# Patient Record
Sex: FEMALE | Employment: UNEMPLOYED | URBAN - METROPOLITAN AREA
[De-identification: names, ages, dates, MRNs, and addresses within clinical notes are randomized per-mention and may not be internally consistent; named-entity substitution may affect disease eponyms.]

---

## 2017-01-01 ENCOUNTER — APPOINTMENT (OUTPATIENT)
Dept: GENERAL RADIOLOGY | Age: 57
DRG: 710 | End: 2017-01-01
Attending: HOSPITALIST
Payer: MEDICAID

## 2017-01-01 ENCOUNTER — ANESTHESIA (OUTPATIENT)
Dept: SURGERY | Age: 57
DRG: 710 | End: 2017-01-01
Payer: MEDICAID

## 2017-01-01 ENCOUNTER — HOME CARE VISIT (OUTPATIENT)
Dept: SCHEDULING | Facility: HOME HEALTH | Age: 57
End: 2017-01-01
Payer: MEDICAID

## 2017-01-01 ENCOUNTER — HOSPICE ADMISSION (OUTPATIENT)
Dept: HOSPICE | Facility: HOSPICE | Age: 57
End: 2017-01-01
Payer: MEDICAID

## 2017-01-01 ENCOUNTER — APPOINTMENT (OUTPATIENT)
Dept: GENERAL RADIOLOGY | Age: 57
DRG: 710 | End: 2017-01-01
Attending: INTERNAL MEDICINE
Payer: MEDICAID

## 2017-01-01 ENCOUNTER — APPOINTMENT (OUTPATIENT)
Dept: GENERAL RADIOLOGY | Age: 57
DRG: 710 | End: 2017-01-01
Attending: UROLOGY
Payer: MEDICAID

## 2017-01-01 ENCOUNTER — ANESTHESIA EVENT (OUTPATIENT)
Dept: ICU | Age: 57
DRG: 710 | End: 2017-01-01
Payer: MEDICAID

## 2017-01-01 ENCOUNTER — APPOINTMENT (OUTPATIENT)
Dept: CT IMAGING | Age: 57
DRG: 710 | End: 2017-01-01
Attending: FAMILY MEDICINE
Payer: MEDICAID

## 2017-01-01 ENCOUNTER — APPOINTMENT (OUTPATIENT)
Dept: GENERAL RADIOLOGY | Age: 57
DRG: 710 | End: 2017-01-01
Attending: NURSE PRACTITIONER
Payer: MEDICAID

## 2017-01-01 ENCOUNTER — APPOINTMENT (OUTPATIENT)
Dept: CT IMAGING | Age: 57
DRG: 710 | End: 2017-01-01
Attending: HOSPITALIST
Payer: MEDICAID

## 2017-01-01 ENCOUNTER — APPOINTMENT (OUTPATIENT)
Dept: GENERAL RADIOLOGY | Age: 57
End: 2017-01-01
Attending: EMERGENCY MEDICINE
Payer: MEDICAID

## 2017-01-01 ENCOUNTER — HOSPITAL ENCOUNTER (INPATIENT)
Age: 57
LOS: 28 days | Discharge: HOME HEALTH | DRG: 710 | End: 2017-03-31
Attending: EMERGENCY MEDICINE | Admitting: FAMILY MEDICINE
Payer: MEDICAID

## 2017-01-01 ENCOUNTER — ANESTHESIA (OUTPATIENT)
Dept: ICU | Age: 57
DRG: 710 | End: 2017-01-01
Payer: MEDICAID

## 2017-01-01 ENCOUNTER — ANESTHESIA EVENT (OUTPATIENT)
Dept: SURGERY | Age: 57
DRG: 710 | End: 2017-01-01
Payer: MEDICAID

## 2017-01-01 ENCOUNTER — HOSPITAL ENCOUNTER (EMERGENCY)
Age: 57
Discharge: HOME | End: 2017-05-06
Attending: EMERGENCY MEDICINE
Payer: MEDICAID

## 2017-01-01 ENCOUNTER — HOME CARE VISIT (OUTPATIENT)
Dept: HOSPICE | Facility: HOSPICE | Age: 57
End: 2017-01-01
Payer: MEDICAID

## 2017-01-01 ENCOUNTER — HOSPITAL ENCOUNTER (INPATIENT)
Age: 57
LOS: 10 days | Discharge: NURSING, REHAB OR LONG-TERM CARE | DRG: 720 | End: 2017-06-30
Attending: EMERGENCY MEDICINE | Admitting: FAMILY MEDICINE
Payer: MEDICAID

## 2017-01-01 ENCOUNTER — APPOINTMENT (OUTPATIENT)
Dept: GENERAL RADIOLOGY | Age: 57
DRG: 710 | End: 2017-01-01
Attending: EMERGENCY MEDICINE
Payer: MEDICAID

## 2017-01-01 ENCOUNTER — APPOINTMENT (OUTPATIENT)
Dept: CT IMAGING | Age: 57
DRG: 720 | End: 2017-01-01
Attending: EMERGENCY MEDICINE
Payer: MEDICAID

## 2017-01-01 ENCOUNTER — APPOINTMENT (OUTPATIENT)
Dept: GENERAL RADIOLOGY | Age: 57
DRG: 720 | End: 2017-01-01
Attending: EMERGENCY MEDICINE
Payer: MEDICAID

## 2017-01-01 ENCOUNTER — HOSPITAL ENCOUNTER (EMERGENCY)
Age: 57
LOS: 1 days | End: 2017-07-07
Attending: EMERGENCY MEDICINE | Admitting: INTERNAL MEDICINE
Payer: MEDICAID

## 2017-01-01 ENCOUNTER — APPOINTMENT (OUTPATIENT)
Dept: CT IMAGING | Age: 57
DRG: 710 | End: 2017-01-01
Attending: EMERGENCY MEDICINE
Payer: MEDICAID

## 2017-01-01 VITALS
WEIGHT: 246 LBS | RESPIRATION RATE: 7 BRPM | BODY MASS INDEX: 38.53 KG/M2 | TEMPERATURE: 97.4 F | HEART RATE: 109 BPM | SYSTOLIC BLOOD PRESSURE: 121 MMHG | DIASTOLIC BLOOD PRESSURE: 17 MMHG | OXYGEN SATURATION: 67 %

## 2017-01-01 VITALS
SYSTOLIC BLOOD PRESSURE: 133 MMHG | WEIGHT: 246.91 LBS | OXYGEN SATURATION: 96 % | TEMPERATURE: 97.7 F | DIASTOLIC BLOOD PRESSURE: 81 MMHG | BODY MASS INDEX: 42.15 KG/M2 | HEART RATE: 87 BPM | HEIGHT: 64 IN | RESPIRATION RATE: 14 BRPM

## 2017-01-01 VITALS
DIASTOLIC BLOOD PRESSURE: 71 MMHG | HEIGHT: 63 IN | SYSTOLIC BLOOD PRESSURE: 106 MMHG | RESPIRATION RATE: 17 BRPM | WEIGHT: 231 LBS | OXYGEN SATURATION: 98 % | BODY MASS INDEX: 40.93 KG/M2 | HEART RATE: 86 BPM | TEMPERATURE: 98.1 F

## 2017-01-01 VITALS — HEART RATE: 80 BPM | OXYGEN SATURATION: 92 % | RESPIRATION RATE: 18 BRPM | TEMPERATURE: 98.2 F

## 2017-01-01 VITALS
HEIGHT: 64 IN | BODY MASS INDEX: 39.52 KG/M2 | TEMPERATURE: 98.2 F | OXYGEN SATURATION: 97 % | DIASTOLIC BLOOD PRESSURE: 77 MMHG | SYSTOLIC BLOOD PRESSURE: 116 MMHG | WEIGHT: 231.48 LBS | HEART RATE: 92 BPM | RESPIRATION RATE: 21 BRPM

## 2017-01-01 VITALS
OXYGEN SATURATION: 94 % | HEART RATE: 79 BPM | RESPIRATION RATE: 18 BRPM | TEMPERATURE: 97.3 F | SYSTOLIC BLOOD PRESSURE: 130 MMHG | HEIGHT: 67 IN | DIASTOLIC BLOOD PRESSURE: 70 MMHG

## 2017-01-01 DIAGNOSIS — N17.9 ACUTE RENAL FAILURE, UNSPECIFIED ACUTE RENAL FAILURE TYPE (HCC): ICD-10-CM

## 2017-01-01 DIAGNOSIS — N39.0 RECURRENT UTI: Primary | ICD-10-CM

## 2017-01-01 DIAGNOSIS — I95.9 HYPOTENSION, UNSPECIFIED HYPOTENSION TYPE: ICD-10-CM

## 2017-01-01 DIAGNOSIS — J96.20 ACUTE ON CHRONIC RESPIRATORY FAILURE, UNSPECIFIED WHETHER WITH HYPOXIA OR HYPERCAPNIA (HCC): ICD-10-CM

## 2017-01-01 DIAGNOSIS — R65.21 SEPTIC SHOCK (HCC): Primary | ICD-10-CM

## 2017-01-01 DIAGNOSIS — N39.0 ACUTE UTI (URINARY TRACT INFECTION): Primary | ICD-10-CM

## 2017-01-01 DIAGNOSIS — N30.01 ACUTE CYSTITIS WITH HEMATURIA: ICD-10-CM

## 2017-01-01 DIAGNOSIS — F41.9 ANXIETY: Chronic | ICD-10-CM

## 2017-01-01 DIAGNOSIS — A41.9 SEPTIC SHOCK (HCC): Primary | ICD-10-CM

## 2017-01-01 DIAGNOSIS — L98.429 STAGE 4 SKIN ULCER OF SACRAL REGION (HCC): ICD-10-CM

## 2017-01-01 DIAGNOSIS — C54.1 ENDOMETRIAL CANCER (HCC): ICD-10-CM

## 2017-01-01 DIAGNOSIS — E87.29 HIGH ANION GAP METABOLIC ACIDOSIS: ICD-10-CM

## 2017-01-01 DIAGNOSIS — S31.000D WOUND OF SACRAL REGION, SUBSEQUENT ENCOUNTER: ICD-10-CM

## 2017-01-01 DIAGNOSIS — R65.21 SEVERE SEPSIS WITH SEPTIC SHOCK (CODE) (HCC): ICD-10-CM

## 2017-01-01 LAB
ABO + RH BLD: NORMAL
ALBUMIN SERPL BCP-MCNC: 1.1 G/DL (ref 3.4–5)
ALBUMIN SERPL BCP-MCNC: 1.4 G/DL (ref 3.4–5)
ALBUMIN SERPL BCP-MCNC: 1.4 G/DL (ref 3.4–5)
ALBUMIN SERPL BCP-MCNC: 1.5 G/DL (ref 3.4–5)
ALBUMIN SERPL BCP-MCNC: 1.6 G/DL (ref 3.4–5)
ALBUMIN SERPL BCP-MCNC: 1.9 G/DL (ref 3.4–5)
ALBUMIN SERPL BCP-MCNC: 2 G/DL (ref 3.4–5)
ALBUMIN SERPL BCP-MCNC: 2.7 G/DL (ref 3.4–5)
ALBUMIN SERPL BCP-MCNC: 2.9 G/DL (ref 3.4–5)
ALBUMIN SERPL BCP-MCNC: 3 G/DL (ref 3.4–5)
ALBUMIN SERPL BCP-MCNC: 3 G/DL (ref 3.4–5)
ALBUMIN SERPL BCP-MCNC: 3.4 G/DL (ref 3.4–5)
ALBUMIN SERPL BCP-MCNC: 3.9 G/DL (ref 3.4–5)
ALBUMIN/GLOB SERPL: 0.2 {RATIO} (ref 0.8–1.7)
ALBUMIN/GLOB SERPL: 0.3 {RATIO} (ref 0.8–1.7)
ALBUMIN/GLOB SERPL: 0.4 {RATIO} (ref 0.8–1.7)
ALBUMIN/GLOB SERPL: 0.4 {RATIO} (ref 0.8–1.7)
ALBUMIN/GLOB SERPL: 0.5 {RATIO} (ref 0.8–1.7)
ALBUMIN/GLOB SERPL: 0.7 {RATIO} (ref 0.8–1.7)
ALBUMIN/GLOB SERPL: 0.8 {RATIO} (ref 0.8–1.7)
ALBUMIN/GLOB SERPL: 0.9 {RATIO} (ref 0.8–1.7)
ALP SERPL-CCNC: 141 U/L (ref 45–117)
ALP SERPL-CCNC: 144 U/L (ref 45–117)
ALP SERPL-CCNC: 146 U/L (ref 45–117)
ALP SERPL-CCNC: 163 U/L (ref 45–117)
ALP SERPL-CCNC: 191 U/L (ref 45–117)
ALP SERPL-CCNC: 198 U/L (ref 45–117)
ALP SERPL-CCNC: 227 U/L (ref 45–117)
ALP SERPL-CCNC: 231 U/L (ref 45–117)
ALP SERPL-CCNC: 232 U/L (ref 45–117)
ALP SERPL-CCNC: 294 U/L (ref 45–117)
ALP SERPL-CCNC: 370 U/L (ref 45–117)
ALP SERPL-CCNC: 450 U/L (ref 45–117)
ALT SERPL-CCNC: 10 U/L (ref 13–56)
ALT SERPL-CCNC: 10 U/L (ref 13–56)
ALT SERPL-CCNC: 12 U/L (ref 13–56)
ALT SERPL-CCNC: 20 U/L (ref 13–56)
ALT SERPL-CCNC: 21 U/L (ref 13–56)
ALT SERPL-CCNC: 21 U/L (ref 13–56)
ALT SERPL-CCNC: 22 U/L (ref 13–56)
ALT SERPL-CCNC: 22 U/L (ref 13–56)
ALT SERPL-CCNC: 26 U/L (ref 13–56)
ALT SERPL-CCNC: 28 U/L (ref 13–56)
ALT SERPL-CCNC: 42 U/L (ref 13–56)
ALT SERPL-CCNC: 46 U/L (ref 13–56)
ANION GAP BLD CALC-SCNC: 10 MMOL/L (ref 3–18)
ANION GAP BLD CALC-SCNC: 11 MMOL/L (ref 3–18)
ANION GAP BLD CALC-SCNC: 12 MMOL/L (ref 3–18)
ANION GAP BLD CALC-SCNC: 12 MMOL/L (ref 3–18)
ANION GAP BLD CALC-SCNC: 13 MMOL/L (ref 3–18)
ANION GAP BLD CALC-SCNC: 14 MMOL/L (ref 3–18)
ANION GAP BLD CALC-SCNC: 15 MMOL/L (ref 3–18)
ANION GAP BLD CALC-SCNC: 16 MMOL/L (ref 3–18)
ANION GAP BLD CALC-SCNC: 17 MMOL/L (ref 10–20)
ANION GAP BLD CALC-SCNC: 17 MMOL/L (ref 3–18)
ANION GAP BLD CALC-SCNC: 17 MMOL/L (ref 3–18)
ANION GAP BLD CALC-SCNC: 24 MMOL/L (ref 3–18)
ANION GAP BLD CALC-SCNC: 26 MMOL/L (ref 3–18)
ANION GAP BLD CALC-SCNC: 5 MMOL/L (ref 3–18)
ANION GAP BLD CALC-SCNC: 6 MMOL/L (ref 3–18)
ANION GAP BLD CALC-SCNC: 7 MMOL/L (ref 3–18)
ANION GAP BLD CALC-SCNC: 7 MMOL/L (ref 3–18)
ANION GAP BLD CALC-SCNC: 8 MMOL/L (ref 3–18)
ANION GAP BLD CALC-SCNC: 9 MMOL/L (ref 3–18)
ANION GAP BLD CALC-SCNC: 9 MMOL/L (ref 3–18)
APPEARANCE UR: ABNORMAL
APTT PPP: 33.8 SEC (ref 23–36.4)
APTT PPP: 48.6 SEC (ref 23–36.4)
ARTERIAL PATENCY WRIST A: ABNORMAL
ARTERIAL PATENCY WRIST A: YES
AST SERPL W P-5'-P-CCNC: 11 U/L (ref 15–37)
AST SERPL W P-5'-P-CCNC: 16 U/L (ref 15–37)
AST SERPL W P-5'-P-CCNC: 20 U/L (ref 15–37)
AST SERPL W P-5'-P-CCNC: 22 U/L (ref 15–37)
AST SERPL W P-5'-P-CCNC: 23 U/L (ref 15–37)
AST SERPL W P-5'-P-CCNC: 24 U/L (ref 15–37)
AST SERPL W P-5'-P-CCNC: 24 U/L (ref 15–37)
AST SERPL W P-5'-P-CCNC: 26 U/L (ref 15–37)
AST SERPL W P-5'-P-CCNC: 29 U/L (ref 15–37)
AST SERPL W P-5'-P-CCNC: 33 U/L (ref 15–37)
AST SERPL W P-5'-P-CCNC: 68 U/L (ref 15–37)
AST SERPL W P-5'-P-CCNC: 82 U/L (ref 15–37)
ATRIAL RATE: 103 BPM
ATRIAL RATE: 104 BPM
ATRIAL RATE: 127 BPM
BACTERIA SPEC CULT: ABNORMAL
BACTERIA SPEC CULT: NORMAL
BACTERIA URNS QL MICRO: ABNORMAL /HPF
BASE DEFICIT BLD-SCNC: 10 MMOL/L
BASE DEFICIT BLD-SCNC: 13 MMOL/L
BASE DEFICIT BLD-SCNC: 14 MMOL/L
BASE DEFICIT BLD-SCNC: 14 MMOL/L
BASE DEFICIT BLD-SCNC: 15 MMOL/L
BASE DEFICIT BLD-SCNC: 15 MMOL/L
BASE DEFICIT BLD-SCNC: 18 MMOL/L
BASE DEFICIT BLD-SCNC: 6 MMOL/L
BASOPHILS # BLD AUTO: 0 K/UL (ref 0–0.06)
BASOPHILS # BLD AUTO: 0 K/UL (ref 0–0.1)
BASOPHILS # BLD AUTO: 0.1 K/UL (ref 0–0.1)
BASOPHILS # BLD: 0 % (ref 0–2)
BASOPHILS # BLD: 0 % (ref 0–3)
BASOPHILS # BLD: 1 % (ref 0–3)
BDY SITE: ABNORMAL
BILIRUB DIRECT SERPL-MCNC: <0.1 MG/DL (ref 0–0.2)
BILIRUB SERPL-MCNC: 0.2 MG/DL (ref 0.2–1)
BILIRUB SERPL-MCNC: 0.3 MG/DL (ref 0.2–1)
BILIRUB SERPL-MCNC: 0.3 MG/DL (ref 0.2–1)
BILIRUB SERPL-MCNC: 0.4 MG/DL (ref 0.2–1)
BILIRUB SERPL-MCNC: 0.5 MG/DL (ref 0.2–1)
BILIRUB SERPL-MCNC: 0.5 MG/DL (ref 0.2–1)
BILIRUB SERPL-MCNC: 0.6 MG/DL (ref 0.2–1)
BILIRUB SERPL-MCNC: 0.9 MG/DL (ref 0.2–1)
BILIRUB SERPL-MCNC: 1.8 MG/DL (ref 0.2–1)
BILIRUB SERPL-MCNC: 2.2 MG/DL (ref 0.2–1)
BILIRUB UR QL: ABNORMAL
BILIRUB UR QL: NEGATIVE
BLASTS NFR BLD: 0 %
BLD PROD TYP BPU: NORMAL
BLOOD GROUP ANTIBODIES SERPL: NORMAL
BODY TEMPERATURE: 37.5
BODY TEMPERATURE: 37.8
BODY TEMPERATURE: 38.1
BODY TEMPERATURE: 97.7
BODY TEMPERATURE: 98.6
BODY TEMPERATURE: 99.2
BPU ID: NORMAL
BUN BLD-MCNC: 38 MG/DL (ref 7–18)
BUN SERPL-MCNC: 113 MG/DL (ref 7–18)
BUN SERPL-MCNC: 13 MG/DL (ref 7–18)
BUN SERPL-MCNC: 131 MG/DL (ref 7–18)
BUN SERPL-MCNC: 14 MG/DL (ref 7–18)
BUN SERPL-MCNC: 148 MG/DL (ref 7–18)
BUN SERPL-MCNC: 15 MG/DL (ref 7–18)
BUN SERPL-MCNC: 152 MG/DL (ref 7–18)
BUN SERPL-MCNC: 155 MG/DL (ref 7–18)
BUN SERPL-MCNC: 167 MG/DL (ref 7–18)
BUN SERPL-MCNC: 18 MG/DL (ref 7–18)
BUN SERPL-MCNC: 19 MG/DL (ref 7–18)
BUN SERPL-MCNC: 20 MG/DL (ref 7–18)
BUN SERPL-MCNC: 20 MG/DL (ref 7–18)
BUN SERPL-MCNC: 21 MG/DL (ref 7–18)
BUN SERPL-MCNC: 22 MG/DL (ref 7–18)
BUN SERPL-MCNC: 22 MG/DL (ref 7–18)
BUN SERPL-MCNC: 23 MG/DL (ref 7–18)
BUN SERPL-MCNC: 24 MG/DL (ref 7–18)
BUN SERPL-MCNC: 24 MG/DL (ref 7–18)
BUN SERPL-MCNC: 25 MG/DL (ref 7–18)
BUN SERPL-MCNC: 26 MG/DL (ref 7–18)
BUN SERPL-MCNC: 27 MG/DL (ref 7–18)
BUN SERPL-MCNC: 28 MG/DL (ref 7–18)
BUN SERPL-MCNC: 29 MG/DL (ref 7–18)
BUN SERPL-MCNC: 31 MG/DL (ref 7–18)
BUN SERPL-MCNC: 34 MG/DL (ref 7–18)
BUN SERPL-MCNC: 37 MG/DL (ref 7–18)
BUN SERPL-MCNC: 38 MG/DL (ref 7–18)
BUN SERPL-MCNC: 42 MG/DL (ref 7–18)
BUN SERPL-MCNC: 43 MG/DL (ref 7–18)
BUN SERPL-MCNC: 44 MG/DL (ref 7–18)
BUN SERPL-MCNC: 45 MG/DL (ref 7–18)
BUN SERPL-MCNC: 47 MG/DL (ref 7–18)
BUN SERPL-MCNC: 50 MG/DL (ref 7–18)
BUN SERPL-MCNC: 51 MG/DL (ref 7–18)
BUN SERPL-MCNC: 51 MG/DL (ref 7–18)
BUN SERPL-MCNC: 66 MG/DL (ref 7–18)
BUN SERPL-MCNC: 71 MG/DL (ref 7–18)
BUN SERPL-MCNC: 90 MG/DL (ref 7–18)
BUN/CREAT SERPL: 13 (ref 12–20)
BUN/CREAT SERPL: 18 (ref 12–20)
BUN/CREAT SERPL: 19 (ref 12–20)
BUN/CREAT SERPL: 19 (ref 12–20)
BUN/CREAT SERPL: 20 (ref 12–20)
BUN/CREAT SERPL: 21 (ref 12–20)
BUN/CREAT SERPL: 22 (ref 12–20)
BUN/CREAT SERPL: 24 (ref 12–20)
BUN/CREAT SERPL: 25 (ref 12–20)
BUN/CREAT SERPL: 26 (ref 12–20)
BUN/CREAT SERPL: 27 (ref 12–20)
BUN/CREAT SERPL: 28 (ref 12–20)
BUN/CREAT SERPL: 28 (ref 12–20)
BUN/CREAT SERPL: 29 (ref 12–20)
BUN/CREAT SERPL: 31 (ref 12–20)
BUN/CREAT SERPL: 32 (ref 12–20)
BUN/CREAT SERPL: 33 (ref 12–20)
BUN/CREAT SERPL: 35 (ref 12–20)
BUN/CREAT SERPL: 37 (ref 12–20)
BUN/CREAT SERPL: 40 (ref 12–20)
CA-I BLD-MCNC: 1.32 MMOL/L (ref 1.12–1.32)
CA-I SERPL-SCNC: 0.97 MMOL/L (ref 1.12–1.32)
CA-I SERPL-SCNC: 0.98 MMOL/L (ref 1.12–1.32)
CA-I SERPL-SCNC: 0.98 MMOL/L (ref 1.12–1.32)
CA-I SERPL-SCNC: 0.99 MMOL/L (ref 1.12–1.32)
CA-I SERPL-SCNC: 0.99 MMOL/L (ref 1.12–1.32)
CA-I SERPL-SCNC: 1.01 MMOL/L (ref 1.12–1.32)
CA-I SERPL-SCNC: 1.02 MMOL/L (ref 1.12–1.32)
CA-I SERPL-SCNC: 1.03 MMOL/L (ref 1.12–1.32)
CA-I SERPL-SCNC: 1.04 MMOL/L (ref 1.12–1.32)
CA-I SERPL-SCNC: 1.05 MMOL/L (ref 1.12–1.32)
CA-I SERPL-SCNC: 1.06 MMOL/L (ref 1.12–1.32)
CA-I SERPL-SCNC: 1.07 MMOL/L (ref 1.12–1.32)
CA-I SERPL-SCNC: 1.08 MMOL/L (ref 1.12–1.32)
CA-I SERPL-SCNC: 1.08 MMOL/L (ref 1.12–1.32)
CA-I SERPL-SCNC: 1.09 MMOL/L (ref 1.12–1.32)
CA-I SERPL-SCNC: 1.11 MMOL/L (ref 1.12–1.32)
CA-I SERPL-SCNC: 1.11 MMOL/L (ref 1.12–1.32)
CA-I SERPL-SCNC: 1.13 MMOL/L (ref 1.12–1.32)
CA-I SERPL-SCNC: 1.14 MMOL/L (ref 1.12–1.32)
CA-I SERPL-SCNC: 1.14 MMOL/L (ref 1.12–1.32)
CA-I SERPL-SCNC: 1.16 MMOL/L (ref 1.12–1.32)
CA-I SERPL-SCNC: 1.16 MMOL/L (ref 1.12–1.32)
CA-I SERPL-SCNC: 1.17 MMOL/L (ref 1.12–1.32)
CA-I SERPL-SCNC: 1.17 MMOL/L (ref 1.12–1.32)
CALCIUM SERPL-MCNC: 7 MG/DL (ref 8.5–10.1)
CALCIUM SERPL-MCNC: 7 MG/DL (ref 8.5–10.1)
CALCIUM SERPL-MCNC: 7.1 MG/DL (ref 8.5–10.1)
CALCIUM SERPL-MCNC: 7.1 MG/DL (ref 8.5–10.1)
CALCIUM SERPL-MCNC: 7.2 MG/DL (ref 8.5–10.1)
CALCIUM SERPL-MCNC: 7.3 MG/DL (ref 8.5–10.1)
CALCIUM SERPL-MCNC: 7.4 MG/DL (ref 8.5–10.1)
CALCIUM SERPL-MCNC: 7.5 MG/DL (ref 8.5–10.1)
CALCIUM SERPL-MCNC: 7.6 MG/DL (ref 8.5–10.1)
CALCIUM SERPL-MCNC: 7.7 MG/DL (ref 8.5–10.1)
CALCIUM SERPL-MCNC: 7.7 MG/DL (ref 8.5–10.1)
CALCIUM SERPL-MCNC: 7.8 MG/DL (ref 8.5–10.1)
CALCIUM SERPL-MCNC: 7.9 MG/DL (ref 8.5–10.1)
CALCIUM SERPL-MCNC: 8.1 MG/DL (ref 8.5–10.1)
CALCIUM SERPL-MCNC: 8.3 MG/DL (ref 8.5–10.1)
CALCIUM SERPL-MCNC: 8.4 MG/DL (ref 8.5–10.1)
CALCULATED P AXIS, ECG09: 25 DEGREES
CALCULATED P AXIS, ECG09: 51 DEGREES
CALCULATED P AXIS, ECG09: 55 DEGREES
CALCULATED R AXIS, ECG10: 34 DEGREES
CALCULATED R AXIS, ECG10: 39 DEGREES
CALCULATED R AXIS, ECG10: 6 DEGREES
CALCULATED T AXIS, ECG11: 18 DEGREES
CALCULATED T AXIS, ECG11: 63 DEGREES
CALCULATED T AXIS, ECG11: 65 DEGREES
CALLED TO:,BCALL1: NORMAL
CALLED TO:,BCALL2: NORMAL
CALLED TO:,BCALL3: NORMAL
CHLORIDE BLD-SCNC: 115 MMOL/L (ref 100–108)
CHLORIDE SERPL-SCNC: 100 MMOL/L (ref 100–108)
CHLORIDE SERPL-SCNC: 101 MMOL/L (ref 100–108)
CHLORIDE SERPL-SCNC: 101 MMOL/L (ref 100–108)
CHLORIDE SERPL-SCNC: 102 MMOL/L (ref 100–108)
CHLORIDE SERPL-SCNC: 103 MMOL/L (ref 100–108)
CHLORIDE SERPL-SCNC: 103 MMOL/L (ref 100–108)
CHLORIDE SERPL-SCNC: 104 MMOL/L (ref 100–108)
CHLORIDE SERPL-SCNC: 105 MMOL/L (ref 100–108)
CHLORIDE SERPL-SCNC: 106 MMOL/L (ref 100–108)
CHLORIDE SERPL-SCNC: 108 MMOL/L (ref 100–108)
CHLORIDE SERPL-SCNC: 108 MMOL/L (ref 100–108)
CHLORIDE SERPL-SCNC: 109 MMOL/L (ref 100–108)
CHLORIDE SERPL-SCNC: 110 MMOL/L (ref 100–108)
CHLORIDE SERPL-SCNC: 111 MMOL/L (ref 100–108)
CHLORIDE SERPL-SCNC: 112 MMOL/L (ref 100–108)
CHLORIDE SERPL-SCNC: 113 MMOL/L (ref 100–108)
CHLORIDE SERPL-SCNC: 114 MMOL/L (ref 100–108)
CHLORIDE SERPL-SCNC: 116 MMOL/L (ref 100–108)
CHLORIDE SERPL-SCNC: 92 MMOL/L (ref 100–108)
CHLORIDE SERPL-SCNC: 92 MMOL/L (ref 100–108)
CHLORIDE SERPL-SCNC: 93 MMOL/L (ref 100–108)
CHLORIDE SERPL-SCNC: 94 MMOL/L (ref 100–108)
CHLORIDE SERPL-SCNC: 94 MMOL/L (ref 100–108)
CHLORIDE SERPL-SCNC: 95 MMOL/L (ref 100–108)
CHLORIDE SERPL-SCNC: 96 MMOL/L (ref 100–108)
CHLORIDE SERPL-SCNC: 96 MMOL/L (ref 100–108)
CHLORIDE SERPL-SCNC: 99 MMOL/L (ref 100–108)
CHLORIDE SERPL-SCNC: 99 MMOL/L (ref 100–108)
CK MB CFR SERPL CALC: 2.9 % (ref 0–4)
CK MB CFR SERPL CALC: 3 % (ref 0–4)
CK MB CFR SERPL CALC: 3 % (ref 0–4)
CK MB CFR SERPL CALC: 3.2 % (ref 0–4)
CK MB CFR SERPL CALC: 3.4 % (ref 0–4)
CK MB CFR SERPL CALC: 3.5 % (ref 0–4)
CK MB CFR SERPL CALC: 3.6 % (ref 0–4)
CK MB CFR SERPL CALC: 3.6 % (ref 0–4)
CK MB CFR SERPL CALC: 4.2 % (ref 0–4)
CK MB CFR SERPL CALC: 4.5 % (ref 0–4)
CK MB CFR SERPL CALC: 7.4 % (ref 0–4)
CK MB CFR SERPL CALC: 7.9 % (ref 0–4)
CK MB CFR SERPL CALC: 8.6 % (ref 0–4)
CK MB SERPL-MCNC: 10.4 NG/ML (ref 5–25)
CK MB SERPL-MCNC: 11.7 NG/ML (ref 5–25)
CK MB SERPL-MCNC: 15.9 NG/ML (ref 5–25)
CK MB SERPL-MCNC: 22.5 NG/ML (ref 5–25)
CK MB SERPL-MCNC: 37.2 NG/ML (ref 5–25)
CK MB SERPL-MCNC: 5.7 NG/ML (ref 5–25)
CK MB SERPL-MCNC: 6 NG/ML (ref 5–25)
CK MB SERPL-MCNC: 6 NG/ML (ref 5–25)
CK MB SERPL-MCNC: 7 NG/ML (ref 5–25)
CK MB SERPL-MCNC: 7.2 NG/ML (ref 5–25)
CK MB SERPL-MCNC: 8.3 NG/ML (ref 5–25)
CK MB SERPL-MCNC: 8.8 NG/ML (ref 5–25)
CK MB SERPL-MCNC: 9.5 NG/ML (ref 5–25)
CK SERPL-CCNC: 128 U/L (ref 26–192)
CK SERPL-CCNC: 178 U/L (ref 26–192)
CK SERPL-CCNC: 198 U/L (ref 26–192)
CK SERPL-CCNC: 200 U/L (ref 26–192)
CK SERPL-CCNC: 206 U/L (ref 26–192)
CK SERPL-CCNC: 209 U/L (ref 26–192)
CK SERPL-CCNC: 210 U/L (ref 26–192)
CK SERPL-CCNC: 215 U/L (ref 26–192)
CK SERPL-CCNC: 229 U/L (ref 26–192)
CK SERPL-CCNC: 229 U/L (ref 26–192)
CK SERPL-CCNC: 262 U/L (ref 26–192)
CK SERPL-CCNC: 263 U/L (ref 26–192)
CK SERPL-CCNC: 31 U/L (ref 26–192)
CK SERPL-CCNC: 37 U/L (ref 26–192)
CK SERPL-CCNC: 39 U/L (ref 26–192)
CK SERPL-CCNC: 396 U/L (ref 26–192)
CK SERPL-CCNC: 42 U/L (ref 26–192)
CK SERPL-CCNC: 468 U/L (ref 26–192)
CK SERPL-CCNC: 57 U/L (ref 26–192)
CO2 BLD-SCNC: 16 MMOL/L (ref 19–24)
CO2 SERPL-SCNC: 12 MMOL/L (ref 21–32)
CO2 SERPL-SCNC: 12 MMOL/L (ref 21–32)
CO2 SERPL-SCNC: 13 MMOL/L (ref 21–32)
CO2 SERPL-SCNC: 14 MMOL/L (ref 21–32)
CO2 SERPL-SCNC: 15 MMOL/L (ref 21–32)
CO2 SERPL-SCNC: 16 MMOL/L (ref 21–32)
CO2 SERPL-SCNC: 17 MMOL/L (ref 21–32)
CO2 SERPL-SCNC: 17 MMOL/L (ref 21–32)
CO2 SERPL-SCNC: 18 MMOL/L (ref 21–32)
CO2 SERPL-SCNC: 18 MMOL/L (ref 21–32)
CO2 SERPL-SCNC: 19 MMOL/L (ref 21–32)
CO2 SERPL-SCNC: 20 MMOL/L (ref 21–32)
CO2 SERPL-SCNC: 22 MMOL/L (ref 21–32)
CO2 SERPL-SCNC: 22 MMOL/L (ref 21–32)
CO2 SERPL-SCNC: 23 MMOL/L (ref 21–32)
CO2 SERPL-SCNC: 25 MMOL/L (ref 21–32)
CO2 SERPL-SCNC: 26 MMOL/L (ref 21–32)
CO2 SERPL-SCNC: 29 MMOL/L (ref 21–32)
CO2 SERPL-SCNC: 31 MMOL/L (ref 21–32)
CO2 SERPL-SCNC: 32 MMOL/L (ref 21–32)
CO2 SERPL-SCNC: 33 MMOL/L (ref 21–32)
CO2 SERPL-SCNC: 33 MMOL/L (ref 21–32)
CO2 SERPL-SCNC: 34 MMOL/L (ref 21–32)
CO2 SERPL-SCNC: 36 MMOL/L (ref 21–32)
CO2 SERPL-SCNC: 36 MMOL/L (ref 21–32)
CO2 SERPL-SCNC: 39 MMOL/L (ref 21–32)
CO2 SERPL-SCNC: 9 MMOL/L (ref 21–32)
CO2 SERPL-SCNC: 9 MMOL/L (ref 21–32)
COLOR UR: ABNORMAL
COLOR UR: YELLOW
CORTIS SERPL-MCNC: 68 UG/DL (ref 3.09–22.4)
CREAT SERPL-MCNC: 0.61 MG/DL (ref 0.6–1.3)
CREAT SERPL-MCNC: 0.65 MG/DL (ref 0.6–1.3)
CREAT SERPL-MCNC: 0.66 MG/DL (ref 0.6–1.3)
CREAT SERPL-MCNC: 0.69 MG/DL (ref 0.6–1.3)
CREAT SERPL-MCNC: 0.71 MG/DL (ref 0.6–1.3)
CREAT SERPL-MCNC: 0.74 MG/DL (ref 0.6–1.3)
CREAT SERPL-MCNC: 0.76 MG/DL (ref 0.6–1.3)
CREAT SERPL-MCNC: 0.78 MG/DL (ref 0.6–1.3)
CREAT SERPL-MCNC: 0.8 MG/DL (ref 0.6–1.3)
CREAT SERPL-MCNC: 0.8 MG/DL (ref 0.6–1.3)
CREAT SERPL-MCNC: 0.83 MG/DL (ref 0.6–1.3)
CREAT SERPL-MCNC: 0.84 MG/DL (ref 0.6–1.3)
CREAT SERPL-MCNC: 0.85 MG/DL (ref 0.6–1.3)
CREAT SERPL-MCNC: 0.93 MG/DL (ref 0.6–1.3)
CREAT SERPL-MCNC: 0.96 MG/DL (ref 0.6–1.3)
CREAT SERPL-MCNC: 1.02 MG/DL (ref 0.6–1.3)
CREAT SERPL-MCNC: 1.06 MG/DL (ref 0.6–1.3)
CREAT SERPL-MCNC: 1.16 MG/DL (ref 0.6–1.3)
CREAT SERPL-MCNC: 1.18 MG/DL (ref 0.6–1.3)
CREAT SERPL-MCNC: 1.25 MG/DL (ref 0.6–1.3)
CREAT SERPL-MCNC: 1.3 MG/DL (ref 0.6–1.3)
CREAT SERPL-MCNC: 1.32 MG/DL (ref 0.6–1.3)
CREAT SERPL-MCNC: 1.32 MG/DL (ref 0.6–1.3)
CREAT SERPL-MCNC: 1.36 MG/DL (ref 0.6–1.3)
CREAT SERPL-MCNC: 1.59 MG/DL (ref 0.6–1.3)
CREAT SERPL-MCNC: 1.65 MG/DL (ref 0.6–1.3)
CREAT SERPL-MCNC: 1.66 MG/DL (ref 0.6–1.3)
CREAT SERPL-MCNC: 1.77 MG/DL (ref 0.6–1.3)
CREAT SERPL-MCNC: 1.78 MG/DL (ref 0.6–1.3)
CREAT SERPL-MCNC: 1.82 MG/DL (ref 0.6–1.3)
CREAT SERPL-MCNC: 1.91 MG/DL (ref 0.6–1.3)
CREAT SERPL-MCNC: 1.97 MG/DL (ref 0.6–1.3)
CREAT SERPL-MCNC: 2.03 MG/DL (ref 0.6–1.3)
CREAT SERPL-MCNC: 2.04 MG/DL (ref 0.6–1.3)
CREAT SERPL-MCNC: 2.22 MG/DL (ref 0.6–1.3)
CREAT SERPL-MCNC: 2.86 MG/DL (ref 0.6–1.3)
CREAT SERPL-MCNC: 3.27 MG/DL (ref 0.6–1.3)
CREAT SERPL-MCNC: 3.38 MG/DL (ref 0.6–1.3)
CREAT SERPL-MCNC: 4.14 MG/DL (ref 0.6–1.3)
CREAT SERPL-MCNC: 4.55 MG/DL (ref 0.6–1.3)
CREAT SERPL-MCNC: 5.31 MG/DL (ref 0.6–1.3)
CREAT SERPL-MCNC: 6.1 MG/DL (ref 0.6–1.3)
CREAT SERPL-MCNC: 6.55 MG/DL (ref 0.6–1.3)
CREAT UR-MCNC: 3.2 MG/DL (ref 0.6–1.3)
CROSSMATCH RESULT,%XM: NORMAL
CRP SERPL-MCNC: 22 MG/DL (ref 0–0.3)
DIAGNOSIS, 93000: NORMAL
DIFFERENTIAL METHOD BLD: ABNORMAL
EOSINOPHIL # BLD: 0 K/UL (ref 0–0.4)
EOSINOPHIL # BLD: 0.1 K/UL (ref 0–0.4)
EOSINOPHIL # BLD: 0.3 K/UL (ref 0–0.4)
EOSINOPHIL NFR BLD: 0 % (ref 0–5)
EOSINOPHIL NFR BLD: 1 % (ref 0–5)
EOSINOPHIL NFR BLD: 2 % (ref 0–5)
EOSINOPHIL NFR BLD: 2 % (ref 0–5)
EPITH CASTS URNS QL MICRO: 0 /LPF (ref 0–5)
EPITH CASTS URNS QL MICRO: ABNORMAL /LPF (ref 0–5)
EPITH CASTS URNS QL MICRO: NEGATIVE /LPF (ref 0–5)
EPITH CASTS URNS QL MICRO: NEGATIVE /LPF (ref 0–5)
ERYTHROCYTE [DISTWIDTH] IN BLOOD BY AUTOMATED COUNT: 15.4 % (ref 11.6–14.5)
ERYTHROCYTE [DISTWIDTH] IN BLOOD BY AUTOMATED COUNT: 15.6 % (ref 11.6–14.5)
ERYTHROCYTE [DISTWIDTH] IN BLOOD BY AUTOMATED COUNT: 15.7 % (ref 11.6–14.5)
ERYTHROCYTE [DISTWIDTH] IN BLOOD BY AUTOMATED COUNT: 15.8 % (ref 11.6–14.5)
ERYTHROCYTE [DISTWIDTH] IN BLOOD BY AUTOMATED COUNT: 15.9 % (ref 11.6–14.5)
ERYTHROCYTE [DISTWIDTH] IN BLOOD BY AUTOMATED COUNT: 15.9 % (ref 11.6–14.5)
ERYTHROCYTE [DISTWIDTH] IN BLOOD BY AUTOMATED COUNT: 16 % (ref 11.6–14.5)
ERYTHROCYTE [DISTWIDTH] IN BLOOD BY AUTOMATED COUNT: 16.1 % (ref 11.6–14.5)
ERYTHROCYTE [DISTWIDTH] IN BLOOD BY AUTOMATED COUNT: 16.2 % (ref 11.6–14.5)
ERYTHROCYTE [DISTWIDTH] IN BLOOD BY AUTOMATED COUNT: 16.2 % (ref 11.6–14.5)
ERYTHROCYTE [DISTWIDTH] IN BLOOD BY AUTOMATED COUNT: 16.3 % (ref 11.6–14.5)
ERYTHROCYTE [DISTWIDTH] IN BLOOD BY AUTOMATED COUNT: 16.4 % (ref 11.6–14.5)
ERYTHROCYTE [DISTWIDTH] IN BLOOD BY AUTOMATED COUNT: 16.5 % (ref 11.6–14.5)
ERYTHROCYTE [DISTWIDTH] IN BLOOD BY AUTOMATED COUNT: 16.6 % (ref 11.6–14.5)
ERYTHROCYTE [DISTWIDTH] IN BLOOD BY AUTOMATED COUNT: 16.7 % (ref 11.6–14.5)
ERYTHROCYTE [DISTWIDTH] IN BLOOD BY AUTOMATED COUNT: 16.8 % (ref 11.6–14.5)
ERYTHROCYTE [DISTWIDTH] IN BLOOD BY AUTOMATED COUNT: 16.9 % (ref 11.6–14.5)
ERYTHROCYTE [DISTWIDTH] IN BLOOD BY AUTOMATED COUNT: 16.9 % (ref 11.6–14.5)
ERYTHROCYTE [DISTWIDTH] IN BLOOD BY AUTOMATED COUNT: 17 % (ref 11.6–14.5)
ERYTHROCYTE [DISTWIDTH] IN BLOOD BY AUTOMATED COUNT: 17.2 % (ref 11.6–14.5)
ERYTHROCYTE [DISTWIDTH] IN BLOOD BY AUTOMATED COUNT: 17.4 % (ref 11.6–14.5)
ERYTHROCYTE [DISTWIDTH] IN BLOOD BY AUTOMATED COUNT: 17.4 % (ref 11.6–14.5)
ERYTHROCYTE [DISTWIDTH] IN BLOOD BY AUTOMATED COUNT: 17.5 % (ref 11.6–14.5)
ERYTHROCYTE [DISTWIDTH] IN BLOOD BY AUTOMATED COUNT: 17.8 % (ref 11.6–14.5)
ERYTHROCYTE [DISTWIDTH] IN BLOOD BY AUTOMATED COUNT: 17.8 % (ref 11.6–14.5)
ERYTHROCYTE [DISTWIDTH] IN BLOOD BY AUTOMATED COUNT: 18 % (ref 11.6–14.5)
ERYTHROCYTE [DISTWIDTH] IN BLOOD BY AUTOMATED COUNT: 18.1 % (ref 11.6–14.5)
ERYTHROCYTE [DISTWIDTH] IN BLOOD BY AUTOMATED COUNT: 18.2 % (ref 11.6–14.5)
ERYTHROCYTE [SEDIMENTATION RATE] IN BLOOD: >140 MM/HR (ref 0–30)
EST. AVERAGE GLUCOSE BLD GHB EST-MCNC: 108 MG/DL
EST. AVERAGE GLUCOSE BLD GHB EST-MCNC: ABNORMAL MG/DL
FLUAV AG NPH QL IA: NEGATIVE
FLUBV AG NOSE QL IA: NEGATIVE
GAS FLOW.O2 O2 DELIVERY SYS: ABNORMAL L/MIN
GAS FLOW.O2 SETTING OXYMISER: 12 BPM
GAS FLOW.O2 SETTING OXYMISER: 14 BPM
GLOBULIN SER CALC-MCNC: 3.7 G/DL (ref 2–4)
GLOBULIN SER CALC-MCNC: 3.8 G/DL (ref 2–4)
GLOBULIN SER CALC-MCNC: 3.8 G/DL (ref 2–4)
GLOBULIN SER CALC-MCNC: 3.9 G/DL (ref 2–4)
GLOBULIN SER CALC-MCNC: 3.9 G/DL (ref 2–4)
GLOBULIN SER CALC-MCNC: 4 G/DL (ref 2–4)
GLOBULIN SER CALC-MCNC: 4.2 G/DL (ref 2–4)
GLOBULIN SER CALC-MCNC: 4.5 G/DL (ref 2–4)
GLOBULIN SER CALC-MCNC: 4.6 G/DL (ref 2–4)
GLOBULIN SER CALC-MCNC: 5.1 G/DL (ref 2–4)
GLUCOSE BLD STRIP.AUTO-MCNC: 100 MG/DL (ref 70–110)
GLUCOSE BLD STRIP.AUTO-MCNC: 101 MG/DL (ref 70–110)
GLUCOSE BLD STRIP.AUTO-MCNC: 106 MG/DL (ref 70–110)
GLUCOSE BLD STRIP.AUTO-MCNC: 107 MG/DL (ref 70–110)
GLUCOSE BLD STRIP.AUTO-MCNC: 108 MG/DL (ref 70–110)
GLUCOSE BLD STRIP.AUTO-MCNC: 109 MG/DL (ref 70–110)
GLUCOSE BLD STRIP.AUTO-MCNC: 110 MG/DL (ref 70–110)
GLUCOSE BLD STRIP.AUTO-MCNC: 110 MG/DL (ref 70–110)
GLUCOSE BLD STRIP.AUTO-MCNC: 111 MG/DL (ref 70–110)
GLUCOSE BLD STRIP.AUTO-MCNC: 112 MG/DL (ref 70–110)
GLUCOSE BLD STRIP.AUTO-MCNC: 114 MG/DL (ref 70–110)
GLUCOSE BLD STRIP.AUTO-MCNC: 116 MG/DL (ref 70–110)
GLUCOSE BLD STRIP.AUTO-MCNC: 117 MG/DL (ref 70–110)
GLUCOSE BLD STRIP.AUTO-MCNC: 117 MG/DL (ref 70–110)
GLUCOSE BLD STRIP.AUTO-MCNC: 119 MG/DL (ref 70–110)
GLUCOSE BLD STRIP.AUTO-MCNC: 122 MG/DL (ref 70–110)
GLUCOSE BLD STRIP.AUTO-MCNC: 122 MG/DL (ref 70–110)
GLUCOSE BLD STRIP.AUTO-MCNC: 123 MG/DL (ref 70–110)
GLUCOSE BLD STRIP.AUTO-MCNC: 124 MG/DL (ref 70–110)
GLUCOSE BLD STRIP.AUTO-MCNC: 124 MG/DL (ref 70–110)
GLUCOSE BLD STRIP.AUTO-MCNC: 130 MG/DL (ref 70–110)
GLUCOSE BLD STRIP.AUTO-MCNC: 131 MG/DL (ref 70–110)
GLUCOSE BLD STRIP.AUTO-MCNC: 131 MG/DL (ref 70–110)
GLUCOSE BLD STRIP.AUTO-MCNC: 135 MG/DL (ref 70–110)
GLUCOSE BLD STRIP.AUTO-MCNC: 135 MG/DL (ref 70–110)
GLUCOSE BLD STRIP.AUTO-MCNC: 136 MG/DL (ref 70–110)
GLUCOSE BLD STRIP.AUTO-MCNC: 137 MG/DL (ref 70–110)
GLUCOSE BLD STRIP.AUTO-MCNC: 138 MG/DL (ref 70–110)
GLUCOSE BLD STRIP.AUTO-MCNC: 141 MG/DL (ref 70–110)
GLUCOSE BLD STRIP.AUTO-MCNC: 143 MG/DL (ref 70–110)
GLUCOSE BLD STRIP.AUTO-MCNC: 145 MG/DL (ref 74–106)
GLUCOSE BLD STRIP.AUTO-MCNC: 147 MG/DL (ref 70–110)
GLUCOSE BLD STRIP.AUTO-MCNC: 152 MG/DL (ref 70–110)
GLUCOSE BLD STRIP.AUTO-MCNC: 152 MG/DL (ref 70–110)
GLUCOSE BLD STRIP.AUTO-MCNC: 155 MG/DL (ref 70–110)
GLUCOSE BLD STRIP.AUTO-MCNC: 157 MG/DL (ref 70–110)
GLUCOSE BLD STRIP.AUTO-MCNC: 162 MG/DL (ref 70–110)
GLUCOSE BLD STRIP.AUTO-MCNC: 173 MG/DL (ref 70–110)
GLUCOSE BLD STRIP.AUTO-MCNC: 179 MG/DL (ref 70–110)
GLUCOSE BLD STRIP.AUTO-MCNC: 191 MG/DL (ref 70–110)
GLUCOSE BLD STRIP.AUTO-MCNC: 429 MG/DL (ref 70–110)
GLUCOSE BLD STRIP.AUTO-MCNC: 438 MG/DL (ref 70–110)
GLUCOSE BLD STRIP.AUTO-MCNC: 49 MG/DL (ref 70–110)
GLUCOSE BLD STRIP.AUTO-MCNC: 53 MG/DL (ref 70–110)
GLUCOSE BLD STRIP.AUTO-MCNC: 53 MG/DL (ref 70–110)
GLUCOSE BLD STRIP.AUTO-MCNC: 55 MG/DL (ref 70–110)
GLUCOSE BLD STRIP.AUTO-MCNC: 64 MG/DL (ref 70–110)
GLUCOSE BLD STRIP.AUTO-MCNC: 67 MG/DL (ref 70–110)
GLUCOSE BLD STRIP.AUTO-MCNC: 68 MG/DL (ref 70–110)
GLUCOSE BLD STRIP.AUTO-MCNC: 72 MG/DL (ref 70–110)
GLUCOSE BLD STRIP.AUTO-MCNC: 76 MG/DL (ref 70–110)
GLUCOSE BLD STRIP.AUTO-MCNC: 77 MG/DL (ref 70–110)
GLUCOSE BLD STRIP.AUTO-MCNC: 78 MG/DL (ref 70–110)
GLUCOSE BLD STRIP.AUTO-MCNC: 81 MG/DL (ref 70–110)
GLUCOSE BLD STRIP.AUTO-MCNC: 82 MG/DL (ref 70–110)
GLUCOSE BLD STRIP.AUTO-MCNC: 83 MG/DL (ref 70–110)
GLUCOSE BLD STRIP.AUTO-MCNC: 84 MG/DL (ref 70–110)
GLUCOSE BLD STRIP.AUTO-MCNC: 85 MG/DL (ref 70–110)
GLUCOSE BLD STRIP.AUTO-MCNC: 87 MG/DL (ref 70–110)
GLUCOSE BLD STRIP.AUTO-MCNC: 89 MG/DL (ref 70–110)
GLUCOSE BLD STRIP.AUTO-MCNC: 89 MG/DL (ref 70–110)
GLUCOSE BLD STRIP.AUTO-MCNC: 90 MG/DL (ref 70–110)
GLUCOSE BLD STRIP.AUTO-MCNC: 90 MG/DL (ref 70–110)
GLUCOSE BLD STRIP.AUTO-MCNC: 91 MG/DL (ref 70–110)
GLUCOSE BLD STRIP.AUTO-MCNC: 92 MG/DL (ref 70–110)
GLUCOSE BLD STRIP.AUTO-MCNC: 95 MG/DL (ref 70–110)
GLUCOSE BLD STRIP.AUTO-MCNC: 96 MG/DL (ref 70–110)
GLUCOSE BLD STRIP.AUTO-MCNC: 96 MG/DL (ref 70–110)
GLUCOSE BLD STRIP.AUTO-MCNC: <30 MG/DL (ref 70–110)
GLUCOSE BLD STRIP.AUTO-MCNC: >600 MG/DL (ref 70–110)
GLUCOSE SERPL-MCNC: 100 MG/DL (ref 74–99)
GLUCOSE SERPL-MCNC: 103 MG/DL (ref 74–99)
GLUCOSE SERPL-MCNC: 104 MG/DL (ref 74–99)
GLUCOSE SERPL-MCNC: 105 MG/DL (ref 74–99)
GLUCOSE SERPL-MCNC: 109 MG/DL (ref 74–99)
GLUCOSE SERPL-MCNC: 111 MG/DL (ref 74–99)
GLUCOSE SERPL-MCNC: 112 MG/DL (ref 74–99)
GLUCOSE SERPL-MCNC: 113 MG/DL (ref 74–99)
GLUCOSE SERPL-MCNC: 115 MG/DL (ref 74–99)
GLUCOSE SERPL-MCNC: 116 MG/DL (ref 74–99)
GLUCOSE SERPL-MCNC: 124 MG/DL (ref 74–99)
GLUCOSE SERPL-MCNC: 132 MG/DL (ref 74–99)
GLUCOSE SERPL-MCNC: 134 MG/DL (ref 74–99)
GLUCOSE SERPL-MCNC: 140 MG/DL (ref 74–99)
GLUCOSE SERPL-MCNC: 145 MG/DL (ref 74–99)
GLUCOSE SERPL-MCNC: 148 MG/DL (ref 74–99)
GLUCOSE SERPL-MCNC: 148 MG/DL (ref 74–99)
GLUCOSE SERPL-MCNC: 158 MG/DL (ref 74–99)
GLUCOSE SERPL-MCNC: 440 MG/DL (ref 74–99)
GLUCOSE SERPL-MCNC: 75 MG/DL (ref 74–99)
GLUCOSE SERPL-MCNC: 76 MG/DL (ref 74–99)
GLUCOSE SERPL-MCNC: 79 MG/DL (ref 74–99)
GLUCOSE SERPL-MCNC: 80 MG/DL (ref 74–99)
GLUCOSE SERPL-MCNC: 80 MG/DL (ref 74–99)
GLUCOSE SERPL-MCNC: 84 MG/DL (ref 74–99)
GLUCOSE SERPL-MCNC: 86 MG/DL (ref 74–99)
GLUCOSE SERPL-MCNC: 88 MG/DL (ref 74–99)
GLUCOSE SERPL-MCNC: 89 MG/DL (ref 74–99)
GLUCOSE SERPL-MCNC: 90 MG/DL (ref 74–99)
GLUCOSE SERPL-MCNC: 90 MG/DL (ref 74–99)
GLUCOSE SERPL-MCNC: 91 MG/DL (ref 74–99)
GLUCOSE SERPL-MCNC: 93 MG/DL (ref 74–99)
GLUCOSE SERPL-MCNC: 93 MG/DL (ref 74–99)
GLUCOSE SERPL-MCNC: 94 MG/DL (ref 74–99)
GLUCOSE SERPL-MCNC: 95 MG/DL (ref 74–99)
GLUCOSE SERPL-MCNC: 96 MG/DL (ref 74–99)
GLUCOSE SERPL-MCNC: 97 MG/DL (ref 74–99)
GLUCOSE SERPL-MCNC: 98 MG/DL (ref 74–99)
GLUCOSE SERPL-MCNC: 98 MG/DL (ref 74–99)
GLUCOSE UR STRIP.AUTO-MCNC: NEGATIVE MG/DL
GRAM STN SPEC: ABNORMAL
HAPTOGLOB SERPL-MCNC: 246 MG/DL (ref 34–200)
HBA1C MFR BLD: 5.4 % (ref 4.2–5.6)
HBA1C MFR BLD: <3.5 % (ref 4.2–5.6)
HCG UR QL: NEGATIVE
HCO3 BLD-SCNC: 11.4 MMOL/L (ref 22–26)
HCO3 BLD-SCNC: 11.7 MMOL/L (ref 22–26)
HCO3 BLD-SCNC: 12 MMOL/L (ref 22–26)
HCO3 BLD-SCNC: 12.4 MMOL/L (ref 22–26)
HCO3 BLD-SCNC: 13.8 MMOL/L (ref 22–26)
HCO3 BLD-SCNC: 14.8 MMOL/L (ref 22–26)
HCO3 BLD-SCNC: 15.8 MMOL/L (ref 22–26)
HCO3 BLD-SCNC: 18.5 MMOL/L (ref 22–26)
HCT VFR BLD AUTO: 20 % (ref 35–45)
HCT VFR BLD AUTO: 20.3 % (ref 35–45)
HCT VFR BLD AUTO: 20.3 % (ref 35–45)
HCT VFR BLD AUTO: 20.5 % (ref 35–45)
HCT VFR BLD AUTO: 21.2 % (ref 35–45)
HCT VFR BLD AUTO: 22 % (ref 35–45)
HCT VFR BLD AUTO: 22 % (ref 35–45)
HCT VFR BLD AUTO: 22.3 % (ref 35–45)
HCT VFR BLD AUTO: 22.4 % (ref 35–45)
HCT VFR BLD AUTO: 23.2 % (ref 35–45)
HCT VFR BLD AUTO: 23.4 % (ref 35–45)
HCT VFR BLD AUTO: 23.5 % (ref 35–45)
HCT VFR BLD AUTO: 23.7 % (ref 35–45)
HCT VFR BLD AUTO: 23.7 % (ref 35–45)
HCT VFR BLD AUTO: 23.8 % (ref 35–45)
HCT VFR BLD AUTO: 24.2 % (ref 35–45)
HCT VFR BLD AUTO: 24.3 % (ref 35–45)
HCT VFR BLD AUTO: 24.7 % (ref 35–45)
HCT VFR BLD AUTO: 25 % (ref 35–45)
HCT VFR BLD AUTO: 25.3 % (ref 35–45)
HCT VFR BLD AUTO: 25.9 % (ref 35–45)
HCT VFR BLD AUTO: 26.2 % (ref 35–45)
HCT VFR BLD AUTO: 26.5 % (ref 35–45)
HCT VFR BLD AUTO: 26.6 % (ref 35–45)
HCT VFR BLD AUTO: 26.6 % (ref 35–45)
HCT VFR BLD AUTO: 26.7 % (ref 35–45)
HCT VFR BLD AUTO: 26.8 % (ref 35–45)
HCT VFR BLD AUTO: 26.9 % (ref 35–45)
HCT VFR BLD AUTO: 27 % (ref 35–45)
HCT VFR BLD AUTO: 27.2 % (ref 35–45)
HCT VFR BLD AUTO: 27.3 % (ref 35–45)
HCT VFR BLD AUTO: 27.4 % (ref 35–45)
HCT VFR BLD AUTO: 27.4 % (ref 35–45)
HCT VFR BLD AUTO: 27.7 % (ref 35–45)
HCT VFR BLD AUTO: 27.7 % (ref 35–45)
HCT VFR BLD AUTO: 27.8 % (ref 35–45)
HCT VFR BLD AUTO: 28.1 % (ref 35–45)
HCT VFR BLD AUTO: 28.1 % (ref 35–45)
HCT VFR BLD AUTO: 28.2 % (ref 35–45)
HCT VFR BLD AUTO: 28.2 % (ref 35–45)
HCT VFR BLD AUTO: 28.4 % (ref 35–45)
HCT VFR BLD AUTO: 28.9 % (ref 35–45)
HCT VFR BLD AUTO: 31.8 % (ref 35–45)
HCT VFR BLD AUTO: 32.9 % (ref 35–45)
HCT VFR BLD CALC: 27 % (ref 36–49)
HGB BLD-MCNC: 10.1 G/DL (ref 12–16)
HGB BLD-MCNC: 10.3 G/DL (ref 12–16)
HGB BLD-MCNC: 6.4 G/DL (ref 12–16)
HGB BLD-MCNC: 6.4 G/DL (ref 12–16)
HGB BLD-MCNC: 6.6 G/DL (ref 12–16)
HGB BLD-MCNC: 6.8 G/DL (ref 12–16)
HGB BLD-MCNC: 6.9 G/DL (ref 12–16)
HGB BLD-MCNC: 7.2 G/DL (ref 12–16)
HGB BLD-MCNC: 7.2 G/DL (ref 12–16)
HGB BLD-MCNC: 7.4 G/DL (ref 12–16)
HGB BLD-MCNC: 7.4 G/DL (ref 12–16)
HGB BLD-MCNC: 7.5 G/DL (ref 12–16)
HGB BLD-MCNC: 7.5 G/DL (ref 12–16)
HGB BLD-MCNC: 7.6 G/DL (ref 12–16)
HGB BLD-MCNC: 7.7 G/DL (ref 12–16)
HGB BLD-MCNC: 7.7 G/DL (ref 12–16)
HGB BLD-MCNC: 7.8 G/DL (ref 12–16)
HGB BLD-MCNC: 7.8 G/DL (ref 12–16)
HGB BLD-MCNC: 8 G/DL (ref 12–16)
HGB BLD-MCNC: 8.1 G/DL (ref 12–16)
HGB BLD-MCNC: 8.2 G/DL (ref 12–16)
HGB BLD-MCNC: 8.3 G/DL (ref 12–16)
HGB BLD-MCNC: 8.4 G/DL (ref 12–16)
HGB BLD-MCNC: 8.5 G/DL (ref 12–16)
HGB BLD-MCNC: 8.6 G/DL (ref 12–16)
HGB BLD-MCNC: 8.7 G/DL (ref 12–16)
HGB BLD-MCNC: 8.8 G/DL (ref 12–16)
HGB BLD-MCNC: 8.8 G/DL (ref 12–16)
HGB BLD-MCNC: 8.9 G/DL (ref 12–16)
HGB BLD-MCNC: 9 G/DL (ref 12–16)
HGB BLD-MCNC: 9 G/DL (ref 12–16)
HGB BLD-MCNC: 9.2 G/DL (ref 12–16)
HGB UR QL STRIP: ABNORMAL
INR PPP: 1.2 (ref 0.8–1.2)
INR PPP: 1.2 (ref 0.8–1.2)
INR PPP: 1.3 (ref 0.8–1.2)
INR PPP: 1.4 (ref 0.8–1.2)
INSPIRATION.DURATION SETTING TIME VENT: 0.8 SEC
INSPIRATION.DURATION SETTING TIME VENT: 1 SEC
KETONES UR QL STRIP.AUTO: ABNORMAL MG/DL
KETONES UR QL STRIP.AUTO: NEGATIVE MG/DL
LACTATE BLD-SCNC: 0.8 MMOL/L (ref 0.4–2)
LACTATE BLD-SCNC: 1.3 MMOL/L (ref 0.4–2)
LACTATE BLD-SCNC: 1.7 MMOL/L (ref 0.4–2)
LACTATE BLD-SCNC: 2.1 MMOL/L (ref 0.4–2)
LACTATE BLD-SCNC: 3.6 MMOL/L (ref 0.4–2)
LACTATE BLD-SCNC: 7.9 MMOL/L (ref 0.4–2)
LACTATE BLD-SCNC: <0.3 MMOL/L (ref 0.4–2)
LACTATE SERPL-SCNC: 1.4 MMOL/L (ref 0.4–2)
LDH SERPL L TO P-CCNC: 294 U/L (ref 81–234)
LEUKOCYTE ESTERASE UR QL STRIP.AUTO: ABNORMAL
LIPASE SERPL-CCNC: 207 U/L (ref 73–393)
LIPASE SERPL-CCNC: 93 U/L (ref 73–393)
LYMPHOCYTES # BLD AUTO: 1 % (ref 20–51)
LYMPHOCYTES # BLD AUTO: 10 % (ref 21–52)
LYMPHOCYTES # BLD AUTO: 11 % (ref 21–52)
LYMPHOCYTES # BLD AUTO: 12 % (ref 21–52)
LYMPHOCYTES # BLD AUTO: 13 % (ref 21–52)
LYMPHOCYTES # BLD AUTO: 13 % (ref 21–52)
LYMPHOCYTES # BLD AUTO: 14 % (ref 21–52)
LYMPHOCYTES # BLD AUTO: 14 % (ref 21–52)
LYMPHOCYTES # BLD AUTO: 15 % (ref 20–51)
LYMPHOCYTES # BLD AUTO: 16 % (ref 21–52)
LYMPHOCYTES # BLD AUTO: 17 % (ref 21–52)
LYMPHOCYTES # BLD AUTO: 18 % (ref 21–52)
LYMPHOCYTES # BLD AUTO: 2 % (ref 20–51)
LYMPHOCYTES # BLD AUTO: 4 % (ref 20–51)
LYMPHOCYTES # BLD AUTO: 4 % (ref 21–52)
LYMPHOCYTES # BLD AUTO: 5 % (ref 20–51)
LYMPHOCYTES # BLD AUTO: 5 % (ref 21–52)
LYMPHOCYTES # BLD AUTO: 6 % (ref 20–51)
LYMPHOCYTES # BLD AUTO: 6 % (ref 20–51)
LYMPHOCYTES # BLD AUTO: 6 % (ref 21–52)
LYMPHOCYTES # BLD AUTO: 7 % (ref 21–52)
LYMPHOCYTES # BLD AUTO: 8 % (ref 20–51)
LYMPHOCYTES # BLD AUTO: 8 % (ref 20–51)
LYMPHOCYTES # BLD AUTO: 9 % (ref 20–51)
LYMPHOCYTES # BLD AUTO: 9 % (ref 21–52)
LYMPHOCYTES # BLD AUTO: 9 % (ref 21–52)
LYMPHOCYTES # BLD: 0.3 K/UL (ref 0.8–3.5)
LYMPHOCYTES # BLD: 0.6 K/UL (ref 0.8–3.5)
LYMPHOCYTES # BLD: 0.7 K/UL (ref 0.8–3.5)
LYMPHOCYTES # BLD: 0.7 K/UL (ref 0.8–3.5)
LYMPHOCYTES # BLD: 0.8 K/UL (ref 0.8–3.5)
LYMPHOCYTES # BLD: 0.8 K/UL (ref 0.8–3.5)
LYMPHOCYTES # BLD: 0.8 K/UL (ref 0.9–3.6)
LYMPHOCYTES # BLD: 0.9 K/UL (ref 0.8–3.5)
LYMPHOCYTES # BLD: 0.9 K/UL (ref 0.9–3.6)
LYMPHOCYTES # BLD: 1 K/UL (ref 0.9–3.6)
LYMPHOCYTES # BLD: 1.1 K/UL (ref 0.8–3.5)
LYMPHOCYTES # BLD: 1.1 K/UL (ref 0.9–3.6)
LYMPHOCYTES # BLD: 1.2 K/UL (ref 0.9–3.6)
LYMPHOCYTES # BLD: 1.3 K/UL (ref 0.9–3.6)
LYMPHOCYTES # BLD: 1.4 K/UL (ref 0.9–3.6)
LYMPHOCYTES # BLD: 1.4 K/UL (ref 0.9–3.6)
LYMPHOCYTES # BLD: 1.5 K/UL (ref 0.8–3.5)
LYMPHOCYTES # BLD: 1.7 K/UL (ref 0.9–3.6)
LYMPHOCYTES # BLD: 1.9 K/UL (ref 0.8–3.5)
LYMPHOCYTES # BLD: 2 K/UL (ref 0.8–3.5)
LYMPHOCYTES # BLD: 2 K/UL (ref 0.9–3.6)
LYMPHOCYTES # BLD: 2.1 K/UL (ref 0.9–3.6)
LYMPHOCYTES # BLD: 3.5 K/UL (ref 0.8–3.5)
MAGNESIUM SERPL-MCNC: 1.2 MG/DL (ref 1.8–2.4)
MAGNESIUM SERPL-MCNC: 1.3 MG/DL (ref 1.8–2.4)
MAGNESIUM SERPL-MCNC: 1.4 MG/DL (ref 1.8–2.4)
MAGNESIUM SERPL-MCNC: 1.5 MG/DL (ref 1.8–2.4)
MAGNESIUM SERPL-MCNC: 1.6 MG/DL (ref 1.6–2.6)
MAGNESIUM SERPL-MCNC: 1.6 MG/DL (ref 1.6–2.6)
MAGNESIUM SERPL-MCNC: 1.6 MG/DL (ref 1.8–2.4)
MAGNESIUM SERPL-MCNC: 1.6 MG/DL (ref 1.8–2.4)
MAGNESIUM SERPL-MCNC: 1.7 MG/DL (ref 1.8–2.4)
MAGNESIUM SERPL-MCNC: 1.8 MG/DL (ref 1.6–2.6)
MAGNESIUM SERPL-MCNC: 1.8 MG/DL (ref 1.8–2.4)
MAGNESIUM SERPL-MCNC: 1.9 MG/DL (ref 1.6–2.6)
MAGNESIUM SERPL-MCNC: 1.9 MG/DL (ref 1.8–2.4)
MAGNESIUM SERPL-MCNC: 2 MG/DL (ref 1.8–2.4)
MAGNESIUM SERPL-MCNC: 2.1 MG/DL (ref 1.8–2.4)
MAGNESIUM SERPL-MCNC: 2.2 MG/DL (ref 1.8–2.4)
MAGNESIUM SERPL-MCNC: 2.2 MG/DL (ref 1.8–2.4)
MAGNESIUM SERPL-MCNC: 2.3 MG/DL (ref 1.6–2.6)
MANUAL DIFFERENTIAL PERFORMED BLD QL: ABNORMAL
MCH RBC QN AUTO: 26.2 PG (ref 24–34)
MCH RBC QN AUTO: 26.3 PG (ref 24–34)
MCH RBC QN AUTO: 26.3 PG (ref 24–34)
MCH RBC QN AUTO: 26.5 PG (ref 24–34)
MCH RBC QN AUTO: 26.7 PG (ref 24–34)
MCH RBC QN AUTO: 26.7 PG (ref 24–34)
MCH RBC QN AUTO: 26.8 PG (ref 24–34)
MCH RBC QN AUTO: 26.8 PG (ref 24–34)
MCH RBC QN AUTO: 27 PG (ref 24–34)
MCH RBC QN AUTO: 27 PG (ref 24–34)
MCH RBC QN AUTO: 27.1 PG (ref 24–34)
MCH RBC QN AUTO: 27.2 PG (ref 24–34)
MCH RBC QN AUTO: 27.3 PG (ref 24–34)
MCH RBC QN AUTO: 27.4 PG (ref 24–34)
MCH RBC QN AUTO: 27.5 PG (ref 24–34)
MCH RBC QN AUTO: 27.7 PG (ref 24–34)
MCH RBC QN AUTO: 27.7 PG (ref 24–34)
MCH RBC QN AUTO: 27.8 PG (ref 24–34)
MCH RBC QN AUTO: 28 PG (ref 24–34)
MCH RBC QN AUTO: 28 PG (ref 24–34)
MCH RBC QN AUTO: 28.1 PG (ref 24–34)
MCH RBC QN AUTO: 28.1 PG (ref 24–34)
MCH RBC QN AUTO: 28.2 PG (ref 24–34)
MCH RBC QN AUTO: 28.3 PG (ref 24–34)
MCH RBC QN AUTO: 28.3 PG (ref 24–34)
MCH RBC QN AUTO: 28.4 PG (ref 24–34)
MCH RBC QN AUTO: 28.5 PG (ref 24–34)
MCH RBC QN AUTO: 28.5 PG (ref 24–34)
MCH RBC QN AUTO: 28.6 PG (ref 24–34)
MCH RBC QN AUTO: 28.7 PG (ref 24–34)
MCH RBC QN AUTO: 28.7 PG (ref 24–34)
MCHC RBC AUTO-ENTMCNC: 30 G/DL (ref 31–37)
MCHC RBC AUTO-ENTMCNC: 30.2 G/DL (ref 31–37)
MCHC RBC AUTO-ENTMCNC: 30.2 G/DL (ref 31–37)
MCHC RBC AUTO-ENTMCNC: 30.3 G/DL (ref 31–37)
MCHC RBC AUTO-ENTMCNC: 30.4 G/DL (ref 31–37)
MCHC RBC AUTO-ENTMCNC: 30.5 G/DL (ref 31–37)
MCHC RBC AUTO-ENTMCNC: 30.6 G/DL (ref 31–37)
MCHC RBC AUTO-ENTMCNC: 30.7 G/DL (ref 31–37)
MCHC RBC AUTO-ENTMCNC: 30.7 G/DL (ref 31–37)
MCHC RBC AUTO-ENTMCNC: 30.8 G/DL (ref 31–37)
MCHC RBC AUTO-ENTMCNC: 30.8 G/DL (ref 31–37)
MCHC RBC AUTO-ENTMCNC: 30.9 G/DL (ref 31–37)
MCHC RBC AUTO-ENTMCNC: 30.9 G/DL (ref 31–37)
MCHC RBC AUTO-ENTMCNC: 31.2 G/DL (ref 31–37)
MCHC RBC AUTO-ENTMCNC: 31.3 G/DL (ref 31–37)
MCHC RBC AUTO-ENTMCNC: 31.3 G/DL (ref 31–37)
MCHC RBC AUTO-ENTMCNC: 31.5 G/DL (ref 31–37)
MCHC RBC AUTO-ENTMCNC: 31.7 G/DL (ref 31–37)
MCHC RBC AUTO-ENTMCNC: 31.7 G/DL (ref 31–37)
MCHC RBC AUTO-ENTMCNC: 31.8 G/DL (ref 31–37)
MCHC RBC AUTO-ENTMCNC: 31.9 G/DL (ref 31–37)
MCHC RBC AUTO-ENTMCNC: 31.9 G/DL (ref 31–37)
MCHC RBC AUTO-ENTMCNC: 32 G/DL (ref 31–37)
MCHC RBC AUTO-ENTMCNC: 32 G/DL (ref 31–37)
MCHC RBC AUTO-ENTMCNC: 32.1 G/DL (ref 31–37)
MCHC RBC AUTO-ENTMCNC: 32.5 G/DL (ref 31–37)
MCHC RBC AUTO-ENTMCNC: 32.7 G/DL (ref 31–37)
MCHC RBC AUTO-ENTMCNC: 32.8 G/DL (ref 31–37)
MCHC RBC AUTO-ENTMCNC: 33 G/DL (ref 31–37)
MCV RBC AUTO: 82.5 FL (ref 74–97)
MCV RBC AUTO: 82.6 FL (ref 74–97)
MCV RBC AUTO: 82.7 FL (ref 74–97)
MCV RBC AUTO: 83.5 FL (ref 74–97)
MCV RBC AUTO: 83.5 FL (ref 74–97)
MCV RBC AUTO: 85.2 FL (ref 74–97)
MCV RBC AUTO: 85.5 FL (ref 74–97)
MCV RBC AUTO: 85.6 FL (ref 74–97)
MCV RBC AUTO: 85.7 FL (ref 74–97)
MCV RBC AUTO: 85.9 FL (ref 74–97)
MCV RBC AUTO: 86.2 FL (ref 74–97)
MCV RBC AUTO: 86.6 FL (ref 74–97)
MCV RBC AUTO: 86.6 FL (ref 74–97)
MCV RBC AUTO: 86.8 FL (ref 74–97)
MCV RBC AUTO: 87.2 FL (ref 74–97)
MCV RBC AUTO: 87.6 FL (ref 74–97)
MCV RBC AUTO: 87.8 FL (ref 74–97)
MCV RBC AUTO: 87.9 FL (ref 74–97)
MCV RBC AUTO: 88 FL (ref 74–97)
MCV RBC AUTO: 88 FL (ref 74–97)
MCV RBC AUTO: 88.2 FL (ref 74–97)
MCV RBC AUTO: 88.3 FL (ref 74–97)
MCV RBC AUTO: 88.8 FL (ref 74–97)
MCV RBC AUTO: 88.8 FL (ref 74–97)
MCV RBC AUTO: 89 FL (ref 74–97)
MCV RBC AUTO: 89.1 FL (ref 74–97)
MCV RBC AUTO: 89.1 FL (ref 74–97)
MCV RBC AUTO: 89.4 FL (ref 74–97)
MCV RBC AUTO: 89.7 FL (ref 74–97)
MCV RBC AUTO: 90.5 FL (ref 74–97)
MCV RBC AUTO: 91.8 FL (ref 74–97)
MCV RBC AUTO: 92 FL (ref 74–97)
MCV RBC AUTO: 92.5 FL (ref 74–97)
MCV RBC AUTO: 92.6 FL (ref 74–97)
MCV RBC AUTO: 93.2 FL (ref 74–97)
MCV RBC AUTO: 93.8 FL (ref 74–97)
MCV RBC AUTO: 94 FL (ref 74–97)
MCV RBC AUTO: 94.1 FL (ref 74–97)
MCV RBC AUTO: 94.2 FL (ref 74–97)
MCV RBC AUTO: 94.3 FL (ref 74–97)
MCV RBC AUTO: 94.4 FL (ref 74–97)
MCV RBC AUTO: 94.8 FL (ref 74–97)
METAMYELOCYTES NFR BLD MANUAL: 0 %
METAMYELOCYTES NFR BLD MANUAL: 1 %
METAMYELOCYTES NFR BLD MANUAL: 1 %
METAMYELOCYTES NFR BLD MANUAL: 3 %
MONOCYTES # BLD: 0.1 K/UL (ref 0–1)
MONOCYTES # BLD: 0.3 K/UL (ref 0.05–1.2)
MONOCYTES # BLD: 0.3 K/UL (ref 0–1)
MONOCYTES # BLD: 0.4 K/UL (ref 0.05–1.2)
MONOCYTES # BLD: 0.5 K/UL (ref 0.05–1.2)
MONOCYTES # BLD: 0.5 K/UL (ref 0–1)
MONOCYTES # BLD: 0.6 K/UL (ref 0.05–1.2)
MONOCYTES # BLD: 0.6 K/UL (ref 0–1)
MONOCYTES # BLD: 0.7 K/UL (ref 0.05–1.2)
MONOCYTES # BLD: 0.7 K/UL (ref 0.05–1.2)
MONOCYTES # BLD: 0.7 K/UL (ref 0–1)
MONOCYTES # BLD: 1.3 K/UL (ref 0.05–1.2)
MONOCYTES # BLD: 1.3 K/UL (ref 0–1)
MONOCYTES # BLD: 1.6 K/UL (ref 0–1)
MONOCYTES # BLD: 1.7 K/UL (ref 0.05–1.2)
MONOCYTES # BLD: 1.9 K/UL (ref 0–1)
MONOCYTES NFR BLD AUTO: 1 % (ref 2–9)
MONOCYTES NFR BLD AUTO: 2 % (ref 2–9)
MONOCYTES NFR BLD AUTO: 2 % (ref 3–10)
MONOCYTES NFR BLD AUTO: 2 % (ref 3–10)
MONOCYTES NFR BLD AUTO: 3 % (ref 2–9)
MONOCYTES NFR BLD AUTO: 3 % (ref 3–10)
MONOCYTES NFR BLD AUTO: 3 % (ref 3–10)
MONOCYTES NFR BLD AUTO: 4 % (ref 2–9)
MONOCYTES NFR BLD AUTO: 4 % (ref 2–9)
MONOCYTES NFR BLD AUTO: 4 % (ref 3–10)
MONOCYTES NFR BLD AUTO: 5 % (ref 3–10)
MONOCYTES NFR BLD AUTO: 6 % (ref 2–9)
MONOCYTES NFR BLD AUTO: 6 % (ref 2–9)
MONOCYTES NFR BLD AUTO: 6 % (ref 3–10)
MONOCYTES NFR BLD AUTO: 7 % (ref 2–9)
MONOCYTES NFR BLD AUTO: 7 % (ref 3–10)
MYELOCYTES NFR BLD MANUAL: 0 %
MYELOCYTES NFR BLD MANUAL: 1 %
MYELOCYTES NFR BLD MANUAL: 1 %
NEUTS BAND NFR BLD MANUAL: 0 % (ref 0–5)
NEUTS BAND NFR BLD MANUAL: 1 % (ref 0–5)
NEUTS BAND NFR BLD MANUAL: 17 % (ref 0–5)
NEUTS BAND NFR BLD MANUAL: 2 % (ref 0–5)
NEUTS BAND NFR BLD MANUAL: 3 % (ref 0–5)
NEUTS SEG # BLD: 10.1 K/UL (ref 1.8–8)
NEUTS SEG # BLD: 10.5 K/UL (ref 1.8–8)
NEUTS SEG # BLD: 11.8 K/UL (ref 1.8–8)
NEUTS SEG # BLD: 14.1 K/UL (ref 1.8–8)
NEUTS SEG # BLD: 15.1 K/UL (ref 1.8–8)
NEUTS SEG # BLD: 16.6 K/UL (ref 1.8–8)
NEUTS SEG # BLD: 16.9 K/UL (ref 1.8–8)
NEUTS SEG # BLD: 16.9 K/UL (ref 1.8–8)
NEUTS SEG # BLD: 18 K/UL (ref 1.8–8)
NEUTS SEG # BLD: 22.7 K/UL (ref 1.8–8)
NEUTS SEG # BLD: 23.4 K/UL (ref 1.8–8)
NEUTS SEG # BLD: 26.5 K/UL (ref 1.8–8)
NEUTS SEG # BLD: 27.1 K/UL (ref 1.8–8)
NEUTS SEG # BLD: 27.5 K/UL (ref 1.8–8)
NEUTS SEG # BLD: 27.9 K/UL (ref 1.8–8)
NEUTS SEG # BLD: 30.3 K/UL (ref 1.8–8)
NEUTS SEG # BLD: 37.8 K/UL (ref 1.8–8)
NEUTS SEG # BLD: 5.6 K/UL (ref 1.8–8)
NEUTS SEG # BLD: 5.6 K/UL (ref 1.8–8)
NEUTS SEG # BLD: 5.7 K/UL (ref 1.8–8)
NEUTS SEG # BLD: 5.8 K/UL (ref 1.8–8)
NEUTS SEG # BLD: 5.9 K/UL (ref 1.8–8)
NEUTS SEG # BLD: 6 K/UL (ref 1.8–8)
NEUTS SEG # BLD: 6 K/UL (ref 1.8–8)
NEUTS SEG # BLD: 6.1 K/UL (ref 1.8–8)
NEUTS SEG # BLD: 6.2 K/UL (ref 1.8–8)
NEUTS SEG # BLD: 6.3 K/UL (ref 1.8–8)
NEUTS SEG # BLD: 6.3 K/UL (ref 1.8–8)
NEUTS SEG # BLD: 6.9 K/UL (ref 1.8–8)
NEUTS SEG # BLD: 7.2 K/UL (ref 1.8–8)
NEUTS SEG # BLD: 7.3 K/UL (ref 1.8–8)
NEUTS SEG # BLD: 7.4 K/UL (ref 1.8–8)
NEUTS SEG # BLD: 7.7 K/UL (ref 1.8–8)
NEUTS SEG # BLD: 7.8 K/UL (ref 1.8–8)
NEUTS SEG # BLD: 7.8 K/UL (ref 1.8–8)
NEUTS SEG # BLD: 7.9 K/UL (ref 1.8–8)
NEUTS SEG # BLD: 7.9 K/UL (ref 1.8–8)
NEUTS SEG # BLD: 8 K/UL (ref 1.8–8)
NEUTS SEG # BLD: 8.2 K/UL (ref 1.8–8)
NEUTS SEG # BLD: 8.4 K/UL (ref 1.8–8)
NEUTS SEG # BLD: 8.5 K/UL (ref 1.8–8)
NEUTS SEG # BLD: 9.2 K/UL (ref 1.8–8)
NEUTS SEG NFR BLD AUTO: 76 % (ref 40–73)
NEUTS SEG NFR BLD AUTO: 77 % (ref 40–73)
NEUTS SEG NFR BLD AUTO: 78 % (ref 42–75)
NEUTS SEG NFR BLD AUTO: 79 % (ref 40–73)
NEUTS SEG NFR BLD AUTO: 79 % (ref 40–73)
NEUTS SEG NFR BLD AUTO: 80 % (ref 40–73)
NEUTS SEG NFR BLD AUTO: 80 % (ref 42–75)
NEUTS SEG NFR BLD AUTO: 81 % (ref 40–73)
NEUTS SEG NFR BLD AUTO: 82 % (ref 40–73)
NEUTS SEG NFR BLD AUTO: 83 % (ref 40–73)
NEUTS SEG NFR BLD AUTO: 83 % (ref 40–73)
NEUTS SEG NFR BLD AUTO: 83 % (ref 42–75)
NEUTS SEG NFR BLD AUTO: 84 % (ref 40–73)
NEUTS SEG NFR BLD AUTO: 85 % (ref 40–73)
NEUTS SEG NFR BLD AUTO: 85 % (ref 40–73)
NEUTS SEG NFR BLD AUTO: 85 % (ref 42–75)
NEUTS SEG NFR BLD AUTO: 86 % (ref 40–73)
NEUTS SEG NFR BLD AUTO: 87 % (ref 42–75)
NEUTS SEG NFR BLD AUTO: 88 % (ref 40–73)
NEUTS SEG NFR BLD AUTO: 88 % (ref 42–75)
NEUTS SEG NFR BLD AUTO: 89 % (ref 40–73)
NEUTS SEG NFR BLD AUTO: 90 % (ref 42–75)
NEUTS SEG NFR BLD AUTO: 91 % (ref 40–73)
NEUTS SEG NFR BLD AUTO: 91 % (ref 42–75)
NEUTS SEG NFR BLD AUTO: 92 % (ref 40–73)
NEUTS SEG NFR BLD AUTO: 94 % (ref 40–73)
NEUTS SEG NFR BLD AUTO: 94 % (ref 42–75)
NEUTS SEG NFR BLD AUTO: 94 % (ref 42–75)
NITRITE UR QL STRIP.AUTO: NEGATIVE
NITRITE UR QL STRIP.AUTO: POSITIVE
NRBC BLD-RTO: 1 PER 100 WBC
O2/TOTAL GAS SETTING VFR VENT: 0.1 %
O2/TOTAL GAS SETTING VFR VENT: 0.35 %
O2/TOTAL GAS SETTING VFR VENT: 0.4 %
O2/TOTAL GAS SETTING VFR VENT: 0.5 %
O2/TOTAL GAS SETTING VFR VENT: 1 %
O2/TOTAL GAS SETTING VFR VENT: 100 %
O2/TOTAL GAS SETTING VFR VENT: 60 %
O2/TOTAL GAS SETTING VFR VENT: 60 %
P-R INTERVAL, ECG05: 118 MS
P-R INTERVAL, ECG05: 130 MS
P-R INTERVAL, ECG05: 130 MS
PCO2 BLD: 24.2 MMHG (ref 35–45)
PCO2 BLD: 24.5 MMHG (ref 35–45)
PCO2 BLD: 24.8 MMHG (ref 35–45)
PCO2 BLD: 28.7 MMHG (ref 35–45)
PCO2 BLD: 29.9 MMHG (ref 35–45)
PCO2 BLD: 30.9 MMHG (ref 35–45)
PCO2 BLD: 40.6 MMHG (ref 35–45)
PCO2 BLD: 42.8 MMHG (ref 35–45)
PEEP RESPIRATORY: 5 CMH2O
PEEP RESPIRATORY: 7 CMH2O
PEEP RESPIRATORY: 7 CMH2O
PH BLD: 7.05 [PH] (ref 7.35–7.45)
PH BLD: 7.17 [PH] (ref 7.35–7.45)
PH BLD: 7.27 [PH] (ref 7.35–7.45)
PH BLD: 7.29 [PH] (ref 7.35–7.45)
PH BLD: 7.29 [PH] (ref 7.35–7.45)
PH BLD: 7.31 [PH] (ref 7.35–7.45)
PH BLD: 7.32 [PH] (ref 7.35–7.45)
PH BLD: 7.4 [PH] (ref 7.35–7.45)
PH UR STRIP: 5 [PH] (ref 5–8)
PH UR STRIP: 5.5 [PH] (ref 5–8)
PH UR STRIP: 6 [PH] (ref 5–8)
PH UR STRIP: 6.5 [PH] (ref 5–8)
PH UR STRIP: 8 [PH] (ref 5–8)
PHOSPHATE SERPL-MCNC: 1.6 MG/DL (ref 2.5–4.9)
PHOSPHATE SERPL-MCNC: 1.7 MG/DL (ref 2.5–4.9)
PHOSPHATE SERPL-MCNC: 1.9 MG/DL (ref 2.5–4.9)
PHOSPHATE SERPL-MCNC: 2 MG/DL (ref 2.5–4.9)
PHOSPHATE SERPL-MCNC: 2.1 MG/DL (ref 2.5–4.9)
PHOSPHATE SERPL-MCNC: 2.2 MG/DL (ref 2.5–4.9)
PHOSPHATE SERPL-MCNC: 2.2 MG/DL (ref 2.5–4.9)
PHOSPHATE SERPL-MCNC: 2.3 MG/DL (ref 2.5–4.9)
PHOSPHATE SERPL-MCNC: 2.4 MG/DL (ref 2.5–4.9)
PHOSPHATE SERPL-MCNC: 2.5 MG/DL (ref 2.5–4.9)
PHOSPHATE SERPL-MCNC: 2.5 MG/DL (ref 2.5–4.9)
PHOSPHATE SERPL-MCNC: 2.7 MG/DL (ref 2.5–4.9)
PHOSPHATE SERPL-MCNC: 2.8 MG/DL (ref 2.5–4.9)
PHOSPHATE SERPL-MCNC: 2.9 MG/DL (ref 2.5–4.9)
PHOSPHATE SERPL-MCNC: 2.9 MG/DL (ref 2.5–4.9)
PHOSPHATE SERPL-MCNC: 3.2 MG/DL (ref 2.5–4.9)
PHOSPHATE SERPL-MCNC: 3.4 MG/DL (ref 2.5–4.9)
PHOSPHATE SERPL-MCNC: 3.5 MG/DL (ref 2.5–4.9)
PHOSPHATE SERPL-MCNC: 3.8 MG/DL (ref 2.5–4.9)
PHOSPHATE SERPL-MCNC: 3.9 MG/DL (ref 2.5–4.9)
PHOSPHATE SERPL-MCNC: 4.2 MG/DL (ref 2.5–4.9)
PHOSPHATE SERPL-MCNC: 4.3 MG/DL (ref 2.5–4.9)
PHOSPHATE SERPL-MCNC: 4.9 MG/DL (ref 2.5–4.9)
PHOSPHATE SERPL-MCNC: 5 MG/DL (ref 2.5–4.9)
PHOSPHATE SERPL-MCNC: 5.1 MG/DL (ref 2.5–4.9)
PIP ISTAT,IPIP: 16
PIP ISTAT,IPIP: 20
PIP ISTAT,IPIP: 21
PIP ISTAT,IPIP: 22
PIP ISTAT,IPIP: 25
PIP ISTAT,IPIP: 26
PIP ISTAT,IPIP: 29
PLATELET # BLD AUTO: 10 K/UL (ref 135–420)
PLATELET # BLD AUTO: 103 K/UL (ref 135–420)
PLATELET # BLD AUTO: 105 K/UL (ref 135–420)
PLATELET # BLD AUTO: 111 K/UL (ref 135–420)
PLATELET # BLD AUTO: 112 K/UL (ref 135–420)
PLATELET # BLD AUTO: 113 K/UL (ref 135–420)
PLATELET # BLD AUTO: 120 K/UL (ref 135–420)
PLATELET # BLD AUTO: 123 K/UL (ref 135–420)
PLATELET # BLD AUTO: 128 K/UL (ref 135–420)
PLATELET # BLD AUTO: 133 K/UL (ref 135–420)
PLATELET # BLD AUTO: 143 K/UL (ref 135–420)
PLATELET # BLD AUTO: 146 K/UL (ref 135–420)
PLATELET # BLD AUTO: 151 K/UL (ref 135–420)
PLATELET # BLD AUTO: 16 K/UL (ref 135–420)
PLATELET # BLD AUTO: 17 K/UL (ref 135–420)
PLATELET # BLD AUTO: 170 K/UL (ref 135–420)
PLATELET # BLD AUTO: 175 K/UL (ref 135–420)
PLATELET # BLD AUTO: 177 K/UL (ref 135–420)
PLATELET # BLD AUTO: 182 K/UL (ref 135–420)
PLATELET # BLD AUTO: 199 K/UL (ref 135–420)
PLATELET # BLD AUTO: 20 K/UL (ref 135–420)
PLATELET # BLD AUTO: 205 K/UL (ref 135–420)
PLATELET # BLD AUTO: 207 K/UL (ref 135–420)
PLATELET # BLD AUTO: 207 K/UL (ref 135–420)
PLATELET # BLD AUTO: 210 K/UL (ref 135–420)
PLATELET # BLD AUTO: 226 K/UL (ref 135–420)
PLATELET # BLD AUTO: 238 K/UL (ref 135–420)
PLATELET # BLD AUTO: 240 K/UL (ref 135–420)
PLATELET # BLD AUTO: 240 K/UL (ref 135–420)
PLATELET # BLD AUTO: 249 K/UL (ref 135–420)
PLATELET # BLD AUTO: 258 K/UL (ref 135–420)
PLATELET # BLD AUTO: 277 K/UL (ref 135–420)
PLATELET # BLD AUTO: 279 K/UL (ref 135–420)
PLATELET # BLD AUTO: 28 K/UL (ref 135–420)
PLATELET # BLD AUTO: 315 K/UL (ref 135–420)
PLATELET # BLD AUTO: 321 K/UL (ref 135–420)
PLATELET # BLD AUTO: 49 K/UL (ref 135–420)
PLATELET # BLD AUTO: 72 K/UL (ref 135–420)
PLATELET # BLD AUTO: 72 K/UL (ref 135–420)
PLATELET # BLD AUTO: 83 K/UL (ref 135–420)
PLATELET # BLD AUTO: 9 K/UL (ref 135–420)
PLATELET # BLD AUTO: 94 K/UL (ref 135–420)
PLATELET COMMENTS,PCOM: ABNORMAL
PMV BLD AUTO: 10 FL (ref 9.2–11.8)
PMV BLD AUTO: 10 FL (ref 9.2–11.8)
PMV BLD AUTO: 10.1 FL (ref 9.2–11.8)
PMV BLD AUTO: 10.1 FL (ref 9.2–11.8)
PMV BLD AUTO: 10.2 FL (ref 9.2–11.8)
PMV BLD AUTO: 10.2 FL (ref 9.2–11.8)
PMV BLD AUTO: 10.3 FL (ref 9.2–11.8)
PMV BLD AUTO: 10.3 FL (ref 9.2–11.8)
PMV BLD AUTO: 10.5 FL (ref 9.2–11.8)
PMV BLD AUTO: 10.7 FL (ref 9.2–11.8)
PMV BLD AUTO: 10.8 FL (ref 9.2–11.8)
PMV BLD AUTO: 10.8 FL (ref 9.2–11.8)
PMV BLD AUTO: 10.9 FL (ref 9.2–11.8)
PMV BLD AUTO: 11 FL (ref 9.2–11.8)
PMV BLD AUTO: 11 FL (ref 9.2–11.8)
PMV BLD AUTO: 11.3 FL (ref 9.2–11.8)
PMV BLD AUTO: 11.3 FL (ref 9.2–11.8)
PMV BLD AUTO: 11.4 FL (ref 9.2–11.8)
PMV BLD AUTO: 11.4 FL (ref 9.2–11.8)
PMV BLD AUTO: 8.9 FL (ref 9.2–11.8)
PMV BLD AUTO: 9 FL (ref 9.2–11.8)
PMV BLD AUTO: 9.3 FL (ref 9.2–11.8)
PMV BLD AUTO: 9.4 FL (ref 9.2–11.8)
PMV BLD AUTO: 9.5 FL (ref 9.2–11.8)
PMV BLD AUTO: 9.6 FL (ref 9.2–11.8)
PMV BLD AUTO: 9.7 FL (ref 9.2–11.8)
PMV BLD AUTO: 9.8 FL (ref 9.2–11.8)
PMV BLD AUTO: 9.9 FL (ref 9.2–11.8)
PO2 BLD: 158 MMHG (ref 80–100)
PO2 BLD: 162 MMHG (ref 80–100)
PO2 BLD: 174 MMHG (ref 80–100)
PO2 BLD: 209 MMHG (ref 80–100)
PO2 BLD: 258 MMHG (ref 80–100)
PO2 BLD: 304 MMHG (ref 80–100)
PO2 BLD: 542 MMHG (ref 80–100)
PO2 BLD: 68 MMHG (ref 80–100)
POTASSIUM BLD-SCNC: 4.4 MMOL/L (ref 3.5–5.5)
POTASSIUM SERPL-SCNC: 2.5 MMOL/L (ref 3.5–5.5)
POTASSIUM SERPL-SCNC: 2.8 MMOL/L (ref 3.5–5.5)
POTASSIUM SERPL-SCNC: 2.8 MMOL/L (ref 3.5–5.5)
POTASSIUM SERPL-SCNC: 2.9 MMOL/L (ref 3.5–5.5)
POTASSIUM SERPL-SCNC: 3 MMOL/L (ref 3.5–5.5)
POTASSIUM SERPL-SCNC: 3.1 MMOL/L (ref 3.5–5.5)
POTASSIUM SERPL-SCNC: 3.2 MMOL/L (ref 3.5–5.5)
POTASSIUM SERPL-SCNC: 3.3 MMOL/L (ref 3.5–5.5)
POTASSIUM SERPL-SCNC: 3.4 MMOL/L (ref 3.5–5.5)
POTASSIUM SERPL-SCNC: 3.5 MMOL/L (ref 3.5–5.5)
POTASSIUM SERPL-SCNC: 3.5 MMOL/L (ref 3.5–5.5)
POTASSIUM SERPL-SCNC: 3.6 MMOL/L (ref 3.5–5.5)
POTASSIUM SERPL-SCNC: 3.7 MMOL/L (ref 3.5–5.5)
POTASSIUM SERPL-SCNC: 3.8 MMOL/L (ref 3.5–5.5)
POTASSIUM SERPL-SCNC: 3.9 MMOL/L (ref 3.5–5.5)
POTASSIUM SERPL-SCNC: 4 MMOL/L (ref 3.5–5.5)
POTASSIUM SERPL-SCNC: 4.1 MMOL/L (ref 3.5–5.5)
POTASSIUM SERPL-SCNC: 4.2 MMOL/L (ref 3.5–5.5)
POTASSIUM SERPL-SCNC: 4.3 MMOL/L (ref 3.5–5.5)
POTASSIUM SERPL-SCNC: 4.3 MMOL/L (ref 3.5–5.5)
POTASSIUM SERPL-SCNC: 4.4 MMOL/L (ref 3.5–5.5)
POTASSIUM SERPL-SCNC: 4.4 MMOL/L (ref 3.5–5.5)
POTASSIUM SERPL-SCNC: 4.5 MMOL/L (ref 3.5–5.5)
POTASSIUM SERPL-SCNC: 4.6 MMOL/L (ref 3.5–5.5)
POTASSIUM SERPL-SCNC: 4.7 MMOL/L (ref 3.5–5.5)
POTASSIUM SERPL-SCNC: 5.3 MMOL/L (ref 3.5–5.5)
PRESSURE CONTROL, IPC: YES
PRESSURE CONTROL, IPC: YES
PRESSURE SUPPORT SETTING VENT: 7 CMH2O
PRESSURE SUPPORT SETTING VENT: 7 CMH2O
PROMYELOCYTES NFR BLD MANUAL: 0 %
PROT SERPL-MCNC: 5.6 G/DL (ref 6.4–8.2)
PROT SERPL-MCNC: 5.6 G/DL (ref 6.4–8.2)
PROT SERPL-MCNC: 5.9 G/DL (ref 6.4–8.2)
PROT SERPL-MCNC: 6 G/DL (ref 6.4–8.2)
PROT SERPL-MCNC: 6.1 G/DL (ref 6.4–8.2)
PROT SERPL-MCNC: 6.1 G/DL (ref 6.4–8.2)
PROT SERPL-MCNC: 6.6 G/DL (ref 6.4–8.2)
PROT SERPL-MCNC: 6.7 G/DL (ref 6.4–8.2)
PROT SERPL-MCNC: 6.7 G/DL (ref 6.4–8.2)
PROT SERPL-MCNC: 6.8 G/DL (ref 6.4–8.2)
PROT SERPL-MCNC: 7.1 G/DL (ref 6.4–8.2)
PROT SERPL-MCNC: 7.3 G/DL (ref 6.4–8.2)
PROT UR STRIP-MCNC: 100 MG/DL
PROT UR STRIP-MCNC: 30 MG/DL
PROT UR STRIP-MCNC: 300 MG/DL
PROT UR STRIP-MCNC: 300 MG/DL
PROTHROMBIN TIME: 14.5 SEC (ref 11.5–15.2)
PROTHROMBIN TIME: 14.9 SEC (ref 11.5–15.2)
PROTHROMBIN TIME: 15.5 SEC (ref 11.5–15.2)
PROTHROMBIN TIME: 16.3 SEC (ref 11.5–15.2)
Q-T INTERVAL, ECG07: 278 MS
Q-T INTERVAL, ECG07: 316 MS
Q-T INTERVAL, ECG07: 328 MS
QRS DURATION, ECG06: 74 MS
QRS DURATION, ECG06: 82 MS
QRS DURATION, ECG06: 86 MS
QTC CALCULATION (BEZET), ECG08: 404 MS
QTC CALCULATION (BEZET), ECG08: 413 MS
QTC CALCULATION (BEZET), ECG08: 431 MS
RBC # BLD AUTO: 2.34 M/UL (ref 4.2–5.3)
RBC # BLD AUTO: 2.34 M/UL (ref 4.2–5.3)
RBC # BLD AUTO: 2.43 M/UL (ref 4.2–5.3)
RBC # BLD AUTO: 2.47 M/UL (ref 4.2–5.3)
RBC # BLD AUTO: 2.54 M/UL (ref 4.2–5.3)
RBC # BLD AUTO: 2.62 M/UL (ref 4.2–5.3)
RBC # BLD AUTO: 2.66 M/UL (ref 4.2–5.3)
RBC # BLD AUTO: 2.66 M/UL (ref 4.2–5.3)
RBC # BLD AUTO: 2.68 M/UL (ref 4.2–5.3)
RBC # BLD AUTO: 2.7 M/UL (ref 4.2–5.3)
RBC # BLD AUTO: 2.77 M/UL (ref 4.2–5.3)
RBC # BLD AUTO: 2.78 M/UL (ref 4.2–5.3)
RBC # BLD AUTO: 2.82 M/UL (ref 4.2–5.3)
RBC # BLD AUTO: 2.83 M/UL (ref 4.2–5.3)
RBC # BLD AUTO: 2.84 M/UL (ref 4.2–5.3)
RBC # BLD AUTO: 2.84 M/UL (ref 4.2–5.3)
RBC # BLD AUTO: 2.87 M/UL (ref 4.2–5.3)
RBC # BLD AUTO: 2.88 M/UL (ref 4.2–5.3)
RBC # BLD AUTO: 2.89 M/UL (ref 4.2–5.3)
RBC # BLD AUTO: 2.91 M/UL (ref 4.2–5.3)
RBC # BLD AUTO: 2.93 M/UL (ref 4.2–5.3)
RBC # BLD AUTO: 2.93 M/UL (ref 4.2–5.3)
RBC # BLD AUTO: 2.94 M/UL (ref 4.2–5.3)
RBC # BLD AUTO: 2.99 M/UL (ref 4.2–5.3)
RBC # BLD AUTO: 3 M/UL (ref 4.2–5.3)
RBC # BLD AUTO: 3 M/UL (ref 4.2–5.3)
RBC # BLD AUTO: 3.06 M/UL (ref 4.2–5.3)
RBC # BLD AUTO: 3.1 M/UL (ref 4.2–5.3)
RBC # BLD AUTO: 3.13 M/UL (ref 4.2–5.3)
RBC # BLD AUTO: 3.13 M/UL (ref 4.2–5.3)
RBC # BLD AUTO: 3.14 M/UL (ref 4.2–5.3)
RBC # BLD AUTO: 3.18 M/UL (ref 4.2–5.3)
RBC # BLD AUTO: 3.2 M/UL (ref 4.2–5.3)
RBC # BLD AUTO: 3.25 M/UL (ref 4.2–5.3)
RBC # BLD AUTO: 3.29 M/UL (ref 4.2–5.3)
RBC # BLD AUTO: 3.3 M/UL (ref 4.2–5.3)
RBC # BLD AUTO: 3.3 M/UL (ref 4.2–5.3)
RBC # BLD AUTO: 3.73 M/UL (ref 4.2–5.3)
RBC #/AREA URNS HPF: ABNORMAL /HPF (ref 0–5)
RBC MORPH BLD: ABNORMAL
RETICS/RBC NFR AUTO: 3.4 % (ref 0.5–2.3)
SAO2 % BLD: 100 % (ref 92–97)
SAO2 % BLD: 84 % (ref 92–97)
SAO2 % BLD: 99 % (ref 92–97)
SERVICE CMNT-IMP: ABNORMAL
SERVICE CMNT-IMP: NORMAL
SODIUM BLD-SCNC: 142 MMOL/L (ref 136–145)
SODIUM SERPL-SCNC: 127 MMOL/L (ref 136–145)
SODIUM SERPL-SCNC: 132 MMOL/L (ref 136–145)
SODIUM SERPL-SCNC: 134 MMOL/L (ref 136–145)
SODIUM SERPL-SCNC: 135 MMOL/L (ref 136–145)
SODIUM SERPL-SCNC: 135 MMOL/L (ref 136–145)
SODIUM SERPL-SCNC: 136 MMOL/L (ref 136–145)
SODIUM SERPL-SCNC: 136 MMOL/L (ref 136–145)
SODIUM SERPL-SCNC: 137 MMOL/L (ref 136–145)
SODIUM SERPL-SCNC: 138 MMOL/L (ref 136–145)
SODIUM SERPL-SCNC: 138 MMOL/L (ref 136–145)
SODIUM SERPL-SCNC: 139 MMOL/L (ref 136–145)
SODIUM SERPL-SCNC: 139 MMOL/L (ref 136–145)
SODIUM SERPL-SCNC: 140 MMOL/L (ref 136–145)
SODIUM SERPL-SCNC: 141 MMOL/L (ref 136–145)
SODIUM SERPL-SCNC: 142 MMOL/L (ref 136–145)
SODIUM SERPL-SCNC: 143 MMOL/L (ref 136–145)
SODIUM SERPL-SCNC: 144 MMOL/L (ref 136–145)
SODIUM SERPL-SCNC: 145 MMOL/L (ref 136–145)
SP GR UR REFRACTOMETRY: 1.01 (ref 1–1.03)
SP GR UR REFRACTOMETRY: 1.02 (ref 1–1.03)
SPECIMEN EXP DATE BLD: NORMAL
SPECIMEN TYPE: ABNORMAL
STATUS OF UNIT,%ST: NORMAL
T4 FREE SERPL-MCNC: 0.4 NG/DL (ref 0.7–1.5)
TOTAL RESP. RATE, ITRR: 14
TOTAL RESP. RATE, ITRR: 22
TOTAL RESP. RATE, ITRR: 23
TOTAL RESP. RATE, ITRR: 24
TOTAL RESP. RATE, ITRR: 28
TOTAL RESP. RATE, ITRR: 28
TOTAL RESP. RATE, ITRR: 33
TOTAL RESP. RATE, ITRR: 35
TROPONIN I SERPL-MCNC: 0.02 NG/ML (ref 0–0.04)
TROPONIN I SERPL-MCNC: 0.03 NG/ML (ref 0–0.04)
TROPONIN I SERPL-MCNC: 0.05 NG/ML (ref 0–0.04)
TROPONIN I SERPL-MCNC: 0.05 NG/ML (ref 0–0.04)
TROPONIN I SERPL-MCNC: 0.06 NG/ML (ref 0–0.04)
TROPONIN I SERPL-MCNC: 0.07 NG/ML (ref 0–0.04)
TROPONIN I SERPL-MCNC: 0.1 NG/ML (ref 0–0.04)
TROPONIN I SERPL-MCNC: 0.14 NG/ML (ref 0–0.04)
TSH SERPL DL<=0.05 MIU/L-ACNC: 2.82 UIU/ML (ref 0.36–3.74)
UNIT DIVISION, %UDIV: 0
UROBILINOGEN UR QL STRIP.AUTO: 0.2 EU/DL (ref 0.2–1)
UROBILINOGEN UR QL STRIP.AUTO: 1 EU/DL (ref 0.2–1)
VENTILATION MODE VENT: ABNORMAL
VENTRICULAR RATE, ECG03: 103 BPM
VENTRICULAR RATE, ECG03: 104 BPM
VENTRICULAR RATE, ECG03: 127 BPM
VOLUME CONTROL PLUS IVLCP: YES
VOLUME CONTROL PLUS IVLCP: YES
VT SETTING VENT: 450 ML
VT SETTING VENT: 484 ML
VT SETTING VENT: 675 ML
WBC # BLD AUTO: 10 K/UL (ref 4.6–13.2)
WBC # BLD AUTO: 11.3 K/UL (ref 4.6–13.2)
WBC # BLD AUTO: 12 K/UL (ref 4.6–13.2)
WBC # BLD AUTO: 12.2 K/UL (ref 4.6–13.2)
WBC # BLD AUTO: 13.9 K/UL (ref 4.6–13.2)
WBC # BLD AUTO: 14.7 K/UL (ref 4.6–13.2)
WBC # BLD AUTO: 16 K/UL (ref 4.6–13.2)
WBC # BLD AUTO: 16.8 K/UL (ref 4.6–13.2)
WBC # BLD AUTO: 18.4 K/UL (ref 4.6–13.2)
WBC # BLD AUTO: 18.5 K/UL (ref 4.6–13.2)
WBC # BLD AUTO: 19.2 K/UL (ref 4.6–13.2)
WBC # BLD AUTO: 23.1 K/UL (ref 4.6–13.2)
WBC # BLD AUTO: 24.9 K/UL (ref 4.6–13.2)
WBC # BLD AUTO: 28.2 K/UL (ref 4.6–13.2)
WBC # BLD AUTO: 28.4 K/UL (ref 4.6–13.2)
WBC # BLD AUTO: 29.3 K/UL (ref 4.6–13.2)
WBC # BLD AUTO: 30.4 K/UL (ref 4.6–13.2)
WBC # BLD AUTO: 32 K/UL (ref 4.6–13.2)
WBC # BLD AUTO: 33.6 K/UL (ref 4.6–13.2)
WBC # BLD AUTO: 41.6 K/UL (ref 4.6–13.2)
WBC # BLD AUTO: 47.1 K/UL (ref 4.6–13.2)
WBC # BLD AUTO: 6.9 K/UL (ref 4.6–13.2)
WBC # BLD AUTO: 7.2 K/UL (ref 4.6–13.2)
WBC # BLD AUTO: 7.3 K/UL (ref 4.6–13.2)
WBC # BLD AUTO: 7.4 K/UL (ref 4.6–13.2)
WBC # BLD AUTO: 7.5 K/UL (ref 4.6–13.2)
WBC # BLD AUTO: 7.5 K/UL (ref 4.6–13.2)
WBC # BLD AUTO: 7.7 K/UL (ref 4.6–13.2)
WBC # BLD AUTO: 7.7 K/UL (ref 4.6–13.2)
WBC # BLD AUTO: 8.2 K/UL (ref 4.6–13.2)
WBC # BLD AUTO: 8.3 K/UL (ref 4.6–13.2)
WBC # BLD AUTO: 8.6 K/UL (ref 4.6–13.2)
WBC # BLD AUTO: 9 K/UL (ref 4.6–13.2)
WBC # BLD AUTO: 9 K/UL (ref 4.6–13.2)
WBC # BLD AUTO: 9.2 K/UL (ref 4.6–13.2)
WBC # BLD AUTO: 9.3 K/UL (ref 4.6–13.2)
WBC # BLD AUTO: 9.4 K/UL (ref 4.6–13.2)
WBC # BLD AUTO: 9.4 K/UL (ref 4.6–13.2)
WBC # BLD AUTO: 9.5 K/UL (ref 4.6–13.2)
WBC # BLD AUTO: 9.6 K/UL (ref 4.6–13.2)
WBC # BLD AUTO: 9.7 K/UL (ref 4.6–13.2)
WBC # BLD AUTO: 9.8 K/UL (ref 4.6–13.2)
WBC MORPH BLD: ABNORMAL
WBC URNS QL MICRO: ABNORMAL /HPF (ref 0–4)

## 2017-01-01 PROCEDURE — 77030008680 HC TU ET BRONCH COOK -C: Performed by: INTERNAL MEDICINE

## 2017-01-01 PROCEDURE — 83615 LACTATE (LD) (LDH) ENZYME: CPT | Performed by: INTERNAL MEDICINE

## 2017-01-01 PROCEDURE — 74011250636 HC RX REV CODE- 250/636: Performed by: HOSPITALIST

## 2017-01-01 PROCEDURE — 82330 ASSAY OF CALCIUM: CPT | Performed by: HOSPITALIST

## 2017-01-01 PROCEDURE — 83690 ASSAY OF LIPASE: CPT | Performed by: EMERGENCY MEDICINE

## 2017-01-01 PROCEDURE — C9113 INJ PANTOPRAZOLE SODIUM, VIA: HCPCS | Performed by: HOSPITALIST

## 2017-01-01 PROCEDURE — 74011000258 HC RX REV CODE- 258: Performed by: PHYSICIAN ASSISTANT

## 2017-01-01 PROCEDURE — 93970 EXTREMITY STUDY: CPT

## 2017-01-01 PROCEDURE — 77030020256 HC SOL INJ NACL 0.9%  500ML

## 2017-01-01 PROCEDURE — 74011000250 HC RX REV CODE- 250: Performed by: UROLOGY

## 2017-01-01 PROCEDURE — 77030020254 HC SOL INJ D5LR LACTATED RINGER

## 2017-01-01 PROCEDURE — 83735 ASSAY OF MAGNESIUM: CPT | Performed by: FAMILY MEDICINE

## 2017-01-01 PROCEDURE — 84100 ASSAY OF PHOSPHORUS: CPT | Performed by: FAMILY MEDICINE

## 2017-01-01 PROCEDURE — 74011250637 HC RX REV CODE- 250/637: Performed by: INTERNAL MEDICINE

## 2017-01-01 PROCEDURE — 77030034848

## 2017-01-01 PROCEDURE — 77030011256 HC DRSG MEPILEX <16IN NO BORD MOLN -A

## 2017-01-01 PROCEDURE — 74011000258 HC RX REV CODE- 258: Performed by: EMERGENCY MEDICINE

## 2017-01-01 PROCEDURE — 93005 ELECTROCARDIOGRAM TRACING: CPT

## 2017-01-01 PROCEDURE — 74011000258 HC RX REV CODE- 258: Performed by: HOSPITALIST

## 2017-01-01 PROCEDURE — 84100 ASSAY OF PHOSPHORUS: CPT | Performed by: INTERNAL MEDICINE

## 2017-01-01 PROCEDURE — 96376 TX/PRO/DX INJ SAME DRUG ADON: CPT

## 2017-01-01 PROCEDURE — 87077 CULTURE AEROBIC IDENTIFY: CPT | Performed by: FAMILY MEDICINE

## 2017-01-01 PROCEDURE — 82550 ASSAY OF CK (CPK): CPT | Performed by: FAMILY MEDICINE

## 2017-01-01 PROCEDURE — 74011250636 HC RX REV CODE- 250/636: Performed by: EMERGENCY MEDICINE

## 2017-01-01 PROCEDURE — 80053 COMPREHEN METABOLIC PANEL: CPT | Performed by: EMERGENCY MEDICINE

## 2017-01-01 PROCEDURE — 77010033678 HC OXYGEN DAILY

## 2017-01-01 PROCEDURE — 74011250636 HC RX REV CODE- 250/636: Performed by: INTERNAL MEDICINE

## 2017-01-01 PROCEDURE — 74011250637 HC RX REV CODE- 250/637: Performed by: NURSE PRACTITIONER

## 2017-01-01 PROCEDURE — 74011250636 HC RX REV CODE- 250/636: Performed by: PHYSICIAN ASSISTANT

## 2017-01-01 PROCEDURE — 74011000250 HC RX REV CODE- 250: Performed by: INTERNAL MEDICINE

## 2017-01-01 PROCEDURE — 84132 ASSAY OF SERUM POTASSIUM: CPT | Performed by: FAMILY MEDICINE

## 2017-01-01 PROCEDURE — 36593 DECLOT VASCULAR DEVICE: CPT

## 2017-01-01 PROCEDURE — 74011000250 HC RX REV CODE- 250: Performed by: NURSE PRACTITIONER

## 2017-01-01 PROCEDURE — 74011250637 HC RX REV CODE- 250/637: Performed by: HOSPITALIST

## 2017-01-01 PROCEDURE — 87040 BLOOD CULTURE FOR BACTERIA: CPT | Performed by: INTERNAL MEDICINE

## 2017-01-01 PROCEDURE — 74011000250 HC RX REV CODE- 250: Performed by: PHYSICIAN ASSISTANT

## 2017-01-01 PROCEDURE — 77030033263 HC DRSG MEPILEX 16-48IN BORD MOLN -B

## 2017-01-01 PROCEDURE — 74011000250 HC RX REV CODE- 250: Performed by: EMERGENCY MEDICINE

## 2017-01-01 PROCEDURE — 84100 ASSAY OF PHOSPHORUS: CPT | Performed by: HOSPITALIST

## 2017-01-01 PROCEDURE — 3330220001 HC HSPC R&B RUG RATE

## 2017-01-01 PROCEDURE — 74011250636 HC RX REV CODE- 250/636: Performed by: FAMILY MEDICINE

## 2017-01-01 PROCEDURE — 77030003560 HC NDL HUBR BARD -A

## 2017-01-01 PROCEDURE — 0651 HSPC ROUTINE HOME CARE

## 2017-01-01 PROCEDURE — 3336590001 HSPC ROOM AND BOARD

## 2017-01-01 PROCEDURE — 94640 AIRWAY INHALATION TREATMENT: CPT

## 2017-01-01 PROCEDURE — 92611 MOTION FLUOROSCOPY/SWALLOW: CPT

## 2017-01-01 PROCEDURE — 80048 BASIC METABOLIC PNL TOTAL CA: CPT | Performed by: FAMILY MEDICINE

## 2017-01-01 PROCEDURE — 83735 ASSAY OF MAGNESIUM: CPT | Performed by: HOSPITALIST

## 2017-01-01 PROCEDURE — 74011000250 HC RX REV CODE- 250: Performed by: HOSPITALIST

## 2017-01-01 PROCEDURE — 77030018842 HC SOL IRR SOD CL 9% BAXT -A: Performed by: UROLOGY

## 2017-01-01 PROCEDURE — 97530 THERAPEUTIC ACTIVITIES: CPT

## 2017-01-01 PROCEDURE — 94660 CPAP INITIATION&MGMT: CPT

## 2017-01-01 PROCEDURE — 82962 GLUCOSE BLOOD TEST: CPT

## 2017-01-01 PROCEDURE — 74011250636 HC RX REV CODE- 250/636: Performed by: PSYCHIATRY & NEUROLOGY

## 2017-01-01 PROCEDURE — 94761 N-INVAS EAR/PLS OXIMETRY MLT: CPT

## 2017-01-01 PROCEDURE — 77030019938 HC TBNG IV PCA ICUM -A

## 2017-01-01 PROCEDURE — A9270 NON-COVERED ITEM OR SERVICE: HCPCS

## 2017-01-01 PROCEDURE — 36591 DRAW BLOOD OFF VENOUS DEVICE: CPT

## 2017-01-01 PROCEDURE — 85025 COMPLETE CBC W/AUTO DIFF WBC: CPT | Performed by: HOSPITALIST

## 2017-01-01 PROCEDURE — 76450000000

## 2017-01-01 PROCEDURE — 74011250637 HC RX REV CODE- 250/637: Performed by: PHYSICIAN ASSISTANT

## 2017-01-01 PROCEDURE — 65610000006 HC RM INTENSIVE CARE

## 2017-01-01 PROCEDURE — HHS10554 SHAMPOO/BODY WASH 8 OZ ALOE VESTA

## 2017-01-01 PROCEDURE — 77030012510 HC MSK AIRWY LMA TELE -B: Performed by: NURSE ANESTHETIST, CERTIFIED REGISTERED

## 2017-01-01 PROCEDURE — 74011250636 HC RX REV CODE- 250/636: Performed by: NURSE ANESTHETIST, CERTIFIED REGISTERED

## 2017-01-01 PROCEDURE — 36415 COLL VENOUS BLD VENIPUNCTURE: CPT | Performed by: INTERNAL MEDICINE

## 2017-01-01 PROCEDURE — P9016 RBC LEUKOCYTES REDUCED: HCPCS | Performed by: FAMILY MEDICINE

## 2017-01-01 PROCEDURE — P9047 ALBUMIN (HUMAN), 25%, 50ML: HCPCS | Performed by: INTERNAL MEDICINE

## 2017-01-01 PROCEDURE — 87186 SC STD MICRODIL/AGAR DIL: CPT | Performed by: EMERGENCY MEDICINE

## 2017-01-01 PROCEDURE — 82040 ASSAY OF SERUM ALBUMIN: CPT | Performed by: PHYSICIAN ASSISTANT

## 2017-01-01 PROCEDURE — 82550 ASSAY OF CK (CPK): CPT | Performed by: HOSPITALIST

## 2017-01-01 PROCEDURE — 82803 BLOOD GASES ANY COMBINATION: CPT

## 2017-01-01 PROCEDURE — 36430 TRANSFUSION BLD/BLD COMPNT: CPT

## 2017-01-01 PROCEDURE — 81001 URINALYSIS AUTO W/SCOPE: CPT | Performed by: EMERGENCY MEDICINE

## 2017-01-01 PROCEDURE — 74011000258 HC RX REV CODE- 258: Performed by: INTERNAL MEDICINE

## 2017-01-01 PROCEDURE — 77030013140 HC MSK NEB VYRM -A: Performed by: INTERNAL MEDICINE

## 2017-01-01 PROCEDURE — 71010 XR CHEST PORT: CPT

## 2017-01-01 PROCEDURE — 81001 URINALYSIS AUTO W/SCOPE: CPT | Performed by: INTERNAL MEDICINE

## 2017-01-01 PROCEDURE — 3336500001 HSPC ELECTION

## 2017-01-01 PROCEDURE — 85027 COMPLETE CBC AUTOMATED: CPT | Performed by: HOSPITALIST

## 2017-01-01 PROCEDURE — 92526 ORAL FUNCTION THERAPY: CPT

## 2017-01-01 PROCEDURE — 85025 COMPLETE CBC W/AUTO DIFF WBC: CPT | Performed by: EMERGENCY MEDICINE

## 2017-01-01 PROCEDURE — 77030018846 HC SOL IRR STRL H20 ICUM -A: Performed by: UROLOGY

## 2017-01-01 PROCEDURE — 77030020250 HC SOL INJ D 5% LFCR 1000ML BG LF

## 2017-01-01 PROCEDURE — 83735 ASSAY OF MAGNESIUM: CPT | Performed by: EMERGENCY MEDICINE

## 2017-01-01 PROCEDURE — 77030020263 HC SOL INJ SOD CL0.9% LFCR 1000ML

## 2017-01-01 PROCEDURE — 74011000272 HC RX REV CODE- 272: Performed by: UROLOGY

## 2017-01-01 PROCEDURE — P9016 RBC LEUKOCYTES REDUCED: HCPCS | Performed by: EMERGENCY MEDICINE

## 2017-01-01 PROCEDURE — 77030005520 HC CATH URETH FOL38 BARD -A: Performed by: UROLOGY

## 2017-01-01 PROCEDURE — 87040 BLOOD CULTURE FOR BACTERIA: CPT | Performed by: EMERGENCY MEDICINE

## 2017-01-01 PROCEDURE — 80048 BASIC METABOLIC PNL TOTAL CA: CPT | Performed by: HOSPITALIST

## 2017-01-01 PROCEDURE — 74011000258 HC RX REV CODE- 258: Performed by: FAMILY MEDICINE

## 2017-01-01 PROCEDURE — 74176 CT ABD & PELVIS W/O CONTRAST: CPT

## 2017-01-01 PROCEDURE — 96375 TX/PRO/DX INJ NEW DRUG ADDON: CPT

## 2017-01-01 PROCEDURE — 36592 COLLECT BLOOD FROM PICC: CPT

## 2017-01-01 PROCEDURE — 76210000006 HC OR PH I REC 0.5 TO 1 HR: Performed by: UROLOGY

## 2017-01-01 PROCEDURE — 74011250637 HC RX REV CODE- 250/637: Performed by: FAMILY MEDICINE

## 2017-01-01 PROCEDURE — 80048 BASIC METABOLIC PNL TOTAL CA: CPT | Performed by: EMERGENCY MEDICINE

## 2017-01-01 PROCEDURE — 97530 THERAPEUTIC ACTIVITIES: CPT | Performed by: PHYSICAL THERAPIST

## 2017-01-01 PROCEDURE — 77030020255 HC SOL INJ LR 1000ML BG

## 2017-01-01 PROCEDURE — C9113 INJ PANTOPRAZOLE SODIUM, VIA: HCPCS | Performed by: FAMILY MEDICINE

## 2017-01-01 PROCEDURE — 99285 EMERGENCY DEPT VISIT HI MDM: CPT

## 2017-01-01 PROCEDURE — 5A1945Z RESPIRATORY VENTILATION, 24-96 CONSECUTIVE HOURS: ICD-10-PCS | Performed by: INTERNAL MEDICINE

## 2017-01-01 PROCEDURE — 74011250636 HC RX REV CODE- 250/636: Performed by: NURSE PRACTITIONER

## 2017-01-01 PROCEDURE — 36600 WITHDRAWAL OF ARTERIAL BLOOD: CPT

## 2017-01-01 PROCEDURE — 85610 PROTHROMBIN TIME: CPT | Performed by: FAMILY MEDICINE

## 2017-01-01 PROCEDURE — 83605 ASSAY OF LACTIC ACID: CPT | Performed by: HOSPITALIST

## 2017-01-01 PROCEDURE — 85025 COMPLETE CBC W/AUTO DIFF WBC: CPT | Performed by: FAMILY MEDICINE

## 2017-01-01 PROCEDURE — 74011258636 HC RX REV CODE- 258/636: Performed by: INTERNAL MEDICINE

## 2017-01-01 PROCEDURE — 74011636320 HC RX REV CODE- 636/320: Performed by: UROLOGY

## 2017-01-01 PROCEDURE — 74011250636 HC RX REV CODE- 250/636: Performed by: SURGERY

## 2017-01-01 PROCEDURE — 77030034850

## 2017-01-01 PROCEDURE — 80053 COMPREHEN METABOLIC PANEL: CPT | Performed by: HOSPITALIST

## 2017-01-01 PROCEDURE — 94003 VENT MGMT INPAT SUBQ DAY: CPT

## 2017-01-01 PROCEDURE — T4543 ADULT DISP BRIEF/DIAP ABV XL: HCPCS

## 2017-01-01 PROCEDURE — 97166 OT EVAL MOD COMPLEX 45 MIN: CPT

## 2017-01-01 PROCEDURE — 74011000250 HC RX REV CODE- 250

## 2017-01-01 PROCEDURE — 82553 CREATINE MB FRACTION: CPT | Performed by: PHYSICIAN ASSISTANT

## 2017-01-01 PROCEDURE — 97110 THERAPEUTIC EXERCISES: CPT

## 2017-01-01 PROCEDURE — SSB01 HC HSPC R&B RUG RATE

## 2017-01-01 PROCEDURE — HOSPICE MEDICATION HC HH HOSPICE MEDICATION

## 2017-01-01 PROCEDURE — 76040000019: Performed by: INTERNAL MEDICINE

## 2017-01-01 PROCEDURE — 87070 CULTURE OTHR SPECIMN AEROBIC: CPT | Performed by: PHYSICIAN ASSISTANT

## 2017-01-01 PROCEDURE — 93306 TTE W/DOPPLER COMPLETE: CPT

## 2017-01-01 PROCEDURE — 36415 COLL VENOUS BLD VENIPUNCTURE: CPT | Performed by: FAMILY MEDICINE

## 2017-01-01 PROCEDURE — 95816 EEG AWAKE AND DROWSY: CPT | Performed by: HOSPITALIST

## 2017-01-01 PROCEDURE — 96365 THER/PROPH/DIAG IV INF INIT: CPT

## 2017-01-01 PROCEDURE — G0299 HHS/HOSPICE OF RN EA 15 MIN: HCPCS

## 2017-01-01 PROCEDURE — 65660000000 HC RM CCU STEPDOWN

## 2017-01-01 PROCEDURE — P9035 PLATELET PHERES LEUKOREDUCED: HCPCS | Performed by: EMERGENCY MEDICINE

## 2017-01-01 PROCEDURE — 96368 THER/DIAG CONCURRENT INF: CPT

## 2017-01-01 PROCEDURE — 77030013169 SET IV BLD ICUM -A

## 2017-01-01 PROCEDURE — 84100 ASSAY OF PHOSPHORUS: CPT | Performed by: PHYSICIAN ASSISTANT

## 2017-01-01 PROCEDURE — 83605 ASSAY OF LACTIC ACID: CPT

## 2017-01-01 PROCEDURE — 77030008771 HC TU NG SALEM SUMP -A

## 2017-01-01 PROCEDURE — 30233N1 TRANSFUSION OF NONAUTOLOGOUS RED BLOOD CELLS INTO PERIPHERAL VEIN, PERCUTANEOUS APPROACH: ICD-10-PCS | Performed by: HOSPITALIST

## 2017-01-01 PROCEDURE — 94002 VENT MGMT INPAT INIT DAY: CPT

## 2017-01-01 PROCEDURE — 92610 EVALUATE SWALLOWING FUNCTION: CPT

## 2017-01-01 PROCEDURE — 87077 CULTURE AEROBIC IDENTIFY: CPT | Performed by: EMERGENCY MEDICINE

## 2017-01-01 PROCEDURE — 82550 ASSAY OF CK (CPK): CPT | Performed by: PHYSICIAN ASSISTANT

## 2017-01-01 PROCEDURE — 86900 BLOOD TYPING SEROLOGIC ABO: CPT | Performed by: HOSPITALIST

## 2017-01-01 PROCEDURE — 83735 ASSAY OF MAGNESIUM: CPT | Performed by: INTERNAL MEDICINE

## 2017-01-01 PROCEDURE — 36415 COLL VENOUS BLD VENIPUNCTURE: CPT | Performed by: PHYSICIAN ASSISTANT

## 2017-01-01 PROCEDURE — 77030032491 HC SLV COMPR SCD KNE XL COVD -B

## 2017-01-01 PROCEDURE — 87040 BLOOD CULTURE FOR BACTERIA: CPT | Performed by: PHYSICIAN ASSISTANT

## 2017-01-01 PROCEDURE — 77030018836 HC SOL IRR NACL ICUM -A: Performed by: UROLOGY

## 2017-01-01 PROCEDURE — 80048 BASIC METABOLIC PNL TOTAL CA: CPT | Performed by: INTERNAL MEDICINE

## 2017-01-01 PROCEDURE — 74011250636 HC RX REV CODE- 250/636

## 2017-01-01 PROCEDURE — 97168 OT RE-EVAL EST PLAN CARE: CPT

## 2017-01-01 PROCEDURE — 82330 ASSAY OF CALCIUM: CPT | Performed by: INTERNAL MEDICINE

## 2017-01-01 PROCEDURE — 74000 XR ABD (KUB): CPT

## 2017-01-01 PROCEDURE — 87070 CULTURE OTHR SPECIMN AEROBIC: CPT | Performed by: FAMILY MEDICINE

## 2017-01-01 PROCEDURE — 31500 INSERT EMERGENCY AIRWAY: CPT

## 2017-01-01 PROCEDURE — 31720 CLEARANCE OF AIRWAYS: CPT

## 2017-01-01 PROCEDURE — 87070 CULTURE OTHR SPECIMN AEROBIC: CPT | Performed by: INTERNAL MEDICINE

## 2017-01-01 PROCEDURE — 83010 ASSAY OF HAPTOGLOBIN QUANT: CPT | Performed by: INTERNAL MEDICINE

## 2017-01-01 PROCEDURE — 06H03DZ INSERTION OF INTRALUMINAL DEVICE INTO INFERIOR VENA CAVA, PERCUTANEOUS APPROACH: ICD-10-PCS | Performed by: SURGERY

## 2017-01-01 PROCEDURE — C2617 STENT, NON-COR, TEM W/O DEL: HCPCS | Performed by: UROLOGY

## 2017-01-01 PROCEDURE — 82533 TOTAL CORTISOL: CPT | Performed by: INTERNAL MEDICINE

## 2017-01-01 PROCEDURE — 84443 ASSAY THYROID STIM HORMONE: CPT | Performed by: INTERNAL MEDICINE

## 2017-01-01 PROCEDURE — 65660000001 HC RM ICU INTERMED STEPDOWN

## 2017-01-01 PROCEDURE — 36415 COLL VENOUS BLD VENIPUNCTURE: CPT | Performed by: HOSPITALIST

## 2017-01-01 PROCEDURE — 85025 COMPLETE CBC W/AUTO DIFF WBC: CPT | Performed by: INTERNAL MEDICINE

## 2017-01-01 PROCEDURE — 74011000255 HC RX REV CODE- 255: Performed by: RADIOLOGY

## 2017-01-01 PROCEDURE — 74011000250 HC RX REV CODE- 250: Performed by: FAMILY MEDICINE

## 2017-01-01 PROCEDURE — 97164 PT RE-EVAL EST PLAN CARE: CPT

## 2017-01-01 PROCEDURE — 80048 BASIC METABOLIC PNL TOTAL CA: CPT | Performed by: PHYSICIAN ASSISTANT

## 2017-01-01 PROCEDURE — 81025 URINE PREGNANCY TEST: CPT

## 2017-01-01 PROCEDURE — 87086 URINE CULTURE/COLONY COUNT: CPT | Performed by: FAMILY MEDICINE

## 2017-01-01 PROCEDURE — 86900 BLOOD TYPING SEROLOGIC ABO: CPT | Performed by: EMERGENCY MEDICINE

## 2017-01-01 PROCEDURE — A4450 NON-WATERPROOF TAPE: HCPCS

## 2017-01-01 PROCEDURE — 77030012699 HC VLV SUC CNTRL OCOA -A: Performed by: INTERNAL MEDICINE

## 2017-01-01 PROCEDURE — G0155 HHCP-SVS OF CSW,EA 15 MIN: HCPCS

## 2017-01-01 PROCEDURE — 74011636637 HC RX REV CODE- 636/637: Performed by: PHYSICIAN ASSISTANT

## 2017-01-01 PROCEDURE — 94400 HC END TIDAL CO2 RESPONSE CURVE: CPT

## 2017-01-01 PROCEDURE — T4541 LARGE DISPOSABLE UNDERPAD: HCPCS

## 2017-01-01 PROCEDURE — 86920 COMPATIBILITY TEST SPIN: CPT | Performed by: EMERGENCY MEDICINE

## 2017-01-01 PROCEDURE — 95819 EEG AWAKE AND ASLEEP: CPT

## 2017-01-01 PROCEDURE — 96366 THER/PROPH/DIAG IV INF ADDON: CPT

## 2017-01-01 PROCEDURE — 97163 PT EVAL HIGH COMPLEX 45 MIN: CPT | Performed by: PHYSICAL THERAPIST

## 2017-01-01 PROCEDURE — A6402 STERILE GAUZE <= 16 SQ IN: HCPCS

## 2017-01-01 PROCEDURE — 82330 ASSAY OF CALCIUM: CPT | Performed by: FAMILY MEDICINE

## 2017-01-01 PROCEDURE — 77030018719 HC DRSG PTCH ANTIMIC J&J -A

## 2017-01-01 PROCEDURE — 65270000029 HC RM PRIVATE

## 2017-01-01 PROCEDURE — 77030018846 HC SOL IRR STRL H20 ICUM -A

## 2017-01-01 PROCEDURE — 0T768DZ DILATION OF RIGHT URETER WITH INTRALUMINAL DEVICE, VIA NATURAL OR ARTIFICIAL OPENING ENDOSCOPIC: ICD-10-PCS | Performed by: UROLOGY

## 2017-01-01 PROCEDURE — 77030032490 HC SLV COMPR SCD KNE COVD -B: Performed by: UROLOGY

## 2017-01-01 PROCEDURE — 87086 URINE CULTURE/COLONY COUNT: CPT | Performed by: EMERGENCY MEDICINE

## 2017-01-01 PROCEDURE — 70450 CT HEAD/BRAIN W/O DYE: CPT

## 2017-01-01 PROCEDURE — G0300 HHS/HOSPICE OF LPN EA 15 MIN: HCPCS

## 2017-01-01 PROCEDURE — 85018 HEMOGLOBIN: CPT | Performed by: INTERNAL MEDICINE

## 2017-01-01 PROCEDURE — A6260 WOUND CLEANSER ANY TYPE/SIZE: HCPCS

## 2017-01-01 PROCEDURE — P9047 ALBUMIN (HUMAN), 25%, 50ML: HCPCS | Performed by: PHYSICIAN ASSISTANT

## 2017-01-01 PROCEDURE — 0TP98DZ REMOVAL OF INTRALUMINAL DEVICE FROM URETER, VIA NATURAL OR ARTIFICIAL OPENING ENDOSCOPIC: ICD-10-PCS | Performed by: UROLOGY

## 2017-01-01 PROCEDURE — 77030029684 HC NEB SM VOL KT MONA -A

## 2017-01-01 PROCEDURE — HE101 HC HSPC R&B RUG RATE

## 2017-01-01 PROCEDURE — 82040 ASSAY OF SERUM ALBUMIN: CPT | Performed by: INTERNAL MEDICINE

## 2017-01-01 PROCEDURE — 87086 URINE CULTURE/COLONY COUNT: CPT | Performed by: INTERNAL MEDICINE

## 2017-01-01 PROCEDURE — 83036 HEMOGLOBIN GLYCOSYLATED A1C: CPT | Performed by: INTERNAL MEDICINE

## 2017-01-01 PROCEDURE — 82550 ASSAY OF CK (CPK): CPT | Performed by: INTERNAL MEDICINE

## 2017-01-01 PROCEDURE — 85025 COMPLETE CBC W/AUTO DIFF WBC: CPT | Performed by: PHYSICIAN ASSISTANT

## 2017-01-01 PROCEDURE — 51702 INSERT TEMP BLADDER CATH: CPT

## 2017-01-01 PROCEDURE — 76060000032 HC ANESTHESIA 0.5 TO 1 HR: Performed by: UROLOGY

## 2017-01-01 PROCEDURE — 84132 ASSAY OF SERUM POTASSIUM: CPT | Performed by: HOSPITALIST

## 2017-01-01 PROCEDURE — 84439 ASSAY OF FREE THYROXINE: CPT | Performed by: INTERNAL MEDICINE

## 2017-01-01 PROCEDURE — P9035 PLATELET PHERES LEUKOREDUCED: HCPCS | Performed by: INTERNAL MEDICINE

## 2017-01-01 PROCEDURE — C1769 GUIDE WIRE: HCPCS | Performed by: UROLOGY

## 2017-01-01 PROCEDURE — 30233R1 TRANSFUSION OF NONAUTOLOGOUS PLATELETS INTO PERIPHERAL VEIN, PERCUTANEOUS APPROACH: ICD-10-PCS | Performed by: INTERNAL MEDICINE

## 2017-01-01 PROCEDURE — 77030020262 HC SOL INJ SOD CL 0.9% 100ML

## 2017-01-01 PROCEDURE — 85027 COMPLETE CBC AUTOMATED: CPT | Performed by: INTERNAL MEDICINE

## 2017-01-01 PROCEDURE — 85018 HEMOGLOBIN: CPT | Performed by: HOSPITALIST

## 2017-01-01 PROCEDURE — 77030018712 HC DEV BLLN INFL BSC -B: Performed by: INTERNAL MEDICINE

## 2017-01-01 PROCEDURE — 87186 SC STD MICRODIL/AGAR DIL: CPT | Performed by: FAMILY MEDICINE

## 2017-01-01 PROCEDURE — 85610 PROTHROMBIN TIME: CPT | Performed by: EMERGENCY MEDICINE

## 2017-01-01 PROCEDURE — 80076 HEPATIC FUNCTION PANEL: CPT | Performed by: EMERGENCY MEDICINE

## 2017-01-01 PROCEDURE — 96367 TX/PROPH/DG ADDL SEQ IV INF: CPT

## 2017-01-01 PROCEDURE — 77030012961 HC IRR KT CYSTO/TUR ICUM -A: Performed by: UROLOGY

## 2017-01-01 PROCEDURE — 74230 X-RAY XM SWLNG FUNCJ C+: CPT

## 2017-01-01 PROCEDURE — 80047 BASIC METABLC PNL IONIZED CA: CPT

## 2017-01-01 PROCEDURE — 74011636320 HC RX REV CODE- 636/320: Performed by: SURGERY

## 2017-01-01 PROCEDURE — 76040000007: Performed by: INTERNAL MEDICINE

## 2017-01-01 PROCEDURE — 86140 C-REACTIVE PROTEIN: CPT | Performed by: FAMILY MEDICINE

## 2017-01-01 PROCEDURE — 97162 PT EVAL MOD COMPLEX 30 MIN: CPT | Performed by: PHYSICAL THERAPIST

## 2017-01-01 PROCEDURE — 85730 THROMBOPLASTIN TIME PARTIAL: CPT | Performed by: EMERGENCY MEDICINE

## 2017-01-01 PROCEDURE — 94760 N-INVAS EAR/PLS OXIMETRY 1: CPT

## 2017-01-01 PROCEDURE — 0BJ08ZZ INSPECTION OF TRACHEOBRONCHIAL TREE, VIA NATURAL OR ARTIFICIAL OPENING ENDOSCOPIC: ICD-10-PCS | Performed by: INTERNAL MEDICINE

## 2017-01-01 PROCEDURE — 87804 INFLUENZA ASSAY W/OPTIC: CPT | Performed by: FAMILY MEDICINE

## 2017-01-01 PROCEDURE — C1769 GUIDE WIRE: HCPCS

## 2017-01-01 PROCEDURE — 80053 COMPREHEN METABOLIC PANEL: CPT | Performed by: PHYSICIAN ASSISTANT

## 2017-01-01 PROCEDURE — 77030022643 HC PAD DEFIB AD HRTSTRT PHL -B

## 2017-01-01 PROCEDURE — 86900 BLOOD TYPING SEROLOGIC ABO: CPT | Performed by: FAMILY MEDICINE

## 2017-01-01 PROCEDURE — 97535 SELF CARE MNGMENT TRAINING: CPT

## 2017-01-01 PROCEDURE — 85652 RBC SED RATE AUTOMATED: CPT | Performed by: FAMILY MEDICINE

## 2017-01-01 PROCEDURE — 74011636637 HC RX REV CODE- 636/637: Performed by: HOSPITALIST

## 2017-01-01 PROCEDURE — 0T778DZ DILATION OF LEFT URETER WITH INTRALUMINAL DEVICE, VIA NATURAL OR ARTIFICIAL OPENING ENDOSCOPIC: ICD-10-PCS | Performed by: UROLOGY

## 2017-01-01 PROCEDURE — 80053 COMPREHEN METABOLIC PANEL: CPT | Performed by: INTERNAL MEDICINE

## 2017-01-01 PROCEDURE — L4396 STATIC OR DYNAMI AFO PRE CST: HCPCS

## 2017-01-01 PROCEDURE — 77030034849

## 2017-01-01 PROCEDURE — 83036 HEMOGLOBIN GLYCOSYLATED A1C: CPT | Performed by: PHYSICIAN ASSISTANT

## 2017-01-01 PROCEDURE — 77030020186 HC BOOT HL PROTCT SAGE -B

## 2017-01-01 PROCEDURE — 81001 URINALYSIS AUTO W/SCOPE: CPT | Performed by: PHYSICIAN ASSISTANT

## 2017-01-01 PROCEDURE — 86920 COMPATIBILITY TEST SPIN: CPT | Performed by: FAMILY MEDICINE

## 2017-01-01 PROCEDURE — 0BH17EZ INSERTION OF ENDOTRACHEAL AIRWAY INTO TRACHEA, VIA NATURAL OR ARTIFICIAL OPENING: ICD-10-PCS | Performed by: INTERNAL MEDICINE

## 2017-01-01 PROCEDURE — BT141ZZ FLUOROSCOPY OF KIDNEYS, URETERS AND BLADDER USING LOW OSMOLAR CONTRAST: ICD-10-PCS | Performed by: UROLOGY

## 2017-01-01 PROCEDURE — 30233N1 TRANSFUSION OF NONAUTOLOGOUS RED BLOOD CELLS INTO PERIPHERAL VEIN, PERCUTANEOUS APPROACH: ICD-10-PCS | Performed by: INTERNAL MEDICINE

## 2017-01-01 PROCEDURE — A6248 HYDROGEL DRSG GEL FILLER: HCPCS

## 2017-01-01 PROCEDURE — 76010000138 HC OR TIME 0.5 TO 1 HR: Performed by: UROLOGY

## 2017-01-01 PROCEDURE — 77030035694 HC MSK BIPAP FLL FAC PERFMAX PHIL -B

## 2017-01-01 PROCEDURE — 95816 EEG AWAKE AND DROWSY: CPT | Performed by: INTERNAL MEDICINE

## 2017-01-01 PROCEDURE — A6213 FOAM DRG >16<=48 SQ IN W/BDR: HCPCS

## 2017-01-01 PROCEDURE — A5120 SKIN BARRIER, WIPE OR SWAB: HCPCS

## 2017-01-01 PROCEDURE — 85045 AUTOMATED RETICULOCYTE COUNT: CPT | Performed by: INTERNAL MEDICINE

## 2017-01-01 PROCEDURE — 84484 ASSAY OF TROPONIN QUANT: CPT | Performed by: PHYSICIAN ASSISTANT

## 2017-01-01 PROCEDURE — A6223 GAUZE >16<=48 NO W/SAL W/O B: HCPCS

## 2017-01-01 PROCEDURE — 77030018836 HC SOL IRR NACL ICUM -A: Performed by: INTERNAL MEDICINE

## 2017-01-01 PROCEDURE — 96361 HYDRATE IV INFUSION ADD-ON: CPT

## 2017-01-01 PROCEDURE — A6250 SKIN SEAL PROTECT MOISTURIZR: HCPCS

## 2017-01-01 PROCEDURE — 83735 ASSAY OF MAGNESIUM: CPT | Performed by: PHYSICIAN ASSISTANT

## 2017-01-01 PROCEDURE — 77030018836 HC SOL IRR NACL ICUM -A

## 2017-01-01 DEVICE — URETERAL STENT
Type: IMPLANTABLE DEVICE | Site: URETER | Status: FUNCTIONAL
Brand: POLARIS™ ULTRA

## 2017-01-01 RX ORDER — SODIUM BICARBONATE 650 MG/1
650 TABLET ORAL 3 TIMES DAILY
Qty: 100 TAB | Refills: 3 | Status: SHIPPED | OUTPATIENT
Start: 2017-01-01 | End: 2017-01-01

## 2017-01-01 RX ORDER — HEPARIN SODIUM 200 [USP'U]/100ML
1000 INJECTION, SOLUTION INTRAVENOUS ONCE
Status: COMPLETED | OUTPATIENT
Start: 2017-01-01 | End: 2017-01-01

## 2017-01-01 RX ORDER — SODIUM CHLORIDE 9 MG/ML
1000 INJECTION, SOLUTION INTRAVENOUS
Status: COMPLETED | OUTPATIENT
Start: 2017-01-01 | End: 2017-01-01

## 2017-01-01 RX ORDER — ONDANSETRON 4 MG/1
4 TABLET, ORALLY DISINTEGRATING ORAL
Status: DISCONTINUED | OUTPATIENT
Start: 2017-01-01 | End: 2017-01-01 | Stop reason: HOSPADM

## 2017-01-01 RX ORDER — HYDROMORPHONE HYDROCHLORIDE 2 MG/ML
1 INJECTION, SOLUTION INTRAMUSCULAR; INTRAVENOUS; SUBCUTANEOUS
Status: DISCONTINUED | OUTPATIENT
Start: 2017-01-01 | End: 2017-01-01 | Stop reason: CLARIF

## 2017-01-01 RX ORDER — SUCCINYLCHOLINE CHLORIDE 20 MG/ML
100 INJECTION INTRAMUSCULAR; INTRAVENOUS
Status: COMPLETED | OUTPATIENT
Start: 2017-01-01 | End: 2017-01-01

## 2017-01-01 RX ORDER — HYDROMORPHONE HYDROCHLORIDE 4 MG/1
4 TABLET ORAL
COMMUNITY
End: 2017-01-01

## 2017-01-01 RX ORDER — CHLORHEXIDINE GLUCONATE 1.2 MG/ML
10 RINSE ORAL EVERY 12 HOURS
Qty: 1 BOTTLE | Refills: 5 | Status: SHIPPED | OUTPATIENT
Start: 2017-01-01 | End: 2017-01-01

## 2017-01-01 RX ORDER — INSULIN LISPRO 100 [IU]/ML
INJECTION, SOLUTION INTRAVENOUS; SUBCUTANEOUS EVERY 6 HOURS
Status: DISCONTINUED | OUTPATIENT
Start: 2017-01-01 | End: 2017-01-01

## 2017-01-01 RX ORDER — ONDANSETRON 2 MG/ML
INJECTION INTRAMUSCULAR; INTRAVENOUS AS NEEDED
Status: DISCONTINUED | OUTPATIENT
Start: 2017-01-01 | End: 2017-01-01 | Stop reason: HOSPADM

## 2017-01-01 RX ORDER — POTASSIUM CHLORIDE 7.45 MG/ML
10 INJECTION INTRAVENOUS
Status: COMPLETED | OUTPATIENT
Start: 2017-01-01 | End: 2017-01-01

## 2017-01-01 RX ORDER — DIPHENHYDRAMINE HYDROCHLORIDE 50 MG/ML
12.5 INJECTION, SOLUTION INTRAMUSCULAR; INTRAVENOUS
Status: DISCONTINUED | OUTPATIENT
Start: 2017-01-01 | End: 2017-01-01 | Stop reason: HOSPADM

## 2017-01-01 RX ORDER — IPRATROPIUM BROMIDE AND ALBUTEROL SULFATE 2.5; .5 MG/3ML; MG/3ML
3 SOLUTION RESPIRATORY (INHALATION)
Status: DISCONTINUED | OUTPATIENT
Start: 2017-01-01 | End: 2017-01-01

## 2017-01-01 RX ORDER — SODIUM BICARBONATE 1 MEQ/ML
50 SYRINGE (ML) INTRAVENOUS ONCE
Status: COMPLETED | OUTPATIENT
Start: 2017-01-01 | End: 2017-01-01

## 2017-01-01 RX ORDER — MAGNESIUM SULFATE HEPTAHYDRATE 40 MG/ML
2 INJECTION, SOLUTION INTRAVENOUS ONCE
Status: COMPLETED | OUTPATIENT
Start: 2017-01-01 | End: 2017-01-01

## 2017-01-01 RX ORDER — MAGNESIUM SULFATE 1 G/100ML
1 INJECTION INTRAVENOUS ONCE
Status: COMPLETED | OUTPATIENT
Start: 2017-01-01 | End: 2017-01-01

## 2017-01-01 RX ORDER — LEVOFLOXACIN 5 MG/ML
750 INJECTION, SOLUTION INTRAVENOUS
Status: DISCONTINUED | OUTPATIENT
Start: 2017-01-01 | End: 2017-01-01

## 2017-01-01 RX ORDER — OXYCODONE AND ACETAMINOPHEN 5; 325 MG/1; MG/1
1 TABLET ORAL
Status: DISCONTINUED | OUTPATIENT
Start: 2017-01-01 | End: 2017-01-01 | Stop reason: HOSPADM

## 2017-01-01 RX ORDER — NOREPINEPHRINE BITARTRATE/D5W 8 MG/250ML
PLASTIC BAG, INJECTION (ML) INTRAVENOUS
Status: DISPENSED
Start: 2017-01-01 | End: 2017-01-01

## 2017-01-01 RX ORDER — CALCIUM GLUCONATE 94 MG/ML
2 INJECTION, SOLUTION INTRAVENOUS ONCE
Status: COMPLETED | OUTPATIENT
Start: 2017-01-01 | End: 2017-01-01

## 2017-01-01 RX ORDER — MORPHINE SULFATE 5 MG/ML
INJECTION, SOLUTION INTRAVENOUS CONTINUOUS
Status: DISCONTINUED | OUTPATIENT
Start: 2017-01-01 | End: 2017-01-01 | Stop reason: HOSPADM

## 2017-01-01 RX ORDER — TROSPIUM CHLORIDE 20 MG/1
20 TABLET, FILM COATED ORAL 2 TIMES DAILY
Status: DISCONTINUED | OUTPATIENT
Start: 2017-01-01 | End: 2017-01-01

## 2017-01-01 RX ORDER — POTASSIUM CHLORIDE 7.45 MG/ML
10 INJECTION INTRAVENOUS ONCE
Status: COMPLETED | OUTPATIENT
Start: 2017-01-01 | End: 2017-01-01

## 2017-01-01 RX ORDER — NYSTATIN 100000 [USP'U]/G
POWDER TOPICAL 2 TIMES DAILY
Qty: 1 BOTTLE | Refills: 3 | Status: SHIPPED | OUTPATIENT
Start: 2017-01-01 | End: 2017-01-01

## 2017-01-01 RX ORDER — MORPHINE SULFATE 5 MG/ML
INJECTION, SOLUTION INTRAVENOUS CONTINUOUS
Status: DISCONTINUED | OUTPATIENT
Start: 2017-01-01 | End: 2017-01-01

## 2017-01-01 RX ORDER — IPRATROPIUM BROMIDE AND ALBUTEROL SULFATE 2.5; .5 MG/3ML; MG/3ML
SOLUTION RESPIRATORY (INHALATION)
Status: COMPLETED
Start: 2017-01-01 | End: 2017-01-01

## 2017-01-01 RX ORDER — ENOXAPARIN SODIUM 100 MG/ML
40 INJECTION SUBCUTANEOUS EVERY 24 HOURS
Status: DISCONTINUED | OUTPATIENT
Start: 2017-01-01 | End: 2017-01-01

## 2017-01-01 RX ORDER — IPRATROPIUM BROMIDE AND ALBUTEROL SULFATE 2.5; .5 MG/3ML; MG/3ML
3 SOLUTION RESPIRATORY (INHALATION)
Status: DISCONTINUED | OUTPATIENT
Start: 2017-01-01 | End: 2017-01-01 | Stop reason: HOSPADM

## 2017-01-01 RX ORDER — FUROSEMIDE 40 MG/1
TABLET ORAL
Qty: 60 TAB | Refills: 5 | Status: SHIPPED | OUTPATIENT
Start: 2017-01-01 | End: 2017-01-01

## 2017-01-01 RX ORDER — HEPARIN SODIUM 200 [USP'U]/100ML
500 INJECTION, SOLUTION INTRAVENOUS ONCE
Status: COMPLETED | OUTPATIENT
Start: 2017-01-01 | End: 2017-01-01

## 2017-01-01 RX ORDER — FUROSEMIDE 40 MG/1
40 TABLET ORAL 2 TIMES DAILY
Status: DISCONTINUED | OUTPATIENT
Start: 2017-01-01 | End: 2017-01-01 | Stop reason: HOSPADM

## 2017-01-01 RX ORDER — LIDOCAINE HYDROCHLORIDE 10 MG/ML
0.1 INJECTION, SOLUTION EPIDURAL; INFILTRATION; INTRACAUDAL; PERINEURAL AS NEEDED
Status: DISCONTINUED | OUTPATIENT
Start: 2017-01-01 | End: 2017-01-01 | Stop reason: HOSPADM

## 2017-01-01 RX ORDER — NOREPINEPHRINE BITARTRATE/D5W 8 MG/250ML
2-30 PLASTIC BAG, INJECTION (ML) INTRAVENOUS
Status: DISCONTINUED | OUTPATIENT
Start: 2017-01-01 | End: 2017-01-01

## 2017-01-01 RX ORDER — LORAZEPAM 2 MG/ML
0.5 CONCENTRATE ORAL
Qty: 1 BOTTLE | Refills: 0 | Status: SHIPPED | OUTPATIENT
Start: 2017-01-01 | End: 2017-01-01

## 2017-01-01 RX ORDER — ENOXAPARIN SODIUM 100 MG/ML
40 INJECTION SUBCUTANEOUS EVERY 12 HOURS
Qty: 30 SYRINGE | Refills: 5 | Status: SHIPPED | OUTPATIENT
Start: 2017-01-01 | End: 2017-01-01

## 2017-01-01 RX ORDER — HYDROCORTISONE SODIUM SUCCINATE 100 MG/2ML
INJECTION, POWDER, FOR SOLUTION INTRAMUSCULAR; INTRAVENOUS
Status: COMPLETED
Start: 2017-01-01 | End: 2017-01-01

## 2017-01-01 RX ORDER — LEVOFLOXACIN 5 MG/ML
500 INJECTION, SOLUTION INTRAVENOUS
Status: COMPLETED | OUTPATIENT
Start: 2017-01-01 | End: 2017-01-01

## 2017-01-01 RX ORDER — PANTOPRAZOLE SODIUM 40 MG/1
40 TABLET, DELAYED RELEASE ORAL DAILY
Status: DISCONTINUED | OUTPATIENT
Start: 2017-01-01 | End: 2017-01-01

## 2017-01-01 RX ORDER — POTASSIUM CHLORIDE 750 MG/1
10 TABLET, EXTENDED RELEASE ORAL ONCE
Status: COMPLETED | OUTPATIENT
Start: 2017-01-01 | End: 2017-01-01

## 2017-01-01 RX ORDER — SODIUM CHLORIDE 9 MG/ML
250 INJECTION, SOLUTION INTRAVENOUS AS NEEDED
Status: DISCONTINUED | OUTPATIENT
Start: 2017-01-01 | End: 2017-01-01 | Stop reason: SDUPTHER

## 2017-01-01 RX ORDER — POTASSIUM CHLORIDE 7.45 MG/ML
INJECTION INTRAVENOUS
Status: COMPLETED
Start: 2017-01-01 | End: 2017-01-01

## 2017-01-01 RX ORDER — NOREPINEPHRINE BITARTRATE/D5W 8 MG/250ML
2-16 PLASTIC BAG, INJECTION (ML) INTRAVENOUS
Status: DISCONTINUED | OUTPATIENT
Start: 2017-01-01 | End: 2017-01-01

## 2017-01-01 RX ORDER — NALOXONE HYDROCHLORIDE 0.4 MG/ML
INJECTION, SOLUTION INTRAMUSCULAR; INTRAVENOUS; SUBCUTANEOUS
Status: DISPENSED
Start: 2017-01-01 | End: 2017-01-01

## 2017-01-01 RX ORDER — CEFEPIME HYDROCHLORIDE 1 G/1
INJECTION, POWDER, FOR SOLUTION INTRAMUSCULAR; INTRAVENOUS
Status: DISPENSED
Start: 2017-01-01 | End: 2017-01-01

## 2017-01-01 RX ORDER — ESMOLOL HYDROCHLORIDE 10 MG/ML
INJECTION INTRAVENOUS AS NEEDED
Status: DISCONTINUED | OUTPATIENT
Start: 2017-01-01 | End: 2017-01-01 | Stop reason: HOSPADM

## 2017-01-01 RX ORDER — SODIUM CHLORIDE, SODIUM LACTATE, POTASSIUM CHLORIDE, CALCIUM CHLORIDE 600; 310; 30; 20 MG/100ML; MG/100ML; MG/100ML; MG/100ML
50 INJECTION, SOLUTION INTRAVENOUS CONTINUOUS
Status: DISCONTINUED | OUTPATIENT
Start: 2017-01-01 | End: 2017-01-01 | Stop reason: HOSPADM

## 2017-01-01 RX ORDER — LORAZEPAM 2 MG/ML
0.5 INJECTION INTRAMUSCULAR ONCE
Status: COMPLETED | OUTPATIENT
Start: 2017-01-01 | End: 2017-01-01

## 2017-01-01 RX ORDER — SODIUM CHLORIDE 9 MG/ML
250 INJECTION, SOLUTION INTRAVENOUS AS NEEDED
Status: DISCONTINUED | OUTPATIENT
Start: 2017-01-01 | End: 2017-01-01 | Stop reason: HOSPADM

## 2017-01-01 RX ORDER — POTASSIUM CHLORIDE 29.8 MG/ML
20 INJECTION INTRAVENOUS ONCE
Status: COMPLETED | OUTPATIENT
Start: 2017-01-01 | End: 2017-01-01

## 2017-01-01 RX ORDER — LORAZEPAM 2 MG/ML
INJECTION INTRAMUSCULAR
Status: COMPLETED
Start: 2017-01-01 | End: 2017-01-01

## 2017-01-01 RX ORDER — SODIUM CHLORIDE 900 MG/100ML
INJECTION INTRAVENOUS
Status: DISPENSED
Start: 2017-01-01 | End: 2017-01-01

## 2017-01-01 RX ORDER — POTASSIUM CHLORIDE 20 MEQ/1
20 TABLET, EXTENDED RELEASE ORAL 2 TIMES DAILY
Qty: 30 TAB | Refills: 2 | Status: SHIPPED | OUTPATIENT
Start: 2017-01-01 | End: 2017-01-01

## 2017-01-01 RX ORDER — MAGNESIUM SULFATE HEPTAHYDRATE 40 MG/ML
2 INJECTION, SOLUTION INTRAVENOUS
Status: COMPLETED | OUTPATIENT
Start: 2017-01-01 | End: 2017-01-01

## 2017-01-01 RX ORDER — PROPOFOL 10 MG/ML
INJECTION, EMULSION INTRAVENOUS AS NEEDED
Status: DISCONTINUED | OUTPATIENT
Start: 2017-01-01 | End: 2017-01-01 | Stop reason: HOSPADM

## 2017-01-01 RX ORDER — NALOXONE HYDROCHLORIDE 0.4 MG/ML
0.4 INJECTION, SOLUTION INTRAMUSCULAR; INTRAVENOUS; SUBCUTANEOUS AS NEEDED
Status: DISCONTINUED | OUTPATIENT
Start: 2017-01-01 | End: 2017-01-01

## 2017-01-01 RX ORDER — CIPROFLOXACIN 2 MG/ML
400 INJECTION, SOLUTION INTRAVENOUS
Status: COMPLETED | OUTPATIENT
Start: 2017-01-01 | End: 2017-01-01

## 2017-01-01 RX ORDER — CHLORHEXIDINE GLUCONATE 1.2 MG/ML
10 RINSE ORAL EVERY 12 HOURS
Status: DISCONTINUED | OUTPATIENT
Start: 2017-01-01 | End: 2017-01-01

## 2017-01-01 RX ORDER — MIDAZOLAM HYDROCHLORIDE 1 MG/ML
.5-2 INJECTION, SOLUTION INTRAMUSCULAR; INTRAVENOUS
Status: DISCONTINUED | OUTPATIENT
Start: 2017-01-01 | End: 2017-01-01

## 2017-01-01 RX ORDER — FACIAL-BODY WIPES
10 EACH TOPICAL DAILY PRN
Status: DISCONTINUED | OUTPATIENT
Start: 2017-01-01 | End: 2017-01-01 | Stop reason: HOSPADM

## 2017-01-01 RX ORDER — CHLORHEXIDINE GLUCONATE 1.2 MG/ML
10 RINSE ORAL ONCE
Status: COMPLETED | OUTPATIENT
Start: 2017-01-01 | End: 2017-01-01

## 2017-01-01 RX ORDER — HYOSCYAMINE SULFATE 0.12 MG/1
0.12 TABLET, ORALLY DISINTEGRATING ORAL
Status: DISCONTINUED | OUTPATIENT
Start: 2017-01-01 | End: 2017-01-01 | Stop reason: HOSPADM

## 2017-01-01 RX ORDER — ACETAMINOPHEN 325 MG/1
650 TABLET ORAL
Status: DISCONTINUED | OUTPATIENT
Start: 2017-01-01 | End: 2017-01-01 | Stop reason: SDUPTHER

## 2017-01-01 RX ORDER — MORPHINE SULFATE IN 0.9 % NACL 1 MG/ML
2 PLASTIC BAG, INJECTION (ML) INTRAVENOUS
Status: DISCONTINUED | OUTPATIENT
Start: 2017-01-01 | End: 2017-01-01 | Stop reason: CLARIF

## 2017-01-01 RX ORDER — DEXTROSE, SODIUM CHLORIDE, AND POTASSIUM CHLORIDE 5; .45; .15 G/100ML; G/100ML; G/100ML
50 INJECTION INTRAVENOUS CONTINUOUS
Status: DISCONTINUED | OUTPATIENT
Start: 2017-01-01 | End: 2017-01-01

## 2017-01-01 RX ORDER — HYDROMORPHONE HYDROCHLORIDE 2 MG/ML
1 INJECTION, SOLUTION INTRAMUSCULAR; INTRAVENOUS; SUBCUTANEOUS
Status: DISCONTINUED | OUTPATIENT
Start: 2017-01-01 | End: 2017-01-01 | Stop reason: SDUPTHER

## 2017-01-01 RX ORDER — SODIUM CHLORIDE AND POTASSIUM CHLORIDE .9; .15 G/100ML; G/100ML
SOLUTION INTRAVENOUS CONTINUOUS
Status: DISCONTINUED | OUTPATIENT
Start: 2017-01-01 | End: 2017-01-01

## 2017-01-01 RX ORDER — HYDROMORPHONE HYDROCHLORIDE 1 MG/ML
1 INJECTION, SOLUTION INTRAMUSCULAR; INTRAVENOUS; SUBCUTANEOUS
Status: DISCONTINUED | OUTPATIENT
Start: 2017-01-01 | End: 2017-01-01

## 2017-01-01 RX ORDER — FENTANYL CITRATE-0.9 % NACL/PF 900MCG/30
PATIENT CONTROLLED ANALGESIA VIAL INJECTION
Status: DISCONTINUED | OUTPATIENT
Start: 2017-01-01 | End: 2017-01-01

## 2017-01-01 RX ORDER — LEVOFLOXACIN 5 MG/ML
750 INJECTION, SOLUTION INTRAVENOUS EVERY 24 HOURS
Status: DISCONTINUED | OUTPATIENT
Start: 2017-01-01 | End: 2017-01-01

## 2017-01-01 RX ORDER — MAGNESIUM SULFATE 100 %
4 CRYSTALS MISCELLANEOUS AS NEEDED
Status: DISCONTINUED | OUTPATIENT
Start: 2017-01-01 | End: 2017-01-01

## 2017-01-01 RX ORDER — PROPOFOL 10 MG/ML
INJECTION, EMULSION INTRAVENOUS
Status: DISPENSED
Start: 2017-01-01 | End: 2017-01-01

## 2017-01-01 RX ORDER — HYDROMORPHONE HYDROCHLORIDE 2 MG/1
4 TABLET ORAL
Status: DISCONTINUED | OUTPATIENT
Start: 2017-01-01 | End: 2017-01-01

## 2017-01-01 RX ORDER — FLUMAZENIL 0.1 MG/ML
INJECTION INTRAVENOUS
Status: COMPLETED
Start: 2017-01-01 | End: 2017-01-01

## 2017-01-01 RX ORDER — VASOPRESSIN 20 U/ML
INJECTION PARENTERAL
Status: DISPENSED
Start: 2017-01-01 | End: 2017-01-01

## 2017-01-01 RX ORDER — FENTANYL CITRATE 50 UG/ML
INJECTION, SOLUTION INTRAMUSCULAR; INTRAVENOUS
Status: DISPENSED
Start: 2017-01-01 | End: 2017-01-01

## 2017-01-01 RX ORDER — HEPARIN SODIUM (PORCINE) LOCK FLUSH IV SOLN 100 UNIT/ML 100 UNIT/ML
300 SOLUTION INTRAVENOUS
Status: DISCONTINUED | OUTPATIENT
Start: 2017-01-01 | End: 2017-01-01 | Stop reason: HOSPADM

## 2017-01-01 RX ORDER — MAGNESIUM SULFATE 1 G/100ML
1 INJECTION INTRAVENOUS
Status: COMPLETED | OUTPATIENT
Start: 2017-01-01 | End: 2017-01-01

## 2017-01-01 RX ORDER — PANTOPRAZOLE SODIUM 40 MG/1
40 TABLET, DELAYED RELEASE ORAL
Status: DISCONTINUED | OUTPATIENT
Start: 2017-01-01 | End: 2017-01-01 | Stop reason: DRUGHIGH

## 2017-01-01 RX ORDER — NYSTATIN 100000 [USP'U]/G
POWDER TOPICAL 2 TIMES DAILY
Qty: 1 BOTTLE | Refills: 0 | Status: SHIPPED | OUTPATIENT
Start: 2017-01-01

## 2017-01-01 RX ORDER — IPRATROPIUM BROMIDE AND ALBUTEROL SULFATE 2.5; .5 MG/3ML; MG/3ML
3 SOLUTION RESPIRATORY (INHALATION)
Qty: 30 NEBULE | Refills: 5 | Status: SHIPPED | OUTPATIENT
Start: 2017-01-01 | End: 2017-01-01

## 2017-01-01 RX ORDER — DEXTROSE MONOHYDRATE AND SODIUM CHLORIDE 5; .9 G/100ML; G/100ML
250 INJECTION, SOLUTION INTRAVENOUS CONTINUOUS
Status: DISPENSED | OUTPATIENT
Start: 2017-01-01 | End: 2017-01-01

## 2017-01-01 RX ORDER — HEPARIN SODIUM 1000 [USP'U]/ML
10-10000 INJECTION, SOLUTION INTRAVENOUS; SUBCUTANEOUS
Status: DISCONTINUED | OUTPATIENT
Start: 2017-01-01 | End: 2017-01-01

## 2017-01-01 RX ORDER — LANOLIN ALCOHOL/MO/W.PET/CERES
400 CREAM (GRAM) TOPICAL DAILY
Status: DISCONTINUED | OUTPATIENT
Start: 2017-01-01 | End: 2017-01-01 | Stop reason: HOSPADM

## 2017-01-01 RX ORDER — POTASSIUM CHLORIDE 20 MEQ/1
20 TABLET, EXTENDED RELEASE ORAL 2 TIMES DAILY
Status: DISCONTINUED | OUTPATIENT
Start: 2017-01-01 | End: 2017-01-01

## 2017-01-01 RX ORDER — ONDANSETRON 2 MG/ML
4 INJECTION INTRAMUSCULAR; INTRAVENOUS
Status: COMPLETED | OUTPATIENT
Start: 2017-01-01 | End: 2017-01-01

## 2017-01-01 RX ORDER — MAGNESIUM SULFATE HEPTAHYDRATE 40 MG/ML
INJECTION, SOLUTION INTRAVENOUS
Status: DISCONTINUED
Start: 2017-01-01 | End: 2017-07-08 | Stop reason: HOSPADM

## 2017-01-01 RX ORDER — SODIUM BICARBONATE 650 MG/1
650 TABLET ORAL 3 TIMES DAILY
Status: DISCONTINUED | OUTPATIENT
Start: 2017-01-01 | End: 2017-01-01

## 2017-01-01 RX ORDER — DEXAMETHASONE SODIUM PHOSPHATE 4 MG/ML
INJECTION, SOLUTION INTRA-ARTICULAR; INTRALESIONAL; INTRAMUSCULAR; INTRAVENOUS; SOFT TISSUE AS NEEDED
Status: DISCONTINUED | OUTPATIENT
Start: 2017-01-01 | End: 2017-01-01 | Stop reason: HOSPADM

## 2017-01-01 RX ORDER — MORPHINE SULFATE 2 MG/ML
2 INJECTION, SOLUTION INTRAMUSCULAR; INTRAVENOUS
Qty: 5 SYRINGE | Refills: 0 | Status: SHIPPED | OUTPATIENT
Start: 2017-01-01 | End: 2017-01-01

## 2017-01-01 RX ORDER — DEXTROSE MONOHYDRATE 25 G/50ML
25-50 INJECTION, SOLUTION INTRAVENOUS AS NEEDED
Status: DISCONTINUED | OUTPATIENT
Start: 2017-01-01 | End: 2017-01-01

## 2017-01-01 RX ORDER — DIPHENHYDRAMINE HYDROCHLORIDE 50 MG/ML
INJECTION, SOLUTION INTRAMUSCULAR; INTRAVENOUS
Status: COMPLETED
Start: 2017-01-01 | End: 2017-01-01

## 2017-01-01 RX ORDER — MORPHINE SULFATE 30 MG/1
90 TABLET, FILM COATED, EXTENDED RELEASE ORAL EVERY 12 HOURS
COMMUNITY
End: 2017-01-01

## 2017-01-01 RX ORDER — DEXTROSE 50 % IN WATER (D50W) INTRAVENOUS SYRINGE
Status: COMPLETED
Start: 2017-01-01 | End: 2017-01-01

## 2017-01-01 RX ORDER — MAGNESIUM SULFATE HEPTAHYDRATE 40 MG/ML
INJECTION, SOLUTION INTRAVENOUS
Status: DISPENSED
Start: 2017-01-01 | End: 2017-01-01

## 2017-01-01 RX ORDER — MIDAZOLAM HYDROCHLORIDE 1 MG/ML
INJECTION, SOLUTION INTRAMUSCULAR; INTRAVENOUS
Status: COMPLETED
Start: 2017-01-01 | End: 2017-01-01

## 2017-01-01 RX ORDER — SODIUM CHLORIDE 9 MG/ML
100 INJECTION, SOLUTION INTRAVENOUS CONTINUOUS
Status: DISCONTINUED | OUTPATIENT
Start: 2017-01-01 | End: 2017-01-01

## 2017-01-01 RX ORDER — SODIUM CHLORIDE, SODIUM LACTATE, POTASSIUM CHLORIDE, CALCIUM CHLORIDE 600; 310; 30; 20 MG/100ML; MG/100ML; MG/100ML; MG/100ML
200 INJECTION, SOLUTION INTRAVENOUS CONTINUOUS
Status: DISCONTINUED | OUTPATIENT
Start: 2017-01-01 | End: 2017-01-01

## 2017-01-01 RX ORDER — LIDOCAINE HYDROCHLORIDE 10 MG/ML
INJECTION INFILTRATION; PERINEURAL
Status: DISPENSED
Start: 2017-01-01 | End: 2017-01-01

## 2017-01-01 RX ORDER — SODIUM CHLORIDE 9 MG/ML
250 INJECTION, SOLUTION INTRAVENOUS AS NEEDED
Status: DISCONTINUED | OUTPATIENT
Start: 2017-01-01 | End: 2017-01-01

## 2017-01-01 RX ORDER — PHENYLEPHRINE HCL IN 0.9% NACL 30MG/250ML
2-200 PLASTIC BAG, INJECTION (ML) INTRAVENOUS
Status: DISCONTINUED | OUTPATIENT
Start: 2017-01-01 | End: 2017-01-01

## 2017-01-01 RX ORDER — MORPHINE SULFATE 30 MG/1
30 TABLET, FILM COATED, EXTENDED RELEASE ORAL EVERY 12 HOURS
Status: DISCONTINUED | OUTPATIENT
Start: 2017-01-01 | End: 2017-01-01

## 2017-01-01 RX ORDER — SODIUM CHLORIDE 9 MG/ML
125 INJECTION, SOLUTION INTRAVENOUS CONTINUOUS
Status: DISCONTINUED | OUTPATIENT
Start: 2017-01-01 | End: 2017-01-01

## 2017-01-01 RX ORDER — SODIUM CHLORIDE 9 MG/ML
150 INJECTION, SOLUTION INTRAVENOUS CONTINUOUS
Status: DISCONTINUED | OUTPATIENT
Start: 2017-01-01 | End: 2017-01-01

## 2017-01-01 RX ORDER — NOREPINEPHRINE BITARTRATE/D5W 8 MG/250ML
0-20 PLASTIC BAG, INJECTION (ML) INTRAVENOUS
Status: DISCONTINUED | OUTPATIENT
Start: 2017-01-01 | End: 2017-01-01

## 2017-01-01 RX ORDER — PHENAZOPYRIDINE HYDROCHLORIDE 200 MG/1
200 TABLET, FILM COATED ORAL 3 TIMES DAILY
Qty: 6 TAB | Refills: 0 | Status: SHIPPED | OUTPATIENT
Start: 2017-01-01 | End: 2017-01-01

## 2017-01-01 RX ORDER — ONDANSETRON 2 MG/ML
4 INJECTION INTRAMUSCULAR; INTRAVENOUS
Status: DISCONTINUED | OUTPATIENT
Start: 2017-01-01 | End: 2017-01-01 | Stop reason: HOSPADM

## 2017-01-01 RX ORDER — PROPOFOL 10 MG/ML
0-50 VIAL (ML) INTRAVENOUS
Status: DISCONTINUED | OUTPATIENT
Start: 2017-01-01 | End: 2017-01-01

## 2017-01-01 RX ORDER — EPINEPHRINE 0.1 MG/ML
1 INJECTION INTRACARDIAC; INTRAVENOUS
Status: COMPLETED | OUTPATIENT
Start: 2017-01-01 | End: 2017-01-01

## 2017-01-01 RX ORDER — HEPARIN SODIUM 1000 [USP'U]/ML
INJECTION, SOLUTION INTRAVENOUS; SUBCUTANEOUS
Status: DISPENSED
Start: 2017-01-01 | End: 2017-01-01

## 2017-01-01 RX ORDER — FENTANYL CITRATE 50 UG/ML
25-100 INJECTION, SOLUTION INTRAMUSCULAR; INTRAVENOUS
Status: DISCONTINUED | OUTPATIENT
Start: 2017-01-01 | End: 2017-01-01

## 2017-01-01 RX ORDER — MAGNESIUM SULFATE HEPTAHYDRATE 500 MG/ML
2 INJECTION, SOLUTION INTRAMUSCULAR; INTRAVENOUS
Status: DISCONTINUED | OUTPATIENT
Start: 2017-01-01 | End: 2017-01-01

## 2017-01-01 RX ORDER — TRIAMCINOLONE ACETONIDE 0.25 MG/G
1 CREAM TOPICAL 3 TIMES DAILY
Qty: 15 G | Refills: 0 | Status: SHIPPED | OUTPATIENT
Start: 2017-01-01

## 2017-01-01 RX ORDER — NALOXONE HYDROCHLORIDE 0.4 MG/ML
INJECTION, SOLUTION INTRAMUSCULAR; INTRAVENOUS; SUBCUTANEOUS
Status: COMPLETED | OUTPATIENT
Start: 2017-01-01 | End: 2017-01-01

## 2017-01-01 RX ORDER — LORAZEPAM 1 MG/1
1 TABLET ORAL
Status: DISCONTINUED | OUTPATIENT
Start: 2017-01-01 | End: 2017-01-01

## 2017-01-01 RX ORDER — LEVETIRACETAM 10 MG/ML
1000 INJECTION INTRAVASCULAR EVERY 12 HOURS
Status: DISCONTINUED | OUTPATIENT
Start: 2017-01-01 | End: 2017-01-01 | Stop reason: HOSPADM

## 2017-01-01 RX ORDER — NOREPINEPHRINE BITARTRATE/D5W 8 MG/250ML
2-30 PLASTIC BAG, INJECTION (ML) INTRAVENOUS
Status: DISCONTINUED | OUTPATIENT
Start: 2017-01-01 | End: 2017-07-08 | Stop reason: HOSPADM

## 2017-01-01 RX ORDER — HYDROCORTISONE SODIUM SUCCINATE 100 MG/2ML
100 INJECTION, POWDER, FOR SOLUTION INTRAMUSCULAR; INTRAVENOUS ONCE
Status: COMPLETED | OUTPATIENT
Start: 2017-01-01 | End: 2017-01-01

## 2017-01-01 RX ORDER — DEXTROSE MONOHYDRATE 50 MG/ML
25 INJECTION, SOLUTION INTRAVENOUS CONTINUOUS
Status: DISCONTINUED | OUTPATIENT
Start: 2017-01-01 | End: 2017-01-01 | Stop reason: HOSPADM

## 2017-01-01 RX ORDER — SODIUM CHLORIDE 0.9 % (FLUSH) 0.9 %
5-10 SYRINGE (ML) INJECTION AS NEEDED
Status: DISCONTINUED | OUTPATIENT
Start: 2017-01-01 | End: 2017-01-01 | Stop reason: SDUPTHER

## 2017-01-01 RX ORDER — LINEZOLID 2 MG/ML
600 INJECTION, SOLUTION INTRAVENOUS EVERY 12 HOURS
Status: DISCONTINUED | OUTPATIENT
Start: 2017-01-01 | End: 2017-01-01 | Stop reason: CLARIF

## 2017-01-01 RX ORDER — SODIUM CHLORIDE, SODIUM LACTATE, POTASSIUM CHLORIDE, CALCIUM CHLORIDE 600; 310; 30; 20 MG/100ML; MG/100ML; MG/100ML; MG/100ML
INJECTION, SOLUTION INTRAVENOUS
Status: DISCONTINUED | OUTPATIENT
Start: 2017-01-01 | End: 2017-01-01 | Stop reason: HOSPADM

## 2017-01-01 RX ORDER — ENOXAPARIN SODIUM 100 MG/ML
40 INJECTION SUBCUTANEOUS EVERY 12 HOURS
Status: DISCONTINUED | OUTPATIENT
Start: 2017-01-01 | End: 2017-01-01 | Stop reason: HOSPADM

## 2017-01-01 RX ORDER — TRIAMCINOLONE ACETONIDE 0.25 MG/G
1 CREAM TOPICAL
COMMUNITY
End: 2017-01-01

## 2017-01-01 RX ORDER — MORPHINE SULFATE 2 MG/ML
2 INJECTION, SOLUTION INTRAMUSCULAR; INTRAVENOUS ONCE
Status: COMPLETED | OUTPATIENT
Start: 2017-01-01 | End: 2017-01-01

## 2017-01-01 RX ORDER — MAGNESIUM SULFATE HEPTAHYDRATE 500 MG/ML
1 INJECTION, SOLUTION INTRAMUSCULAR; INTRAVENOUS ONCE
Status: DISCONTINUED | OUTPATIENT
Start: 2017-01-01 | End: 2017-01-01 | Stop reason: CLARIF

## 2017-01-01 RX ORDER — HYDROMORPHONE HYDROCHLORIDE 2 MG/ML
0.2 INJECTION, SOLUTION INTRAMUSCULAR; INTRAVENOUS; SUBCUTANEOUS AS NEEDED
Status: DISCONTINUED | OUTPATIENT
Start: 2017-01-01 | End: 2017-01-01

## 2017-01-01 RX ORDER — POTASSIUM CHLORIDE 7.45 MG/ML
10 INJECTION INTRAVENOUS
Status: DISPENSED | OUTPATIENT
Start: 2017-01-01 | End: 2017-01-01

## 2017-01-01 RX ORDER — SODIUM CHLORIDE 9 MG/ML
1000 INJECTION, SOLUTION INTRAVENOUS ONCE
Status: COMPLETED | OUTPATIENT
Start: 2017-01-01 | End: 2017-01-01

## 2017-01-01 RX ORDER — HYOSCYAMINE SULFATE 0.12 MG/1
0.12 TABLET, ORALLY DISINTEGRATING ORAL
Qty: 4 TAB | Refills: 0 | Status: SHIPPED | OUTPATIENT
Start: 2017-01-01 | End: 2017-01-01

## 2017-01-01 RX ORDER — LEVOFLOXACIN 500 MG/1
500 TABLET, FILM COATED ORAL DAILY
Qty: 10 TAB | Refills: 0 | Status: SHIPPED | OUTPATIENT
Start: 2017-01-01 | End: 2017-01-01

## 2017-01-01 RX ORDER — POLYETHYLENE GLYCOL 3350 17 G/17G
17 POWDER, FOR SOLUTION ORAL DAILY
COMMUNITY
End: 2017-01-01

## 2017-01-01 RX ORDER — FAMOTIDINE 20 MG/1
20 TABLET, FILM COATED ORAL 2 TIMES DAILY
Status: DISCONTINUED | OUTPATIENT
Start: 2017-01-01 | End: 2017-01-01

## 2017-01-01 RX ORDER — LEVETIRACETAM 1000 MG/1
1000 TABLET ORAL 2 TIMES DAILY
Qty: 180 TAB | Refills: 1 | Status: SHIPPED | OUTPATIENT
Start: 2017-01-01 | End: 2017-01-01

## 2017-01-01 RX ORDER — SODIUM BICARBONATE 650 MG/1
650 TABLET ORAL 3 TIMES DAILY
Status: DISCONTINUED | OUTPATIENT
Start: 2017-01-01 | End: 2017-01-01 | Stop reason: HOSPADM

## 2017-01-01 RX ORDER — INSULIN LISPRO 100 [IU]/ML
INJECTION, SOLUTION INTRAVENOUS; SUBCUTANEOUS ONCE
Status: DISCONTINUED | OUTPATIENT
Start: 2017-01-01 | End: 2017-01-01 | Stop reason: HOSPADM

## 2017-01-01 RX ORDER — MAGNESIUM SULFATE 100 %
4 CRYSTALS MISCELLANEOUS AS NEEDED
Status: DISCONTINUED | OUTPATIENT
Start: 2017-01-01 | End: 2017-01-01 | Stop reason: HOSPADM

## 2017-01-01 RX ORDER — ONDANSETRON 2 MG/ML
4 INJECTION INTRAMUSCULAR; INTRAVENOUS ONCE
Status: DISCONTINUED | OUTPATIENT
Start: 2017-01-01 | End: 2017-01-01 | Stop reason: HOSPADM

## 2017-01-01 RX ORDER — LORAZEPAM 1 MG/1
1 TABLET ORAL
COMMUNITY
End: 2017-01-01

## 2017-01-01 RX ORDER — LINEZOLID 600 MG/1
600 TABLET, FILM COATED ORAL EVERY 12 HOURS
Status: DISCONTINUED | OUTPATIENT
Start: 2017-01-01 | End: 2017-01-01

## 2017-01-01 RX ORDER — MORPHINE SULFATE 5 MG/ML
INJECTION, SOLUTION INTRAVENOUS CONTINUOUS
Status: DISCONTINUED | OUTPATIENT
Start: 2017-01-01 | End: 2017-07-08 | Stop reason: HOSPADM

## 2017-01-01 RX ORDER — DIPHENHYDRAMINE HYDROCHLORIDE 50 MG/ML
50 INJECTION, SOLUTION INTRAMUSCULAR; INTRAVENOUS ONCE
Status: COMPLETED | OUTPATIENT
Start: 2017-01-01 | End: 2017-01-01

## 2017-01-01 RX ORDER — DEXTROSE MONOHYDRATE 25 G/50ML
25-50 INJECTION, SOLUTION INTRAVENOUS AS NEEDED
Status: DISCONTINUED | OUTPATIENT
Start: 2017-01-01 | End: 2017-01-01 | Stop reason: HOSPADM

## 2017-01-01 RX ORDER — LIDOCAINE HYDROCHLORIDE 20 MG/ML
INJECTION, SOLUTION EPIDURAL; INFILTRATION; INTRACAUDAL; PERINEURAL AS NEEDED
Status: DISCONTINUED | OUTPATIENT
Start: 2017-01-01 | End: 2017-01-01 | Stop reason: HOSPADM

## 2017-01-01 RX ORDER — FAMOTIDINE 20 MG/1
20 TABLET, FILM COATED ORAL DAILY
COMMUNITY
Start: 2016-06-14 | End: 2017-01-01

## 2017-01-01 RX ORDER — SODIUM CHLORIDE, SODIUM LACTATE, POTASSIUM CHLORIDE, CALCIUM CHLORIDE 600; 310; 30; 20 MG/100ML; MG/100ML; MG/100ML; MG/100ML
75 INJECTION, SOLUTION INTRAVENOUS CONTINUOUS
Status: DISCONTINUED | OUTPATIENT
Start: 2017-01-01 | End: 2017-01-01 | Stop reason: HOSPADM

## 2017-01-01 RX ORDER — HYDROMORPHONE HYDROCHLORIDE 2 MG/ML
0.5 INJECTION, SOLUTION INTRAMUSCULAR; INTRAVENOUS; SUBCUTANEOUS
Status: DISCONTINUED | OUTPATIENT
Start: 2017-01-01 | End: 2017-01-01

## 2017-01-01 RX ORDER — GLYCOPYRROLATE 0.2 MG/ML
0.2 INJECTION INTRAMUSCULAR; INTRAVENOUS
Qty: 1 VIAL | Refills: 0 | Status: SHIPPED | OUTPATIENT
Start: 2017-01-01

## 2017-01-01 RX ORDER — TRIAMCINOLONE ACETONIDE 0.25 MG/G
1 CREAM TOPICAL 3 TIMES DAILY
Status: DISCONTINUED | OUTPATIENT
Start: 2017-01-01 | End: 2017-01-01 | Stop reason: HOSPADM

## 2017-01-01 RX ORDER — POLYETHYLENE GLYCOL 3350 17 G/17G
17 POWDER, FOR SOLUTION ORAL DAILY
Status: DISCONTINUED | OUTPATIENT
Start: 2017-01-01 | End: 2017-01-01

## 2017-01-01 RX ORDER — LORAZEPAM 2 MG/ML
2 INJECTION INTRAMUSCULAR ONCE
Status: COMPLETED | OUTPATIENT
Start: 2017-01-01 | End: 2017-01-01

## 2017-01-01 RX ORDER — LANOLIN ALCOHOL/MO/W.PET/CERES
400 CREAM (GRAM) TOPICAL DAILY
Qty: 10 TAB | Refills: 0 | Status: SHIPPED | OUTPATIENT
Start: 2017-01-01 | End: 2017-01-01

## 2017-01-01 RX ORDER — LEVETIRACETAM 10 MG/ML
1000 INJECTION INTRAVASCULAR ONCE
Status: DISCONTINUED | OUTPATIENT
Start: 2017-01-01 | End: 2017-01-01

## 2017-01-01 RX ORDER — INSULIN LISPRO 100 [IU]/ML
INJECTION, SOLUTION INTRAVENOUS; SUBCUTANEOUS
Status: DISCONTINUED | OUTPATIENT
Start: 2017-01-01 | End: 2017-01-01

## 2017-01-01 RX ORDER — DEXTROSE 50 % IN WATER (D50W) INTRAVENOUS SYRINGE
25-50 AS NEEDED
Status: DISCONTINUED | OUTPATIENT
Start: 2017-01-01 | End: 2017-01-01 | Stop reason: HOSPADM

## 2017-01-01 RX ORDER — MORPHINE SULFATE 2 MG/ML
2 INJECTION, SOLUTION INTRAMUSCULAR; INTRAVENOUS
Status: DISCONTINUED | OUTPATIENT
Start: 2017-01-01 | End: 2017-01-01 | Stop reason: HOSPADM

## 2017-01-01 RX ORDER — LORAZEPAM 2 MG/ML
0.5 CONCENTRATE ORAL
Status: DISCONTINUED | OUTPATIENT
Start: 2017-01-01 | End: 2017-01-01 | Stop reason: HOSPADM

## 2017-01-01 RX ORDER — DEXTROSE 50 % IN WATER (D50W) INTRAVENOUS SYRINGE
25
Status: COMPLETED | OUTPATIENT
Start: 2017-01-01 | End: 2017-01-01

## 2017-01-01 RX ORDER — FUROSEMIDE 10 MG/ML
20 INJECTION INTRAMUSCULAR; INTRAVENOUS ONCE
Status: COMPLETED | OUTPATIENT
Start: 2017-01-01 | End: 2017-01-01

## 2017-01-01 RX ORDER — LEVOFLOXACIN 5 MG/ML
750 INJECTION, SOLUTION INTRAVENOUS EVERY 24 HOURS
Status: DISCONTINUED | OUTPATIENT
Start: 2017-01-01 | End: 2017-01-01 | Stop reason: DRUGHIGH

## 2017-01-01 RX ORDER — HYDROMORPHONE HYDROCHLORIDE 2 MG/ML
0.5 INJECTION, SOLUTION INTRAMUSCULAR; INTRAVENOUS; SUBCUTANEOUS
Status: DISCONTINUED | OUTPATIENT
Start: 2017-01-01 | End: 2017-01-01 | Stop reason: HOSPADM

## 2017-01-01 RX ORDER — PANTOPRAZOLE SODIUM 40 MG/1
40 TABLET, DELAYED RELEASE ORAL
Status: DISCONTINUED | OUTPATIENT
Start: 2017-01-01 | End: 2017-01-01

## 2017-01-01 RX ORDER — POTASSIUM CHLORIDE 1.5 G/1.77G
20 POWDER, FOR SOLUTION ORAL DAILY
Status: DISCONTINUED | OUTPATIENT
Start: 2017-01-01 | End: 2017-01-01

## 2017-01-01 RX ORDER — SODIUM CHLORIDE 0.9 % (FLUSH) 0.9 %
5-10 SYRINGE (ML) INJECTION AS NEEDED
Status: DISCONTINUED | OUTPATIENT
Start: 2017-01-01 | End: 2017-07-08 | Stop reason: HOSPADM

## 2017-01-01 RX ORDER — SODIUM CHLORIDE 0.9 % (FLUSH) 0.9 %
5-10 SYRINGE (ML) INJECTION AS NEEDED
Status: DISCONTINUED | OUTPATIENT
Start: 2017-01-01 | End: 2017-01-01 | Stop reason: HOSPADM

## 2017-01-01 RX ORDER — MORPHINE SULFATE 2 MG/ML
2 INJECTION, SOLUTION INTRAMUSCULAR; INTRAVENOUS
Status: COMPLETED | OUTPATIENT
Start: 2017-01-01 | End: 2017-01-01

## 2017-01-01 RX ORDER — NOREPINEPHRINE BITARTRATE/D5W 8 MG/250ML
PLASTIC BAG, INJECTION (ML) INTRAVENOUS
Status: COMPLETED
Start: 2017-01-01 | End: 2017-01-01

## 2017-01-01 RX ORDER — CHLORHEXIDINE GLUCONATE 1.2 MG/ML
15 RINSE ORAL 2 TIMES DAILY
Status: DISCONTINUED | OUTPATIENT
Start: 2017-01-01 | End: 2017-01-01

## 2017-01-01 RX ORDER — MORPHINE SULFATE 2 MG/ML
2 INJECTION, SOLUTION INTRAMUSCULAR; INTRAVENOUS
Status: DISCONTINUED | OUTPATIENT
Start: 2017-01-01 | End: 2017-07-08 | Stop reason: HOSPADM

## 2017-01-01 RX ORDER — GLYCOPYRROLATE 0.2 MG/ML
0.2 INJECTION INTRAMUSCULAR; INTRAVENOUS
Status: DISCONTINUED | OUTPATIENT
Start: 2017-01-01 | End: 2017-01-01 | Stop reason: HOSPADM

## 2017-01-01 RX ORDER — FENTANYL 25 UG/1
1 PATCH TRANSDERMAL
Status: DISCONTINUED | OUTPATIENT
Start: 2017-01-01 | End: 2017-01-01

## 2017-01-01 RX ORDER — FUROSEMIDE 10 MG/ML
40 INJECTION INTRAMUSCULAR; INTRAVENOUS ONCE
Status: COMPLETED | OUTPATIENT
Start: 2017-01-01 | End: 2017-01-01

## 2017-01-01 RX ORDER — ENOXAPARIN SODIUM 100 MG/ML
40 INJECTION SUBCUTANEOUS EVERY 12 HOURS
Status: DISCONTINUED | OUTPATIENT
Start: 2017-01-01 | End: 2017-01-01

## 2017-01-01 RX ORDER — POTASSIUM CHLORIDE 750 MG/1
10 TABLET, EXTENDED RELEASE ORAL ONCE
Status: DISCONTINUED | OUTPATIENT
Start: 2017-01-01 | End: 2017-01-01

## 2017-01-01 RX ORDER — FENTANYL CITRATE 50 UG/ML
25-50 INJECTION, SOLUTION INTRAMUSCULAR; INTRAVENOUS
Status: DISCONTINUED | OUTPATIENT
Start: 2017-01-01 | End: 2017-01-01

## 2017-01-01 RX ORDER — DIPHENHYDRAMINE HYDROCHLORIDE 50 MG/ML
12.5 INJECTION, SOLUTION INTRAMUSCULAR; INTRAVENOUS
Status: DISCONTINUED | OUTPATIENT
Start: 2017-01-01 | End: 2017-01-01

## 2017-01-01 RX ORDER — HEPARIN 100 UNIT/ML
SYRINGE INTRAVENOUS
Status: COMPLETED
Start: 2017-01-01 | End: 2017-01-01

## 2017-01-01 RX ORDER — DEXTROSE, SODIUM CHLORIDE, SODIUM LACTATE, POTASSIUM CHLORIDE, AND CALCIUM CHLORIDE 5; .6; .31; .03; .02 G/100ML; G/100ML; G/100ML; G/100ML; G/100ML
50 INJECTION, SOLUTION INTRAVENOUS CONTINUOUS
Status: DISCONTINUED | OUTPATIENT
Start: 2017-01-01 | End: 2017-01-01

## 2017-01-01 RX ORDER — ALBUMIN HUMAN 250 G/1000ML
25 SOLUTION INTRAVENOUS EVERY 6 HOURS
Status: COMPLETED | OUTPATIENT
Start: 2017-01-01 | End: 2017-01-01

## 2017-01-01 RX ORDER — ALBUMIN HUMAN 250 G/1000ML
25 SOLUTION INTRAVENOUS EVERY 6 HOURS
Status: DISCONTINUED | OUTPATIENT
Start: 2017-01-01 | End: 2017-01-01

## 2017-01-01 RX ORDER — SODIUM CHLORIDE 0.9 % (FLUSH) 0.9 %
5-10 SYRINGE (ML) INJECTION EVERY 8 HOURS
Status: DISCONTINUED | OUTPATIENT
Start: 2017-01-01 | End: 2017-01-01 | Stop reason: HOSPADM

## 2017-01-01 RX ORDER — ACETAMINOPHEN 325 MG/1
650 TABLET ORAL
Status: DISCONTINUED | OUTPATIENT
Start: 2017-01-01 | End: 2017-01-01 | Stop reason: CLARIF

## 2017-01-01 RX ORDER — FUROSEMIDE 10 MG/ML
40 INJECTION INTRAMUSCULAR; INTRAVENOUS EVERY 12 HOURS
Status: DISCONTINUED | OUTPATIENT
Start: 2017-01-01 | End: 2017-01-01 | Stop reason: SDUPTHER

## 2017-01-01 RX ORDER — OXYCODONE AND ACETAMINOPHEN 5; 325 MG/1; MG/1
1 TABLET ORAL
Qty: 20 TAB | Refills: 0 | Status: SHIPPED | OUTPATIENT
Start: 2017-01-01 | End: 2017-01-01

## 2017-01-01 RX ORDER — MAGNESIUM SULFATE 100 %
16 CRYSTALS MISCELLANEOUS AS NEEDED
Status: DISCONTINUED | OUTPATIENT
Start: 2017-01-01 | End: 2017-01-01 | Stop reason: HOSPADM

## 2017-01-01 RX ORDER — LEVETIRACETAM 15 MG/ML
1500 INJECTION INTRAVASCULAR EVERY 12 HOURS
Status: DISCONTINUED | OUTPATIENT
Start: 2017-01-01 | End: 2017-01-01

## 2017-01-01 RX ORDER — MAGNESIUM SULFATE 1 G/100ML
INJECTION INTRAVENOUS
Status: DISPENSED
Start: 2017-01-01 | End: 2017-01-01

## 2017-01-01 RX ORDER — DEXTROSE, SODIUM CHLORIDE, AND POTASSIUM CHLORIDE 5; .45; .3 G/100ML; G/100ML; G/100ML
INJECTION INTRAVENOUS CONTINUOUS
Status: DISCONTINUED | OUTPATIENT
Start: 2017-01-01 | End: 2017-01-01 | Stop reason: HOSPADM

## 2017-01-01 RX ORDER — FENTANYL CITRATE 50 UG/ML
INJECTION, SOLUTION INTRAMUSCULAR; INTRAVENOUS AS NEEDED
Status: DISCONTINUED | OUTPATIENT
Start: 2017-01-01 | End: 2017-01-01 | Stop reason: HOSPADM

## 2017-01-01 RX ORDER — LORAZEPAM 2 MG/ML
INJECTION INTRAMUSCULAR
Status: DISPENSED
Start: 2017-01-01 | End: 2017-01-01

## 2017-01-01 RX ORDER — LEVETIRACETAM 10 MG/ML
1000 INJECTION INTRAVASCULAR EVERY 12 HOURS
Status: DISCONTINUED | OUTPATIENT
Start: 2017-01-01 | End: 2017-01-01

## 2017-01-01 RX ORDER — HYDROMORPHONE HYDROCHLORIDE 2 MG/ML
0.2 INJECTION, SOLUTION INTRAMUSCULAR; INTRAVENOUS; SUBCUTANEOUS
Status: DISCONTINUED | OUTPATIENT
Start: 2017-01-01 | End: 2017-01-01 | Stop reason: HOSPADM

## 2017-01-01 RX ORDER — FLUMAZENIL 0.1 MG/ML
0.4 INJECTION INTRAVENOUS ONCE
Status: COMPLETED | OUTPATIENT
Start: 2017-01-01 | End: 2017-01-01

## 2017-01-01 RX ORDER — TROSPIUM CHLORIDE 20 MG/1
20 TABLET, FILM COATED ORAL 2 TIMES DAILY
COMMUNITY
End: 2017-01-01

## 2017-01-01 RX ORDER — NYSTATIN 100000 [USP'U]/G
POWDER TOPICAL 2 TIMES DAILY
Status: DISCONTINUED | OUTPATIENT
Start: 2017-01-01 | End: 2017-01-01 | Stop reason: HOSPADM

## 2017-01-01 RX ORDER — NALOXONE HYDROCHLORIDE 0.4 MG/ML
0.4 INJECTION, SOLUTION INTRAMUSCULAR; INTRAVENOUS; SUBCUTANEOUS ONCE
Status: ACTIVE | OUTPATIENT
Start: 2017-01-01 | End: 2017-01-01

## 2017-01-01 RX ORDER — LIDOCAINE HYDROCHLORIDE AND EPINEPHRINE 10; 10 MG/ML; UG/ML
INJECTION, SOLUTION INFILTRATION; PERINEURAL
Status: DISPENSED
Start: 2017-01-01 | End: 2017-01-01

## 2017-01-01 RX ADMIN — CHLORHEXIDINE GLUCONATE 10 ML: 1.2 RINSE ORAL at 20:59

## 2017-01-01 RX ADMIN — LINEZOLID 600 MG: 600 INJECTION, SOLUTION INTRAVENOUS at 01:37

## 2017-01-01 RX ADMIN — SODIUM CHLORIDE, SODIUM LACTATE, POTASSIUM CHLORIDE, CALCIUM CHLORIDE, AND DEXTROSE MONOHYDRATE 50 ML/HR: 600; 310; 30; 20; 5 INJECTION, SOLUTION INTRAVENOUS at 01:21

## 2017-01-01 RX ADMIN — Medication 11 MCG/MIN: at 21:46

## 2017-01-01 RX ADMIN — IPRATROPIUM BROMIDE AND ALBUTEROL SULFATE 3 ML: .5; 3 SOLUTION RESPIRATORY (INHALATION) at 20:25

## 2017-01-01 RX ADMIN — SODIUM BICARBONATE 650 MG TABLET 650 MG: at 08:30

## 2017-01-01 RX ADMIN — NYSTATIN: 100000 POWDER TOPICAL at 14:44

## 2017-01-01 RX ADMIN — LORAZEPAM 1 MG: 1 TABLET ORAL at 18:42

## 2017-01-01 RX ADMIN — DEXTROSE MONOHYDRATE, SODIUM CHLORIDE, AND POTASSIUM CHLORIDE 125 ML/HR: 50; 4.5; 1.49 INJECTION, SOLUTION INTRAVENOUS at 21:07

## 2017-01-01 RX ADMIN — SODIUM CHLORIDE 150 ML/HR: 900 INJECTION, SOLUTION INTRAVENOUS at 08:45

## 2017-01-01 RX ADMIN — SODIUM CHLORIDE 40 MG: 9 INJECTION INTRAMUSCULAR; INTRAVENOUS; SUBCUTANEOUS at 10:38

## 2017-01-01 RX ADMIN — IPRATROPIUM BROMIDE AND ALBUTEROL SULFATE 3 ML: .5; 3 SOLUTION RESPIRATORY (INHALATION) at 02:16

## 2017-01-01 RX ADMIN — Medication: at 20:27

## 2017-01-01 RX ADMIN — Medication 10 ML: at 13:36

## 2017-01-01 RX ADMIN — IPRATROPIUM BROMIDE AND ALBUTEROL SULFATE 3 ML: .5; 3 SOLUTION RESPIRATORY (INHALATION) at 20:26

## 2017-01-01 RX ADMIN — Medication 10 ML: at 15:57

## 2017-01-01 RX ADMIN — NYSTATIN: 100000 POWDER TOPICAL at 09:50

## 2017-01-01 RX ADMIN — MORPHINE SULFATE: 5 INJECTION, SOLUTION INTRAVENOUS at 18:01

## 2017-01-01 RX ADMIN — FUROSEMIDE 40 MG: 40 TABLET ORAL at 16:57

## 2017-01-01 RX ADMIN — SODIUM BICARBONATE 650 MG TABLET 650 MG: at 21:53

## 2017-01-01 RX ADMIN — ENOXAPARIN SODIUM 40 MG: 40 INJECTION SUBCUTANEOUS at 02:42

## 2017-01-01 RX ADMIN — PIPERACILLIN SODIUM,TAZOBACTAM SODIUM 2.25 G: 2; .25 INJECTION, POWDER, FOR SOLUTION INTRAVENOUS at 14:47

## 2017-01-01 RX ADMIN — IPRATROPIUM BROMIDE AND ALBUTEROL SULFATE 3 ML: .5; 3 SOLUTION RESPIRATORY (INHALATION) at 08:28

## 2017-01-01 RX ADMIN — CALCIUM GLUCONATE 2 G: 94 INJECTION, SOLUTION INTRAVENOUS at 10:11

## 2017-01-01 RX ADMIN — IPRATROPIUM BROMIDE AND ALBUTEROL SULFATE 3 ML: .5; 3 SOLUTION RESPIRATORY (INHALATION) at 08:06

## 2017-01-01 RX ADMIN — DAPTOMYCIN 719 MG: 500 INJECTION, POWDER, LYOPHILIZED, FOR SOLUTION INTRAVENOUS at 13:38

## 2017-01-01 RX ADMIN — LINEZOLID 600 MG: 600 INJECTION, SOLUTION INTRAVENOUS at 23:13

## 2017-01-01 RX ADMIN — FUROSEMIDE 40 MG: 10 INJECTION, SOLUTION INTRAMUSCULAR; INTRAVENOUS at 09:28

## 2017-01-01 RX ADMIN — FENTANYL CITRATE 50 MCG: 50 INJECTION, SOLUTION INTRAMUSCULAR; INTRAVENOUS at 20:59

## 2017-01-01 RX ADMIN — ENOXAPARIN SODIUM 40 MG: 40 INJECTION SUBCUTANEOUS at 13:31

## 2017-01-01 RX ADMIN — ENOXAPARIN SODIUM 40 MG: 40 INJECTION SUBCUTANEOUS at 01:25

## 2017-01-01 RX ADMIN — SUCCINYLCHOLINE CHLORIDE 100 MG: 20 INJECTION, SOLUTION INTRAMUSCULAR; INTRAVENOUS at 19:59

## 2017-01-01 RX ADMIN — SODIUM CHLORIDE 125 ML/HR: 900 INJECTION, SOLUTION INTRAVENOUS at 05:10

## 2017-01-01 RX ADMIN — IPRATROPIUM BROMIDE AND ALBUTEROL SULFATE 3 ML: .5; 3 SOLUTION RESPIRATORY (INHALATION) at 02:18

## 2017-01-01 RX ADMIN — SODIUM BICARBONATE 650 MG TABLET 650 MG: at 21:45

## 2017-01-01 RX ADMIN — Medication 10 ML: at 15:33

## 2017-01-01 RX ADMIN — SODIUM CHLORIDE, SODIUM LACTATE, POTASSIUM CHLORIDE, CALCIUM CHLORIDE, AND DEXTROSE MONOHYDRATE 50 ML/HR: 600; 310; 30; 20; 5 INJECTION, SOLUTION INTRAVENOUS at 02:42

## 2017-01-01 RX ADMIN — CALCIUM GLUCONATE 2 G: 94 INJECTION, SOLUTION INTRAVENOUS at 09:15

## 2017-01-01 RX ADMIN — ALBUMIN (HUMAN) 25 G: 0.25 INJECTION, SOLUTION INTRAVENOUS at 05:17

## 2017-01-01 RX ADMIN — MAGNESIUM SULFATE HEPTAHYDRATE 1 G: 1 INJECTION, SOLUTION INTRAVENOUS at 08:34

## 2017-01-01 RX ADMIN — FAMOTIDINE 20 MG: 10 INJECTION, SOLUTION INTRAVENOUS at 12:33

## 2017-01-01 RX ADMIN — PIPERACILLIN SODIUM,TAZOBACTAM SODIUM 2.25 G: 2; .25 INJECTION, POWDER, FOR SOLUTION INTRAVENOUS at 21:25

## 2017-01-01 RX ADMIN — LEVETIRACETAM 1000 MG: 10 INJECTION INTRAVENOUS at 12:16

## 2017-01-01 RX ADMIN — PANTOPRAZOLE SODIUM 40 MG: 40 TABLET, DELAYED RELEASE ORAL at 08:41

## 2017-01-01 RX ADMIN — FUROSEMIDE 40 MG: 40 TABLET ORAL at 18:22

## 2017-01-01 RX ADMIN — SODIUM BICARBONATE 650 MG TABLET 650 MG: at 15:11

## 2017-01-01 RX ADMIN — INSULIN LISPRO 2 UNITS: 100 INJECTION, SOLUTION INTRAVENOUS; SUBCUTANEOUS at 16:51

## 2017-01-01 RX ADMIN — SODIUM BICARBONATE 650 MG TABLET 650 MG: at 16:45

## 2017-01-01 RX ADMIN — CHLORHEXIDINE GLUCONATE 10 ML: 1.2 RINSE ORAL at 23:02

## 2017-01-01 RX ADMIN — SODIUM CHLORIDE 40 MG: 9 INJECTION INTRAMUSCULAR; INTRAVENOUS; SUBCUTANEOUS at 09:00

## 2017-01-01 RX ADMIN — SODIUM BICARBONATE 650 MG TABLET 650 MG: at 16:42

## 2017-01-01 RX ADMIN — FUROSEMIDE 40 MG: 10 INJECTION, SOLUTION INTRAMUSCULAR; INTRAVENOUS at 09:42

## 2017-01-01 RX ADMIN — IPRATROPIUM BROMIDE AND ALBUTEROL SULFATE 3 ML: .5; 3 SOLUTION RESPIRATORY (INHALATION) at 20:32

## 2017-01-01 RX ADMIN — NYSTATIN: 100000 POWDER TOPICAL at 08:52

## 2017-01-01 RX ADMIN — PROPOFOL 40 MCG/KG/MIN: 10 INJECTION, EMULSION INTRAVENOUS at 12:13

## 2017-01-01 RX ADMIN — POLYETHYLENE GLYCOL 3350 17 G: 17 POWDER, FOR SOLUTION ORAL at 09:14

## 2017-01-01 RX ADMIN — SODIUM CHLORIDE 1000 ML: 900 INJECTION, SOLUTION INTRAVENOUS at 20:08

## 2017-01-01 RX ADMIN — SODIUM CHLORIDE 40 MG: 9 INJECTION INTRAMUSCULAR; INTRAVENOUS; SUBCUTANEOUS at 09:27

## 2017-01-01 RX ADMIN — AZTREONAM 1 G: 1 INJECTION, POWDER, LYOPHILIZED, FOR SOLUTION INTRAMUSCULAR; INTRAVENOUS at 17:05

## 2017-01-01 RX ADMIN — FUROSEMIDE 40 MG: 10 INJECTION, SOLUTION INTRAMUSCULAR; INTRAVENOUS at 22:14

## 2017-01-01 RX ADMIN — DEXTROSE MONOHYDRATE 50 ML/HR: 5 INJECTION, SOLUTION INTRAVENOUS at 13:12

## 2017-01-01 RX ADMIN — SODIUM BICARBONATE 650 MG TABLET 650 MG: at 17:05

## 2017-01-01 RX ADMIN — ENOXAPARIN SODIUM 40 MG: 40 INJECTION SUBCUTANEOUS at 11:44

## 2017-01-01 RX ADMIN — Medication 10 ML: at 13:51

## 2017-01-01 RX ADMIN — IPRATROPIUM BROMIDE AND ALBUTEROL SULFATE 3 ML: .5; 3 SOLUTION RESPIRATORY (INHALATION) at 22:43

## 2017-01-01 RX ADMIN — IPRATROPIUM BROMIDE AND ALBUTEROL SULFATE 3 ML: .5; 3 SOLUTION RESPIRATORY (INHALATION) at 13:18

## 2017-01-01 RX ADMIN — MORPHINE SULFATE 2 MG: 2 INJECTION, SOLUTION INTRAMUSCULAR; INTRAVENOUS at 06:00

## 2017-01-01 RX ADMIN — POTASSIUM CHLORIDE 10 MEQ: 7.46 INJECTION, SOLUTION INTRAVENOUS at 20:02

## 2017-01-01 RX ADMIN — AZTREONAM 1 G: 1 INJECTION, POWDER, LYOPHILIZED, FOR SOLUTION INTRAMUSCULAR; INTRAVENOUS at 17:53

## 2017-01-01 RX ADMIN — CHLORHEXIDINE GLUCONATE 15 ML: 1.2 RINSE ORAL at 09:00

## 2017-01-01 RX ADMIN — PROPOFOL 40 MCG/KG/MIN: 10 INJECTION, EMULSION INTRAVENOUS at 22:49

## 2017-01-01 RX ADMIN — FUROSEMIDE 40 MG: 40 TABLET ORAL at 18:01

## 2017-01-01 RX ADMIN — FENTANYL CITRATE 25 MCG: 50 INJECTION, SOLUTION INTRAMUSCULAR; INTRAVENOUS at 23:04

## 2017-01-01 RX ADMIN — SODIUM CHLORIDE, SODIUM LACTATE, POTASSIUM CHLORIDE, CALCIUM CHLORIDE, AND DEXTROSE MONOHYDRATE 50 ML/HR: 600; 310; 30; 20; 5 INJECTION, SOLUTION INTRAVENOUS at 03:25

## 2017-01-01 RX ADMIN — Medication 10 ML: at 15:35

## 2017-01-01 RX ADMIN — SODIUM CHLORIDE 40 MG: 9 INJECTION INTRAMUSCULAR; INTRAVENOUS; SUBCUTANEOUS at 09:42

## 2017-01-01 RX ADMIN — NYSTATIN: 100000 POWDER TOPICAL at 17:00

## 2017-01-01 RX ADMIN — POTASSIUM CHLORIDE 10 MEQ: 7.46 INJECTION, SOLUTION INTRAVENOUS at 19:37

## 2017-01-01 RX ADMIN — PANTOPRAZOLE SODIUM 40 MG: 40 TABLET, DELAYED RELEASE ORAL at 12:30

## 2017-01-01 RX ADMIN — POTASSIUM CHLORIDE 20 MEQ: 20 TABLET, EXTENDED RELEASE ORAL at 17:45

## 2017-01-01 RX ADMIN — Medication 5 MCG/MIN: at 11:53

## 2017-01-01 RX ADMIN — POTASSIUM CHLORIDE 10 MEQ: 7.46 INJECTION, SOLUTION INTRAVENOUS at 11:09

## 2017-01-01 RX ADMIN — AZTREONAM 1 G: 1 INJECTION, POWDER, LYOPHILIZED, FOR SOLUTION INTRAMUSCULAR; INTRAVENOUS at 12:14

## 2017-01-01 RX ADMIN — FUROSEMIDE 40 MG: 40 TABLET ORAL at 10:15

## 2017-01-01 RX ADMIN — FUROSEMIDE 40 MG: 10 INJECTION, SOLUTION INTRAMUSCULAR; INTRAVENOUS at 21:04

## 2017-01-01 RX ADMIN — TRIAMCINOLONE ACETONIDE 1 G: 0.25 CREAM TOPICAL at 10:52

## 2017-01-01 RX ADMIN — INSULIN LISPRO 2 UNITS: 100 INJECTION, SOLUTION INTRAVENOUS; SUBCUTANEOUS at 08:55

## 2017-01-01 RX ADMIN — POLYETHYLENE GLYCOL 3350 17 G: 17 POWDER, FOR SOLUTION ORAL at 09:10

## 2017-01-01 RX ADMIN — CHLORHEXIDINE GLUCONATE 10 ML: 1.2 RINSE ORAL at 08:30

## 2017-01-01 RX ADMIN — FUROSEMIDE 40 MG: 10 INJECTION, SOLUTION INTRAMUSCULAR; INTRAVENOUS at 20:00

## 2017-01-01 RX ADMIN — Medication 10 ML: at 06:00

## 2017-01-01 RX ADMIN — SODIUM CHLORIDE 125 ML/HR: 900 INJECTION, SOLUTION INTRAVENOUS at 23:28

## 2017-01-01 RX ADMIN — TROSPIUM CHLORIDE 20 MG: 20 TABLET ORAL at 09:10

## 2017-01-01 RX ADMIN — SODIUM CHLORIDE 2000 ML: 900 INJECTION, SOLUTION INTRAVENOUS at 21:23

## 2017-01-01 RX ADMIN — POTASSIUM CHLORIDE 10 MEQ: 7.46 INJECTION, SOLUTION INTRAVENOUS at 08:31

## 2017-01-01 RX ADMIN — Medication 10 ML: at 23:34

## 2017-01-01 RX ADMIN — CHLORHEXIDINE GLUCONATE 10 ML: 1.2 RINSE ORAL at 09:40

## 2017-01-01 RX ADMIN — POTASSIUM CHLORIDE 10 MEQ: 7.46 INJECTION, SOLUTION INTRAVENOUS at 17:43

## 2017-01-01 RX ADMIN — FUROSEMIDE 40 MG: 40 TABLET ORAL at 09:33

## 2017-01-01 RX ADMIN — FUROSEMIDE 20 MG: 10 INJECTION, SOLUTION INTRAMUSCULAR; INTRAVENOUS at 15:04

## 2017-01-01 RX ADMIN — IPRATROPIUM BROMIDE AND ALBUTEROL SULFATE 3 ML: .5; 3 SOLUTION RESPIRATORY (INHALATION) at 20:21

## 2017-01-01 RX ADMIN — MAGNESIUM SULFATE HEPTAHYDRATE 1 G: 1 INJECTION, SOLUTION INTRAVENOUS at 15:41

## 2017-01-01 RX ADMIN — DEXTROSE MONOHYDRATE, SODIUM CHLORIDE, AND POTASSIUM CHLORIDE: 50; 4.5; 2.98 INJECTION, SOLUTION INTRAVENOUS at 10:38

## 2017-01-01 RX ADMIN — POTASSIUM CHLORIDE 20 MEQ: 20 TABLET, EXTENDED RELEASE ORAL at 09:15

## 2017-01-01 RX ADMIN — FUROSEMIDE 40 MG: 10 INJECTION, SOLUTION INTRAMUSCULAR; INTRAVENOUS at 09:40

## 2017-01-01 RX ADMIN — ALBUMIN (HUMAN) 25 G: 0.25 INJECTION, SOLUTION INTRAVENOUS at 05:03

## 2017-01-01 RX ADMIN — Medication: at 00:45

## 2017-01-01 RX ADMIN — IPRATROPIUM BROMIDE AND ALBUTEROL SULFATE 3 ML: .5; 3 SOLUTION RESPIRATORY (INHALATION) at 09:19

## 2017-01-01 RX ADMIN — MORPHINE SULFATE 2 MG: 2 INJECTION, SOLUTION INTRAMUSCULAR; INTRAVENOUS at 22:44

## 2017-01-01 RX ADMIN — MORPHINE SULFATE 2 MG: 2 INJECTION, SOLUTION INTRAMUSCULAR; INTRAVENOUS at 10:32

## 2017-01-01 RX ADMIN — LINEZOLID 600 MG: 600 INJECTION, SOLUTION INTRAVENOUS at 00:14

## 2017-01-01 RX ADMIN — TRIAMCINOLONE ACETONIDE 1 G: 0.25 CREAM TOPICAL at 18:09

## 2017-01-01 RX ADMIN — FUROSEMIDE 40 MG: 40 TABLET ORAL at 16:33

## 2017-01-01 RX ADMIN — PROPOFOL 40 MCG/KG/MIN: 10 INJECTION, EMULSION INTRAVENOUS at 02:02

## 2017-01-01 RX ADMIN — SODIUM BICARBONATE 650 MG TABLET 650 MG: at 09:54

## 2017-01-01 RX ADMIN — SODIUM CHLORIDE 100 MG: 900 INJECTION, SOLUTION INTRAVENOUS at 20:44

## 2017-01-01 RX ADMIN — SODIUM CHLORIDE 3000 ML: 900 INJECTION, SOLUTION INTRAVENOUS at 20:24

## 2017-01-01 RX ADMIN — ENOXAPARIN SODIUM 40 MG: 40 INJECTION SUBCUTANEOUS at 12:34

## 2017-01-01 RX ADMIN — DAPTOMYCIN 672 MG: 500 INJECTION, POWDER, LYOPHILIZED, FOR SOLUTION INTRAVENOUS at 14:46

## 2017-01-01 RX ADMIN — LEVOFLOXACIN 750 MG: 5 INJECTION, SOLUTION INTRAVENOUS at 21:45

## 2017-01-01 RX ADMIN — DAPTOMYCIN 630 MG: 500 INJECTION, POWDER, LYOPHILIZED, FOR SOLUTION INTRAVENOUS at 14:44

## 2017-01-01 RX ADMIN — SODIUM CHLORIDE 40 MG: 9 INJECTION INTRAMUSCULAR; INTRAVENOUS; SUBCUTANEOUS at 08:57

## 2017-01-01 RX ADMIN — Medication 3.5 MCG/MIN: at 12:46

## 2017-01-01 RX ADMIN — CALCIUM GLUCONATE 2 G: 94 INJECTION, SOLUTION INTRAVENOUS at 06:00

## 2017-01-01 RX ADMIN — CALCIUM GLUCONATE 4 G: 94 INJECTION, SOLUTION INTRAVENOUS at 10:33

## 2017-01-01 RX ADMIN — ACETAMINOPHEN 650 MG: 325 TABLET, FILM COATED ORAL at 08:50

## 2017-01-01 RX ADMIN — DIPHENHYDRAMINE HYDROCHLORIDE 50 MG: 50 INJECTION, SOLUTION INTRAMUSCULAR; INTRAVENOUS at 08:37

## 2017-01-01 RX ADMIN — MORPHINE SULFATE 2 MG: 2 INJECTION, SOLUTION INTRAMUSCULAR; INTRAVENOUS at 10:17

## 2017-01-01 RX ADMIN — MORPHINE SULFATE 2 MG: 2 INJECTION, SOLUTION INTRAMUSCULAR; INTRAVENOUS at 11:11

## 2017-01-01 RX ADMIN — DAPTOMYCIN 672 MG: 500 INJECTION, POWDER, LYOPHILIZED, FOR SOLUTION INTRAVENOUS at 14:05

## 2017-01-01 RX ADMIN — PROPOFOL 30 MCG/KG/MIN: 10 INJECTION, EMULSION INTRAVENOUS at 01:56

## 2017-01-01 RX ADMIN — INSULIN LISPRO 5 UNITS: 100 INJECTION, SOLUTION INTRAVENOUS; SUBCUTANEOUS at 13:43

## 2017-01-01 RX ADMIN — NYSTATIN: 100000 POWDER TOPICAL at 16:13

## 2017-01-01 RX ADMIN — Medication 10 ML: at 22:46

## 2017-01-01 RX ADMIN — Medication 10 ML: at 06:17

## 2017-01-01 RX ADMIN — DAKIN'S SOLUTION 0.125% (QUARTER STRENGTH): 0.12 SOLUTION at 08:38

## 2017-01-01 RX ADMIN — DEXTROSE MONOHYDRATE, SODIUM CHLORIDE, AND POTASSIUM CHLORIDE 50 ML/HR: 50; 4.5; 1.49 INJECTION, SOLUTION INTRAVENOUS at 15:05

## 2017-01-01 RX ADMIN — Medication: at 07:38

## 2017-01-01 RX ADMIN — POTASSIUM CHLORIDE 10 MEQ: 10 INJECTION, SOLUTION INTRAVENOUS at 13:00

## 2017-01-01 RX ADMIN — FENTANYL CITRATE 25 MCG: 50 INJECTION, SOLUTION INTRAMUSCULAR; INTRAVENOUS at 14:43

## 2017-01-01 RX ADMIN — FENTANYL CITRATE 50 MCG: 50 INJECTION, SOLUTION INTRAMUSCULAR; INTRAVENOUS at 14:58

## 2017-01-01 RX ADMIN — SODIUM BICARBONATE 650 MG TABLET 650 MG: at 11:50

## 2017-01-01 RX ADMIN — POTASSIUM CHLORIDE 10 MEQ: 7.46 INJECTION, SOLUTION INTRAVENOUS at 06:22

## 2017-01-01 RX ADMIN — IPRATROPIUM BROMIDE AND ALBUTEROL SULFATE 3 ML: .5; 3 SOLUTION RESPIRATORY (INHALATION) at 13:40

## 2017-01-01 RX ADMIN — Medication 10 ML: at 23:03

## 2017-01-01 RX ADMIN — AZTREONAM 1 G: 1 INJECTION, POWDER, LYOPHILIZED, FOR SOLUTION INTRAMUSCULAR; INTRAVENOUS at 09:24

## 2017-01-01 RX ADMIN — DEXTROSE MONOHYDRATE 25 G: 25 INJECTION, SOLUTION INTRAVENOUS at 16:16

## 2017-01-01 RX ADMIN — FENTANYL CITRATE 50 MCG: 50 INJECTION, SOLUTION INTRAMUSCULAR; INTRAVENOUS at 15:01

## 2017-01-01 RX ADMIN — TROSPIUM CHLORIDE 20 MG: 20 TABLET ORAL at 21:00

## 2017-01-01 RX ADMIN — ENOXAPARIN SODIUM 40 MG: 40 INJECTION SUBCUTANEOUS at 12:27

## 2017-01-01 RX ADMIN — Medication 30 ML: at 04:25

## 2017-01-01 RX ADMIN — CEFEPIME 2 G: 2 INJECTION, POWDER, FOR SOLUTION INTRAVENOUS at 16:00

## 2017-01-01 RX ADMIN — LINEZOLID 600 MG: 600 INJECTION, SOLUTION INTRAVENOUS at 13:15

## 2017-01-01 RX ADMIN — CHLORHEXIDINE GLUCONATE 15 ML: 1.2 RINSE ORAL at 08:52

## 2017-01-01 RX ADMIN — DEXTROSE MONOHYDRATE, SODIUM CHLORIDE, AND POTASSIUM CHLORIDE 125 ML/HR: 50; 4.5; 1.49 INJECTION, SOLUTION INTRAVENOUS at 05:50

## 2017-01-01 RX ADMIN — TRIAMCINOLONE ACETONIDE 1 G: 0.25 CREAM TOPICAL at 09:15

## 2017-01-01 RX ADMIN — SODIUM BICARBONATE 50 MEQ: 84 INJECTION, SOLUTION INTRAVENOUS at 22:38

## 2017-01-01 RX ADMIN — BARIUM SULFATE 15 ML: 400 SUSPENSION ORAL at 10:03

## 2017-01-01 RX ADMIN — Medication 11 MCG/MIN: at 04:26

## 2017-01-01 RX ADMIN — SODIUM BICARBONATE 650 MG TABLET 650 MG: at 10:00

## 2017-01-01 RX ADMIN — IPRATROPIUM BROMIDE AND ALBUTEROL SULFATE 3 ML: .5; 3 SOLUTION RESPIRATORY (INHALATION) at 02:43

## 2017-01-01 RX ADMIN — ENOXAPARIN SODIUM 40 MG: 40 INJECTION SUBCUTANEOUS at 00:48

## 2017-01-01 RX ADMIN — Medication 2 MCG/MIN: at 14:00

## 2017-01-01 RX ADMIN — ACETAMINOPHEN 650 MG: 650 SOLUTION ORAL at 22:22

## 2017-01-01 RX ADMIN — CHLORHEXIDINE GLUCONATE 10 ML: 1.2 RINSE ORAL at 13:32

## 2017-01-01 RX ADMIN — COLLAGENASE SANTYL: 250 OINTMENT TOPICAL at 09:23

## 2017-01-01 RX ADMIN — PIPERACILLIN SODIUM,TAZOBACTAM SODIUM 2.25 G: 2; .25 INJECTION, POWDER, FOR SOLUTION INTRAVENOUS at 06:57

## 2017-01-01 RX ADMIN — POTASSIUM CHLORIDE 10 MEQ: 7.46 INJECTION, SOLUTION INTRAVENOUS at 10:24

## 2017-01-01 RX ADMIN — CHLORHEXIDINE GLUCONATE 15 ML: 1.2 RINSE ORAL at 18:22

## 2017-01-01 RX ADMIN — SODIUM BICARBONATE 650 MG TABLET 650 MG: at 21:00

## 2017-01-01 RX ADMIN — SODIUM BICARBONATE 650 MG TABLET 650 MG: at 17:21

## 2017-01-01 RX ADMIN — ENOXAPARIN SODIUM 40 MG: 40 INJECTION SUBCUTANEOUS at 12:48

## 2017-01-01 RX ADMIN — PROPOFOL 40 MCG/KG/MIN: 10 INJECTION, EMULSION INTRAVENOUS at 09:19

## 2017-01-01 RX ADMIN — LEVOFLOXACIN 500 MG: 5 INJECTION, SOLUTION INTRAVENOUS at 12:00

## 2017-01-01 RX ADMIN — DAPTOMYCIN 719 MG: 500 INJECTION, POWDER, LYOPHILIZED, FOR SOLUTION INTRAVENOUS at 18:30

## 2017-01-01 RX ADMIN — POTASSIUM CHLORIDE 10 MEQ: 7.46 INJECTION, SOLUTION INTRAVENOUS at 12:41

## 2017-01-01 RX ADMIN — DAPTOMYCIN 630 MG: 500 INJECTION, POWDER, LYOPHILIZED, FOR SOLUTION INTRAVENOUS at 23:03

## 2017-01-01 RX ADMIN — NYSTATIN: 100000 POWDER TOPICAL at 10:21

## 2017-01-01 RX ADMIN — TRIAMCINOLONE ACETONIDE 1 G: 0.25 CREAM TOPICAL at 09:22

## 2017-01-01 RX ADMIN — SODIUM BICARBONATE 650 MG TABLET 650 MG: at 23:49

## 2017-01-01 RX ADMIN — MORPHINE SULFATE 2 MG: 2 INJECTION, SOLUTION INTRAMUSCULAR; INTRAVENOUS at 03:26

## 2017-01-01 RX ADMIN — SODIUM CHLORIDE 40 MG: 9 INJECTION INTRAMUSCULAR; INTRAVENOUS; SUBCUTANEOUS at 10:22

## 2017-01-01 RX ADMIN — AZTREONAM 1 G: 1 INJECTION, POWDER, LYOPHILIZED, FOR SOLUTION INTRAMUSCULAR; INTRAVENOUS at 02:01

## 2017-01-01 RX ADMIN — SODIUM CHLORIDE 2000 MG: 900 INJECTION, SOLUTION INTRAVENOUS at 23:00

## 2017-01-01 RX ADMIN — POTASSIUM CHLORIDE 10 MEQ: 7.46 INJECTION, SOLUTION INTRAVENOUS at 08:04

## 2017-01-01 RX ADMIN — SODIUM CHLORIDE 40 MG: 9 INJECTION INTRAMUSCULAR; INTRAVENOUS; SUBCUTANEOUS at 02:52

## 2017-01-01 RX ADMIN — NYSTATIN: 100000 POWDER TOPICAL at 18:16

## 2017-01-01 RX ADMIN — NYSTATIN: 100000 POWDER TOPICAL at 11:01

## 2017-01-01 RX ADMIN — CALCIUM GLUCONATE 2 G: 94 INJECTION, SOLUTION INTRAVENOUS at 00:30

## 2017-01-01 RX ADMIN — ENOXAPARIN SODIUM 40 MG: 40 INJECTION SUBCUTANEOUS at 22:59

## 2017-01-01 RX ADMIN — SODIUM CHLORIDE 40 MG: 9 INJECTION INTRAMUSCULAR; INTRAVENOUS; SUBCUTANEOUS at 08:55

## 2017-01-01 RX ADMIN — POTASSIUM CHLORIDE 10 MEQ: 7.46 INJECTION, SOLUTION INTRAVENOUS at 06:19

## 2017-01-01 RX ADMIN — FAMOTIDINE 20 MG: 10 INJECTION, SOLUTION INTRAVENOUS at 09:10

## 2017-01-01 RX ADMIN — PROPOFOL 30 MCG/KG/MIN: 10 INJECTION, EMULSION INTRAVENOUS at 17:33

## 2017-01-01 RX ADMIN — SODIUM CHLORIDE 40 MG: 9 INJECTION INTRAMUSCULAR; INTRAVENOUS; SUBCUTANEOUS at 09:28

## 2017-01-01 RX ADMIN — ENOXAPARIN SODIUM 40 MG: 40 INJECTION SUBCUTANEOUS at 11:48

## 2017-01-01 RX ADMIN — Medication 18 MCG/MIN: at 05:26

## 2017-01-01 RX ADMIN — Medication 10 ML: at 13:52

## 2017-01-01 RX ADMIN — DAPTOMYCIN 719 MG: 500 INJECTION, POWDER, LYOPHILIZED, FOR SOLUTION INTRAVENOUS at 13:30

## 2017-01-01 RX ADMIN — Medication 10 ML: at 05:57

## 2017-01-01 RX ADMIN — DAPTOMYCIN 630 MG: 500 INJECTION, POWDER, LYOPHILIZED, FOR SOLUTION INTRAVENOUS at 10:27

## 2017-01-01 RX ADMIN — CIPROFLOXACIN 400 MG: 2 INJECTION, SOLUTION INTRAVENOUS at 08:22

## 2017-01-01 RX ADMIN — SODIUM CHLORIDE 40 MG: 9 INJECTION INTRAMUSCULAR; INTRAVENOUS; SUBCUTANEOUS at 10:01

## 2017-01-01 RX ADMIN — ENOXAPARIN SODIUM 40 MG: 40 INJECTION SUBCUTANEOUS at 14:00

## 2017-01-01 RX ADMIN — FENTANYL CITRATE 50 MCG: 50 INJECTION, SOLUTION INTRAMUSCULAR; INTRAVENOUS at 08:29

## 2017-01-01 RX ADMIN — ALBUMIN (HUMAN) 25 G: 0.25 INJECTION, SOLUTION INTRAVENOUS at 06:00

## 2017-01-01 RX ADMIN — IPRATROPIUM BROMIDE AND ALBUTEROL SULFATE 3 ML: .5; 3 SOLUTION RESPIRATORY (INHALATION) at 21:00

## 2017-01-01 RX ADMIN — ONDANSETRON 4 MG: 2 SOLUTION INTRAMUSCULAR; INTRAVENOUS at 12:50

## 2017-01-01 RX ADMIN — MAGNESIUM SULFATE HEPTAHYDRATE 2 G: 40 INJECTION, SOLUTION INTRAVENOUS at 13:21

## 2017-01-01 RX ADMIN — MAGNESIUM SULFATE HEPTAHYDRATE 1 G: 1 INJECTION, SOLUTION INTRAVENOUS at 05:42

## 2017-01-01 RX ADMIN — Medication 10 ML: at 23:37

## 2017-01-01 RX ADMIN — DEXTROSE MONOHYDRATE 25 G: 25 INJECTION, SOLUTION INTRAVENOUS at 05:12

## 2017-01-01 RX ADMIN — POTASSIUM CHLORIDE 10 MEQ: 7.46 INJECTION, SOLUTION INTRAVENOUS at 09:44

## 2017-01-01 RX ADMIN — NYSTATIN: 100000 POWDER TOPICAL at 18:00

## 2017-01-01 RX ADMIN — Medication 6 MCG/MIN: at 22:49

## 2017-01-01 RX ADMIN — ALBUMIN (HUMAN) 25 G: 0.25 INJECTION, SOLUTION INTRAVENOUS at 05:56

## 2017-01-01 RX ADMIN — DAPTOMYCIN 719 MG: 500 INJECTION, POWDER, LYOPHILIZED, FOR SOLUTION INTRAVENOUS at 15:28

## 2017-01-01 RX ADMIN — AZTREONAM 1 G: 1 INJECTION, POWDER, LYOPHILIZED, FOR SOLUTION INTRAMUSCULAR; INTRAVENOUS at 09:47

## 2017-01-01 RX ADMIN — DEXTROSE MONOHYDRATE, SODIUM CHLORIDE, AND POTASSIUM CHLORIDE: 50; 4.5; 2.98 INJECTION, SOLUTION INTRAVENOUS at 10:37

## 2017-01-01 RX ADMIN — Medication 10 ML: at 21:00

## 2017-01-01 RX ADMIN — FUROSEMIDE 40 MG: 10 INJECTION, SOLUTION INTRAMUSCULAR; INTRAVENOUS at 20:58

## 2017-01-01 RX ADMIN — DAKIN'S SOLUTION 0.125% (QUARTER STRENGTH): 0.12 SOLUTION at 09:22

## 2017-01-01 RX ADMIN — FUROSEMIDE 40 MG: 40 TABLET ORAL at 10:01

## 2017-01-01 RX ADMIN — CEFEPIME 2 G: 2 INJECTION, POWDER, FOR SOLUTION INTRAVENOUS at 17:41

## 2017-01-01 RX ADMIN — SODIUM CHLORIDE, SODIUM LACTATE, POTASSIUM CHLORIDE, CALCIUM CHLORIDE, AND DEXTROSE MONOHYDRATE 50 ML/HR: 600; 310; 30; 20; 5 INJECTION, SOLUTION INTRAVENOUS at 13:52

## 2017-01-01 RX ADMIN — DAPTOMYCIN 630 MG: 500 INJECTION, POWDER, LYOPHILIZED, FOR SOLUTION INTRAVENOUS at 15:33

## 2017-01-01 RX ADMIN — SODIUM BICARBONATE 650 MG TABLET 650 MG: at 15:36

## 2017-01-01 RX ADMIN — Medication 10 ML: at 14:13

## 2017-01-01 RX ADMIN — LIDOCAINE HYDROCHLORIDE 100 MG: 20 INJECTION, SOLUTION EPIDURAL; INFILTRATION; INTRACAUDAL; PERINEURAL at 08:05

## 2017-01-01 RX ADMIN — DEXTROSE MONOHYDRATE, SODIUM CHLORIDE, AND POTASSIUM CHLORIDE 125 ML/HR: 50; 4.5; 1.49 INJECTION, SOLUTION INTRAVENOUS at 05:57

## 2017-01-01 RX ADMIN — TRIAMCINOLONE ACETONIDE 1 G: 0.25 CREAM TOPICAL at 16:56

## 2017-01-01 RX ADMIN — POTASSIUM CHLORIDE 10 MEQ: 10 INJECTION, SOLUTION INTRAVENOUS at 10:00

## 2017-01-01 RX ADMIN — DEXTROSE MONOHYDRATE: 5 INJECTION, SOLUTION INTRAVENOUS at 15:00

## 2017-01-01 RX ADMIN — FENTANYL CITRATE 50 MCG: 50 INJECTION, SOLUTION INTRAMUSCULAR; INTRAVENOUS at 17:57

## 2017-01-01 RX ADMIN — IPRATROPIUM BROMIDE AND ALBUTEROL SULFATE 3 ML: .5; 3 SOLUTION RESPIRATORY (INHALATION) at 20:06

## 2017-01-01 RX ADMIN — MORPHINE SULFATE 2 MG: 2 INJECTION, SOLUTION INTRAMUSCULAR; INTRAVENOUS at 09:41

## 2017-01-01 RX ADMIN — Medication 10 ML: at 05:31

## 2017-01-01 RX ADMIN — SODIUM BICARBONATE 650 MG TABLET 650 MG: at 18:01

## 2017-01-01 RX ADMIN — ALBUMIN HUMAN 25 G: 250 SOLUTION INTRAVENOUS at 05:42

## 2017-01-01 RX ADMIN — SODIUM BICARBONATE 650 MG TABLET 650 MG: at 12:30

## 2017-01-01 RX ADMIN — ALBUMIN (HUMAN) 25 G: 0.25 INJECTION, SOLUTION INTRAVENOUS at 17:53

## 2017-01-01 RX ADMIN — Medication 10 ML: at 15:55

## 2017-01-01 RX ADMIN — SODIUM CHLORIDE 40 MG: 9 INJECTION INTRAMUSCULAR; INTRAVENOUS; SUBCUTANEOUS at 09:17

## 2017-01-01 RX ADMIN — HYDROCORTISONE SODIUM SUCCINATE 100 MG: 100 INJECTION, POWDER, FOR SOLUTION INTRAMUSCULAR; INTRAVENOUS at 08:36

## 2017-01-01 RX ADMIN — IPRATROPIUM BROMIDE AND ALBUTEROL SULFATE 3 ML: .5; 3 SOLUTION RESPIRATORY (INHALATION) at 14:16

## 2017-01-01 RX ADMIN — FLUMAZENIL 0.4 MG: 0.1 INJECTION, SOLUTION INTRAVENOUS at 19:33

## 2017-01-01 RX ADMIN — IPRATROPIUM BROMIDE AND ALBUTEROL SULFATE 3 ML: .5; 3 SOLUTION RESPIRATORY (INHALATION) at 13:20

## 2017-01-01 RX ADMIN — TRIAMCINOLONE ACETONIDE 1 G: 0.25 CREAM TOPICAL at 21:38

## 2017-01-01 RX ADMIN — MORPHINE SULFATE 2 MG: 2 INJECTION, SOLUTION INTRAMUSCULAR; INTRAVENOUS at 19:27

## 2017-01-01 RX ADMIN — POTASSIUM CHLORIDE 10 MEQ: 7.46 INJECTION, SOLUTION INTRAVENOUS at 09:21

## 2017-01-01 RX ADMIN — Medication 10 ML: at 07:05

## 2017-01-01 RX ADMIN — IPRATROPIUM BROMIDE AND ALBUTEROL SULFATE 3 ML: .5; 3 SOLUTION RESPIRATORY (INHALATION) at 20:42

## 2017-01-01 RX ADMIN — LEVETIRACETAM 1000 MG: 10 INJECTION INTRAVENOUS at 23:00

## 2017-01-01 RX ADMIN — CEFEPIME 2 G: 2 INJECTION, POWDER, FOR SOLUTION INTRAVENOUS at 04:14

## 2017-01-01 RX ADMIN — IPRATROPIUM BROMIDE AND ALBUTEROL SULFATE 3 ML: .5; 3 SOLUTION RESPIRATORY (INHALATION) at 01:31

## 2017-01-01 RX ADMIN — ENOXAPARIN SODIUM 40 MG: 40 INJECTION SUBCUTANEOUS at 00:15

## 2017-01-01 RX ADMIN — IPRATROPIUM BROMIDE AND ALBUTEROL SULFATE 3 ML: .5; 3 SOLUTION RESPIRATORY (INHALATION) at 09:00

## 2017-01-01 RX ADMIN — SODIUM BICARBONATE 650 MG TABLET 650 MG: at 18:51

## 2017-01-01 RX ADMIN — DAKIN'S SOLUTION 0.125% (QUARTER STRENGTH): 0.12 SOLUTION at 09:00

## 2017-01-01 RX ADMIN — DAPTOMYCIN 630 MG: 500 INJECTION, POWDER, LYOPHILIZED, FOR SOLUTION INTRAVENOUS at 14:07

## 2017-01-01 RX ADMIN — Medication 8 MCG/MIN: at 01:19

## 2017-01-01 RX ADMIN — IPRATROPIUM BROMIDE AND ALBUTEROL SULFATE 3 ML: .5; 3 SOLUTION RESPIRATORY (INHALATION) at 14:24

## 2017-01-01 RX ADMIN — SODIUM BICARBONATE 650 MG TABLET 650 MG: at 16:22

## 2017-01-01 RX ADMIN — PIPERACILLIN SODIUM,TAZOBACTAM SODIUM 2.25 G: 2; .25 INJECTION, POWDER, FOR SOLUTION INTRAVENOUS at 06:13

## 2017-01-01 RX ADMIN — SODIUM BICARBONATE 650 MG TABLET 650 MG: at 23:24

## 2017-01-01 RX ADMIN — SODIUM BICARBONATE 650 MG TABLET 650 MG: at 10:21

## 2017-01-01 RX ADMIN — POTASSIUM CHLORIDE 10 MEQ: 7.46 INJECTION, SOLUTION INTRAVENOUS at 23:00

## 2017-01-01 RX ADMIN — LINEZOLID 600 MG: 600 INJECTION, SOLUTION INTRAVENOUS at 12:44

## 2017-01-01 RX ADMIN — TROSPIUM CHLORIDE 20 MG: 20 TABLET ORAL at 13:02

## 2017-01-01 RX ADMIN — ACETAMINOPHEN 650 MG: 650 SOLUTION ORAL at 17:44

## 2017-01-01 RX ADMIN — LEVOFLOXACIN 750 MG: 5 INJECTION, SOLUTION INTRAVENOUS at 22:10

## 2017-01-01 RX ADMIN — SODIUM CHLORIDE 40 MG: 9 INJECTION INTRAMUSCULAR; INTRAVENOUS; SUBCUTANEOUS at 11:54

## 2017-01-01 RX ADMIN — SODIUM CHLORIDE, SODIUM LACTATE, POTASSIUM CHLORIDE, CALCIUM CHLORIDE, AND DEXTROSE MONOHYDRATE 50 ML/HR: 600; 310; 30; 20; 5 INJECTION, SOLUTION INTRAVENOUS at 21:51

## 2017-01-01 RX ADMIN — POTASSIUM CHLORIDE 10 MEQ: 10 INJECTION, SOLUTION INTRAVENOUS at 13:42

## 2017-01-01 RX ADMIN — AZTREONAM 1 G: 1 INJECTION, POWDER, LYOPHILIZED, FOR SOLUTION INTRAMUSCULAR; INTRAVENOUS at 10:36

## 2017-01-01 RX ADMIN — BARIUM SULFATE 15 ML: 400 PASTE ORAL at 10:04

## 2017-01-01 RX ADMIN — Medication 10 ML: at 13:38

## 2017-01-01 RX ADMIN — SODIUM CHLORIDE 1000 ML: 900 INJECTION, SOLUTION INTRAVENOUS at 22:23

## 2017-01-01 RX ADMIN — HYDROMORPHONE HYDROCHLORIDE 1 MG: 1 INJECTION, SOLUTION INTRAMUSCULAR; INTRAVENOUS; SUBCUTANEOUS at 17:10

## 2017-01-01 RX ADMIN — DEXTROSE MONOHYDRATE 50 ML/HR: 5 INJECTION, SOLUTION INTRAVENOUS at 12:35

## 2017-01-01 RX ADMIN — PHENYLEPHRINE HYDROCHLORIDE 50 MCG/MIN: 10 INJECTION, SOLUTION INTRAMUSCULAR; INTRAVENOUS; SUBCUTANEOUS at 15:58

## 2017-01-01 RX ADMIN — POTASSIUM CHLORIDE 10 MEQ: 7.46 INJECTION, SOLUTION INTRAVENOUS at 11:55

## 2017-01-01 RX ADMIN — MAGNESIUM SULFATE HEPTAHYDRATE 1 G: 1 INJECTION, SOLUTION INTRAVENOUS at 23:38

## 2017-01-01 RX ADMIN — CALCIUM GLUCONATE 2 G: 94 INJECTION, SOLUTION INTRAVENOUS at 11:13

## 2017-01-01 RX ADMIN — SODIUM CHLORIDE 40 MG: 9 INJECTION INTRAMUSCULAR; INTRAVENOUS; SUBCUTANEOUS at 08:39

## 2017-01-01 RX ADMIN — PIPERACILLIN SODIUM,TAZOBACTAM SODIUM 2.25 G: 2; .25 INJECTION, POWDER, FOR SOLUTION INTRAVENOUS at 05:12

## 2017-01-01 RX ADMIN — SODIUM CHLORIDE 150 ML/HR: 900 INJECTION, SOLUTION INTRAVENOUS at 15:58

## 2017-01-01 RX ADMIN — MIDAZOLAM HYDROCHLORIDE 0.5 MG: 1 INJECTION, SOLUTION INTRAMUSCULAR; INTRAVENOUS at 08:42

## 2017-01-01 RX ADMIN — TRIAMCINOLONE ACETONIDE 1 G: 0.25 CREAM TOPICAL at 09:09

## 2017-01-01 RX ADMIN — IPRATROPIUM BROMIDE AND ALBUTEROL SULFATE 3 ML: .5; 3 SOLUTION RESPIRATORY (INHALATION) at 02:47

## 2017-01-01 RX ADMIN — ENOXAPARIN SODIUM 40 MG: 40 INJECTION SUBCUTANEOUS at 10:33

## 2017-01-01 RX ADMIN — POTASSIUM CHLORIDE 20 MEQ: 20 TABLET, EXTENDED RELEASE ORAL at 09:43

## 2017-01-01 RX ADMIN — NYSTATIN: 100000 POWDER TOPICAL at 18:53

## 2017-01-01 RX ADMIN — ENOXAPARIN SODIUM 40 MG: 40 INJECTION SUBCUTANEOUS at 22:52

## 2017-01-01 RX ADMIN — CALCIUM GLUCONATE 1000 MG: 94 INJECTION, SOLUTION INTRAVENOUS at 17:30

## 2017-01-01 RX ADMIN — SODIUM BICARBONATE 650 MG TABLET 650 MG: at 16:55

## 2017-01-01 RX ADMIN — PIPERACILLIN SODIUM,TAZOBACTAM SODIUM 4.5 G: 4; .5 INJECTION, POWDER, FOR SOLUTION INTRAVENOUS at 21:27

## 2017-01-01 RX ADMIN — ACETAMINOPHEN 650 MG: 650 SOLUTION ORAL at 17:53

## 2017-01-01 RX ADMIN — SODIUM BICARBONATE 650 MG TABLET 650 MG: at 08:37

## 2017-01-01 RX ADMIN — FAMOTIDINE 20 MG: 10 INJECTION, SOLUTION INTRAVENOUS at 09:15

## 2017-01-01 RX ADMIN — ENOXAPARIN SODIUM 40 MG: 40 INJECTION SUBCUTANEOUS at 03:22

## 2017-01-01 RX ADMIN — NYSTATIN: 100000 POWDER TOPICAL at 12:00

## 2017-01-01 RX ADMIN — ALBUMIN (HUMAN) 25 G: 0.25 INJECTION, SOLUTION INTRAVENOUS at 23:37

## 2017-01-01 RX ADMIN — CHLORHEXIDINE GLUCONATE 10 ML: 1.2 RINSE ORAL at 11:00

## 2017-01-01 RX ADMIN — SODIUM BICARBONATE 650 MG TABLET 650 MG: at 21:58

## 2017-01-01 RX ADMIN — IPRATROPIUM BROMIDE AND ALBUTEROL SULFATE 3 ML: .5; 3 SOLUTION RESPIRATORY (INHALATION) at 08:25

## 2017-01-01 RX ADMIN — PROPOFOL 100 MG: 10 INJECTION, EMULSION INTRAVENOUS at 08:06

## 2017-01-01 RX ADMIN — SODIUM CHLORIDE 100 ML/HR: 900 INJECTION, SOLUTION INTRAVENOUS at 01:27

## 2017-01-01 RX ADMIN — ENOXAPARIN SODIUM 40 MG: 40 INJECTION SUBCUTANEOUS at 15:02

## 2017-01-01 RX ADMIN — POTASSIUM CHLORIDE 10 MEQ: 7.46 INJECTION, SOLUTION INTRAVENOUS at 01:05

## 2017-01-01 RX ADMIN — LORAZEPAM 1 MG: 1 TABLET ORAL at 14:43

## 2017-01-01 RX ADMIN — SODIUM BICARBONATE 650 MG TABLET 650 MG: at 09:43

## 2017-01-01 RX ADMIN — ONDANSETRON 4 MG: 2 SOLUTION INTRAMUSCULAR; INTRAVENOUS at 06:17

## 2017-01-01 RX ADMIN — LEVETIRACETAM 1000 MG: 10 INJECTION INTRAVENOUS at 22:21

## 2017-01-01 RX ADMIN — MORPHINE SULFATE 2 MG: 2 INJECTION, SOLUTION INTRAMUSCULAR; INTRAVENOUS at 02:28

## 2017-01-01 RX ADMIN — MORPHINE SULFATE 2 MG: 2 INJECTION, SOLUTION INTRAMUSCULAR; INTRAVENOUS at 01:37

## 2017-01-01 RX ADMIN — IPRATROPIUM BROMIDE AND ALBUTEROL SULFATE 3 ML: .5; 3 SOLUTION RESPIRATORY (INHALATION) at 15:39

## 2017-01-01 RX ADMIN — SODIUM BICARBONATE 650 MG TABLET 650 MG: at 16:17

## 2017-01-01 RX ADMIN — ENOXAPARIN SODIUM 40 MG: 40 INJECTION SUBCUTANEOUS at 01:32

## 2017-01-01 RX ADMIN — NYSTATIN: 100000 POWDER TOPICAL at 09:36

## 2017-01-01 RX ADMIN — SODIUM CHLORIDE AND POTASSIUM CHLORIDE: 9; 1.49 INJECTION, SOLUTION INTRAVENOUS at 10:59

## 2017-01-01 RX ADMIN — SODIUM CHLORIDE 125 ML/HR: 900 INJECTION, SOLUTION INTRAVENOUS at 20:54

## 2017-01-01 RX ADMIN — PANTOPRAZOLE SODIUM 40 MG: 40 TABLET, DELAYED RELEASE ORAL at 08:55

## 2017-01-01 RX ADMIN — FUROSEMIDE 40 MG: 10 INJECTION, SOLUTION INTRAMUSCULAR; INTRAVENOUS at 10:21

## 2017-01-01 RX ADMIN — SODIUM CHLORIDE 100 MG: 900 INJECTION, SOLUTION INTRAVENOUS at 20:02

## 2017-01-01 RX ADMIN — CHLORHEXIDINE GLUCONATE 15 ML: 1.2 RINSE ORAL at 12:36

## 2017-01-01 RX ADMIN — CHLORHEXIDINE GLUCONATE 10 ML: 1.2 RINSE ORAL at 22:09

## 2017-01-01 RX ADMIN — FENTANYL CITRATE 50 MCG: 50 INJECTION, SOLUTION INTRAMUSCULAR; INTRAVENOUS at 08:11

## 2017-01-01 RX ADMIN — HYDROMORPHONE HYDROCHLORIDE 1 MG: 2 INJECTION INTRAMUSCULAR; INTRAVENOUS; SUBCUTANEOUS at 03:41

## 2017-01-01 RX ADMIN — ENOXAPARIN SODIUM 40 MG: 40 INJECTION SUBCUTANEOUS at 13:50

## 2017-01-01 RX ADMIN — SODIUM BICARBONATE 650 MG TABLET 650 MG: at 09:40

## 2017-01-01 RX ADMIN — IPRATROPIUM BROMIDE AND ALBUTEROL SULFATE 3 ML: .5; 3 SOLUTION RESPIRATORY (INHALATION) at 21:12

## 2017-01-01 RX ADMIN — SODIUM CHLORIDE 40 MG: 9 INJECTION INTRAMUSCULAR; INTRAVENOUS; SUBCUTANEOUS at 09:40

## 2017-01-01 RX ADMIN — NALOXONE HYDROCHLORIDE 0.4 MG: 0.4 INJECTION, SOLUTION INTRAMUSCULAR; INTRAVENOUS; SUBCUTANEOUS at 20:18

## 2017-01-01 RX ADMIN — CEFEPIME 2 G: 2 INJECTION, POWDER, FOR SOLUTION INTRAVENOUS at 04:25

## 2017-01-01 RX ADMIN — SODIUM BICARBONATE 650 MG TABLET 650 MG: at 22:46

## 2017-01-01 RX ADMIN — AZTREONAM 1 G: 1 INJECTION, POWDER, LYOPHILIZED, FOR SOLUTION INTRAMUSCULAR; INTRAVENOUS at 17:24

## 2017-01-01 RX ADMIN — ENOXAPARIN SODIUM 40 MG: 40 INJECTION SUBCUTANEOUS at 01:59

## 2017-01-01 RX ADMIN — ONDANSETRON 4 MG: 2 SOLUTION INTRAMUSCULAR; INTRAVENOUS at 12:27

## 2017-01-01 RX ADMIN — ACETAMINOPHEN 650 MG: 650 SOLUTION ORAL at 16:45

## 2017-01-01 RX ADMIN — COLLAGENASE SANTYL: 250 OINTMENT TOPICAL at 08:38

## 2017-01-01 RX ADMIN — IPRATROPIUM BROMIDE AND ALBUTEROL SULFATE 3 ML: .5; 3 SOLUTION RESPIRATORY (INHALATION) at 02:30

## 2017-01-01 RX ADMIN — POTASSIUM CHLORIDE 10 MEQ: 7.46 INJECTION, SOLUTION INTRAVENOUS at 23:48

## 2017-01-01 RX ADMIN — POTASSIUM CHLORIDE 20 MEQ: 20 TABLET, EXTENDED RELEASE ORAL at 18:43

## 2017-01-01 RX ADMIN — TROSPIUM CHLORIDE 20 MG: 20 TABLET ORAL at 09:17

## 2017-01-01 RX ADMIN — HEPARIN SODIUM 1000 UNITS: 200 INJECTION, SOLUTION INTRAVENOUS at 08:09

## 2017-01-01 RX ADMIN — SODIUM CHLORIDE 40 MG: 9 INJECTION INTRAMUSCULAR; INTRAVENOUS; SUBCUTANEOUS at 09:25

## 2017-01-01 RX ADMIN — MORPHINE SULFATE 2 MG: 2 INJECTION, SOLUTION INTRAMUSCULAR; INTRAVENOUS at 16:00

## 2017-01-01 RX ADMIN — ENOXAPARIN SODIUM 40 MG: 40 INJECTION SUBCUTANEOUS at 00:08

## 2017-01-01 RX ADMIN — Medication: at 07:15

## 2017-01-01 RX ADMIN — IPRATROPIUM BROMIDE AND ALBUTEROL SULFATE 3 ML: .5; 3 SOLUTION RESPIRATORY (INHALATION) at 08:13

## 2017-01-01 RX ADMIN — MAGNESIUM SULFATE IN DEXTROSE 1 G: 10 INJECTION, SOLUTION INTRAVENOUS at 09:36

## 2017-01-01 RX ADMIN — ENOXAPARIN SODIUM 40 MG: 40 INJECTION SUBCUTANEOUS at 13:43

## 2017-01-01 RX ADMIN — Medication 400 MG: at 10:01

## 2017-01-01 RX ADMIN — IPRATROPIUM BROMIDE AND ALBUTEROL SULFATE 3 ML: .5; 3 SOLUTION RESPIRATORY (INHALATION) at 13:30

## 2017-01-01 RX ADMIN — POLYETHYLENE GLYCOL 3350 17 G: 17 POWDER, FOR SOLUTION ORAL at 08:38

## 2017-01-01 RX ADMIN — ACETAMINOPHEN 650 MG: 325 TABLET, FILM COATED ORAL at 08:48

## 2017-01-01 RX ADMIN — FENTANYL CITRATE 25 MCG: 50 INJECTION, SOLUTION INTRAMUSCULAR; INTRAVENOUS at 18:44

## 2017-01-01 RX ADMIN — AZTREONAM 1 G: 1 INJECTION, POWDER, LYOPHILIZED, FOR SOLUTION INTRAMUSCULAR; INTRAVENOUS at 09:25

## 2017-01-01 RX ADMIN — TRIAMCINOLONE ACETONIDE 1 G: 0.25 CREAM TOPICAL at 15:04

## 2017-01-01 RX ADMIN — POTASSIUM CHLORIDE 10 MEQ: 7.46 INJECTION, SOLUTION INTRAVENOUS at 10:16

## 2017-01-01 RX ADMIN — IPRATROPIUM BROMIDE AND ALBUTEROL SULFATE 3 ML: .5; 3 SOLUTION RESPIRATORY (INHALATION) at 20:07

## 2017-01-01 RX ADMIN — SODIUM BICARBONATE 650 MG TABLET 650 MG: at 21:19

## 2017-01-01 RX ADMIN — DEXTROSE MONOHYDRATE 25 ML/HR: 5 INJECTION, SOLUTION INTRAVENOUS at 04:18

## 2017-01-01 RX ADMIN — LEVOFLOXACIN 750 MG: 5 INJECTION, SOLUTION INTRAVENOUS at 21:25

## 2017-01-01 RX ADMIN — IPRATROPIUM BROMIDE AND ALBUTEROL SULFATE 3 ML: .5; 3 SOLUTION RESPIRATORY (INHALATION) at 14:49

## 2017-01-01 RX ADMIN — POTASSIUM CHLORIDE 10 MEQ: 7.46 INJECTION, SOLUTION INTRAVENOUS at 21:00

## 2017-01-01 RX ADMIN — TROSPIUM CHLORIDE 20 MG: 20 TABLET ORAL at 22:08

## 2017-01-01 RX ADMIN — CEFEPIME 2 G: 2 INJECTION, POWDER, FOR SOLUTION INTRAVENOUS at 16:54

## 2017-01-01 RX ADMIN — NYSTATIN: 100000 POWDER TOPICAL at 17:05

## 2017-01-01 RX ADMIN — CHLORHEXIDINE GLUCONATE 15 ML: 1.2 RINSE ORAL at 17:24

## 2017-01-01 RX ADMIN — DAPTOMYCIN 672 MG: 500 INJECTION, POWDER, LYOPHILIZED, FOR SOLUTION INTRAVENOUS at 13:03

## 2017-01-01 RX ADMIN — POTASSIUM CHLORIDE 10 MEQ: 7.46 INJECTION, SOLUTION INTRAVENOUS at 22:38

## 2017-01-01 RX ADMIN — DEXTROSE MONOHYDRATE: 5 INJECTION, SOLUTION INTRAVENOUS at 23:43

## 2017-01-01 RX ADMIN — NYSTATIN: 100000 POWDER TOPICAL at 09:16

## 2017-01-01 RX ADMIN — AZTREONAM 1 G: 1 INJECTION, POWDER, LYOPHILIZED, FOR SOLUTION INTRAMUSCULAR; INTRAVENOUS at 18:22

## 2017-01-01 RX ADMIN — POTASSIUM CHLORIDE 10 MEQ: 7.46 INJECTION, SOLUTION INTRAVENOUS at 12:00

## 2017-01-01 RX ADMIN — LEVETIRACETAM 1500 MG: 15 INJECTION INTRAVENOUS at 11:54

## 2017-01-01 RX ADMIN — FUROSEMIDE 40 MG: 10 INJECTION, SOLUTION INTRAMUSCULAR; INTRAVENOUS at 13:31

## 2017-01-01 RX ADMIN — SODIUM BICARBONATE 650 MG TABLET 650 MG: at 16:33

## 2017-01-01 RX ADMIN — ACETAMINOPHEN 650 MG: 650 SOLUTION ORAL at 02:53

## 2017-01-01 RX ADMIN — IPRATROPIUM BROMIDE AND ALBUTEROL SULFATE 3 ML: .5; 3 SOLUTION RESPIRATORY (INHALATION) at 13:05

## 2017-01-01 RX ADMIN — FUROSEMIDE 40 MG: 10 INJECTION, SOLUTION INTRAMUSCULAR; INTRAVENOUS at 12:37

## 2017-01-01 RX ADMIN — ENOXAPARIN SODIUM 40 MG: 40 INJECTION SUBCUTANEOUS at 12:15

## 2017-01-01 RX ADMIN — NYSTATIN: 100000 POWDER TOPICAL at 09:27

## 2017-01-01 RX ADMIN — POTASSIUM CHLORIDE 10 MEQ: 7.46 INJECTION, SOLUTION INTRAVENOUS at 08:53

## 2017-01-01 RX ADMIN — SODIUM BICARBONATE 650 MG TABLET 650 MG: at 17:45

## 2017-01-01 RX ADMIN — IPRATROPIUM BROMIDE AND ALBUTEROL SULFATE 3 ML: .5; 3 SOLUTION RESPIRATORY (INHALATION) at 14:15

## 2017-01-01 RX ADMIN — MAGNESIUM SULFATE IN DEXTROSE 1 G: 10 INJECTION, SOLUTION INTRAVENOUS at 01:25

## 2017-01-01 RX ADMIN — SODIUM CHLORIDE, SODIUM LACTATE, POTASSIUM CHLORIDE, CALCIUM CHLORIDE, AND DEXTROSE MONOHYDRATE 50 ML/HR: 600; 310; 30; 20; 5 INJECTION, SOLUTION INTRAVENOUS at 11:28

## 2017-01-01 RX ADMIN — IPRATROPIUM BROMIDE AND ALBUTEROL SULFATE 3 ML: .5; 3 SOLUTION RESPIRATORY (INHALATION) at 08:19

## 2017-01-01 RX ADMIN — FAMOTIDINE 20 MG: 10 INJECTION, SOLUTION INTRAVENOUS at 22:08

## 2017-01-01 RX ADMIN — MORPHINE SULFATE 2 MG: 2 INJECTION, SOLUTION INTRAMUSCULAR; INTRAVENOUS at 18:52

## 2017-01-01 RX ADMIN — COLLAGENASE SANTYL: 250 OINTMENT TOPICAL at 09:08

## 2017-01-01 RX ADMIN — SODIUM BICARBONATE 650 MG TABLET 650 MG: at 08:54

## 2017-01-01 RX ADMIN — IPRATROPIUM BROMIDE AND ALBUTEROL SULFATE 3 ML: .5; 3 SOLUTION RESPIRATORY (INHALATION) at 04:24

## 2017-01-01 RX ADMIN — Medication 4 MCG/MIN: at 03:14

## 2017-01-01 RX ADMIN — POTASSIUM CHLORIDE 10 MEQ: 7.46 INJECTION, SOLUTION INTRAVENOUS at 21:01

## 2017-01-01 RX ADMIN — MULTIPLE VITAMINS W/ MINERALS TAB 1 TABLET: TAB at 09:53

## 2017-01-01 RX ADMIN — CALCIUM GLUCONATE 2 G: 94 INJECTION, SOLUTION INTRAVENOUS at 20:57

## 2017-01-01 RX ADMIN — POLYETHYLENE GLYCOL 3350 17 G: 17 POWDER, FOR SOLUTION ORAL at 09:40

## 2017-01-01 RX ADMIN — SODIUM BICARBONATE 650 MG TABLET 650 MG: at 08:41

## 2017-01-01 RX ADMIN — FLUMAZENIL 0.4 MG: 0.1 INJECTION INTRAVENOUS at 19:33

## 2017-01-01 RX ADMIN — SODIUM BICARBONATE 650 MG TABLET 650 MG: at 21:16

## 2017-01-01 RX ADMIN — CEFEPIME 2 G: 2 INJECTION, POWDER, FOR SOLUTION INTRAVENOUS at 04:09

## 2017-01-01 RX ADMIN — ENOXAPARIN SODIUM 40 MG: 40 INJECTION SUBCUTANEOUS at 13:13

## 2017-01-01 RX ADMIN — IPRATROPIUM BROMIDE AND ALBUTEROL SULFATE 3 ML: .5; 3 SOLUTION RESPIRATORY (INHALATION) at 14:30

## 2017-01-01 RX ADMIN — ALBUMIN HUMAN 25 G: 250 SOLUTION INTRAVENOUS at 17:10

## 2017-01-01 RX ADMIN — POTASSIUM CHLORIDE 20 MEQ: 20 TABLET, EXTENDED RELEASE ORAL at 21:20

## 2017-01-01 RX ADMIN — SODIUM CHLORIDE 100 MG: 900 INJECTION, SOLUTION INTRAVENOUS at 20:30

## 2017-01-01 RX ADMIN — AZTREONAM 1 G: 1 INJECTION, POWDER, LYOPHILIZED, FOR SOLUTION INTRAMUSCULAR; INTRAVENOUS at 01:19

## 2017-01-01 RX ADMIN — IPRATROPIUM BROMIDE AND ALBUTEROL SULFATE 3 ML: .5; 3 SOLUTION RESPIRATORY (INHALATION) at 01:36

## 2017-01-01 RX ADMIN — SODIUM BICARBONATE 650 MG TABLET 650 MG: at 09:33

## 2017-01-01 RX ADMIN — IPRATROPIUM BROMIDE AND ALBUTEROL SULFATE 3 ML: .5; 3 SOLUTION RESPIRATORY (INHALATION) at 20:27

## 2017-01-01 RX ADMIN — Medication 10 ML: at 05:20

## 2017-01-01 RX ADMIN — NYSTATIN: 100000 POWDER TOPICAL at 09:26

## 2017-01-01 RX ADMIN — HYDROMORPHONE HYDROCHLORIDE 1 MG: 1 INJECTION, SOLUTION INTRAMUSCULAR; INTRAVENOUS; SUBCUTANEOUS at 04:40

## 2017-01-01 RX ADMIN — SODIUM CHLORIDE 40 MG: 9 INJECTION INTRAMUSCULAR; INTRAVENOUS; SUBCUTANEOUS at 09:51

## 2017-01-01 RX ADMIN — SODIUM BICARBONATE 650 MG TABLET 650 MG: at 10:15

## 2017-01-01 RX ADMIN — FENTANYL CITRATE 25 MCG: 50 INJECTION, SOLUTION INTRAMUSCULAR; INTRAVENOUS at 01:03

## 2017-01-01 RX ADMIN — IPRATROPIUM BROMIDE AND ALBUTEROL SULFATE 3 ML: .5; 3 SOLUTION RESPIRATORY (INHALATION) at 01:49

## 2017-01-01 RX ADMIN — ALBUMIN (HUMAN) 25 G: 0.25 INJECTION, SOLUTION INTRAVENOUS at 11:50

## 2017-01-01 RX ADMIN — Medication 10 ML: at 08:53

## 2017-01-01 RX ADMIN — POTASSIUM CHLORIDE 10 MEQ: 7.46 INJECTION, SOLUTION INTRAVENOUS at 06:00

## 2017-01-01 RX ADMIN — HYDROMORPHONE HYDROCHLORIDE 1 MG: 2 INJECTION INTRAMUSCULAR; INTRAVENOUS; SUBCUTANEOUS at 03:03

## 2017-01-01 RX ADMIN — EPINEPHRINE 1 MG: 0.1 INJECTION, SOLUTION ENDOTRACHEAL; INTRACARDIAC; INTRAVENOUS at 11:53

## 2017-01-01 RX ADMIN — NYSTATIN: 100000 POWDER TOPICAL at 21:39

## 2017-01-01 RX ADMIN — DEXTROSE MONOHYDRATE, SODIUM CHLORIDE, AND POTASSIUM CHLORIDE 125 ML/HR: 50; 4.5; 1.49 INJECTION, SOLUTION INTRAVENOUS at 15:41

## 2017-01-01 RX ADMIN — TRIAMCINOLONE ACETONIDE 1 G: 0.25 CREAM TOPICAL at 21:55

## 2017-01-01 RX ADMIN — EPINEPHRINE 1 MG: 0.1 INJECTION, SOLUTION ENDOTRACHEAL; INTRACARDIAC; INTRAVENOUS at 11:52

## 2017-01-01 RX ADMIN — MORPHINE SULFATE 2 MG: 2 INJECTION, SOLUTION INTRAMUSCULAR; INTRAVENOUS at 16:42

## 2017-01-01 RX ADMIN — SODIUM BICARBONATE 650 MG TABLET 650 MG: at 15:41

## 2017-01-01 RX ADMIN — SODIUM BICARBONATE 650 MG TABLET 650 MG: at 16:27

## 2017-01-01 RX ADMIN — CALCIUM GLUCONATE 4 G: 94 INJECTION, SOLUTION INTRAVENOUS at 09:01

## 2017-01-01 RX ADMIN — Medication 10 ML: at 00:20

## 2017-01-01 RX ADMIN — AZTREONAM 1 G: 1 INJECTION, POWDER, LYOPHILIZED, FOR SOLUTION INTRAMUSCULAR; INTRAVENOUS at 10:00

## 2017-01-01 RX ADMIN — POTASSIUM CHLORIDE 10 MEQ: 7.46 INJECTION, SOLUTION INTRAVENOUS at 08:00

## 2017-01-01 RX ADMIN — MORPHINE SULFATE 2 MG: 2 INJECTION, SOLUTION INTRAMUSCULAR; INTRAVENOUS at 20:58

## 2017-01-01 RX ADMIN — IPRATROPIUM BROMIDE AND ALBUTEROL SULFATE 3 ML: .5; 3 SOLUTION RESPIRATORY (INHALATION) at 07:50

## 2017-01-01 RX ADMIN — LORAZEPAM 2 MG: 2 INJECTION INTRAMUSCULAR at 20:29

## 2017-01-01 RX ADMIN — POTASSIUM CHLORIDE 10 MEQ: 7.46 INJECTION, SOLUTION INTRAVENOUS at 10:08

## 2017-01-01 RX ADMIN — SODIUM CHLORIDE, SODIUM LACTATE, POTASSIUM CHLORIDE, CALCIUM CHLORIDE, AND DEXTROSE MONOHYDRATE 50 ML/HR: 600; 310; 30; 20; 5 INJECTION, SOLUTION INTRAVENOUS at 01:35

## 2017-01-01 RX ADMIN — IPRATROPIUM BROMIDE AND ALBUTEROL SULFATE 3 ML: .5; 3 SOLUTION RESPIRATORY (INHALATION) at 13:34

## 2017-01-01 RX ADMIN — ENOXAPARIN SODIUM 40 MG: 40 INJECTION SUBCUTANEOUS at 23:40

## 2017-01-01 RX ADMIN — Medication 10 ML: at 05:22

## 2017-01-01 RX ADMIN — ENOXAPARIN SODIUM 40 MG: 40 INJECTION SUBCUTANEOUS at 12:54

## 2017-01-01 RX ADMIN — ALBUMIN (HUMAN) 25 G: 0.25 INJECTION, SOLUTION INTRAVENOUS at 23:34

## 2017-01-01 RX ADMIN — POTASSIUM CHLORIDE 10 MEQ: 7.46 INJECTION, SOLUTION INTRAVENOUS at 02:05

## 2017-01-01 RX ADMIN — Medication 10 ML: at 14:26

## 2017-01-01 RX ADMIN — FUROSEMIDE 40 MG: 10 INJECTION, SOLUTION INTRAMUSCULAR; INTRAVENOUS at 08:57

## 2017-01-01 RX ADMIN — SODIUM PHOSPHATE, MONOBASIC, MONOHYDRATE AND SODIUM PHOSPHATE, DIBASIC ANHYDROUS 9 MMOL: 276; 142 INJECTION, SOLUTION INTRAVENOUS at 01:15

## 2017-01-01 RX ADMIN — POTASSIUM CHLORIDE 10 MEQ: 7.46 INJECTION, SOLUTION INTRAVENOUS at 11:00

## 2017-01-01 RX ADMIN — CEFEPIME 2 G: 2 INJECTION, POWDER, FOR SOLUTION INTRAVENOUS at 03:27

## 2017-01-01 RX ADMIN — IPRATROPIUM BROMIDE AND ALBUTEROL SULFATE 3 ML: .5; 3 SOLUTION RESPIRATORY (INHALATION) at 19:44

## 2017-01-01 RX ADMIN — POTASSIUM CHLORIDE 20 MEQ: 20 TABLET, EXTENDED RELEASE ORAL at 09:00

## 2017-01-01 RX ADMIN — SODIUM BICARBONATE 650 MG TABLET 650 MG: at 21:57

## 2017-01-01 RX ADMIN — POTASSIUM CHLORIDE 20 MEQ: 20 TABLET, EXTENDED RELEASE ORAL at 09:21

## 2017-01-01 RX ADMIN — SODIUM PHOSPHATE, DIBASIC AND SODIUM PHOSPHATE, MONOBASIC 118 ML: 7; 19 ENEMA RECTAL at 21:26

## 2017-01-01 RX ADMIN — IPRATROPIUM BROMIDE AND ALBUTEROL SULFATE 3 ML: .5; 3 SOLUTION RESPIRATORY (INHALATION) at 07:54

## 2017-01-01 RX ADMIN — SODIUM BICARBONATE 650 MG TABLET 650 MG: at 17:53

## 2017-01-01 RX ADMIN — ENOXAPARIN SODIUM 40 MG: 40 INJECTION SUBCUTANEOUS at 13:52

## 2017-01-01 RX ADMIN — ONDANSETRON 4 MG: 2 SOLUTION INTRAMUSCULAR; INTRAVENOUS at 02:36

## 2017-01-01 RX ADMIN — Medication 10 ML: at 14:00

## 2017-01-01 RX ADMIN — Medication 15 MCG/MIN: at 12:30

## 2017-01-01 RX ADMIN — CEFEPIME 2 G: 2 INJECTION, POWDER, FOR SOLUTION INTRAVENOUS at 18:11

## 2017-01-01 RX ADMIN — NYSTATIN: 100000 POWDER TOPICAL at 17:21

## 2017-01-01 RX ADMIN — IPRATROPIUM BROMIDE AND ALBUTEROL SULFATE 3 ML: .5; 3 SOLUTION RESPIRATORY (INHALATION) at 08:45

## 2017-01-01 RX ADMIN — AZTREONAM 1 G: 1 INJECTION, POWDER, LYOPHILIZED, FOR SOLUTION INTRAMUSCULAR; INTRAVENOUS at 09:49

## 2017-01-01 RX ADMIN — NYSTATIN: 100000 POWDER TOPICAL at 10:56

## 2017-01-01 RX ADMIN — Medication 15 MCG/MIN: at 12:27

## 2017-01-01 RX ADMIN — DEXTROSE MONOHYDRATE, SODIUM CHLORIDE, AND POTASSIUM CHLORIDE: 50; 4.5; 2.98 INJECTION, SOLUTION INTRAVENOUS at 14:21

## 2017-01-01 RX ADMIN — MORPHINE SULFATE 2 MG: 2 INJECTION, SOLUTION INTRAMUSCULAR; INTRAVENOUS at 10:52

## 2017-01-01 RX ADMIN — DAPTOMYCIN 672 MG: 500 INJECTION, POWDER, LYOPHILIZED, FOR SOLUTION INTRAVENOUS at 14:43

## 2017-01-01 RX ADMIN — COLLAGENASE SANTYL: 250 OINTMENT TOPICAL at 09:00

## 2017-01-01 RX ADMIN — DEXTROSE MONOHYDRATE 25 G: 25 INJECTION, SOLUTION INTRAVENOUS at 19:22

## 2017-01-01 RX ADMIN — Medication 10 ML: at 14:45

## 2017-01-01 RX ADMIN — SODIUM BICARBONATE 650 MG TABLET 650 MG: at 23:05

## 2017-01-01 RX ADMIN — CHLORHEXIDINE GLUCONATE 10 ML: 1.2 RINSE ORAL at 09:49

## 2017-01-01 RX ADMIN — MORPHINE SULFATE 2 MG: 2 INJECTION, SOLUTION INTRAMUSCULAR; INTRAVENOUS at 13:21

## 2017-01-01 RX ADMIN — POTASSIUM CHLORIDE 10 MEQ: 7.46 INJECTION, SOLUTION INTRAVENOUS at 03:00

## 2017-01-01 RX ADMIN — PHENYLEPHRINE HYDROCHLORIDE 100 MCG/MIN: 10 INJECTION, SOLUTION INTRAMUSCULAR; INTRAVENOUS; SUBCUTANEOUS at 10:49

## 2017-01-01 RX ADMIN — NYSTATIN: 100000 POWDER TOPICAL at 17:50

## 2017-01-01 RX ADMIN — DAKIN'S SOLUTION 0.125% (QUARTER STRENGTH): 0.12 SOLUTION at 11:35

## 2017-01-01 RX ADMIN — CEFEPIME 2 G: 2 INJECTION, POWDER, FOR SOLUTION INTRAVENOUS at 03:24

## 2017-01-01 RX ADMIN — MAGNESIUM SULFATE HEPTAHYDRATE 1 G: 1 INJECTION, SOLUTION INTRAVENOUS at 16:33

## 2017-01-01 RX ADMIN — DEXTROSE 50 % IN WATER (D50W) INTRAVENOUS SYRINGE 25 G: at 19:22

## 2017-01-01 RX ADMIN — CHLORHEXIDINE GLUCONATE 10 ML: 1.2 RINSE ORAL at 21:58

## 2017-01-01 RX ADMIN — DAPTOMYCIN 719 MG: 500 INJECTION, POWDER, LYOPHILIZED, FOR SOLUTION INTRAVENOUS at 15:21

## 2017-01-01 RX ADMIN — CALCIUM GLUCONATE 2 G: 94 INJECTION, SOLUTION INTRAVENOUS at 07:00

## 2017-01-01 RX ADMIN — ACETAMINOPHEN 650 MG: 325 TABLET, FILM COATED ORAL at 15:11

## 2017-01-01 RX ADMIN — ONDANSETRON 4 MG: 2 SOLUTION INTRAMUSCULAR; INTRAVENOUS at 06:41

## 2017-01-01 RX ADMIN — NYSTATIN: 100000 POWDER TOPICAL at 08:53

## 2017-01-01 RX ADMIN — POTASSIUM CHLORIDE 20 MEQ: 20 TABLET, EXTENDED RELEASE ORAL at 18:54

## 2017-01-01 RX ADMIN — ALBUMIN (HUMAN) 25 G: 0.25 INJECTION, SOLUTION INTRAVENOUS at 17:57

## 2017-01-01 RX ADMIN — CALCIUM GLUCONATE 2 G: 94 INJECTION, SOLUTION INTRAVENOUS at 11:51

## 2017-01-01 RX ADMIN — SODIUM BICARBONATE 650 MG TABLET 650 MG: at 09:25

## 2017-01-01 RX ADMIN — IPRATROPIUM BROMIDE AND ALBUTEROL SULFATE 3 ML: .5; 3 SOLUTION RESPIRATORY (INHALATION) at 16:58

## 2017-01-01 RX ADMIN — CHLORHEXIDINE GLUCONATE 10 ML: 1.2 RINSE ORAL at 21:04

## 2017-01-01 RX ADMIN — SODIUM CHLORIDE 100 ML/HR: 900 INJECTION, SOLUTION INTRAVENOUS at 04:17

## 2017-01-01 RX ADMIN — TRIAMCINOLONE ACETONIDE 1 G: 0.25 CREAM TOPICAL at 22:11

## 2017-01-01 RX ADMIN — LEVETIRACETAM 1500 MG: 15 INJECTION INTRAVENOUS at 23:05

## 2017-01-01 RX ADMIN — DEXTROSE MONOHYDRATE 50 ML/HR: 5 INJECTION, SOLUTION INTRAVENOUS at 14:47

## 2017-01-01 RX ADMIN — NYSTATIN: 100000 POWDER TOPICAL at 17:53

## 2017-01-01 RX ADMIN — SODIUM CHLORIDE, SODIUM LACTATE, POTASSIUM CHLORIDE, CALCIUM CHLORIDE, AND DEXTROSE MONOHYDRATE 50 ML/HR: 600; 310; 30; 20; 5 INJECTION, SOLUTION INTRAVENOUS at 05:17

## 2017-01-01 RX ADMIN — TRIAMCINOLONE ACETONIDE 1 G: 0.25 CREAM TOPICAL at 08:39

## 2017-01-01 RX ADMIN — DEXAMETHASONE SODIUM PHOSPHATE 4 MG: 4 INJECTION, SOLUTION INTRA-ARTICULAR; INTRALESIONAL; INTRAMUSCULAR; INTRAVENOUS; SOFT TISSUE at 08:10

## 2017-01-01 RX ADMIN — TRIAMCINOLONE ACETONIDE 1 G: 0.25 CREAM TOPICAL at 16:16

## 2017-01-01 RX ADMIN — SODIUM BICARBONATE 650 MG TABLET 650 MG: at 16:57

## 2017-01-01 RX ADMIN — SODIUM CHLORIDE 40 MG: 9 INJECTION INTRAMUSCULAR; INTRAVENOUS; SUBCUTANEOUS at 09:32

## 2017-01-01 RX ADMIN — NYSTATIN: 100000 POWDER TOPICAL at 09:09

## 2017-01-01 RX ADMIN — ENOXAPARIN SODIUM 40 MG: 40 INJECTION SUBCUTANEOUS at 12:37

## 2017-01-01 RX ADMIN — DAPTOMYCIN 719 MG: 500 INJECTION, POWDER, LYOPHILIZED, FOR SOLUTION INTRAVENOUS at 14:49

## 2017-01-01 RX ADMIN — DAPTOMYCIN 672 MG: 500 INJECTION, POWDER, LYOPHILIZED, FOR SOLUTION INTRAVENOUS at 19:53

## 2017-01-01 RX ADMIN — PIPERACILLIN SODIUM,TAZOBACTAM SODIUM 2.25 G: 2; .25 INJECTION, POWDER, FOR SOLUTION INTRAVENOUS at 21:41

## 2017-01-01 RX ADMIN — IPRATROPIUM BROMIDE AND ALBUTEROL SULFATE 3 ML: .5; 3 SOLUTION RESPIRATORY (INHALATION) at 01:43

## 2017-01-01 RX ADMIN — Medication 10 ML: at 22:04

## 2017-01-01 RX ADMIN — TRIAMCINOLONE ACETONIDE 1 G: 0.25 CREAM TOPICAL at 22:13

## 2017-01-01 RX ADMIN — FENTANYL CITRATE 25 MCG: 50 INJECTION, SOLUTION INTRAMUSCULAR; INTRAVENOUS at 03:00

## 2017-01-01 RX ADMIN — IPRATROPIUM BROMIDE AND ALBUTEROL SULFATE 3 ML: .5; 3 SOLUTION RESPIRATORY (INHALATION) at 19:23

## 2017-01-01 RX ADMIN — SODIUM CHLORIDE 150 ML/HR: 900 INJECTION, SOLUTION INTRAVENOUS at 17:03

## 2017-01-01 RX ADMIN — SODIUM CHLORIDE, SODIUM LACTATE, POTASSIUM CHLORIDE, CALCIUM CHLORIDE, AND DEXTROSE MONOHYDRATE 50 ML/HR: 600; 310; 30; 20; 5 INJECTION, SOLUTION INTRAVENOUS at 09:20

## 2017-01-01 RX ADMIN — NYSTATIN: 100000 POWDER TOPICAL at 18:22

## 2017-01-01 RX ADMIN — HYDROMORPHONE HYDROCHLORIDE 4 MG: 2 TABLET ORAL at 15:09

## 2017-01-01 RX ADMIN — DEXTROSE MONOHYDRATE 25 G: 25 INJECTION, SOLUTION INTRAVENOUS at 09:01

## 2017-01-01 RX ADMIN — Medication 10 ML: at 23:55

## 2017-01-01 RX ADMIN — FUROSEMIDE 40 MG: 40 TABLET ORAL at 16:42

## 2017-01-01 RX ADMIN — DEXTROSE MONOHYDRATE: 5 INJECTION, SOLUTION INTRAVENOUS at 19:22

## 2017-01-01 RX ADMIN — AZTREONAM 1 G: 1 INJECTION, POWDER, LYOPHILIZED, FOR SOLUTION INTRAMUSCULAR; INTRAVENOUS at 02:00

## 2017-01-01 RX ADMIN — PROPOFOL 30 MCG/KG/MIN: 10 INJECTION, EMULSION INTRAVENOUS at 05:52

## 2017-01-01 RX ADMIN — POTASSIUM CHLORIDE 10 MEQ: 7.46 INJECTION, SOLUTION INTRAVENOUS at 11:20

## 2017-01-01 RX ADMIN — ONDANSETRON 4 MG: 2 SOLUTION INTRAMUSCULAR; INTRAVENOUS at 08:48

## 2017-01-01 RX ADMIN — MORPHINE SULFATE 2 MG: 2 INJECTION, SOLUTION INTRAMUSCULAR; INTRAVENOUS at 04:25

## 2017-01-01 RX ADMIN — LORAZEPAM 1 MG: 1 TABLET ORAL at 15:08

## 2017-01-01 RX ADMIN — SODIUM BICARBONATE 650 MG TABLET 650 MG: at 15:57

## 2017-01-01 RX ADMIN — AZTREONAM 1 G: 1 INJECTION, POWDER, LYOPHILIZED, FOR SOLUTION INTRAMUSCULAR; INTRAVENOUS at 17:57

## 2017-01-01 RX ADMIN — CHLORHEXIDINE GLUCONATE 15 ML: 1.2 RINSE ORAL at 17:33

## 2017-01-01 RX ADMIN — COLLAGENASE SANTYL: 250 OINTMENT TOPICAL at 14:40

## 2017-01-01 RX ADMIN — FUROSEMIDE 40 MG: 10 INJECTION, SOLUTION INTRAMUSCULAR; INTRAVENOUS at 09:00

## 2017-01-01 RX ADMIN — FAMOTIDINE 20 MG: 20 TABLET ORAL at 08:37

## 2017-01-01 RX ADMIN — NYSTATIN: 100000 POWDER TOPICAL at 08:36

## 2017-01-01 RX ADMIN — SODIUM BICARBONATE 650 MG TABLET 650 MG: at 15:12

## 2017-01-01 RX ADMIN — ENOXAPARIN SODIUM 40 MG: 40 INJECTION SUBCUTANEOUS at 14:44

## 2017-01-01 RX ADMIN — AZTREONAM 1 G: 1 INJECTION, POWDER, LYOPHILIZED, FOR SOLUTION INTRAMUSCULAR; INTRAVENOUS at 09:19

## 2017-01-01 RX ADMIN — SODIUM BICARBONATE 650 MG TABLET 650 MG: at 21:59

## 2017-01-01 RX ADMIN — SODIUM CHLORIDE 40 MG: 9 INJECTION INTRAMUSCULAR; INTRAVENOUS; SUBCUTANEOUS at 10:32

## 2017-01-01 RX ADMIN — FUROSEMIDE 40 MG: 10 INJECTION, SOLUTION INTRAMUSCULAR; INTRAVENOUS at 21:20

## 2017-01-01 RX ADMIN — TROSPIUM CHLORIDE 20 MG: 20 TABLET ORAL at 21:53

## 2017-01-01 RX ADMIN — PROPOFOL 20 MCG/KG/MIN: 10 INJECTION, EMULSION INTRAVENOUS at 20:38

## 2017-01-01 RX ADMIN — SODIUM BICARBONATE 650 MG TABLET 650 MG: at 09:51

## 2017-01-01 RX ADMIN — FENTANYL CITRATE 50 MCG: 50 INJECTION, SOLUTION INTRAMUSCULAR; INTRAVENOUS at 08:17

## 2017-01-01 RX ADMIN — FUROSEMIDE 40 MG: 40 TABLET ORAL at 11:56

## 2017-01-01 RX ADMIN — MAGNESIUM SULFATE HEPTAHYDRATE 2 G: 40 INJECTION, SOLUTION INTRAVENOUS at 21:35

## 2017-01-01 RX ADMIN — POTASSIUM CHLORIDE 20 MEQ: 1.5 POWDER, FOR SOLUTION ORAL at 10:22

## 2017-01-01 RX ADMIN — MAGNESIUM SULFATE IN DEXTROSE 1 G: 10 INJECTION, SOLUTION INTRAVENOUS at 12:21

## 2017-01-01 RX ADMIN — LORAZEPAM 0.5 MG: 2 INJECTION, SOLUTION INTRAMUSCULAR; INTRAVENOUS at 17:13

## 2017-01-01 RX ADMIN — DAPTOMYCIN 630 MG: 500 INJECTION, POWDER, LYOPHILIZED, FOR SOLUTION INTRAVENOUS at 09:23

## 2017-01-01 RX ADMIN — POLYETHYLENE GLYCOL 3350 17 G: 17 POWDER, FOR SOLUTION ORAL at 09:21

## 2017-01-01 RX ADMIN — ENOXAPARIN SODIUM 40 MG: 40 INJECTION SUBCUTANEOUS at 12:08

## 2017-01-01 RX ADMIN — TROSPIUM CHLORIDE 20 MG: 20 TABLET ORAL at 21:16

## 2017-01-01 RX ADMIN — ACETAMINOPHEN 650 MG: 650 SOLUTION ORAL at 21:58

## 2017-01-01 RX ADMIN — NYSTATIN: 100000 POWDER TOPICAL at 17:29

## 2017-01-01 RX ADMIN — POTASSIUM CHLORIDE 20 MEQ: 1.5 POWDER, FOR SOLUTION ORAL at 09:00

## 2017-01-01 RX ADMIN — ENOXAPARIN SODIUM 40 MG: 40 INJECTION SUBCUTANEOUS at 12:07

## 2017-01-01 RX ADMIN — NYSTATIN: 100000 POWDER TOPICAL at 18:20

## 2017-01-01 RX ADMIN — NYSTATIN: 100000 POWDER TOPICAL at 08:39

## 2017-01-01 RX ADMIN — Medication 10 ML: at 15:28

## 2017-01-01 RX ADMIN — IPRATROPIUM BROMIDE AND ALBUTEROL SULFATE 3 ML: .5; 3 SOLUTION RESPIRATORY (INHALATION) at 01:12

## 2017-01-01 RX ADMIN — ACETAMINOPHEN 650 MG: 325 TABLET, FILM COATED ORAL at 03:30

## 2017-01-01 RX ADMIN — Medication: at 03:24

## 2017-01-01 RX ADMIN — POTASSIUM CHLORIDE 20 MEQ: 20 TABLET, EXTENDED RELEASE ORAL at 09:40

## 2017-01-01 RX ADMIN — LINEZOLID 600 MG: 600 INJECTION, SOLUTION INTRAVENOUS at 00:56

## 2017-01-01 RX ADMIN — PHENYLEPHRINE HYDROCHLORIDE 75 MCG/MIN: 10 INJECTION, SOLUTION INTRAMUSCULAR; INTRAVENOUS; SUBCUTANEOUS at 00:37

## 2017-01-01 RX ADMIN — IPRATROPIUM BROMIDE AND ALBUTEROL SULFATE 3 ML: .5; 3 SOLUTION RESPIRATORY (INHALATION) at 13:29

## 2017-01-01 RX ADMIN — FUROSEMIDE 40 MG: 10 INJECTION, SOLUTION INTRAMUSCULAR; INTRAVENOUS at 11:10

## 2017-01-01 RX ADMIN — LINEZOLID 600 MG: 600 INJECTION, SOLUTION INTRAVENOUS at 12:26

## 2017-01-01 RX ADMIN — LEVETIRACETAM 1000 MG: 10 INJECTION INTRAVENOUS at 10:21

## 2017-01-01 RX ADMIN — SODIUM BICARBONATE 650 MG TABLET 650 MG: at 22:37

## 2017-01-01 RX ADMIN — MORPHINE SULFATE 2 MG: 2 INJECTION, SOLUTION INTRAMUSCULAR; INTRAVENOUS at 16:08

## 2017-01-01 RX ADMIN — ALBUMIN (HUMAN) 25 G: 0.25 INJECTION, SOLUTION INTRAVENOUS at 17:44

## 2017-01-01 RX ADMIN — DAPTOMYCIN 719 MG: 500 INJECTION, POWDER, LYOPHILIZED, FOR SOLUTION INTRAVENOUS at 15:55

## 2017-01-01 RX ADMIN — FENTANYL CITRATE 25 MCG: 50 INJECTION, SOLUTION INTRAMUSCULAR; INTRAVENOUS at 05:34

## 2017-01-01 RX ADMIN — SODIUM BICARBONATE 650 MG TABLET 650 MG: at 23:04

## 2017-01-01 RX ADMIN — SODIUM CHLORIDE 40 MG: 9 INJECTION INTRAMUSCULAR; INTRAVENOUS; SUBCUTANEOUS at 08:30

## 2017-01-01 RX ADMIN — DAPTOMYCIN 719 MG: 500 INJECTION, POWDER, LYOPHILIZED, FOR SOLUTION INTRAVENOUS at 14:40

## 2017-01-01 RX ADMIN — SODIUM BICARBONATE 650 MG TABLET 650 MG: at 08:53

## 2017-01-01 RX ADMIN — FAMOTIDINE 20 MG: 10 INJECTION, SOLUTION INTRAVENOUS at 21:37

## 2017-01-01 RX ADMIN — DAPTOMYCIN 630 MG: 500 INJECTION, POWDER, LYOPHILIZED, FOR SOLUTION INTRAVENOUS at 08:00

## 2017-01-01 RX ADMIN — POTASSIUM CHLORIDE 10 MEQ: 7.46 INJECTION, SOLUTION INTRAVENOUS at 09:20

## 2017-01-01 RX ADMIN — Medication: at 00:41

## 2017-01-01 RX ADMIN — SODIUM BICARBONATE 650 MG TABLET 650 MG: at 16:50

## 2017-01-01 RX ADMIN — DAPTOMYCIN 672 MG: 500 INJECTION, POWDER, LYOPHILIZED, FOR SOLUTION INTRAVENOUS at 14:32

## 2017-01-01 RX ADMIN — LEVETIRACETAM 1500 MG: 15 INJECTION INTRAVENOUS at 12:03

## 2017-01-01 RX ADMIN — SODIUM BICARBONATE 650 MG TABLET 650 MG: at 21:04

## 2017-01-01 RX ADMIN — ALBUMIN (HUMAN) 25 G: 0.25 INJECTION, SOLUTION INTRAVENOUS at 00:19

## 2017-01-01 RX ADMIN — SODIUM CHLORIDE 40 MG: 9 INJECTION INTRAMUSCULAR; INTRAVENOUS; SUBCUTANEOUS at 11:10

## 2017-01-01 RX ADMIN — Medication 400 MG: at 10:32

## 2017-01-01 RX ADMIN — Medication 400 MG: at 09:33

## 2017-01-01 RX ADMIN — Medication 10 ML: at 22:09

## 2017-01-01 RX ADMIN — Medication 10 ML: at 22:26

## 2017-01-01 RX ADMIN — FUROSEMIDE 40 MG: 10 INJECTION, SOLUTION INTRAMUSCULAR; INTRAVENOUS at 22:46

## 2017-01-01 RX ADMIN — NYSTATIN: 100000 POWDER TOPICAL at 17:46

## 2017-01-01 RX ADMIN — NYSTATIN: 100000 POWDER TOPICAL at 09:22

## 2017-01-01 RX ADMIN — HYDROMORPHONE HYDROCHLORIDE 1 MG: 1 INJECTION, SOLUTION INTRAMUSCULAR; INTRAVENOUS; SUBCUTANEOUS at 12:00

## 2017-01-01 RX ADMIN — MORPHINE SULFATE 2 MG: 2 INJECTION, SOLUTION INTRAMUSCULAR; INTRAVENOUS at 04:19

## 2017-01-01 RX ADMIN — Medication 10 ML: at 22:00

## 2017-01-01 RX ADMIN — ONDANSETRON 4 MG: 2 SOLUTION INTRAMUSCULAR; INTRAVENOUS at 11:21

## 2017-01-01 RX ADMIN — POTASSIUM CHLORIDE 10 MEQ: 10 TABLET, EXTENDED RELEASE ORAL at 20:05

## 2017-01-01 RX ADMIN — NYSTATIN: 100000 POWDER TOPICAL at 10:17

## 2017-01-01 RX ADMIN — SODIUM CHLORIDE, SODIUM LACTATE, POTASSIUM CHLORIDE, CALCIUM CHLORIDE: 600; 310; 30; 20 INJECTION, SOLUTION INTRAVENOUS at 08:02

## 2017-01-01 RX ADMIN — HYDROMORPHONE HYDROCHLORIDE 1 MG: 1 INJECTION, SOLUTION INTRAMUSCULAR; INTRAVENOUS; SUBCUTANEOUS at 11:28

## 2017-01-01 RX ADMIN — SODIUM CHLORIDE 500 ML: 900 INJECTION, SOLUTION INTRAVENOUS at 21:37

## 2017-01-01 RX ADMIN — MAGNESIUM SULFATE HEPTAHYDRATE 2 G: 40 INJECTION, SOLUTION INTRAVENOUS at 08:52

## 2017-01-01 RX ADMIN — ONDANSETRON 4 MG: 2 INJECTION INTRAMUSCULAR; INTRAVENOUS at 23:45

## 2017-01-01 RX ADMIN — CHLORHEXIDINE GLUCONATE 15 ML: 1.2 RINSE ORAL at 09:20

## 2017-01-01 RX ADMIN — DEXTROSE MONOHYDRATE 50 ML/HR: 5 INJECTION, SOLUTION INTRAVENOUS at 00:05

## 2017-01-01 RX ADMIN — MORPHINE SULFATE 2 MG: 2 INJECTION, SOLUTION INTRAMUSCULAR; INTRAVENOUS at 14:50

## 2017-01-01 RX ADMIN — SODIUM BICARBONATE 650 MG TABLET 650 MG: at 11:11

## 2017-01-01 RX ADMIN — MORPHINE SULFATE 2 MG: 2 INJECTION, SOLUTION INTRAMUSCULAR; INTRAVENOUS at 01:39

## 2017-01-01 RX ADMIN — SODIUM BICARBONATE 650 MG TABLET 650 MG: at 10:32

## 2017-01-01 RX ADMIN — PHENYLEPHRINE HYDROCHLORIDE 75 MCG/MIN: 10 INJECTION, SOLUTION INTRAMUSCULAR; INTRAVENOUS; SUBCUTANEOUS at 21:08

## 2017-01-01 RX ADMIN — MORPHINE SULFATE: 5 INJECTION, SOLUTION INTRAVENOUS at 17:36

## 2017-01-01 RX ADMIN — TRIAMCINOLONE ACETONIDE 1 G: 0.25 CREAM TOPICAL at 17:46

## 2017-01-01 RX ADMIN — MAGNESIUM SULFATE IN DEXTROSE 1 G: 10 INJECTION, SOLUTION INTRAVENOUS at 21:47

## 2017-01-01 RX ADMIN — FENTANYL CITRATE 25 MCG: 50 INJECTION, SOLUTION INTRAMUSCULAR; INTRAVENOUS at 23:09

## 2017-01-01 RX ADMIN — Medication 10 ML: at 23:06

## 2017-01-01 RX ADMIN — FUROSEMIDE 40 MG: 10 INJECTION, SOLUTION INTRAMUSCULAR; INTRAVENOUS at 09:26

## 2017-01-01 RX ADMIN — FUROSEMIDE 40 MG: 10 INJECTION, SOLUTION INTRAMUSCULAR; INTRAVENOUS at 20:03

## 2017-01-01 RX ADMIN — PHENYLEPHRINE HYDROCHLORIDE 200 MCG/MIN: 10 INJECTION, SOLUTION INTRAMUSCULAR; INTRAVENOUS; SUBCUTANEOUS at 20:13

## 2017-01-01 RX ADMIN — Medication 400 MG: at 09:43

## 2017-01-01 RX ADMIN — SODIUM BICARBONATE 650 MG TABLET 650 MG: at 21:20

## 2017-01-01 RX ADMIN — ALBUMIN (HUMAN) 25 G: 0.25 INJECTION, SOLUTION INTRAVENOUS at 12:08

## 2017-01-01 RX ADMIN — SODIUM BICARBONATE 650 MG TABLET 650 MG: at 09:15

## 2017-01-01 RX ADMIN — SODIUM CHLORIDE, SODIUM LACTATE, POTASSIUM CHLORIDE, AND CALCIUM CHLORIDE 75 ML/HR: 600; 310; 30; 20 INJECTION, SOLUTION INTRAVENOUS at 07:00

## 2017-01-01 RX ADMIN — FAMOTIDINE 20 MG: 20 TABLET ORAL at 09:21

## 2017-01-01 RX ADMIN — Medication: at 12:31

## 2017-01-01 RX ADMIN — ALBUMIN HUMAN 25 G: 250 SOLUTION INTRAVENOUS at 23:40

## 2017-01-01 RX ADMIN — POTASSIUM CHLORIDE 20 MEQ: 20 TABLET, EXTENDED RELEASE ORAL at 17:21

## 2017-01-01 RX ADMIN — BARIUM SULFATE 30 ML: 0.81 POWDER, FOR SUSPENSION ORAL at 10:03

## 2017-01-01 RX ADMIN — MULTIPLE VITAMINS W/ MINERALS TAB 1 TABLET: TAB at 09:21

## 2017-01-01 RX ADMIN — DAPTOMYCIN 719 MG: 500 INJECTION, POWDER, LYOPHILIZED, FOR SOLUTION INTRAVENOUS at 14:58

## 2017-01-01 RX ADMIN — MORPHINE SULFATE 2 MG: 2 INJECTION, SOLUTION INTRAMUSCULAR; INTRAVENOUS at 13:13

## 2017-01-01 RX ADMIN — FUROSEMIDE 40 MG: 10 INJECTION, SOLUTION INTRAMUSCULAR; INTRAVENOUS at 10:32

## 2017-01-01 RX ADMIN — DAPTOMYCIN 630 MG: 500 INJECTION, POWDER, LYOPHILIZED, FOR SOLUTION INTRAVENOUS at 15:01

## 2017-01-01 RX ADMIN — NYSTATIN: 100000 POWDER TOPICAL at 09:11

## 2017-01-01 RX ADMIN — AZTREONAM 1 G: 1 INJECTION, POWDER, LYOPHILIZED, FOR SOLUTION INTRAMUSCULAR; INTRAVENOUS at 17:39

## 2017-01-01 RX ADMIN — IOPAMIDOL 15 ML: 612 INJECTION, SOLUTION INTRATHECAL at 08:09

## 2017-01-01 RX ADMIN — INSULIN LISPRO 2 UNITS: 100 INJECTION, SOLUTION INTRAVENOUS; SUBCUTANEOUS at 00:45

## 2017-01-01 RX ADMIN — ALBUMIN HUMAN 25 G: 250 SOLUTION INTRAVENOUS at 12:48

## 2017-01-01 RX ADMIN — ALBUMIN (HUMAN) 25 G: 0.25 INJECTION, SOLUTION INTRAVENOUS at 17:41

## 2017-01-01 RX ADMIN — SODIUM BICARBONATE 650 MG TABLET 650 MG: at 21:06

## 2017-01-01 RX ADMIN — IPRATROPIUM BROMIDE AND ALBUTEROL SULFATE 3 ML: .5; 3 SOLUTION RESPIRATORY (INHALATION) at 19:37

## 2017-01-01 RX ADMIN — ALBUMIN (HUMAN) 25 G: 0.25 INJECTION, SOLUTION INTRAVENOUS at 23:00

## 2017-01-01 RX ADMIN — TRIAMCINOLONE ACETONIDE 1 G: 0.25 CREAM TOPICAL at 09:41

## 2017-01-01 RX ADMIN — FUROSEMIDE 40 MG: 40 TABLET ORAL at 15:57

## 2017-01-01 RX ADMIN — CEFEPIME 2 G: 2 INJECTION, POWDER, FOR SOLUTION INTRAVENOUS at 04:13

## 2017-01-01 RX ADMIN — SODIUM CHLORIDE 40 MG: 9 INJECTION INTRAMUSCULAR; INTRAVENOUS; SUBCUTANEOUS at 09:19

## 2017-01-01 RX ADMIN — SODIUM CHLORIDE 200 MG: 900 INJECTION, SOLUTION INTRAVENOUS at 22:09

## 2017-01-01 RX ADMIN — PROPOFOL 40 MCG/KG/MIN: 10 INJECTION, EMULSION INTRAVENOUS at 15:05

## 2017-01-01 RX ADMIN — SODIUM BICARBONATE 650 MG TABLET 650 MG: at 22:09

## 2017-01-01 RX ADMIN — SODIUM CHLORIDE 6 MMOL: 900 INJECTION, SOLUTION INTRAVENOUS at 12:27

## 2017-01-01 RX ADMIN — DAPTOMYCIN 630 MG: 500 INJECTION, POWDER, LYOPHILIZED, FOR SOLUTION INTRAVENOUS at 13:35

## 2017-01-01 RX ADMIN — TRIAMCINOLONE ACETONIDE 1 G: 0.25 CREAM TOPICAL at 23:08

## 2017-01-01 RX ADMIN — CEFEPIME 2 G: 2 INJECTION, POWDER, FOR SOLUTION INTRAVENOUS at 16:26

## 2017-01-01 RX ADMIN — DEXTROSE MONOHYDRATE, SODIUM CHLORIDE, AND POTASSIUM CHLORIDE 125 ML/HR: 50; 4.5; 1.49 INJECTION, SOLUTION INTRAVENOUS at 12:41

## 2017-01-01 RX ADMIN — POTASSIUM CHLORIDE 10 MEQ: 7.46 INJECTION, SOLUTION INTRAVENOUS at 00:50

## 2017-01-01 RX ADMIN — POTASSIUM CHLORIDE 10 MEQ: 7.46 INJECTION, SOLUTION INTRAVENOUS at 06:39

## 2017-01-01 RX ADMIN — Medication 10 ML: at 18:01

## 2017-01-01 RX ADMIN — IPRATROPIUM BROMIDE AND ALBUTEROL SULFATE 3 ML: .5; 3 SOLUTION RESPIRATORY (INHALATION) at 07:28

## 2017-01-01 RX ADMIN — NYSTATIN: 100000 POWDER TOPICAL at 09:00

## 2017-01-01 RX ADMIN — ALBUMIN (HUMAN) 25 G: 0.25 INJECTION, SOLUTION INTRAVENOUS at 12:38

## 2017-01-01 RX ADMIN — FUROSEMIDE 40 MG: 10 INJECTION, SOLUTION INTRAMUSCULAR; INTRAVENOUS at 22:09

## 2017-01-01 RX ADMIN — POTASSIUM CHLORIDE 20 MEQ: 20 TABLET, EXTENDED RELEASE ORAL at 17:00

## 2017-01-01 RX ADMIN — Medication 10 ML: at 17:30

## 2017-01-01 RX ADMIN — CEFEPIME 2 G: 2 INJECTION, POWDER, FOR SOLUTION INTRAVENOUS at 03:25

## 2017-01-01 RX ADMIN — Medication 10.03 MCG/MIN: at 10:49

## 2017-01-01 RX ADMIN — SODIUM CHLORIDE 40 MG: 9 INJECTION INTRAMUSCULAR; INTRAVENOUS; SUBCUTANEOUS at 10:21

## 2017-01-01 RX ADMIN — CHLORHEXIDINE GLUCONATE 10 ML: 1.2 RINSE ORAL at 20:57

## 2017-01-01 RX ADMIN — SODIUM CHLORIDE 100 MG: 900 INJECTION, SOLUTION INTRAVENOUS at 21:58

## 2017-01-01 RX ADMIN — TRIAMCINOLONE ACETONIDE 1 G: 0.25 CREAM TOPICAL at 09:00

## 2017-01-01 RX ADMIN — ENOXAPARIN SODIUM 40 MG: 40 INJECTION SUBCUTANEOUS at 14:21

## 2017-01-01 RX ADMIN — IPRATROPIUM BROMIDE AND ALBUTEROL SULFATE 3 ML: .5; 3 SOLUTION RESPIRATORY (INHALATION) at 20:34

## 2017-01-01 RX ADMIN — FAMOTIDINE 20 MG: 20 TABLET ORAL at 17:45

## 2017-01-01 RX ADMIN — NYSTATIN: 100000 POWDER TOPICAL at 10:40

## 2017-01-01 RX ADMIN — SODIUM CHLORIDE 2000 MG: 900 INJECTION, SOLUTION INTRAVENOUS at 23:54

## 2017-01-01 RX ADMIN — ENOXAPARIN SODIUM 40 MG: 40 INJECTION SUBCUTANEOUS at 11:19

## 2017-01-01 RX ADMIN — Medication 300 UNITS: at 20:21

## 2017-01-01 RX ADMIN — CHLORHEXIDINE GLUCONATE 10 ML: 1.2 RINSE ORAL at 22:36

## 2017-01-01 RX ADMIN — CHLORHEXIDINE GLUCONATE 10 ML: 1.2 RINSE ORAL at 09:25

## 2017-01-01 RX ADMIN — POTASSIUM CHLORIDE 20 MEQ: 20 TABLET, EXTENDED RELEASE ORAL at 09:11

## 2017-01-01 RX ADMIN — FUROSEMIDE 40 MG: 10 INJECTION, SOLUTION INTRAMUSCULAR; INTRAVENOUS at 21:59

## 2017-01-01 RX ADMIN — CALCIUM GLUCONATE 2 G: 94 INJECTION, SOLUTION INTRAVENOUS at 20:14

## 2017-01-01 RX ADMIN — AZTREONAM 1 G: 1 INJECTION, POWDER, LYOPHILIZED, FOR SOLUTION INTRAMUSCULAR; INTRAVENOUS at 17:29

## 2017-01-01 RX ADMIN — AZTREONAM 1 G: 1 INJECTION, POWDER, LYOPHILIZED, FOR SOLUTION INTRAMUSCULAR; INTRAVENOUS at 10:51

## 2017-01-01 RX ADMIN — DAPTOMYCIN 630 MG: 500 INJECTION, POWDER, LYOPHILIZED, FOR SOLUTION INTRAVENOUS at 12:53

## 2017-01-01 RX ADMIN — SODIUM CHLORIDE 150 ML/HR: 900 INJECTION, SOLUTION INTRAVENOUS at 15:44

## 2017-01-01 RX ADMIN — Medication 400 MG: at 10:20

## 2017-01-01 RX ADMIN — ENOXAPARIN SODIUM 40 MG: 40 INJECTION SUBCUTANEOUS at 13:21

## 2017-01-01 RX ADMIN — BISACODYL 10 MG: 10 SUPPOSITORY RECTAL at 18:35

## 2017-01-01 RX ADMIN — IPRATROPIUM BROMIDE AND ALBUTEROL SULFATE 3 ML: .5; 3 SOLUTION RESPIRATORY (INHALATION) at 07:43

## 2017-01-01 RX ADMIN — Medication 10 ML: at 21:43

## 2017-01-01 RX ADMIN — CALCIUM GLUCONATE 2 G: 94 INJECTION, SOLUTION INTRAVENOUS at 17:10

## 2017-01-01 RX ADMIN — IPRATROPIUM BROMIDE AND ALBUTEROL SULFATE 3 ML: .5; 3 SOLUTION RESPIRATORY (INHALATION) at 13:38

## 2017-01-01 RX ADMIN — FUROSEMIDE 40 MG: 10 INJECTION, SOLUTION INTRAMUSCULAR; INTRAVENOUS at 09:25

## 2017-01-01 RX ADMIN — HYDROMORPHONE HYDROCHLORIDE 1 MG: 1 INJECTION, SOLUTION INTRAMUSCULAR; INTRAVENOUS; SUBCUTANEOUS at 00:44

## 2017-01-01 RX ADMIN — LINEZOLID 600 MG: 600 INJECTION, SOLUTION INTRAVENOUS at 12:48

## 2017-01-01 RX ADMIN — POTASSIUM CHLORIDE 20 MEQ: 400 INJECTION, SOLUTION INTRAVENOUS at 10:34

## 2017-01-01 RX ADMIN — ENOXAPARIN SODIUM 40 MG: 40 INJECTION SUBCUTANEOUS at 11:11

## 2017-01-01 RX ADMIN — AZTREONAM 1 G: 1 INJECTION, POWDER, LYOPHILIZED, FOR SOLUTION INTRAMUSCULAR; INTRAVENOUS at 01:31

## 2017-01-01 RX ADMIN — ENOXAPARIN SODIUM 40 MG: 40 INJECTION SUBCUTANEOUS at 00:46

## 2017-01-01 RX ADMIN — CHLORHEXIDINE GLUCONATE 10 ML: 1.2 RINSE ORAL at 10:32

## 2017-01-01 RX ADMIN — PANTOPRAZOLE SODIUM 40 MG: 40 TABLET, DELAYED RELEASE ORAL at 08:30

## 2017-01-01 RX ADMIN — MORPHINE SULFATE 2 MG: 2 INJECTION, SOLUTION INTRAMUSCULAR; INTRAVENOUS at 18:51

## 2017-01-01 RX ADMIN — CHLORHEXIDINE GLUCONATE 10 ML: 1.2 RINSE ORAL at 23:05

## 2017-01-01 RX ADMIN — SODIUM BICARBONATE 650 MG TABLET 650 MG: at 18:54

## 2017-01-01 RX ADMIN — POTASSIUM CHLORIDE 10 MEQ: 7.46 INJECTION, SOLUTION INTRAVENOUS at 07:04

## 2017-01-01 RX ADMIN — TROSPIUM CHLORIDE 20 MG: 20 TABLET ORAL at 11:12

## 2017-01-01 RX ADMIN — ESMOLOL HYDROCHLORIDE 20 MG: 10 INJECTION INTRAVENOUS at 08:40

## 2017-01-01 RX ADMIN — SODIUM BICARBONATE 650 MG TABLET 650 MG: at 10:01

## 2017-01-01 RX ADMIN — POTASSIUM CHLORIDE 10 MEQ: 10 INJECTION, SOLUTION INTRAVENOUS at 14:45

## 2017-01-01 RX ADMIN — FAMOTIDINE 20 MG: 10 INJECTION, SOLUTION INTRAVENOUS at 21:42

## 2017-01-01 RX ADMIN — SODIUM CHLORIDE 100 MG: 900 INJECTION, SOLUTION INTRAVENOUS at 21:04

## 2017-01-01 RX ADMIN — SODIUM CHLORIDE, SODIUM LACTATE, POTASSIUM CHLORIDE, AND CALCIUM CHLORIDE 200 ML/HR: 600; 310; 30; 20 INJECTION, SOLUTION INTRAVENOUS at 23:45

## 2017-01-01 RX ADMIN — SODIUM CHLORIDE 100 MG: 900 INJECTION, SOLUTION INTRAVENOUS at 22:38

## 2017-01-01 RX ADMIN — MORPHINE SULFATE 2 MG: 2 INJECTION, SOLUTION INTRAMUSCULAR; INTRAVENOUS at 17:41

## 2017-01-01 RX ADMIN — DEXTROSE MONOHYDRATE, SODIUM CHLORIDE, AND POTASSIUM CHLORIDE: 50; 4.5; 2.98 INJECTION, SOLUTION INTRAVENOUS at 15:00

## 2017-01-01 RX ADMIN — Medication 4 MCG/MIN: at 22:27

## 2017-01-01 RX ADMIN — DEXTROSE MONOHYDRATE 25 G: 25 INJECTION, SOLUTION INTRAVENOUS at 17:41

## 2017-01-01 RX ADMIN — ENOXAPARIN SODIUM 40 MG: 40 INJECTION SUBCUTANEOUS at 01:38

## 2017-01-01 RX ADMIN — POTASSIUM CHLORIDE 20 MEQ: 1.5 POWDER, FOR SOLUTION ORAL at 13:22

## 2017-01-01 RX ADMIN — PHENYLEPHRINE HYDROCHLORIDE 25 MCG/MIN: 10 INJECTION, SOLUTION INTRAMUSCULAR; INTRAVENOUS; SUBCUTANEOUS at 07:46

## 2017-01-01 RX ADMIN — Medication 10 ML: at 05:21

## 2017-01-01 RX ADMIN — Medication 20 MCG/MIN: at 20:06

## 2017-01-01 RX ADMIN — CEFTRIAXONE 2 G: 2 INJECTION, POWDER, FOR SOLUTION INTRAMUSCULAR; INTRAVENOUS at 17:27

## 2017-01-01 RX ADMIN — NYSTATIN: 100000 POWDER TOPICAL at 18:11

## 2017-01-01 RX ADMIN — POTASSIUM CHLORIDE 20 MEQ: 1.5 POWDER, FOR SOLUTION ORAL at 09:27

## 2017-01-01 RX ADMIN — TRIAMCINOLONE ACETONIDE 1 G: 0.25 CREAM TOPICAL at 15:09

## 2017-01-01 RX ADMIN — POTASSIUM CHLORIDE 20 MEQ: 1.5 POWDER, FOR SOLUTION ORAL at 11:10

## 2017-01-01 RX ADMIN — DEXTROSE MONOHYDRATE, SODIUM CHLORIDE, AND POTASSIUM CHLORIDE: 50; 4.5; 2.98 INJECTION, SOLUTION INTRAVENOUS at 12:21

## 2017-01-01 RX ADMIN — NYSTATIN: 100000 POWDER TOPICAL at 19:59

## 2017-01-01 RX ADMIN — SODIUM BICARBONATE 650 MG TABLET 650 MG: at 18:12

## 2017-01-01 RX ADMIN — DAPTOMYCIN 719 MG: 500 INJECTION, POWDER, LYOPHILIZED, FOR SOLUTION INTRAVENOUS at 13:20

## 2017-01-01 RX ADMIN — SODIUM CHLORIDE 150 ML/HR: 900 INJECTION, SOLUTION INTRAVENOUS at 01:20

## 2017-01-01 RX ADMIN — ACETAMINOPHEN 650 MG: 325 TABLET, FILM COATED ORAL at 02:30

## 2017-01-01 RX ADMIN — Medication 6 MCG/MIN: at 09:10

## 2017-01-01 RX ADMIN — NYSTATIN: 100000 POWDER TOPICAL at 18:44

## 2017-01-01 RX ADMIN — MAGNESIUM SULFATE IN WATER 2 G: 40 INJECTION, SOLUTION INTRAVENOUS at 10:59

## 2017-01-01 RX ADMIN — DEXTROSE MONOHYDRATE 12.5 G: 25 INJECTION, SOLUTION INTRAVENOUS at 09:00

## 2017-01-01 RX ADMIN — PROPOFOL 40 MCG/KG/MIN: 10 INJECTION, EMULSION INTRAVENOUS at 18:58

## 2017-01-01 RX ADMIN — Medication: at 17:15

## 2017-01-01 RX ADMIN — SODIUM CHLORIDE 150 ML/HR: 900 INJECTION, SOLUTION INTRAVENOUS at 00:45

## 2017-01-01 RX ADMIN — CHLORHEXIDINE GLUCONATE 10 ML: 1.2 RINSE ORAL at 21:19

## 2017-01-01 RX ADMIN — Medication 10 ML: at 14:08

## 2017-01-01 RX ADMIN — Medication 400 MG: at 10:15

## 2017-01-01 RX ADMIN — CHLORHEXIDINE GLUCONATE 10 ML: 1.2 RINSE ORAL at 09:41

## 2017-01-01 RX ADMIN — Medication 10 ML: at 06:02

## 2017-01-01 RX ADMIN — POTASSIUM CHLORIDE 10 MEQ: 7.46 INJECTION, SOLUTION INTRAVENOUS at 10:45

## 2017-01-01 RX ADMIN — MORPHINE SULFATE 2 MG: 2 INJECTION, SOLUTION INTRAMUSCULAR; INTRAVENOUS at 16:55

## 2017-01-01 RX ADMIN — AZTREONAM 1 G: 1 INJECTION, POWDER, LYOPHILIZED, FOR SOLUTION INTRAMUSCULAR; INTRAVENOUS at 17:00

## 2017-01-01 RX ADMIN — CHLORHEXIDINE GLUCONATE 10 ML: 1.2 RINSE ORAL at 14:03

## 2017-01-01 RX ADMIN — Medication 10 ML: at 02:19

## 2017-01-01 RX ADMIN — SODIUM CHLORIDE 150 ML/HR: 900 INJECTION, SOLUTION INTRAVENOUS at 21:40

## 2017-01-01 RX ADMIN — SODIUM CHLORIDE, SODIUM LACTATE, POTASSIUM CHLORIDE, CALCIUM CHLORIDE, AND DEXTROSE MONOHYDRATE 50 ML/HR: 600; 310; 30; 20; 5 INJECTION, SOLUTION INTRAVENOUS at 14:00

## 2017-01-01 RX ADMIN — ENOXAPARIN SODIUM 40 MG: 40 INJECTION SUBCUTANEOUS at 02:16

## 2017-01-01 RX ADMIN — Medication 10 ML: at 21:04

## 2017-01-01 RX ADMIN — CEFEPIME 2 G: 2 INJECTION, POWDER, FOR SOLUTION INTRAVENOUS at 16:08

## 2017-01-01 RX ADMIN — NYSTATIN: 100000 POWDER TOPICAL at 17:06

## 2017-01-01 RX ADMIN — NYSTATIN: 100000 POWDER TOPICAL at 10:01

## 2017-01-01 RX ADMIN — IPRATROPIUM BROMIDE AND ALBUTEROL SULFATE 3 ML: .5; 3 SOLUTION RESPIRATORY (INHALATION) at 21:28

## 2017-01-01 RX ADMIN — DAPTOMYCIN 719 MG: 500 INJECTION, POWDER, LYOPHILIZED, FOR SOLUTION INTRAVENOUS at 12:36

## 2017-01-01 RX ADMIN — SODIUM BICARBONATE 650 MG TABLET 650 MG: at 15:55

## 2017-01-01 RX ADMIN — AZTREONAM 1 G: 1 INJECTION, POWDER, LYOPHILIZED, FOR SOLUTION INTRAMUSCULAR; INTRAVENOUS at 01:37

## 2017-01-01 RX ADMIN — ONDANSETRON 4 MG: 2 SOLUTION INTRAMUSCULAR; INTRAVENOUS at 03:41

## 2017-01-01 RX ADMIN — SODIUM CHLORIDE 40 MG: 9 INJECTION INTRAMUSCULAR; INTRAVENOUS; SUBCUTANEOUS at 08:52

## 2017-01-01 RX ADMIN — SODIUM BICARBONATE 650 MG TABLET 650 MG: at 09:21

## 2017-01-01 RX ADMIN — IPRATROPIUM BROMIDE AND ALBUTEROL SULFATE 3 ML: .5; 3 SOLUTION RESPIRATORY (INHALATION) at 08:00

## 2017-01-01 RX ADMIN — FENTANYL CITRATE 25 MCG: 50 INJECTION, SOLUTION INTRAMUSCULAR; INTRAVENOUS at 12:02

## 2017-01-01 RX ADMIN — Medication 10 ML: at 15:02

## 2017-01-01 RX ADMIN — AZTREONAM 1 G: 1 INJECTION, POWDER, LYOPHILIZED, FOR SOLUTION INTRAMUSCULAR; INTRAVENOUS at 21:31

## 2017-01-01 RX ADMIN — DAPTOMYCIN 630 MG: 500 INJECTION, POWDER, LYOPHILIZED, FOR SOLUTION INTRAVENOUS at 10:17

## 2017-01-01 RX ADMIN — TROSPIUM CHLORIDE 20 MG: 20 TABLET ORAL at 08:37

## 2017-01-01 RX ADMIN — Medication 10 ML: at 21:03

## 2017-01-01 RX ADMIN — CHLORHEXIDINE GLUCONATE 10 ML: 1.2 RINSE ORAL at 20:04

## 2017-01-01 RX ADMIN — CALCIUM GLUCONATE 2 G: 94 INJECTION, SOLUTION INTRAVENOUS at 09:23

## 2017-01-01 RX ADMIN — SODIUM BICARBONATE 650 MG TABLET 650 MG: at 08:34

## 2017-01-01 RX ADMIN — SODIUM CHLORIDE, SODIUM LACTATE, POTASSIUM CHLORIDE, CALCIUM CHLORIDE, AND DEXTROSE MONOHYDRATE 50 ML/HR: 600; 310; 30; 20; 5 INJECTION, SOLUTION INTRAVENOUS at 20:15

## 2017-01-01 RX ADMIN — ONDANSETRON 4 MG: 2 INJECTION INTRAMUSCULAR; INTRAVENOUS at 08:10

## 2017-01-01 RX ADMIN — DAPTOMYCIN 630 MG: 500 INJECTION, POWDER, LYOPHILIZED, FOR SOLUTION INTRAVENOUS at 13:50

## 2017-01-01 RX ADMIN — SODIUM BICARBONATE 650 MG TABLET 650 MG: at 10:22

## 2017-01-01 RX ADMIN — POTASSIUM CHLORIDE 20 MEQ: 20 TABLET, EXTENDED RELEASE ORAL at 08:37

## 2017-01-01 RX ADMIN — ALBUMIN (HUMAN) 25 G: 0.25 INJECTION, SOLUTION INTRAVENOUS at 11:48

## 2017-01-01 RX ADMIN — PROPOFOL 30 MCG/KG/MIN: 10 INJECTION, EMULSION INTRAVENOUS at 22:06

## 2017-01-01 RX ADMIN — CHLORHEXIDINE GLUCONATE 10 ML: 1.2 RINSE ORAL at 20:03

## 2017-01-01 RX ADMIN — POTASSIUM CHLORIDE 20 MEQ: 20 TABLET, EXTENDED RELEASE ORAL at 10:32

## 2017-01-01 RX ADMIN — ONDANSETRON 4 MG: 2 SOLUTION INTRAMUSCULAR; INTRAVENOUS at 04:00

## 2017-01-01 RX ADMIN — PROPOFOL 35 MCG/KG/MIN: 10 INJECTION, EMULSION INTRAVENOUS at 11:07

## 2017-01-01 RX ADMIN — Medication 10 ML: at 05:17

## 2017-01-01 RX ADMIN — PIPERACILLIN SODIUM,TAZOBACTAM SODIUM 2.25 G: 2; .25 INJECTION, POWDER, FOR SOLUTION INTRAVENOUS at 15:53

## 2017-01-01 RX ADMIN — COLLAGENASE SANTYL: 250 OINTMENT TOPICAL at 09:06

## 2017-01-01 RX ADMIN — AZTREONAM 1 G: 1 INJECTION, POWDER, LYOPHILIZED, FOR SOLUTION INTRAMUSCULAR; INTRAVENOUS at 17:33

## 2017-01-01 RX ADMIN — ENOXAPARIN SODIUM 40 MG: 40 INJECTION SUBCUTANEOUS at 23:09

## 2017-01-01 RX ADMIN — HEPARIN SODIUM 2000 UNITS: 200 INJECTION, SOLUTION INTRAVENOUS at 08:09

## 2017-01-01 RX ADMIN — SODIUM BICARBONATE 650 MG TABLET 650 MG: at 23:35

## 2017-01-01 RX ADMIN — SODIUM CHLORIDE 125 ML/HR: 900 INJECTION, SOLUTION INTRAVENOUS at 21:19

## 2017-01-01 RX ADMIN — SODIUM BICARBONATE 650 MG TABLET 650 MG: at 09:19

## 2017-01-01 RX ADMIN — SODIUM BICARBONATE 650 MG TABLET 650 MG: at 22:14

## 2017-01-01 RX ADMIN — MORPHINE SULFATE 2 MG: 2 INJECTION, SOLUTION INTRAMUSCULAR; INTRAVENOUS at 23:05

## 2017-01-01 RX ADMIN — POTASSIUM CHLORIDE 20 MEQ: 20 TABLET, EXTENDED RELEASE ORAL at 18:06

## 2017-01-01 RX ADMIN — AZTREONAM 1 G: 1 INJECTION, POWDER, LYOPHILIZED, FOR SOLUTION INTRAMUSCULAR; INTRAVENOUS at 09:38

## 2017-01-01 RX ADMIN — ALBUMIN (HUMAN) 25 G: 0.25 INJECTION, SOLUTION INTRAVENOUS at 05:30

## 2017-01-01 RX ADMIN — Medication 10 ML: at 21:29

## 2017-01-01 RX ADMIN — ONDANSETRON 4 MG: 2 SOLUTION INTRAMUSCULAR; INTRAVENOUS at 08:39

## 2017-01-01 RX ADMIN — MORPHINE SULFATE 2 MG: 2 INJECTION, SOLUTION INTRAMUSCULAR; INTRAVENOUS at 11:52

## 2017-01-01 RX ADMIN — TRIAMCINOLONE ACETONIDE 1 G: 0.25 CREAM TOPICAL at 16:28

## 2017-01-01 RX ADMIN — IPRATROPIUM BROMIDE AND ALBUTEROL SULFATE 3 ML: .5; 3 SOLUTION RESPIRATORY (INHALATION) at 20:15

## 2017-01-01 RX ADMIN — NYSTATIN: 100000 POWDER TOPICAL at 19:10

## 2017-01-01 RX ADMIN — TRIAMCINOLONE ACETONIDE 1 G: 0.25 CREAM TOPICAL at 21:42

## 2017-01-01 RX ADMIN — FUROSEMIDE 40 MG: 10 INJECTION, SOLUTION INTRAMUSCULAR; INTRAVENOUS at 08:30

## 2017-01-01 RX ADMIN — SODIUM BICARBONATE 650 MG TABLET 650 MG: at 22:10

## 2017-01-01 RX ADMIN — DAPTOMYCIN 672 MG: 500 INJECTION, POWDER, LYOPHILIZED, FOR SOLUTION INTRAVENOUS at 15:12

## 2017-01-01 RX ADMIN — POTASSIUM CHLORIDE 10 MEQ: 7.46 INJECTION, SOLUTION INTRAVENOUS at 02:19

## 2017-01-01 RX ADMIN — MAGNESIUM SULFATE HEPTAHYDRATE 1 G: 1 INJECTION, SOLUTION INTRAVENOUS at 22:27

## 2017-01-01 RX ADMIN — POTASSIUM CHLORIDE 10 MEQ: 7.46 INJECTION, SOLUTION INTRAVENOUS at 14:00

## 2017-01-01 RX ADMIN — CALCIUM GLUCONATE 2 G: 94 INJECTION, SOLUTION INTRAVENOUS at 09:30

## 2017-01-01 RX ADMIN — MORPHINE SULFATE 2 MG: 2 INJECTION, SOLUTION INTRAMUSCULAR; INTRAVENOUS at 21:45

## 2017-01-01 RX ADMIN — CALCIUM GLUCONATE 2 G: 94 INJECTION, SOLUTION INTRAVENOUS at 10:00

## 2017-01-01 RX ADMIN — HYDROMORPHONE HYDROCHLORIDE 1 MG: 1 INJECTION, SOLUTION INTRAMUSCULAR; INTRAVENOUS; SUBCUTANEOUS at 21:07

## 2017-01-01 RX ADMIN — SODIUM CHLORIDE 3348 ML: 900 INJECTION, SOLUTION INTRAVENOUS at 11:58

## 2017-01-01 RX ADMIN — FENTANYL CITRATE 50 MCG: 50 INJECTION, SOLUTION INTRAMUSCULAR; INTRAVENOUS at 04:49

## 2017-01-01 RX ADMIN — Medication 10 ML: at 22:14

## 2017-01-01 RX ADMIN — IPRATROPIUM BROMIDE AND ALBUTEROL SULFATE 3 ML: .5; 3 SOLUTION RESPIRATORY (INHALATION) at 13:35

## 2017-01-01 RX ADMIN — POTASSIUM CHLORIDE 20 MEQ: 20 TABLET, EXTENDED RELEASE ORAL at 18:12

## 2017-01-01 RX ADMIN — NYSTATIN: 100000 POWDER TOPICAL at 09:39

## 2017-01-01 RX ADMIN — ENOXAPARIN SODIUM 40 MG: 40 INJECTION SUBCUTANEOUS at 01:35

## 2017-01-01 RX ADMIN — ESMOLOL HYDROCHLORIDE 30 MG: 10 INJECTION INTRAVENOUS at 08:37

## 2017-01-01 RX ADMIN — ALBUMIN (HUMAN) 25 G: 0.25 INJECTION, SOLUTION INTRAVENOUS at 23:54

## 2017-01-01 RX ADMIN — SODIUM CHLORIDE 100 MG: 900 INJECTION, SOLUTION INTRAVENOUS at 20:12

## 2017-01-01 RX ADMIN — SODIUM BICARBONATE 650 MG TABLET 650 MG: at 16:37

## 2017-01-01 RX ADMIN — IPRATROPIUM BROMIDE AND ALBUTEROL SULFATE 3 ML: .5; 3 SOLUTION RESPIRATORY (INHALATION) at 14:04

## 2017-01-01 RX ADMIN — ALBUMIN (HUMAN) 25 G: 0.25 INJECTION, SOLUTION INTRAVENOUS at 11:58

## 2017-01-01 RX ADMIN — LEVETIRACETAM 1000 MG: 10 INJECTION INTRAVENOUS at 14:16

## 2017-01-01 RX ADMIN — LEVOFLOXACIN 500 MG: 5 INJECTION, SOLUTION INTRAVENOUS at 12:14

## 2017-01-01 RX ADMIN — HYDROMORPHONE HYDROCHLORIDE 1 MG: 1 INJECTION, SOLUTION INTRAMUSCULAR; INTRAVENOUS; SUBCUTANEOUS at 08:07

## 2017-01-01 RX ADMIN — POTASSIUM CHLORIDE 10 MEQ: 7.46 INJECTION, SOLUTION INTRAVENOUS at 02:16

## 2017-01-01 RX ADMIN — FENTANYL CITRATE 25 MCG: 50 INJECTION, SOLUTION INTRAMUSCULAR; INTRAVENOUS at 16:37

## 2017-01-01 RX ADMIN — SODIUM CHLORIDE AND POTASSIUM CHLORIDE: 9; 1.49 INJECTION, SOLUTION INTRAVENOUS at 23:50

## 2017-01-01 RX ADMIN — DEXTROSE MONOHYDRATE 25 G: 25 INJECTION, SOLUTION INTRAVENOUS at 12:59

## 2017-01-01 RX ADMIN — ACETAMINOPHEN 650 MG: 650 SOLUTION ORAL at 00:26

## 2017-01-01 RX ADMIN — ENOXAPARIN SODIUM 40 MG: 40 INJECTION SUBCUTANEOUS at 12:41

## 2017-01-01 RX ADMIN — POTASSIUM CHLORIDE 10 MEQ: 7.46 INJECTION, SOLUTION INTRAVENOUS at 20:04

## 2017-01-01 RX ADMIN — ENOXAPARIN SODIUM 40 MG: 40 INJECTION SUBCUTANEOUS at 00:41

## 2017-01-01 RX ADMIN — Medication: at 20:05

## 2017-01-01 RX ADMIN — SODIUM CHLORIDE 100 ML/HR: 900 INJECTION, SOLUTION INTRAVENOUS at 10:51

## 2017-01-01 RX ADMIN — DAPTOMYCIN 719 MG: 500 INJECTION, POWDER, LYOPHILIZED, FOR SOLUTION INTRAVENOUS at 16:50

## 2017-01-01 RX ADMIN — SODIUM BICARBONATE 650 MG TABLET 650 MG: at 16:00

## 2017-01-01 RX ADMIN — CHLORHEXIDINE GLUCONATE 10 ML: 1.2 RINSE ORAL at 10:18

## 2017-01-01 RX ADMIN — IPRATROPIUM BROMIDE AND ALBUTEROL SULFATE 3 ML: .5; 3 SOLUTION RESPIRATORY (INHALATION) at 01:34

## 2017-01-01 RX ADMIN — SODIUM BICARBONATE 650 MG TABLET 650 MG: at 11:28

## 2017-01-01 RX ADMIN — PROPOFOL 50 MCG/KG/MIN: 10 INJECTION, EMULSION INTRAVENOUS at 17:23

## 2017-01-01 RX ADMIN — MULTIPLE VITAMINS W/ MINERALS TAB 1 TABLET: TAB at 08:37

## 2017-01-01 RX ADMIN — FUROSEMIDE 40 MG: 40 TABLET ORAL at 10:20

## 2017-01-01 RX ADMIN — Medication 10 ML: at 22:50

## 2017-01-01 RX ADMIN — DAKIN'S SOLUTION 0.125% (QUARTER STRENGTH): 0.12 SOLUTION at 09:11

## 2017-01-01 RX ADMIN — ENOXAPARIN SODIUM 40 MG: 40 INJECTION SUBCUTANEOUS at 12:01

## 2017-01-01 RX ADMIN — LEVETIRACETAM 1000 MG: 10 INJECTION INTRAVENOUS at 00:05

## 2017-01-01 RX ADMIN — SODIUM BICARBONATE 650 MG TABLET 650 MG: at 21:35

## 2017-03-03 PROBLEM — R65.21 SEVERE SEPSIS WITH SEPTIC SHOCK (CODE) (HCC): Status: ACTIVE | Noted: 2017-01-01

## 2017-03-03 PROBLEM — N39.0 UTI (URINARY TRACT INFECTION): Status: ACTIVE | Noted: 2017-01-01

## 2017-03-03 NOTE — Clinical Note
Status[de-identified] Inpatient [101] Type of Bed: Intensive Care [6] Inpatient Hospitalization Certified Necessary for the Following Reasons: 3. Patient receiving treatment that can only be provided in an inpatient setting (further clarification in H&P documentation) Admitting Diagnosis: Severe sepsis with septic shock (CODE) ([facility] [[id number]] Admitting Diagnosis: UTI (urinary tract infection) [131861] Admitting Physician: Jeremie Rao Attending Physician: Jeremie Rao Estimated Length of Stay: > or = to 2 Midnights Discharge Plan[de-identified] Home with Office Follow-up

## 2017-03-03 NOTE — IP AVS SNAPSHOT
Current Discharge Medication List  
  
START taking these medications Dose & Instructions Dispensing Information Comments Morning Noon Evening Bedtime  
 albuterol-ipratropium 2.5 mg-0.5 mg/3 ml Nebu Commonly known as:  Dwayne Turner Your last dose was: Your next dose is:    
   
   
 Dose:  3 mL  
3 mL by Nebulization route every six (6) hours as needed. Indications: CHRONIC OBSTRUCTIVE PULMONARY DISEASE WITH BRONCHOSPASMS Quantity:  30 Nebule Refills:  5  
     
   
   
   
  
 chlorhexidine 0.12 % solution Commonly known as:  PERIDEX Your last dose was: Your next dose is:    
   
   
 Dose:  10 mL Take 10 mL by mouth every twelve (12) hours for 14 days. Quantity:  1 Bottle Refills:  5 DAPTOmycin (CUBICIN RF) IVPB Your last dose was: Your next dose is:    
   
   
 Dose:  630 mg  
630 mg by IntraVENous route every twenty-four (24) hours for 19 days. Quantity:  19 Dose Refills:  0  
     
   
   
   
  
 enoxaparin 40 mg/0.4 mL Commonly known as:  LOVENOX Your last dose was: Your next dose is:    
   
   
 Dose:  40 mg  
0.4 mL by SubCUTAneous route every twelve (12) hours every twelve (12) hours. Quantity:  30 Syringe Refills:  5  
     
   
   
   
  
 furosemide 40 mg tablet Commonly known as:  LASIX Your last dose was: Your next dose is:    
   
   
 onetab po bid Quantity:  60 Tab Refills:  5  
     
   
   
   
  
 levETIRAcetam 1,000 mg tablet Commonly known as:  KEPPRA Your last dose was: Your next dose is:    
   
   
 Dose:  1000 mg Take 1 Tab by mouth two (2) times a day. Quantity:  180 Tab Refills:  1  
     
   
   
   
  
 magnesium oxide 400 mg tablet Commonly known as:  MAG-OX Your last dose was: Your next dose is:    
   
   
 Dose:  400 mg Take 1 Tab by mouth daily. Quantity:  10 Tab Refills:  0 nystatin powder Commonly known as:  MYCOSTATIN Your last dose was: Your next dose is:    
   
   
 Apply  to affected area two (2) times a day. Quantity:  1 Bottle Refills:  3  
     
   
   
   
  
 potassium chloride 20 mEq tablet Commonly known as:  K-DUR KLOR-CON Your last dose was: Your next dose is:    
   
   
 Dose:  20 mEq Take 1 Tab by mouth two (2) times a day. Quantity:  30 Tab Refills:  2  
     
   
   
   
  
 sodium bicarbonate 650 mg tablet Your last dose was: Your next dose is:    
   
   
 Dose:  650 mg Take 1 Tab by mouth three (3) times daily. Quantity:  100 Tab Refills:  3 CONTINUE these medications which have NOT CHANGED Dose & Instructions Dispensing Information Comments Morning Noon Evening Bedtime  
 famotidine 20 mg tablet Commonly known as:  PEPCID Your last dose was: Your next dose is:    
   
   
 Dose:  20 mg Take 20 mg by mouth daily. Refills:  0  
     
   
   
   
  
 ondansetron 4 mg disintegrating tablet Commonly known as:  ZOFRAN ODT Your last dose was: Your next dose is:    
   
   
 Dose:  4 mg Take 1 Tab by mouth every six (6) hours as needed for Nausea (vomiting). Quantity:  30 Tab Refills:  0  
     
   
   
   
  
 oxyCODONE-acetaminophen 5-325 mg per tablet Commonly known as:  PERCOCET Your last dose was: Your next dose is:    
   
   
 Dose:  1 Tab Take 1 Tab by mouth every four (4) hours as needed for Pain. Max Daily Amount: 6 Tabs. Quantity:  8 Tab Refills:  0  
     
   
   
   
  
 pantoprazole 40 mg tablet Commonly known as:  PROTONIX Your last dose was: Your next dose is:    
   
   
 Dose:  40 mg Take 1 Tab by mouth Before breakfast and dinner. Quantity:  60 Tab Refills:  0 STOP taking these medications   
 dronabinol 2.5 mg capsule Commonly known as:  Cheryle Flemings HYDROmorphone 4 mg tablet Commonly known as:  DILAUDID  
   
  
 metoprolol succinate 25 mg XL tablet Commonly known as:  TOPROL-XL  
   
  
 morphine CR 30 mg CR tablet Commonly known as:  MS CONTIN Where to Get Your Medications Information on where to get these meds will be given to you by the nurse or doctor. ! Ask your nurse or doctor about these medications  
  albuterol-ipratropium 2.5 mg-0.5 mg/3 ml Nebu  
 chlorhexidine 0.12 % solution DAPTOmycin (CUBICIN RF) IVPB  
 enoxaparin 40 mg/0.4 mL  
 furosemide 40 mg tablet  
 levETIRAcetam 1,000 mg tablet  
 magnesium oxide 400 mg tablet  
 nystatin powder  
 potassium chloride 20 mEq tablet  
 sodium bicarbonate 650 mg tablet

## 2017-03-03 NOTE — IP AVS SNAPSHOT
[address] [address] 
[phone] Patient: Filemon Bruno MRN: [id number] You are allergic to the following Allergen Reactions Latex, Natural Rubber Hives Itching Clindamycin Hives Clindamycin Itching Iodine Hives Pt denies allergy. Patient reports this is an allergy to contrast media that caused temporary vision loss and vomiting but is fine if premedicated with antihistamine. Keflex (Cephalexin) Hives Linezolid Hives Nsaids (Non-Steroidal Anti-Inflammatory Drug) Nausea and Vomiting Emesis, Previous GI bleed Piperacillin-Tazobactam Hives Has tolerated Teflaro @ [facility] Has been tolerating cephalosporins SAPH Sulfa (Sulfonamide Antibiotics) Hives Contact Dermatitis Vancomycin Hives Recent Documentation Height Weight BMI OB Status Smoking Status 1.626 m 105 kg 39.73 kg/m2 Menopause Former Smoker Emergency Contacts Name Discharge Info Relation Home Work Mobile Angela Lazo DISCHARGE CAREGIVER [3] Child [2] [phone] About your hospitalization You were admitted on:  March 3, 2017 You last received care in the:  [address] [place], [place] [address] You were discharged on:  March 31, 2017 Unit phone number:  [phone] Why you were hospitalized Your primary diagnosis was:  Severe Sepsis With Septic Shock (Code) ([facility]) Your diagnoses also included:  Uti (Urinary Tract Infection), Severe Thrombocytopenia (Hcc), History Of Ovarian Cancer, Morbid Obesity (Hcc), Cad (Coronary Artery Disease), A-Fib ([facility]), Shabbir (Acute Kidney Injury) ([facility]) Providers Seen During Your Hospitalizations Provider Role Specialty Primary office phone Vianney Olvera MD Attending Provider Emergency Medicine [phone]  Lizbeth Quiñones MD Attending Provider [facility] [phone] Rachel Jang MD Attending Provider [facility] [phone] Nahum Webster MD Attending Provider Internal Medicine [phone] Ana Muñoz DO Attending Provider Internal Medicine [phone] Your Primary Care Physician (PCP) Primary Care Physician Office Phone Office Fax [place], [place] [address] [facility] [phone] Follow-up Information Follow up With Details Comments Contact Info Chayo Cosme MD In 1 week  [address] [facility] [address] [phone] 
[phone] Shannan Faustin MD Schedule an appointment as soon as possible for a visit urology follow-up [address] [facility] 3rd Floor [address] [address] 
[phone] Mathieu Lozano MD Call in 2 weeks Infectious disease follow-up [address] [phone] 
[phone] Current Discharge Medication List  
  
START taking these medications Dose & Instructions Dispensing Information Comments Morning Noon Evening Bedtime  
 albuterol-ipratropium 2.5 mg-0.5 mg/3 ml Nebu Commonly known as:  Jackie Gibson Your last dose was: Your next dose is:    
   
   
 Dose:  3 mL  
3 mL by Nebulization route every six (6) hours as needed. Indications: CHRONIC OBSTRUCTIVE PULMONARY DISEASE WITH BRONCHOSPASMS Quantity:  30 Nebule Refills:  5  
     
   
   
   
  
 chlorhexidine 0.12 % solution Commonly known as:  PERIDEX Your last dose was: Your next dose is:    
   
   
 Dose:  10 mL Take 10 mL by mouth every twelve (12) hours for 14 days. Quantity:  1 Bottle Refills:  5 DAPTOmycin (CUBICIN RF) IVPB Your last dose was: Your next dose is:    
   
   
 Dose:  630 mg  
630 mg by IntraVENous route every twenty-four (24) hours for 19 days. Quantity:  19 Dose Refills:  0  
     
   
   
   
  
 enoxaparin 40 mg/0.4 mL Commonly known as:  LOVENOX Your last dose was: Your next dose is:    
   
   
 Dose:  40 mg  
0.4 mL by SubCUTAneous route every twelve (12) hours every twelve (12) hours. Quantity:  30 Syringe Refills:  5  
     
   
   
   
  
 furosemide 40 mg tablet Commonly known as:  LASIX Your last dose was: Your next dose is:    
   
   
 onetab po bid Quantity:  60 Tab Refills:  5  
     
   
   
   
  
 levETIRAcetam 1,000 mg tablet Commonly known as:  KEPPRA Your last dose was: Your next dose is:    
   
   
 Dose:  1000 mg Take 1 Tab by mouth two (2) times a day. Quantity:  180 Tab Refills:  1  
     
   
   
   
  
 magnesium oxide 400 mg tablet Commonly known as:  MAG-OX Your last dose was: Your next dose is:    
   
   
 Dose:  400 mg Take 1 Tab by mouth daily. Quantity:  10 Tab Refills:  0  
     
   
   
   
  
 nystatin powder Commonly known as:  MYCOSTATIN Your last dose was: Your next dose is:    
   
   
 Apply  to affected area two (2) times a day. Quantity:  1 Bottle Refills:  3  
     
   
   
   
  
 potassium chloride 20 mEq tablet Commonly known as:  K-DUR, KLOR-CON Your last dose was: Your next dose is:    
   
   
 Dose:  20 mEq Take 1 Tab by mouth two (2) times a day. Quantity:  30 Tab Refills:  2  
     
   
   
   
  
 sodium bicarbonate 650 mg tablet Your last dose was: Your next dose is:    
   
   
 Dose:  650 mg Take 1 Tab by mouth three (3) times daily. Quantity:  100 Tab Refills:  3 CONTINUE these medications which have NOT CHANGED Dose & Instructions Dispensing Information Comments Morning Noon Evening Bedtime  
 famotidine 20 mg tablet Commonly known as:  PEPCID Your last dose was: Your next dose is:    
   
   
 Dose:  20 mg Take 20 mg by mouth daily. Refills:  0 ondansetron 4 mg disintegrating tablet Commonly known as:  ZOFRAN ODT Your last dose was: Your next dose is:    
   
   
 Dose:  4 mg Take 1 Tab by mouth every six (6) hours as needed for Nausea (vomiting). Quantity:  30 Tab Refills:  0  
     
   
   
   
  
 oxyCODONE-acetaminophen 5-325 mg per tablet Commonly known as:  PERCOCET Your last dose was: Your next dose is:    
   
   
 Dose:  1 Tab Take 1 Tab by mouth every four (4) hours as needed for Pain. Max Daily Amount: 6 Tabs. Quantity:  8 Tab Refills:  0  
     
   
   
   
  
 pantoprazole 40 mg tablet Commonly known as:  PROTONIX Your last dose was: Your next dose is:    
   
   
 Dose:  40 mg Take 1 Tab by mouth Before breakfast and dinner. Quantity:  60 Tab Refills:  0 STOP taking these medications   
 dronabinol 2.5 mg capsule Commonly known as:  [place]ard [place] HYDROmorphone 4 mg tablet Commonly known as:  DILAUDID  
   
  
 metoprolol succinate 25 mg XL tablet Commonly known as:  TOPROL-XL  
   
  
 morphine CR 30 mg CR tablet Commonly known as:  MS CONTIN Where to Get Your Medications Information on where to get these meds will be given to you by the nurse or doctor. ! Ask your nurse or doctor about these medications  
  albuterol-ipratropium 2.5 mg-0.5 mg/3 ml Nebu  
 chlorhexidine 0.12 % solution DAPTOmycin (CUBICIN RF) IVPB  
 enoxaparin 40 mg/0.4 mL  
 furosemide 40 mg tablet  
 levETIRAcetam 1,000 mg tablet  
 magnesium oxide 400 mg tablet  
 nystatin powder  
 potassium chloride 20 mEq tablet  
 sodium bicarbonate 650 mg tablet Discharge Instructions DISCHARGE SUMMARY from Nurse The following personal items are in your possession at time of discharge: 
 
Dental Appliances: None Home Medications: None Jewelry: None Clothing: None Other Valuables: None PATIENT INSTRUCTIONS: 
 
 
F-face looks uneven A-arms unable to move or move unevenly S-speech slurred or non-existent T-time-call 911 as soon as signs and symptoms begin-DO NOT go Back to bed or wait to see if you get better-TIME IS BRAIN. Warning Signs of HEART ATTACK Call 911 if you have these symptoms: 
? Chest discomfort. Most heart attacks involve discomfort in the center of the chest that lasts more than a few minutes, or that goes away and comes back. It can feel like uncomfortable pressure, squeezing, fullness, or pain. ? Discomfort in other areas of the upper body. Symptoms can include pain or discomfort in one or both arms, the back, neck, jaw, or stomach. ? Shortness of breath with or without chest discomfort. ? Other signs may include breaking out in a cold sweat, nausea, or lightheadedness. Don't wait more than five minutes to call [address]! Fast action can save your life. Calling 911 is almost always the fastest way to get lifesaving treatment. Emergency Medical Services staff can begin treatment when they arrive  up to an hour sooner than if someone gets to the hospital by car. The discharge information has been reviewed with the patient. The patient verbalized understanding. Discharge medications reviewed with the patient and appropriate educational materials and side effects teaching were provided. Patient armband removed and shredded. Discharge Instructions Attachments/References MAGNESIUM (BY MOUTH) (ENGLISH) SODIUM BICARBONATE (BY MOUTH) (ENGLISH) CHLORHEXIDINE (INTO THE MOUTH) (ENGLISH) POTASSIUM CHLORIDE (BY MOUTH) (ENGLISH) IPRATROPIUM/ALBUTEROL (BY BREATHING) (ENGLISH) ENOXAPARIN (BY INJECTION) (ENGLISH) FUROSEMIDE (BY MOUTH) (ENGLISH) Discharge Orders None [facility] Announcement We are excited to announce that we are making your provider's discharge notes available to you in ClipMine. You will see these notes when they are completed and signed by the physician that discharged you from your recent hospital stay. If you have any questions or concerns about any information you see in ClipMine, please call the Health Information Department where you were seen or reach out to your Primary Care Provider for more information about your plan of care. Introducing [facility]! Dionna Vargas introduces ClipMine patient portal. Now you can access parts of your medical record, email your doctor's office, and request medication refills online. 1. In your internet browser, go to [email or web address] 2. Click on the First Time User? Click Here link in the Sign In box. You will see the New Member Sign Up page. 3. Enter your ClipMine Access Code exactly as it appears below. You will not need to use this code after youve completed the sign-up process. If you do not sign up before the expiration date, you must request a new code. · ClipMine Access Code: [email or web address] Expires: 6/1/2017  8:16 PM 
 
4. Enter the last four digits of your Social Security Number (xxxx) and Date of Birth (mm/dd/yyyy) as indicated and click Submit. You will be taken to the next sign-up page. 5. Create a [employer] ID. This will be your ClipMine login ID and cannot be changed, so think of one that is secure and easy to remember. 6. Create a ClipMine password. You can change your password at any time. 7. Enter your Password Reset Question and Answer. This can be used at a later time if you forget your password. 8. Enter your e-mail address. You will receive e-mail notification when new information is available in [address] 9. Click Sign Up. You can now view and download portions of your medical record. 10. Click the Download Summary menu link to download a portable copy of your medical information. If you have questions, please visit the Frequently Asked Questions section of the Contrib website. Remember, eTecthart is NOT to be used for urgent needs. For medical emergencies, dial 911. Now available from your iPhone and Android! General Information Please provide this summary of care documentation to your next provider. Patient Signature:  ____________________________________________________________ Date:  ____________________________________________________________  
  
Evon Castro Provider Signature:  ____________________________________________________________ Date:  ____________________________________________________________ More Information Magnesium (By mouth) Used as a mineral supplement to add to the magnesium in your daily diet. Brand Name(s):CalMag Thins, Coral Calcium, GNC Calcium/Magnesium with Vitamin D, Leader Magnesium, MG Plus Protein, MP Magnesium, Mag-G, Mag-Ox 400, Mag-Tab SR, Magimin, Magimin-Forte, Maginex, Maglex, Magnacaps, Magonate There may be other brand names for this medicine. When This Medicine Should Not Be Used:  
Unless your doctor tells you otherwise, there is no reason for you to not use this medicine. How to Use This Medicine:  
Powder for Suspension, Liquid Filled Capsule, Capsule, Powder, Liquid, Tablet, Packet, Long Acting Tablet · Your doctor will tell you how much medicine to use. Do not use more than directed. · Follow the instructions on the medicine label if you are using this medicine without a prescription. · Drink at least six to eight (8 ounce) cups of liquid each day, unless your doctor tells you otherwise. · Measure the oral liquid medicine with a marked measuring spoon, oral syringe, or medicine cup. If a dose is missed: · Take a dose as soon as you remember. If it is almost time for your next dose, wait until then and take a regular dose. Do not take extra medicine to make up for a missed dose. How to Store and Dispose of This Medicine: · Store the medicine in a closed container at room temperature, away from heat, moisture, and direct light. · Keep all medicine out of the reach of children. Never share your medicine with anyone. · Ask your pharmacist, doctor, or health caregiver about the best way to dispose of any outdated medicine or medicine no longer needed. Drugs and Foods to Avoid: Ask your doctor or pharmacist before using any other medicine, including over-the-counter medicines, vitamins, and herbal products. · There are other medicines that may not work properly if you use them together with magnesium. Make sure your doctor knows about all other medicines you are using. Warnings While Using This Medicine: · Make sure your doctor knows if you are pregnant or breast feeding, or if you have kidney disease. Possible Side Effects While Using This Medicine: If you notice these less serious side effects, talk with your doctor: · Nausea, diarrhea. If you notice other side effects that you think are caused by this medicine, tell your doctor. Call your doctor for medical advice about side effects. You may report side effects to FDA at 8-811-FDA-9200 © 2016 [address] is for End User's use only and may not be sold, redistributed or otherwise used for commercial purposes. The above information is an  only. It is not intended as medical advice for individual conditions or treatments. Talk to your doctor, nurse or pharmacist before following any medical regimen to see if it is safe and effective for you. Sodium Bicarbonate (By mouth) Sodium Bicarbonate (KAMINI-brady-um bye-SLOANE-olga-krystle) Treats heartburn, acid indigestion, and sour or upset stomach. This is an antacid. Brand Name(s):  
There may be other brand names for this medicine. When This Medicine Should Not Be Used: You should not use this medicine if you have had an allergic reaction to sodium bicarbonate. How to Use This Medicine:  
Tablet · Follow the instructions on the medicine label if you are using this medicine without a prescription. How to Store and Dispose of This Medicine: · Store the medicine in a closed container at room temperature, away from heat, moisture, and direct light. · Ask your pharmacist, doctor, or health caregiver about the best way to dispose of any outdated medicine or medicine no longer needed. · Keep all medicine out of the reach of children. Never share your medicine with anyone. Drugs and Foods to Avoid: Ask your doctor or pharmacist before using any other medicine, including over-the-counter medicines, vitamins, and herbal products. · Make sure your doctor knows about all the other medicines you are using. There are certain prescription medicines that should not be taken together with an antacid. Warnings While Using This Medicine: · Make sure your doctor knows if you are pregnant or breastfeeding. · This medicine contains salt. Talk to your doctor before using this medicine if you are on a low-sodium (low-salt) diet. · Tell your doctor if your symptoms last for longer than two weeks. Possible Side Effects While Using This Medicine:  
Call your doctor right away if you notice any of these side effects: · Allergic reaction: Itching or hives, swelling in your face or hands, swelling or tingling in your mouth or throat, chest tightness, trouble breathing · Headache that continues. · Rapid weight gain. · Severe stomach pain. · Shortness of breath, cold sweats, and bluish-colored skin. · Stomach discomfort that gets worse or new stomach symptoms. · Swelling in your hands, ankles, or feet. If you notice other side effects that you think are caused by this medicine, tell your doctor. Call your doctor for medical advice about side effects. You may report side effects to FDA at 4-682-KHU-7497 © 2016 [address] is for End User's use only and may not be sold, redistributed or otherwise used for commercial purposes. The above information is an  only. It is not intended as medical advice for individual conditions or treatments. Talk to your doctor, nurse or pharmacist before following any medical regimen to see if it is safe and effective for you. Chlorhexidine (Into the mouth) Chlorhexidine (klor-HEX-i-mariya) Treats gingivitis and periodontitis (teeth and gum disease). Brand Name(s):Periochip There may be other brand names for this medicine. When This Medicine Should Not Be Used: Do not use this medicine if you have had an allergic reaction to chlorhexidine. How to Use This Medicine:  
Chip, Liquid · Your dentist will tell you how much of this medicine to use and how often. Do not use more medicine or use it longer or more often than your dentist tells you to. · Chip: Your dentist will place the chip between your gums and teeth where the gum has a deep pocket. He will place the chip after your teeth have been thoroughly cleaned. Your dentist will check the depth of the pockets in your gums every 3 months to see if they need to be treated again. · Liquid: This medicine should be used after you have brushed and flossed your teeth. Measure out 1/2 fluid ounce (15 milliliters) as marked in the cap that comes with the bottle. Swish the solution in your mouth for at least 30 seconds and then spit it out. Do not swallow it. Do not mix it with water or other fluids. Do not eat or drink right after you use this medicine. If a dose is missed: · Take a dose as soon as you remember. If it is almost time for your next dose, wait until then and take a regular dose. Do not take extra medicine to make up for a missed dose. How to Store and Dispose of This Medicine: · Store the medicine in a closed container at room temperature, away from heat, moisture, and direct light. · Ask your pharmacist, doctor, or health caregiver about the best way to dispose of any outdated medicine or medicine no longer needed. · Keep all medicine out of the reach of children. Never share your medicine with anyone. Drugs and Foods to Avoid: Ask your doctor or pharmacist before using any other medicine, including over-the-counter medicines, vitamins, and herbal products. Warnings While Using This Medicine: · Make sure your dentist knows if you are pregnant or breastfeeding, or if you have cancer, diabetes, a weak immune system, or other gum problems (such as abscess or pus). · This medicine may discolor your teeth a yellow-brown color. This is normal and can be removed with professional cleaning. · Check with your dentist right away if a dental chip becomes loose or falls out. Chlorhexidine implants are small, orange-brown rectangular chips that are rounded at one end. · Do not floss around the teeth and gums that have been treated for 10 days after the dental chips have been inserted. Flossing may push out the dental chips. · Mild sensitivity is normal for about 1 week after the chip was inserted. Check with your dentist right away if you feel pain or swelling where the chip was inserted. Possible Side Effects While Using This Medicine: If you notice these less serious side effects, talk with your doctor: · Allergic reaction: Itching or hives, swelling in your face or hands, swelling or tingling in your mouth or throat, chest tightness, trouble breathing · Bad taste in your mouth or altered sense of taste · Stained teeth, tooth fillings, and dentures · Toothache If you notice other side effects that you think are caused by this medicine, tell your doctor. Call your doctor for medical advice about side effects. You may report side effects to FDA at 0-466-QIM-1128 © 2016 [address] is for End User's use only and may not be sold, redistributed or otherwise used for commercial purposes. The above information is an  only. It is not intended as medical advice for individual conditions or treatments. Talk to your doctor, nurse or pharmacist before following any medical regimen to see if it is safe and effective for you. Potassium Chloride (By mouth) Potassium Chloride (uga-SME-ee-um KLOR-lm) Prevents and treats low potassium levels in the blood. Brand Name(s):K-Sol, K-Tab, [address] [place] [address] [address], Klor-Con, Klor-Con 10, Klor-Con 8, Klor-Con M10, Klor-Con M15, Klor-Con M20, Klor-Con Sprinkle There may be other brand names for this medicine. When This Medicine Should Not Be Used: This medicine is not right for everyone. Do not use if you had an allergic reaction to potassium. How to Use This Medicine:  
Tablet, Long Acting Capsule, Powder, Liquid, Long Acting Tablet, Granule · Your doctor will tell you how much medicine to use. Do not use more than directed. · Take this medicine with food or right after eating, to avoid stomach upset. · Powder or oral liquid:  You must mix with at least one-half cup (4 ounces) of water or juice. You could damage your stomach if you take the medicine without mixing it with water or juice. · Tablet or capsule: Do not chew, crush, or break. Swallow it whole with full glass of water. · Extended-release capsule: Swallow whole with a full glass of water. If you cannot swallow the extended-release capsule, you may open it and pour the medicine into a small amount of soft food such as pudding, yogurt, or applesauce. Stir this mixture well and swallow it without chewing. · Extended-release tablet:  Swallow whole with a full glass of water. If you have trouble swallowing, ask your doctor or pharmacist if you may crush the tablet. Some brands of this medicine must be swallowed whole, but other brands may be crushed. · If you mix the medicine in water or soft food, do not mix until you are ready to take your dose. Do not save mixed medicine for later use. · Carefully follow your doctor's instructions about any special diet. · Missed dose: Take a dose as soon as you remember. If it is almost time for your next dose, wait until then and take a regular dose. Do not take extra medicine to make up for a missed dose. · Store the medicine in a closed container at room temperature, away from heat, moisture, and direct light. Drugs and Foods to Avoid: Ask your doctor or pharmacist before using any other medicine, including over-the-counter medicines, vitamins, and herbal products. · Some foods and medicines can affect how potassium chloride works. Tell you doctor if you are using any of the following: ¨ Potassium-sparing diuretic (water pill) ¨ ACE inhibitor blood pressure medicine ¨ Salt substitute ¨ Digoxin Warnings While Using This Medicine: · Tell your doctor if you are pregnant or breastfeeding or if you have kidney disease, heart disease, or problems with your digestive system. · This medicine may cause the following problems: ¨ Bleeding or ulcers in the digestive system ¨ Potassium levels that are too high · Your doctor will do lab tests at regular visits to check on the effects of this medicine. Keep all appointments. · Keep all medicine out of the reach of children. Never share your medicine with anyone. Possible Side Effects While Using This Medicine:  
Call your doctor right away if you notice any of these side effects: · Allergic reaction: Itching or hives, swelling in your face or hands, swelling or tingling in your mouth or throat, chest tightness, trouble breathing · Bloody or black, tarry stools · Confusion, weakness, uneven heartbeat, trouble breathing, numbness in your hands, feet, or lips · Severe stomach pain or vomiting · Throat pain, feeling as if pill is stuck in the throat If you notice these less serious side effects, talk with your doctor: · Mild nausea, diarrhea, gas If you notice other side effects that you think are caused by this medicine, tell your doctor. Call your doctor for medical advice about side effects. You may report side effects to FDA at 7-226-TYW-7120 © 2016 [address] is for End User's use only and may not be sold, redistributed or otherwise used for commercial purposes. The above information is an  only. It is not intended as medical advice for individual conditions or treatments. Talk to your doctor, nurse or pharmacist before following any medical regimen to see if it is safe and effective for you. Ipratropium/Albuterol (By breathing) Albuterol Sulfate (al-BUE-ter-ol SUL-fate), Ipratropium Bromide (hn-rt-DYJY-pee-um BROE-mide) Treats chronic obstructive pulmonary disease (COPD). Brand Name(s):Combivent, Combivent Respimat, Duoneb There may be other brand names for this medicine. When This Medicine Should Not Be Used: Do not use this medicine if you have had an allergic reaction to albuterol, ipratropium, levalbuterol, or atropine. You should not use the Combivent® brand of this medicine if you are allergic to soya lecithin, soybeans, or peanuts. How to Use This Medicine:  
Aerosol · Your doctor will tell you how much medicine to use. Do not use more than directed. This medicine comes in more than one form. It can be given with an aerosol inhaler, a spray inhaler, or a nebulizer. · Read and follow the patient instructions that come with this medicine. Talk to your doctor or pharmacist if you have any questions. · Aerosol inhaler: ¨ You will use this medicine with a device called a metered-dose inhaler. The inhaler fits on the medicine canister and turns the medicine into a fine spray that you breathe in through your mouth and to your lungs. You may be told to use a spacer, which is a tube that is placed between the inhaler and your mouth. Your caregiver will show you how to use your inhaler and the spacer (if needed). ¨ Shake the inhaler well just before each use. Avoid spraying this medicine into your eyes. ¨ Remove the cap and look at the mouthpiece to make sure it is clean. ¨ Test spray in the air before using for the first time or if the inhaler has not been used for a while. Spray the medicine 3 times into the air and away from your face. ¨ To inhale this medicine, breathe out fully, trying to get as much air out of the lungs as possible. Put the mouthpiece just in front of your mouth with the canister upright. ¨ Open your mouth and breathe in slowly and deeply (like yawning), and at the same time firmly press down on the top of the canister once. ¨ Hold your breath for about 5 to 10 seconds, and then breathe out slowly. ¨ If you are supposed to use more than one puff, wait 1 to 2 minutes before inhaling the second puff. Repeat these steps for the next puff, starting with shaking the inhaler. ¨ When you have finished all your inhalations, rinse your mouth out with water. ¨ Clean the inhaler mouthpiece daily with warm water. · Spray inhaler: ¨ You need to prime the spray inhaler to get it ready the first time you use it. You also need to prime it when you don't use the inhaler for more than 3 days in a row. ¨ Prime the spray inhaler: Make sure the inhaler is pointed away from your face. Press the button a few times until some medicine is sprayed out. Then spray the medicine 3 more times, to make sure the correct amount of medicine comes out each time.  
¨ Follow the instructions that came with the medicine if it has been more than 3 days since you used the medicine. ¨ Use the medicine: Breathe out, and try to get as much air out of your lungs as possible. Place your lips on the mouthpiece, but do not cover the air vents. ¨ Slowly breathe in through your mouth, and press the button at the same time. Keep breathing in for as long as possible. Hold your breath for about 10 seconds. ¨ Clean the mouthpiece with a damp cloth or a tissue. Do this at least once a week. · A nebulizer turns the medicine into a fine mist that you can breathe in through your mouth to your lungs. Your caregiver will show you how to use your nebulizer. If a dose is missed: · Take a dose as soon as you remember. If it is almost time for your next dose, wait until then and take a regular dose. Do not take extra medicine to make up for a missed dose. How to Store and Dispose of This Medicine: · Use all of the nebulizer liquid in the vial right away or throw it away. Keep the medicine in the foil pouch until you are ready to use it. Store at room temperature, away from heat and direct light. Do not freeze. · Store the canister at room temperature, away from heat and direct light. Do not freeze. Do not keep this medicine inside a car where it could be exposed to extreme heat or cold. Do not poke holes in the canister or throw it into a fire, even if the canister is empty. · Ask your pharmacist, doctor, or health caregiver about the best way to dispose of the used medicine container and any leftover medicine. You will also need to throw away old medicine after the expiration date has passed. · Keep all medicine out of the reach of children. Never share your medicine with anyone. Drugs and Foods to Avoid: Ask your doctor or pharmacist before using any other medicine, including over-the-counter medicines, vitamins, and herbal products.  
· Make sure your doctor knows if you also use digoxin (Lanoxin®), blood pressure medicines (such as atenolol, metoprolol, propranolol, Tenormin®), or diuretics (water pills, such as furosemide, hydrochlorothiazide, torsemide, Lasix®). Tell your doctor if you have used medicine for depression (such as amitriptyline, doxepin, nortriptyline, Elavil®) or an MAO inhibitor (such as Eldepryl®, Marplan®, Nardil®, Parnate®) within the past 2 weeks. · This medicine should not be used together with similar inhaled medicines such as albuterol (Ventolin®), isoproterenol (Isuprel®), levalbuterol (Xopenex), metaproterenol (Alupent®), pirbuterol (Maxair®), or terbutaline Damir Santiago). · Tell your doctor if you also use medicine to treat incontinence, or other medicine that can cause dry mouth or constipation (such as atropine, dicyclomine, glycopyrrolate, scopolamine, Bentyl®, Robinul®, Transderm Scop®). Warnings While Using This Medicine: · Make sure your doctor knows if you are pregnant or breastfeeding, or if you have kidney disease, liver disease, diabetes, blood vessel problems, heart disease, heart rhythm problems, high blood pressure, low potassium, an overactive thyroid, narrow-angle glaucoma, an enlarged prostate, problems with urination, or seizures. · Stop using this medicine and check with your doctor right away if you have coughing, trouble breathing, shortness of breath, or wheezing after you use this medicine. This medicine may cause paradoxical bronchospasm, which may be life-threatening. · Tell your doctor right away if you feel chest pain, notice any changes in your blood pressure (such as feeling lightheaded), or notice your heart beating faster or slower. · If any of your COPD medicines do not seem to be working as well as usual, call your doctor right away. Take all of your COPD medicines as instructed. Do not use more medicine or change your dose without asking your doctor. · Do not spray the medicine near your eyes.  If the medicine gets in your eyes, it can cause pain, enlarged pupils, red eyes, blurred vision, halos, or odd colors when you look at things. If the medicine does get in your eyes, rinse your eyes with cool water and call your doctor. · This medicine may make you dizzy or drowsy, or it might cause trouble seeing clearly. Avoid driving, using machines, or doing anything else that could be dangerous if you are not alert or not able to see well. · Call your doctor if your symptoms do not improve or if they get worse. Possible Side Effects While Using This Medicine:  
Call your doctor right away if you notice any of these side effects: · Allergic reaction: Itching or hives, swelling in your face or hands, swelling or tingling in your mouth or throat, chest tightness, trouble breathing · Chest pain, fast, pounding, or uneven heartbeat · Decrease in how much or how often you urinate · Dry mouth, increased thirst, muscle cramps, nausea, vomiting · Fever, chills, cough, sore throat, stuffy or runny nose, and body aches · Lightheadedness or fainting · Seizures · Trouble breathing If you notice these less serious side effects, talk with your doctor: · Blurred vision · Headache · Nervousness, shaking If you notice other side effects that you think are caused by this medicine, tell your doctor. Call your doctor for medical advice about side effects. You may report side effects to FDA at 6-373-FDA-8345 © 2016 [address] is for End User's use only and may not be sold, redistributed or otherwise used for commercial purposes. The above information is an  only. It is not intended as medical advice for individual conditions or treatments. Talk to your doctor, nurse or pharmacist before following any medical regimen to see if it is safe and effective for you. Enoxaparin (By injection) Enoxaparin (ee-nox-a-PAR-in) Prevents and treats blood clots. Also treats heart attacks.  This medicine is a blood thinner. Brand Name(s):[facility] Choice Enoxaparin Sodium, Lovenox, Novaplus Enoxaparin Sodium, PremierPro Rx Lovenox There may be other brand names for this medicine. When This Medicine Should Not Be Used: You should not use this medicine if you have had an allergic reaction to enoxaparin, heparin, benzyl alcohol, or products made from pork. You should not use enoxaparin if you have bleeding disorders or any active bleeding. How to Use This Medicine:  
Injectable · Your doctor will prescribe your exact dose and tell you how often it should be given. This medicine is given as a shot under your skin. · A nurse or other health provider will give you this medicine. It may also be given by a home health caregiver. · You may be taught how to give your medicine at home. Make sure you understand all instructions before giving yourself an injection. Do not use more medicine or use it more often than your doctor tells you to. · You will be shown the body areas where this shot can be given. Use a different body area each time you give yourself a shot. Keep track of where you give each shot to make sure you rotate body areas. · Use a new needle and syringe each time you inject your medicine. If a dose is missed:  
· You must use this medicine on a fixed schedule. Call your doctor or pharmacist if you miss a dose. How to Store and Dispose of This Medicine: · If you store this medicine at home, keep it at room temperature, away from heat and direct light. · If you were given a bottle of medicine to use with your syringes, you must use the medicine within 28 days after the first shot. Throw away the unused medicine in the bottle after 28 days. · Throw away used needles in a hard, closed container that the needles cannot poke through. Keep this container away from children and pets.  
· Ask your pharmacist, doctor, or health caregiver about the best way to dispose of any leftover medicine, containers, and other supplies. Throw away old medicine after the expiration date has passed. · Keep all medicine out of the reach of children. Never share your medicine with anyone. Drugs and Foods to Avoid: Ask your doctor or pharmacist before using any other medicine, including over-the-counter medicines, vitamins, and herbal products. · Make sure your doctor knows if you are also using blood thinners (such as clopidogrel, warfarin, or Coumadin®). Tell your doctor if you are also using dipyridamole (Persantine®), ketorolac (Toradol®), or sulfinpyrazone (Anturane®). · Make sure your doctor knows if you are using pain or arthritis medicine (such as aspirin, Advil®, Aleve®, Motrin®, Orudis®, Dolobid®, [place]mitry, Indocin®, Relafen®, or Voltaren®). Avoid taking aspirin or medicines that contain aspirin, unless your doctor tells you to. Warnings While Using This Medicine: · Make sure your doctor knows if you are pregnant or breastfeeding, or if you have liver disease, kidney disease, blood vessel problems, diabetes, a heart infection, uncontrolled high blood pressure, a stomach ulcer or bleeding, or a bleeding disorder such as hemophilia. Tell your doctor if you have a bleeding disorder caused by heparin. · Make sure your doctor knows if you have recently had a stroke, or surgery on your eyes, brain, or spine. Tell your doctor if you have had a heart valve replaced. · This medicine may cause bleeding or bruising. This risk is higher if you have a catheter in your back for pain medicine or anesthesia (sometimes called an \"epidural\"), or if you have kidney problems. The risk of bleeding increases if your kidney problems get worse. Discuss this with your doctor if you are concerned. · You may bleed and bruise more easily while you are using this medicine.  Be extra careful to avoid injuries until the effects of the medicine have worn off. Stay away from rough sports or other situations where you could be bruised, cut, or injured. Brush and floss your teeth gently. Be careful when using sharp objects, including razors and fingernail clippers. Avoid picking your nose. If you need to blow your nose, blow it gently. · Watch for any bleeding from open areas such as around the injection site. Also check for blood in your urine or stool. If you have any bleeding or injuries, tell your doctor right away. · Tell any doctor or dentist who treats you that you are using this medicine. You may need to stop using this medicine several days before you have surgery or medical tests. · Your doctor will do lab tests at regular visits to check on the effects of this medicine. Keep all appointments. Possible Side Effects While Using This Medicine:  
Call your doctor right away if you notice any of these side effects: · Allergic reaction: Itching or hives, swelling in your face or hands, swelling or tingling in your mouth or throat, chest tightness, trouble breathing · Blood in your urine. · Bloody or black, tarry stools. · Chest pain, shortness of breath, or coughing up blood. · Fever. · Large, flat, blue or purplish patches in the skin. · Numbness or weakness in your arm or leg, or on one side of your body. · Pain in your lower leg (calf). · Sudden or severe headache, problems with vision, speech, or walking. · Swelling in your hands, ankles, or feet. · Uneven heartbeat. · Unusual bleeding or bruising. · Vomiting blood or material that looks like coffee grounds. · Warmth or redness in your face, neck, arms, or upper chest. 
If you notice these less serious side effects, talk with your doctor: · Confusion. · Nausea or diarrhea. · Pain, redness, bruising, swelling, or a lump under your skin where the shot was given. If you notice other side effects that you think are caused by this medicine, tell your doctor. Call your doctor for medical advice about side effects. You may report side effects to FDA at 3-892-CUU-0872 © 2016 [address] is for End User's use only and may not be sold, redistributed or otherwise used for commercial purposes. The above information is an  only. It is not intended as medical advice for individual conditions or treatments. Talk to your doctor, nurse or pharmacist before following any medical regimen to see if it is safe and effective for you. Furosemide (By mouth) Furosemide (eejt-OD-tq-mide) Treats fluid retention and high blood pressure. This medicine is a diuretic (water pill). Brand Name(s): Active-Medicated Specimen Collection Kit, Lasix, RX-Specimen Collection Kit, Urinx Medicated Specimen Collection Package There may be other brand names for this medicine. When This Medicine Should Not Be Used: This medicine is not right for everyone. Do not use it if you had an allergic reaction to furosemide or if you are not able to urinate. How to Use This Medicine:  
Liquid, Tablet · Take your medicine as directed. Your dose may need to be changed several times to find what works best for you. · Measure the oral liquid medicine with a marked measuring spoon, oral syringe, or medicine cup. · You may take this medicine with food if it upsets your stomach. · Missed dose: Take a dose as soon as you remember. If it is almost time for your next dose, wait until then and take a regular dose. Do not take extra medicine to make up for a missed dose. · Store the medicine in a closed container at room temperature, away from heat, moisture, and direct light. Drugs and Foods to Avoid: Ask your doctor or pharmacist before using any other medicine, including over-the-counter medicines, vitamins, and herbal products. · Some medicines can affect how furosemide works.  Tell your doctor if you are also using cisplatin, cyclosporine, digoxin, ethacrynic acid, indomethacin, licorice, lithium, mecamylamine, methotrexate, or phenytoin. · Also tell your doctor if you are also using an adrenocorticotropic hormone (ACTH), a laxative, medicine to treat an infection, other medicine for high blood pressure, a steroid medicine (such as hydrocortisone, methylprednisolone, prednisone, prednisolone, dexamethasone), or an NSAID pain or arthritis medicine (such as aspirin, diclofenac, ibuprofen, naproxen). · If you also take sucralfate, allow at least 2 hours between the time you take furosemide and the time you take sucralfate. Warnings While Using This Medicine: · Tell your doctor if you are pregnant or breastfeeding, or if you have kidney disease, liver disease, anemia, diabetes, gout, hearing problems, low blood pressure, lupus, an enlarged prostate, trouble urinating, or an allergy to sulfa drugs. Tell your doctor if you drink alcohol. · This medicine may cause the following problems:  
¨ Hypokalemia (low potassium in your blood) ¨ Changes in blood sugar levels ¨ Hearing problems · Make sure any doctor or dentist who treats you knows that you are using this medicine. · This medicine could lower your blood pressure too much, especially when you first use it or if you are dehydrated. Stand or sit up slowly if you feel lightheaded or dizzy. · Drink plenty of fluids if you exercise, sweat more than usual, or have diarrhea or vomiting. · This medicine may make your skin more sensitive to sunlight. Wear sunscreen. Do not use sunlamps or tanning beds. · Your doctor will do lab tests at regular visits to check on the effects of this medicine. Keep all appointments. · Keep all medicine out of the reach of children. Never share your medicine with anyone. Possible Side Effects While Using This Medicine:  
Call your doctor right away if you notice any of these side effects: · Allergic reaction: Itching or hives, swelling in your face or hands, swelling or tingling in your mouth or throat, chest tightness, trouble breathing · Blistering, peeling, red skin rash · Chest pain, shortness of breath · Confusion, weakness, muscle twitching · Dry mouth, increased thirst, muscle cramps, nausea or vomiting, uneven heartbeat · Sudden and severe stomach pain, nausea, vomiting, fever, lightheadedness · Hearing loss, ringing in the ears · Lightheadedness, dizziness, fainting · Severe diarrhea · Unusual bleeding or bruising · Yellow skin or eyes If you notice these less serious side effects, talk with your doctor: · Loss of appetite, stomach cramps If you notice other side effects that you think are caused by this medicine, tell your doctor. Call your doctor for medical advice about side effects. You may report side effects to FDA at 0-717-FDA-4973 © 2016 [address] is for End User's use only and may not be sold, redistributed or otherwise used for commercial purposes. The above information is an  only. It is not intended as medical advice for individual conditions or treatments. Talk to your doctor, nurse or pharmacist before following any medical regimen to see if it is safe and effective for you.

## 2017-03-04 PROBLEM — D69.6 SEVERE THROMBOCYTOPENIA (HCC): Status: ACTIVE | Noted: 2017-01-01

## 2017-03-04 NOTE — ED NOTES
Pt transported to CT scan a second time for CT of head. Pt tolerated the procedure, but states she felt nauseous with movement. Placed on a hospital bed for comfort. Call bell within reach. Pt resting comfortably. Will continue to monitor.

## 2017-03-04 NOTE — ED NOTES
Pt /78, levophed decreased. Pt alert and improved. Pt c/o intermittent nausea and then will fall back to sleep. Will continue to monitor.

## 2017-03-04 NOTE — CONSULTS
[facility]  Pulmonary, Critical Care, and Sleep Medicine    Name: Heydi Sosa MRN: [id number]   : 1960 Hospital: [address] [place] [address]   Date: 3/4/2017        Critical Care Initial Patient Consult    Requesting MD:    ER and Hospitalist MDs                                              Reason for CC Consult: Sepsis  IMPRESSION:   Patient Active Problem List   Diagnosis Code    Anemia D64.9    Anxiety F41.9    Nausea and vomiting R11.2    Morbid obesity ([facility] [address] E66.01    Chronic pain syndrome G89.4    History of ovarian cancer Z85.43    History of gastric bypass Z98.890    CAD (coronary artery disease) I25.10    History of Clostridium difficile Z87.19    History of endometrial cancer Z85.42    History of GI bleed Z87.19    Lymphedema I89.0    Hypokalemia E87.6    DEV (acute kidney injury) ([facility] [address] N17.9    Pulmonary nodules R91.8    A-fib ([facility] [address] I48.91    Aortic stenosis I35.0    GI bleed K92.2    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   · Continue ATBXs  · De escalate ATBXs once we know ID/sensi  · IVF: Received IVF per Sepsis protocol: most likely preload dependence due to valvular disease  · Pressors  · I spoke with the lab they will run a Cortisol level on blood from 630 AM this AM  · CXR  · ABG  · CBC  · CMP  · Lactic Acid  · Already received Platelets         Hematology / Nephrology/ ID / ICU: will see her per Primary service request Subjective/History: This patient has been seen and evaluated at the request of Dr. Gustabo HAJI for Sepsis. Patient is a 49 y.o. female unknown to me : ALL history from chart review: patient is not able to give accurate info/nofamily available to me. Brought by EMS from hospice care due to terminal GYN CA, I was told that she was hospice care and DNR. E MD told me that she remains DNR and removed herself from hospice care, hospitalist's note states that she is now full code. Admitted with multiple medical problems and severe sepsis, hypotensive, acidotic and in MODS, she seems to be responding to initial therapy     The patient is critically ill and can not provide additional history due to Unable to comprehend. Past Medical History:   Diagnosis Date    Anxiety and depression     Aortic stenosis     Aortic stenosis     Arthritis     Arthropathy     Back pain     Cardiac disorder     Cellulitis     left leg    Cellulitis     Chronic pain     Endometrial adenocarcinoma (HCC)     Endometrial cancer (HCC)     Endometrial hyperplasia     Gastric ulcer     H/O ovarian cancer     Heart murmur     Hematemesis     History of blood transfusion     History of endometrial cancer     Infection     Lymphedema     Munchausen syndrome     Narcotic dependence (HCC)     Obesity     Ovarian cancer ([facility] [address]     Spinal stenosis     Status post partial hysterectomy     Super-super obese (HCC)     Urethral obstruction       Past Surgical History:   Procedure Laterality Date    HX  SECTION      HX ENDOSCOPY      EGD 6 x [facility]    HX HERNIA REPAIR      HX OOPHORECTOMY      HX TONSILLECTOMY      HX VASCULAR ACCESS Right     single lumen power port      Prior to Admission medications    Medication Sig Start Date End Date Taking? Authorizing Provider   dronabinol (MARINOL) 2.5 mg capsule Take 1 Cap by mouth two (2) times a day.  Max Daily Amount: 5 mg. 16   Екатерина Amador MD   HYDROmorphone (DILAUDID) 4 mg tablet Take 1 Tab by mouth every three (3) hours as needed. Max Daily Amount: 32 mg. 8/30/16   Izabella Ma MD   metoprolol succinate (TOPROL-XL) 25 mg XL tablet Take 1 Tab by mouth daily. 8/30/16   Izabella Ma MD   morphine CR (MS CONTIN) 30 mg CR tablet Take 1 Tab by mouth every twelve (12) hours. Max Daily Amount: 60 mg. 8/30/16   Izabella Ma MD   ondansetron (ZOFRAN ODT) 4 mg disintegrating tablet Take 1 Tab by mouth every six (6) hours as needed for Nausea (vomiting). 8/30/16   Izabella Ma MD   pantoprazole (PROTONIX) 40 mg tablet Take 1 Tab by mouth Before breakfast and dinner. 8/30/16   Izabella Ma MD   oxyCODONE-acetaminophen (PERCOCET) 5-325 mg per tablet Take 1 Tab by mouth every four (4) hours as needed for Pain. Max Daily Amount: 6 Tabs. 8/19/16   Yamel Montero DO     Current Facility-Administered Medications   Medication Dose Route Frequency    linezolid (ZYVOX) IVPB premix in D5W 600 mg  600 mg IntraVENous Q12H    [START ON 3/5/2017] levoFLOXacin (LEVAQUIN) 750 mg in D5W IVPB  750 mg IntraVENous Q48H    piperacillin-tazobactam (ZOSYN) 2.25 g in 0.9% sodium chloride (MBP/ADV) 50 mL MBP  2.25 g IntraVENous Q8H    0.9% sodium chloride infusion  150 mL/hr IntraVENous CONTINUOUS    pantoprazole (PROTONIX) 40 mg in sodium chloride 0.9 % 10 mL injection  40 mg IntraVENous DAILY    NOREPINephrine (LEVOPHED) 8,000 mcg in dextrose 5% 250 mL infusion  2-30 mcg/min IntraVENous TITRATE     Allergies   Allergen Reactions    Latex, Natural Rubber Hives and Itching    Clindamycin Hives    Clindamycin Itching    Iodine Hives     Pt denies allergy. Patient reports this is an allergy to contrast media that caused temporary vision loss and vomiting but is fine if premedicated with antihistamine.     Keflex [Cephalexin] Hives    Linezolid Hives    Nsaids (Non-Steroidal Anti-Inflammatory Drug) Nausea and Vomiting     Emesis, Previous GI bleed  Piperacillin-Tazobactam Hives     Has tolerated Teflaro @ [id number]  Has been tolerating cephalosporins SAPH     Sulfa (Sulfonamide Antibiotics) Hives and Contact Dermatitis    Vancomycin Hives      Social History   Substance Use Topics    Smoking status: Former Smoker    Smokeless tobacco: Never Used    Alcohol use No      Family History   Problem Relation Age of Onset    Arthritis-osteo Mother     Heart Attack Mother     Cancer Father     Heart Attack Father     Hypertension Brother     Stroke Brother         Review of Systems:  Review of systems not obtained due to patient factors. Objective:   Vital Signs:    Visit Vitals    /70    Pulse 80    Temp (!) 101.8 °F (38.8 °C)    Resp 14    Ht 5' 4\" (1.626 m)    Wt 122.5 kg (270 lb)    SpO2 100%    BMI 46.35 kg/m2               Temp (24hrs), Av.2 °F (26.8 °C), Min:36.5 °F (2.5 °C), Max:101.8 °F (38.8 °C)       Intake/Output:   Last shift:         Last 3 shifts:  1901 -  0700  In: 4098 [I.V.:4350]  Out: -     Intake/Output Summary (Last 24 hours) at 17 1052  Last data filed at 17 0446   Gross per 24 hour   Intake             4905 ml   Output                0 ml   Net             4905 ml     Hemodynamics:   . @MAP     . @CVP       Ventilator Settings:  Mode Rate Tidal Volume Pressure FiO2 PEEP                    Peak airway pressure:      Minute ventilation:        ARDS network Guidelines: Lung protective strategy and Pl pressure goals____________    Physical Exam:    General:  Alert, uncooperative, no distress, appears older than stated age. Neglected personal hygiene   Head:  Normocephalic, without obvious abnormality, atraumatic. Eyes:  Conjunctivae/corneas clear. PERRL, EOMs intact. Nose: Nares normal. Septum midline. Mucosa normal. No drainage or sinus tenderness. Throat: Lips, mucosa, and tongue pale. Teeth and gums in poor condition.    Neck: Supple, symmetrical, trachea midline, no adenopathy, thyroid: no enlargment/tenderness/nodules, no carotid bruit and no JVD. Back:   Symmetric, no curvature. ROM normal.   Lungs:   Clear to auscultation bilaterally. Chest wall:  No tenderness or deformity. Heart:  Regular rate and rhythm, S1, S2 normal, no murmur, click, rub or gallop. Abdomen:   Soft, non-tender. Bowel sounds normal. No masses,  No organomegaly. Extremities: Extremities normal, atraumatic, no cyanosis or edema. Pulses: 2+ and symmetric all extremities. Skin: Skin color, texture, turgor normal. No rashes or lesions   Lymph nodes: Cervical, supraclavicular, and axillary nodes normal.   Neurologic: Grossly non focal, alert, non cooperative, disoriented       Data:     Recent Results (from the past 24 hour(s))   GLUCOSE, POC    Collection Time: 03/03/17  7:17 PM   Result Value Ref Range    Glucose (POC) <30.0 (LL) 70 - 110 mg/dL   GLUCOSE, POC    Collection Time: 03/03/17  7:25 PM   Result Value Ref Range    Glucose (POC) 55 (L) 70 - 209 mg/dL   METABOLIC PANEL, COMPREHENSIVE    Collection Time: 03/03/17  7:47 PM   Result Value Ref Range    Sodium 127 (L) 136 - 145 mmol/L    Potassium 5.3 3.5 - 5.5 mmol/L    Chloride 92 (L) 100 - 108 mmol/L    CO2 9 (LL) 21 - 32 mmol/L    Anion gap 26 (H) 3.0 - 18 mmol/L    Glucose 440 (HH) 74 - 99 mg/dL     (H) 7.0 - 18 MG/DL    Creatinine 6.55 (H) 0.6 - 1.3 MG/DL    BUN/Creatinine ratio 25 (H) 12 - 20      GFR est AA 8 (L) >60 ml/min/1.73m2    GFR est non-AA 7 (L) >60 ml/min/1.73m2    Calcium 7.6 (L) 8.5 - 10.1 MG/DL    Bilirubin, total 2.2 (H) 0.2 - 1.0 MG/DL    ALT (SGPT) 46 13 - 56 U/L    AST (SGOT) 82 (H) 15 - 37 U/L    Alk.  phosphatase 450 (H) 45 - 117 U/L    Protein, total 5.9 (L) 6.4 - 8.2 g/dL    Albumin 1.4 (L) 3.4 - 5.0 g/dL    Globulin 4.5 (H) 2.0 - 4.0 g/dL    A-G Ratio 0.3 (L) 0.8 - 1.7     LIPASE    Collection Time: 03/03/17  7:47 PM   Result Value Ref Range    Lipase 207 73 - 393 U/L   POC LACTIC ACID    Collection Time: 03/03/17  7:47 PM Result Value Ref Range    Lactic Acid (POC) 7.9 (HH) 0.4 - 2.0 mmol/L   GLUCOSE, POC    Collection Time: 03/03/17  7:50 PM   Result Value Ref Range    Glucose (POC) 429 (HH) 70 - 110 mg/dL   GLUCOSE, POC    Collection Time: 03/03/17  7:51 PM   Result Value Ref Range    Glucose (POC) 438 (HH) 70 - 110 mg/dL   GLUCOSE, POC    Collection Time: 03/03/17  8:44 PM   Result Value Ref Range    Glucose (POC) 179 (H) 70 - 110 mg/dL   CULTURE, BLOOD    Collection Time: 03/03/17  9:04 PM   Result Value Ref Range    Special Requests: PERIPHERAL      Culture result: NO GROWTH AFTER 9 HOURS     CULTURE, BLOOD    Collection Time: 03/03/17  9:04 PM   Result Value Ref Range    Special Requests: PERIPHERAL      Culture result: NO GROWTH AFTER 9 HOURS     CBC WITH AUTOMATED DIFF    Collection Time: 03/03/17  9:04 PM   Result Value Ref Range    WBC 28.4 (H) 4.6 - 13.2 K/uL    RBC 3.30 (L) 4.20 - 5.30 M/uL    HGB 8.8 (L) 12.0 - 16.0 g/dL    HCT 28.2 (L) 35.0 - 45.0 %    MCV 85.5 74.0 - 97.0 FL    MCH 26.7 24.0 - 34.0 PG    MCHC 31.2 31.0 - 37.0 g/dL    RDW 15.7 (H) 11.6 - 14.5 %    PLATELET 10 (LL) 886 - 420 K/uL    NEUTROPHILS 80 (H) 42 - 75 %    BAND NEUTROPHILS 17 (H) 0 - 5 %    LYMPHOCYTES 2 (L) 20 - 51 %    MONOCYTES 1 (L) 2 - 9 %    EOSINOPHILS 0 0 - 5 %    BASOPHILS 0 0 - 3 %    ABS. NEUTROPHILS 22.7 (H) 1.8 - 8.0 K/UL    ABS. LYMPHOCYTES 0.6 (L) 0.8 - 3.5 K/UL    ABS. MONOCYTES 0.3 0 - 1.0 K/UL    ABS. EOSINOPHILS 0.0 0.0 - 0.4 K/UL    ABS. BASOPHILS 0.0 0.0 - 0.1 K/UL    PLATELET COMMENTS PLATELETS APPEAR MARKEDLY  DECREASED.      RBC COMMENTS NORMOCYTIC, NORMOCHROMIC      WBC COMMENTS TOXIC GRANULATION      DF MANUAL     URINALYSIS W/ RFLX MICROSCOPIC    Collection Time: 03/03/17  9:09 PM   Result Value Ref Range    Color DARK YELLOW      Appearance TURBID      Specific gravity 1.018 1.005 - 1.030      pH (UA) 8.0 5.0 - 8.0      Protein 300 (A) NEG mg/dL    Glucose NEGATIVE  NEG mg/dL    Ketone NEGATIVE  NEG mg/dL    Bilirubin NEGATIVE  NEG      Blood LARGE (A) NEG      Urobilinogen 1.0 0.2 - 1.0 EU/dL    Nitrites NEGATIVE  NEG      Leukocyte Esterase LARGE (A) NEG     URINE MICROSCOPIC ONLY    Collection Time: 03/03/17  9:09 PM   Result Value Ref Range    WBC TOO NUMEROUS TO COUNT 0 - 4 /hpf    RBC TOO NUMEROUS TO COUNT 0 - 5 /hpf    Epithelial cells FEW 0 - 5 /lpf    Bacteria 4+ (A) NEG /hpf   EKG, 12 LEAD, INITIAL    Collection Time: 03/03/17  9:17 PM   Result Value Ref Range    Ventricular Rate 127 BPM    Atrial Rate 127 BPM    P-R Interval 130 ms    QRS Duration 86 ms    Q-T Interval 278 ms    QTC Calculation (Bezet) 404 ms    Calculated P Axis 51 degrees    Calculated R Axis 39 degrees    Calculated T Axis 65 degrees    Diagnosis       Sinus tachycardia  Possible Left atrial enlargement  Nonspecific T wave abnormality  Abnormal ECG  When compared with ECG of 01-AUG-2016 08:25,  Vent.  rate has increased BY  70 BPM  T wave inversion now evident in Lateral leads     METABOLIC PANEL, BASIC    Collection Time: 03/03/17 10:15 PM   Result Value Ref Range    Sodium 134 (L) 136 - 145 mmol/L    Potassium 4.6 3.5 - 5.5 mmol/L    Chloride 101 100 - 108 mmol/L    CO2 9 (LL) 21 - 32 mmol/L    Anion gap 24 (H) 3.0 - 18 mmol/L    Glucose 115 (H) 74 - 99 mg/dL     (H) 7.0 - 18 MG/DL    Creatinine 6.10 (H) 0.6 - 1.3 MG/DL    BUN/Creatinine ratio 25 (H) 12 - 20      GFR est AA 9 (L) >60 ml/min/1.73m2    GFR est non-AA 7 (L) >60 ml/min/1.73m2    Calcium 7.2 (L) 8.5 - 10.1 MG/DL   PLATELETS, ALLOCATE    Collection Time: 03/04/17 12:00 AM   Result Value Ref Range    Unit number [id number]     Blood component type PLTPH LR,1     Unit division 00     Status of unit ISSUED     Unit number [id number]     Blood component type PLTPH LR,2     Unit division 00     Status of unit ISSUED    POC LACTIC ACID    Collection Time: 03/04/17 12:26 AM   Result Value Ref Range    Lactic Acid (POC) 3.6 (HH) 0.4 - 2.0 mmol/L   TYPE & SCREEN    Collection Time: 03/04/17 12:30 AM   Result Value Ref Range    Crossmatch Expiration 03/07/2017     ABO/Rh(D) A POSITIVE     Antibody screen NEG    INFLUENZA A & B AG (RAPID TEST)    Collection Time: 03/04/17 12:35 AM   Result Value Ref Range    Influenza A Antigen NEGATIVE  NEG      Influenza B Antigen NEGATIVE  NEG     POC LACTIC ACID    Collection Time: 03/04/17  4:22 AM   Result Value Ref Range    Lactic Acid (POC) 1.7 0.4 - 2.0 mmol/L   METABOLIC PANEL, BASIC    Collection Time: 03/04/17  6:47 AM   Result Value Ref Range    Sodium 132 (L) 136 - 145 mmol/L    Potassium 4.7 3.5 - 5.5 mmol/L    Chloride 99 (L) 100 - 108 mmol/L    CO2 16 (L) 21 - 32 mmol/L    Anion gap 17 3.0 - 18 mmol/L    Glucose 132 (H) 74 - 99 mg/dL     (H) 7.0 - 18 MG/DL    Creatinine 5.31 (H) 0.6 - 1.3 MG/DL    BUN/Creatinine ratio 29 (H) 12 - 20      GFR est AA 10 (L) >60 ml/min/1.73m2    GFR est non-AA 8 (L) >60 ml/min/1.73m2    Calcium 7.6 (L) 8.5 - 10.1 MG/DL   CBC WITH AUTOMATED DIFF    Collection Time: 03/04/17  6:47 AM   Result Value Ref Range    WBC 24.9 (H) 4.6 - 13.2 K/uL    RBC 2.91 (L) 4.20 - 5.30 M/uL    HGB 7.8 (L) 12.0 - 16.0 g/dL    HCT 24.3 (L) 35.0 - 45.0 %    MCV 83.5 74.0 - 97.0 FL    MCH 26.8 24.0 - 34.0 PG    MCHC 32.1 31.0 - 37.0 g/dL    RDW 15.8 (H) 11.6 - 14.5 %    PLATELET 16 (LL) 114 - 420 K/uL    NEUTROPHILS 94 (H) 42 - 75 %    BAND NEUTROPHILS 1 0 - 5 %    LYMPHOCYTES 1 (L) 20 - 51 %    MONOCYTES 3 2 - 9 %    EOSINOPHILS 0 0 - 5 %    BASOPHILS 0 0 - 3 %    MYELOCYTES 1 (H) 0 %    ABS. NEUTROPHILS 23.4 (H) 1.8 - 8.0 K/UL    ABS. LYMPHOCYTES 0.3 (L) 0.8 - 3.5 K/UL    ABS. MONOCYTES 0.7 0 - 1.0 K/UL    ABS. EOSINOPHILS 0.0 0.0 - 0.4 K/UL    ABS.  BASOPHILS 0.0 0.0 - 0.1 K/UL    RBC COMMENTS NORMOCYTIC, NORMOCHROMIC      WBC COMMENTS VACUOLATED POLYS      DF MANUAL     PROTHROMBIN TIME + INR    Collection Time: 03/04/17  6:47 AM   Result Value Ref Range    Prothrombin time 14.5 11.5 - 15.2 sec    INR 1.2 0.8 - 1.2     SED RATE (ESR)    Collection Time: 03/04/17  6:47 AM   Result Value Ref Range    Sed rate, automated >140 (H) 0 - 30 mm/hr   MAGNESIUM    Collection Time: 03/04/17  6:47 AM   Result Value Ref Range    Magnesium 2.1 1.8 - 2.4 mg/dL             Telemetry:normal sinus rhythm    Imaging:  I have personally reviewed the patients radiographs and have reviewed the reports: All notes read   Best practice :  Not applicableCardiovascular:  An arterial systolic blood pressure (SBP) of < or equal to 90mm Hg or a mean arterial pressure (MAP) < or equal to 70mm Hg for at least 1 hour despite adequate fluid resuscitation or adequate intravascular volume status; or the need for vasopressors to maintain SBP>or equal to 90mm Hg or MAP > or equal to 70mm Hg., Renal: Urine output < 0.5mL/kg/hr for 1hour, despite adequate fluid resuscitation, Respiratory: PaO2/FiO2 < or equal to 250, or < or equal to 200 if the lung was the sole organ meeting the dysfunction criteria, Hematological: Platelet count of < 98,546/FC4 or a 50% decrease in the platelet count from the highest value recorded over the previous 3 days and Unexplained Metabolic: Acidosis pH < or equal to 7.3 or base deficit > or equal to 5.0mmol/L and a plasma lactate level > 1.5 times the upper limit of normal.Pulmonary consult  Duarte Bundle Followed     Total critical care time exclusive of procedures: 55 minutes  Arnoldo Sheikh MD

## 2017-03-04 NOTE — ED NOTES
Second unit of platelets started. Pt resting comfortably and will wake to verbal stimulation. Will continue to monitor patient.

## 2017-03-04 NOTE — ED NOTES
Pt tolerated CT scan well. Pt is more awake and alert and speaking coherently. Hospitalist in to see the patient.

## 2017-03-04 NOTE — ED NOTES
Received care of patient who is yelling out and confused. She is repeating that she needs something to drink. Pt is uncooperative with commands. Mottling noted to arms and legs with reduced capillary refill and blue to the tips of her fingers. Extremities are also cold. Dr. Bi Wood at bedside.

## 2017-03-04 NOTE — ED NOTES
Platelets infusing to 20G in right AC. Pt tolerating this well. Currently c/o pain to all extremities and states tylenol did not help. Hospitalist paged.

## 2017-03-04 NOTE — PROGRESS NOTES
1415-Received pt from ED via bed, vital signs stable on pressor, no sign of distress, calm, cooperative, confusion at times, anxious at times, will continue to monitor  1600-Resting in bed with eyes open, no sign of distress, vs stable on pressor, will continue to titrate  1800-Resting in bed with eyes closed, no sign of distress, vs stable

## 2017-03-04 NOTE — ED NOTES
Third bolus started and BP 68/30. Pt is responsive but sleepy and confused. Pt will wake up and state \"it hurts\" and then fall back to sleep. Dr. Renetta Davis made aware and is contacting family.

## 2017-03-04 NOTE — ED NOTES
The Sepsis Screening has been completed on arrival in the Emergency Department. Vital signs:  Patient Vitals for the past 4 hrs:   Temp Pulse Resp BP SpO2   03/04/17 0401 98.2 °F (36.8 °C) 97 17 - 100 %   03/04/17 0353 98.4 °F (36.9 °C) 98 17 120/75 100 %   03/04/17 0350 98.4 °F (36.9 °C) 99 17 - 100 %   03/04/17 0335 98.4 °F (36.9 °C) 98 18 120/75 100 %   03/04/17 0320 98.6 °F (37 °C) (!) 101 17 - 100 %   03/04/17 0312 98.6 °F (37 °C) 97 18 112/65 100 %   03/04/17 0304 98.6 °F (37 °C) (!) 101 17 - 100 %   03/04/17 0300 98.6 °F (37 °C) (!) 102 18 111/64 100 %   03/04/17 0256 98.6 °F (37 °C) (!) 104 18 111/53 -   03/04/17 0230 98.6 °F (37 °C) (!) 102 17 111/53 100 %   03/04/17 0200 99.2 °F (37.3 °C) 99 17 102/63 100 %   03/04/17 0130 99.5 °F (37.5 °C) 97 19 99/55 100 %   03/04/17 0124 99.4 °F (37.4 °C) 97 16 - 100 %   03/04/17 0120 - - - 104/60 -   03/04/17 0043 - 100 20 (!) 75/61 100 %   03/04/17 0030 - (!) 102 17 (!) 63/48 100 %       MAP (Monitor): 85    =monitored (data validate)  MAP (Calculated): 90    =calculated (manual entry)    Patient meets 2 or more SIRS criteria with suspected infection? YES    Pt temperature at 2000 was 101.6 rectally with hypotension and tachycardia . Lactic acid 7.9    IF ANSWER IS YES, INITIATE NURSE DRIVEN SEPSIS ORDERS OR REQUEST SEPSIS BUNDLE ORDER BY PROVIDER.      SIRS Criteria is achieved when two or more of the following are present:    Temperature < 96.8°F (36°C) or > 100.9°F (38.3°C)   Heart Rate > 90 beats per minute   Respiratory Rate > 20 beats per minute   WBC count > 12,000 or <4,000 or > 10% bands

## 2017-03-04 NOTE — ED NOTES
Pt daughter called this morning asking for information over the phone regarding her mother's condition. Informed the daughter that i would have to obtain the patient's permission to share any information. At this time, the daughter interrupted my explanation stating \"my mother has been here numerous times and no one has asked me if they can share information. \" Attempted to explain HIPPA laws and daughter stated \"ma'am, do not interrupt me\". At this time she hung up.

## 2017-03-04 NOTE — ED NOTES
Pt continually talking stating \" i have to poo or im going to die. \"   Duarte placed without difficulty, BP dropped to 66/19  Dr Darvin Quinones aware

## 2017-03-04 NOTE — ED PROVIDER NOTES
HPI Comments: 7:36 PM Ayaka Martinez is a 49 y.o. female with a hx of aortic stenosis, left leg cellulitis, endometrial adenocarcinoma cancer, ovarian cancer, heart murmur, lymphedema, Munchausen syndrome, spinal stenosis, and morbid obesity who presents to the ED via EMS for a AMS evaluation. Per EMS, pt is a Hospice patient. They state that the family called EMS because she was unable to answer questions correctly. EMS states that the patient stated that she was 27years old en route, and she is 49. The also mentions that the family thinks that the patient had an allergic reaction to some antibiotics yesterday, and she became SOB. They called the Hospice house, but then stated that they never received a phone call back. EMS states that they could not establish an IV and the patient's glucose was 40. She was given Glucagon en route. When the patient was asked Kimannie Alan brought you into the ED? \" She replied \"I felt sick. I can't talk about it right now, I will tell you later\". No other hx was obtainable at this time due to the acuity of the patient's condition. The history is provided by the EMS personnel.         Past Medical History:   Diagnosis Date    Anxiety and depression     Aortic stenosis     Aortic stenosis     Arthritis     Arthropathy     Back pain     Cardiac disorder     Cellulitis     left leg    Cellulitis     Chronic pain     Endometrial adenocarcinoma (HCC)     Endometrial cancer (HCC)     Endometrial hyperplasia     Gastric ulcer     H/O ovarian cancer     Heart murmur     Hematemesis     History of blood transfusion     History of endometrial cancer     Infection     Lymphedema     Munchausen syndrome     Narcotic dependence (HCC)     Obesity     Ovarian cancer (HCC)     Spinal stenosis     Status post partial hysterectomy     Super-super obese (HCC)     Urethral obstruction        Past Surgical History:   Procedure Laterality Date    HX  SECTION      HX ENDOSCOPY      EGD 6 x [facility]    HX HERNIA REPAIR      HX OOPHORECTOMY      HX TONSILLECTOMY      HX VASCULAR ACCESS Right     single lumen power port         Family History:   Problem Relation Age of Onset    Arthritis-osteo Mother     Heart Attack Mother     Cancer Father     Heart Attack Father     Hypertension Brother     Stroke Brother        Social History     Social History    Marital status:      Spouse name: N/A    Number of children: N/A    Years of education: N/A     Occupational History    Not on file. Social History Main Topics    Smoking status: Former Smoker    Smokeless tobacco: Never Used    Alcohol use No    Drug use: No      Comment: hx of thc    Sexual activity: No     Other Topics Concern    Not on file     Social History Narrative         ALLERGIES: Latex, natural rubber; Clindamycin; Clindamycin; Iodine; Keflex [cephalexin]; Linezolid; Nsaids (non-steroidal anti-inflammatory drug); Piperacillin-tazobactam; Sulfa (sulfonamide antibiotics); and Vancomycin    Review of Systems   Unable to perform ROS: Acuity of condition       Vitals:    03/03/17 2129 03/03/17 2200 03/03/17 2215 03/03/17 2220   BP:  (!) 69/46 (!) 68/23 (!) 68/30   Pulse:  (!) 118 (!) 111 (!) 111   Resp:  23 21 22   Temp:  (!) 37.4 °F (3 °C) (!) 37.3 °F (2.9 °C) (!) 37.3 °F (2.9 °C)   Weight: 122.5 kg (270 lb)      Height:                Physical Exam   Constitutional: She appears well-developed. No distress. Chronically ill-appearing, nad  Unkempt appearing, malodorous   HENT:   Head: Normocephalic and atraumatic. Eyes: EOM are normal.   Neck: Normal range of motion. Cardiovascular: Tachycardia present. Pulmonary/Chest: Effort normal and breath sounds normal. No respiratory distress. Abdominal: Soft. There is tenderness. Exam limited. Some epigastric masses with laparotomy scar. Large panus.    Musculoskeletal:   Mechanically stable   Neurological:   Able to follow basic commands, no apparent focal deficits   Psychiatric: Her behavior is normal.   Nursing note and vitals reviewed. MDM  Number of Diagnoses or Management Options  Acute cystitis with hematuria:   Acute renal failure, unspecified acute renal failure type [facility]):   High anion gap metabolic acidosis:   Septic shock [facility]):   Diagnosis management comments: 50 yo CF with PMHx chronic pain, cancer presents by EMS with ams. Pt with hypoglycemia in field, given glucagon. Pt arrives with glucose < 30 on finger-stick, does follow basic commands but does not give reliable history - just states \"please help me, father, I am sick. \"  Examination significant for some epigastric masses near laparotomy scar. Will give glucose, evaluate for acute process. 8:36 PM  Pt now noted to be febrile, tachycardic, hypotensive, urine grossly purulent, lactic elevated. Will treat as sepsis. Pt does have some \"hives\" allergies to abx, but given presentation and lack of suitable alternative, will administer. It is noted, however, that pt is DNR and reportedly on hospice. 10:55 PM  BP very low despite IVF. Pt mental status, however, seems improved. Pt states that she would like to be treated, is revoking hospice. HR is improved. Will start levophed. Discussed with intensivist, Dr. Eva Campos, who agreed to evaluate pt. Discussed with hospitalist, Dr. Tiffanie Hernández, who agreed to evaluate pt for ICU admission.         [address] [place] [address] Sepsis Core Measures      Name: Erskine Bamberger  : 1960  MRN: [id number]  Date: 3/3/2017  Time:       10:47 PM      Completed physical exam:yes    Latest lactic acid: Lactic acid       Date                     Value               Ref Range           Status                2015               0.4                 0.4 - 2.0 MMOL*     Final                 2015               0.9                 0.4 - 2.0 MMOL*     Final            ----------    Vital Signs:    BP (!) 68/30  Pulse (!) 111  Temp (!) 37.3 °F (2.9 °C) Resp 22  Ht 5' 4\" (1.626 m)  Wt 122.5 kg (270 lb)  BMI 46.35 kg/m2         Temp (24hrs), Av.4 °F (11.9 °C), Min:37.3 °F (2.9 °C), Max:101.6 °F (38.7 °C)      Empty flowsheet group. Physical Examination:  General:  Drowsy  Heart[de-identified]  Tachycardic, improved  Lungs:  normal ; breath sounds clear and equal bilaterally  Skin: Warm and dry.  no hyperpigmentation, vitiligo, or suspicious lesions  Peripheral Pulse: Bilateral  Capillary refill: normal      Completed IVF Resuscitation (30ml/kg) - yes  IVF choice: NS  Suspected source/s of severe sepsis: urinary  Organ dysfunction: yes    Antibiotics: levo/zosyn/vanc      Josey Pacheco MD           Amount and/or Complexity of Data Reviewed  Clinical lab tests: ordered and reviewed  Tests in the radiology section of CPT®: ordered and reviewed  Decide to obtain previous medical records or to obtain history from someone other than the patient: yes  Obtain history from someone other than the patient: yes  Review and summarize past medical records: yes  Independent visualization of images, tracings, or specimens: yes    Risk of Complications, Morbidity, and/or Mortality  Presenting problems: high  Diagnostic procedures: high  Management options: high    Critical Care  Total time providing critical care:  minutes    ED Course       CRITICAL CARE (ASAP ONLY)  Performed by: Jamil Cortez  Authorized by: Jamil Cortez     Critical care provider statement:     Critical care time was exclusive of:  Separately billable procedures and treating other patients    Critical care was necessary to treat or prevent imminent or life-threatening deterioration of the following conditions:  Sepsis, shock, endocrine crisis and cardiac failure    Critical care was time spent personally by me on the following activities:  Ordering and performing treatments and interventions, ordering and review of laboratory studies, ordering and review of radiographic studies, pulse oximetry, re-evaluation of patient's condition, review of old charts, obtaining history from patient or surrogate, examination of patient, evaluation of patient's response to treatment, discussions with consultants and development of treatment plan with patient or surrogate  EKG  Date/Time: 3/3/2017 9:24 PM  Performed by: Dinorah Montoya  Authorized by: Dinorah Montoya     ECG reviewed by ED Physician in the absence of a cardiologist: yes    Previous ECG:     Previous ECG:  Compared to current    Comparison ECG info:  8/1/16 - non-specific changes    Similarity:  Changes noted  Interpretation:     Interpretation: non-specific    Quality:     Tracing quality:  Limited by artifact  Rate:     ECG rate:  127    ECG rate assessment: tachycardic    Rhythm:     Rhythm: sinus tachycardia    Ectopy:     Ectopy: none    QRS:     QRS axis:  Normal    QRS intervals:  Normal  Conduction:     Conduction: normal    ST segments:     ST segments:  Normal  T waves:     T waves: normal        Vitals:  Patient Vitals for the past 12 hrs:   Temp Pulse Resp BP   03/03/17 2220 (!) 37.3 °F (2.9 °C) (!) 111 22 (!) 68/30   03/03/17 2215 (!) 37.3 °F (2.9 °C) (!) 111 21 (!) 68/23 03/03/17 2200 (!) 37.4 °F (3 °C) (!) 118 23 (!) 69/46   03/03/17 2000 (!) 101.6 °F (38.7 °C) (!) 140 26 (!) 76/54   03/03/17 1945 - (!) 141 28 (!) 163/105   03/03/17 1935 - (!) 141 22 (!) 151/102     Medications ordered:   Medications   dextrose 5% and 0.9% NaCl infusion (0 mL/hr IntraVENous Held 3/3/17 1927)   sodium chloride (NS) flush 5-10 mL (not administered)   piperacillin-tazobactam (ZOSYN) 4.5 g in 0.9% sodium chloride (MBP/ADV) 100 mL MBP (0 g IntraVENous IV Completed 3/3/17 2210)   levoFLOXacin (LEVAQUIN) 750 mg in D5W IVPB (750 mg IntraVENous New Bag 3/3/17 2210)   vancomycin (VANCOCIN) 2,000 mg in 0.9% sodium chloride 500 mL IVPB (not administered)   NOREPINephrine (LEVOPHED) 8,000 mcg in dextrose 5% 250 mL infusion (6 mcg/min IntraVENous New Bag 3/3/17 8211) dextrose (D50W) injection syrg 25 g (25 g IntraVENous Given 3/3/17 1922)   sodium chloride 0.9 % bolus infusion 2,000 mL (0 mL IntraVENous IV Completed 3/3/17 2222)   Please enter patient's height when available. (1 Each Other Given 3/3/17 2206)   0.9% sodium chloride infusion 1,000 mL (1,000 mL IntraVENous New Bag 3/3/17 2223)       Lab findings:  Recent Results (from the past 12 hour(s))   GLUCOSE, POC    Collection Time: 03/03/17  7:17 PM   Result Value Ref Range    Glucose (POC) <30.0 (LL) 70 - 110 mg/dL   GLUCOSE, POC    Collection Time: 03/03/17  7:25 PM   Result Value Ref Range    Glucose (POC) 55 (L) 70 - 606 mg/dL   METABOLIC PANEL, COMPREHENSIVE    Collection Time: 03/03/17  7:47 PM   Result Value Ref Range    Sodium 127 (L) 136 - 145 mmol/L    Potassium 5.3 3.5 - 5.5 mmol/L    Chloride 92 (L) 100 - 108 mmol/L    CO2 9 (LL) 21 - 32 mmol/L    Anion gap 26 (H) 3.0 - 18 mmol/L    Glucose 440 (HH) 74 - 99 mg/dL     (H) 7.0 - 18 MG/DL    Creatinine 6.55 (H) 0.6 - 1.3 MG/DL    BUN/Creatinine ratio 25 (H) 12 - 20      GFR est AA 8 (L) >60 ml/min/1.73m2    GFR est non-AA 7 (L) >60 ml/min/1.73m2    Calcium 7.6 (L) 8.5 - 10.1 MG/DL    Bilirubin, total 2.2 (H) 0.2 - 1.0 MG/DL    ALT (SGPT) 46 13 - 56 U/L    AST (SGOT) 82 (H) 15 - 37 U/L    Alk.  phosphatase 450 (H) 45 - 117 U/L    Protein, total 5.9 (L) 6.4 - 8.2 g/dL    Albumin 1.4 (L) 3.4 - 5.0 g/dL    Globulin 4.5 (H) 2.0 - 4.0 g/dL    A-G Ratio 0.3 (L) 0.8 - 1.7     LIPASE    Collection Time: 03/03/17  7:47 PM   Result Value Ref Range    Lipase 207 73 - 393 U/L   POC LACTIC ACID    Collection Time: 03/03/17  7:47 PM   Result Value Ref Range    Lactic Acid (POC) 7.9 (HH) 0.4 - 2.0 mmol/L   GLUCOSE, POC    Collection Time: 03/03/17  7:50 PM   Result Value Ref Range    Glucose (POC) 429 (HH) 70 - 110 mg/dL   GLUCOSE, POC    Collection Time: 03/03/17  7:51 PM   Result Value Ref Range    Glucose (POC) 438 (HH) 70 - 110 mg/dL   GLUCOSE, POC    Collection Time: 03/03/17  8:44 PM   Result Value Ref Range    Glucose (POC) 179 (H) 70 - 110 mg/dL   CULTURE, BLOOD    Collection Time: 03/03/17  9:04 PM   Result Value Ref Range    Special Requests: PERIPHERAL      Culture result: PENDING    CULTURE, BLOOD    Collection Time: 03/03/17  9:04 PM   Result Value Ref Range    Special Requests: PERIPHERAL      Culture result: PENDING    CBC WITH AUTOMATED DIFF    Collection Time: 03/03/17  9:04 PM   Result Value Ref Range    WBC 28.4 (H) 4.6 - 13.2 K/uL    RBC 3.30 (L) 4.20 - 5.30 M/uL    HGB 8.8 (L) 12.0 - 16.0 g/dL    HCT 28.2 (L) 35.0 - 45.0 %    MCV 85.5 74.0 - 97.0 FL    MCH 26.7 24.0 - 34.0 PG    MCHC 31.2 31.0 - 37.0 g/dL    RDW 15.7 (H) 11.6 - 14.5 %    PLATELET 10 (LL) 057 - 420 K/uL    NEUTROPHILS 80 (H) 42 - 75 %    BAND NEUTROPHILS 17 (H) 0 - 5 %    LYMPHOCYTES 2 (L) 20 - 51 %    MONOCYTES 1 (L) 2 - 9 %    EOSINOPHILS 0 0 - 5 %    BASOPHILS 0 0 - 3 %    ABS. NEUTROPHILS 22.7 (H) 1.8 - 8.0 K/UL    ABS. LYMPHOCYTES 0.6 (L) 0.8 - 3.5 K/UL    ABS. MONOCYTES 0.3 0 - 1.0 K/UL    ABS. EOSINOPHILS 0.0 0.0 - 0.4 K/UL    ABS. BASOPHILS 0.0 0.0 - 0.1 K/UL    PLATELET COMMENTS PLATELETS APPEAR MARKEDLY  DECREASED.      RBC COMMENTS NORMOCYTIC, NORMOCHROMIC      WBC COMMENTS TOXIC GRANULATION      DF MANUAL     URINALYSIS W/ RFLX MICROSCOPIC    Collection Time: 03/03/17  9:09 PM   Result Value Ref Range    Color DARK YELLOW      Appearance TURBID      Specific gravity 1.018 1.005 - 1.030      pH (UA) 8.0 5.0 - 8.0      Protein 300 (A) NEG mg/dL    Glucose NEGATIVE  NEG mg/dL    Ketone NEGATIVE  NEG mg/dL    Bilirubin NEGATIVE  NEG      Blood LARGE (A) NEG      Urobilinogen 1.0 0.2 - 1.0 EU/dL    Nitrites NEGATIVE  NEG      Leukocyte Esterase LARGE (A) NEG     URINE MICROSCOPIC ONLY    Collection Time: 03/03/17  9:09 PM   Result Value Ref Range    WBC TOO NUMEROUS TO COUNT 0 - 4 /hpf    RBC TOO NUMEROUS TO COUNT 0 - 5 /hpf    Epithelial cells FEW 0 - 5 /lpf    Bacteria 4+ (A) NEG /hpf EKG, 12 LEAD, INITIAL    Collection Time: 03/03/17  9:17 PM   Result Value Ref Range    Ventricular Rate 127 BPM    Atrial Rate 127 BPM    P-R Interval 130 ms    QRS Duration 86 ms    Q-T Interval 278 ms    QTC Calculation (Bezet) 404 ms    Calculated P Axis 51 degrees    Calculated R Axis 39 degrees    Calculated T Axis 65 degrees    Diagnosis       Sinus tachycardia  Possible Left atrial enlargement  Nonspecific T wave abnormality  Abnormal ECG  When compared with ECG of 01-AUG-2016 08:25,  Vent. rate has increased BY  70 BPM  T wave inversion now evident in Lateral leads     METABOLIC PANEL, BASIC    Collection Time: 03/03/17 10:15 PM   Result Value Ref Range    Sodium 134 (L) 136 - 145 mmol/L    Potassium 4.6 3.5 - 5.5 mmol/L    Chloride 101 100 - 108 mmol/L    CO2 9 (LL) 21 - 32 mmol/L    Anion gap 24 (H) 3.0 - 18 mmol/L    Glucose 115 (H) 74 - 99 mg/dL     (H) 7.0 - 18 MG/DL    Creatinine 6.10 (H) 0.6 - 1.3 MG/DL    BUN/Creatinine ratio 25 (H) 12 - 20      GFR est AA 9 (L) >60 ml/min/1.73m2    GFR est non-AA 7 (L) >60 ml/min/1.73m2    Calcium 7.2 (L) 8.5 - 10.1 MG/DL     X-Ray, CT or other radiology findings or impressions:  XR CHEST PORT       IMPRESSION:  No acute process. Per Eddie Castro MD 8:50 PM    IMPRESSION:  No radiographic evidence of acute cardiopulmonary abnormality. Per Radiology. Progress notes, Consult notes or additional Procedure notes:   Consult:  Discussed care with Dr. Eva Campos (ICU). Standard discussion; including history of patients chief complaint, available diagnostic results, and treatment course. Agrees to consult. 11:02 PM    Reevaluation of patient:   11:04 PM Pt reevaluated at this time. Discussed results and findings, as well as, plan for admission. Pt verbalizes understanding and agreement with plan. Disposition:  Diagnosis:   1. Septic shock ([address]    2. Acute renal failure, unspecified acute renal failure type ([address]    3.  Acute cystitis with hematuria 4. High anion gap metabolic acidosis      Disposition: Admit    Follow-up Information     None        Patient's Medications   Start Taking    No medications on file   Continue Taking    DRONABINOL (MARINOL) 2.5 MG CAPSULE    Take 1 Cap by mouth two (2) times a day. Max Daily Amount: 5 mg. HYDROMORPHONE (DILAUDID) 4 MG TABLET    Take 1 Tab by mouth every three (3) hours as needed. Max Daily Amount: 32 mg. METOPROLOL SUCCINATE (TOPROL-XL) 25 MG XL TABLET    Take 1 Tab by mouth daily. MORPHINE CR (MS CONTIN) 30 MG CR TABLET    Take 1 Tab by mouth every twelve (12) hours. Max Daily Amount: 60 mg.    ONDANSETRON (ZOFRAN ODT) 4 MG DISINTEGRATING TABLET    Take 1 Tab by mouth every six (6) hours as needed for Nausea (vomiting). OXYCODONE-ACETAMINOPHEN (PERCOCET) 5-325 MG PER TABLET    Take 1 Tab by mouth every four (4) hours as needed for Pain. Max Daily Amount: 6 Tabs. PANTOPRAZOLE (PROTONIX) 40 MG TABLET    Take 1 Tab by mouth Before breakfast and dinner. These Medications have changed    No medications on file   Stop Taking    No medications on file     SCRIBE ATTESTATION STATEMENT  Documented by: Teresa Etienne for, and in the presence of, Ludwin Esparza MD 7:30 PM    Signed by: Shanti Reyes, 03/03/17 7:30 PM    PROVIDER ATTESTATION STATEMENT  I personally performed the services described in the documentation, reviewed the documentation, as recorded by the scribe in my presence, and it accurately and completely records my words and actions.   Ludwin Esparza MD

## 2017-03-04 NOTE — ED NOTES
Pt called stating she is nauseated and would like some water. Pt states it makes her feel better. Water provided and pt tolerated well and voiced improvement. Will continue to monitor.

## 2017-03-04 NOTE — PROGRESS NOTES
Daily Progress Note: 3/4/2017 4:06 PM   Admit Date: 3/3/2017    Patient seen in follow up for multiple medical problems as listed below:  Patient Active Problem List   Diagnosis Code    Anemia D64.9    Anxiety F41.9    Nausea and vomiting R11.2    Morbid obesity ([facility] E66.01    Chronic pain syndrome G89.4    History of ovarian cancer Z85.43    History of gastric bypass Z98.890    CAD (coronary artery disease) I25.10    History of Clostridium difficile Z87.19    History of endometrial cancer Z85.42    History of GI bleed Z87.19    Lymphedema I89.0    Hypokalemia E87.6    DEV (acute kidney injury) ([facility] N17.9    Pulmonary nodules R91.8    A-fib ([facility] I48.91    Aortic stenosis I35.0    GI bleed K92.2    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    Acute blood loss anemia D62    Bladder mass N32.89    Flank pain, chronic R10.9, G89.29    UTI (urinary tract infection) N39.0    Severe sepsis with septic shock (CODE) ([facility]) R65.21    Severe thrombocytopenia ([facility]) D69.6       Assesment     49 y.o. female who is admitted for severe sepsis and shock, urosepsis and severe thrombocytopenia. Patient was on Hospice until now. She presents with c/o altered mental status via EMS with cyanotic/desirae complextion. Patient was found AMS with blood sugar low 40's. There was no IV assess and she was given Glucagon and her mentation and blood sugar improved. Patient in non diabetic. She has endometrial cancer. In ED she has urosepsis and hypotension requiring pressor support after 30 cc/kg BOLUS normal saline. Patient has Mediport. She also was febrile with Tmax 101.6. Denies cough, sob, chest pain. She was started on antibiotics . Hx VRE UTI noted. She reports having foul smelling urine and vaginal discharge. Patient has a platelet level of 80,516. No evidence of bleed. Severe sepsis with shock  - 2/2 UTI.  Leukocytosis, tachy  - CT abdomen /pelvis pending  - Levophed for pressor support goal MAP > 65  - monitor UOP  - Continue Zyvox, Levaquin, zosyn  - Follow urine and blood culture ordered  - Intensivist consulted     UTI  - hx VRE UTI, Immunosuppression endometrial cancer  - On Zyvox , Zosyn , Levaquin pending Urine culture result     Severe thrombocytopenia  - Platelet 76,614 and septic   - will transfuse given platelet level less than 30,000 with sepsis  - Transfuse platelets until platelet level > 80,463  - Recommend Hematology / oncology consult in AM     Encephalopathy  - 2/2 Hypoglycemia vs urosepsis vs hypotension vs uremia  - resolved . CT head non-acute     Hypoglycemia   - Resolved      ARF  - cr 6.55  - bun 167  - Likeley prerenal with UREMIA  - IVF   - Needs further evaluation  - Monitor BMP  - Recommend Nephrology consult in AM     Anemia   - Hgb 8.8  - likely 2/2 Endometrial malignancy   - Transfuse if hemoglobin < 7     Hx of Ovarian and Endometrial Cancer with abdominal pain  - Patient was on Hospice until today. Daughter has rescinded her DDNR and Hospice and now wants to be FULL CODE  - Palliative care consult monday  - Lower abdomen pain Likely due to new R adnexal mass from ovarian CA. Discuss further pending hospice plans  - pain control    Hyponatremia   - IVF  - BMP ordered     Mediport   - for IV drug use, if no urinary source may need to be ruled out as a possible source     Vaginal discharge   - Wet prep ordered    Chronic problems  1. Recurrent gastrointestinal bleed with multiple  esophagogastroduodenoscopies in the past year with negative results. 2. History of ovarian cancer. 3. History of endometrial cancer. 4. Bladder mass, status post cystoscopy and biopsy results pending. 5. Acute blood loss anemia, stable. 6. Chronic back pain. 7. Chronic abdominal pain. 8. Nausea and vomiting secondary to above    DVT Protocol Active: yes. Hold LMWH for plts  GI - PPI  Code Status:  Full Code     Disposition: Req 48h hospitalization. Plan to d/c back to hospice.      Subjective:     CC: Altered mental status    Interval History: admitted early in am. Already with mentation improvement after IVF and antibiotics. Pt asks about her plan of care. Objective:     Visit Vitals    /62    Pulse 78    Temp (!) 36.2 °F (2.3 °C)    Resp 13    Ht 5' 4\" (1.626 m)    Wt 122.5 kg (270 lb)    SpO2 100%    BMI 46.35 kg/m2       Temp (24hrs), Av.6 °F (18.7 °C), Min:36.2 °F (2.3 °C), Max:101.8 °F (38.8 °C)        Intake/Output Summary (Last 24 hours) at 17 1606  Last data filed at 17 0446   Gross per 24 hour   Intake             4905 ml   Output                0 ml   Net             4905 ml       Gen: AOx2, NAD  HEENT:  KARINA, EOMI. Neck: No Bruits/JVD   Lungs:   CTAB. Good respiratory effort  Heart:   Tachy  S1 S2 without M/R/G  Abdomen: Large panus flap,NT, BSX4,   Extremities:   Fat vs LE edema. No cyanosis.   Skin:  no jaundice/lesions      Data Review:     Meds/Labs/Tests reviewed    Current Shift:     Last three shifts:   1901 -  0700  In: 4905 [I.V.:4350]  Out: -   Recent Labs      17   0647  17   2104   WBC  24.9*  28.4*   RBC  2.91*  3.30*   HGB  7.8*  8.8*   HCT  24.3*  28.2*   PLT  16*  10*   GRANS  94*  80*   LYMPH  1*  2*   EOS  0  0       Recent Labs      17   0647  17   2215  17   1947   BUN  152*  155*  167*   CREA  5.31*  6.10*  6.55*   CA  7.6*  7.2*  7.6*   ALB   --    --   1.4*   K  4.7  4.6  5.3   NA  132*  134*  127*   CL  99*  101  92*   CO2  16*  9*  9*   GLU  132*  115*  440*        Lab Results   Component Value Date/Time    Glucose 132 2017 06:47 AM    Glucose 115 2017 10:15 PM    Glucose 440 2017 07:47 PM    Glucose 85 2016 11:40 AM    Glucose 99 2016 05:33 AM          Care coordination with Nursing/Consultants/staff: 10  Prior history, labs, and charting reviewed: 15    Procedures/Imaging:  CT head  CT abd    Total time spent with chart review, patient examination/education, discussion with staff on case,documentation and medication management / adjustment  :  39 Minutes      Dr Keisha Barrios  Pager: [phone]

## 2017-03-04 NOTE — ED NOTES
Latex free temperature byrne placed in patient. Purulent drainage noted from byrne. Sample obtained and sent to the lab.

## 2017-03-04 NOTE — ED NOTES
First unit of platelets completed and pt tolerated well. C/o pain to her legs, BP stable 107/69, will administer PRN pain medication. Spoke with hospitalist regarding wet prep and was told to speak with ED physician about obtaining this sample. Dr. Jose E Bach made aware.

## 2017-03-05 NOTE — PROGRESS NOTES
Daily Progress Note: 3/5/2017 4:06 PM   Admit Date: 3/3/2017    Patient seen in follow up for multiple medical problems as listed below:  Patient Active Problem List   Diagnosis Code    Anemia D64.9    Anxiety F41.9    Nausea and vomiting R11.2    Morbid obesity ([facility] E66.01    Chronic pain syndrome G89.4    History of ovarian cancer Z85.43    History of gastric bypass Z98.890    CAD (coronary artery disease) I25.10    History of Clostridium difficile Z87.19    History of endometrial cancer Z85.42    History of GI bleed Z87.19    Lymphedema I89.0    Hypokalemia E87.6    DEV (acute kidney injury) ([facility] N17.9    Pulmonary nodules R91.8    [facility] I48.91    Aortic stenosis I35.0    GI bleed K92.2    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 female who is admitted for severe sepsis and shock, urosepsis and severe thrombocytopenia. Patient was on Hospice until now. She presents with c/o altered mental status via EMS with cyanotic/desirae complextion. Patient was found AMS with blood sugar low 40's. There was no IV assess and she was given Glucagon and her mentation and blood sugar improved. Patient in non diabetic. She has endometrial cancer. In ED she has urosepsis and hypotension requiring pressor support after 30 cc/kg BOLUS normal saline. Patient has Mediport. She also was febrile with Tmax 101.6. Denies cough, sob, chest pain. She was started on antibiotics . Hx VRE UTI noted. She reports having foul smelling urine and vaginal discharge. Patient has a platelet level of 98,070. No evidence of bleed. Severe sepsis with shock  - 2/2 UTI.  Leukocytosis, tachy  - Levophed for pressor support goal MAP > 65  - Continue Zyvox, Levaquin, zosyn  - Follow urine and blood culture: GPC in blood  - Intensivist consulting     UTI  - hx VRE UTI, Immunosuppression endometrial cancer  - On Zyvox , Zosyn , Levaquin : Urine culture pending     Severe thrombocytopenia  - Platelet down to 9k, needs transfusion. Would try and keep >25K     Encephalopathy  - 2/2 Hypoglycemia vs urosepsis vs hypotension vs uremia  - resolved . CT head non-acute     Hypoglycemia   - Resolved      ARF  - cr 6.55/Nfi662 ->improving  - severe prerenal with UREMIA  - IVF. Na Bicarb added     Anemia   - Hgb 8.8  - likely 2/2 Endometrial malignancy   - Transfuse if hemoglobin < 7     Hx of Ovarian and Endometrial Cancer with abdominal pain  - Patient was on Hospice until today. Daughter has rescinded her DDNR and Hospice and now wants to be FULL CODE  - Palliative care consult monday  - Lower abdomen pain Likely due to new R adnexal mass from ovarian CA. Discuss further pending hospice plans  - pain control  -Mediport for IV drug use, if no urinary source may need to be ruled out as a possible source    Hyponatremia   - IVF corrected.     Vaginal discharge   - Wet prep ordered    Chronic problems  1. Recurrent gastrointestinal bleed with multiple  esophagogastroduodenoscopies in the past year with negative results. 2. History of ovarian cancer. 3. History of endometrial cancer. 4. Bladder mass, status post cystoscopy and biopsy results pending. 5. Acute blood loss anemia, stable. 6. Chronic back pain. 7. Chronic abdominal pain. 8. Nausea and vomiting secondary to above    DVT Protocol Active: yes. Hold LMWH for plts  GI - PPI  Code Status:  Full Code     Disposition: Req 48h hospitalization. Plan to d/c back to hospice. Subjective:     CC: Altered mental status    Interval History: afebrile and duy VS. Cr improving adding NaBicarb. Plt 9k needs Tsfn.  Bld Cx GPC    Objective:     Visit Vitals    /59    Pulse 87    Temp 97.8 °F (36.6 °C)    Resp 16    Ht 5' 4\" (1.626 m)    Wt 114.7 kg (252 lb 13.9 oz)    SpO2 100%    BMI 43.4 kg/m2       Temp (24hrs), Av.7 °F (10.9 °C), Min:36.2 °F (2.3 °C), Max:98.3 °F (36.8 °C)        Intake/Output Summary (Last 24 hours) at 17  Last data filed at 17 0900   Gross per 24 hour   Intake           5915.8 ml   Output             1927 ml   Net           3988.8 ml       Gen: AOx2, NAD  HEENT:  KARINA, EOMI. Neck: No Bruits/JVD   Lungs:   CTAB. Good respiratory effort  Heart:   Tachy  S1 S2 without M/R/G  Abdomen: Large panus flap,NT, BSX4,   Extremities:   Fat vs LE edema. No cyanosis.   Skin:  no jaundice/lesions      Data Review:     Meds/Labs/Tests reviewed    Current Shift:  701 - 1900  In: -   Out: 325 [Urine:225]  Last three shifts:  1901 -  07  In: 35556.8 [I.V.:81758.8]  Out: 2754 [Urine:1602]  Recent Labs      17   0430  17   0647  17   2104   WBC  18.4*  24.9*  28.4*   RBC  3.25*  2.91*  3.30*   HGB  8.6*  7.8*  8.8*   HCT  26.8*  24.3*  28.2*   PLT  9*  16*  10*   GRANS  90*  94*  80*   LYMPH  5*  1*  2*   EOS  0  0  0       Recent Labs      17   0430  17   0647  17   2215  17   1947   BUN  148*  152*  155*  167*   CREA  4.55*  5.31*  6.10*  6.55*   CA  7.7*  7.6*  7.2*  7.6*   ALB  1.5*   --    --   1.4*   K  4.3  4.7  4.6  5.3   NA  135*  132*  134*  127*   CL  103  99*  101  92*   CO2  16*  16*  9*  9*   GLU  112*  132*  115*  440*        Lab Results   Component Value Date/Time    Glucose 112 2017 04:30 AM    Glucose 132 2017 06:47 AM    Glucose 115 2017 10:15 PM    Glucose 440 2017 07:47 PM    Glucose 85 2016 11:40 AM          Care coordination with Nursing/Consultants/staff: 10  Prior history, labs, and charting reviewed: 15    Procedures/Imaging:  CT head  CT abd  LE US 3/5-p    Total time spent with chart review, patient examination/education, discussion with staff on case,documentation and medication management / adjustment  :  39 Minutes      Dr Juve Vyas  Pager: [phone]

## 2017-03-05 NOTE — PROGRESS NOTES
[facility] [address]   Discharge Planning/ Assessment    Reasons for Intervention:  Interviewed patient, she live with her daughter an her daughter boyfriend. She agrees to share her discharge plan is her daughter, see below. She uses a cane to assist with ambulation. Dr Moy Georges is pcp. She does not have a skilled benefit. Her discharge plan is to return home. May need [facility] for home transport.      High Risk Criteria  [x] Yes  []No   Physician Referral  [] Yes  [x]No        Date    Nursing Referral  [] Yes  [x]No        Date    Patient/Family Request  [] Yes  [x]No        Date       Resources:    Medicare  [] Yes  [x]No   Medicaid  [x] Yes  []No   No Resources  [] Yes  [x]No   Private Insurance  [] Yes  [x]No    Name/Phone Number    Other  [] Yes  [x]No        (i.e. Workman's Comp)         Prior Services:    Prior Services  [] Yes  [x]No   Home Health  [] Yes  [x]No   [address]  [] Yes  [x]No        [address] [facility] [address] Program  [] Yes  [x]No       Meals on Wheels  [] Yes  [x]No   Office on Aging  [] Yes  [x]No   Transportation Services  [] Yes  [x]No   Nursing Home  [] Yes  [x]No        Nursing Home Name    [facility]  [] Yes  [x]No        [address] Name    Other       Information Source:      Information obtained from  [x] Patient  [] Parent   [] [address]   [] Child  [] Spouse   [] Significant Other/Partner   [] Friend      [] EMS    [] Nursing Home Chart          [x] Other: chart   Chart Review  [x] Yes  []No     Family/Support System:    Patient lives with  [] Alone    [] Spouse   [] Significant Other  [x] Children  [] Caretaker   [] Parent  [] Sibling     [] Other       Other Support System:    Is the patient responsible for care of others  [] Yes  [x]No   Information of person caring for patient on  discharge self   Managers financial affairs independently  [x] Yes  []No   If no, explain:      Status Prior to Admission:    Mental Status [x] Awake  [x] Alert  [x] Oriented  [] Quiet/Calm [] Lethargic/Sedated   [] Disoriented  [] Restless/Anxious  [] Combative   Personal Care  [] Dependent  [] Independent Personal Care  [x] Requires Assistance   Meal Preparation Ability  [] Independent   [] Standby Assistance   [] Minimal Assistance   [] Moderate Assistance  [x] Maximum Assistance     [] Total Assistance   Chores  [] Independent with Chores   [] N/A Nursing Home Resident   [x] Requires Assistance   Bowel/Bladder  [] Continent  [] Catheter  [x] Incontinent  [] Ostomy Self-Care    [] Urine Diversion Self-Care  [] Maximum Assistance     [] Total Assistance   Number of Persons needed for assistance    DME at home  [] Dirk Bowl, Ladell Jennifer  [x] Dirk Bowl, Straight   [] Commode    [] Bathroom/Grab Bars  [] Hospital Bed  [] Nebulizer  [] Oxygen           [] Raised Toilet Seat  [] Shower Chair  [] Side Rails for Bed   [] Tub Transfer Bench   [] Towana Prey  [] Walker, Standard      [] Other:   Vendor      Treatment Presently Receiving:    Current Treatments  [] Chemotherapy  [] Dialysis  [] Insulin  [x] IVAB [x] IVF   [x] O2  [] PCA   [] PT   [x] RT   [] Tube Feedings   [] Wound Care     Psychosocial Evaluation:    Verbalized Knowledge of Disease Process  [x] Patient  [x]Family   Coping with Disease Process  [x] Patient  [x]Family   Requires Further Counseling Coping with Disease Process  [] Patient  []Family     Identified Projected Needs:    Home Health Aid  [] Yes  [x]No   Transportation  [x] Yes  []No   Education  [] Yes  [x]No        Specific Education     Financial Counseling  [] Yes  [x]No   Inability to Care for Self/Will Require 24 hour care  [] Yes  [x]No   Pain Management  [] Yes  [x]No   Home Infusion Therapy  [] Yes  [x]No   Oxygen Therapy  [] Yes  [x]No   DME  [] Yes  [x]No   Long Term Care Placement  [] Yes  [x]No   Rehab  [] Yes  [x]No   Physical Therapy  [] Yes  [x]No   Needs Anticipated At This Time  [] Yes  [x]No     Intra-Hospital Referral:    Home Health Liasion  [] Yes  [x]No     [] Yes  [x]No   Patient Representative  [] Yes  [x]No   Staff for Teaching Needs  [] Yes  [x]No   Specialty Teaching Needs     Diabetic Educator  [] Yes  [x]No   Referral for Diabetic Educator Needed  [] Yes  [x]No  If Yes, place order for Nutritionist or Diabetic Consult     Tentative Discharge Plan:    Home with No Services  [x] Yes  []No   Home with [address]  [] Yes  [x]No        If Yes, specify type    Home Care Program  [] Yes  [x]No        If Yes, specify type    Meals on Wheels  [] Yes  [x]No   Office of Aging  [] Yes  [x]No   NHP  [] Yes  [x]No   Return to the Nursing Home  [] Yes  [x]No   Rehab Therapy  [] Yes  [x]No   Acute Rehab  [] Yes  [x]No   Subacute Rehab  [] Yes  [x]No   Private Care  [] Yes  [x]No   Substance Abuse Referral  [] Yes  [x]No   Transportation  [] Yes  [x]No   Chore Service  [] Yes  [x]No   Inpatient Hospice  [] Yes  [x]No   OP RT  [] Yes  [x] No   OP Hemo  [] Yes  [x] No   OP PT  [] Yes  [x]No   Support Group  [] Yes  [x]No   Reach to Recovery  [] Yes  [x]No   OP Oncology Clinic  [] Yes  [x]No   Clinic Appointment  [] Yes  [x]No   DME  [] Yes  [x]No   Comments    Name of D/C Planner or  Given to Patient or Family Danna Jarvis RN   Phone Number [phone]   Date March 5, 2017   Time 839 am   If you are discharged home, whom do you designate to participate in your discharge plan and receive any information needed?      Enter name of Belinda Lazo  daughter        Phone # of mick  [phone]        Address of mick HERNANDEZ . Zoë Gantr Chance, [address] [place] [address]        Updated March 5, 2017        Patient refused to designate any           individual

## 2017-03-05 NOTE — PROGRESS NOTES
Patient has designated her duaghter to participate in his/her discharge plan and to receive any needed information.      Name:   Vee Mccall  daughter    [address] [place] [address]   March 5, 2017        Address:  Phone number:

## 2017-03-05 NOTE — ROUTINE PROCESS
Bedside and Verbal shift change report given to Stevan Gonzales RN (oncoming nurse) by Jennifer Michael RN (offgoing nurse).  Report included the following information SBAR, Kardex, Intake/Output, MAR, Recent Results, Med Rec Status and Cardiac Rhythm SR.

## 2017-03-05 NOTE — CONSULTS
[facility]  Name: Emanuel Carrasco   : 1960   MRN: [id number]   Date: 3/5/2017    []I have reviewed the flowsheet and previous days notes. []The patient is unable to give any meaningful history or review of systems because the patient is:  []Intubated []Sedated   []Unresponsive      []The patient is critically ill on      []Mechanical ventilation []Pressors   []BiPAP []   S: None               ROS:Review of systems not obtained due to patient factors. \"I am tired of answering the same questions over and over, I hurt all over always\"    Events and notes from last 24 hours reviewed. Care plan discussed on multidisciplinary rounds. Vital Signs:    Visit Vitals    /59    Pulse 87    Temp 97.8 °F (36.6 °C)    Resp 16    Ht 5' 4\" (1.626 m)    Wt 114.7 kg (252 lb 13.9 oz)    SpO2 100%    BMI 43.4 kg/m2               Temp (24hrs), Av.8 °F (13.8 °C), Min:36.2 °F (2.3 °C), Max:98.3 °F (36.8 °C)       Intake/Output:   Last shift:       07 -  1900  In: -   Out: 325 [Urine:225]  Last 3 shifts: 1901 -  0700  In: 99636.8 [I.V.:04707.8]  Out: 8302 [Urine:1602]    Intake/Output Summary (Last 24 hours) at 17 1039  Last data filed at 17 0900   Gross per 24 hour   Intake           5915.8 ml   Output             1927 ml   Net           3988.8 ml     Hemodynamics:       :      Ventilator Settings:                Physical Exam:    General: in no apparent distress, well developed and well nourished, non-toxic and in no respiratory distress and acyanotic   HEENT: Normal   Neck: No abnormally enlarged lymph nodes.    Chest: increased AP diameter   Lungs: decreased air exchange bilaterally, distant lung sounds and prolonged expiration  no dullness/ tenderness/ rash   Heart: Regular rate and rhythm or S1S2 present   Abdomen: non distended, bowel sounds normoactive, tympanic, abdomen is soft without significant tenderness, masses, organomegaly or guarding   Extremity: Trace edema   Neuro: alert   Skin: Skin color, texture, turgor normal. No rashes or lesions      DATA:   Current Facility-Administered Medications   Medication Dose Route Frequency    sodium bicarbonate tablet 650 mg  650 mg Oral TID    0.9% sodium chloride infusion 250 mL  250 mL IntraVENous PRN    linezolid (ZYVOX) IVPB premix in D5W 600 mg  600 mg IntraVENous Q12H    levoFLOXacin (LEVAQUIN) 750 mg in D5W IVPB  750 mg IntraVENous Q48H    piperacillin-tazobactam (ZOSYN) 2.25 g in 0.9% sodium chloride (MBP/ADV) 50 mL MBP  2.25 g IntraVENous Q8H    insulin lispro (HUMALOG) injection   SubCUTAneous Q6H    glucose chewable tablet 16 g  16 g Oral PRN    glucagon (GLUCAGEN) injection 1 mg  1 mg IntraMUSCular PRN    dextrose (D50W) injection syrg 12.5-25 g  25-50 mL IntraVENous PRN    0.9% sodium chloride infusion  150 mL/hr IntraVENous CONTINUOUS    acetaminophen (TYLENOL) tablet 650 mg  650 mg Oral Q4H PRN    ondansetron (ZOFRAN) injection 4 mg  4 mg IntraVENous Q4H PRN    pantoprazole (PROTONIX) 40 mg in sodium chloride 0.9 % 10 mL injection  40 mg IntraVENous DAILY    HYDROmorphone (PF) (DILAUDID) injection 1 mg  1 mg IntraVENous Q4H PRN    sodium chloride (NS) flush 5-10 mL  5-10 mL IntraVENous PRN    NOREPINephrine (LEVOPHED) 8,000 mcg in dextrose 5% 250 mL infusion  2-30 mcg/min IntraVENous TITRATE    0.9% sodium chloride infusion 250 mL  250 mL IntraVENous PRN       Telemetry: [x]Sinus []A-flutter []Paced    []A-fib []Multiple PVCs                  Labs:  Recent Labs      03/05/17   0430  03/04/17   0647  03/03/17   2104   WBC  18.4*  24.9*  28.4*   HGB  8.6*  7.8*  8.8*   HCT  26.8*  24.3*  28.2*   PLT  9*  16*  10*     Recent Labs      03/05/17   0430  03/04/17   0647  03/03/17   2215  03/03/17   1947   NA  135*  132*  134*  127*   K  4.3  4.7  4.6  5.3   CL  103  99*  101  92*   CO2  16*  16*  9*  9*   GLU  112*  132*  115*  440*   BUN  148*  152*  155*  167*   CREA 4.55*  5.31*  6.10*  6.55*   CA  7.7*  7.6*  7.2*  7.6*   MG  2.0  2.1   --    --    ALB  1.5*   --    --   1.4*   SGOT  68*   --    --   82*   ALT  42   --    --   46   INR   --   1.2   --    --      No results for input(s): PH, PCO2, PO2, HCO3, FIO2 in the last 72 hours.     Imaging:  [x]I have personally reviewed the patients radiographs  []Radiographs reviewed with radiologist   [] No CXR study available for review today   [x]No change from prior, tubes and lines in adequate position  []Improved   []Worsening       IMPRESSION:   Patient Active Problem List   Diagnosis Code    Anemia D64.9    Anxiety F41.9    Nausea and vomiting R11.2    Morbid obesity ([facility] E66.01    Chronic pain syndrome G89.4    History of ovarian cancer Z85.43    History of gastric bypass Z98.890    CAD (coronary artery disease) I25.10    History of Clostridium difficile Z87.19    History of endometrial cancer Z85.42    History of GI bleed Z87.19    Lymphedema I89.0    Hypokalemia E87.6    DEV (acute kidney injury) (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    Acute blood loss anemia D62    Bladder mass N32.89    Flank pain, chronic R10.9, G89.29    UTI (urinary tract infection) N39.0    Severe sepsis with septic shock (CODE) ([facility]) R65.21    Severe thrombocytopenia ([facility]) D69.6 ·   Subacute DVTPVLs just done at bedside) with severely low platelets: 9k last count  · She is refusing multiple therapeutic intyerventions and meds   PLAN:   · Vascular Surgery Consult: Called  · Platelets  · CBC  · CMP  · IVF     The patient is: [] acutely ill Risk of deterioration: [] moderate    [x] critically ill  [x] high     []See my orders for details    My assessment, plan of care, findings, medications, side effects etc were discussed with:  [x]nursing []PT/OT    [x]respiratory therapy [x]   []family []     [x]Total critical care time exclusive of procedures 30  minutes  Magdy Putnam MD

## 2017-03-05 NOTE — PROGRESS NOTES
met with patient completed the initial Spiritual Assessment of the patient, and offered Pastoral Care, see flow sheets for interventions.  extended the assurance of prayer and spiritual care materials. Patient does not have any Yarsanism/cultural needs that will affect patients preferences in health care. The patient was informed that chaplains will continue to follow and will provide pastoral care on an as needed/requested basis. Patient noted she was not feeling well and wanted to rest.  I immediately obliged, and invited Pastoral Care contact if and when desired.       Rina Espino, MPH, [address] [phone]

## 2017-03-05 NOTE — ROUTINE PROCESS
Bedside, Verbal and Written shift change report given to MAGNOLIA Rivera (oncoming nurse) by THOMAS Mckinnon,SHELLY (offgoing nurse). Report included the following information SBAR, Kardex, Intake/Output and Recent Results. Assumed care of pt laying in bed, opens eyes to verbal commands, states pain is 7/10, a/o x 4, following commands, remains on telemetry, room air, IV fluids, pressors, bryne, pt refused bilateral sequentials. 2200  Positive blood cultures, Dr. Jasso Self notified, no new orders at this time. Scheduled med's given. 0000  Pt reassessed, status unchanged, remains on IV fluids and pressors for SBP > 90, pt remains awake watching TV.  0200  Pt awake, watching TV, appears asymptomatic at this time. Jeremie Fuentes at bedside, pt refusing to have Xray done at this time, I explained to pt the importance of Xray. Pt still refuses, pt request Xray to come back later when she feels a little better. 0530  Pt BS < 70, D50 given, will repeat BS in 15 min.  0600  Pt BS repeat is > 150, pt remain a/o x 4, following commands. 1518  Pt has platelet count of 1367, passed Lab value to alpesh Cordon RN to address with MD this morning. Pt has no signs of active bleeding at this time. 0715  Bedside, Verbal and Written shift change report given to [address] [place] [address] (oncoming nurse) by MAGNOLIA Rivera (offgoing nurse). Report included the following information SBAR, Kardex, Intake/Output and Recent Results.

## 2017-03-05 NOTE — PROGRESS NOTES
1200  Pt refuses to undergo portable CXR.  1500  Pt refuses to be turned in bed. Nurse warned pt of danger of developing a decubitus ulcer as a result of not allowing herself to be turned. Pt continues to refuse turning despite indicating understanding of information. 1900  Bedside and Verbal shift change report given to Breanne Acosta (oncoming nurse) by Jose Rafael Ott RN (offgoing nurse). Report included the following information SBAR, Kardex, Procedure Summary, Intake/Output and MAR.

## 2017-03-05 NOTE — PROCEDURES
[facility] [place]  *** FINAL REPORT ***    Name: Reno Meadows  MRN: [id number]    Inpatient  : 17 May 1960  HIS Order #: [id number]  [address] Visit #: [id number]  Date: 05 Mar 2017    TYPE OF TEST: Peripheral Venous Testing    REASON FOR TEST  Pain in limb, Limb swelling    Right Leg:-  Deep venous thrombosis:           Yes  Proximal extent of thrombus:      Common Femoral  Superficial venous thrombosis:    No  Deep venous insufficiency:        Not examined  Superficial venous insufficiency: Not examined    Left Leg:-  Deep venous thrombosis:           No  Superficial venous thrombosis:    No  Deep venous insufficiency:        Not examined  Superficial venous insufficiency: Not examined      INTERPRETATION/FINDINGS  Duplex images were obtained using 2-D gray scale, color flow, and  spectral Doppler analysis. Technically difficult and limited due to patients body habitus, severe   pain, and inability to tolerate the exam.  Right leg :  1. Deep vein(s) visualized include the common femoral, deep femoral,  proximal femoral, mid femoral and distal femoral veins. 2. The right popliteal, posterior tibial and peroneal veins were  unable to be examined due to limitations stated above. 3. Sub-acute occlusive deep venous thrombosis was identified in the  proximal femoral vein. 4. Sub-acute non-occlusive deep venous thrombosis is identified in the   common femoral, mid femoral and deep femoral veins. 5. Continuous flow was noted in the common femoral vein indicating a  proximal obstruction. Due to limitations stated above the proximal  vessels were not able to be evaluated. 6. No evidence of superficial thrombosis detected. Left leg :  1. Deep vein(s) visualized include the common femoral, deep femoral  and proximal femoral veins. 2. No evidence of deep venous thrombosis detected in the veins  visualized. 3. No evidence of superficial thrombosis detected.   4. The left mid to distal femoral, popliteal, posterior tibial and  peroneal veins were not evaluated due to the patients request to stop  the exam.    ADDITIONAL COMMENTS    I have personally reviewed the data relevant to the interpretation of  this  study. TECHNOLOGIST: Deidre Davis RVT  Signed: 03/05/2017 11:50 AM    PHYSICIAN: Arianne Ospina.  Jesus Hartman MD  Signed: 03/05/2017 11:21 PM

## 2017-03-06 NOTE — CDMP QUERY
Patient noted to have Diagnosis of Encephalopathy. Pt noted to have Sepsis with septic shock. According to ICD 10 specificity please note if this is Metabolic/Toxic Encephalopathy, now resolving. thank you .   Robert Renee RN [facility]  [phone]

## 2017-03-06 NOTE — ROUTINE PROCESS
0730 Bedside report given to [facility] (oncoming nurse) per Batsheva Kim RN (offgoing nurse) utilizing SBAR, MAR, Kardex, I&O, results review, and EKG interpretation with rate and rhythm.

## 2017-03-06 NOTE — CDMP QUERY
On 3/4--(Alireza Mckinnon)SHELLY noted that pt had 2 decubitus ulcers on her sacral area    Please document these ulcers and if they were POA. Thank you.   Nayeli Gomez RN [facility]  [phone]

## 2017-03-06 NOTE — CONSULTS
Vascular Surgery Consultation  [facility]    Subjective:   50 y/o female admitted 3/3/17 for urosepsis, hypotension, and thrombocytopenia. Pt previously in home hospice / daughter caring for her -- has metastatic endometrial cancer with R lateral pelvis mass, appears to be in region of R external iliac vein. DNR rescinded at admission by daughter. Found to have RLE DVT. Pt minimally ambulatory. Pt improving with antibiotic. Now alert, states she just feels bad - 'really don't want anything done today'.         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    CAD (coronary artery disease) 12/29/2015    History of Clostridium difficile 12/29/2015    History of endometrial cancer 12/29/2015    History of GI bleed 12/29/2015    Lymphedema 12/29/2015    Hypokalemia 12/29/2015    DEV (acute kidney injury) ([address] 12/29/2015    Pulmonary nodules 12/29/2015    A-fib ([address] 12/29/2015    Aortic stenosis 12/29/2015    GI bleed 12/29/2015    Chronic pain syndrome 03/18/2015    History of ovarian cancer 03/18/2015    History of gastric bypass 03/18/2015    Morbid obesity ([address] 10/02/2014    Nausea and vomiting 10/23/2013    Anxiety 03/15/2013    Anemia 01/16/2013     Past Medical History:   Diagnosis Date    Anxiety and depression     Aortic stenosis     Aortic stenosis     Arthritis     Arthropathy     Back pain     Cardiac disorder     Cellulitis     left leg    Cellulitis     Chronic pain     Endometrial adenocarcinoma (HCC)     Endometrial cancer (HCC)     Endometrial hyperplasia     Gastric ulcer     H/O ovarian cancer     Heart murmur     Hematemesis     History of blood transfusion     History of endometrial cancer     Infection     Lymphedema     Munchausen syndrome     Narcotic dependence (HCC)     Obesity     Ovarian cancer ([facility]     Spinal stenosis     Status post partial hysterectomy     Super-super obese (HCC)     Urethral obstruction       Past Surgical History:   Procedure Laterality Date    HX  SECTION      HX ENDOSCOPY      EGD 6 x [facility]    HX HERNIA REPAIR      HX OOPHORECTOMY      HX TONSILLECTOMY      HX VASCULAR ACCESS Right     single lumen power port      Social History   Substance Use Topics    Smoking status: Former Smoker    Smokeless tobacco: Never Used    Alcohol use No      Family History   Problem Relation Age of Onset    Arthritis-osteo Mother     Heart Attack Mother     Cancer Father     Heart Attack Father     Hypertension Brother     Stroke Brother       Prior to Admission medications    Medication Sig Start Date End Date Taking? Authorizing Provider   HYDROmorphone (DILAUDID) 4 mg tablet Take 1 Tab by mouth every three (3) hours as needed. Max Daily Amount: 32 mg. 16  Yes Екатерина Amador MD   morphine CR (MS CONTIN) 30 mg CR tablet Take 1 Tab by mouth every twelve (12) hours. Max Daily Amount: 60 mg. 16  Yes Екатерина Amador MD   ondansetron (ZOFRAN ODT) 4 mg disintegrating tablet Take 1 Tab by mouth every six (6) hours as needed for Nausea (vomiting). 16  Yes Екатерина Amador MD   dronabinol (MARINOL) 2.5 mg capsule Take 1 Cap by mouth two (2) times a day. Max Daily Amount: 5 mg. 16   Екатерина Amador MD   metoprolol succinate (TOPROL-XL) 25 mg XL tablet Take 1 Tab by mouth daily. 16   Екатерина Amador MD   pantoprazole (PROTONIX) 40 mg tablet Take 1 Tab by mouth Before breakfast and dinner. 16   Екатерина Amador MD   oxyCODONE-acetaminophen (PERCOCET) 5-325 mg per tablet Take 1 Tab by mouth every four (4) hours as needed for Pain. Max Daily Amount: 6 Tabs.  16   Anastasia Arriola Dews, DO     Allergies   Allergen Reactions    Latex, Natural Rubber Hives and Itching    Clindamycin Hives    Clindamycin Itching    Iodine Hives     Pt denies allergy. Patient reports this is an allergy to contrast media that caused temporary vision loss and vomiting but is fine if premedicated with antihistamine.  Keflex [Cephalexin] Hives    Linezolid Hives    Nsaids (Non-Steroidal Anti-Inflammatory Drug) Nausea and Vomiting     Emesis, Previous GI bleed    Piperacillin-Tazobactam Hives     Has tolerated Teflaro @ [address]  Has been tolerating cephalosporins SAPH     Sulfa (Sulfonamide Antibiotics) Hives and Contact Dermatitis    Vancomycin Hives         Review of Systems:   Gen -+  fever / chills,  Decreased appetite. HEENT - denies vision changes, no dysphagia  PULM - no cough/dyspnea  CV - denies chest pain, palpitations   GI - + nausea and abd discomfort, no blood per rectum   - denies dysuria/hematuria/frequency  SKIN - no ulcers, + dermatitis both lower legs, + myalgias - global /both legs  MS - minimally ambulatory, deconditioned. NEURO - denies prior stroke or TIA, no seizure history, no significant headaches, no focal weakness. Objective:     Visit Vitals    BP 95/51    Pulse 74    Temp 98 °F (36.7 °C)    Resp 14    Ht 5' 4\" (1.626 m)    Wt 113.1 kg (249 lb 5.4 oz)    SpO2 100%    BMI 42.8 kg/m2       Temp (24hrs), Av.6 °F (36.4 °C), Min:97.2 °F (36.2 °C), Max:98 °F (36.7 °C)      Physical Exam:  GEN: A&Ox3, chronically ill,   HEENT:PERRL EOMI, non icteric. NECK: no JVD, supple  LUNG: clear b/l, decreased bases and unlabored breathing  HEART: regular   ABD: soft, NT, obese/large pannus  EXT: 3+ LE edema. L>R with lower leg dermatitis. Both legs tender with movement. PULSES:very soft PT pulses. Shaina Pulliam NEURO: no focal lateralizing deficits noted. Motor 5/5.      Labs:   Recent Results (from the past 24 hour(s))   PLATELETS, ALLOCATE    Collection Time: 17  9:30 AM   Result Value Ref Range    CALLED TO: Francisco Ivory, 2ICU2, AT 1035 ON 3/5/2017 BY [address] [facility]     Unit number [id number]     Blood component type PLTPH LR,1     Unit division 00     Status of unit TRANSFUSED     Unit number [id number]     Blood component type PLTPH LR,1     Unit division 00     Status of unit TRANSFUSED    GLUCOSE, POC    Collection Time: 03/05/17 12:50 PM   Result Value Ref Range    Glucose (POC) 112 (H) 70 - 110 mg/dL   CBC W/O DIFF    Collection Time: 03/05/17  5:25 PM   Result Value Ref Range    WBC 14.7 (H) 4.6 - 13.2 K/uL    RBC 3.13 (L) 4.20 - 5.30 M/uL    HGB 8.2 (L) 12.0 - 16.0 g/dL    HCT 25.9 (L) 35.0 - 45.0 %    MCV 82.7 74.0 - 97.0 FL    MCH 26.2 24.0 - 34.0 PG    MCHC 31.7 31.0 - 37.0 g/dL    RDW 16.2 (H) 11.6 - 14.5 %    PLATELET 17 (LL) 920 - 420 K/uL   GLUCOSE, POC    Collection Time: 03/05/17  6:46 PM   Result Value Ref Range    Glucose (POC) 83 70 - 110 mg/dL   POTASSIUM    Collection Time: 03/05/17  8:00 PM   Result Value Ref Range    Potassium 4.3 3.5 - 5.5 mmol/L   MAGNESIUM    Collection Time: 03/05/17  8:00 PM   Result Value Ref Range    Magnesium 1.9 1.8 - 2.4 mg/dL   GLUCOSE, POC    Collection Time: 03/06/17 12:38 AM   Result Value Ref Range    Glucose (POC) 109 70 - 110 mg/dL   MAGNESIUM    Collection Time: 03/06/17  3:20 AM   Result Value Ref Range    Magnesium 1.8 1.8 - 2.4 mg/dL   GLUCOSE, POC    Collection Time: 03/06/17  6:37 AM   Result Value Ref Range    Glucose (POC) 90 70 - 110 mg/dL     LE venous duplex 3/5/17:   1. Sub-acute occlusive DVT was identified in the proximal femoral vein. 2. Sub-acute non-occlusive DVT is identified in the common femoral, mid femoral and deep femoral veins.       Assessment:     Principal Problem:    Severe sepsis with septic shock (CODE) ([facility] (3/3/2017)    Active Problems:    History of ovarian cancer (3/18/2015)      Morbid obesity ([facility] (10/2/2014)      CAD (coronary artery disease) (12/29/2015)      DEV (acute kidney injury) ([facility] (12/29/2015)      A-fib ([facility] (12/29/2015)      UTI (urinary tract infection) (3/3/2017)      Severe thrombocytopenia ([address] (3/4/2017)        Plan:   Pt with L pelvic mass/metastatic tumor most likely obstructing venous outflow with subacute RLE DVT. Currently severely thrombocytopenic precluding anticoagulation. Pt previously home hospice and appears may return to this status at discharge. Pt also at this point does not desire intervention 'today' - alert and oriented. Given the above points -will re evaluate daily for possible IVC filter - but do not recommend placement. Teresa Díaz.  Meaghan Akins, [address] [facility]  cell - [phone]  March 6, 2017  7:57 AM

## 2017-03-06 NOTE — PROGRESS NOTES
Called nurse to ask if patient would allow me to do echocardiogram.  Patient refused the test again.

## 2017-03-06 NOTE — DIABETES MGMT
NUTRITIONAL ASSESSMENT AND GLYCEMIC CONTROL PLAN OF CARE     Sandi Workman           49 y.o.           3/3/2017                 1. Septic shock ([facility]    2. Acute renal failure, unspecified acute renal failure type ([facility]    3. Acute cystitis with hematuria    4. High anion gap metabolic acidosis      Patient Active Problem List   Diagnosis Code    Anemia D64.9    Anxiety F41.9    Nausea and vomiting R11.2    Morbid obesity ([facility] E66.01    Chronic pain syndrome G89.4    History of ovarian cancer Z85.43    History of gastric bypass Z98.890    CAD (coronary artery disease) I25.10    History of Clostridium difficile Z87.19    History of endometrial cancer Z85.42      [x]  No Cultural, Faith or ethnic dietary need identified. []  Cultural, Faith and ethnic food preferences identified and addressed    [x]  Participated in discharge planning/Interdisciplinary rounds   Food allergies: [x]  No        []  Yes-  ASSESSMENT:   Pt is overweight related to excess caloric intake as evidenced by 192% ideal weight and BMI=42.8kg/m2. Inadequate energy intake related to nausea as evidenced by npo diet order. Pt w/hypoalbuminemia as evidenced by albumin=1.6 mg/dl. INTERVENTIONS/PLAN:   Encouraged increase intake at meals to meet calorie and protein requirements when diet resumes. SUBJECTIVE/OBJECTIVE:   Diet:npo /ice chips  No data found.     Medications: [x]                Reviewed -has corrective lispro-not requiring  NS at 125ml/hr  Most Recent POC Glucose:   Recent Labs      03/06/17   0320  03/05/17   0430  03/04/17   0647  03/03/17   2215   GLU  115*  112*  132*  115*         Labs:   Lab Results   Component Value Date/Time    Hemoglobin A1c 5.4 03/04/2017 06:47 AM     Lab Results   Component Value Date/Time    Sodium 135 03/06/2017 03:20 AM    Potassium 4.1 03/06/2017 03:20 AM    Chloride 105 03/06/2017 03:20 AM    CO2 16 03/06/2017 03:20 AM    Anion gap 14 03/06/2017 03:20 AM    Glucose 115 03/06/2017 03:20 AM     03/06/2017 03:20 AM    Creatinine 4.14 03/06/2017 03:20 AM    Calcium 7.8 03/06/2017 03:20 AM    Magnesium 1.8 03/06/2017 03:20 AM    Albumin 1.6 03/06/2017 03:20 AM       Anthropometrics: IBW : 59 kg (130 lb), % IBW (Calculated): 191.8 %, BMI (calculated): 42.8  Wt Readings from Last 1 Encounters:   03/06/17 113.1 kg (249 lb 5.4 oz)      Ht Readings from Last 1 Encounters:   03/06/17 5' 4\" (1.626 m)       Estimated Nutrition Needs: 1480 Kcals/day, Protein (g): 70 g Fluid (ml): 1800 ml  Based on:   []          Actual BW    [x]          IBW   []            Adjusted BW      Nutrition Diagnoses:      As stated above. Nutrition Interventions: monitor intake when diet resumes  Goal:     Intake will meet >75% of energy and protein requirements by  3/11. Glucose will be within target range of 70-180mg/dl by   3/9.-met  No weight goals due to hospice status prior to admission.   Nutrition Monitoring and Evaluation      [x]     Monitor po intake on meal rounds  [x]     Continue inpatient monitoring and intervention  []     Other:      Nutrition Risk:  []   High     [x]  Moderate    []  Minimal/Uncompromised    Kinjal Butcher RD

## 2017-03-06 NOTE — PROGRESS NOTES
Assumed care from off going nurse at the bedside. Patient lying in bed without distress. Patient alert and orient X 3. Patient has call bell within reach, and bed locked in low position. See complex assessment for further findings. 0940 patient lying in bed no distress or discomfort noted at this time. Patient refuses to turn at this time. Byrne draining and intact,  Patient declined byrne care. Patient refused oral medication. Moves all extremities with weakness. Iv sites intact, no redness, swelling or tenderness noted. 1200 patient lying in bed no distress or discomfort noted. Patient continues to refuse to turn at this time. [phone] 90 patient had visitors at this time, no distress or discomfort noted at this time. Patient does not want to turn at this time. 1730 patient lying in bed no distress or discomfort noted at this time. Still refuses turning at this time. 1926 Bedside and Verbal shift change report given to [address] [facility] [address] [facility] [address], [address] [facility] (oncoming nurse) by Mandi Gore RN (offgoing nurse). Report given with SBAR, Kardex, Intake/Output, MAR and Recent Results. Emmie Calvert

## 2017-03-06 NOTE — MED STUDENT NOTES
*ATTENTION:  This note has been created by a medical student for educational purposes only. Please do not refer to the content of this note for clinical decision-making, billing, or other purposes. Please see attending physicians note to obtain clinical information on this patient. *       Student Progress Note  Please refer to attendings daily rounding note for full details      Patient: Ayaka Martinez MRN: [id number]  CSN: [id number]    YOB: 1960  Age: 49 y.o. Sex: female    DOA: 3/3/2017 LOS:  LOS: 3 days          Chief Complaint: \"wasn't feeling well\"    Subjective:    Pt is a 50 yo female with a hx of CAD, C-diff, Endometrial and Ovarian cancer, lymphedema, and morbid obesity presented to the ED 3 days ago complaining of not feeling well. She states that she does not remember what happened prior to her visit to the ER. She is in the ICU. She does report mild abdominal pain, bilateral leg pain, nausea, and vomiting. Objective:      Visit Vitals    /62    Pulse 76    Temp 97.8 °F (36.6 °C)    Resp 16    Ht 5' 4\" (1.626 m)    Wt 113.1 kg (249 lb 5.4 oz)    SpO2 99%    BMI 42.8 kg/m2         Physical Exam:  Gen: pt is a 50 yo morbidly obese patient who appears uncomfortable in the bed  HEENT: moist mucous membranes  CV: murmurs noted in RUSB, and mildly in LUSB  Resp: no wheezes or rhonchi  Abd: soft, diffusely tender to palpation  Extrem: cyanotic tip of bilateral hands, edema but no cyanosis present in LE  Skin: warm and dry  Neuro: mildly confused      I have reviewed the patient's Labs and Radiology studies. Assessment/Plan:     1. Sepsis with Septic shock: 2/2 UTI, hx of VRE. Continue Linezolid, Zosyn, and Levaquin. Culture results noted. Awaiting new results. 2. UTI: Continue abx- pos **VANCOMYCIN RESISTANT ENTEROCOCCUS FAECIUM** on culture  3. Thrombocytopenia: platelets transfused, heme/onc consulted  4.  DVT on Right Leg: No anticoagulant 2/2 severe low platelet, pt has refused echo, observe for PE symptoms. 5. Encephalopathy: 2/2 hypoglycemia vs urosepsis vs hypotension vs uremia- improving, CT shows no evidence of acute infarct, hemorrhage or mass. 6. Hypoglycemia: resolved with glucagon  7. Anemia: transfuse if Hgb <7  8. Malignancy at multiple sites: endometrial and ovarian cancer, New right middle lobe pulmonary nodule. Cannot rule out metastatic disease per CT. Heme/Onc consulted  9. ARF: IVF, nephro consult  10. Chronic hypoalbuminemia: ?replete  11.  GI prophylaxis: Protonix    Jacquie Musa  3/6/2017  4:12 PM

## 2017-03-06 NOTE — PROGRESS NOTES
[facility]  Hospitalist Division        Inpatient Daily Progress Note    Daily progress Note    Patient: Ayaka Martinez MRN: [id number]  CSN: [id number]    YOB: 1960  Age: 49 y.o. Sex: female    DOA: 3/3/2017 LOS:  LOS: 3 days                    Chief Complaint:  AMS       Subjective:      C/o generalized pain. In NAD. Objective:      Visit Vitals    /62    Pulse 76    Temp 97.8 °F (36.6 °C)    Resp 16    Ht 5' 4\" (1.626 m)    Wt 113.1 kg (249 lb 5.4 oz)    SpO2 99%    BMI 42.8 kg/m2       Physical Exam:  General appearance: alert, cooperative, no distress, appears stated age  Head: Normocephalic, without obvious abnormality, atraumatic  Lungs: diminished bases bilaterally, expiratory wheezes   Heart: regular rate and rhythm, S1, S2 normal, no murmur, click, rub or gallop  Abdomen: soft, non distended, tender TTP. Normoactive bowel sounds  Extremities: extremities normal, atraumatic, no cyanosis.  1+ BLE edema   Skin: scattered ecchymosis to abdomen, BLE  Neurologic: moves all 4 extremities   PSY: appropriately behaved        Intake and Output:  Current Shift:  03/06 0701 - 03/06 1900  In: 375 [I.V.:375]  Out: 200 [Urine:200]  Last three shifts:  03/04 1901 - 03/06 0700  In: 6148.1 [I.V.:5292.1]  Out: 2612 [Urine:2512]    Recent Results (from the past 24 hour(s))   CBC W/O DIFF    Collection Time: 03/05/17  5:25 PM   Result Value Ref Range    WBC 14.7 (H) 4.6 - 13.2 K/uL    RBC 3.13 (L) 4.20 - 5.30 M/uL    HGB 8.2 (L) 12.0 - 16.0 g/dL    HCT 25.9 (L) 35.0 - 45.0 %    MCV 82.7 74.0 - 97.0 FL    MCH 26.2 24.0 - 34.0 PG    MCHC 31.7 31.0 - 37.0 g/dL    RDW 16.2 (H) 11.6 - 14.5 %    PLATELET 17 (LL) 982 - 420 K/uL   GLUCOSE, POC    Collection Time: 03/05/17  6:46 PM   Result Value Ref Range    Glucose (POC) 83 70 - 110 mg/dL   POTASSIUM    Collection Time: 03/05/17  8:00 PM   Result Value Ref Range    Potassium 4.3 3.5 - 5.5 mmol/L   MAGNESIUM Collection Time: 03/05/17  8:00 PM   Result Value Ref Range    Magnesium 1.9 1.8 - 2.4 mg/dL   GLUCOSE, POC    Collection Time: 03/06/17 12:38 AM   Result Value Ref Range    Glucose (POC) 109 70 - 110 mg/dL   MAGNESIUM    Collection Time: 03/06/17  3:20 AM   Result Value Ref Range    Magnesium 1.8 1.8 - 2.4 mg/dL   METABOLIC PANEL, COMPREHENSIVE    Collection Time: 03/06/17  3:20 AM   Result Value Ref Range    Sodium 135 (L) 136 - 145 mmol/L    Potassium 4.1 3.5 - 5.5 mmol/L    Chloride 105 100 - 108 mmol/L    CO2 16 (L) 21 - 32 mmol/L    Anion gap 14 3.0 - 18 mmol/L    Glucose 115 (H) 74 - 99 mg/dL     (H) 7.0 - 18 MG/DL    Creatinine 4.14 (H) 0.6 - 1.3 MG/DL    BUN/Creatinine ratio 32 (H) 12 - 20      GFR est AA 14 (L) >60 ml/min/1.73m2    GFR est non-AA 11 (L) >60 ml/min/1.73m2    Calcium 7.8 (L) 8.5 - 10.1 MG/DL    Bilirubin, total 0.9 0.2 - 1.0 MG/DL    ALT (SGPT) 28 13 - 56 U/L    AST (SGOT) 33 15 - 37 U/L    Alk. phosphatase 231 (H) 45 - 117 U/L    Protein, total 5.6 (L) 6.4 - 8.2 g/dL    Albumin 1.6 (L) 3.4 - 5.0 g/dL    Globulin 4.0 2.0 - 4.0 g/dL    A-G Ratio 0.4 (L) 0.8 - 1.7     CBC WITH AUTOMATED DIFF    Collection Time: 03/06/17  3:20 AM   Result Value Ref Range    WBC 16.0 (H) 4.6 - 13.2 K/uL    RBC 2.93 (L) 4.20 - 5.30 M/uL    HGB 7.7 (L) 12.0 - 16.0 g/dL    HCT 24.2 (L) 35.0 - 45.0 %    MCV 82.6 74.0 - 97.0 FL    MCH 26.3 24.0 - 34.0 PG    MCHC 31.8 31.0 - 37.0 g/dL    RDW 16.4 (H) 11.6 - 14.5 %    PLATELET 20 (LL) 713 - 420 K/uL    NEUTROPHILS 88 (H) 42 - 75 %    BAND NEUTROPHILS 3 0 - 5 %    LYMPHOCYTES 5 (L) 20 - 51 %    MONOCYTES 4 2 - 9 %    EOSINOPHILS 0 0 - 5 %    BASOPHILS 0 0 - 3 %    ABS. NEUTROPHILS 14.1 (H) 1.8 - 8.0 K/UL    ABS. LYMPHOCYTES 0.8 0.8 - 3.5 K/UL    ABS. MONOCYTES 0.6 0 - 1.0 K/UL    ABS. EOSINOPHILS 0.0 0.0 - 0.4 K/UL    ABS.  BASOPHILS 0.0 0.0 - 0.1 K/UL    RBC COMMENTS ANISOCYTOSIS  1+        RBC COMMENTS OVALOCYTES  1+        RBC COMMENTS SPHEROCYTES  1+        DF MANUAL     GLUCOSE, POC    Collection Time: 03/06/17  6:37 AM   Result Value Ref Range    Glucose (POC) 90 70 - 110 mg/dL   CULTURE, BLOOD    Collection Time: 03/06/17 10:35 AM   Result Value Ref Range    Special Requests: PERIPHERAL      Culture result: PENDING    CULTURE, BLOOD    Collection Time: 03/06/17 10:40 AM   Result Value Ref Range    Special Requests: PERIPHERAL      Culture result: PENDING    GLUCOSE, POC    Collection Time: 03/06/17 11:39 AM   Result Value Ref Range    Glucose (POC) 100 70 - 110 mg/dL           Lab Results   Component Value Date/Time    Glucose 115 03/06/2017 03:20 AM    Glucose 112 03/05/2017 04:30 AM    Glucose 132 03/04/2017 06:47 AM    Glucose 115 03/03/2017 10:15 PM    Glucose 440 03/03/2017 07:47 PM        Assessment/Plan:     Patient Active Problem List   Diagnosis Code    Anemia D64.9    Anxiety F41.9    Nausea and vomiting R11.2    Morbid obesity ([facility]) E66.01    Chronic pain syndrome G89.4    History of ovarian cancer Z85.43    History of gastric bypass Z98.890    CAD (coronary artery disease) I25.10    History of Clostridium difficile Z87.19    History of endometrial cancer Z85.42    History of GI bleed Z87.19    Lymphedema I89.0    Hypokalemia E87.6    DEV (acute kidney injury) ([facility] N17.9    Pulmonary nodules R91.8    A-fib ([facility]) I48.91    Aortic stenosis I35.0    GI bleed K92.2    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    Acute blood loss anemia D62    Bladder mass N32.89    Flank pain, chronic R10.9, G89.29    UTI (urinary tract infection) N39.0    Severe sepsis with septic shock (CODE) ([facility]) R65.21    Severe thrombocytopenia ([facility]) D69.6       A/P:  UTI: Cx grew out VRE- continue Zyvox   Sepsis 2/2 above   DM: SSI- monitor glucose control  ARF- Cr noted    Severe Thrombocytopenia   Hx Ovarian and Endometrial cancer- pain control    GI prophylaxis: Protonix  PCCM following- appreciate assistance       BYRON [facility] [facility]  Hospitalist Division  Pager:  [phone]  Office:  [phone]

## 2017-03-06 NOTE — PROGRESS NOTES
2000 Assumed care of pt after bedside report with pt lying in bed with HOB elevated slightly. Monitor denotes NSR. Neuro intact. AAO x 4. Able to APONTE x 4 but states is weak and will need help. Lungs clear, diminished in bases. Abd obese, intact, with pannus. Duarte intact and draining brownish urine with sediment. Pt with Mepiliex in place on sacrum, buttock areaover stage 2 decub. Also noted large ecchymotic areas noted on rt interior thigh, lower leg. Both hands with fingers cyanotic and both feet with toes cyanotic. MD aware. Advised per offgoing nurse Dank BRAVO that pt does not want any visitors or telephone calls. Palliative Care consult scheduled for tomorrow. 2030 Pt c/o abd pain and state needs enema right now or she will split tight open. Advised pt that she received Dulcolax suppository at 1830. Pt still insists on enema. 2045 DrVivienne paged and requested Fleets enema. Advised nurse to disimpact pt and give Fleets enema. Advised that constipation is result of medications. 2125 Pt disimpacted per nurse of large amount of formed brownish stool. Fleets enema given after disimpaction but pt unable to retain any of contents. 2230 Resting and watching TV without complaints. 03/06/2017  0000 Accucheck result 109. No Lispro insulin given as per sliding scale coverage. 0300 AM labs drawn and sent to lab.0600 Accucheck result 90. No Lispro insulin given as per sliding scale coverage. 0617 Pt c/o nausea. Zofran 4 mg IV given for c/o nausea. 0645 Pt states Zofran did not help nausea but pt eating ice and advised per nurse wilfred should not eat ice when nauseated. Pt states is just wetting lips.

## 2017-03-06 NOTE — PROGRESS NOTES
[facility]. Pulmonary, Critical Care, and Sleep Medicine    Name: Dionna White MRN: [id number]   : 1960 Hospital: [address] [place] [address]   Date: 3/6/2017  Admission Date: 3/3/2017       3/6: Reports generalized pain, tolerating NC, has not required bipap for 24 hours      Chart and notes reviewed. Data reviewed. I have evaluated all findings. [x]I have reviewed the flowsheet and previous days notes. ROS:generalized pain    Events and notes from last 24 hours reviewed. Care plan discussed on multidisciplinary rounds.   Patient Active Problem List   Diagnosis Code    Anemia D64.9    Anxiety F41.9    Nausea and vomiting R11.2    Morbid obesity ([facility] E66.01    Chronic pain syndrome G89.4    History of ovarian cancer Z85.43    History of gastric bypass Z98.890    CAD (coronary artery disease) I25.10    History of Clostridium difficile Z87.19    History of endometrial cancer Z85.42    History of GI bleed Z87.19    Lymphedema I89.0    Hypokalemia E87.6    DEV (acute kidney injury) ([facility] N17.9    Pulmonary nodules R91.8    A-fib ([facility] I48.91    Aortic stenosis I35.0    GI bleed K92.2    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    Acute blood loss anemia D62    Bladder mass N32.89    Flank pain, chronic R10.9, G89.29    UTI (urinary tract infection) N39.0    Severe sepsis with septic shock (CODE) ([facility]) R65.21    Severe thrombocytopenia ([facility]) D69.6       Vital Signs:  Visit Vitals    /62    Pulse 76    Temp 97.8 °F (36.6 °C)    Resp 16    Ht 5' 4\" (1.626 m)    Wt 113.1 kg (249 lb 5.4 oz)    SpO2 99%    BMI 42.8 kg/m2       O2 Device: Room air       Temp (24hrs), Av.8 °F (36.6 °C), Min:97.2 °F (36.2 °C), Max:98 °F (36.7 °C)       Intake/Output:   Last shift:       0701 -  1900  In: 375 [I.V.:375]  Out: 200 [Urine:200]  Last 3 shifts:  1901 -  0700  In: 6148.1 [I.V.:5292.1]  Out: 2612 [Urine:2512]    Intake/Output Summary (Last 24 hours) at 03/06/17 1354  Last data filed at 03/06/17 1000   Gross per 24 hour   Intake          3346.08 ml   Output             1460 ml   Net          1886.08 ml      Ventilator Settings:                Current Facility-Administered Medications   Medication Dose Route Frequency    sodium bicarbonate tablet 650 mg  650 mg Oral TID    linezolid (ZYVOX) IVPB premix in D5W 600 mg  600 mg IntraVENous Q12H    insulin lispro (HUMALOG) injection   SubCUTAneous Q6H    0.9% sodium chloride infusion  125 mL/hr IntraVENous CONTINUOUS    pantoprazole (PROTONIX) 40 mg in sodium chloride 0.9 % 10 mL injection  40 mg IntraVENous DAILY       Telemetry:     Physical Exam:   General: in no apparent distress   HEENT: Normal   Neck: No abnormally enlarged lymph nodes.    Chest: normal   Lungs: coarse bilaterally with scattered wheezes   Heart: Regular rate and rhythm   Abdomen: non distended, bowel sounds normoactive, tympanic, abdomen is soft without significant tenderness, masses, organomegaly or guarding   Extremity: toes, fingers with color change   Neuro: awake, alert, following commands   Skin: Scattered ecchymosis  DATA:  MAR reviewed and pertinent medications noted or modified as needed    Labs:  Recent Labs      03/06/17   0320  03/05/17   1725  03/05/17   0430   WBC  16.0*  14.7*  18.4*   HGB  7.7*  8.2*  8.6*   HCT  24.2*  25.9*  26.8*   PLT  20*  17*  9*     Recent Labs      03/06/17   0320  03/05/17   2000  03/05/17   0430  03/04/17   0647   03/03/17   1947   NA  135*   --   135*  132*   < >  127*   K  4.1  4.3  4.3  4.7   < >  5.3   CL  105   --   103  99*   < >  92*   CO2  16*   --   16*  16*   < >  9*   GLU  115*   --   112*  132*   < >  440*   BUN  131*   --   148*  152*   < >  167*   CREA  4.14*   --   4.55*  5.31*   < >  6.55*   CA  7.8*   --   7.7*  7.6*   < >  7.6*   MG  1.8  1.9  2.0  2.1   < >   --    ALB  1.6*   --   1.5*   --    --   1.4*   SGOT  33   --   68*   --    --   82*   ALT  28   --   42   --    -- 46   INR   --    --    --   1.2   --    --     < > = values in this interval not displayed. No results for input(s): PH, PCO2, PO2, HCO3, FIO2 in the last 72 hours. No results for input(s): FIO2I, IFO2, HCO3I, IHCO3, HCOPOC, PCO2I, PCOPOC, IPHI, PHI, PHPOC, PO2I, PO2POC in the last 72 hours. No lab exists for component: IPOC2  Imaging:  [x]                           I have personally reviewed the patients radiographs and reports    High complexity decision making was performed during this consultation and evaluation. [x]       Pt is at high risk for further organ failure and dysfunction. Critical care time spent  minutes with patient exclusive of procedures.     IMPRESSION:    Anemia D64.9    Anxiety F41.9    Nausea and vomiting R11.2    Morbid obesity ([facility]) E66.01    Chronic pain syndrome G89.4    History of ovarian cancer Z85.43    History of gastric bypass Z98.890    CAD (coronary artery disease) I25.10    History of Clostridium difficile Z87.19    History of endometrial cancer Z85.42    History of GI bleed Z87.19    Lymphedema I89.0    Hypokalemia E87.6    DEV (acute kidney injury) ([facility] N17.9    Pulmonary nodules R91.8    A-fib ([facility]) I48.91    Aortic stenosis I35.0    GI bleed K92.2    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    Acute blood loss anemia D62    Bladder mass N32.89    Flank pain, chronic R10.9, G89.29    UTI (urinary tract infection) N39.0    Severe sepsis with septic shock (CODE) ([facility]) R65.21    Severe thrombocytopenia ([facility]) D69.6 ·   Subacute DVTPVLs just done at bedside) with severely low platelets: 9k last count  ·       PLAN:   Neuro  Awake, alert, following commands    Cardiovascular  Hemodynamically stable  Refused 2D echo  Afib rate controlled  Vascular surgery team following, aware of digit ischemia, no acute interventions planned    Respiratory  Tolerating NC  Off bipap x 24 hours    GI  Continue PPI    Renal  Acute renal failure with slow daily improvement  Electrolytes stable, replace as needed    ID  Persistent leukocytosis  No fevers  Bcx 3/3 with alpha strep (2 out of 2 bottles), repeat bcx today  Ucx 3/3 with VRE  VRE in urine needs contact precautions  Continue linezolid, will deescalate abx and d/c levaquin, zosyn, discussed with pharmacy    Heme  Ovarian cancer  RLE DVT likely 2/2 venous outflow obstruction due to pelvic mass  Pt refused IVC today  Severe, refractory thrombocytopenia  Hb with slow downtrend    Endo  Continue ssi    Dispo  Possible return to hospice?              Dunia Mahmood MD

## 2017-03-06 NOTE — PROGRESS NOTES
Pt was home with hospice, await palliative care consult. Pt can go  Into a facility  With hospice, would convert to straight [facility], or long term care, but does not have a snf benefit.

## 2017-03-07 NOTE — DIABETES MGMT
NUTRITIONAL RE-ASSESSMENT AND GLYCEMIC CONTROL PLAN OF CARE     Radha Sims           49 y.o.           3/3/2017                 1. Septic shock ([facility]    2. Acute renal failure, unspecified acute renal failure type ([facility]    3. Acute cystitis with hematuria    4. High anion gap metabolic acidosis      Patient Active Problem List   Diagnosis Code    Anemia D64.9    Anxiety F41.9    Nausea and vomiting R11.2    Morbid obesity ([facility] E66.01    Chronic pain syndrome G89.4    History of ovarian cancer Z85.43    History of gastric bypass Z98.890    CAD (coronary artery disease) I25.10    History of Clostridium difficile Z87.19    History of endometrial cancer Z85.42      [x]  No Cultural, Alevism or ethnic dietary need identified. []  Cultural, Alevism and ethnic food preferences identified and addressed    [x]  Participated in discharge planning/Interdisciplinary rounds   Food allergies: [x]  No        []  Yes-  ASSESSMENT:   Pt is overweight related to excess caloric intake as evidenced by 192% ideal weight and BMI=42.8kg/m2. Inadequate energy intake related to poor appetite pt was  Npo, regular  diet ordered for the lunch meal.  Pt w/hypoalbuminemia as evidenced by albumin=1.6 mg/dl. INTERVENTIONS/PLAN:   Encouraged increased intake at meals to meet calorie and protein requirements. SUBJECTIVE/OBJECTIVE:   Diet:regular  No data found.     Medications: [x]                Reviewed -has corrective lispro-not requiring recommend discontinuing BG monitoring  NS at 125ml/hr  Most Recent POC Glucose:   Recent Labs      03/07/17   0330  03/06/17   0320  03/05/17   0430   GLU  100*  115*  112*         Labs:   Lab Results   Component Value Date/Time    Hemoglobin A1c 5.4 03/04/2017 06:47 AM     Lab Results   Component Value Date/Time    Sodium 140 03/07/2017 03:30 AM    Potassium 3.3 03/07/2017 03:30 AM    Chloride 109 03/07/2017 03:30 AM    CO2 18 03/07/2017 03:30 AM    Anion gap 13 03/07/2017 03:30 AM Glucose 100 03/07/2017 03:30 AM     03/07/2017 03:30 AM    Creatinine 3.38 03/07/2017 03:30 AM    Calcium 7.5 03/07/2017 03:30 AM    Magnesium 1.7 03/07/2017 03:30 AM    Albumin 1.6 03/06/2017 03:20 AM       Anthropometrics: IBW : 59 kg (130 lb), % IBW (Calculated): 191.8 %, BMI (calculated): 45.3  Wt Readings from Last 1 Encounters:   03/07/17 119.8 kg (264 lb 1.8 oz)      Ht Readings from Last 1 Encounters:   03/06/17 5' 4\" (1.626 m)       Estimated Nutrition Needs: 1480 Kcals/day, Protein (g): 70 g Fluid (ml): 1800 ml  Based on:   []          Actual BW    [x]          IBW   []            Adjusted BW      Nutrition Diagnoses:      As stated above. Nutrition Interventions: monitor intake when diet resumes  Goal:     Intake will meet >75% of energy and protein requirements by  3/12. Glucose will be within target range of 70-180mg/dl by   3/10. -met  No weight goals due to hospice status prior to admission.   Nutrition Monitoring and Evaluation      [x]     Monitor po intake on meal rounds  [x]     Continue inpatient monitoring and intervention  []     Other:      Nutrition Risk:  []   High     [x]  Moderate    []  Minimal/Uncompromised    Yuridia November, RD

## 2017-03-07 NOTE — ROUTINE PROCESS
Bedside and Verbal shift change report given to Lori Prince RN (oncoming nurse) by Natan Paul RN   (offgoing nurse). Report included the following information SBAR, Kardex, Intake/Output, MAR and Recent Results.

## 2017-03-07 NOTE — PROGRESS NOTES
[facility]: This patient meets P & T approved criteria for conversion from IV to oral therapy for the following medication:     Linezolid 600 mg IV q12h was discontinued and Linezolid 600 mg PO q12h was ordered. Pantoprazole 40 mg IV q24h for SUP was discontinued and Pantoprazole 40 mg tab PO daily was ordered. If the patient no longer meets all criteria for oral therapy, the pharmacist will switch back to IV therapy. Thanks.

## 2017-03-07 NOTE — PROGRESS NOTES
Pt was not receptive to the palliative care/ hospice discusion as noted in notes. Plan is uncertain at this time. Appears to be home. Will continue to monitor.

## 2017-03-07 NOTE — PROGRESS NOTES
[facility]  Hospitalist Division        Inpatient Daily Progress Note    Daily progress Note    Patient: Amilcar Boateng MRN: [id number]  CSN: [id number]    YOB: 1960  Age: 49 y.o. Sex: female    DOA: 3/3/2017 LOS:  LOS: 4 days                    Chief Complaint:  AMS       Subjective:      Resting, complaining of generalized pain, cough. In no distress. Objective:      Visit Vitals    BP 96/48    Pulse 72    Temp 98 °F (36.7 °C)    Resp 17    Ht 5' 4\" (1.626 m)    Wt 119.8 kg (264 lb 1.8 oz)    SpO2 100%    BMI 45.33 kg/m2       Physical Exam:  General appearance: alert, no distress  Head: Normocephalic, without obvious abnormality, atraumatic  Lungs: diminished bases bilaterally, expiratory wheezes throughout   Heart: regular rate and rhythm, S1, S2 normal, no murmur, click, rub or gallop  Abdomen: soft, tender to palpation. Bowel sounds normoactive   Extremities: extremities normal, atraumatic, no cyanosis.  1+ BLE edema   Skin: scattered ecchymosis to abdomen, BLE  Neurologic: moves all 4 extremities   PSY: appropriately behaved        Intake and Output:  Current Shift:  03/07 0701 - 03/07 1900  In: 0483 [I.V.:1050]  Out: 305 [Urine:305]  Last three shifts:  03/05 1901 - 03/07 0700  In: 4925 [I.V.:4925]  Out: 2425 [Urine:2425]    Recent Results (from the past 24 hour(s))   GLUCOSE, POC    Collection Time: 03/06/17  5:22 PM   Result Value Ref Range    Glucose (POC) 96 70 - 110 mg/dL   GLUCOSE, POC    Collection Time: 03/06/17 11:33 PM   Result Value Ref Range    Glucose (POC) 90 70 - 110 mg/dL   MAGNESIUM    Collection Time: 03/07/17  3:30 AM   Result Value Ref Range    Magnesium 1.7 (L) 1.8 - 2.4 mg/dL   CBC WITH AUTOMATED DIFF    Collection Time: 03/07/17  3:30 AM   Result Value Ref Range    WBC 18.5 (H) 4.6 - 13.2 K/uL    RBC 2.43 (L) 4.20 - 5.30 M/uL    HGB 6.4 (L) 12.0 - 16.0 g/dL    HCT 20.3 (L) 35.0 - 45.0 %    MCV 83.5 74.0 - 97.0 FL    MCH 26.3 24.0 - 34.0 PG    MCHC 31.5 31.0 - 37.0 g/dL    RDW 16.5 (H) 11.6 - 14.5 %    PLATELET 28 (LL) 864 - 420 K/uL    NEUTROPHILS 92 (H) 40 - 73 %    LYMPHOCYTES 6 (L) 21 - 52 %    MONOCYTES 2 (L) 3 - 10 %    EOSINOPHILS 0 0 - 5 %    BASOPHILS 0 0 - 2 %    ABS. NEUTROPHILS 16.9 (H) 1.8 - 8.0 K/UL    ABS. LYMPHOCYTES 1.2 0.9 - 3.6 K/UL    ABS. MONOCYTES 0.4 0.05 - 1.2 K/UL    ABS. EOSINOPHILS 0.0 0.0 - 0.4 K/UL    ABS.  BASOPHILS 0.0 0.0 - 0.06 K/UL    DF AUTOMATED     METABOLIC PANEL, BASIC    Collection Time: 03/07/17  3:30 AM   Result Value Ref Range    Sodium 140 136 - 145 mmol/L    Potassium 3.3 (L) 3.5 - 5.5 mmol/L    Chloride 109 (H) 100 - 108 mmol/L    CO2 18 (L) 21 - 32 mmol/L    Anion gap 13 3.0 - 18 mmol/L    Glucose 100 (H) 74 - 99 mg/dL     (H) 7.0 - 18 MG/DL    Creatinine 3.38 (H) 0.6 - 1.3 MG/DL    BUN/Creatinine ratio 33 (H) 12 - 20      GFR est AA 17 (L) >60 ml/min/1.73m2    GFR est non-AA 14 (L) >60 ml/min/1.73m2    Calcium 7.5 (L) 8.5 - 10.1 MG/DL   GLUCOSE, POC    Collection Time: 03/07/17  5:23 AM   Result Value Ref Range    Glucose (POC) 100 70 - 110 mg/dL   GLUCOSE, POC    Collection Time: 03/07/17 12:27 PM   Result Value Ref Range    Glucose (POC) 95 70 - 110 mg/dL           Lab Results   Component Value Date/Time    Glucose 100 03/07/2017 03:30 AM    Glucose 115 03/06/2017 03:20 AM    Glucose 112 03/05/2017 04:30 AM    Glucose 132 03/04/2017 06:47 AM    Glucose 115 03/03/2017 10:15 PM        Assessment/Plan:     Patient Active Problem List   Diagnosis Code    Anemia D64.9    Anxiety F41.9    Nausea and vomiting R11.2    Morbid obesity ([facility]) E66.01    Chronic pain syndrome G89.4    History of ovarian cancer Z85.43    History of gastric bypass Z98.890    CAD (coronary artery disease) I25.10    History of Clostridium difficile Z87.19    History of endometrial cancer Z85.42    History of GI bleed Z87.19    Lymphedema I89.0    Hypokalemia E87.6    DEV (acute kidney injury) ([facility] N17.9    Pulmonary nodules R91.8    A-fib ([facility]) I48.91    Aortic stenosis I35.0    GI bleed K92.2    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 Blood Cx- NGTD  Acute blood loss anemia: Hgb 6.4- 1 unit PRBC infused, 2nd unit hanging. Monitor H/H  Vascular following for possible placement of IVC filter 2/2 RLE DVT  DM: SSI- continue to monitor glucose control  ARF- Cr continues to trend down.  Continue to monitor   Severe Thrombocytopenia   Hx Ovarian and Endometrial cancer with possible metastatic disease- pain control    GI prophylaxis: Protonix  PCCM following- appreciate assistance    Palliative following  Transfer to floor         Gabby Patel, [address]  Pager:  [phone]  Office:  [phone]

## 2017-03-07 NOTE — PALLIATIVE CARE
I saw pt at bedside to assess for orientation and whether a psych consult might be helpful. Pt could answer all orientation questions accurately. When questioned about revoking her Hospice benefit and DDNR, pt claimed that someone from hospice \"made her do it. Some eloy came in and made her sign papers revoking hospice and her DDNR. \" Pt then claimed she did not choose to come to hospital, her \"daughter made her do it. \" We also discussed going home with hospice and she said \"why wouldn't I? \" and I explained that on hospice you don't come back to the hospital and her response was that she \"hadn't come back for 7 months and she was so sick she had to come in.\" She repeatedly told me how ill she is. Pt was resistant to this discussion and acted offended because I was questioning her decisions, so she blamed others for her decisions. While pt is oriented, she appears to have a mental illness which impacts her judgment and reasoning.

## 2017-03-07 NOTE — PROGRESS NOTES
2030 Nursing assessment done, awake and alert, refusing to be repositioned. Stated she wants to relax. 2300 Still refusing to be moved and refusing byrne care. Keep saying she just want to relax. 0400 Pt refusing nursing care. Awake most of the night. Pt stated that she vomited but didn't call. Refusing pain medication and Zofran for emesis. 3100 Johana Oates 6.4/20.3, Dr. Cary Maxwell paged. 9740 Dr. Cary Maxwell returned call, new orders given. Asked pt to sign consent, stated she wants to sleep a little bit more. Chandan Perez

## 2017-03-07 NOTE — PROGRESS NOTES
[facility]  [address]  [address] [place], [place]  [address] [place] [address], [address] [place] [address]  Dr. Alice Hussein, Dr. Javier Rosales  [phone] FAX# [phone]    Progress Note    Patient: Emily Matos MRN: [id number]  SSN: [id number]    YOB: 1960  Age: 49 y.o.   Sex: female      Admit Date: 3/3/2017    LOS: 4 days     Subjective:     No decision made with regards to IVCF or hospice    Objective:     Vitals:    03/07/17 0600 03/07/17 0700 03/07/17 0715 03/07/17 0800   BP: 99/59 96/48     Pulse: 76 72     Resp: 17 17     Temp:    98 °F (36.7 °C)   SpO2: 100% 100%     Weight:   119.8 kg (264 lb 1.8 oz)    Height:            Intake and Output:  Current Shift: 03/07 0701 - 03/07 1900  In: 850 [I.V.:500]  Out: 170 [Urine:170]  Last three shifts: 03/05 1901 - 03/07 0700  In: 4925 [I.V.:4925]  Out: 2425 [Urine:2425]    Physical Exam:   deferred    Lab/Data Review:  BMP:   Lab Results   Component Value Date/Time     03/07/2017 03:30 AM    K 3.3 (L) 03/07/2017 03:30 AM     (H) 03/07/2017 03:30 AM    CO2 18 (L) 03/07/2017 03:30 AM    AGAP 13 03/07/2017 03:30 AM     (H) 03/07/2017 03:30 AM     (H) 03/07/2017 03:30 AM    CREA 3.38 (H) 03/07/2017 03:30 AM    GFRAA 17 (L) 03/07/2017 03:30 AM    GFRNA 14 (L) 03/07/2017 03:30 AM     CMP:   Lab Results   Component Value Date/Time     03/07/2017 03:30 AM    K 3.3 (L) 03/07/2017 03:30 AM     (H) 03/07/2017 03:30 AM    CO2 18 (L) 03/07/2017 03:30 AM    AGAP 13 03/07/2017 03:30 AM     (H) 03/07/2017 03:30 AM     (H) 03/07/2017 03:30 AM    CREA 3.38 (H) 03/07/2017 03:30 AM    GFRAA 17 (L) 03/07/2017 03:30 AM    GFRNA 14 (L) 03/07/2017 03:30 AM    CA 7.5 (L) 03/07/2017 03:30 AM    MG 1.7 (L) 03/07/2017 03:30 AM     CBC:   Lab Results   Component Value Date/Time    WBC 18.5 (H) 03/07/2017 03:30 AM HGB 6.4 (L) 03/07/2017 03:30 AM    HCT 20.3 (L) 03/07/2017 03:30 AM    PLT 28 (LL) 03/07/2017 03:30 AM     COAGS: No results found for: APTT, PTP, INR         Assessment:     Principal Problem:    Severe sepsis with septic shock (CODE) ([facility] (3/3/2017)    Active Problems:    History of ovarian cancer (3/18/2015)      Morbid obesity ([address] (10/2/2014)      CAD (coronary artery disease) (12/29/2015)      DEV (acute kidney injury) ([facility] (12/29/2015)      A-fib ([facility] (12/29/2015)      UTI (urinary tract infection) (3/3/2017)      Severe thrombocytopenia ([address] (3/4/2017)        Plan:      Will tentatively plan to place IVCF Thursday  Following    Signed By: Marcus South MD     March 7, 2017

## 2017-03-07 NOTE — PROGRESS NOTES
[facility]. Pulmonary, Critical Care, and Sleep Medicine    Name: Clem Pulliam MRN: [id number]   : 1960 Hospital: [facility]   Date: 3/7/2017  Admission Date: 3/3/2017       3: Reports generalized pain, tolerating NC, has not required bipap for 24 hours  3/7: still reports generalized pain but improved from yesterday, tolerating NC      Chart and notes reviewed. Data reviewed. I have evaluated all findings. [x]I have reviewed the flowsheet and previous days notes. ROS:generalized pain    Events and notes from last 24 hours reviewed. Care plan discussed on multidisciplinary rounds.   Patient Active Problem List   Diagnosis Code    Anemia D64.9    Anxiety F41.9    Nausea and vomiting R11.2    Morbid obesity ([facility] [address] E66.01    Chronic pain syndrome G89.4    History of ovarian cancer Z85.43    History of gastric bypass Z98.890    CAD (coronary artery disease) I25.10    History of Clostridium difficile Z87.19    History of endometrial cancer Z85.42    History of GI bleed Z87.19    Lymphedema I89.0    Hypokalemia E87.6    DEV (acute kidney injury) ([facility] [address] N17.9    Pulmonary nodules R91.8    A-fib ([facility] [address] I48.91    Aortic stenosis I35.0    GI bleed K92.2    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    Acute blood loss anemia D62    Bladder mass N32.89    Flank pain, chronic R10.9, G89.29    UTI (urinary tract infection) N39.0    Severe sepsis with septic shock (CODE) ([facility]) R65.21    Severe thrombocytopenia ([facility]) D69.6       Vital Signs:  Visit Vitals    BP 96/48    Pulse 72    Temp 98 °F (36.7 °C)    Resp 17    Ht 5' 4\" (1.626 m)    Wt 119.8 kg (264 lb 1.8 oz)    SpO2 100%    BMI 45.33 kg/m2       O2 Device: Room air       Temp (24hrs), Av.9 °F (36.6 °C), Min:97.8 °F (36.6 °C), Max:98.1 °F (36.7 °C)       Intake/Output:   Last shift:      701 - 1900  In: 850 [I.V.:500]  Out: 170 [Urine:170]  Last 3 shifts: 1901 - 700  In: 4925 [I.V.:4925]  Out: 2425 [Urine:2425]    Intake/Output Summary (Last 24 hours) at 03/07/17 1157  Last data filed at 03/07/17 1000   Gross per 24 hour   Intake             3525 ml   Output             1510 ml   Net             2015 ml      Ventilator Settings:                Current Facility-Administered Medications   Medication Dose Route Frequency    sodium bicarbonate tablet 650 mg  650 mg Oral TID    linezolid (ZYVOX) IVPB premix in D5W 600 mg  600 mg IntraVENous Q12H    insulin lispro (HUMALOG) injection   SubCUTAneous Q6H    0.9% sodium chloride infusion  125 mL/hr IntraVENous CONTINUOUS    pantoprazole (PROTONIX) 40 mg in sodium chloride 0.9 % 10 mL injection  40 mg IntraVENous DAILY       Telemetry:     Physical Exam:   General: in no apparent distress   HEENT: Normal   Neck: No abnormally enlarged lymph nodes.    Chest: normal   Lungs: coarse bilaterally with scattered wheezes   Heart: Regular rate and rhythm   Abdomen: non distended, bowel sounds normoactive, tympanic, abdomen is soft without significant tenderness, masses, organomegaly or guarding   Extremity: toes, fingers with color change   Neuro: awake, alert, following commands   Skin: Scattered ecchymosis  DATA:  MAR reviewed and pertinent medications noted or modified as needed    Labs:  Recent Labs      03/07/17   0330  03/06/17   0320 03/05/17   1725   WBC  18.5*  16.0*  14.7*   HGB  6.4*  7.7*  8.2*   HCT  20.3*  24.2*  25.9*   PLT  28*  20*  17*     Recent Labs      03/07/17   0330 03/06/17   0320  03/05/17 2000 03/05/17   0430   NA  140  135*   --   135*   K  3.3*  4.1  4.3  4.3   CL  109*  105   --   103   CO2  18*  16*   --   16*   GLU  100*  115*   --   112*   BUN  113*  131*   --   148*   CREA  3.38*  4.14*   --   4.55*   CA  7.5*  7.8*   --   7.7*   MG  1.7*  1.8  1.9  2.0   ALB   --   1.6*   --   1.5*   SGOT   --   33   --   68*   ALT   --   28   --   42     No results for input(s): PH, PCO2, PO2, HCO3, FIO2 in the last 72 hours. No results for input(s): FIO2I, IFO2, HCO3I, IHCO3, HCOPOC, PCO2I, PCOPOC, IPHI, PHI, PHPOC, PO2I, PO2POC in the last 72 hours. No lab exists for component: IPOC2  Imaging:  [x]                           I have personally reviewed the patients radiographs and reports    High complexity decision making was performed during this consultation and evaluation. [x]       Pt is at high risk for further organ failure and dysfunction. Critical care time spent  minutes with patient exclusive of procedures.     IMPRESSION:    Anemia D64.9    Anxiety F41.9    Nausea and vomiting R11.2    Morbid obesity ([facility]) E66.01    Chronic pain syndrome G89.4    History of ovarian cancer Z85.43    History of gastric bypass Z98.890    CAD (coronary artery disease) I25.10    History of Clostridium difficile Z87.19    History of endometrial cancer Z85.42    History of GI bleed Z87.19    Lymphedema I89.0    Hypokalemia E87.6    DEV (acute kidney injury) ([facility] [address] N17.9    Pulmonary nodules R91.8    A-fib ([facility]) I48.91    Aortic stenosis I35.0    GI bleed K92.2    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    Acute blood loss anemia D62    Bladder mass N32.89    Flank pain, chronic R10.9, G89.29    UTI (urinary tract infection) N39.0    Severe sepsis with septic shock (CODE) ([facility]) R65.21    Severe thrombocytopenia ([facility]) D69.6 ·   Subacute DVTPVLs just done at bedside) with severely low platelets: 9k last count  ·       PLAN:   Neuro  Awake, alert, following commands    Cardiovascular  Hemodynamically stable  Refused 2D echo  Afib rate controlled  Vascular surgery team following, aware of digit ischemia, no acute interventions planned    Respiratory  Tolerating NC  RML pulmonary nodule, possibly mets    GI  Continue PPI  Ok to advance diet    Renal  Acute renal failure with slow daily improvement  Electrolytes stable, replace as needed    ID  Persistent leukocytosis  No fevers  Bcx 3/3 with alpha strep (2 out of 2 bottles), repeat bcx 3/6 negative to date  Ucx 3/3 with VRE  VRE in urine needs contact precautions  Continue linezolid, s/p levaquin and zosyn    Heme  Ovarian cancer  RLE DVT likely 2/2 venous outflow obstruction due to pelvic mass  Tentative IVC placement Thursday  Severe, refractory thrombocytopenia, slightly improving  Hb with slow downtrend, required 1 uPRBCs today 3/7    Endo  Continue ssi    Dispo  Possible return to [facility] for floor transfer             Adrián Jerez MD

## 2017-03-07 NOTE — PROGRESS NOTES
Assumed care from off going nurse at the bedside. Patient lying in bed without distress. Patient alert and orient X 4. Patient has call bell within reach, and bed locked in low position. See complex assessment for further findings. 8983 patient lying in bed no distress or discomfort noted at this time. Patient refuses to turn at this time. Iv site intact, no redness, swelling or tenderness noted. Byrne draining and intact, patient declines byrne care at this time. Patient has weakness in extremities. Patient denies any pain. 50 Patient informed blood transfusion ordered. Patient denies history of blood transfusion reaction. Discuss s/s of blood transfusion reaction to report. 1 of 2 rbcs hung at this time. Transfusion started at 125ml/hr. Patient monitored for s/s of reaction. 1149 Patient was observed per blood transfusion protocol. 2nd PRBC's No s/s of blood transfusion reaction reported or verbalized. Transfusion rate changed to 125ml/hr. Call bell within reach and informed patient to call if assistance needed. Patient verbalize understanding    1200 patient vomiting at this time, patient bathed, gown changed, and linen. Patient didn't want to turn so i could clean the back. No pain noted at this time. 1900 TRANSFER - OUT REPORT:    Verbal report given to Florencio Rajput RN on Daisy Levy  being transferred to (unit) for routine progression of care       Report consisted of patients Situation, Background, Assessment and   Recommendations(SBAR). Information from the following report(s) Intake/Output, MAR and Recent Results was reviewed with the receiving nurse.     Lines:   Venous Access Device Mediport (Power port) Upper chest (subclavicular area, right (Active)   Central Line Being Utilized Yes 3/7/2017  4:00 PM   Criteria for Appropriate Use Hemodynamically unstable, requiring monitoring lines, vasopressors, or volume resuscitation 3/7/2017  4:00 PM   Site Assessment Clean, dry, & intact 3/7/2017  4:00 PM   Date of Last Dressing Change 03/01/17 3/7/2017  4:00 PM   Dressing Status Clean, dry, & intact 3/7/2017  4:00 PM   Dressing Type Disk with Chlorhexadine gluconate (CHG) 3/7/2017  4:00 PM   Action Taken Open ports on tubing capped 3/7/2017  4:00 PM   Date Accessed (Medial Site) 03/01/17 3/5/2017  4:00 PM   Positive Blood Return (Medial Site) Yes 3/5/2017  8:00 AM   Action Taken (Medial Site) Infusing 3/5/2017  4:00 PM   Alcohol Cap Used Yes 3/6/2017  4:00 AM       Peripheral IV 03/03/17 Left Antecubital (Active)   Site Assessment Clean, dry, & intact 3/7/2017  4:00 PM   Phlebitis Assessment 0 3/7/2017  4:00 PM   Infiltration Assessment 0 3/7/2017  4:00 PM   Dressing Status Clean, dry, & intact 3/7/2017  4:00 PM   Dressing Type Transparent 3/7/2017  4:00 PM   Hub Color/Line Status Patent 3/7/2017  4:00 PM   Action Taken Open ports on tubing capped 3/7/2017  4:00 PM   Alcohol Cap Used Yes 3/7/2017  4:00 PM        Opportunity for questions and clarification was provided.       Patient transported with:   Registered Nurse

## 2017-03-08 NOTE — CONSULTS
INFECTIOUS DISEASE CONSULT NOTE     Requested by:Dr Nichols    Reason:sepsis, VRE BSI/ UTI    ABX:     Current abx Prior abx    Daptomycin 3/8 - 0  linezolid 3/4 - 3, vanc/zosyn/levoflox 3/3 -0     ASSESSMENT: -- RECOMMENDATIONS      Sepsis POA  - fever 102 , leukocytosis 28K, tachycardia, hypotension  - source VRE UTI and BSI  - wbc up ?secondary to allergic reaction vs endocarditis   - monitor on changed abx   VRE BSI with ?aortic endocarditis  - blcx 3/3 2/2 VRE positive   - repeat 3/6 ntd   - TTE aortic stenosis with aortic vegetation  - d/c linezolid  - start Daptomycin , check CPK in am  - consider cardiology consult  - d/w Dr Chawla Gu  - repeat blcx today  - concern for [facility] as well  - unfortunately can not add gentamicin as DEV   VRE UTI   - UA with large LE , wbc tntc, jamar 4+  - ucx >100k VRE  - CT with no hydronephrosis but has b/l ureteral stents - above abx should cover. ADR - Rash over abdomen/chest   - secondary to linezolid which has been listed from before as allergy  - d/c linezolid, monitor off of it. RLE subacute DVT     Rt pelvic mass ? ovarian ca / endometrial ca with mets    DEV due to sepsis  - monitor cr   Thrombocytopenia slowly improving     Blood loss anemia       MICROBIOLOGY:   3/3 Blcx x 2 - 2/2 VRE [ S gent, linezolid , quinupristin ]        Ucx > 100K VRE  3/6 Blcx x 2 - ntd     LINES/DRAINS:   Rt mediport     HPI:  Pt is a 51yr old  female with h/o AS, anxiety, endometrial ca, Munchausen syndrome, obesity who presented to [place] [address] ER 3/3/17 with fever 101.8, leukocytosis, sepsis. She was found to have UTI , her Ucx grew VRE, also her blcx grew VRE. She had TTE due to BSI which showed possible aortic valve vegetation. Pt was started on linezolid which she has know allergy to.  For last 2 days she has developed worsening rash over her abdomen, hence we are asked to see pt. Past Medical History:   Diagnosis Date    Anxiety and depression     Aortic stenosis     Aortic stenosis     Arthritis     Arthropathy     Back pain     Cardiac disorder     Cellulitis     left leg    Cellulitis     Chronic pain     Endometrial adenocarcinoma (HCC)     Endometrial cancer (HCC)     Endometrial hyperplasia     Gastric ulcer     H/O ovarian cancer     Heart murmur     Hematemesis     History of blood transfusion     History of endometrial cancer     Infection     Lymphedema     Munchausen syndrome     Narcotic dependence (HCC)     Obesity     Ovarian cancer (HCC)     Spinal stenosis     Status post partial hysterectomy     Super-super obese (HCC)     Urethral obstruction        Past Surgical History:   Procedure Laterality Date    HX  SECTION      HX ENDOSCOPY      EGD 6 x [facility]    HX HERNIA REPAIR      HX OOPHORECTOMY      HX TONSILLECTOMY      HX VASCULAR ACCESS Right     single lumen power port       Allergies: Latex, natural rubber; Clindamycin; Clindamycin; Iodine; Keflex [cephalexin]; Linezolid; Nsaids (non-steroidal anti-inflammatory drug); Piperacillin-tazobactam; Sulfa (sulfonamide antibiotics); and Vancomycin .     Current Facility-Administered Medications   Medication Dose Route Frequency    insulin lispro (HUMALOG) injection   SubCUTAneous AC&HS    0.9% sodium chloride with KCl 20 mEq/L infusion   IntraVENous CONTINUOUS    DAPTOmycin (CUBICIN) 719 mg in 0.9% sodium chloride 50 mL IVPB RF formulation  6 mg/kg IntraVENous Q24H    0.9% sodium chloride infusion 250 mL  250 mL IntraVENous PRN    pantoprazole (PROTONIX) tablet 40 mg  40 mg Oral DAILY    sodium bicarbonate tablet 650 mg  650 mg Oral TID    0.9% sodium chloride infusion 250 mL  250 mL IntraVENous PRN    bisacodyl (DULCOLAX) suppository 10 mg  10 mg Rectal DAILY PRN    glucose chewable tablet 16 g  16 g Oral PRN    glucagon (GLUCAGEN) injection 1 mg  1 mg IntraMUSCular PRN    dextrose (D50W) injection syrg 12.5-25 g  25-50 mL IntraVENous PRN    acetaminophen (TYLENOL) tablet 650 mg  650 mg Oral Q4H PRN    ondansetron (ZOFRAN) injection 4 mg  4 mg IntraVENous Q4H PRN    HYDROmorphone (PF) (DILAUDID) injection 1 mg  1 mg IntraVENous Q4H PRN    sodium chloride (NS) flush 5-10 mL  5-10 mL IntraVENous PRN    0.9% sodium chloride infusion 250 mL  250 mL IntraVENous PRN       Family History   Problem Relation Age of Onset    Arthritis-osteo Mother     Heart Attack Mother     Cancer Father     Heart Attack Father     Hypertension Brother     Stroke Brother      Social History     Social History    Marital status:      Spouse name: N/A    Number of children: N/A    Years of education: N/A     Occupational History    Not on file. Social History Main Topics    Smoking status: Former Smoker    Smokeless tobacco: Never Used    Alcohol use No    Drug use: No      Comment: hx of thc    Sexual activity: No     Other Topics Concern    Not on file     Social History Narrative     History   Smoking Status    Former Smoker   Smokeless Tobacco    Never Used        Temp (24hrs), Av.3 °F (36.8 °C), Min:98 °F (36.7 °C), Max:98.4 °F (36.9 °C)    Visit Vitals    /79    Pulse 89    Temp 98.3 °F (36.8 °C)    Resp 18    Ht 5' 4\" (1.626 m)    Wt 119.8 kg (264 lb 1.8 oz)    SpO2 97%    BMI 45.33 kg/m2       ROS:A comprehensive review of systems was negative except for that written in the History of Present Illness. Physical Exam:    General: Well developed, obese 49 y.o.  female in mild distress.   ENT: ENT exam normal, no neck nodes or sinus tenderness  Head: normocephalic, without obvious abnormality  Mouth:  mucous membranes moist, pharynx normal without lesions  Neck: supple, symmetrical, trachea midline   Cardio:  regular rate and rhythm, S1, S2 normal, 3/6 systolic murmur  Lungs: clear to auscultation, no wheezes or rales and unlabored breathing  Abdomen: soft, non-tender. Bowel sounds normal. No masses, no organomegaly. Extremities:  no redness or tenderness in the calves or thighs, no edema  Neuro: Grossly normal  Skin: maculopapular erythematous rash over abdomen , chest, b/l sides with intense rash on sides.        Labs: Results:   Chemistry Recent Labs      03/08/17   0530  03/07/17   0330  03/06/17   0320   GLU  86  100*  115*   NA  141  140  135*   K  3.4*  3.3*  4.1   CL  111*  109*  105   CO2  14*  18*  16*   BUN  90*  113*  131*   CREA  2.86*  3.38*  4.14*   CA  7.9*  7.5*  7.8*   AGAP  16  13  14   BUCR  31*  33*  32*   AP   --    --   231*   TP   --    --   5.6*   ALB   --    --   1.6*   GLOB   --    --   4.0   AGRAT   --    --   0.4*      CBC w/Diff Recent Labs      03/08/17   0530  03/07/17   2056  03/07/17   1550  03/07/17   0330  03/06/17   0320   WBC  28.2*   --    --   18.5*  16.0*   RBC  3.30*   --    --   2.43*  2.93*   HGB  9.0*  10.3*  8.7*  6.4*  7.7*   HCT  28.1*  31.8*  27.0*  20.3*  24.2*   PLT  49*   --    --   28*  20*   GRANS  94*   --    --   92*  88*   LYMPH  4*   --    --   6*  5*   EOS  0   --    --   0  0      Microbiology Recent Labs      03/06/17   1040  03/06/17   1035   CULT  NO GROWTH 2 DAYS  NO GROWTH 2 DAYS          Dieudonne Baker MD  3/8/2017  12:31 PM  [facility]   [phone]

## 2017-03-08 NOTE — PROGRESS NOTES
conducted a Follow up consultation and Spiritual Assessment for Colby Christensen, who is a 49 y.o.,female. The  provided the following Interventions:  Continued the relationship of care and support. Listened empathically. Offered prayer and assurance of continued prayer on patients behalf. Chart reviewed. The following outcomes were achieved:  Patient expressed gratitude for pastoral care visit. Assessment:  There are no further spiritual or Jew issues which require Spiritual Care Services interventions at this time. Plan:  Chaplains will continue to follow and will provide pastoral care on an as needed/requested basis.  recommends bedside caregivers page  on duty if patient shows signs of acute spiritual or emotional distress. Patient express she be having pain on a daily basis. Appears to be in a good frame of mind. Some frustration but not appropriate behavior is consistent with goal attainment of getting healthier. Life is meaning and relationship to herson is adequate and connected. Patient now palliative care.     [address] [facility]   Staff [address] [place] [address]   [phone]

## 2017-03-08 NOTE — PROGRESS NOTES
[facility]  Hospitalist Division        Inpatient Daily Progress Note    Daily progress Note    Patient: Emily Matos MRN: [id number]  CSN: [id number]    YOB: 1960  Age: 49 y.o. Sex: female    DOA: 3/3/2017 LOS:  LOS: 5 days                    Chief Complaint:  AMS       Subjective:     C/o fatigue and all over pain, nausea. In NAD. Objective:      Visit Vitals    /79    Pulse 89    Temp 98.3 °F (36.8 °C)    Resp 18    Ht 5' 4\" (1.626 m)    Wt 119.8 kg (264 lb 1.8 oz)    SpO2 97%    BMI 45.33 kg/m2       Physical Exam:  General appearance: alert, in no distress  Head: Normocephalic, without obvious abnormality, atraumatic  Lungs: diminished bases bilaterally, no wheezes or rhonchi   Heart: regular rate and rhythm, S1, S2 normal, no murmur, click, rub or gallop  Abdomen: soft, remains tender to palpation.  Bowel sounds normoactive   Extremities: bilateral fingertips cyanotic, trace BLE edema   Skin: scattered ecchymosis to abdomen, BLE  Neurologic: moves all 4 extremities   PSY: appropriately behaved        Intake and Output:  Current Shift:  03/08 0701 - 03/08 1900  In: -   Out: 425 [Urine:425]  Last three shifts:  03/06 1901 - 03/08 0700  In: 5456.3 [I.V.:4756.3]  Out: 2920 [Urine:2920]    Recent Results (from the past 24 hour(s))   GLUCOSE, POC    Collection Time: 03/07/17 12:27 PM   Result Value Ref Range    Glucose (POC) 95 70 - 110 mg/dL   HGB & HCT    Collection Time: 03/07/17  3:50 PM   Result Value Ref Range    HGB 8.7 (L) 12.0 - 16.0 g/dL    HCT 27.0 (L) 35.0 - 45.0 %   TYPE & SCREEN    Collection Time: 03/07/17  3:50 PM   Result Value Ref Range    Crossmatch Expiration 03/10/2017     ABO/Rh(D) A POSITIVE     Antibody screen NEG    GLUCOSE, POC    Collection Time: 03/07/17  5:06 PM   Result Value Ref Range    Glucose (POC) 101 70 - 110 mg/dL   HGB & HCT    Collection Time: 03/07/17  8:56 PM   Result Value Ref Range    HGB 10.3 (L) 12.0 - 16.0 g/dL    HCT 31.8 (L) 35.0 - 45.0 %   GLUCOSE, POC    Collection Time: 03/07/17 11:35 PM   Result Value Ref Range    Glucose (POC) 100 70 - 110 mg/dL   MAGNESIUM    Collection Time: 03/08/17  5:30 AM   Result Value Ref Range    Magnesium 1.5 (L) 1.8 - 2.4 mg/dL   CBC WITH AUTOMATED DIFF    Collection Time: 03/08/17  5:30 AM   Result Value Ref Range    WBC 28.2 (H) 4.6 - 13.2 K/uL    RBC 3.30 (L) 4.20 - 5.30 M/uL    HGB 9.0 (L) 12.0 - 16.0 g/dL    HCT 28.1 (L) 35.0 - 45.0 %    MCV 85.2 74.0 - 97.0 FL    MCH 27.3 24.0 - 34.0 PG    MCHC 32.0 31.0 - 37.0 g/dL    RDW 16.1 (H) 11.6 - 14.5 %    PLATELET 49 (L) 096 - 420 K/uL    NEUTROPHILS 94 (H) 40 - 73 %    LYMPHOCYTES 4 (L) 21 - 52 %    MONOCYTES 2 (L) 3 - 10 %    EOSINOPHILS 0 0 - 5 %    BASOPHILS 0 0 - 2 %    ABS. NEUTROPHILS 26.5 (H) 1.8 - 8.0 K/UL    ABS. LYMPHOCYTES 1.2 0.9 - 3.6 K/UL    ABS. MONOCYTES 0.5 0.05 - 1.2 K/UL    ABS. EOSINOPHILS 0.0 0.0 - 0.4 K/UL    ABS.  BASOPHILS 0.0 0.0 - 0.06 K/UL    DF AUTOMATED     METABOLIC PANEL, BASIC    Collection Time: 03/08/17  5:30 AM   Result Value Ref Range    Sodium 141 136 - 145 mmol/L    Potassium 3.4 (L) 3.5 - 5.5 mmol/L    Chloride 111 (H) 100 - 108 mmol/L    CO2 14 (L) 21 - 32 mmol/L    Anion gap 16 3.0 - 18 mmol/L    Glucose 86 74 - 99 mg/dL    BUN 90 (H) 7.0 - 18 MG/DL    Creatinine 2.86 (H) 0.6 - 1.3 MG/DL    BUN/Creatinine ratio 31 (H) 12 - 20      GFR est AA 21 (L) >60 ml/min/1.73m2    GFR est non-AA 17 (L) >60 ml/min/1.73m2    Calcium 7.9 (L) 8.5 - 10.1 MG/DL           Lab Results   Component Value Date/Time    Glucose 86 03/08/2017 05:30 AM    Glucose 100 03/07/2017 03:30 AM    Glucose 115 03/06/2017 03:20 AM    Glucose 112 03/05/2017 04:30 AM    Glucose 132 03/04/2017 06:47 AM        Assessment/Plan:     Patient Active Problem List   Diagnosis Code    Anemia D64.9    Anxiety F41.9    Nausea and vomiting R11.2    Morbid obesity (HCC) E66.01    Chronic pain syndrome G89.4    History of ovarian cancer Z85.43    History of gastric bypass Z98.890    CAD (coronary artery disease) I25.10    History of Clostridium difficile Z87.19    History of endometrial cancer Z85.42    History of GI bleed Z87.19    Lymphedema I89.0    Hypokalemia E87.6    DEV (acute kidney injury) ([facility] [address] N17.9    Pulmonary nodules R91.8    A-fib (HCC) I48.91    Aortic stenosis I35.0    GI bleed K92.2    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) R65.21    Severe thrombocytopenia ([facility]) D69.6       A/P:  UTI: Cx grew out VRE- continue Zyvox   Sepsis 2/2 above. Blood Cx- NGTD  Acute blood loss anemia: s/p 2 units PRBC. Hgb stable. Continue to monitor   RLE DVT- ? IVC filter placement on Thursday   DM: well controlled. Continue SSI  ARF- Cr 2.86- continue to monitor   Severe Thrombocytopenia- platelets 49. Monitor    Hx Ovarian and Endometrial cancer with possible metastatic disease- pain control    GI prophylaxis: Protonix  Hypomagnesemia: replete. Check in am  Hypokalemia: replete.  Check in am   Palliative consulted and actively following  Dispo: home soon with return to [facility]  [facility]  Hospitalist Division  Pager:  [phone]  Office:  [phone]

## 2017-03-08 NOTE — CDMP QUERY
Please clarify if this patient likely had the DVT in the Right Femoral vein Present on Admission.     Pt admitted with Sepsis, AMS  **Has history of endometrial cancer  **Vasc study shows  Sub-acute occlusive deep venous thrombosis proximal femoral vein, Sub-acute non-occlusive deep venous thrombosis is identified in the common femoral, mid femoral and deep femoral veins   **3/6-Vascular consultant noted--\"Pt with L pelvic mass/metastatic tumor most likely obstructing venous outflow with subacute RLE DVT\"    Thank you,  Stefan Medeiros RN   [facility]  [phone]

## 2017-03-08 NOTE — PROGRESS NOTES
Vascular Surgery Progress Note    Admit Date: 3/3/2017    Subjective:   Pt more alert, appeared comfortable when seen early this a.m. Objective:     Visit Vitals    /79    Pulse 89    Temp 98.3 °F (36.8 °C)    Resp 18    Ht 5' 4\" (1.626 m)    Wt 119.8 kg (264 lb 1.8 oz)    SpO2 97%    BMI 45.33 kg/m2       Temp (24hrs), Av.3 °F (36.8 °C), Min:98 °F (36.7 °C), Max:98.4 °F (36.9 °C)      Physical Exam:  GEN: A&Ox3, NAD, chronically ill appearing. HEENT:PERRL EOMI, non icteric, moist mesu  NECK: supple. LUNG: decreased breath sounds bases, unlabored breathing  HEART: regular   ABD: soft, NT, very large overlying pannus, mildly diffuse tenderness w/o guarding  EXT:1-2 edema bilateral legs. PULSES: softly palpable pulses. Quiana Dias NEURO: moves all extremities, generalized weakness/deconitioning.      Labs:   Recent Results (from the past 24 hour(s))   HGB & HCT    Collection Time: 17  3:50 PM   Result Value Ref Range    HGB 8.7 (L) 12.0 - 16.0 g/dL    HCT 27.0 (L) 35.0 - 45.0 %   TYPE & SCREEN    Collection Time: 17  3:50 PM   Result Value Ref Range    Crossmatch Expiration 03/10/2017     ABO/Rh(D) A POSITIVE     Antibody screen NEG    GLUCOSE, POC    Collection Time: 17  5:06 PM   Result Value Ref Range    Glucose (POC) 101 70 - 110 mg/dL   HGB & HCT    Collection Time: 17  8:56 PM   Result Value Ref Range    HGB 10.3 (L) 12.0 - 16.0 g/dL    HCT 31.8 (L) 35.0 - 45.0 %   GLUCOSE, POC    Collection Time: 17 11:35 PM   Result Value Ref Range    Glucose (POC) 100 70 - 110 mg/dL   MAGNESIUM    Collection Time: 17  5:30 AM   Result Value Ref Range    Magnesium 1.5 (L) 1.8 - 2.4 mg/dL   CBC WITH AUTOMATED DIFF    Collection Time: 17  5:30 AM   Result Value Ref Range    WBC 28.2 (H) 4.6 - 13.2 K/uL    RBC 3.30 (L) 4.20 - 5.30 M/uL    HGB 9.0 (L) 12.0 - 16.0 g/dL    HCT 28.1 (L) 35.0 - 45.0 %    MCV 85.2 74.0 - 97.0 FL    MCH 27.3 24.0 - 34.0 PG    MCHC 32.0 31.0 - 37.0 g/dL    RDW 16.1 (H) 11.6 - 14.5 %    PLATELET 49 (L) 482 - 420 K/uL    NEUTROPHILS 94 (H) 40 - 73 %    LYMPHOCYTES 4 (L) 21 - 52 %    MONOCYTES 2 (L) 3 - 10 %    EOSINOPHILS 0 0 - 5 %    BASOPHILS 0 0 - 2 %    ABS. NEUTROPHILS 26.5 (H) 1.8 - 8.0 K/UL    ABS. LYMPHOCYTES 1.2 0.9 - 3.6 K/UL    ABS. MONOCYTES 0.5 0.05 - 1.2 K/UL    ABS. EOSINOPHILS 0.0 0.0 - 0.4 K/UL    ABS. BASOPHILS 0.0 0.0 - 0.06 K/UL    DF AUTOMATED     METABOLIC PANEL, BASIC    Collection Time: 03/08/17  5:30 AM   Result Value Ref Range    Sodium 141 136 - 145 mmol/L    Potassium 3.4 (L) 3.5 - 5.5 mmol/L    Chloride 111 (H) 100 - 108 mmol/L    CO2 14 (L) 21 - 32 mmol/L    Anion gap 16 3.0 - 18 mmol/L    Glucose 86 74 - 99 mg/dL    BUN 90 (H) 7.0 - 18 MG/DL    Creatinine 2.86 (H) 0.6 - 1.3 MG/DL    BUN/Creatinine ratio 31 (H) 12 - 20      GFR est AA 21 (L) >60 ml/min/1.73m2    GFR est non-AA 17 (L) >60 ml/min/1.73m2    Calcium 7.9 (L) 8.5 - 10.1 MG/DL       Assessment:     Principal Problem:    Severe sepsis with septic shock (CODE) (HCC) (3/3/2017)    Active Problems:    History of ovarian cancer (3/18/2015)      Morbid obesity ([facility] (10/2/2014)      CAD (coronary artery disease) (12/29/2015)      DEV (acute kidney injury) ([facility] [address] (12/29/2015)      A-fib ([facility] [address] (12/29/2015)      UTI (urinary tract infection) (3/3/2017)      Severe thrombocytopenia (HCC) (3/4/2017)        Plan/Recommendations/Medical Decision Making:   RLE subacute occlusive/non occlusive DVT mostly likely due to R pelvic mass - unable to be anticoagulated due to low plt and recent bleed requiring 2 units PRBC. THrombocytopenia improved. Plan IVC filter tomorrow 3/9/17 - discussed with patient including risks - she agrees to proceed. Adelaida Jesus.  Olga Jarrett, [address] [facility]  [facility] - [phone]  March 8, 2017  12:41 PM

## 2017-03-08 NOTE — CONSULTS
[facility] - Consult Note    Consultation request by Olegario Briceno MD for advice/opinion related to evaluating Severe sepsis with septic shock (CODE) ([facility] [address]  UTI (urinary tract infection)  Severe sepsis with septic shock (CODE) ([address]  Severe thrombocytopenia ([facility] [address]    Date of  Admission: 3/3/2017  7:16 PM   Primary Care Physician:  Antonio Calero MD      I saw, evaluated, interviewed and examined the patient personally. I agree with the findings and plan of care as documented below with PA-C note  Admitted with urosepsis, thrombocytopenia  Known  tract carcinoma, probably metastatic. Was on hospice care prior to arrival  Found to have Bacteremia on 3/3. Repeat culture negative so far  ECHO with Aortic valve stenosis with calcified fix echodensity ( this was documented on echo report dating back even in 2014). ?? Likely chronic finding. Recommend ID evaluation for antibiotics  On first evaluation, she appears to be suboptimals candidate for aggressive cardiac treatment for even possible endocarditis except antibiotic. Currently no evidence of heart block or heart failure. Continue to monitor  EKG tomorrow    Paulina Zarate MD           Assessment:     -Possible endocarditis: TTE with echodensity on aortic valve.  TTE with small/medium size fixed partially calcified echodensity on right coronary cusp.  -Urosepsis and shock, blood cultures positive for gram positive cocci in pairs and VRE, on IV daptomycin  -Bacteremia, gram positive cocci and VRE cultures on 3/3, repeat cultures on 3/6 without growth in two days.  -Acute blood loss anemia, s/p 2 units PRBCs  -RLE DVT, vascular to place IVC filter tomorrow, not candidate for [facility] d/t anemia and thrombocytopenia  -Thrombocytopenia, plts 49  -Endometrial/ovarian cancer with probable metastatic disease, on hospice care prior to admission  -Electrolyte abnormalities, hypoK/Mg  -Acute renal failure, secondary to shock       Plan:     -Discussed echo results and clinical symptoms in detail with patient. Discussed option of proceeding with ESSIE for more definitive diagnosis. Pt refuses ESSIE at this time. States she is considering going home with hospice care again.   -Would continue IV abx per primary team recs. -Hemodynamics are stable. -Continue to replete electrolytes. -Continue work-up per vascular recs for DVT. -Will be available if patient changes her mind for study. History of Present Illness: This is a 49 y.o. female admitted for urosepsis, shock and thrombocytopenia who was found to have positive blood cultures on 3/3/17 for VRE. Started on IV abx. Pt has PMHx of endometrial cancer. She was on hospice service prior to admission. She has a mediport in place. Per palliative care note pt stated she was somehow convinced/\"forced\" to revoke her DDNR by \"someone from hospice care\" as well as her daughter. And she also stated, \"I was so sick I had to come in.\" During my evaluation today pt states she doesn't want any further testing. Refuses ESSIE. States she is contemplating/considering returning home with hospice care. Pt had a TTE while in house which was significant for possible vegetation on her aortic valve. Pt has also been diagnosed with LE DVT and will be getting an IVC filter placed tomorrow by vascular team as she is thrombocytopenic and anemic therefore not a candidate for [address] [place] [address] Road. Echocardiogram 3/7/17:  SUMMARY:  Left ventricle: Systolic function was normal by visual assessment. Ejection fraction was estimated in the range of 55 % to 60 %. No obvious  wall motion abnormalities identified in the views obtained. Wall thickness  was mildly increased. Doppler parameters were consistent with abnormal  left ventricular relaxation (grade 1 diastolic dysfunction). Right ventricle: Systolic function was normal. Estimated peak pressure was  in the range of 45 mmHg to 50 mmHg. Right atrium: The atrium was mildly dilated.     Mitral valve: No obvious mass, vegetation or thrombus noted. Aortic valve: The valve was trileaflet. Leaflets exhibited moderate  calcification and moderately reduced cuspal separation. There was moderate  stenosis. There was mild regurgitation. There was small-medium size fix  partially calcified echodensity noted attach to right coronary cusp. Could  represent vegetation. Valve mean gradient was 34 mmHg. Aortic valve area  was 1 cm-sq by the continuity equation. Tricuspid valve: There was mild regurgitation. Aorta, systemic arteries: There was mild dilatation of the ascending aorta  at 4.1. INDICATIONS: Evaluate for suspected valvular vegetation. Review of Symptoms:  A ten point review of systems was negative including fevers/chills, shortness of breath, SINHA, chest pain, palpitations, abdominal pain, n/v/d, hematuria, LE pain/weakness, hot flashes, and anxiety. Past Medical History:     Past Medical History:   Diagnosis Date    Anxiety and depression     Aortic stenosis     Aortic stenosis     Arthritis     Arthropathy     Back pain     Cardiac disorder     Cellulitis     left leg    Cellulitis     Chronic pain     Endometrial adenocarcinoma (HCC)     Endometrial cancer (HCC)     Endometrial hyperplasia     Gastric ulcer     H/O ovarian cancer     Heart murmur     Hematemesis     History of blood transfusion     History of endometrial cancer     Infection     Lymphedema     Munchausen syndrome     Narcotic dependence (HCC)     Obesity     Ovarian cancer (HCC)     Spinal stenosis     Status post partial hysterectomy     Super-super obese (HCC)     Urethral obstruction          Social History:     Social History     Social History    Marital status:      Spouse name: N/A    Number of children: N/A    Years of education: N/A     Social History Main Topics    Smoking status: Former Smoker    Smokeless tobacco: Never Used    Alcohol use No    Drug use:  No Comment: hx of thc    Sexual activity: No     Other Topics Concern    None     Social History Narrative        Family History:     Family History   Problem Relation Age of Onset   [de-identified] Arthritis-osteo Mother     Heart Attack Mother     Cancer Father     Heart Attack Father     Hypertension Brother     Stroke Brother         Medications: Allergies   Allergen Reactions    Latex, Natural Rubber Hives and Itching    Clindamycin Hives    Clindamycin Itching    Iodine Hives     Pt denies allergy. Patient reports this is an allergy to contrast media that caused temporary vision loss and vomiting but is fine if premedicated with antihistamine.     Keflex [Cephalexin] Hives    Linezolid Hives    Nsaids (Non-Steroidal Anti-Inflammatory Drug) Nausea and Vomiting     Emesis, Previous GI bleed    Piperacillin-Tazobactam Hives     Has tolerated Teflaro @ [id number]  Has been tolerating cephalosporins SAPH     Sulfa (Sulfonamide Antibiotics) Hives and Contact Dermatitis    Vancomycin Hives        Current Facility-Administered Medications   Medication Dose Route Frequency    insulin lispro (HUMALOG) injection   SubCUTAneous AC&HS    potassium chloride 10 mEq in 100 ml IVPB  10 mEq IntraVENous Q1H    0.9% sodium chloride with KCl 20 mEq/L infusion   IntraVENous CONTINUOUS    DAPTOmycin (CUBICIN) 719 mg in 0.9% sodium chloride 50 mL IVPB RF formulation  6 mg/kg IntraVENous Q24H    0.9% sodium chloride infusion 250 mL  250 mL IntraVENous PRN    pantoprazole (PROTONIX) tablet 40 mg  40 mg Oral DAILY    sodium bicarbonate tablet 650 mg  650 mg Oral TID    0.9% sodium chloride infusion 250 mL  250 mL IntraVENous PRN    bisacodyl (DULCOLAX) suppository 10 mg  10 mg Rectal DAILY PRN    glucose chewable tablet 16 g  16 g Oral PRN    glucagon (GLUCAGEN) injection 1 mg  1 mg IntraMUSCular PRN    dextrose (D50W) injection syrg 12.5-25 g  25-50 mL IntraVENous PRN    acetaminophen (TYLENOL) tablet 650 mg  650 mg Oral Q4H PRN    ondansetron (ZOFRAN) injection 4 mg  4 mg IntraVENous Q4H PRN    HYDROmorphone (PF) (DILAUDID) injection 1 mg  1 mg IntraVENous Q4H PRN    sodium chloride (NS) flush 5-10 mL  5-10 mL IntraVENous PRN    0.9% sodium chloride infusion 250 mL  250 mL IntraVENous PRN         Physical Exam:     Visit Vitals    /70    Pulse 87    Temp 98.6 °F (37 °C)    Resp 20    Ht 5' 4\" (1.626 m)    Wt 119.8 kg (264 lb 1.8 oz)    SpO2 99%    BMI 45.33 kg/m2     BP Readings from Last 3 Encounters:   03/08/17 119/70   09/14/16 141/59   08/30/16 142/68     Pulse Readings from Last 3 Encounters:   03/08/17 87   09/14/16 60   08/30/16 (!) 55     Wt Readings from Last 3 Encounters:   03/07/17 119.8 kg (264 lb 1.8 oz)   09/14/16 126.6 kg (279 lb)   08/26/16 126.6 kg (279 lb)       General: awake, alert, oriented x 3, resting comfortably  Neck:  Supple, no jvd, no carotid bruits  Lungs:  Clear to auscultation bilaterally  Heart:  2/6 ASTRID at RUSB, reg rate and rhythm  Abdomen:  Soft, non-tender  Extremities:  No LE edema, atraumatic  Skin: warm and dry  Neuro: no focal deficits  Psych: flat mood and affect     Data Review:     Recent Labs      03/08/17   0530  03/07/17   2056  03/07/17   1550  03/07/17   0330  03/06/17   0320   WBC  28.2*   --    --   18.5*  16.0*   HGB  9.0*  10.3*  8.7*  6.4*  7.7*   HCT  28.1*  31.8*  27.0*  20.3*  24.2*   PLT  49*   --    --   28*  20*     Recent Labs      03/08/17   0530  03/07/17   0330  03/06/17   0320   NA  141  140  135*   K  3.4*  3.3*  4.1   CL  111*  109*  105   CO2  14*  18*  16*   GLU  86  100*  115*   BUN  90*  113*  131*   CREA  2.86*  3.38*  4.14*   CA  7.9*  7.5*  7.8*   MG  1.5*  1.7*  1.8   ALB   --    --   1.6*   SGOT   --    --   33   ALT   --    --   28       Results for orders placed or performed during the hospital encounter of 03/03/17   EKG, 12 LEAD, INITIAL   Result Value Ref Range    Ventricular Rate 127 BPM    Atrial Rate 127 BPM    P-R Interval 130 ms QRS Duration 86 ms    Q-T Interval 278 ms    QTC Calculation (Bezet) 404 ms    Calculated P Axis 51 degrees    Calculated R Axis 39 degrees    Calculated T Axis 65 degrees    Diagnosis       Sinus tachycardia  Possible Left atrial enlargement  Nonspecific T wave abnormality  Abnormal ECG  When compared with ECG of 01-AUG-2016 08:25,  Vent.  rate has increased BY  70 BPM  T wave inversion now evident in Lateral leads  Confirmed by Isaac Cao MD, --- ([id number]) on 3/4/2017 11:07:48 AM         All Cardiac Markers in the last 24 hours:  No results found for: CPK, CKMMB, CKMB, RCK3, CKMBT, CKNDX, CKND1, LUIS CARLOS, TROPT, TROIQ, JEFFY, TROPT, TNIPOC, BNP, BNPP    Last Lipid:  No results found for: CHOL, CHOLX, CHLST, CHOLV, HDL, LDL, DLDL, LDLC, DLDLP, TGL, TGLX, TRIGL, TRIGP, CHHD, CHHDX    Signed By: RIK Nogueira     March 8, 2017

## 2017-03-09 NOTE — CONSULTS
[employer] [facility]  Name: Harper Larkin   : 1960   MRN: [id number]   Date: 3/9/2017    []I have reviewed the flowsheet and previous days notes. []The patient is unable to give any meaningful history or review of systems because the patient is:  []Intubated []Sedated   []Unresponsive      []The patient is critically ill on      []Mechanical ventilation []Pressors   []BiPAP []   S: Tired, fatigue, no chest pains, no dyspnea. ROS:A comprehensive review of systems was negative except for: Constitutional: positive for fatigue and malaise  Respiratory: positive for chronic bronchitis  Cardiovascular: positive for fatigue  Gastrointestinal: positive for dyspepsia  Integument/breast: positive for dryness    Events and notes from last 24 hours reviewed. Care plan discussed on multidisciplinary rounds. Vital Signs:    Visit Vitals    /75 (BP 1 Location: Left arm, BP Patient Position: At rest)    Pulse (!) 109    Temp 97.3 °F (36.3 °C)    Resp 20    Ht 5' 4\" (1.626 m)    Wt 119.8 kg (264 lb 1.8 oz)    SpO2 98%    BMI 45.33 kg/m2       O2 Device: Room air       Temp (24hrs), Av.2 °F (36.8 °C), Min:97.3 °F (36.3 °C), Max:98.7 °F (37.1 °C)       Intake/Output:   Last shift:       07 - 1900  In: -   Out: 400 [Urine:400]  Last 3 shifts: 1901 -  07  In: 2183.3 [P.O.:50; I.V.:2133.3]  Out: 3075 [Urine:3075]    Intake/Output Summary (Last 24 hours) at 17 1143  Last data filed at 17 0704   Gross per 24 hour   Intake           902.08 ml   Output             2150 ml   Net         -1247.92 ml     Hemodynamics:       :      Ventilator Settings:                Physical Exam:    General: in no apparent distress, well developed and well nourished and in no respiratory distress and acyanotic   HEENT: Normal   Neck: No abnormally enlarged lymph nodes.    Chest: normal   Lungs: decreased air exchange bilaterally, distant lung sounds and prolonged expiration  no dullness/ tenderness/ rash   Heart: Regular rate and rhythm or S1S2 present   Abdomen: non distended, bowel sounds normoactive, tympanic, abdomen is soft without significant tenderness, masses, organomegaly or guarding   Extremity: 1+ edema   Neuro: alert   Skin: Skin color, texture, turgor normal. No rashes or lesions      DATA:   Current Facility-Administered Medications   Medication Dose Route Frequency    heparin (porcine) 1,000 unit/mL injection        fentaNYL citrate (PF) 50 mcg/mL injection        insulin lispro (HUMALOG) injection   SubCUTAneous AC&HS    0.9% sodium chloride with KCl 20 mEq/L infusion   IntraVENous CONTINUOUS    DAPTOmycin (CUBICIN) 719 mg in 0.9% sodium chloride 50 mL IVPB RF formulation  6 mg/kg IntraVENous Q24H    0.9% sodium chloride infusion 250 mL  250 mL IntraVENous PRN    pantoprazole (PROTONIX) tablet 40 mg  40 mg Oral DAILY    sodium bicarbonate tablet 650 mg  650 mg Oral TID    0.9% sodium chloride infusion 250 mL  250 mL IntraVENous PRN    bisacodyl (DULCOLAX) suppository 10 mg  10 mg Rectal DAILY PRN    glucose chewable tablet 16 g  16 g Oral PRN    glucagon (GLUCAGEN) injection 1 mg  1 mg IntraMUSCular PRN    dextrose (D50W) injection syrg 12.5-25 g  25-50 mL IntraVENous PRN    acetaminophen (TYLENOL) tablet 650 mg  650 mg Oral Q4H PRN    ondansetron (ZOFRAN) injection 4 mg  4 mg IntraVENous Q4H PRN    HYDROmorphone (PF) (DILAUDID) injection 1 mg  1 mg IntraVENous Q4H PRN    sodium chloride (NS) flush 5-10 mL  5-10 mL IntraVENous PRN    0.9% sodium chloride infusion 250 mL  250 mL IntraVENous PRN       Telemetry: [x]Sinus []A-flutter []Paced    []A-fib []Multiple PVCs                  Labs:  Recent Labs      03/09/17   0450  03/08/17   0530  03/07/17   2056   03/07/17   0330   WBC  33.6*  28.2*   --    --   18.5*   HGB  9.0*  9.0*  10.3*   < >  6.4*   HCT  28.2*  28.1*  31.8*   < >  20.3*   PLT  72*  49*   --    --   28*    < > = values in this interval not displayed. Recent Labs      03/09/17   0450  03/08/17   0530  03/07/17   0330   NA  139  141  140   K  3.7  3.4*  3.3*   CL  110*  111*  109*   CO2  14*  14*  18*   GLU  88  86  100*   BUN  71*  90*  113*   CREA  2.22*  2.86*  3.38*   CA  7.5*  7.9*  7.5*   MG  1.7*  1.5*  1.7*     No results for input(s): PH, PCO2, PO2, HCO3, FIO2 in the last 72 hours.     Imaging:  [x]I have personally reviewed the patients radiographs  []Radiographs reviewed with radiologist   [x] No CXR study available for review today   []No change from prior, tubes and lines in adequate position  []Improved   []Worsening       IMPRESSION:   Patient Active Problem List   Diagnosis Code    Anemia D64.9    Anxiety F41.9    Nausea and vomiting R11.2    Morbid obesity ([facility] [address] E66.01    Chronic pain syndrome G89.4    History of ovarian cancer Z85.43    History of gastric bypass Z98.890    CAD (coronary artery disease) I25.10    History of Clostridium difficile Z87.19    History of endometrial cancer Z85.42    History of GI bleed Z87.19    Lymphedema I89.0    Hypokalemia E87.6    DEV (acute kidney injury) ([facility] [address] N17.9    Pulmonary nodules R91.8    A-fib ([facility] [address] I48.91    Aortic stenosis I35.0    GI bleed K92.2    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    Acute blood loss anemia D62    Bladder mass N32.89    Flank pain, chronic R10.9, G89.29    UTI (urinary tract infection) N39.0    Severe sepsis with septic shock (CODE) ([facility]) R65.21    Severe thrombocytopenia ([facility]) D69.6 ·     ·    PLAN:   · Continue therapy  · Cardiology note read  · May need prolonged antibiotic therapy  · Hemodynamically stable/normotensive  · Pulmonary will be available as needed: please call ME for any PULMONARY questions/concers/interventions or procedures (none planned at present)  · (O) [phone]  · (B)  · [phone]     The patient is: [] acutely ill Risk of deterioration: [] moderate    [] critically ill  [] high     []See my orders for details    My assessment, plan of care, findings, medications, side effects etc were discussed with:  []nursing []PT/OT    []respiratory therapy []Dr. Gaby Dang []     [x]Total critical care time exclusive of procedures 25 minutes  Aftab Salas MD

## 2017-03-09 NOTE — H&P
Date of Surgery Update:  Isaac Conteh was seen and examined. History and physical has been reviewed. The patient has been examined.  There have been no significant clinical changes since the completion of the originally dated History and Physical.    Signed By: Kevin Jurado MD     March 9, 2017 8:18 AM

## 2017-03-09 NOTE — ROUTINE PROCESS
TRANSFER - OUT REPORT:    Verbal report given to SHELLY Tsang(name) on Isaac Conteh  being transferred to [facility](unit) for routine progression of care       Report consisted of patients Situation, Background, Assessment and   Recommendations(SBAR). Information from the following report(s) SBAR was reviewed with the receiving nurse. Lines:   Venous Access Device Mediport (Power port) Upper chest (subclavicular area, right (Active)   Central Line Being Utilized Yes 3/8/2017  8:15 PM   Criteria for Appropriate Use Limited/no vessel suitable for conventional peripheral access 3/8/2017  8:15 PM   Site Assessment Clean, dry, & intact 3/8/2017  8:15 PM   Date of Last Dressing Change 03/07/17 3/8/2017  8:15 PM   Dressing Status Clean, dry, & intact 3/8/2017  8:15 PM   Dressing Type Disk with Chlorhexadine gluconate (CHG) 3/8/2017  8:15 PM   Action Taken Open ports on tubing capped 3/8/2017  8:15 PM   Date Accessed (Medial Site) 03/03/17 3/8/2017  8:15 PM   Positive Blood Return (Medial Site) No 3/8/2017  8:46 AM   Action Taken (Medial Site) Infusing 3/8/2017  8:46 AM   Alcohol Cap Used Yes 3/8/2017  8:15 PM       Peripheral IV 03/03/17 Left Antecubital (Active)   Site Assessment Clean, dry, & intact 3/8/2017  8:15 PM   Phlebitis Assessment 0 3/8/2017  8:15 PM   Infiltration Assessment 0 3/8/2017  8:15 PM   Dressing Status Clean, dry, & intact 3/8/2017  8:15 PM   Dressing Type Transparent 3/8/2017  8:15 PM   Hub Color/Line Status Patent 3/8/2017  8:15 PM   Action Taken Open ports on tubing capped 3/8/2017  8:15 PM   Alcohol Cap Used Yes 3/8/2017  8:15 PM        Opportunity for questions and clarification was provided.       Patient transported with:   Robert Rodriguez

## 2017-03-09 NOTE — PROGRESS NOTES
[facility] - Progress Note  Admit Date: 3/3/2017    Assessment:     -Possible endocarditis: TTE with echodensity on aortic valve. TTE with small/medium size fixed partially calcified echodensity on right coronary cusp.  -Urosepsis and shock, blood cultures positive for gram positive cocci in pairs and VRE, on IV daptomycin  -Bacteremia, gram positive cocci and VRE cultures on 3/3, repeat cultures on 3/6 without growth in two days.  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 Was on hospice care prior to arrival  Found to have Bacteremia on 3/3. Repeat culture negative so far from 3/5 and 3/6   ECHO with Aortic valve stenosis with calcified fix echodensity ( this was documented on echo report dating back even in 2014). ?? Likely chronic finding. Recommend ID evaluation for antibiotics  On first evaluation, she appears to be suboptimals candidate for aggressive cardiac treatment for even possible endocarditis except antibiotic. Currently no evidence of heart block or heart failure. Continue to monitor. Use lasix as needed. EKG yesterday with normal OR interval.        Subjective:     No new complaints.      Objective:      Patient Vitals for the past 8 hrs:   Temp Pulse Resp BP SpO2   03/09/17 0856 - 100 20 134/70 99 %   03/09/17 0701 98.7 °F (37.1 °C) (!) 111 20 134/79 99 %   03/09/17 0140 98.2 °F (36.8 °C) 100 20 93/69 100 %         Patient Vitals for the past 96 hrs:   Weight   03/07/17 0715 264 lb 1.8 oz (119.8 kg)   03/06/17 0600 249 lb 5.4 oz (113.1 kg)                    Intake/Output Summary (Last 24 hours) at 03/09/17 4021  Last data filed at 03/09/17 0704   Gross per 24 hour   Intake           902.08 ml   Output             2150 ml   Net         -1247.92 ml       Physical Exam:  General:  fatigued, cooperative, no distress, appears stated age  Neck:  no JVD  Lungs:  No obvious rales, decrease BS  Heart:  regular rate and rhythm, S1, S2 normal, faint Aortic systlic ejection murmur  Abdomen:  abdomen is soft without significant tenderness, masses, organomegaly or guarding  Extremities:  edema  Data Review:     Labs: Results:       Chemistry Recent Labs      03/09/17 0450 03/08/17 0530 03/07/17 0330   GLU  88  86  100*   NA  139  141  140   K  3.7  3.4*  3.3*   CL  110*  111*  109*   CO2  14*  14*  18*   BUN  71*  90*  113*   CREA  2.22*  2.86*  3.38*   CA  7.5*  7.9*  7.5*   MG  1.7*  1.5*  1.7*   AGAP  15  16  13   BUCR  32*  31*  33*      CBC w/Diff Recent Labs      03/09/17   0450  03/08/17   0530  03/07/17 2056 03/07/17   0330   WBC  33.6*  28.2*   --    --   18.5*   RBC  3.29*  3.30*   --    --   2.43*   HGB  9.0*  9.0*  10.3*   < >  6.4*   HCT  28.2*  28.1*  31.8*   < >  20.3*   PLT  72*  49*   --    --   28*   GRANS  90*  94*   --    --   92*   LYMPH  6*  4*   --    --   6*   EOS  0  0   --    --   0    < > = values in this interval not displayed. Cardiac Enzymes Lab Results   Component Value Date/Time    CPK 31 03/09/2017 04:50 AM      Coagulation No results for input(s): PTP, INR, APTT in the last 72 hours. No lab exists for component: INREXT    Lipid Panel No results found for: CHOL, CHOLPOCT, CHOLX, CHLST, CHOLV, 085090, HDL, LDL, NLDLCT, DLDL, LDLC, DLDLP, 022113, VLDLC, VLDL, TGL, TGLX, TRIGL, [id number], TRIGP, TGLPOCT, 320512, CHHD, CHHDX   BNP No results found for: BNP, BNPP, XBNPT   Liver Enzymes No results for input(s): TP, ALB, TBIL, AP, SGOT, GPT in the last 72 hours.     No lab exists for component: DBIL   Digoxin    Thyroid Studies Lab Results   Component Value Date/Time    TSH 1.55 05/08/2016 06:21 AM          Signed By: Nallely Goodson MD     March 9, 2017

## 2017-03-09 NOTE — CONSULTS
ASSESSMENT:     ICD-10-CM ICD-9-CM    1. Septic shock (HCC) A41.9 038.9     R65.21 785.52      995.92    2. Acute renal failure, unspecified acute renal failure type ([facility] [address] N17.9 584.9    3. Acute cystitis with hematuria N30.01 595.0    4. High anion gap metabolic acidosis C94.1 012.6           56yoF with hx of endometrial carcinoma and s/p XRT and bilateral ureteral obstruction with chronic stents last changed 8/19/17 non with VRE in blood and urine and ARF    CT 3/3/17 - No hydro   U Cx 3/317 - VRE   Cr improving now 2.22.  6.1 on admission, 1.2 (baseline)  PLAN:      Con't Abx per ID  Con't byrne until Cr Ahmet  Repeat U cx  Once stable from a medical standpoint and U Cx cleared will schedule for stent exchange         Chief Complaint   Patient presents with    Altered mental status       HISTORY OF PRESENT ILLNESS:  Filemon Bruno is a 49 y.o. female who is seen in consultation as referred by Dr. Yaneth Pinedo MD for UTI, bilateral ureteral obstruction. Patient initally presented 3/3 with sepsis. VRE in urine. ARF. Currently improving. C/o abd and flank pain. Denies fever, hematuria    Filemon Bruno is a 49 y.o. female who presents today in follow up for ureteral obstruction, bilateral jj stents in place. Patient with history of endometrial carcinoma, s/p radiation therapy who initially presented to ER on 1/11/2016 with GI bleed.  The patient was noted to have urinary retention and acute renal failure along with bilateral hydronephrosis.  Initially, catheter was placed at which point 750 mL of urine came out.  Subsequent imaging revealed persistent hydronephrosis and a continued elevation of creatinine. S/p Cystoscopy, Bilateral retrograde pyelogram, Bilateral stent placements on 1/15/2016. S/p Cystoscopy, bilateral ureteral stent exchange on 8/2016.  Due for bilateral stent exchange.        Past Medical History:   Diagnosis Date    Anxiety and depression     Aortic stenosis     Arthritis     Arthropathy     Cardiac disorder     Cellulitis     left leg    Chronic pain     Endometrial adenocarcinoma (HCC)     Gastric ulcer     H/O ovarian cancer     Hematemesis     Infection     Lymphedema     Munchausen syndrome     Narcotic dependence ([address]     Obesity     Spinal stenosis     Status post partial hysterectomy     Urethral obstruction        Past Surgical History:   Procedure Laterality Date    HX  SECTION      HX ENDOSCOPY      EGD 6 x [facility]    HX HERNIA REPAIR      HX OOPHORECTOMY      HX TONSILLECTOMY      HX VASCULAR ACCESS Right     single lumen power port       Social History   Substance Use Topics    Smoking status: Former Smoker    Smokeless tobacco: Never Used    Alcohol use No       Allergies   Allergen Reactions    Latex, Natural Rubber Hives and Itching    Clindamycin Hives    Clindamycin Itching    Iodine Hives     Pt denies allergy. Patient reports this is an allergy to contrast media that caused temporary vision loss and vomiting but is fine if premedicated with antihistamine.     Keflex [Cephalexin] Hives    Linezolid Hives    Nsaids (Non-Steroidal Anti-Inflammatory Drug) Nausea and Vomiting     Emesis, Previous GI bleed    Piperacillin-Tazobactam Hives     Has tolerated Teflaro @ [id number]  Has been tolerating cephalosporins SAPH     Sulfa (Sulfonamide Antibiotics) Hives and Contact Dermatitis    Vancomycin Hives       Family History   Problem Relation Age of Onset    Arthritis-osteo Mother     Heart Attack Mother     Cancer Father     Heart Attack Father     Hypertension Brother     Stroke Brother        Current Facility-Administered Medications   Medication Dose Route Frequency Provider Last Rate Last Dose    heparin (porcine) 1,000 unit/mL injection        Stopped at 17    fentaNYL citrate (PF) 50 mcg/mL injection        Stopped at 17    dextrose 5% - 0.45% NaCl with KCl 20 mEq/L infusion  125 mL/hr IntraVENous CONTINUOUS Smith Love  mL/hr at 03/09/17 1541 125 mL/hr at 03/09/17 1541    insulin lispro (HUMALOG) injection   SubCUTAneous AC&HS Ki Hernandez MD   Stopped at 03/08/17 1130    DAPTOmycin (CUBICIN) 719 mg in 0.9% sodium chloride 50 mL IVPB RF formulation  6 mg/kg IntraVENous Q24H Chaka Thao MD   719 mg at 03/09/17 1440    0.9% sodium chloride infusion 250 mL  250 mL IntraVENous PRN Andrew Bryan MD        pantoprazole (PROTONIX) tablet 40 mg  40 mg Oral DAILY Ki Hernandez MD   40 mg at 03/09/17 1230    sodium bicarbonate tablet 650 mg  650 mg Oral TID Tete Hancock, DO   650 mg at 03/09/17 1541    0.9% sodium chloride infusion 250 mL  250 mL IntraVENous PRN Tete Hancock, DO        bisacodyl (DULCOLAX) suppository 10 mg  10 mg Rectal DAILY PRN Tete Hancock, DO   10 mg at 03/05/17 1835    glucose chewable tablet 16 g  16 g Oral PRN Irmanyichukwu Citlalli uSllivan MD        glucagon (GLUCAGEN) injection 1 mg  1 mg IntraMUSCular PRN Jaqueline Dash MD        dextrose (D50W) injection syrg 12.5-25 g  25-50 mL IntraVENous PRN Jaqueline Dash MD   25 g at 03/05/17 0512    acetaminophen (TYLENOL) tablet 650 mg  650 mg Oral Q4H PRN Jaqueline Dash MD   650 mg at 03/04/17 0848    ondansetron (ZOFRAN) injection 4 mg  4 mg IntraVENous Q4H PRN Jaqueline Dash MD   4 mg at 03/08/17 1250    HYDROmorphone (PF) (DILAUDID) injection 1 mg  1 mg IntraVENous Q4H PRN Tete Hancock DO   1 mg at 03/05/17 1128    sodium chloride (NS) flush 5-10 mL  5-10 mL IntraVENous PRN Lupe Estrada MD        0.9% sodium chloride infusion 250 mL  250 mL IntraVENous PRN Lupe Estrada MD             Review of Systems  Constitutional: Fever:  yes improved  Skin: Rash: R UE  HEENT: Hearing difficulty: neg  Eyes: Blurred vision:  neg  Cardiovascular: Chest pain:  neg  Respiratory: Shortness of breath:  neg  Gastrointestinal: Nausea/vomiting: neg  Musculoskeletal: Back pain:  neg  Neurological: Weakness:  neg  Psychological: Memory loss:  neg  Comments/additional findings:  neg        PHYSICAL EXAMINATION:     Visit Vitals    /81 (BP 1 Location: Left arm, BP Patient Position: At rest)    Pulse (!) 106    Temp 97.7 °F (36.5 °C)    Resp 18    Ht 5' 4\" (1.626 m)    Wt 264 lb 1.8 oz (119.8 kg)    SpO2 98%    BMI 45.33 kg/m2     Constitutional: Well developed, well-nourished female in no acute distress. CV:  No peripheral swelling noted  Respiratory: No respiratory distress or difficulties  Abdomen:  Soft and nontender. No masses. No hepatosplenomegaly.  Female:  No CVA tenderness. Skin:  Normal color. No evidence of jaundice. Neuro/Psych:  Patient with appropriate affect. Alert and oriented. Lymphatic:   No enlargement of supraclavicular lymph nodes. REVIEW OF LABS AND IMAGING:      Results for orders placed or performed during the hospital encounter of 03/03/17   CULTURE, BLOOD   Result Value Ref Range    Special Requests: PERIPHERAL      GRAM STAIN        SMEAR CALLED TO AND CORRECTLY REPEATED BY:  Sabina Scott RN 2700 ON [id number] @ 4248 TO       GRAM STAIN GRAM POSITIVE COCCI  IN PAIRS  AEROBIC BOTTLE        Culture result: (A)       AEROBIC BOTTLE  **VANCOMYCIN RESISTANT ENTEROCOCCUS FAECIUM**      Culture result:  For Susceptibility Refer to Culture  [id number]       CULTURE, BLOOD   Result Value Ref Range    Special Requests: PERIPHERAL      GRAM STAIN PEDIATRIC BOTTLE     GRAM STAIN GRAM POSITIVE COCCI  IN PAIRS        GRAM STAIN        SMEAR CALLED TO AND CORRECTLY REPEATED BY:  Sabina Scott RN IN 2700 ON 3/4/17 AT 2108 WF      Culture result: (A)       AEROBIC BOTTLE  **VANCOMYCIN RESISTANT ENTEROCOCCUS FAECIUM**      Culture result: PATIENT IS A KNOWN VRE         Susceptibility    Enterococcus  faecium Vancomycin Resistant - FRANCOISE     Ampicillin ($) >=32 Resistant ug/mL     Penicillin G ($$) >=64 Resistant ug/mL Gentamicin Synergy S  Susceptible ug/mL     Streptomycin Synergy  Resistant ug/mL     Vancomycin ($) >=32 Resistant ug/mL     Linezolid ($$$$$) 2 Susceptible ug/mL     Quinupristin Dalfopr Synd 0.5 Susceptible ug/mL   CULTURE, URINE   Result Value Ref Range    Special Requests: NO SPECIAL REQUESTS      Culture result: (A)       >100,000  COLONIES/mL  **VANCOMYCIN RESISTANT ENTEROCOCCUS FAECIUM**      Culture result:       VRE CALLED TO ART ROCHA RN IN 2ICU2 AT 2906,75/99/05 BY ETT.        Susceptibility    Enterococcus  faecium Vancomycin Resistant - FRANCOISE     Ampicillin ($) >=32 Resistant ug/mL     Penicillin G ($$) >=64 Resistant ug/mL     Ciprofloxacin ($) >=8 Resistant ug/mL     Gentamicin Synergy S  Susceptible ug/mL     Levofloxacin ($) >=8 Resistant ug/mL     Nitrofurantoin 32 Susceptible ug/mL     Streptomycin Synergy  Resistant ug/mL     Tetracycline >=16 Resistant ug/mL     Vancomycin ($) >=32 Resistant ug/mL     Linezolid ($$$$$) 2 Susceptible ug/mL     Quinupristin Dalfopr Synd 0.5 Susceptible ug/mL   INFLUENZA A & B AG (RAPID TEST)   Result Value Ref Range    Influenza A Antigen NEGATIVE  NEG      Influenza B Antigen NEGATIVE  NEG     CULTURE, BLOOD   Result Value Ref Range    Special Requests: PERIPHERAL      Culture result: NO GROWTH 3 DAYS     CULTURE, BLOOD   Result Value Ref Range    Special Requests: PERIPHERAL      Culture result: NO GROWTH 3 DAYS     CULTURE, BLOOD   Result Value Ref Range    Special Requests: NO SPECIAL REQUESTS      Culture result: NO GROWTH AFTER 11 HOURS     CULTURE, BLOOD   Result Value Ref Range    Special Requests: NO SPECIAL REQUESTS      Culture result: NO GROWTH AFTER 11 HOURS     METABOLIC PANEL, COMPREHENSIVE   Result Value Ref Range    Sodium 127 (L) 136 - 145 mmol/L    Potassium 5.3 3.5 - 5.5 mmol/L    Chloride 92 (L) 100 - 108 mmol/L    CO2 9 (LL) 21 - 32 mmol/L    Anion gap 26 (H) 3.0 - 18 mmol/L    Glucose 440 (HH) 74 - 99 mg/dL     (H) 7.0 - 18 MG/DL Creatinine 6.55 (H) 0.6 - 1.3 MG/DL    BUN/Creatinine ratio 25 (H) 12 - 20      GFR est AA 8 (L) >60 ml/min/1.73m2    GFR est non-AA 7 (L) >60 ml/min/1.73m2    Calcium 7.6 (L) 8.5 - 10.1 MG/DL    Bilirubin, total 2.2 (H) 0.2 - 1.0 MG/DL    ALT (SGPT) 46 13 - 56 U/L    AST (SGOT) 82 (H) 15 - 37 U/L    Alk. phosphatase 450 (H) 45 - 117 U/L    Protein, total 5.9 (L) 6.4 - 8.2 g/dL    Albumin 1.4 (L) 3.4 - 5.0 g/dL    Globulin 4.5 (H) 2.0 - 4.0 g/dL    A-G Ratio 0.3 (L) 0.8 - 1.7     LIPASE   Result Value Ref Range    Lipase 207 73 - 393 U/L   URINALYSIS W/ RFLX MICROSCOPIC   Result Value Ref Range    Color DARK YELLOW      Appearance TURBID      Specific gravity 1.018 1.005 - 1.030      pH (UA) 8.0 5.0 - 8.0      Protein 300 (A) NEG mg/dL    Glucose NEGATIVE  NEG mg/dL    Ketone NEGATIVE  NEG mg/dL    Bilirubin NEGATIVE  NEG      Blood LARGE (A) NEG      Urobilinogen 1.0 0.2 - 1.0 EU/dL    Nitrites NEGATIVE  NEG      Leukocyte Esterase LARGE (A) NEG     CBC WITH AUTOMATED DIFF   Result Value Ref Range    WBC 28.4 (H) 4.6 - 13.2 K/uL    RBC 3.30 (L) 4.20 - 5.30 M/uL    HGB 8.8 (L) 12.0 - 16.0 g/dL    HCT 28.2 (L) 35.0 - 45.0 %    MCV 85.5 74.0 - 97.0 FL    MCH 26.7 24.0 - 34.0 PG    MCHC 31.2 31.0 - 37.0 g/dL    RDW 15.7 (H) 11.6 - 14.5 %    PLATELET 10 (LL) 524 - 420 K/uL    NEUTROPHILS 80 (H) 42 - 75 %    BAND NEUTROPHILS 17 (H) 0 - 5 %    LYMPHOCYTES 2 (L) 20 - 51 %    MONOCYTES 1 (L) 2 - 9 %    EOSINOPHILS 0 0 - 5 %    BASOPHILS 0 0 - 3 %    ABS. NEUTROPHILS 22.7 (H) 1.8 - 8.0 K/UL    ABS. LYMPHOCYTES 0.6 (L) 0.8 - 3.5 K/UL    ABS. MONOCYTES 0.3 0 - 1.0 K/UL    ABS. EOSINOPHILS 0.0 0.0 - 0.4 K/UL    ABS. BASOPHILS 0.0 0.0 - 0.1 K/UL    PLATELET COMMENTS PLATELETS APPEAR MARKEDLY  DECREASED.      RBC COMMENTS NORMOCYTIC, NORMOCHROMIC      WBC COMMENTS TOXIC GRANULATION      DF MANUAL     METABOLIC PANEL, BASIC   Result Value Ref Range    Sodium 134 (L) 136 - 145 mmol/L    Potassium 4.6 3.5 - 5.5 mmol/L    Chloride 101 100 - 108 mmol/L    CO2 9 (LL) 21 - 32 mmol/L    Anion gap 24 (H) 3.0 - 18 mmol/L    Glucose 115 (H) 74 - 99 mg/dL     (H) 7.0 - 18 MG/DL    Creatinine 6.10 (H) 0.6 - 1.3 MG/DL    BUN/Creatinine ratio 25 (H) 12 - 20      GFR est AA 9 (L) >60 ml/min/1.73m2    GFR est non-AA 7 (L) >60 ml/min/1.73m2    Calcium 7.2 (L) 8.5 - 10.1 MG/DL   URINE MICROSCOPIC ONLY   Result Value Ref Range    WBC TOO NUMEROUS TO COUNT 0 - 4 /hpf    RBC TOO NUMEROUS TO COUNT 0 - 5 /hpf    Epithelial cells FEW 0 - 5 /lpf    Bacteria 4+ (A) NEG /hpf   METABOLIC PANEL, BASIC   Result Value Ref Range    Sodium 132 (L) 136 - 145 mmol/L    Potassium 4.7 3.5 - 5.5 mmol/L    Chloride 99 (L) 100 - 108 mmol/L    CO2 16 (L) 21 - 32 mmol/L    Anion gap 17 3.0 - 18 mmol/L    Glucose 132 (H) 74 - 99 mg/dL     (H) 7.0 - 18 MG/DL    Creatinine 5.31 (H) 0.6 - 1.3 MG/DL    BUN/Creatinine ratio 29 (H) 12 - 20      GFR est AA 10 (L) >60 ml/min/1.73m2    GFR est non-AA 8 (L) >60 ml/min/1.73m2    Calcium 7.6 (L) 8.5 - 10.1 MG/DL   CBC WITH AUTOMATED DIFF   Result Value Ref Range    WBC 24.9 (H) 4.6 - 13.2 K/uL    RBC 2.91 (L) 4.20 - 5.30 M/uL    HGB 7.8 (L) 12.0 - 16.0 g/dL    HCT 24.3 (L) 35.0 - 45.0 %    MCV 83.5 74.0 - 97.0 FL    MCH 26.8 24.0 - 34.0 PG    MCHC 32.1 31.0 - 37.0 g/dL    RDW 15.8 (H) 11.6 - 14.5 %    PLATELET 16 (LL) 076 - 420 K/uL    NEUTROPHILS 94 (H) 42 - 75 %    BAND NEUTROPHILS 1 0 - 5 %    LYMPHOCYTES 1 (L) 20 - 51 %    MONOCYTES 3 2 - 9 %    EOSINOPHILS 0 0 - 5 %    BASOPHILS 0 0 - 3 %    MYELOCYTES 1 (H) 0 %    ABS. NEUTROPHILS 23.4 (H) 1.8 - 8.0 K/UL    ABS. LYMPHOCYTES 0.3 (L) 0.8 - 3.5 K/UL    ABS. MONOCYTES 0.7 0 - 1.0 K/UL    ABS. EOSINOPHILS 0.0 0.0 - 0.4 K/UL    ABS.  BASOPHILS 0.0 0.0 - 0.1 K/UL    RBC COMMENTS NORMOCYTIC, NORMOCHROMIC      WBC COMMENTS VACUOLATED POLYS      DF MANUAL     PROTHROMBIN TIME + INR   Result Value Ref Range    Prothrombin time 14.5 11.5 - 15.2 sec    INR 1.2 0.8 - 1.2     SED RATE (ESR)   Result Value Ref Range    Sed rate, automated >140 (H) 0 - 30 mm/hr   MAGNESIUM   Result Value Ref Range    Magnesium 2.1 1.8 - 2.4 mg/dL   CORTISOL   Result Value Ref Range    Cortisol, random 68.0 (H) 3.09 - 22.40 ug/dL   HEMOGLOBIN A1C WITH EAG   Result Value Ref Range    Hemoglobin A1c 5.4 4.2 - 5.6 %    Est. average glucose 108 mg/dL   MAGNESIUM   Result Value Ref Range    Magnesium 2.0 1.8 - 2.4 mg/dL   CBC WITH AUTOMATED DIFF   Result Value Ref Range    WBC 18.4 (H) 4.6 - 13.2 K/uL    RBC 3.25 (L) 4.20 - 5.30 M/uL    HGB 8.6 (L) 12.0 - 16.0 g/dL    HCT 26.8 (L) 35.0 - 45.0 %    MCV 82.5 74.0 - 97.0 FL    MCH 26.5 24.0 - 34.0 PG    MCHC 32.1 31.0 - 37.0 g/dL    RDW 15.9 (H) 11.6 - 14.5 %    PLATELET 9 (LL) 221 - 420 K/uL    NEUTROPHILS 90 (H) 42 - 75 %    BAND NEUTROPHILS 1 0 - 5 %    LYMPHOCYTES 5 (L) 20 - 51 %    MONOCYTES 3 2 - 9 %    EOSINOPHILS 0 0 - 5 %    BASOPHILS 0 0 - 3 %    METAMYELOCYTES 1 (H) 0 %    ABS. NEUTROPHILS 16.6 (H) 1.8 - 8.0 K/UL    ABS. LYMPHOCYTES 0.9 0.8 - 3.5 K/UL    ABS. MONOCYTES 0.6 0 - 1.0 K/UL    ABS. EOSINOPHILS 0.0 0.0 - 0.4 K/UL    ABS. BASOPHILS 0.0 0.0 - 0.1 K/UL    PLATELET COMMENTS DECREASED PLATELETS      RBC COMMENTS POIKILOCYTOSIS  1+        RBC COMMENTS OVALOCYTES  1+        WBC COMMENTS VACUOLATED POLYS      DF AUTOMATED     METABOLIC PANEL, COMPREHENSIVE   Result Value Ref Range    Sodium 135 (L) 136 - 145 mmol/L    Potassium 4.3 3.5 - 5.5 mmol/L    Chloride 103 100 - 108 mmol/L    CO2 16 (L) 21 - 32 mmol/L    Anion gap 16 3.0 - 18 mmol/L    Glucose 112 (H) 74 - 99 mg/dL     (H) 7.0 - 18 MG/DL    Creatinine 4.55 (H) 0.6 - 1.3 MG/DL    BUN/Creatinine ratio 33 (H) 12 - 20      GFR est AA 12 (L) >60 ml/min/1.73m2    GFR est non-AA 10 (L) >60 ml/min/1.73m2    Calcium 7.7 (L) 8.5 - 10.1 MG/DL    Bilirubin, total 1.8 (H) 0.2 - 1.0 MG/DL    ALT (SGPT) 42 13 - 56 U/L    AST (SGOT) 68 (H) 15 - 37 U/L    Alk.  phosphatase 294 (H) 45 - 117 U/L    Protein, total 6.0 (L) 6.4 - 8.2 g/dL    Albumin 1.5 (L) 3.4 - 5.0 g/dL    Globulin 4.5 (H) 2.0 - 4.0 g/dL    A-G Ratio 0.3 (L) 0.8 - 1.7     CALCIUM, IONIZED   Result Value Ref Range    Ionized Calcium 1.07 (L) 1.12 - 1.32 MMOL/L   CBC W/O DIFF   Result Value Ref Range    WBC 14.7 (H) 4.6 - 13.2 K/uL    RBC 3.13 (L) 4.20 - 5.30 M/uL    HGB 8.2 (L) 12.0 - 16.0 g/dL    HCT 25.9 (L) 35.0 - 45.0 %    MCV 82.7 74.0 - 97.0 FL    MCH 26.2 24.0 - 34.0 PG    MCHC 31.7 31.0 - 37.0 g/dL    RDW 16.2 (H) 11.6 - 14.5 %    PLATELET 17 (LL) 860 - 420 K/uL   POTASSIUM   Result Value Ref Range    Potassium 4.3 3.5 - 5.5 mmol/L   MAGNESIUM   Result Value Ref Range    Magnesium 1.9 1.8 - 2.4 mg/dL   MAGNESIUM   Result Value Ref Range    Magnesium 1.8 1.8 - 2.4 mg/dL   METABOLIC PANEL, COMPREHENSIVE   Result Value Ref Range    Sodium 135 (L) 136 - 145 mmol/L    Potassium 4.1 3.5 - 5.5 mmol/L    Chloride 105 100 - 108 mmol/L    CO2 16 (L) 21 - 32 mmol/L    Anion gap 14 3.0 - 18 mmol/L    Glucose 115 (H) 74 - 99 mg/dL     (H) 7.0 - 18 MG/DL    Creatinine 4.14 (H) 0.6 - 1.3 MG/DL    BUN/Creatinine ratio 32 (H) 12 - 20      GFR est AA 14 (L) >60 ml/min/1.73m2    GFR est non-AA 11 (L) >60 ml/min/1.73m2    Calcium 7.8 (L) 8.5 - 10.1 MG/DL    Bilirubin, total 0.9 0.2 - 1.0 MG/DL    ALT (SGPT) 28 13 - 56 U/L    AST (SGOT) 33 15 - 37 U/L    Alk.  phosphatase 231 (H) 45 - 117 U/L    Protein, total 5.6 (L) 6.4 - 8.2 g/dL    Albumin 1.6 (L) 3.4 - 5.0 g/dL    Globulin 4.0 2.0 - 4.0 g/dL    A-G Ratio 0.4 (L) 0.8 - 1.7     CBC WITH AUTOMATED DIFF   Result Value Ref Range    WBC 16.0 (H) 4.6 - 13.2 K/uL    RBC 2.93 (L) 4.20 - 5.30 M/uL    HGB 7.7 (L) 12.0 - 16.0 g/dL    HCT 24.2 (L) 35.0 - 45.0 %    MCV 82.6 74.0 - 97.0 FL    MCH 26.3 24.0 - 34.0 PG    MCHC 31.8 31.0 - 37.0 g/dL    RDW 16.4 (H) 11.6 - 14.5 %    PLATELET 20 (LL) 506 - 420 K/uL    NEUTROPHILS 88 (H) 42 - 75 %    BAND NEUTROPHILS 3 0 - 5 %    LYMPHOCYTES 5 (L) 20 - 51 %    MONOCYTES 4 2 - 9 % EOSINOPHILS 0 0 - 5 %    BASOPHILS 0 0 - 3 %    ABS. NEUTROPHILS 14.1 (H) 1.8 - 8.0 K/UL    ABS. LYMPHOCYTES 0.8 0.8 - 3.5 K/UL    ABS. MONOCYTES 0.6 0 - 1.0 K/UL    ABS. EOSINOPHILS 0.0 0.0 - 0.4 K/UL    ABS. BASOPHILS 0.0 0.0 - 0.1 K/UL    RBC COMMENTS ANISOCYTOSIS  1+        RBC COMMENTS OVALOCYTES  1+        RBC COMMENTS SPHEROCYTES  1+        DF MANUAL     MAGNESIUM   Result Value Ref Range    Magnesium 1.7 (L) 1.8 - 2.4 mg/dL   CBC WITH AUTOMATED DIFF   Result Value Ref Range    WBC 18.5 (H) 4.6 - 13.2 K/uL    RBC 2.43 (L) 4.20 - 5.30 M/uL    HGB 6.4 (L) 12.0 - 16.0 g/dL    HCT 20.3 (L) 35.0 - 45.0 %    MCV 83.5 74.0 - 97.0 FL    MCH 26.3 24.0 - 34.0 PG    MCHC 31.5 31.0 - 37.0 g/dL    RDW 16.5 (H) 11.6 - 14.5 %    PLATELET 28 (LL) 035 - 420 K/uL    NEUTROPHILS 92 (H) 40 - 73 %    LYMPHOCYTES 6 (L) 21 - 52 %    MONOCYTES 2 (L) 3 - 10 %    EOSINOPHILS 0 0 - 5 %    BASOPHILS 0 0 - 2 %    ABS. NEUTROPHILS 16.9 (H) 1.8 - 8.0 K/UL    ABS. LYMPHOCYTES 1.2 0.9 - 3.6 K/UL    ABS. MONOCYTES 0.4 0.05 - 1.2 K/UL    ABS. EOSINOPHILS 0.0 0.0 - 0.4 K/UL    ABS.  BASOPHILS 0.0 0.0 - 0.06 K/UL    DF AUTOMATED     METABOLIC PANEL, BASIC   Result Value Ref Range    Sodium 140 136 - 145 mmol/L    Potassium 3.3 (L) 3.5 - 5.5 mmol/L    Chloride 109 (H) 100 - 108 mmol/L    CO2 18 (L) 21 - 32 mmol/L    Anion gap 13 3.0 - 18 mmol/L    Glucose 100 (H) 74 - 99 mg/dL     (H) 7.0 - 18 MG/DL    Creatinine 3.38 (H) 0.6 - 1.3 MG/DL    BUN/Creatinine ratio 33 (H) 12 - 20      GFR est AA 17 (L) >60 ml/min/1.73m2    GFR est non-AA 14 (L) >60 ml/min/1.73m2    Calcium 7.5 (L) 8.5 - 10.1 MG/DL   HGB & HCT   Result Value Ref Range    HGB 8.7 (L) 12.0 - 16.0 g/dL    HCT 27.0 (L) 35.0 - 45.0 %   HGB & HCT   Result Value Ref Range    HGB 10.3 (L) 12.0 - 16.0 g/dL    HCT 31.8 (L) 35.0 - 45.0 %   MAGNESIUM   Result Value Ref Range    Magnesium 1.5 (L) 1.8 - 2.4 mg/dL   CBC WITH AUTOMATED DIFF   Result Value Ref Range    WBC 28.2 (H) 4.6 - 13.2 K/uL    RBC 3.30 (L) 4.20 - 5.30 M/uL    HGB 9.0 (L) 12.0 - 16.0 g/dL    HCT 28.1 (L) 35.0 - 45.0 %    MCV 85.2 74.0 - 97.0 FL    MCH 27.3 24.0 - 34.0 PG    MCHC 32.0 31.0 - 37.0 g/dL    RDW 16.1 (H) 11.6 - 14.5 %    PLATELET 49 (L) 896 - 420 K/uL    NEUTROPHILS 94 (H) 40 - 73 %    LYMPHOCYTES 4 (L) 21 - 52 %    MONOCYTES 2 (L) 3 - 10 %    EOSINOPHILS 0 0 - 5 %    BASOPHILS 0 0 - 2 %    ABS. NEUTROPHILS 26.5 (H) 1.8 - 8.0 K/UL    ABS. LYMPHOCYTES 1.2 0.9 - 3.6 K/UL    ABS. MONOCYTES 0.5 0.05 - 1.2 K/UL    ABS. EOSINOPHILS 0.0 0.0 - 0.4 K/UL    ABS.  BASOPHILS 0.0 0.0 - 0.06 K/UL    DF AUTOMATED     METABOLIC PANEL, BASIC   Result Value Ref Range    Sodium 141 136 - 145 mmol/L    Potassium 3.4 (L) 3.5 - 5.5 mmol/L    Chloride 111 (H) 100 - 108 mmol/L    CO2 14 (L) 21 - 32 mmol/L    Anion gap 16 3.0 - 18 mmol/L    Glucose 86 74 - 99 mg/dL    BUN 90 (H) 7.0 - 18 MG/DL    Creatinine 2.86 (H) 0.6 - 1.3 MG/DL    BUN/Creatinine ratio 31 (H) 12 - 20      GFR est AA 21 (L) >60 ml/min/1.73m2    GFR est non-AA 17 (L) >60 ml/min/1.73m2    Calcium 7.9 (L) 8.5 - 10.1 MG/DL   MAGNESIUM   Result Value Ref Range    Magnesium 1.7 (L) 1.8 - 2.4 mg/dL   METABOLIC PANEL, BASIC   Result Value Ref Range    Sodium 139 136 - 145 mmol/L    Potassium 3.7 3.5 - 5.5 mmol/L    Chloride 110 (H) 100 - 108 mmol/L    CO2 14 (L) 21 - 32 mmol/L    Anion gap 15 3.0 - 18 mmol/L    Glucose 88 74 - 99 mg/dL    BUN 71 (H) 7.0 - 18 MG/DL    Creatinine 2.22 (H) 0.6 - 1.3 MG/DL    BUN/Creatinine ratio 32 (H) 12 - 20      GFR est AA 28 (L) >60 ml/min/1.73m2    GFR est non-AA 23 (L) >60 ml/min/1.73m2    Calcium 7.5 (L) 8.5 - 10.1 MG/DL   CBC WITH AUTOMATED DIFF   Result Value Ref Range    WBC 33.6 (H) 4.6 - 13.2 K/uL    RBC 3.29 (L) 4.20 - 5.30 M/uL    HGB 9.0 (L) 12.0 - 16.0 g/dL    HCT 28.2 (L) 35.0 - 45.0 %    MCV 85.7 74.0 - 97.0 FL    MCH 27.4 24.0 - 34.0 PG    MCHC 31.9 31.0 - 37.0 g/dL    RDW 16.0 (H) 11.6 - 14.5 %    PLATELET 72 (L) 942 - 420 K/uL    MPV 11.3 9.2 - 11.8 FL    NEUTROPHILS 90 (H) 42 - 75 %    LYMPHOCYTES 6 (L) 20 - 51 %    MONOCYTES 4 2 - 9 %    EOSINOPHILS 0 0 - 5 %    BASOPHILS 0 0 - 3 %    ABS. NEUTROPHILS 30.3 (H) 1.8 - 8.0 K/UL    ABS. LYMPHOCYTES 2.0 0.8 - 3.5 K/UL    ABS. MONOCYTES 1.3 (H) 0 - 1.0 K/UL    ABS. EOSINOPHILS 0.0 0.0 - 0.4 K/UL    ABS.  BASOPHILS 0.0 0.0 - 0.1 K/UL    PLATELET COMMENTS DECREASED PLATELETS      RBC COMMENTS ANISOCYTOSIS  1+        WBC COMMENTS TOXIC GRANULATION      DF MANUAL     CK   Result Value Ref Range    CK 31 26 - 192 U/L   GLUCOSE, POC   Result Value Ref Range    Glucose (POC) <30.0 (LL) 70 - 110 mg/dL   GLUCOSE, POC   Result Value Ref Range    Glucose (POC) 55 (L) 70 - 110 mg/dL   POC LACTIC ACID   Result Value Ref Range    Lactic Acid (POC) 7.9 (HH) 0.4 - 2.0 mmol/L   GLUCOSE, POC   Result Value Ref Range    Glucose (POC) 429 (HH) 70 - 110 mg/dL   GLUCOSE, POC   Result Value Ref Range    Glucose (POC) 438 (HH) 70 - 110 mg/dL   GLUCOSE, POC   Result Value Ref Range    Glucose (POC) 179 (H) 70 - 110 mg/dL   POC LACTIC ACID   Result Value Ref Range    Lactic Acid (POC) 3.6 (HH) 0.4 - 2.0 mmol/L   POC LACTIC ACID   Result Value Ref Range    Lactic Acid (POC) 1.7 0.4 - 2.0 mmol/L   GLUCOSE, POC   Result Value Ref Range    Glucose (POC) 131 (H) 70 - 110 mg/dL   GLUCOSE, POC   Result Value Ref Range    Glucose (POC) 119 (H) 70 - 110 mg/dL   GLUCOSE, POC   Result Value Ref Range    Glucose (POC) 91 70 - 110 mg/dL   GLUCOSE, POC   Result Value Ref Range    Glucose (POC) 67 (L) 70 - 110 mg/dL   GLUCOSE, POC   Result Value Ref Range    Glucose (POC) 173 (H) 70 - 110 mg/dL   GLUCOSE, POC   Result Value Ref Range    Glucose (POC) 112 (H) 70 - 110 mg/dL   GLUCOSE, POC   Result Value Ref Range    Glucose (POC) 83 70 - 110 mg/dL   GLUCOSE, POC   Result Value Ref Range    Glucose (POC) 109 70 - 110 mg/dL   GLUCOSE, POC   Result Value Ref Range    Glucose (POC) 90 70 - 110 mg/dL   GLUCOSE, POC   Result Value Ref Range Glucose (POC) 100 70 - 110 mg/dL   GLUCOSE, POC   Result Value Ref Range    Glucose (POC) 96 70 - 110 mg/dL   GLUCOSE, POC   Result Value Ref Range    Glucose (POC) 90 70 - 110 mg/dL   GLUCOSE, POC   Result Value Ref Range    Glucose (POC) 100 70 - 110 mg/dL   GLUCOSE, POC   Result Value Ref Range    Glucose (POC) 95 70 - 110 mg/dL   GLUCOSE, POC   Result Value Ref Range    Glucose (POC) 101 70 - 110 mg/dL   GLUCOSE, POC   Result Value Ref Range    Glucose (POC) 100 70 - 110 mg/dL   GLUCOSE, POC   Result Value Ref Range    Glucose (POC) 137 (H) 70 - 110 mg/dL   GLUCOSE, POC   Result Value Ref Range    Glucose (POC) 117 (H) 70 - 110 mg/dL   GLUCOSE, POC   Result Value Ref Range    Glucose (POC) 116 (H) 70 - 110 mg/dL   EKG, 12 LEAD, INITIAL   Result Value Ref Range    Ventricular Rate 127 BPM    Atrial Rate 127 BPM    P-R Interval 130 ms    QRS Duration 86 ms    Q-T Interval 278 ms    QTC Calculation (Bezet) 404 ms    Calculated P Axis 51 degrees    Calculated R Axis 39 degrees    Calculated T Axis 65 degrees    Diagnosis       Sinus tachycardia  Possible Left atrial enlargement  Nonspecific T wave abnormality  Abnormal ECG  When compared with ECG of 01-AUG-2016 08:25,  Vent.  rate has increased BY  70 BPM  T wave inversion now evident in Lateral leads  Confirmed by Isaac Bryan MD, --- ([id number]) on 3/4/2017 11:07:48 AM     EKG, 12 LEAD, SUBSEQUENT   Result Value Ref Range    Ventricular Rate 103 BPM    Atrial Rate 103 BPM    P-R Interval 130 ms    QRS Duration 82 ms    Q-T Interval 316 ms    QTC Calculation (Bezet) 413 ms    Calculated P Axis 55 degrees    Calculated R Axis 34 degrees    Calculated T Axis 63 degrees    Diagnosis       Sinus tachycardia  Otherwise normal ECG  When compared with ECG of 03-MAR-2017 21:17,  No significant change was found  Confirmed by Jalen Rashid ([id number]) on 3/9/2017 10:20:32 AM     PLATELETS, ALLOCATE   Result Value Ref Range    Unit number [id number]     Blood component type PLTPH LR,1 Unit division 00     Status of unit TRANSFUSED     Unit number [id number]     Blood component type PLTPH LR,2     Unit division 00     Status of unit TRANSFUSED    TYPE & SCREEN   Result Value Ref Range    Crossmatch Expiration 03/07/2017     ABO/Rh(D) A POSITIVE     Antibody screen NEG     CALLED TO: Audie Khan, 2700, AT 0837 ON 03/07/2017  [facility]     Unit number [id number]     Blood component type RC LR AS1     Unit division 00     Status of unit TRANSFUSED     Crossmatch result Compatible     Unit number [id number]     Blood component type RC LR AS1     Unit division 00     Status of unit TRANSFUSED     Crossmatch result Compatible    PLATELETS, ALLOCATE   Result Value Ref Range    CALLED TO: Mateo Kevin, 2ICU2, AT 1035 ON 3/5/2017 BY [id number] [facility]     Unit number [id number]     Blood component type PLTPH LR,1     Unit division 00     Status of unit TRANSFUSED     Unit number [id number]     Blood component type PLTPH LR,1     Unit division 00     Status of unit TRANSFUSED    TYPE & SCREEN   Result Value Ref Range    Crossmatch Expiration 03/10/2017     ABO/Rh(D) A POSITIVE     Antibody screen NEG      Results   CT ABD PELV WO CONT (Accession [id number]) (Order [id number])   Allergies      Unspecified: Latex, Natural Rubber;  Clindamycin;  Clindamycin; Iodine;  Keflex [Cephalexin]; Linezolid;  Nsaids (Non-steroidal Anti-inflammatory Drug);   Piperacillin-tazobactam;  Sulfa (Sulfonamide Antibiotics);  Vancomycin   Result Information   Status Provider Status      Final result (Exam End: 3/3/2017 11:42 PM) Open    Study Result   EXAM: CT of the abdomen and pelvis     INDICATION: Ovarian carcinoma     COMPARISON: CT abdomen and pelvis 9/14/2016 CT chest 11/14/2015     TECHNIQUE: Axial CT imaging of the abdomen and pelvis was performed without  intravenous contrast. Multiplanar reformats were generated.     One or more dose reduction techniques were used on this CT: automated exposure  control, adjustment of the mAs and/or kVp according to patient's size, and  iterative reconstruction techniques. The specific techniques utilized on this CT  exam have been documented in the patient's electronic medical record.     _______________     FINDINGS:     LOWER CHEST: There is a new bilobed nodule in the anterior right middle lobe. This measures 9 x 7 mm. Lung bases are otherwise unremarkable.     LIVER, BILIARY: Liver is normal. No biliary dilation. Status post  cholecystectomy     PANCREAS: Normal.     SPLEEN: Upper limits of normal measuring 12.7 cm. It appears slightly more bulky  than previously.     ADRENALS: Stable 12 mm left adrenal gland nodule. Right adrenal gland is normal.     KIDNEYS: Bilateral ureteral stents continue be present in good position. Resolved hydronephrosis present on previous examination. Periureteral stranding  present is similar to prior CT.     LYMPH NODES: Continued abnormal retroperitoneal adenopathy in the left  periaortic region. This is stable or minimally more prominent.     GASTROINTESTINAL TRACT: No evidence of bowel obstruction.     PELVIC ORGANS: Uterus is still present and unremarkable. Duarte catheter is  present in the bladder. There is a new cystic mass in the right adnexal region. This measures 3.8 x 2.4 cm.     VASCULATURE: Unremarkable.     BONES: No acute or aggressive osseous abnormalities identified. Stable  anterolisthesis L5 on S1 secondary bilateral spondylolysis L5.     OTHER: Multiple fat-containing ventral hernias are unchanged.     _______________     IMPRESSION  IMPRESSION:     1. New cystic mass right adnexal region. Recurrent ovarian carcinoma cannot be  excluded. 2. New right middle lobe pulmonary nodule. Cannot rule out metastatic disease. 3. Bilateral ureteral stents with resolved hydronephrosis.   4. Stable or minimally increased left periaortic retroperitoneal adenopathy.     As the attending radiologist I have personally reviewed the study and  preliminary report, and concur with the preliminary findings.              A copy of today's office visit with all pertinent imaging results and labs were sent to the referring physician.         Becky Cabrera MD

## 2017-03-09 NOTE — MED STUDENT NOTES
*ATTENTION:  This note has been created by a medical student for educational purposes only. Please do not refer to the content of this note for clinical decision-making, billing, or other purposes. Please see attending physicians note to obtain clinical information on this patient. *       Student Progress Note  Please refer to attendings daily rounding note for full details      Patient: Lexii Elias MRN: [id number]  CSN: [id number]    YOB: 1960  Age: 49 y.o. Sex: female    DOA: 3/3/2017 LOS:  LOS: 6 days          Chief Complaint:  Not feeling well    Subjective:   Pt is a 48 yo female with a hx of CAD, DVTs, Endometrial CA, ovarian CA, morbid obesity, and Munchausen syndrome admitted for septic shock and thrombocytopenia states that she feels tired and about the same aas the past couple of days. She denies any fevers, chills, nausea, vomiting, abd pain, chest pain, or SOB. Objective:      Visit Vitals    /75 (BP 1 Location: Left arm, BP Patient Position: At rest)    Pulse (!) 109    Temp 97.3 °F (36.3 °C)    Resp 20    Ht 5' 4\" (1.626 m)    Wt 119.8 kg (264 lb 1.8 oz)    SpO2 98%    BMI 45.33 kg/m2         Physical Exam:  Gen: pt is a 49year old female in NAD  Head: Normocephalic, without obvious abnormality, atraumatic  Lungs: diminished bases and coarse sounds bilaterally, no wheezes   Heart: regular rate and rhythm, S1, S2 normal, no murmur, click, rub or gallop  Abdomen: soft, remains tender to palpation, diffuse ecchymosis/ rash noted mostly on right lower abdomen. Extrem: 2+ edema with no cyanosis  Skin: warm and dry, ecchymosis noted on abdomen  Neuro: intact      I have reviewed the patient's Labs and Radiology studies. Assessment/Plan:     1. Sepsis with Septic shock: 2/2 UTI, hx of VRE. New blood Cx negative. Leukocytosis still present and increased. Daptomycin per ID  2.  Possible Aortic Endocarditis: per TTE results, Cardiology consulted/following, pt refused ESSIE for now. Daptomycin per ID.  3. UTI: Continue abx- pos **VANCOMYCIN RESISTANT ENTEROCOCCUS FAECIUM** on culture  4. Thrombocytopenia: platelets transfused, plts increasing but still very low. heme/onc consulted  5. DVT on Right Leg: No anticoagulant 2/2 severe low platelet, IVC filter inserted today  6. Encephalopathy: improving, CT shows no evidence of acute infarct, hemorrhage or mass. 7. Hypokalemia: resolved  8. Anemia: transfuse if Hgb <7, s/p 2 units PRBC. Hgb stable. Continue to monitor   9. Hypomagnesia: replete  10. ARF: IVF, BUN and creatinine trending down but still very elevated. nephro consult  11. Chronic hypoalbuminemia: ?replete  12. Hypocalcemia: check Ionized calcium, if low, replete  13. Malignancy at multiple sites: endometrial and ovarian cancer, New right middle lobe pulmonary nodule. Cannot rule out metastatic disease per CT. Heme/Onc consulted  14.  GI prophylaxis: Protonix      Jacquie Musa  3/9/2017  12:55 PM

## 2017-03-09 NOTE — PROGRESS NOTES
[facility]  Hospitalist Division        Inpatient Daily Progress Note    Daily progress Note    Patient: Vic Loera MRN: [id number]  CSN: [id number]    YOB: 1960  Age: 49 y.o. Sex: female    DOA: 3/3/2017 LOS:  LOS: 6 days                    Chief Complaint:  AMS       Subjective:     Patient sleeping. Easily arouses. Continues to report generalized pain. In no distress. Objective:      Visit Vitals    /75 (BP 1 Location: Left arm, BP Patient Position: At rest)    Pulse (!) 109    Temp 97.3 °F (36.3 °C)    Resp 20    Ht 5' 4\" (1.626 m)    Wt 119.8 kg (264 lb 1.8 oz)    SpO2 98%    BMI 45.33 kg/m2       Physical Exam:  General appearance: alert, in no distress  Head: Normocephalic, without obvious abnormality, atraumatic  Lungs: diminished bases bilaterally, no wheezes or rhonchi   Heart: regular rate and rhythm, S1, S2 normal, no murmur, click, rub or gallop  Abdomen: soft, tender to palpation.  Bowel sounds normoactive   Extremities: bilateral fingertip cyanosis, trace BLE edema   Skin: scattered ecchymosis to abdomen, BLE  Neurologic: moves all 4 extremities   PSY: appropriately behaved        Intake and Output:  Current Shift:  03/09 0701 - 03/09 1900  In: -   Out: 400 [Urine:400]  Last three shifts:  03/07 1901 - 03/09 0700  In: 2183.3 [P.O.:50; I.V.:2133.3]  Out: 3075 [Urine:3075]    Recent Results (from the past 24 hour(s))   EKG, 12 LEAD, SUBSEQUENT    Collection Time: 03/08/17  4:56 PM   Result Value Ref Range    Ventricular Rate 103 BPM    Atrial Rate 103 BPM    P-R Interval 130 ms    QRS Duration 82 ms    Q-T Interval 316 ms    QTC Calculation (Bezet) 413 ms    Calculated P Axis 55 degrees    Calculated R Axis 34 degrees    Calculated T Axis 63 degrees    Diagnosis       Sinus tachycardia  Otherwise normal ECG  When compared with ECG of 03-MAR-2017 21:17,  No significant change was found  Confirmed by Ronal Conroy ([id number]) on 3/9/2017 10:20:32 AM     GLUCOSE, POC    Collection Time: 03/08/17  6:50 PM   Result Value Ref Range    Glucose (POC) 117 (H) 70 - 110 mg/dL   CULTURE, BLOOD    Collection Time: 03/08/17  8:35 PM   Result Value Ref Range    Special Requests: NO SPECIAL REQUESTS      Culture result: NO GROWTH AFTER 11 HOURS     CULTURE, BLOOD    Collection Time: 03/08/17  8:38 PM   Result Value Ref Range    Special Requests: NO SPECIAL REQUESTS      Culture result: NO GROWTH AFTER 11 HOURS     MAGNESIUM    Collection Time: 03/09/17  4:50 AM   Result Value Ref Range    Magnesium 1.7 (L) 1.8 - 2.4 mg/dL   METABOLIC PANEL, BASIC    Collection Time: 03/09/17  4:50 AM   Result Value Ref Range    Sodium 139 136 - 145 mmol/L    Potassium 3.7 3.5 - 5.5 mmol/L    Chloride 110 (H) 100 - 108 mmol/L    CO2 14 (L) 21 - 32 mmol/L    Anion gap 15 3.0 - 18 mmol/L    Glucose 88 74 - 99 mg/dL    BUN 71 (H) 7.0 - 18 MG/DL    Creatinine 2.22 (H) 0.6 - 1.3 MG/DL    BUN/Creatinine ratio 32 (H) 12 - 20      GFR est AA 28 (L) >60 ml/min/1.73m2    GFR est non-AA 23 (L) >60 ml/min/1.73m2    Calcium 7.5 (L) 8.5 - 10.1 MG/DL   CBC WITH AUTOMATED DIFF    Collection Time: 03/09/17  4:50 AM   Result Value Ref Range    WBC 33.6 (H) 4.6 - 13.2 K/uL    RBC 3.29 (L) 4.20 - 5.30 M/uL    HGB 9.0 (L) 12.0 - 16.0 g/dL    HCT 28.2 (L) 35.0 - 45.0 %    MCV 85.7 74.0 - 97.0 FL    MCH 27.4 24.0 - 34.0 PG    MCHC 31.9 31.0 - 37.0 g/dL    RDW 16.0 (H) 11.6 - 14.5 %    PLATELET 72 (L) 960 - 420 K/uL    MPV 11.3 9.2 - 11.8 FL    NEUTROPHILS 90 (H) 42 - 75 %    LYMPHOCYTES 6 (L) 20 - 51 %    MONOCYTES 4 2 - 9 %    EOSINOPHILS 0 0 - 5 %    BASOPHILS 0 0 - 3 %    ABS. NEUTROPHILS 30.3 (H) 1.8 - 8.0 K/UL    ABS. LYMPHOCYTES 2.0 0.8 - 3.5 K/UL    ABS. MONOCYTES 1.3 (H) 0 - 1.0 K/UL    ABS. EOSINOPHILS 0.0 0.0 - 0.4 K/UL    ABS.  BASOPHILS 0.0 0.0 - 0.1 K/UL    PLATELET COMMENTS DECREASED PLATELETS      RBC COMMENTS ANISOCYTOSIS  1+        WBC COMMENTS TOXIC GRANULATION      DF MANUAL     CK Collection Time: 03/09/17  4:50 AM   Result Value Ref Range    CK 31 26 - 192 U/L   GLUCOSE, POC    Collection Time: 03/09/17 11:45 AM   Result Value Ref Range    Glucose (POC) 116 (H) 70 - 110 mg/dL           Lab Results   Component Value Date/Time    Glucose 88 03/09/2017 04:50 AM    Glucose 86 03/08/2017 05:30 AM    Glucose 100 03/07/2017 03:30 AM    Glucose 115 03/06/2017 03:20 AM    Glucose 112 03/05/2017 04:30 AM        Assessment/Plan:     Patient Active Problem List   Diagnosis Code    Anemia D64.9    Anxiety F41.9    Nausea and vomiting R11.2    Morbid obesity ([facility]) E66.01    Chronic pain syndrome G89.4    History of ovarian cancer Z85.43    History of gastric bypass Z98.890    CAD (coronary artery disease) I25.10    History of Clostridium difficile Z87.19    History of endometrial cancer Z85.42    History of GI bleed Z87.19    Lymphedema I89.0    Hypokalemia E87.6    DEV (acute kidney injury) ([facility] [address] N17.9    Pulmonary nodules R91.8    A-fib ([facility]) I48.91    Aortic stenosis I35.0    GI bleed K92.2    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    Acute blood loss anemia D62    Bladder mass N32.89    Flank pain, chronic R10.9, G89.29    UTI (urinary tract infection) N39.0    Severe sepsis with septic shock (CODE) ([facility]) R65.21    Severe thrombocytopenia ([facility]) D69.6       A/P:  UTI: Cx grew out VRE. Urology consulted 2/2 to UTI- ? Stent change   Blood Cx 3/3- grew out VRE- ID consulted. Repeat BC 3/6, 3/8- NGTD. Continue Daptomycin per ID    Sepsis 2/2 above  Acute blood loss anemia: s/p 2 units PRBC. Hgb stable. Continue to monitor   RLE DVT- IVC filter placed 3/9/17 by Vascular   ? Endocarditis- Cardiology following- pt refused ESSIE. ID following   DM: well controlled. Continue SSI  ARF- Cr continues to trend down. Monitor Cr, I/O   Severe Thrombocytopenia- platelets 49.  Monitor    Hx Ovarian and Endometrial cancer with possible metastatic disease- pain control    GI prophylaxis: Protonix  Hypomagnesemia: replete with 1 gm IV.  Check in am  Hypokalemia: resolved   Patient was Hospice prior to arrival. Yvone Goodell to discuss Hospice at this point in time, remains full code            SHELDON DowellSelect [facility], [place] [address]  Pager:  [phone]  Office:  [phone]

## 2017-03-09 NOTE — PROGRESS NOTES
Plan:  To promote relaxation. Implementation:  Provided live bedside harp music, movie songs from patient's request on previous visits. Evaluation:  Patient awake when I suggested music, smiles and agrees. Appeared to sleep peacefully throughout music time.

## 2017-03-09 NOTE — PROGRESS NOTES
0800- Pt. Off the floor to cath lab.    4530- Received report from cath lab.    6999- Patient returned from cath lab for IVC filter placement. Site check with technician Anita Moncada no swelling no bleeding noted. Skin tears noted on the abdominal panus with large ecchymosis noted around the skin tear site. Pt. Lying flat instructed about 2-3 hrs bedrest pt. Verbalized understanding. 1230- Pt. Off bedrest no change in status pt. Resting quietly. 1730- Patient tolerated diner. 1920-Bedside and Verbal shift change report given to Joshua Bernabe (oncoming nurse) by Editha Severe RN (offgoing nurse). Report included the following information SBAR, Kardex, ED Summary, Intake/Output, MAR and Recent Results.

## 2017-03-09 NOTE — PROCEDURES
VENA CAVA FILTER OP NOTE    3/9/2017    Hospital: [facility]   Surgeon(s): Penelope Zamarripa MD  Assistant(s): Mike Smyth  Pre-operative Diagnosis: DVT  Post-operative Diagnosis: NAHUM  Procedure(s) Performed:     Inferior vena cava filter   Ultrasound guidance   Fluoroscopy <1 hour  Anesthesia:  Local  Findings:  none  Complications: none  Estimated Blood Loss:  Minimal <100  Tubes and Drains:  none  Specimens: * Cannot find log *    The patient was brought to the operating room and positioned in the supine position on a fluoroscopy table. The right groin was prepped with chlorhexidine then draped in a sterile manner. A portable ultrasound was used for vein imaging and needle guidance. The table was placed in a level position. The skin was anesthetized with 1% Xylocaine (lidocaine). The vein was accessed with a 18 gauge needle and a guidewire inserted and advanced to the inferior vena cava under fluoroscopic guidance using a C-arm. Over the guidewire a sheath was advanced into the vena cava, the wire and obturator were removed. 20 mLs of contrast were injected and images were interpreted as showing a normal sized patent vena cava. The location of the renal veins was established. The [facility] filter was inserted into the sheath and advanced to the end of the sheath just below the renal vein location and deployed within the vena cava by withdrawal of the sheath. The filter position was fixed at the L2 and L3 vertebral body level by imaging. The sheath was removed. . Pressure was applied to the puncture site after sheath removal followed by an occlusive gauze dressing. There were no complications and the patient tolerated the procedure well.      Penelope Zamarripa MD  3/9/2017  8:55 AM

## 2017-03-09 NOTE — PROGRESS NOTES
INFECTIOUS DISEASE FOLLOW UP NOTE :-     Admit Date: 3/3/2017    ABX;      Current  Prior    Daptomycin 3/8 - 0  linezolid 3/4 - 3, vanc/zosyn/levoflox 3/3 -0     ASSESSMENT: -> RECS     Sepsis POA  - fever 102 , leukocytosis 28K, tachycardia, hypotension  - source VRE UTI and BSI  - wbc up ?secondary to allergic reaction vs endocarditis   - no diarrhea  - monitor on changed abx, if wbc keeps going up then will need MRI lumbar spine as has pain at that area. VRE BSI with ?aortic endocarditis  - blcx 3/3 2/2 VRE positive   - repeat 3/6 ntd   - TTE aortic stenosis with aortic vegetation [ chronic mixed calcified density ]  - source likely UTI  - Cont Daptomycin , CPK 21, monitor CPK weekly  - cardiology has seen pt , pt refused ESSIE   - d/w Dr Rony Maier  - 3/8 repeat blcx ntd  - concern for mediport involvement as well, pt want sto keep her mediport. - unfortunately can not add gentamicin as DEV  - d/w daughter and pt that if she fails or does not tolerate Daptomycin then no other abx option. VRE UTI   - UA with large LE , wbc tntc, jamar 4+  - ucx >100k VRE  - CT with no hydronephrosis but has b/l ureteral stents - above abx should cover. - consider urology consult for stent exchange given UTI   ADR - Rash over abdomen/chest   - secondary to linezolid which has been listed from before as allergy  - rash fading off linezolid    RLE subacute DVT      Rt pelvic mass ? ovarian ca / endometrial ca with mets  - palliative care involved , was hospice at home and now revoked    DEV due to sepsis  - slowly cr coming down, cr 2.2 today    Thrombocytopenia slowly improving      Blood loss anemia          MICROBIOLOGY:   3/3 Blcx x 2 - 2/2 VRE [ S gent, linezolid , quinupristin ]        Ucx > 100K VRE  3/6 Blcx x 2 - ntd   3/8 Blcx x 2 - ntd      LINES AND CATHETERS:   Rt chest mediport     SUBJECTIVE :     Interval notes reviewed. Pt says that she has back pain , no fever, itching at site of rash present.      MEDICATIONS :     Current Facility-Administered Medications   Medication Dose Route Frequency    heparin (porcine) 1,000 unit/mL injection        fentaNYL citrate (PF) 50 mcg/mL injection        insulin lispro (HUMALOG) injection   SubCUTAneous AC&HS    0.9% sodium chloride with KCl 20 mEq/L infusion   IntraVENous CONTINUOUS    DAPTOmycin (CUBICIN) 719 mg in 0.9% sodium chloride 50 mL IVPB RF formulation  6 mg/kg IntraVENous Q24H    0.9% sodium chloride infusion 250 mL  250 mL IntraVENous PRN    pantoprazole (PROTONIX) tablet 40 mg  40 mg Oral DAILY    sodium bicarbonate tablet 650 mg  650 mg Oral TID    0.9% sodium chloride infusion 250 mL  250 mL IntraVENous PRN    bisacodyl (DULCOLAX) suppository 10 mg  10 mg Rectal DAILY PRN    glucose chewable tablet 16 g  16 g Oral PRN    glucagon (GLUCAGEN) injection 1 mg  1 mg IntraMUSCular PRN    dextrose (D50W) injection syrg 12.5-25 g  25-50 mL IntraVENous PRN    acetaminophen (TYLENOL) tablet 650 mg  650 mg Oral Q4H PRN    ondansetron (ZOFRAN) injection 4 mg  4 mg IntraVENous Q4H PRN    HYDROmorphone (PF) (DILAUDID) injection 1 mg  1 mg IntraVENous Q4H PRN    sodium chloride (NS) flush 5-10 mL  5-10 mL IntraVENous PRN    0.9% sodium chloride infusion 250 mL  250 mL IntraVENous PRN       OBJECTIVE :     Visit Vitals    /75 (BP 1 Location: Left arm, BP Patient Position: At rest)    Pulse (!) 109    Temp 97.3 °F (36.3 °C)    Resp 20    Ht 5' 4\" (1.626 m)    Wt 119.8 kg (264 lb 1.8 oz)    SpO2 98%    BMI 45.33 kg/m2       Temp (24hrs), Av.2 °F (36.8 °C), Min:97.3 °F (36.3 °C), Max:98.7 °F (37.1 °C)    GEN - 56yr old morbidly obese white female lying in bed , not in distress  HEENT - no sinus congestion, perrla, no oral lesions  RESP- ctab, rt chest mediport accessed   CVS- s1s2 normal with 3/6 systolic murmur   ABD-soft , large area of ecchymosis at lower abdominal fold with bleeding at places, bs present   EXT- trace edema   SKIN -maculopapular erythematous rash over abdomen , rt arm which are fading now  NEURO - awake, alert, O x3    Labs: Results:   Chemistry Recent Labs      03/09/17 0450 03/08/17 0530 03/07/17   0330   GLU  88  86  100*   NA  139  141  140   K  3.7  3.4*  3.3*   CL  110*  111*  109*   CO2  14*  14*  18*   BUN  71*  90*  113*   CREA  2.22*  2.86*  3.38*   CA  7.5*  7.9*  7.5*   AGAP  15  16  13   BUCR  32*  31*  33*      CBC w/Diff Recent Labs      03/09/17 0450 03/08/17 0530 03/07/17 2056 03/07/17   0330   WBC  33.6*  28.2*   --    --   18.5*   RBC  3.29*  3.30*   --    --   2.43*   HGB  9.0*  9.0*  10.3*   < >  6.4*   HCT  28.2*  28.1*  31.8*   < >  20.3*   PLT  72*  49*   --    --   28*   GRANS  90*  94*   --    --   92*   LYMPH  6*  4*   --    --   6*   EOS  0  0   --    --   0    < > = values in this interval not displayed. RADIOLOGY :    CT abdo/pelvis 3/3 - IMPRESSION:  1. New cystic mass right adnexal region. Recurrent ovarian carcinoma cannot be  excluded. 2. New right middle lobe pulmonary nodule. Cannot rule out metastatic disease. 3. Bilateral ureteral stents with resolved hydronephrosis. 4. Stable or minimally increased left periaortic retroperitoneal adenopathy. Echo 3/7 - EF 55%, Aortic valve: The valve was trileaflet. Leaflets exhibited moderate  calcification and moderately reduced cuspal separation. There was moderate  stenosis. There was mild regurgitation. There was small-medium size fix  partially calcified echodensity noted attach to right coronary cusp. Could  represent vegetation. Valve mean gradient was 34 mmHg. Aortic valve area  was 1 cm-sq by the continuity equation.     Wilfredo Romero MD  March 9, 2017  [facility]  [phone]

## 2017-03-09 NOTE — PROGRESS NOTES
Bedside and Verbal shift change report received from Juan Ramon Broussard (offgoing nurse). Report included the following information SBAR, Kardex, Intake/Output, MAR and Recent Results. 2020 Pt refused assesement and blood sugar to be taken, she states that the doctor will do everything in the morning,pt explained to on the reason for assessment, understands but still refuses. Alert and oriented x4, no distress noted, not in pain, will continue to monitor. 2200 Pt refused medication despite education of it. Will continue to monitor. Bedside and Verbal shift change report given to Keshia Bautista (oncoming nurse) by Leticia Salazar RN (offgoing nurse). Report included the following information SBAR, Kardex, Intake/Output, MAR and Recent Results.

## 2017-03-10 NOTE — PROGRESS NOTES
NUTRITION follow-up/Plan of care    RECOMMENDATIONS:     1. Consider liberalizing to low sodium diet   2. Monitor weight, labs and PO intake  3. RD to follow    GOALS:     1. Ongoing: PO intake meets >75% of protein/calorie needs by 3/15  2. Ongoing: Maintain weight/Graduwl weight loss ( 1-2 lb) by 3/15    ASSESSMENT:     Weight status is classified as obese per BMI of 45.3. PO intake is poor. Labs noted. Nutrition recommendations listed. RD to follow. Nutrition Risk:  []   High [x]  Moderate [] Low    SUBJECTIVE/OBJECTIVE:     Transferred from ICU. Patient sleeping with lunch untouched at bedside. Being followed by palliative care- deciding on discharge home with hospice or home health. No food allergies on record. Will monitor. Information Obtained From:   [x] Chart Review  [x] Patient  [] Family/Caregiver  [] Nurse/Physician   [] Patient Rounds/Interdisciplinary Meeting    Diet: Renal diet  Patient Vitals for the past 100 hrs:   % Diet Eaten   03/09/17 1429 30 %     Medications: [x] Reviewed   Encounter Diagnoses     ICD-10-CM ICD-9-CM   1. Septic shock (HCC) A41.9 038.9    R65.21 785.52     995.92   2. Acute renal failure, unspecified acute renal failure type ([address] N17.9 584.9   3. Acute cystitis with hematuria N30.01 595.0   4.  High anion gap metabolic acidosis K61.4 011.0     Past Medical History:   Diagnosis Date    Anxiety and depression     Aortic stenosis     Arthritis     Arthropathy     Cardiac disorder     Cellulitis     left leg    Chronic pain     Endometrial adenocarcinoma (HCC)     Gastric ulcer     H/O ovarian cancer     Hematemesis     Infection     Lymphedema     Munchausen syndrome     Narcotic dependence ([facility] [address]     Obesity     Spinal stenosis     Status post partial hysterectomy     Urethral obstruction      Labs:  Lab Results   Component Value Date/Time    Sodium 136 03/10/2017 06:00 AM    Potassium 3.6 03/10/2017 06:00 AM    Chloride 111 03/10/2017 06:00 AM    CO2 13 03/10/2017 06:00 AM    Anion gap 12 03/10/2017 06:00 AM    Glucose 140 03/10/2017 06:00 AM    BUN 66 03/10/2017 06:00 AM    Creatinine 2.03 03/10/2017 06:00 AM    Calcium 8.1 03/10/2017 06:00 AM    Magnesium 1.8 03/10/2017 06:00 AM    Albumin 1.6 03/06/2017 03:20 AM     Anthropometrics: BMI (calculated): 45.3 Last 3 Recorded Weights in this Encounter    03/05/17 0656 03/06/17 0600 03/07/17 0715   Weight: 114.7 kg (252 lb 13.9 oz) 113.1 kg (249 lb 5.4 oz) 119.8 kg (264 lb 1.8 oz)    Ht Readings from Last 1 Encounters:   03/06/17 5' 4\" (1.626 m)     []  Weight Loss  []  Weight Gain  []  Weight Stable   [x]  New wt n/a on record     Estimated Nutrition Needs:   1480 Kcals/day  Protein (g): 70 g    Nutrition Problems Identified:   [x] Suboptimal PO intake   [] Food Allergies  [] Difficulty chewing/swallowing/poor dentition  [] Constipation/Diarrhea   [] Nausea/Vomiting   [] None  [] Other:     Plan:   [x] Therapeutic Diet  []  Obtained/adjusted food preferences/tolerances and/or snacks options   []  Supplements added   [] Occupational therapy following for feeding techniques  []  HS snack added   []  Modify diet texture   []  Modify diet for food allergies   []  Assist with menu selection   [x]  Monitor PO intake on meal rounds   [x]  Continue inpatient monitoring and intervention   []  Participated in discharge planning/Interdisciplinary rounds/Team meetings   []  Other:     Education Needs:   [x] Not appropriate for teaching at this time    [] Identified and addressed    Nutrition Monitoring and Evaluation:   [x] Continue inpatient monitoring and interventions    [] Other:     Pooja Peralta

## 2017-03-10 NOTE — PROGRESS NOTES
Progress Note    Patient: Radha Sims MRN: [id number]  SSN: [id number]    YOB: 1960  Age: 49 y.o. Sex: female      Admit Date: 3/3/2017    LOS: 7 days     Subjective:   JULIO. No N/V. Afebrile. Objective:     Vitals:    03/09/17 1428 03/09/17 2320 03/10/17 0900 03/10/17 1404   BP: 118/81  120/82 126/84   Pulse: (!) 106 (!) 104 100 98   Resp: 18 18 18 20   Temp: 97.7 °F (36.5 °C) 97 °F (36.1 °C) 97.2 °F (36.2 °C) 97.4 °F (36.3 °C)   SpO2: 98% 100%  100%   Weight:       Height:            Intake and Output:  Current Shift: 03/10 0701 - 03/10 1900  In: 370.8 [P.O.:200; I.V.:170.8]  Out: 800 [Urine:800]  Last three shifts: 03/08 1901 - 03/10 0700  In: 2202.5 [P.O.:290; I.V.:1912.5]  Out: 2250 [Urine:2250]    Physical Exam:   GENERAL: appears in discomfort  LUNG: labored breathing  HEART: tachy  ABDOMEN: soft, NT, ND    Lab/Data Review:  BMP:   Lab Results   Component Value Date/Time     03/10/2017 06:00 AM    K 3.6 03/10/2017 06:00 AM     (H) 03/10/2017 06:00 AM    CO2 13 (L) 03/10/2017 06:00 AM    AGAP 12 03/10/2017 06:00 AM     (H) 03/10/2017 06:00 AM    BUN 66 (H) 03/10/2017 06:00 AM    CREA 2.03 (H) 03/10/2017 06:00 AM    GFRAA 31 (L) 03/10/2017 06:00 AM    GFRNA 26 (L) 03/10/2017 06:00 AM     CBC:   Lab Results   Component Value Date/Time    WBC 29.3 (H) 03/10/2017 06:00 AM    HGB 8.8 (L) 03/10/2017 06:00 AM    HCT 27.4 (L) 03/10/2017 06:00 AM    PLT 72 (L) 03/10/2017 06:00 AM          Assessment:   1.  Pt is a 49 y.o. female  with hx of endometrial carcinoma and s/p XRT and bilateral ureteral obstruction with chronic stents last changed 8/19/17 non with VRE in blood and urine and ARF  CT 3/3/17 - No hydro  U Cx 3/317 - VRE  Cr improving     Plan:     Message sent to try and arrange bilateral JJ stent exchange while in patient next week  Cont abx for VRE  Creatinine trending down  Will see again Monday    Signed By: Shira Browne MD     March 10, 2017

## 2017-03-10 NOTE — WOUND CARE
Wound care in to see the patient for consult. Karin Trevino RN (wound care nurse) at bedside. The patient refused for skin assessment and any wound care treatment today 3/10/2017. Wound care will reassess at a later time.

## 2017-03-10 NOTE — MED STUDENT NOTES
*ATTENTION:  This note has been created by a medical student for educational purposes only. Please do not refer to the content of this note for clinical decision-making, billing, or other purposes. Please see attending physicians note to obtain clinical information on this patient. *       Student Progress Note  Please refer to attendings daily rounding note for full details      Patient: Filemon Bruno MRN: [id number]  CSN: [id number]    YOB: 1960  Age: 49 y.o. Sex: female    DOA: 3/3/2017 LOS:  LOS: 7 days          Chief Complaint: Not feeling well, ache and pains    Subjective:   Pt is a 48 yo female with a hx of CAD, DVTs, Endometrial CA, ovarian CA, morbid obesity, and Munchausen syndrome admitted for septic shock and thrombocytopenia states that she feels tired and about the same as the past couple of days to include headache, abdominal pain, and bilateral leg pain. She also reports mild SOB. She denies any fevers, chills, nausea, vomiting, abd pain, or chest pain. Objective:      Visit Vitals    /84    Pulse 98    Temp 97.4 °F (36.3 °C)    Resp 20    Ht 5' 4\" (1.626 m)    Wt 119.8 kg (264 lb 1.8 oz)    SpO2 100%    BMI 45.33 kg/m2         Physical Exam:  General appearance: pt is 50yo female alert, in no distress  Head: Normocephalic, without obvious abnormality, atraumatic  Lungs: diminished bases bilaterally, no wheezes or rhonchi. On NC 2L  Heart: regular rate and rhythm, S1, S2 normal, no murmur, click, rub or gallop  Abdomen: soft, tender to palpation. Bowel sounds normoactive, ecchymosis present with purpura on lower abdomen. Extremities: trace BLE edema and no cyanosis  Skin: scattered ecchymosis to abdomen mostly right side, purpura noted on lower abdomen  Neurologic: alert, oriented x3, moves all 4 extremities. Pyschiatric: appropriately behaved    I have reviewed the patient's Labs and Radiology studies. Assessment/Plan:   1.  Sepsis with Septic shock: 2/2 UTI, hx of VRE. New blood Cx negative. Leukocytosis still present. Daptomycin per ID  2. Possible Aortic Endocarditis: per TTE results, Cardiology consulted/following, pt refused ESSIE for now. Daptomycin per ID.  3. UTI: Continue abx- pos **VANCOMYCIN RESISTANT ENTEROCOCCUS FAECIUM** on culture  4. Thrombocytopenia: platelets transfused, plts increasing but still very low. heme/onc consulted  5. DVT on Right Leg: No anticoagulant 2/2 severe low platelet, IVC filter inserted   6. Encephalopathy: improving, CT shows no evidence of acute infarct, hemorrhage or mass. 7. Hypokalemia: resolved  8. Anemia: transfuse if Hgb <7, s/p 2 units PRBC. Hgb stable. Continue to monitor   9. Hypomagnesia: resolved  10. ARF: IVF, BUN and creatinine trending down but still very elevated. nephro consult  11. Chronic hypoalbuminemia: ?replete  12. Hypocalcemia: check Ionized calcium, if low, replete  13. Malignancy at multiple sites: endometrial and ovarian cancer, New right middle lobe pulmonary nodule. Cannot rule out metastatic disease per CT. Heme/Onc consulted  14.  GI prophylaxis: Protonix     Jacquie Musa  3/10/2017  2:07 PM

## 2017-03-10 NOTE — PROGRESS NOTES
0618 --Bedside and Verbal shift change report given to Danni Duenas RN (oncoming nurse) by Jeff Dela Cruz RN (offgoing nurse). Report included the following information SBAR, Procedure Summary, Intake/Output, MAR, Recent Results. Bed locked and lowered, siderails up x3. Whiteboard updated with information. Call bell at bedside, will continue to monitor. Allergy band applied to wrist. Reminded pt Oxygen needs to kept on at all times. Unable to weight pt due to pt being immobile and bed does not have as option. Unable to wear SCD due to wound at right lower leg, wound consult will be added today to address the above and pt's abdomen and buttocks. 855 -- Morning medications administered, pt tolerated with ease. 1120 -- Spoke with Brice Wong, concern to swap bed or obtain a specialty bed due to pt size. Will follow up. 1246 -- Contacted Pharm about dual Potassium IV orders. Order was approved, medication was administered.      1300 -- Shift reassessment, pt condition unchanged will continue to monitor. 1530 -- Pt refused wound care consult.      1710 -- PRN pain medication administered, will reassess. Pt bathed, gown, linen and provided with incontinence care. 1800 -- Shift reassessment, pt condition unchanged will continue to monitor.      1900 -- Bedside and Verbal shift change report given to Omar Paredes (oncoming nurse) by Avel Jenkins RN (offgoing nurse) Report included the following information SBAR, Procedure Summary, Intake/Output, MAR, Recent Results. Pt up in bed, no indication of pain or acute distress. Bed locked and lowered, siderails up x3. Call bell at bedside. Continuing VRE contact due to weeping abdominal wounds.

## 2017-03-10 NOTE — PROGRESS NOTES
INFECTIOUS DISEASE FOLLOW UP NOTE :-     N.B. We are available over weekend and plan to f/u Monday. Dr Antoine Hameed is on call and can be reached at [phone] if any problem or question for ID prior. Admit Date: 3/3/2017    ABX;      Current  Prior    Daptomycin 3/8 - 2 linezolid 3/4 - 3, vanc/zosyn/levoflox 3/3 -0     ASSESSMENT: -> RECS     Sepsis POA  - fever 102 , leukocytosis 28K, tachycardia, hypotension  - source VRE UTI and BSI or DVT -?secondary to allergic reaction vs complicated UTI/BSI, (can't r/o endocarditis)  - WBC remains sig high though slightly better now at 29k, ?sec to infection vs new DVT    VRE BSI with ?aortic endocarditis  - blcx 3/3 2/2 VRE positive   - repeat 3/6 ntd   - TTE aortic stenosis with aortic vegetation [ chronic mixed calcified density ]  - source likely UTI  - Cont Daptomycin,monitor weekly CPK. Unfortunately can not add gentamicin as DEV. I have asked micro to check S for Dapto  - pt refused ESSIE and removal of mediport  - 3/8 blcx ntd   VRE UTI   - UA with large LE , wbc tntc, bac 4+  - ucx >100k VRE  - CT with no hydronephrosis but has b/l ureteral stents - as above  - appreciate urology consult and eventual plan for stent exchange/removal   - will repeat ucx tomorrow   ADR - Rash over abdomen/chest   - secondary to linezolid which has been listed from before as allergy  - rash fading off linezolid    RLE subacute DVT  - per primary team   Rt pelvic mass ? ovarian ca / endometrial ca with mets  - palliative care involved , was hospice at home and now revoked    DEV due to sepsis  - slowly cr coming down, cr 2.03 today    Thrombocytopenia slowly improving   - Plt 72   Blood loss anemia          MICROBIOLOGY:   3/3 Blcx x 2 - 2/2 VRE [ S gent, linezolid , quinupristin ]        Ucx > 100K VRE  3/6 Blcx x 2 - ntd   3/8 Blcx x 2 - ntd      LINES AND CATHETERS:   Rt chest mediport SUBJECTIVE :     Interval notes reviewed. Pt says very tired and weak now. Back pain under control for now. No appetite. No fever, itching at site of rash better. No family at bedside.     MEDICATIONS :     Current Facility-Administered Medications   Medication Dose Route Frequency    dextrose 5% - 0.45% NaCl with KCl 20 mEq/L infusion  125 mL/hr IntraVENous CONTINUOUS    insulin lispro (HUMALOG) injection   SubCUTAneous AC&HS    DAPTOmycin (CUBICIN) 719 mg in 0.9% sodium chloride 50 mL IVPB RF formulation  6 mg/kg IntraVENous Q24H    0.9% sodium chloride infusion 250 mL  250 mL IntraVENous PRN    pantoprazole (PROTONIX) tablet 40 mg  40 mg Oral DAILY    sodium bicarbonate tablet 650 mg  650 mg Oral TID    0.9% sodium chloride infusion 250 mL  250 mL IntraVENous PRN    bisacodyl (DULCOLAX) suppository 10 mg  10 mg Rectal DAILY PRN    glucose chewable tablet 16 g  16 g Oral PRN    glucagon (GLUCAGEN) injection 1 mg  1 mg IntraMUSCular PRN    dextrose (D50W) injection syrg 12.5-25 g  25-50 mL IntraVENous PRN    acetaminophen (TYLENOL) tablet 650 mg  650 mg Oral Q4H PRN    ondansetron (ZOFRAN) injection 4 mg  4 mg IntraVENous Q4H PRN    HYDROmorphone (PF) (DILAUDID) injection 1 mg  1 mg IntraVENous Q4H PRN    sodium chloride (NS) flush 5-10 mL  5-10 mL IntraVENous PRN    0.9% sodium chloride infusion 250 mL  250 mL IntraVENous PRN       OBJECTIVE :     Visit Vitals    /82 (BP 1 Location: Left arm, BP Patient Position: At rest)    Pulse 100    Temp 97.2 °F (36.2 °C)    Resp 18    Ht 5' 4\" (1.626 m)    Wt 119.8 kg (264 lb 1.8 oz)    SpO2 100%    BMI 45.33 kg/m2       Temp (24hrs), Av.3 °F (36.3 °C), Min:97 °F (36.1 °C), Max:97.7 °F (36.5 °C)    GEN - 56yr old chronically ill appearing white female lying in bed , not in distress  HEENT - no sinus congestion, perrla, no oral lesions  RESP- ctab, rt chest mediport accessed   CVS- s1s2 normal with 3/6 systolic murmur   ABD-soft , large area of ecchymosis at lower abdominal fold with bleeding at places, bs present   EXT- trace edema   SKIN -maculopapular erythematous rash over abdomen , rt arm which are fading now  NEURO - awake, alert, O x3    Labs: Results:   Chemistry Recent Labs      03/10/17   0600  03/09/17   0450  03/08/17   0530   GLU  140*  88  86   NA  136  139  141   K  3.6  3.7  3.4*   CL  111*  110*  111*   CO2  13*  14*  14*   BUN  66*  71*  90*   CREA  2.03*  2.22*  2.86*   CA  8.1*  7.5*  7.9*   AGAP  12  15  16   BUCR  33*  32*  31*      CBC w/Diff Recent Labs      03/10/17   0600  03/09/17   0450  03/08/17   0530   WBC  29.3*  33.6*  28.2*   RBC  3.18*  3.29*  3.30*   HGB  8.8*  9.0*  9.0*   HCT  27.4*  28.2*  28.1*   PLT  72*  72*  49*   GRANS  94*  90*  94*   LYMPH  5*  6*  4*   EOS  0  0  0          RADIOLOGY :    CT abdo/pelvis 3/3 - IMPRESSION:  1. New cystic mass right adnexal region. Recurrent ovarian carcinoma cannot be  excluded. 2. New right middle lobe pulmonary nodule. Cannot rule out metastatic disease. 3. Bilateral ureteral stents with resolved hydronephrosis. 4. Stable or minimally increased left periaortic retroperitoneal adenopathy. Echo 3/7 - EF 55%, Aortic valve: The valve was trileaflet. Leaflets exhibited moderate  calcification and moderately reduced cuspal separation. There was moderate  stenosis. There was mild regurgitation. There was small-medium size fix  partially calcified echodensity noted attach to right coronary cusp. Could  represent vegetation. Valve mean gradient was 34 mmHg. Aortic valve area  was 1 cm-sq by the continuity equation.     Xavier Hull MD  March 10, 2017  [facility]  [phone]

## 2017-03-10 NOTE — PROGRESS NOTES
[facility]  Hospitalist Division        Inpatient Daily Progress Note    Daily progress Note    Patient: Daisy Levy MRN: [id number]  CSN: [id number]    YOB: 1960  Age: 49 y.o. Sex: female    DOA: 3/3/2017 LOS:  LOS: 7 days                    Chief Complaint:  AMS       Subjective:     C/o pain- refusing pain medications. In NAD. Objective:      Visit Vitals    /82 (BP 1 Location: Left arm, BP Patient Position: At rest)    Pulse 100    Temp 97.2 °F (36.2 °C)    Resp 18    Ht 5' 4\" (1.626 m)    Wt 119.8 kg (264 lb 1.8 oz)    SpO2 100%    BMI 45.33 kg/m2       Physical Exam:  General appearance: alert, in no distress  Head: Normocephalic, without obvious abnormality, atraumatic  Lungs: diminished bases bilaterally, no wheezes or rhonchi   Heart: regular rate and rhythm, S1, S2 normal, no murmur, click, rub or gallop  Abdomen: soft, tender to palpation. Bowel sounds normoactive   Extremities: bilateral fingertip cyanosis, trace BLE edema   Skin: scattered ecchymosis to abdomen, BLE  Neurologic: moves all 4 extremities   PSY: appropriately behaved        Intake and Output:  Current Shift:  03/10 0701 - 03/10 1900  In: 370.8 [P.O.:200; I.V.:170.8]  Out: 800 [Urine:800]  Last three shifts:  03/08 1901 - 03/10 0700  In: 2202.5 [P.O.:290;  I.V.:1912.5]  Out: 2250 [Urine:2250]    Recent Results (from the past 24 hour(s))   GLUCOSE, POC    Collection Time: 03/09/17  6:17 PM   Result Value Ref Range    Glucose (POC) 137 (H) 70 - 110 mg/dL   GLUCOSE, POC    Collection Time: 03/09/17 11:35 PM   Result Value Ref Range    Glucose (POC) 141 (H) 70 - 110 mg/dL   MAGNESIUM    Collection Time: 03/10/17  6:00 AM   Result Value Ref Range    Magnesium 1.8 1.8 - 2.4 mg/dL   CBC WITH AUTOMATED DIFF    Collection Time: 03/10/17  6:00 AM   Result Value Ref Range    WBC 29.3 (H) 4.6 - 13.2 K/uL    RBC 3.18 (L) 4.20 - 5.30 M/uL    HGB 8.8 (L) 12.0 - 16.0 g/dL    HCT 27.4 (L) 35.0 - 45.0 %    MCV 86.2 74.0 - 97.0 FL    MCH 27.7 24.0 - 34.0 PG    MCHC 32.1 31.0 - 37.0 g/dL    RDW 16.3 (H) 11.6 - 14.5 %    PLATELET 72 (L) 928 - 420 K/uL    MPV 11.4 9.2 - 11.8 FL    NEUTROPHILS 94 (H) 42 - 75 %    LYMPHOCYTES 5 (L) 20 - 51 %    MONOCYTES 1 (L) 2 - 9 %    EOSINOPHILS 0 0 - 5 %    BASOPHILS 0 0 - 3 %    ABS. NEUTROPHILS 27.5 (H) 1.8 - 8.0 K/UL    ABS. LYMPHOCYTES 1.5 0.8 - 3.5 K/UL    ABS. MONOCYTES 0.3 0 - 1.0 K/UL    ABS. EOSINOPHILS 0.0 0.0 - 0.4 K/UL    ABS.  BASOPHILS 0.0 0.0 - 0.1 K/UL    RBC COMMENTS NORMOCYTIC, NORMOCHROMIC      WBC COMMENTS TOXIC GRANULATION      DF MANUAL     METABOLIC PANEL, BASIC    Collection Time: 03/10/17  6:00 AM   Result Value Ref Range    Sodium 136 136 - 145 mmol/L    Potassium 3.6 3.5 - 5.5 mmol/L    Chloride 111 (H) 100 - 108 mmol/L    CO2 13 (L) 21 - 32 mmol/L    Anion gap 12 3.0 - 18 mmol/L    Glucose 140 (H) 74 - 99 mg/dL    BUN 66 (H) 7.0 - 18 MG/DL    Creatinine 2.03 (H) 0.6 - 1.3 MG/DL    BUN/Creatinine ratio 33 (H) 12 - 20      GFR est AA 31 (L) >60 ml/min/1.73m2    GFR est non-AA 26 (L) >60 ml/min/1.73m2    Calcium 8.1 (L) 8.5 - 10.1 MG/DL   GLUCOSE, POC    Collection Time: 03/10/17  7:40 AM   Result Value Ref Range    Glucose (POC) 191 (H) 70 - 110 mg/dL           Lab Results   Component Value Date/Time    Glucose 140 03/10/2017 06:00 AM    Glucose 88 03/09/2017 04:50 AM    Glucose 86 03/08/2017 05:30 AM    Glucose 100 03/07/2017 03:30 AM    Glucose 115 03/06/2017 03:20 AM        Assessment/Plan:     Patient Active Problem List   Diagnosis Code    Anemia D64.9    Anxiety F41.9    Nausea and vomiting R11.2    Morbid obesity (HCC) E66.01    Chronic pain syndrome G89.4    History of ovarian cancer Z85.43    History of gastric bypass Z98.890    CAD (coronary artery disease) I25.10    History of Clostridium difficile Z87.19    History of endometrial cancer Z85.42    History of GI bleed Z87.19    Lymphedema I89.0    Hypokalemia E87.6    DEV (acute kidney injury) ([facility] [address] N17.9    Pulmonary nodules R91.8    A-fib ([facility]) I48.91    Aortic stenosis I35.0    GI bleed K92.2    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    Acute blood loss anemia D62    Bladder mass N32.89    Flank pain, chronic R10.9, G89.29    UTI (urinary tract infection) N39.0    Severe sepsis with septic shock (CODE) ([facility]) R65.21    Severe thrombocytopenia ([facility]) D69.6       A/P:  UTI: Cx grew out VRE. Urology consulted 2/2 to UTI. No plans for stent change until urine Cx clear   Blood Cx 3/3- grew out VRE- ID consulted. Repeat BC 3/6, 3/8- NGTD. Continue Daptomycin per ID    Sepsis 2/2 above  Acute blood loss anemia: s/p 2 units PRBC. Hgb stable. Continue to monitor   RLE DVT- IVC filter placed 3/9/17 by Vascular   ? Endocarditis- Cardiology following- pt refused ESSIE. ID following   DM: well controlled. Continue SSI  ARF- Cr continues to trend down. Monitor Cr, I/O   Severe Thrombocytopenia- platelets 49.  Monitor    Hx Ovarian and Endometrial cancer with possible metastatic disease- pain control    GI prophylaxis: Protonix  Hypomagnesemia: resolved   Hypokalemia: 3.6- replete with 10 mEq IV x 1             SHELDON Rm 83  Pager:  [phone]  Office:  [phone]

## 2017-03-10 NOTE — PROGRESS NOTES
Bedside and Verbal shift change report received from Keshia Bautista (offgoing nurse). Report included the following information SBAR, Kardex, Intake/Output, MAR and Recent Results. Pt resting in bed watching tv, call bell within reach, bed locked in low position. 1945-Shift assessment completed, pt in bed watching tv, no distress noted, no complaints of pain. 2200-Pt refused sodium bicarbonate, will continue to monitor. 2345-Reassessment completed, no changes from previous. 0210-Incontinence care provided, linens changed, mepilex changed applied on sacrum and right lower leg, pt tolerated well. 0345-Reassessment completed, no changes from previous. Bedside and Verbal shift change report given to Asher Cast RN (oncoming nurse) by Davon Neville RN (offgoing nurse). Report included the following information SBAR, Kardex, Intake/Output, MAR and Recent Results.

## 2017-03-11 NOTE — PROGRESS NOTES
[facility]  Hospitalist Division        Inpatient Daily Progress Note    Daily progress Note    Patient: Filemon Bruno MRN: [id number]  CSN: [id number]    YOB: 1960  Age: 49 y.o. Sex: female    DOA: 3/3/2017 LOS:  LOS: 8 days                    Chief Complaint:  AMS       Subjective:     C/o pain, fatigue. Objective:      Visit Vitals    BP (!) 139/94 (BP 1 Location: Right arm)    Pulse (!) 121    Temp 97.6 °F (36.4 °C)    Resp 24    Ht 5' 4\" (1.626 m)    Wt 119.8 kg (264 lb 1.8 oz)    SpO2 97%    BMI 45.33 kg/m2       Physical Exam:  General appearance: alert, in no distress  Head: Normocephalic, without obvious abnormality, atraumatic  Lungs: diminished bases bilaterally, no wheezes or rhonchi   Heart: regular rate and rhythm, S1, S2 normal, no murmur, click, rub or gallop  Abdomen: soft, tender to palpation.  Bowel sounds normoactive   Extremities: bilateral fingertip cyanosis, trace BLE edema   Skin: scattered ecchymosis to abdomen, BLE  Neurologic: moves all 4 extremities   PSY: appropriately behaved        Intake and Output:  Current Shift:     Last three shifts:  03/09 1901 - 03/11 0700  In: 5017.1 [P.O.:540; I.V.:4477.1]  Out: 3650 [Urine:3650]    Recent Results (from the past 24 hour(s))   GLUCOSE, POC    Collection Time: 03/10/17  4:22 PM   Result Value Ref Range    Glucose (POC) 152 (H) 70 - 110 mg/dL   GLUCOSE, POC    Collection Time: 03/10/17  9:51 PM   Result Value Ref Range    Glucose (POC) 157 (H) 70 - 110 mg/dL   URINALYSIS W/ RFLX MICROSCOPIC    Collection Time: 03/11/17  4:30 AM   Result Value Ref Range    Color ORANGE      Appearance TURBID      Specific gravity 1.019 1.005 - 1.030      pH (UA) 5.5 5.0 - 8.0      Protein 100 (A) NEG mg/dL    Glucose NEGATIVE  NEG mg/dL    Ketone NEGATIVE  NEG mg/dL    Bilirubin NEGATIVE  NEG      Blood LARGE (A) NEG      Urobilinogen 0.2 0.2 - 1.0 EU/dL    Nitrites NEGATIVE  NEG      Leukocyte Esterase LARGE (A) NEG     URINE MICROSCOPIC ONLY    Collection Time: 03/11/17  4:30 AM   Result Value Ref Range    WBC TOO NUMEROUS TO COUNT 0 - 4 /hpf    RBC 21 to 35 0 - 5 /hpf    Epithelial cells NEGATIVE  0 - 5 /lpf    Bacteria 2+ (A) NEG /hpf   GLUCOSE, POC    Collection Time: 03/11/17  8:00 AM   Result Value Ref Range    Glucose (POC) 141 (H) 70 - 110 mg/dL   GLUCOSE, POC    Collection Time: 03/11/17  8:34 AM   Result Value Ref Range    Glucose (POC) 138 (H) 70 - 110 mg/dL   MAGNESIUM    Collection Time: 03/11/17 10:45 AM   Result Value Ref Range    Magnesium 1.5 (L) 1.8 - 2.4 mg/dL   CBC WITH AUTOMATED DIFF    Collection Time: 03/11/17 10:45 AM   Result Value Ref Range    WBC 32.0 (H) 4.6 - 13.2 K/uL    RBC 3.13 (L) 4.20 - 5.30 M/uL    HGB 8.5 (L) 12.0 - 16.0 g/dL    HCT 26.8 (L) 35.0 - 45.0 %    MCV 85.6 74.0 - 97.0 FL    MCH 27.2 24.0 - 34.0 PG    MCHC 31.7 31.0 - 37.0 g/dL    RDW 16.5 (H) 11.6 - 14.5 %    PLATELET 83 (L) 961 - 420 K/uL    MPV 11.4 9.2 - 11.8 FL    NEUTROPHILS PENDING %    LYMPHOCYTES PENDING %    MONOCYTES PENDING %    EOSINOPHILS PENDING %    BASOPHILS PENDING %    ABS. NEUTROPHILS PENDING K/UL    ABS. LYMPHOCYTES PENDING K/UL    ABS. MONOCYTES PENDING K/UL    ABS. EOSINOPHILS PENDING K/UL    ABS.  BASOPHILS PENDING K/UL    DF PENDING    METABOLIC PANEL, BASIC    Collection Time: 03/11/17 10:45 AM   Result Value Ref Range    Sodium 140 136 - 145 mmol/L    Potassium 4.0 3.5 - 5.5 mmol/L    Chloride 110 (H) 100 - 108 mmol/L    CO2 17 (L) 21 - 32 mmol/L    Anion gap 13 3.0 - 18 mmol/L    Glucose 148 (H) 74 - 99 mg/dL    BUN 51 (H) 7.0 - 18 MG/DL    Creatinine 1.65 (H) 0.6 - 1.3 MG/DL    BUN/Creatinine ratio 31 (H) 12 - 20      GFR est AA 39 (L) >60 ml/min/1.73m2    GFR est non-AA 32 (L) >60 ml/min/1.73m2    Calcium 7.5 (L) 8.5 - 10.1 MG/DL   GLUCOSE, POC    Collection Time: 03/11/17 11:39 AM   Result Value Ref Range    Glucose (POC) 147 (H) 70 - 110 mg/dL           Lab Results   Component Value Date/Time    Glucose 148 03/11/2017 10:45 AM    Glucose 140 03/10/2017 06:00 AM    Glucose 88 03/09/2017 04:50 AM    Glucose 86 03/08/2017 05:30 AM    Glucose 100 03/07/2017 03:30 AM        Assessment/Plan:     Patient Active Problem List   Diagnosis Code    Anemia D64.9    Anxiety F41.9    Nausea and vomiting R11.2    Morbid obesity ([facility]) E66.01    Chronic pain syndrome G89.4    History of ovarian cancer Z85.43    History of gastric bypass Z98.890    CAD (coronary artery disease) I25.10    History of Clostridium difficile Z87.19    History of endometrial cancer Z85.42    History of GI bleed Z87.19    Lymphedema I89.0    Hypokalemia E87.6    DEV (acute kidney injury) ([facility]) N17.9    Pulmonary nodules R91.8    A-fib ([facility]) I48.91    Aortic stenosis I35.0    GI bleed K92.2    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 Urology consulted 2/2 to UTI. No plans for stent change until urine Cx clear   Blood Cx 3/3- grew out VRE- ID consulted. Repeat BC 3/6, 3/8- NGTD. Continue Daptomycin per ID    Sepsis 2/2 above  Acute blood loss anemia: s/p 2 units PRBC. Hgb stable. Continue to monitor   RLE DVT- IVC filter placed 3/9/17 by Vascular   Likely Endocarditis- Cardiology following- pt refused ESSIE. ID following- continue IV antibiotics   DM: well controlled. Continue SSI  DEV on CKD- Cr continues to trend down. Monitor Cr, I/O. Decrease IVF  Severe Thrombocytopenia- continue to monitor    Hx Ovarian and Endometrial cancer with possible metastatic disease- resume home pain medication regimen, add Ativan     GI prophylaxis: Protonix  Hypomagnesemia: replete.  Check in AM   Hypokalemia: resolved          BYRON Hayes-BC  [facility]  Hospitalist Division  Pager:  [phone]  Office:  [phone]

## 2017-03-11 NOTE — PROGRESS NOTES
Responded to rapid response--pt SOB--  --when i arrived pt sats 96-99% but pt open mouth breathing, tachypneic, & tachycardic  --pt had just been given Neb--pt coached on slowing down RR, being given anxiety medicine

## 2017-03-11 NOTE — PROGRESS NOTES
1700 Patient having difficulty breathing. Given HHN at [phone] without significant relief. Initial Oxygen sat was 80%. RRT called. See RRT record. Noted significant mottling to lower and upper extremities. Dr Claudia Vernon aware. 1733 Patient sleeping RR 30's, 's O2 sat 100% if she keeps her NRB. 1800 Patient restless and tachypnic, Oxygen Saturation in mid 90's. 3259 [facility] regarding patient status (seen Dr Claudia Vernon in ICU). No orders received. 1850 Patient restless and more tachypnic, BP 92/62, Sats 96%. 1900 Informed incoming charge nurse of patient's condition. 1930 RRT called. Report given to Natalie Weeks in [facility] Patient transferred to ICU.   2000 Tried to call robert Reyez at [phone] - no longer live there. Tried the [phone], but no longer in service. Charge Nurse aware.

## 2017-03-11 NOTE — PROGRESS NOTES
2000: bedside shift report received from Colby Singh RN; AOX4, on 2l 02 via nc, resting in bed, with regular, nonlabored breathing; shift assessment done; NSR on tele; will monitor pt    2107: called for pain med; c/o pain over abdominal area and back; Dilaudid 1 mg given IV; see flowsheet    2200: resting with eyes closed; no apparent distress    0044: Dilaudid 1 mg given IV for pain- see flowsheet; assisted with further needs    0130: sleeping; no apparent distress noted    0440: awake, pain med given as requested-see flowsheet    0530: sleeping; NSR Stach on tele    0600: awake, byrne care, skin care given; refused bath, became more upset when asked to be turned with the help of other staff    0630:  IVF infusing well, 100% 02 sat    0710: Bedside and Verbal shift change report given to Sirena Jones RN and Tommy Thorne RN (oncoming nurse) by Armando Brar RN (offgoing nurse). Report included the following information SBAR, Kardex, Intake/Output, Recent Results and Cardiac Rhythm NSR /Sinus Tachycardia.

## 2017-03-11 NOTE — ROUTINE PROCESS
6243 Patient seen during report, complaining of Shortness of breath, Oxygen saturation at 100 % on 4L/NC. Verbalizing also not to leave her alone. Advised will keep the door on her room open and will be back to check after an hour. 0800 Pain medicine given as requested. Assessment done. 0900 Referred to Dr Donis Leiva regarding redness on pannus - new order received. ADAN Bustamante NP in the room to see patient. 1200 Pain med given for c/o pain. Noted elevated HR and patient tachypnic on 4 L/NC. Paged ADAN Bustamante and made aware. 1320 HHN given. Patient very anxious. Patient advised on medications to be given once avail from pharmacy.

## 2017-03-12 NOTE — PROGRESS NOTES
0700: assumed care of patient. Bedside report received from [facility]. All drip verified with nurse. Patient remains sedated with assistance from ventilator. Patient also tachypneic.  temp of 100.9F via temp byrne, will administer tylenol per orders. Will continue to monitor. 0845:Recieved orders from Dr. Gil Ruiz to change protonix to IV as oral dose cannot be crushed and to discontinue MS Contin as it can not be crushed and start fentanyl patch. Will continue to monitor. 2204: PRN tylenol given for temp. 1511: PRN tylenol given, ice packs applied to groin, bilateral axilla and behind the neck. Patient also continues to be tachypneic, RT at bedside along with NP. Received orders to start fentanyl drip and discontinue patch. 1900: Bedside shift change report given to Artis Bonds RN (oncoming nurse) by Chava Quiñonez RN (offgoing nurse). Report included the following information SBAR, Kardex, Intake/Output, MAR, Recent Results, Cardiac Rhythm sinus tach and Alarm Parameters .

## 2017-03-12 NOTE — PROGRESS NOTES
Progress Note      Patient: Lisa Nam               Sex: female          DOA: 3/3/2017       YOB: 1960      Age:  49 y.o.        LOS:  LOS: 8 days               Subjective:   Metastatic ovarian cancer the patient has reversed her dnr status ,. She had an episode of hy[poxia and anxiety and a rapid response was called . Pt gveni a dose of 0.5 mg ativan and this resulted in making the pt comfortable. Not much to offer at this point except keeping the pt comfortable . Objective:      Visit Vitals    BP 92/62 (BP 1 Location: Right arm, BP Patient Position: Head of bed elevated (Comment degrees))    Pulse (!) 102    Temp 97.5 °F (36.4 °C)    Resp (!) 41    Ht 5' 4\" (1.626 m)    Wt 119.8 kg (264 lb 1.8 oz)    SpO2 96%    BMI 45.33 kg/m2       Physical Exam:  Ill appearing female . Heart tachycardia   Lungs decreased breath sounds  Abdomen soft mildly tender to palpation  Neuro able to interact with nurses .        Lab/Data Reviewed:  CMP:   Lab Results   Component Value Date/Time     03/11/2017 10:45 AM    K 4.0 03/11/2017 10:45 AM     (H) 03/11/2017 10:45 AM    CO2 17 (L) 03/11/2017 10:45 AM    AGAP 13 03/11/2017 10:45 AM     (H) 03/11/2017 10:45 AM    BUN 51 (H) 03/11/2017 10:45 AM    CREA 1.65 (H) 03/11/2017 10:45 AM    GFRAA 39 (L) 03/11/2017 10:45 AM    GFRNA 32 (L) 03/11/2017 10:45 AM    CA 7.5 (L) 03/11/2017 10:45 AM    MG 1.5 (L) 03/11/2017 10:45 AM     CBC:   Lab Results   Component Value Date/Time    WBC 32.0 (H) 03/11/2017 10:45 AM    HGB 8.5 (L) 03/11/2017 10:45 AM    HCT 26.8 (L) 03/11/2017 10:45 AM    PLT 83 (L) 03/11/2017 10:45 AM           Assessment/Plan     Principal Problem:    Severe sepsis with septic shock (CODE) ([facility] (3/3/2017)    Active Problems:    History of ovarian cancer (3/18/2015)      Morbid obesity ([facility] (10/2/2014)      CAD (coronary artery disease) (12/29/2015)      DEV (acute kidney injury) ([facility] (12/29/2015)      A-fib ([facility] (12/29/2015)      UTI (urinary tract infection) (3/3/2017)      Severe thrombocytopenia ([facility] (3/4/2017)        Plan: pt will remain a full code at the pt's wish .  She will be intubated as she is expected to have respiratory failure at some point

## 2017-03-12 NOTE — ANESTHESIA PROCEDURE NOTES
Emergent Intubation  Performed by: Christian Prescott  Authorized by: Christian Prescott     Emergent Intubation:   Location:  ICU  Date/Time:  3/11/2017 7:59 PM  Indications:  Respiratory failure (unresponsive, failed extubation)  Spontaneous Ventilation: present (panting, tachypneic)    Sedation Level:  Unresponsive  Preoxygenated:  Yes      Airway Documentation:   Airway:  ETT - Cuffed (MARITZA)  Technique:  Direct laryngoscopy (+ stylet + cricoid pressure)  Insertion Site:  Oral  Blade Type:  Juanjose  Blade Size:  3  ETT size (mm):  7.5  ETT Line Talon:  Teeth  ETT Insertion depth (cm):  23  DL Glottic view:  3  Placement verified by: auscultation, EtCO2 and BBS    Placement verified by comment:  CO2 detector  Attempts:  1    Difficult airway: No    Given 100 mg succinylcholine IV

## 2017-03-12 NOTE — PROGRESS NOTES
[facility]. Pulmonary, Critical Care, and Sleep Medicine    Name: Dorna Res MRN: [id number]   : 1960 Hospital: [address] [place] [address]   Date: 3/12/2017  Admission Date: 3/3/2017       3/12: pt transferred from floor overnight with acute respiratory failure and hypotension, intubated emergently, started on levo, forrest      Chart and notes reviewed. Data reviewed. I have evaluated all findings. [x]I have reviewed the flowsheet and previous days notes. ROS:generalized pain    Events and notes from last 24 hours reviewed. Care plan discussed on multidisciplinary rounds. Patient Active Problem List   Diagnosis Code    Anemia D64.9    Anxiety F41.9    Nausea and vomiting R11.2    Morbid obesity ([facility] E66.01    Chronic pain syndrome G89.4    History of ovarian cancer Z85.43    History of gastric bypass Z98.890    CAD (coronary artery disease) I25.10    History of Clostridium difficile Z87.19    History of endometrial cancer Z85.42    History of GI bleed Z87.19    Lymphedema I89.0    Hypokalemia E87.6    DEV (acute kidney injury) ([facility] N17.9    Pulmonary nodules R91.8    A-fib ([facility] I48.91    Aortic stenosis I35.0    GI bleed K92.2    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) R65.21    Severe thrombocytopenia ([facility]) D69.6       Vital Signs:  Visit Vitals    BP (!) 83/50    Pulse (!) 103    Temp (!) 37.7 °F (3.2 °C)    Resp (!) 34    Ht 5' 4\" (1.626 m)    Wt 116 kg (255 lb 11.7 oz)    SpO2 100%    BMI 43.9 kg/m2       O2 Device: Endotracheal tube   O2 Flow Rate (L/min): 3 l/min   Temp (24hrs), Av.3 °F (12.9 °C), Min:37.7 °F (3.2 °C), Max:100.6 °F (38.1 °C)       Intake/Output:   Last shift:         Last 3 shifts: 03/10 1901 -  0700  In: 6840.2 [P.O.:350;  I.V.:6490.2]  Out: 2915 [Urine:2715]    Intake/Output Summary (Last 24 hours) at 03/12/17 1119  Last data filed at 03/12/17 0700   Gross per 24 hour   Intake          4990.16 ml   Output             1666 ml   Net          3324.16 ml      Ventilator Settings:  Ventilator  Mode: Pressure control, Assist control  Respiratory Rate  Back-Up Rate: 12  Insp Time (sec): 0.8 sec  I:E Ratio: 1;3  Ventilator Volumes  Vt Set (ml): 450 ml  Vt Exhaled (Machine Breath) (ml): 396 ml  Vt Spont (ml): 394 ml  Ve Observed (l/min): 14 l/min  Ventilator Pressures  PC Set: 20  PIP Observed (cm H2O): 18 cm H2O  Plateau Pressure (cm H2O): 19 cm H2O  MAP (cm H2O): 15  PEEP/VENT (cm H2O): 5 cm H20    Current Facility-Administered Medications   Medication Dose Route Frequency    insulin lispro (HUMALOG) injection   SubCUTAneous Q6H    fentaNYL (DURAGESIC) 25 mcg/hr patch 1 Patch  1 Patch TransDERmal Q72H    pantoprazole (PROTONIX) 40 mg in sodium chloride 0.9 % 10 mL injection  40 mg IntraVENous DAILY    aztreonam (AZACTAM) 1 g in 0.9% sodium chloride (MBP/ADV) 100 mL MBP  1 g IntraVENous Q8H    nystatin (MYCOSTATIN) 100,000 unit/gram powder   Topical BID    NOREPINephrine (LEVOPHED) 8,000 mcg in dextrose 5% 250 mL infusion  0-20 mcg/min IntraVENous TITRATE    PHENYLephrine 30 mg in 0.9% sodium chloride 250 mL (EDILMA-SYNEPHRINE) infusion  2-200 mcg/min IntraVENous TITRATE    vasopressin (VASOSTRICT) 100 Units in 0.9% sodium chloride 100 mL infusion  0.01-0.04 Units/min IntraVENous TITRATE    propofol (DIPRIVAN) infusion  0-50 mcg/kg/min IntraVENous TITRATE    0.9% sodium chloride infusion  150 mL/hr IntraVENous CONTINUOUS    DAPTOmycin (CUBICIN) 719 mg in 0.9% sodium chloride 50 mL IVPB RF formulation  6 mg/kg IntraVENous Q24H    sodium bicarbonate tablet 650 mg  650 mg Oral TID       Telemetry:     Physical Exam:   General: Sedated   HEENT: ETT in place   Neck: No abnormally enlarged lymph nodes.    Chest: normal   Lungs: coarse bilaterally    Heart: Regular rate and rhythm   Abdomen: non distended, bowel sounds normoactive, tympanic, abdomen is soft without significant tenderness, masses, organomegaly or guarding   Extremity: toes, fingers with color change   Neuro: Sedated   Skin: Scattered ecchymosis  DATA:  MAR reviewed and pertinent medications noted or modified as needed    Labs:  Recent Labs      03/12/17   0415  03/11/17   1940 03/11/17   1045   WBC  41.6*  47.1*  32.0*   HGB  8.3*  8.7*  8.5*   HCT  26.5*  27.8*  26.8*   PLT  94*  105*  83*     Recent Labs      03/12/17   0415  03/11/17 1940 03/11/17   1045   NA  140  140  140   K  4.1  4.4  4.0   CL  111*  110*  110*   CO2  13*  17*  17*   GLU  124*  148*  148*   BUN  50*  51*  51*   CREA  1.91*  1.97*  1.65*   CA  7.3*  7.3*  7.5*   MG  1.8  1.7*  1.5*   PHOS  3.9  5.0*   --      No results for input(s): PH, PCO2, PO2, HCO3, FIO2 in the last 72 hours. Recent Labs      03/12/17   1023  03/11/17   2122   FIO2I  0.1  1.0   HCO3I  11.4*  14.8*   PCO2I  24.8*  40.6   PHI  7.272*  7.168*   PO2I  174*  542*     Imaging:  [x]                           I have personally reviewed the patients radiographs and reports    High complexity decision making was performed during this consultation and evaluation. [x]       Pt is at high risk for further organ failure and dysfunction. Critical care time spent  minutes with patient exclusive of procedures.     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    Acute blood loss anemia D62    Bladder mass N32.89    Flank pain, chronic R10.9, G89.29    UTI (urinary tract infection) N39.0    Severe sepsis with septic shock (CODE) ([facility] R65.21    Severe thrombocytopenia ([facility]) D69.6 ·   Subacute DVTPVLs just done at bedside) with severely low platelets: 9k last count  ·       PLAN:   Neuro  Sedated  Daily sedation holidays starting in AM    Cardiovascular  Shock, possibly septic given wbc 41 and fever  2D echo with EF 55 - 20%, grade 1 diastolic, PA 45 - 50  Afib rate controlled  Vascular surgery team following    Respiratory  Continue mechanical ventilation  RML pulmonary nodule, possibly mets    GI  Continue PPI  Ok to advance diet    Renal  Acute renal failure with slow daily improvement  Electrolytes stable, replace as needed    ID  Persistent and worsening leukocytosis, fevers  Bcx 3/3 with alpha strep (2 out of 2 bottles), repeat bcx 3/6 negative   Ucx 3/3 with VRE  Bcx repeated with results pending    Heme  Ovarian cancer  RLE DVT likely 2/2 venous outflow obstruction due to pelvic mass  Thrombocytopenia persistent but improved  Hb stable    Endo  Continue ssi    Dispo  Will need to discuss goals of care with family when available         Critical care time 35 minutes    Julito Soto MD

## 2017-03-12 NOTE — PROGRESS NOTES
Problem: Ventilator Management  Goal: *Adequate oxygenation and ventilation  Outcome: Progressing Towards Goal  1030 patient on vent ac pc 20 rate of 12  vt are 405 spo2 qoo% abg results this am 7.27 24.8 174 -15 11.4 12 99%  Suctioned small thin tan secretions et tube secure will continue to monitor     Patient rr is 40 bbs are clear suction scant secretions RN aware

## 2017-03-12 NOTE — PROGRESS NOTES
INFECTIOUS DISEASE FOLLOW UP NOTE :-     Admit Date: 3/3/2017    Current  Prior    Daptomycin 3/8-4 aztreonam/anidulafungin 3/12-0 linezolid 3/4 - 3, vanc/zosyn/levoflox 3/3 -0     ASSESSMENT: -> RECS     Sepsis POA  - fever 102 , leukocytosis 28K, tachycardia, hypotension  - source VRE UTI and BSI or DVT  -allergic allergic reaction and IE also in DDX  - WBC remains high 42k today  -NEW resp failure NTB 3/11 ->d/w PCCM today, agree with addition of aztreonam pending more information  ->also add antifungal-has been on bsabx and has groin intertrigo  ->prognosis appears very guarded now intubated   VRE BSI with ?aortic endocarditis  - blcx 3/3 2/2 VRE positive   - repeat 3/6 ntd   - TTE aortic stenosis with possible aortic vegetation [ chronic mixed calcified density ]  - source likely UTI  - Cont Daptomycin,monitor weekly CPK  - pt refused ESSIE and removal of mediport  - 3/8 blcx remain neg   VRE UTI, complicated   - UA with large LE , wbc tntc, jamar 4+  - ucx >100k VRE  - CT with no hydronephrosis but has b/l ureteral stents  - appreciate urology consult and eventual plan for stent exchange/removal  - as above     ADR - Rash over abdomen/chest   - secondary to linezolid which has been listed from before as allergy  - rash fading off linezolid    Lymphedema with R medial thigh blister- not currently infected appearing  -massive pannus with intertrigo  ->add systemic antifungal to topical   RLE subacute DVT  - per primary team   Rt pelvic mass ? ovarian ca / endometrial ca with mets  - palliative care involved , was hospice at home and now revoked    DEV due to sepsis - slowly cr coming down, cr 1.9  today     Thrombocytopenia -persistent 94k today   - Plt 72   Blood loss anemia        MICROBIOLOGY:   3/3  Blcx x 2 - 2/2 VRE S gent, linezolid , daptomycin quinupristin         Ucx > 100K VRE  3/4 Flu Ag test neg   3/6 Blcx x 2 ng  3/8  Blcx x 2 - ntd   3/11 uctx ngtd  3/12 bctx x 2 ngtd    LINES AND CATHETERS:   Rt chest mediport     SUBJECTIVE :     Interval notes reviewed. D/w PCCM earlier today, developed resp failure and was intubated transferred to icu overnight, concern for persistent sepsis. Empiric abx complicated by mult drug allergies, appears mundo daptomycin, agree with aztreonam and will add antifungal.  D/w nursing poor iv access, no family at bedside.     MEDICATIONS :     Current Facility-Administered Medications   Medication Dose Route Frequency    insulin lispro (HUMALOG) injection   SubCUTAneous Q6H    pantoprazole (PROTONIX) 40 mg in sodium chloride 0.9 % 10 mL injection  40 mg IntraVENous DAILY    aztreonam (AZACTAM) 1 g in 0.9% sodium chloride (MBP/ADV) 100 mL MBP  1 g IntraVENous Q8H    chlorhexidine (PERIDEX) 0.12 % mouthwash 15 mL  15 mL Oral BID    sodium chloride (NS) flush 5-10 mL  5-10 mL IntraVENous Q8H    sodium chloride (NS) flush 5-10 mL  5-10 mL IntraVENous PRN    fentaNYL (PF)  mcg/30 ml   IntraVENous TITRATE    nystatin (MYCOSTATIN) 100,000 unit/gram powder   Topical BID    albuterol-ipratropium (DUO-NEB) 2.5 MG-0.5 MG/3 ML  3 mL Nebulization Q4H PRN    NOREPINephrine (LEVOPHED) 8,000 mcg in dextrose 5% 250 mL infusion  0-20 mcg/min IntraVENous TITRATE    ELECTROLYTE REPLACEMENT PROTOCOL  1 Each Other PRN    ELECTROLYTE REPLACEMENT PROTOCOL  1 Each Other PRN    ELECTROLYTE REPLACEMENT PROTOCOL  1 Each Other PRN    ELECTROLYTE REPLACEMENT PROTOCOL  1 Each Other PRN    PHENYLephrine 30 mg in 0.9% sodium chloride 250 mL (EDILMA-SYNEPHRINE) infusion  2-200 mcg/min IntraVENous TITRATE    vasopressin (VASOSTRICT) 100 Units in 0.9% sodium chloride 100 mL infusion  0.01-0.04 Units/min IntraVENous TITRATE    propofol (DIPRIVAN) infusion  0-50 mcg/kg/min IntraVENous TITRATE    0.9% sodium chloride infusion  150 mL/hr IntraVENous CONTINUOUS    DAPTOmycin (CUBICIN) 719 mg in 0.9% sodium chloride 50 mL IVPB RF formulation  6 mg/kg IntraVENous Q24H    sodium bicarbonate tablet 650 mg  650 mg Oral TID    bisacodyl (DULCOLAX) suppository 10 mg  10 mg Rectal DAILY PRN    glucose chewable tablet 16 g  16 g Oral PRN    glucagon (GLUCAGEN) injection 1 mg  1 mg IntraMUSCular PRN    dextrose (D50W) injection syrg 12.5-25 g  25-50 mL IntraVENous PRN    acetaminophen (TYLENOL) tablet 650 mg  650 mg Oral Q4H PRN    ondansetron (ZOFRAN) injection 4 mg  4 mg IntraVENous Q4H PRN    sodium chloride (NS) flush 5-10 mL  5-10 mL IntraVENous PRN       OBJECTIVE :     Visit Vitals    /73    Pulse (!) 112    Temp (!) 101.5 °F (38.6 °C)    Resp (!) 40    Ht 5' 4\" (1.626 m)    Wt 116 kg (255 lb 11.7 oz)    SpO2 100%    BMI 43.9 kg/m2       Temp (24hrs), Av.5 °F (14.2 °C), Min:37.7 °F (3.2 °C), Max:101.5 °F (38.6 °C)    GEN - unresponsive ill appearing WF appears older than stated age in ICU on vent  HEENT - conj pale no petech seen, NGT in AMADOU in place  NECK- R mediport accessed no LAD   RESP- ctab,   CVS- tachy 3/6 systolic murmur   ABD-soft , large area of ecchymosis at lower abdominal fold fragile skin, bs present   EXT- (+) edema   SKIN -maculopapular erythematous rash over abdomen ,intertrigo in groin folds, blister R med thigh covered not infected appearing   NEURO - unresponsive.      Labs: Results:   Chemistry Recent Labs      175  17   1045   GLU  124*  148*  148*   NA  140  140  140   K  4.1  4.4  4.0   CL  111*  110*  110*   CO2  13*  17*  17*   BUN  50*  51*  51*   CREA  1.91*  1.97*  1.65*   CA  7.3*  7.3*  7.5*   AGAP  16  13  13   BUCR  26*  26*  31*      CBC w/Diff Recent Labs      17   0415  17  17   1045  03/10/17   0600   WBC  41.6*  47.1*  32.0*  29.3*   RBC  3.00*  3.10*  3.13*  3.18*   HGB  8.3*  8.7*  8.5*  8.8*   HCT  26.5*  27.8*  26.8*  27.4*   PLT  94*  105*  83*  72*   GRANS  91*   --   87*  94*   LYMPH 5*   --   6*  5*   EOS  0   --   1  0        cxr 3/12 IMPRESSION:     1. No acute pulmonary findings     2. Nasogastric tube within the stomach and other medical devices as above    CT abdo/pelvis 3/3 - IMPRESSION:  1. New cystic mass right adnexal region. Recurrent ovarian carcinoma cannot be  excluded. 2. New right middle lobe pulmonary nodule. Cannot rule out metastatic disease. 3. Bilateral ureteral stents with resolved hydronephrosis. 4. Stable or minimally increased left periaortic retroperitoneal adenopathy. Echo 3/7 - EF 55%, Aortic valve: The valve was trileaflet. Leaflets exhibited moderate  calcification and moderately reduced cuspal separation. There was moderate  stenosis. There was mild regurgitation. There was small-medium size fix  partially calcified echodensity noted attach to right coronary cusp. Could  represent vegetation. Valve mean gradient was 34 mmHg. Aortic valve area  was 1 cm-sq by the continuity equation. ua Results for Omkar Mckinley (MRN [id number]) as of 3/12/2017 17:51   Ref. Range 3/11/2017 04:30   Color Latest Units:   ORANGE   Appearance Latest Units:   TURBID   Specific gravity Latest Ref Range: 1.005 - 1.030   1.019   pH (UA) Latest Ref Range: 5.0 - 8.0   5.5   Protein Latest Ref Range: NEG mg/dL 100 (A)   Glucose Latest Ref Range: NEG mg/dL NEGATIVE   Ketone Latest Ref Range: NEG mg/dL NEGATIVE   Blood Latest Ref Range: NEG   LARGE (A)   Bilirubin Latest Ref Range: NEG   NEGATIVE   Urobilinogen Latest Ref Range: 0.2 - 1.0 EU/dL 0.2   Nitrites Latest Ref Range: NEG   NEGATIVE   Leukocyte Esterase Latest Ref Range: NEG   LARGE (A)   Epithelial cells Latest Ref Range: 0 - 5 /lpf NEGATIVE   WBC Latest Ref Range: 0 - 4 /hpf TOO NUMEROUS TO C. ..    RBC Latest Ref Range: 0 - 5 /hpf 21 to 35   Bacteria Latest Ref Range: NEG /hpf 2+ (A)       Rajni Maya MD  March 12, 2017  [facility] AT THE [facility]  [phone]

## 2017-03-12 NOTE — ROUTINE PROCESS
1710:  Responded to RRT in patient room. NS, RT, primary RN, charge RN, MDx2 at bedside. Pt c/o dyspnea. O2 sats in 90's. Pt states she is having an anxiety attack. Attempted to place face mask, patient refused stating claustrophobia. Mental status fluctuates, occasional hallucinations. Pt reassured immediately when Dr. Erica Warren at bedside. Given 0.5mg IV ativan for anxiety. Pt calmed and resting, O2 sats 95%. Will continue to monitor.

## 2017-03-12 NOTE — PROGRESS NOTES
1930-called by charge RN to assess patient who \"does not look good, with a MEWS score\", pt assessed, unresponsive to verbal, tactile or noxious stimuli, agonal respirations noted, BP 77 systolic and O2 saturations not consistently recorded, extremities are cyanotic and mottled, 100% NRB mask on, asked staff to assist with preparing to transfer to ICU, per primary RN patient received ativan several hours earlier in shift for shortness of breath and anxiety, romazicon IV given without response  1933-RRT called for assistance from supervisor and presence of Dr Rodrick Guevara  1940-labs drawn, ABG unable to be obtained by RT, pt transferred to ICU by myself, charge RN Paulina Diez, and nursing supervisor, cardiac monitor placed for transport  1947-arrived to ICU, assisted transfer to ICU bed, monitors placed, pt remains unresponsive, respirations are increasingly agonal in character, Dr Rodrick Guevara at bedside,  requests pt to be intubated  1950-anesthesia paged via   1956-anesthesia at bedside  1959-5 cc of Succinylcholine IV given per [facility] order, patient intubated, # 7.5 ETT secured at 23cm at lip  2033-pt vomited 150cc of green emesis, pt is agitated, NGT placed to left nare after unsuccessful attempt to place orally, NGT placed to low continuous suction, bilateral soft wrist restraints placed for patients safety  2034-Chest x ray complete  2045-ABG unable to be obtained by RT  2118-ABG drawn  2229-message left at number on chart for patients daughter to contact tosha

## 2017-03-12 NOTE — ROUTINE PROCESS
0700: Assumed care of patient with SHELLY Muñoz. Report received from [facility]. Drips verified. Patient remains on levophed and neosynephrine to keep MAP >65. Sedated on propofol, tachypnic on the ventilator. Will continue to monitor. 8687: Patients temperature 38.3 C. Tylenol administered. 1511: Tylenol given. 1543: Ice packs placed. Propofol increased. Patient is tachypnic in the 46s. RT called to the bedside.

## 2017-03-12 NOTE — PROGRESS NOTES
Rapid response called at [address]. Patient having difficulty breathing (shallow breathing)  and is non responsive, hypotensive, tachycardic. Patient transferred down to ICU. Intubated, placed on levophed and fluid bolus. Stat labs and chest xray. Post intubation: Patient blood pressure improved, oxygen saturation at 100% and she is agitated. Propofol added. Intensivist notified. follow up lab results.     Davey Kayser, MD

## 2017-03-12 NOTE — PROGRESS NOTES
Patient in bed on back with head elevated - BBS equal and ess clear - ETT secured at 23 at the lip and moved to the center of the mouth - patient suctioned and MARITZA tube cleared - MVB at [address] - patient suctioned and MARITZA tube cleared - ETT moved to the left side of the mouth    0631 - patient RR increased into the low 50s, sats dropped - vent changes made to increase oxygenation and give patient adequate vT and low pIP

## 2017-03-12 NOTE — PROGRESS NOTES
20:30  Pt transferred from  unresponsive x for occasional blinking eyes and guppy breathing. Placed on moniotor Dr. Jennifer Larson at bedside. Pt obese difficult to move withou multiple assists. Skin mottled to all ext's. 20:45  Pt tubed after callin anesthesia overhead-no difficulty # 7.5. Multiple pressors started due to b/p 50/25 range. Pt obese, multiple bruises reddened /small yeasty appearance to anterior skin folds. 21:15-23:00  Awakening, B/P responding, attemtped to notify pts daughter, message left. Soft wrist restrains placed due to pt awakening and reaching for tubes and lines. Nasal NGT placed to left side by charge RN. Multiple sticks attempted for second IV site without successpt appears to have severe stenosis. ST rate initially 120's -140 now 100-110. CXR completed and ETT in correct place verified. 01:00  Propofol on board for pt comfort, safety of tubes and lines. Appropriate at 25 mics- will continue to monitor. Continuing to wean drips as tolerated by pt. Secretions small, clear to yellow. Drainage from NGT greenish brown. 04:00  Pt incont of large brown soft stool. Bath and linen changed, multiple skin excoriation, pressure sores present to abd skin folds-see skin man. Mepilex placed to skin fold and sacral mepilex. Replaced mepilex to left upper inner thigh. Skin consult in place. 05:00  Secretions thick, large amounts yellowish,clear mostly oral suctioning. 06:00  RR 35 range, desats at times to 88-90 % pt fio2 increased to 100 %-see adjustments by RT.

## 2017-03-12 NOTE — PROGRESS NOTES
[facility]  Hospitalist Division        Inpatient Daily Progress Note    Daily progress Note    Patient: Clem Pulliam MRN: [id number]  CSN: [id number]    YOB: 1960  Age: 49 y.o. Sex: female    DOA: 3/3/2017 LOS:  LOS: 9 days                    Chief Complaint:  AMS       Subjective: Intubated     Objective:      Visit Vitals    /65    Pulse (!) 103    Temp (!) 101.5 °F (38.6 °C)    Resp (!) 36    Ht 5' 4\" (1.626 m)    Wt 116 kg (255 lb 11.7 oz)    SpO2 100%    BMI 43.9 kg/m2       Physical Exam:  General appearance: intubated   Head: Normocephalic, without obvious abnormality, atraumatic  Lungs: diminished bases bilaterally, no wheezes or rhonchi   Heart: regular rate and rhythm, S1, S2 normal, no murmur, click, rub or gallop  Abdomen: soft, tender to palpation. Bowel sounds normoactive   Extremities: bilateral fingertip cyanosis, 1+ BLE edema   Skin: scattered ecchymosis to abdomen, BLE          Intake and Output:  Current Shift:  03/12 0701 - 03/12 1900  In: 1377.7 [I.V.:1357.7]  Out: 280 [Urine:280]  Last three shifts:  03/10 1901 - 03/12 0700  In: 6840.2 [P.O.:350;  I.V.:6490.2]  Out: 2916 [Urine:2715]    Recent Results (from the past 24 hour(s))   GLUCOSE, POC    Collection Time: 03/11/17  4:39 PM   Result Value Ref Range    Glucose (POC) 130 (H) 70 - 222 mg/dL   METABOLIC PANEL, BASIC    Collection Time: 03/11/17  7:40 PM   Result Value Ref Range    Sodium 140 136 - 145 mmol/L    Potassium 4.4 3.5 - 5.5 mmol/L    Chloride 110 (H) 100 - 108 mmol/L    CO2 17 (L) 21 - 32 mmol/L    Anion gap 13 3.0 - 18 mmol/L    Glucose 148 (H) 74 - 99 mg/dL    BUN 51 (H) 7.0 - 18 MG/DL    Creatinine 1.97 (H) 0.6 - 1.3 MG/DL    BUN/Creatinine ratio 26 (H) 12 - 20      GFR est AA 32 (L) >60 ml/min/1.73m2    GFR est non-AA 26 (L) >60 ml/min/1.73m2    Calcium 7.3 (L) 8.5 - 10.1 MG/DL   MAGNESIUM    Collection Time: 03/11/17  7:40 PM   Result Value Ref Range Magnesium 1.7 (L) 1.8 - 2.4 mg/dL   PHOSPHORUS    Collection Time: 03/11/17  7:40 PM   Result Value Ref Range    Phosphorus 5.0 (H) 2.5 - 4.9 MG/DL   CALCIUM, IONIZED    Collection Time: 03/11/17  7:40 PM   Result Value Ref Range    Ionized Calcium 1.16 1.12 - 1.32 MMOL/L   CARDIAC PANEL,(CK, CKMB & TROPONIN)    Collection Time: 03/11/17  7:40 PM   Result Value Ref Range     (H) 26 - 192 U/L    CK - MB 37.2 (H) <3.6 ng/ml    CK-MB Index 7.9 (H) 0.0 - 4.0 %    Troponin-I, Qt. 0.03 0.0 - 0.045 NG/ML   LACTIC ACID, PLASMA    Collection Time: 03/11/17  7:40 PM   Result Value Ref Range    Lactic acid 1.4 0.4 - 2.0 MMOL/L   CBC W/O DIFF    Collection Time: 03/11/17  7:40 PM   Result Value Ref Range    WBC 47.1 (H) 4.6 - 13.2 K/uL    RBC 3.10 (L) 4.20 - 5.30 M/uL    HGB 8.7 (L) 12.0 - 16.0 g/dL    HCT 27.8 (L) 35.0 - 45.0 %    MCV 89.7 74.0 - 97.0 FL    MCH 28.1 24.0 - 34.0 PG    MCHC 31.3 31.0 - 37.0 g/dL    RDW 16.8 (H) 11.6 - 14.5 %    PLATELET 729 (L) 458 - 420 K/uL    MPV 11.0 9.2 - 11.8 FL   GLUCOSE, POC    Collection Time: 03/11/17  7:43 PM   Result Value Ref Range    Glucose (POC) >600 (HH) 70 - 110 mg/dL   POC G3    Collection Time: 03/11/17  9:22 PM   Result Value Ref Range    Device: VENT      FIO2 (POC) 1.0 %    pH (POC) 7.168 (LL) 7.35 - 7.45      pCO2 (POC) 40.6 35.0 - 45.0 MMHG    pO2 (POC) 542 (H) 80 - 100 MMHG    HCO3 (POC) 14.8 (L) 22 - 26 MMOL/L    sO2 (POC) 100 (H) 92 - 97 %    Base deficit (POC) 14 mmol/L    Mode ASSIST CONTROL      Tidal volume 450 ml    Set Rate 12 bpm    PEEP/CPAP (POC) 5 cmH2O    PIP (POC) 22      Allens test (POC) YES      Inspiratory Time 1.0 sec    Total resp. rate 22      Site LEFT BRACHIAL      Patient temp.  97.7      Specimen type (POC) ARTERIAL      Performed by Brandy Pacheco    MAGNESIUM    Collection Time: 03/12/17  4:15 AM   Result Value Ref Range    Magnesium 1.8 1.8 - 2.4 mg/dL   CBC WITH AUTOMATED DIFF    Collection Time: 03/12/17  4:15 AM   Result Value Ref Range    WBC 41.6 (H) 4.6 - 13.2 K/uL    RBC 3.00 (L) 4.20 - 5.30 M/uL    HGB 8.3 (L) 12.0 - 16.0 g/dL    HCT 26.5 (L) 35.0 - 45.0 %    MCV 88.3 74.0 - 97.0 FL    MCH 27.7 24.0 - 34.0 PG    MCHC 31.3 31.0 - 37.0 g/dL    RDW 16.8 (H) 11.6 - 14.5 %    PLATELET 94 (L) 927 - 420 K/uL    MPV 10.8 9.2 - 11.8 FL    NEUTROPHILS 91 (H) 40 - 73 %    LYMPHOCYTES 5 (L) 21 - 52 %    MONOCYTES 4 3 - 10 %    EOSINOPHILS 0 0 - 5 %    BASOPHILS 0 0 - 2 %    ABS. NEUTROPHILS 37.8 (H) 1.8 - 8.0 K/UL    ABS. LYMPHOCYTES 2.1 0.9 - 3.6 K/UL    ABS. MONOCYTES 1.7 (H) 0.05 - 1.2 K/UL    ABS. EOSINOPHILS 0.0 0.0 - 0.4 K/UL    ABS.  BASOPHILS 0.0 0.0 - 0.06 K/UL    RBC COMMENTS HYPOCHROMIA  1+        RBC COMMENTS ANISOCYTOSIS  1+        DF AUTOMATED     METABOLIC PANEL, BASIC    Collection Time: 03/12/17  4:15 AM   Result Value Ref Range    Sodium 140 136 - 145 mmol/L    Potassium 4.1 3.5 - 5.5 mmol/L    Chloride 111 (H) 100 - 108 mmol/L    CO2 13 (L) 21 - 32 mmol/L    Anion gap 16 3.0 - 18 mmol/L    Glucose 124 (H) 74 - 99 mg/dL    BUN 50 (H) 7.0 - 18 MG/DL    Creatinine 1.91 (H) 0.6 - 1.3 MG/DL    BUN/Creatinine ratio 26 (H) 12 - 20      GFR est AA 33 (L) >60 ml/min/1.73m2    GFR est non-AA 27 (L) >60 ml/min/1.73m2    Calcium 7.3 (L) 8.5 - 10.1 MG/DL   CALCIUM, IONIZED    Collection Time: 03/12/17  4:15 AM   Result Value Ref Range    Ionized Calcium 1.06 (L) 1.12 - 1.32 MMOL/L   PHOSPHORUS    Collection Time: 03/12/17  4:15 AM   Result Value Ref Range    Phosphorus 3.9 2.5 - 4.9 MG/DL   CARDIAC PANEL,(CK, CKMB & TROPONIN)    Collection Time: 03/12/17  4:15 AM   Result Value Ref Range     (H) 26 - 192 U/L    CK - MB 22.5 (H) <3.6 ng/ml    CK-MB Index 8.6 (H) 0.0 - 4.0 %    Troponin-I, Qt. 0.03 0.0 - 0.045 NG/ML   GLUCOSE, POC    Collection Time: 03/12/17  5:38 AM   Result Value Ref Range    Glucose (POC) 141 (H) 70 - 110 mg/dL   POC G3    Collection Time: 03/12/17 10:23 AM   Result Value Ref Range    Device: VENT      FIO2 (POC) 0.1 % pH (POC) 7.272 (L) 7.35 - 7.45      pCO2 (POC) 24.8 (L) 35.0 - 45.0 MMHG    pO2 (POC) 174 (H) 80 - 100 MMHG    HCO3 (POC) 11.4 (L) 22 - 26 MMOL/L    sO2 (POC) 99 (H) 92 - 97 %    Base deficit (POC) 15 mmol/L    Mode ASSIST CONTROL      Set Rate 12 bpm    PEEP/CPAP (POC) 5 cmH2O    PIP (POC) 20      Allens test (POC) N/A      Inspiratory Time 0.8 sec    Total resp.  rate 35      Site LEFT RADIAL      Specimen type (POC) ARTERIAL      Performed by Baby Manges     Volume control plus YES     CULTURE, BLOOD    Collection Time: 03/12/17 11:43 AM   Result Value Ref Range    Special Requests: PERIPHERAL      Culture result: PENDING    CULTURE, BLOOD    Collection Time: 03/12/17 11:53 AM   Result Value Ref Range    Special Requests: PERIPHERAL      Culture result: PENDING    GLUCOSE, POC    Collection Time: 03/12/17 12:10 PM   Result Value Ref Range    Glucose (POC) 131 (H) 70 - 110 mg/dL           Lab Results   Component Value Date/Time    Glucose 124 03/12/2017 04:15 AM    Glucose 148 03/11/2017 07:40 PM    Glucose 148 03/11/2017 10:45 AM    Glucose 140 03/10/2017 06:00 AM    Glucose 88 03/09/2017 04:50 AM        Assessment/Plan:     Patient Active Problem List   Diagnosis Code    Anemia D64.9    Anxiety F41.9    Nausea and vomiting R11.2    Morbid obesity (HCC) E66.01    Chronic pain syndrome G89.4    History of ovarian cancer Z85.43    History of gastric bypass Z98.890    CAD (coronary artery disease) I25.10    History of Clostridium difficile Z87.19    History of endometrial cancer Z85.42    History of GI bleed Z87.19    Lymphedema I89.0    Hypokalemia E87.6    DEV (acute kidney injury) ([facility] [address] N17.9    Pulmonary nodules R91.8    A-fib (HCC) I48.91    Aortic stenosis I35.0    GI bleed K92.2    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 Urology consulted 2/2 to UTI. No plans for stent change until urine Cx clear   Blood Cx 3/3- grew out VRE- ID consulted. Repeat BC 3/6, 3/8- NGTD. Continue Daptomycin per ID    Sepsis 2/2 above  Acute blood loss anemia: s/p 2 units PRBC. Hgb stable. Continue to monitor   RLE DVT- IVC filter placed 3/9/17 by Vascular   Likely Endocarditis- Cardiology following- pt refused ESSIE. ID following- continue IV antibiotics   DM: well controlled. Continue SSI  DEV on CKD- Cr continues to trend down. Monitor Cr, I/O. Decrease IVF  Severe Thrombocytopenia- continue to monitor    Hx Ovarian and Endometrial cancer with possible metastatic disease- resume home pain medication regimen, add Ativan     GI prophylaxis: Protonix  Hypomagnesemia: replete.  Check in AM   Hypokalemia: resolved          BYRON Vásquez-SARA [address]  Pager:  [phone]  Office:  [phone]

## 2017-03-13 NOTE — PROGRESS NOTES
INFECTIOUS DISEASE FOLLOW UP NOTE :-     Admit Date: 3/3/2017    Current  Prior    Daptomycin 3/8-5 aztreonam/anidulafungin 3/12-1 linezolid 3/4 - 3, vanc/zosyn/levoflox 3/3 -0     ASSESSMENT: -> RECS     Sepsis POA  - fever 102 , leukocytosis 28K, tachycardia, hypotension  - source VRE UTI and BSI or DVT  -allergic allergic reaction and IE also in DDX  - WBC sl lower 42->30k today ->monitor on antimicrobials broadened 3/12  ->prognosis remains guarded    Resp failure NTB 3/11  -initial cxr nad ->white out L s/o mucus plug ->pul toilet etc per PCCM- repeat cxr today parital re-expansion now SP bronchoscopy  ->monitor sput- GS yeast    VRE BSI with ?aortic endocarditis  - blcx 3/3 2/2 VRE positive   - repeat 3/6 ntd   - TTE aortic stenosis with possible aortic vegetation [ chronic mixed calcified density ]  - source likely UTI; pt refused ESSIE and removal of mediport - Cont Daptomycin,monitor weekly CPK  ->monitor cultures-  3/8 blcx remain neg   VRE UTI, complicated   - UA with large LE , wbc tntc, jamar 4+  - ucx >100k VRE  - CT with no hydronephrosis but has b/l ureteral stents  - appreciate urology consult and eventual plan for stent exchange/removal  - abx as above  ->stent mgmt per      ADR - Rash over abdomen/chest   - secondary to linezolid which has been listed from before as allergy - rash fading off linezolid  ->monitor off linezolid    Lymphedema with R medial thigh blister- not currently infected appearing  -massive pannus with intertrigo  ->add systemic antifungal to topical   RLE subacute DVT  - per primary team   Rt pelvic mass ? ovarian ca / endometrial ca with mets  - palliative care involved , was hospice at home and now revoked    DEV due to sepsis - ~slowly better, Cr 1.9->1.8  today     Thrombocytopenia -better 94-> 120k today  ->monitor   Blood loss anemia        MICROBIOLOGY:   3/3  Blcx x 2 - 2/2 VRE S gent, linezolid , daptomycin quinupristin         Ucx > 100K VRE  3/4 Flu Ag test neg   3/6  Blcx x 2 ng  3/8  Blcx x 2 - ntd   3/11 uctx ng  3/12 bctx x 2 ngtd   sput GS yeast     LINES AND CATHETERS:   Rt chest mediport     SUBJECTIVE :     Interval notes reviewed. Remains in ICU, sput GS yeast and CXR white out s/o mucus plug -> partial re-expansion on repeat today before bronch just done. No family at bedside .     MEDICATIONS :     Current Facility-Administered Medications   Medication Dose Route Frequency    insulin lispro (HUMALOG) injection   SubCUTAneous Q6H    pantoprazole (PROTONIX) 40 mg in sodium chloride 0.9 % 10 mL injection  40 mg IntraVENous DAILY    aztreonam (AZACTAM) 1 g in 0.9% sodium chloride (MBP/ADV) 100 mL MBP  1 g IntraVENous Q8H    chlorhexidine (PERIDEX) 0.12 % mouthwash 15 mL  15 mL Oral BID    sodium chloride (NS) flush 5-10 mL  5-10 mL IntraVENous Q8H    sodium chloride (NS) flush 5-10 mL  5-10 mL IntraVENous PRN    fentaNYL (PF)  mcg/30 ml   IntraVENous TITRATE    anidulafungin (ERAXIS) 100 mg in 0.9% sodium chloride 130 mL IVPB  100 mg IntraVENous Q24H    nystatin (MYCOSTATIN) 100,000 unit/gram powder   Topical BID    albuterol-ipratropium (DUO-NEB) 2.5 MG-0.5 MG/3 ML  3 mL Nebulization Q4H PRN    NOREPINephrine (LEVOPHED) 8,000 mcg in dextrose 5% 250 mL infusion  0-20 mcg/min IntraVENous TITRATE    ELECTROLYTE REPLACEMENT PROTOCOL  1 Each Other PRN    ELECTROLYTE REPLACEMENT PROTOCOL  1 Each Other PRN    ELECTROLYTE REPLACEMENT PROTOCOL  1 Each Other PRN    ELECTROLYTE REPLACEMENT PROTOCOL  1 Each Other PRN    PHENYLephrine 30 mg in 0.9% sodium chloride 250 mL (EDILMA-SYNEPHRINE) infusion  2-200 mcg/min IntraVENous TITRATE    vasopressin (VASOSTRICT) 100 Units in 0.9% sodium chloride 100 mL infusion  0.01-0.04 Units/min IntraVENous TITRATE    propofol (DIPRIVAN) infusion  0-50 mcg/kg/min IntraVENous TITRATE    0.9% sodium chloride infusion  150 mL/hr IntraVENous CONTINUOUS    DAPTOmycin (CUBICIN) 719 mg in 0.9% sodium chloride 50 mL IVPB RF formulation  6 mg/kg IntraVENous Q24H    sodium bicarbonate tablet 650 mg  650 mg Oral TID    bisacodyl (DULCOLAX) suppository 10 mg  10 mg Rectal DAILY PRN    glucose chewable tablet 16 g  16 g Oral PRN    glucagon (GLUCAGEN) injection 1 mg  1 mg IntraMUSCular PRN    dextrose (D50W) injection syrg 12.5-25 g  25-50 mL IntraVENous PRN    acetaminophen (TYLENOL) tablet 650 mg  650 mg Oral Q4H PRN    ondansetron (ZOFRAN) injection 4 mg  4 mg IntraVENous Q4H PRN    sodium chloride (NS) flush 5-10 mL  5-10 mL IntraVENous PRN       OBJECTIVE :     Visit Vitals    BP (!) 146/91    Pulse 97    Temp 99.3 °F (37.4 °C)    Resp 24    Ht 5' 4\" (1.626 m)    Wt 117.3 kg (258 lb 9.6 oz)    SpO2 97%    BMI 44.39 kg/m2       Temp (24hrs), Av.7 °F (5.4 °C), Min:38 °F (3.3 °C), Max:100.5 °F (38.1 °C)    GEN - unresponsive ill appearing WF appears older than stated age in ICU on vent  HEENT - conj pale no petech seen, AMADOU in place  NECK- R mediport accessed no LAD   RESP- scattered rhochi  CVS- tachy II/VI systolic murmur   ABD-massive pannus, large area of ecchymosis at lower abdominal fold fragile skin, bs present   EXT- (+) edema   SKIN -improved rash over abdomen ,intertrigo in groin folds, denuded skin R med thigh covered not infected appearing  NEURO - unresponsive.      Labs: Results:   Chemistry Recent Labs      17   03317   0415  17   1940   GLU  111*  124*  148*   NA  141  140  140   K  3.7  4.1  4.4   CL  113*  111*  110*   CO2  12*  13*  17*   BUN  44*  50*  51*   CREA  1.78*  1.91*  1.97*   CA  7.1*  7.3*  7.3*   AGAP  16  16  13   BUCR  25*  26*  26*      CBC w/Diff Recent Labs      17   0330  17   0415  17   1940  17   1045   WBC  30.4*  41.6*  47.1*  32.0*   RBC  2.77*  3.00*  3.10*  3.13*   HGB  7.6*  8.3*  8.7*  8.5*   HCT  23.8*  26.5*  27.8*  26.8*   PLT  120* 94*  105*  83*   GRANS  89*  91*   --   87*   LYMPH  7*  5*   --   6*   EOS  0  0   --   1        cxr 3/13 IMPRESSION:     Partial reexpansion of left lung atelectasis, with residual left basilar  atelectasis/infiltrate plus or minus left pleural effusion. CT abdo/pelvis 3/3 - IMPRESSION:  1. New cystic mass right adnexal region. Recurrent ovarian carcinoma cannot be  excluded. 2. New right middle lobe pulmonary nodule. Cannot rule out metastatic disease. 3. Bilateral ureteral stents with resolved hydronephrosis. 4. Stable or minimally increased left periaortic retroperitoneal adenopathy. Echo 3/7 - EF 55%, Aortic valve: The valve was trileaflet. Leaflets exhibited moderate  calcification and moderately reduced cuspal separation. There was moderate  stenosis. There was mild regurgitation. There was small-medium size fix  partially calcified echodensity noted attach to right coronary cusp. Could  represent vegetation. Valve mean gradient was 34 mmHg. Aortic valve area  was 1 cm-sq by the continuity equation. ua Results for J Carlos Jernigan (MRN [id number]) as of 3/12/2017 17:51   Ref. Range 3/11/2017 04:30   Color Latest Units:   ORANGE   Appearance Latest Units:   TURBID   Specific gravity Latest Ref Range: 1.005 - 1.030   1.019   pH (UA) Latest Ref Range: 5.0 - 8.0   5.5   Protein Latest Ref Range: NEG mg/dL 100 (A)   Glucose Latest Ref Range: NEG mg/dL NEGATIVE   Ketone Latest Ref Range: NEG mg/dL NEGATIVE   Blood Latest Ref Range: NEG   LARGE (A)   Bilirubin Latest Ref Range: NEG   NEGATIVE   Urobilinogen Latest Ref Range: 0.2 - 1.0 EU/dL 0.2   Nitrites Latest Ref Range: NEG   NEGATIVE   Leukocyte Esterase Latest Ref Range: NEG   LARGE (A)   Epithelial cells Latest Ref Range: 0 - 5 /lpf NEGATIVE   WBC Latest Ref Range: 0 - 4 /hpf TOO NUMEROUS TO C. ..    RBC Latest Ref Range: 0 - 5 /hpf 21 to 35   Bacteria Latest Ref Range: NEG /hpf 2+ (A)       Roberto Yañez MD  March 13, 2017  [facility] AT THE [facility]  [phone]

## 2017-03-13 NOTE — PROGRESS NOTES
Progress Note    Patient: Lucas Storey MRN: [id number]  SSN: [id number]    YOB: 1960  Age: 49 y.o. Sex: female      Admit Date: 3/3/2017    LOS: 10 days     Subjective:   RRT over weekend. Currently intubated, sedated    Objective:     Vitals:    03/13/17 0500 03/13/17 0530 03/13/17 0600 03/13/17 0630   BP: 107/66 113/59 111/63    Pulse: 90 89 86 92   Resp:       Temp:       SpO2: 100% 100% 100% 100%   Weight:    258 lb 9.6 oz (117.3 kg)   Height:            Intake and Output:  Current Shift:    Last three shifts: 03/11 1901 - 03/13 0700  In: 9551.6 [I.V.:9531.6]  Out: 7774 [Urine:1565]    Physical Exam:   GENERAL: appears in discomfort  LUNG: labored breathing  HEART: tachy  ABDOMEN: soft, NT, ND    Lab/Data Review:  BMP:   Lab Results   Component Value Date/Time     03/13/2017 03:30 AM    K 3.7 03/13/2017 03:30 AM     (H) 03/13/2017 03:30 AM    CO2 12 (L) 03/13/2017 03:30 AM    AGAP 16 03/13/2017 03:30 AM     (H) 03/13/2017 03:30 AM    BUN 44 (H) 03/13/2017 03:30 AM    CREA 1.78 (H) 03/13/2017 03:30 AM    GFRAA 36 (L) 03/13/2017 03:30 AM    GFRNA 30 (L) 03/13/2017 03:30 AM     CBC:   Lab Results   Component Value Date/Time    WBC 30.4 (H) 03/13/2017 03:30 AM    HGB 7.6 (L) 03/13/2017 03:30 AM    HCT 23.8 (L) 03/13/2017 03:30 AM     (L) 03/13/2017 03:30 AM          Assessment:   1.  Pt is a 49 y.o. female  with hx of endometrial carcinoma and s/p XRT and bilateral ureteral obstruction with chronic stents last changed 8/19/17 non with VRE in blood and urine and ARF  CT 3/3/17 - No hydro  U Cx 3/317 - VRE  Cr stable 1.78  RRT 3/11/17 - intubated now on pressors    Plan:     Message sent to try and arrange bilateral JJ stent exchange   Cont abx for VRE  Creatinine stable  Will hold on stent exchange until patient is stable      Signed By: Judah Mares MD     March 13, 2017

## 2017-03-13 NOTE — PROGRESS NOTES
0730 Bedside and Verbal shift change report given to Dunia Ho RN and Gabby Muñoz RN (oncoming nurse) by Rodri Adams RN  (offgoing nurse). Report included the following information SBAR, Procedure Summary, Intake/Output, MAR and Recent Results. Pt indicates NAD or pain at this time. Bed locked and lowered, HOB elevated. Will continue to monitor. 0920 Duarte and oral care completed. Scheduled meds provided, assessment completed. Daily sedation holiday held at this time d/t pt receiving bronchoscopy. 1003 Levophed titrated down to 9mcg/min, MAP at 94. Will continue to monitor. 1030 Levophed titrated back up to 10mcg/min MAP at 50.     1145 Levophed titrated down to 8mcg/min for MAP of 128, Primary RN and wound care at bedside. 1200 daily sedation holiday completed. Pt able to follow commands and respond to voice. 1300 Dr Papa Ellis at bedside, bronchoscopy procedure in process. Pt O2 sat stable. Pt's ET tube repositioned by Dr. Elam Keep from 23 to 20cm at the lip. 1400 Pt titrated down to from 7mcg/min to 5mcg/min. Pt tolerated well, will continue to monitor. 1445 Daughter's updated number ([phone]. Daughter requested call if pt's status should change throughout the night. 1545 Levophed held at this time, will continue to monitor. 1630 Pt MAP at 63, Levophed started at 2mcg/min to sustain MAP goal of 65.     1915 Bedside and Verbal shift change report given to Naima Taylor RN  (oncoming nurse) by Mariia Melo RN (offgoing nurse). Report included the following information SBAR, Procedure Summary, Intake/Output, MAR and Recent Results.

## 2017-03-13 NOTE — PROGRESS NOTES
Daily Progress Note: 3/13/2017 2:48 PM   Admit Date: 3/3/2017    Patient seen in follow up for multiple medical problems as listed below:  Patient Active Problem List   Diagnosis Code    Anemia D64.9    Anxiety F41.9    Nausea and vomiting R11.2    Morbid obesity ([facility] E66.01    Chronic pain syndrome G89.4    History of ovarian cancer Z85.43    History of gastric bypass Z98.890    CAD (coronary artery disease) I25.10    History of Clostridium difficile Z87.19    History of endometrial cancer Z85.42    History of GI bleed Z87.19    Lymphedema I89.0    Hypokalemia E87.6    DEV (acute kidney injury) ([facility] N17.9    Pulmonary nodules R91.8    [facility]fib [facility] I48.91    Aortic stenosis I35.0    GI bleed K92.2    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 female who is admitted for severe sepsis and shock, urosepsis and severe thrombocytopenia. Patient was on Hospice until now. She presents with c/o altered mental status via EMS with cyanotic/desirae complextion. Patient was found AMS with blood sugar low 40's. There was no IV assess and she was given Glucagon and her mentation and blood sugar improved. Patient in non diabetic. She has endometrial cancer. In ED she has urosepsis and hypotension requiring pressor support after 30 cc/kg BOLUS normal saline. Patient has Mediport. She also was febrile with Tmax 101.6. Denies cough, sob, chest pain. She was started on antibiotics . Hx VRE UTI noted. She reports having foul smelling urine and vaginal discharge. Patient had a platelet level of 46,727 with no bleed requiring platelet transfusion initially.   She improved and was transferred to the floor until an RRT was called 3/11 for acute hypoxic respiratory failure requiring intubation. Bronchospy 3/13 with significant mucous plugging.      Hypoxic resp failure - intubated  -Bronchospy 3/13 with significant mucous plugging.   -Cx pending on eraxis, azactam, dapto    Subacute RLE DVT  -thought due to obstructing pelvic CA  -not on anticoag? Severe sepsis with shock  - 2/2 UTI. Leukocytosis, tachy, resolving  - off Levophed for pressor support goal MAP > 65, Zyvox, Levaquin, zosyn  - Follow urine and blood culture: GPC in blood  - Intensivist consulting      UTI  - hx VRE UTI,again with VRE. Immunosuppression endometrial cancer  - On Zyvox , Zosyn , Levaquin initially then to zyvox.      Severe thrombocytopenia  - Platelet down to 9k, needs transfusion. Would try and keep >25K      Encephalopathy  - 2/2 Hypoglycemia vs urosepsis vs hypotension vs uremia  - resolved . CT head non-acute      Hypoglycemia   - Resolved       ARF  - cr 6.55/Cbl943 ->improving Cr into 1's  - severe prerenal with UREMIA  - IVF. Na Bicarb added      Anemia   - Hgb 8.8  - likely 2/2 Endometrial malignancy   - Transfuse if hemoglobin < 7      Hx of Ovarian and Endometrial Cancer with abdominal pain  - Patient was on Hospice until today. Daughter has rescinded her DDNR and Hospice and now wants to be FULL CODE  - Palliative care consult monday  - Lower abdomen pain Likely due to new R adnexal mass from ovarian CA. Discuss further pending hospice plans  - pain control  -Mediport for IV drug use, if no urinary source may need to be ruled out as a possible source     Hyponatremia   - IVF corrected.      Vaginal discharge   - Wet prep ordered     Chronic problems  1. Recurrent gastrointestinal bleed with multiple  esophagogastroduodenoscopies in the past year with negative results. 2. History of ovarian cancer. 3. History of endometrial cancer. 4. Bladder mass, status post cystoscopy and biopsy results pending. 5. Acute blood loss anemia, stable. 6. Chronic back pain. 7. Chronic abdominal pain.   8. Nausea and vomiting secondary to above     DVT Protocol Active: SCDs. consider arixtra  GI - PPI  Code Status: Full Code     Disposition: Req 48h hosp. Home with hospice. Subjective:     CC: Altered mental status    Interval History: Bronchospy  with significant mucous plugging this am, consent countersigned. Objective:     Visit Vitals    BP (!) 170/115    Pulse (!) 110    Temp 99.3 °F (37.4 °C)    Resp 24    Ht 5' 4\" (1.626 m)    Wt 117.3 kg (258 lb 9.6 oz)    SpO2 90%    BMI 44.39 kg/m2       Temp (24hrs), Av.7 °F (5.4 °C), Min:38 °F (3.3 °C), Max:100.5 °F (38.1 °C)        Intake/Output Summary (Last 24 hours) at 17 1448  Last data filed at 17 1300   Gross per 24 hour   Intake          5558.85 ml   Output             1095 ml   Net          4463.85 ml       Gen: intubated, sedated, NAD  HEENT:  KARINA  Neck: No Bruits/JVD   Lungs:   Rhonchi and phlegm. mod respiratory effort  Heart:   RR S1 S2 without M/R/G  Abdomen: obese large panus,NT, BSX4,   Extremities:   trace LE edema. No cyanosis.   Skin:  no jaundice/ lots of skin hanging off arms/legs      Data Review:     Meds/Labs/Tests reviewed    Current Shift:   07 - 1900  In: 1052.6 [I.V.:1052.6]  Out: 390 [Urine:390]  Last three shifts:  1901 -  07  In: 26728.9 [I.V.:76749.9]  Out:  [Urine:1745]  Recent Labs      17   0330  17   0415  17   1940  17   1045   WBC  30.4*  41.6*  47.1*  32.0*   RBC  2.77*  3.00*  3.10*  3.13*   HGB  7.6*  8.3*  8.7*  8.5*   HCT  23.8*  26.5*  27.8*  26.8*   PLT  120*  94*  105*  83*   GRANS  89*  91*   --   87*   LYMPH  7*  5*   --   6*   EOS  0  0   --   1       Recent Labs      17   0330  17   0415  17   1940  17   1045   BUN  44*  50*  51*  51*   CREA  1.78*  1.91*  1.97*  1.65*   CA  7.1*  7.3*  7.3*  7.5*   K  3.7  4.1  4.4  4.0   NA  141  140  140  140   CL  113*  111*  110*  110*   CO2  12*  13*  17*  17*   PHOS  3.8 3.9  5.0*   --    GLU  111*  124*  148*  148*        Lab Results   Component Value Date/Time    Glucose 111 03/13/2017 03:30 AM    Glucose 124 03/12/2017 04:15 AM    Glucose 148 03/11/2017 07:40 PM    Glucose 148 03/11/2017 10:45 AM    Glucose 140 03/10/2017 06:00 AM          Care coordination with Nursing/Consultants/staff: 5  Prior history, labs, and charting reviewed: 15    Procedures/Imaging:  Bronch 3/13    Total time spent with chart review, patient examination/education, discussion with staff on case,documentation and medication management / adjustment  :  30 Minutes      Dr Bill Hartman  Pager: [phone]

## 2017-03-13 NOTE — PROGRESS NOTES
Patient is unable to communicate  as she is resting peacefully at this time. Avelino Byrd offered prayer. Avelino Byrd will continue to follow and will provide pastoral care on an as needed/requested basis.     Patricia Cruz   Spirit[facility] Care   [phone]

## 2017-03-13 NOTE — ROUTINE PROCESS
Dr Ghazal Garibay was unable to reach the daughter per telephone for consent for bronchoscopy. Fady Carr left on the answering machine to call the unit.

## 2017-03-13 NOTE — ROUTINE PROCESS
Consent for the Bronchoscopy Signed and Co signed by Dr Andres Duckworth and Dr Roland for the bronchoscopy procedure. The daughter has not returned the call to the unit.

## 2017-03-13 NOTE — DIABETES MGMT
NUTRITIONAL RE-ASSESSMENT AND GLYCEMIC CONTROL PLAN OF CARE     Dionna White           49 y.o.           3/3/2017                 1. Septic shock ([facility]    2. Acute renal failure, unspecified acute renal failure type ([facility]    3. Acute cystitis with hematuria    4. High anion gap metabolic acidosis      Patient Active Problem List   Diagnosis Code    Anemia D64.9    Anxiety F41.9    Nausea and vomiting R11.2    Morbid obesity ([facility] E66.01    Chronic pain syndrome G89.4    History of ovarian cancer Z85.43    History of gastric bypass Z98.890    CAD (coronary artery disease) I25.10    History of Clostridium difficile Z87.19    History of endometrial cancer Z85.42      [x]  Participated in discharge planning/Interdisciplinary rounds   Food allergies: [x]  No        []  Yes-  ASSESSMENT:   Pt is overweight related to excess caloric intake as evidenced by 191% ideal weight and BMI=44.8kg/m2. Pt meets criteria for obesity. Diagnosis-Intake: Inadequate protein-energy intake related to respiratory distress AEB inability to take in oral nourishment while on ventilator and need for enteral feeding. Pt w/hypoalbuminemia as evidenced by albumin=1.6 mg/dl. INTERVENTIONS/PLAN:   If Tube Feeding initiated, recommend FS Osmolite 1calorie at 25ml/ hour to start. Advance TF as tolerated to goal rate of 50 ml/hr, (while on current level of propofol 28.6ml/hr). TF will provide 53g protein, 1272 calories/day with 1 liter free water/day from TF.   SUBJECTIVE/OBJECTIVE:   Diet:npo  Patient Vitals for the past 100 hrs:   % Diet Eaten   03/11/17 1320 0 %   03/11/17 0842 0 %   03/09/17 1429 30 %     Medications: [x]                Reviewed - Propofol is providing ~ 755 calories/24 hrs  -has corrective lispro-not requiring recommend discontinuing BG monitoring  NS at 125ml/hr  Most Recent POC Glucose:   Recent Labs      03/13/17   0330  03/12/17   0415  03/11/17   1940  03/11/17   1045   GLU  111*  124*  148*  148*         Labs: Lab Results   Component Value Date/Time    Hemoglobin A1c 5.4 03/04/2017 06:47 AM     Lab Results   Component Value Date/Time    Sodium 141 03/13/2017 03:30 AM    Potassium 3.7 03/13/2017 03:30 AM    Chloride 113 03/13/2017 03:30 AM    CO2 12 03/13/2017 03:30 AM    Anion gap 16 03/13/2017 03:30 AM    Glucose 111 03/13/2017 03:30 AM    BUN 44 03/13/2017 03:30 AM    Creatinine 1.78 03/13/2017 03:30 AM    Calcium 7.1 03/13/2017 03:30 AM    Magnesium 2.0 03/13/2017 12:10 PM    Phosphorus 3.8 03/13/2017 03:30 AM    Albumin 1.6 03/06/2017 03:20 AM       Anthropometrics: IBW : 59 kg (130 lb), % IBW (Calculated): 191.8 %, BMI (calculated): 44.4  Wt Readings from Last 1 Encounters:   03/13/17 117.3 kg (258 lb 9.6 oz)      Ht Readings from Last 1 Encounters:   03/06/17 5' 4\" (1.626 m)       Estimated Nutrition Needs: 1480 Kcals/day, Protein (g): 70 g Fluid (ml): 1800 ml  Based on:   []          Actual BW    [x]          IBW   []            Adjusted BW      Nutrition Diagnoses:      As stated above. Nutrition Interventions: Tubefeeding Recommendations  Goal:     Intake will meet >75% of energy and protein requirements by  3/18. Glucose will be within target range of 70-180mg/dl by   3/10. -met  No weight goals due to hospice status prior to admission.   Nutrition Monitoring and Evaluation      []     Monitor po intake on meal rounds  [x]     Continue inpatient monitoring and intervention  [x]     Other:TF initiation    Nutrition Risk:  [x]   High     []  Moderate    []  Minimal/Uncompromised    Canda Goodell, RD

## 2017-03-13 NOTE — PROGRESS NOTES
Patient in bed on back with head elevated - BBS equal, ess clear and diminished - ETT secure at 23 at the teeth and moved to the left side of the mouth - patient suctioned and MARITZA tube cleared - vent alarms on and functional - MVb at [address] - patient suctioned and MARITZA tube cleared, ETT moved to the right side of the mouth    0346 - patient suctioned and MARITZA tube cleared - ETT moved to the center of the mouth - lung recruitment maneuvers via increased PEEP for 60 seconds done to help with atelectasis shown on CXR    0500 - lung recruitment maneuvers done    0618 - Chest pt not done at this time - cannot place patient in t-aliyah for postural drainage, and cancer that has mets to the lungs is a contraindication for CPT.  Lung recruitment done x 10 minutes with blood pressure and HR remaining stable - patient levaged, bagged and suctioned

## 2017-03-13 NOTE — PROGRESS NOTES
[facility]   Pulmonary, Critical Care, and Sleep Medicine     ICU Patient Progress Note    Name: Amilcar Boateng   : 1960   MRN: [id number]   Date: 3/13/2017    [x]I have reviewed the flowsheet and previous days notes. IMPRESSION:   · Acute hypoxic respiratory failure requiring mechanical ventilation due to below  · Shock, possibly septic given wbc 41 and fever. · Hypotension, requiring pressor. · RML pulmonary nodule, possibly mets  · Hx Ovarian cancer, s/p XRT  · RLE DVT likely 2/2 venous outflow obstruction due to pelvic mass  · Thrombocytopenia persistent but improved   · Afib controlled, currently on ST   PLAN:     RS:CXR 3/3 X2 reviewed, S/P bronchoscopy today. .. see report. Intubated, Vent management, keep O2 sats 95% or above; keep plateau pressure <28 and weaning per protocol,  Duonebs q 6hrly; VAP bundle, aspiration precautions, HOB 30 degrees. ABG PRN/or condition change. Monitor CXR . CVS:On Levophed, OFF Matty today, titrate per target MAP>65, Telemetry on bedside monitoring shows SR/ST with rate in 90-low 100's,  2D echo with EF 55 - 92%, grade 1 diastolic, PA 45 - 50. Vascular surgery team following. ID:ID following. Antibiotics-Daptomycin/Aztreonam for VRE and Eraxis,  Persistent leukocytosis, Bcx 3/3 with alpha strep (2 out of 2 bottles), repeat bcx 3/6 negative   Ucx 3/3 with VRE. Bcx repeated 3/12 NGTD. Trend temp and wbc, sepsis bundle  ENDO: Glycemic control, monitor glucose  GI: PPI, TF as tolerated, monitor residuals  RENAL/Metabolic:Monitor renal function/creat/lytes-replaced per ICU protocol.   CNS:Monitor mentation  HEMATOLOGY: Monitor H/H and platelets or any active bleeding  MUSCULOSKELETAL:Sedated  PAIN AND SEDATION:Propofol and Fentanyl   ADVANCE DIRECTIVE:Full Code  FAMILY DISCUSSION: :No family at bedside  Lines: Right upper chest Mediport  Will defer respective systems problem management to primary and other consultant and follow patient in ICU with primary and other medical team.     Quality Care: PPI, DVT prophylaxis, HOB elevated, Infection control all reviewed and addressed. Events and notes from last 24 hours reviewed. EKG No results found for this or any previous visit. ECHO echocardiogram     Subjective:     3/12: Pt transferred from floor overnight with acute respiratory failure and hypotension, intubated emergently, started on levo and forrest. [x]The patient is critically ill on  Unable to obtain    [x]Mechanical ventilation [x]Pressors   []BiPAP []               Medication Review:  · Pressors - levophed  · Sedation - Propofol and Fentanyl  · Antibiotics - Azactam, Daptomycin  · Pain - Fentanyl  · GI/ DVT - protonix/scd  · Others (other gtts)    Safety Bundles: VAP Bundle/ CAUTI/ Severe Sepsis Protocol    ROS:  Unable to obtain, intubated    Vital Signs:    Visit Vitals    BP (!) 170/115    Pulse (!) 110    Temp 99.3 °F (37.4 °C)    Resp 24    Ht 5' 4\" (1.626 m)    Wt 117.3 kg (258 lb 9.6 oz)    SpO2 90%    BMI 44.39 kg/m2       O2 Device: Endotracheal tube   O2 Flow Rate (L/min): 3 l/min   Temp (24hrs), Av.7 °F (5.4 °C), Min:38 °F (3.3 °C), Max:100.5 °F (38.1 °C)     Intake/Output:Last shift:       07 -  1900  In: 1052.6 [I.V.:1052.6]  Out: 390 [Urine:390]Last 3 shifts: 1901 -  0700  In: 65587.9 [I.V.:37767.9]  Out:  [Urine:1745]    Intake/Output Summary (Last 24 hours) at 17 1439  Last data filed at 17 1300   Gross per 24 hour   Intake          5558.85 ml   Output             1095 ml   Net          4463.85 ml     Hemodynamics:   . @MAP     . @CVP       Ventilator Settings:  Ventilator  Mode: Assist control, Pressure control  Respiratory Rate  Back-Up Rate: 12  Insp Time (sec): 1 sec  I:E Ratio: 1;2  Ventilator Volumes  Vt Set (ml): 450 ml  Vt Exhaled (Machine Breath) (ml): 534 ml  Vt Spont (ml): 415 ml  Ve Observed (l/min): 11.6 l/min  Ventilator Pressures  PC Set: 20  PIP Observed (cm H2O): 26 cm H2O  Plateau Pressure (cm H2O): 23 cm H2O  MAP (cm H2O): 16  PEEP/VENT (cm H2O): 5 cm H20    Mode Rate Tidal Volume Pressure FiO2 PEEP   Assist control, Pressure control   450 ml    100 % 5 cm H20     Peak airway pressure: 26 cm H2O    Minute ventilation: 11.6 l/min      ARDS network Guidelines: Lung protective strategy and Pl pressure goals<30    Objective:     Physical Exam:   General: Intubated/vent, no acute distress  HEENT: ETT, pupils reactive, sclera anicteric, EOM intact  Neck: No adenopathy or thyroid swelling, no lymphadenopathy or JVD, supple  CVS: S1S2 no murmurs  RS: Mod AE bilaterally, no accessory muscle use, BS bilaterally decrease,coarse, no wheezing. Abd: soft, non tender, no hepatosplenomegaly  Neuro: sedated  Extrm: +UE/LE edema, toes and fingers with color change  Lymph node: No obvious palpable lymph node appreciated.   Skin: multiple ulcers ( see wound care documentation) and scattered small areas ecchymosis    DATA:   Current Facility-Administered Medications   Medication Dose Route Frequency    insulin lispro (HUMALOG) injection   SubCUTAneous Q6H    pantoprazole (PROTONIX) 40 mg in sodium chloride 0.9 % 10 mL injection  40 mg IntraVENous DAILY    aztreonam (AZACTAM) 1 g in 0.9% sodium chloride (MBP/ADV) 100 mL MBP  1 g IntraVENous Q8H    chlorhexidine (PERIDEX) 0.12 % mouthwash 15 mL  15 mL Oral BID    sodium chloride (NS) flush 5-10 mL  5-10 mL IntraVENous Q8H    sodium chloride (NS) flush 5-10 mL  5-10 mL IntraVENous PRN    fentaNYL (PF)  mcg/30 ml   IntraVENous TITRATE    anidulafungin (ERAXIS) 100 mg in 0.9% sodium chloride 130 mL IVPB  100 mg IntraVENous Q24H    nystatin (MYCOSTATIN) 100,000 unit/gram powder   Topical BID    albuterol-ipratropium (DUO-NEB) 2.5 MG-0.5 MG/3 ML  3 mL Nebulization Q4H PRN    NOREPINephrine (LEVOPHED) 8,000 mcg in dextrose 5% 250 mL infusion  0-20 mcg/min IntraVENous TITRATE    ELECTROLYTE REPLACEMENT PROTOCOL  1 Each Other PRN    ELECTROLYTE REPLACEMENT PROTOCOL  1 Each Other PRN    ELECTROLYTE REPLACEMENT PROTOCOL  1 Each Other PRN    ELECTROLYTE REPLACEMENT PROTOCOL  1 Each Other PRN    PHENYLephrine 30 mg in 0.9% sodium chloride 250 mL (EDILMA-SYNEPHRINE) infusion  2-200 mcg/min IntraVENous TITRATE    vasopressin (VASOSTRICT) 100 Units in 0.9% sodium chloride 100 mL infusion  0.01-0.04 Units/min IntraVENous TITRATE    propofol (DIPRIVAN) infusion  0-50 mcg/kg/min IntraVENous TITRATE    0.9% sodium chloride infusion  150 mL/hr IntraVENous CONTINUOUS    DAPTOmycin (CUBICIN) 719 mg in 0.9% sodium chloride 50 mL IVPB RF formulation  6 mg/kg IntraVENous Q24H    sodium bicarbonate tablet 650 mg  650 mg Oral TID    bisacodyl (DULCOLAX) suppository 10 mg  10 mg Rectal DAILY PRN    glucose chewable tablet 16 g  16 g Oral PRN    glucagon (GLUCAGEN) injection 1 mg  1 mg IntraMUSCular PRN    dextrose (D50W) injection syrg 12.5-25 g  25-50 mL IntraVENous PRN    acetaminophen (TYLENOL) tablet 650 mg  650 mg Oral Q4H PRN    ondansetron (ZOFRAN) injection 4 mg  4 mg IntraVENous Q4H PRN    sodium chloride (NS) flush 5-10 mL  5-10 mL IntraVENous PRN       Telemetry: [x]Sinus []A-flutter []Paced    []A-fib []Multiple PVCs     CBC w/Diff Recent Labs      03/13/17   0330  03/12/17   0415  03/11/17   1940  03/11/17   1045   WBC  30.4*  41.6*  47.1*  32.0*   RBC  2.77*  3.00*  3.10*  3.13*   HGB  7.6*  8.3*  8.7*  8.5*   HCT  23.8*  26.5*  27.8*  26.8*   PLT  120*  94*  105*  83*   GRANS  89*  91*   --   87*   LYMPH  7*  5*   --   6*   EOS  0  0   --   1        Chemistry Recent Labs      03/13/17   0330  03/12/17   1355  03/12/17   0415  03/11/17   1940   GLU  111*   --   124*  148*   NA  141   --   140  140   K  3.7   --   4.1  4.4   CL  113*   --   111*  110*   CO2  12*   --   13*  17*   BUN  44*   --   50*  51*   CREA  1.78*   --   1.91*  1.97*   CA  7.1*   --   7.3*  7.3*   MG  1.7*  2.0  1.8  1.7*   PHOS  3.8   --   3.9  5.0* AGAP  16   --   16  13   BUCR  25*   --   26*  26*        Lactic Acid Lactic acid   Date Value Ref Range Status   03/11/2017 1.4 0.4 - 2.0 MMOL/L Final     Recent Labs      03/11/17   1940   LAC  1.4        ABG Recent Labs      03/13/17   0855  03/12/17   1023  03/11/17 2122   PHI  7.294*  7.272*  7.168*   PCO2I  24.2*  24.8*  40.6   PO2I  209*  174*  542*   HCO3I  11.7*  11.4*  14.8*   FIO2I  60  0.1  1.0        Micro  Recent Labs      03/12/17 2123 03/12/17   1153  03/12/17   1143   CULT  PENDING  NO GROWTH AFTER 19 HOURS  NO GROWTH AFTER 19 HOURS     Recent Labs      03/12/17 2123 03/12/17   1153  03/12/17   1143  03/11/17   0430   CULT  PENDING  NO GROWTH AFTER 19 HOURS  NO GROWTH AFTER 19 HOURS  NO GROWTH 2 DAYS        Liver Enzymes Protein, total   Date Value Ref Range Status   03/06/2017 5.6 (L) 6.4 - 8.2 g/dL Final     Albumin   Date Value Ref Range Status   03/06/2017 1.6 (L) 3.4 - 5.0 g/dL Final     Globulin   Date Value Ref Range Status   03/06/2017 4.0 2.0 - 4.0 g/dL Final     A-G Ratio   Date Value Ref Range Status   03/06/2017 0.4 (L) 0.8 - 1.7   Final     AST (SGOT)   Date Value Ref Range Status   03/06/2017 33 15 - 37 U/L Final     Alk. phosphatase   Date Value Ref Range Status   03/06/2017 231 (H) 45 - 117 U/L Final     No results for input(s): TP, ALB, GLOB, AGRAT, SGOT, GPT, AP, TBIL in the last 72 hours. No lab exists for component: DBIL       Cardiac Enzymes Lab Results   Component Value Date/Time     (H) 03/13/2017 12:10 PM     (H) 03/13/2017 03:30 AM    CKMB 11.7 (H) 03/13/2017 12:10 PM    CKMB 10.4 (H) 03/13/2017 03:30 AM    CKND1 3.0 03/13/2017 12:10 PM    CKND1 4.5 (H) 03/13/2017 03:30 AM    TROIQ 0.07 (H) 03/13/2017 12:10 PM    TROIQ 0.10 (H) 03/13/2017 03:30 AM        BNP No results found for: BNP, BNPP, XBNPT     Coagulation No results for input(s): PTP, INR, APTT in the last 72 hours.     No lab exists for component: INREXT      Thyroid  Lab Results   Component Value Date/Time    TSH 1.55 05/08/2016 06:21 AM          Lipid Panel No results found for: CHOL, CHOLPOCT, CHOLX, CHLST, CHOLV, [id number], HDL, LDL, NLDLCT, DLDL, LDLC, DLDLP, [id number], VLDLC, VLDL, TGL, Yina Captain, [id number], Yvan Rayo, [id number], [facility], [facility]       Urinalysis Lab Results   Component Value Date/Time    Color YELLOW 03/12/2017 08:10 PM    Appearance CLOUDY 03/12/2017 08:10 PM    Specific gravity 1.020 03/12/2017 08:10 PM    pH (UA) 5.5 03/12/2017 08:10 PM    Protein 100 03/12/2017 08:10 PM    Glucose NEGATIVE  03/12/2017 08:10 PM    Ketone NEGATIVE  03/12/2017 08:10 PM    Bilirubin NEGATIVE  03/12/2017 08:10 PM    Urobilinogen 0.2 03/12/2017 08:10 PM    Nitrites NEGATIVE  03/12/2017 08:10 PM    Leukocyte Esterase LARGE 03/12/2017 08:10 PM    Epithelial cells FEW 03/12/2017 08:10 PM    Bacteria 3+ 03/12/2017 08:10 PM    WBC TOO NUMEROUS TO COUNT 03/12/2017 08:10 PM    RBC TOO NUMEROUS TO COUNT 03/12/2017 08:10 PM        XR (Most Recent). CXR reviewed by me and compared with previous CXR   Results from Hospital Encounter encounter on 03/03/17   XR CHEST PORT   Narrative EXAM: Portable semiupright chest, one view    INDICATION: Follow-up complete left lung atelectasis    COMPARISON: Earlier the same day    _______________    FINDINGS:    Endotracheal tube is 1.9 cm above the garth, MediPort catheter projects at the  distal SVC, nasogastric tube is present at least to the EG junction with its tip  below the range of imaging. The top of an IVC filter is visible in the upper  abdomen. The mid to upper left lung is aerated. There remains consolidation of  nearly half of the left lung base, with persistent slight mediastinal shift to  the left, although improved mediastinal shift. The right lung is clear.  The  right costophrenic angle is sharp.    _______________         Impression IMPRESSION:    Partial reexpansion of left lung atelectasis, with residual left basilar  atelectasis/infiltrate plus or minus left pleural effusion. CT (Most Recent)   Results from Hospital Encounter encounter on 03/03/17   CT HEAD WO CONT   Narrative EXAM: CT head    INDICATION: Severe thrombocytopenia    COMPARISON: None. TECHNIQUE: Axial CT imaging of the head was performed without intravenous  contrast. Coronal and sagittal reconstructions were performed. One or more dose reduction techniques were used on this CT: automated exposure  control, adjustment of the mAs and/or kVp according to patient's size, and  iterative reconstruction techniques. The specific techniques utilized on this CT  exam have been documented in the patient's electronic medical record.     _______________    FINDINGS:    BRAIN AND POSTERIOR FOSSA: The sulci, folia, ventricles and basal cisterns are  within normal limits for the patient?s age. There is no intracranial hemorrhage,  mass effect, or midline shift. There are no areas of abnormal parenchymal  attenuation. EXTRA-AXIAL SPACES AND MENINGES: There are no abnormal extra-axial fluid  collections. CALVARIUM: Intact. SINUSES: Clear. OTHER: Visual is global structures and mastoid air cells are unremarkable.    _______________         Impression IMPRESSION:      1. No evidence of acute infarct, hemorrhage or mass. Normal noncontrast CT  brain. As the attending radiologist I have personally reviewed the study and this  report, and concur with the above stated findings. EKG No results found for this or any previous visit. ECHO No results found for this or any previous visit. PFT No flowsheet data found.      Other ASA reactivity:   Pre-albumin:   Ionized Calcium:   NH4:   T3, FT4:  Cortisol:  Urine Osm:  Urine Lytes:   HbA1c:      The patient is: [x] acutely ill Risk of deterioration: [] moderate    [x] critically ill  [] high     [x]See my orders for details    My assessment, plan of care, findings, medications, side effects etc were discussed with:  [x]nursing []PT/OT [x]respiratory therapy [x]Dr. Perla Pickett   []family []      Marion Connor NP  Pulmonary Critical Care & Sleep Medicine   [address] [place] [address], [address] [place] [address]  [phone]

## 2017-03-13 NOTE — ROUTINE PROCESS
Bedside and Verbal shift change report given to [address] [place] [address],[phone] (oncoming nurse) by Asuncion Landrum RN   (offgoing nurse).  Report included the following information SBAR, Kardex and Cardiac Rhythm SR.

## 2017-03-13 NOTE — PROGRESS NOTES
Chart reviewed. RRT over w/e and pt currently intubated and sedated. Case-management will need to re-address discharge plan when appropriate.  Carmen BRAVO

## 2017-03-13 NOTE — WOUND CARE
General Progress Note    Patient: Lucas Storey MRN: [id number] LOS: 8     YOB: 1960  Age: 49 y.o. Sex: female        Admit Date: 3/3/2017      Subjective:   Patient is intubated and sedated at this time and cannot participate in discussion of care plan at this time. Patient seen at bedside with Gretchen Harry, [address] [place] [address] (wound care), Tracey Moreira RN (wound care), Margoth Burk RN (patient's current nurse), and Jacinda Ybarra RN (patient's current nurse). Report to Pratima Garcia and Jacinda Ybarra RN at bedside.     Objective:     Vitals  Patient Vitals for the past 8 hrs:   BP Pulse Resp SpO2 Weight   03/13/17 1205 - 93 24 100 % -   03/13/17 1100 134/86 82 - 100 % -   03/13/17 1045 - 83 - 100 % -   03/13/17 1030 131/40 84 - 100 % -   03/13/17 1000 148/75 84 - - -   03/13/17 0930 (!) 152/99 90 - - -   03/13/17 0900 (!) 140/96 84 - - -   03/13/17 0839 - 85 27 100 % -   03/13/17 0830 143/80 81 - - -   03/13/17 0800 132/72 83 - - -   03/13/17 0700 124/67 84 - 100 % -   03/13/17 0630 - 92 - 100 % 117.3 kg (258 lb 9.6 oz)   03/13/17 0600 111/63 86 28 100 % -   03/13/17 0530 113/59 89 - 100 % -   03/13/17 0500 107/66 90 - 100 % -   03/13/17 0430 111/67 100 - 100 % -       I/O Current Shift  03/13 0701 - 03/13 1900  In: 813.6 [I.V.:813.6]  Out: 210 [Urine:210]    I/O Last Three Shifts  03/11 1901 - 03/13 0700  In: 70171.9 [I.V.:17606.9]  Out: 2011 [Urine:1745]      Data Review  Recent Results (from the past 24 hour(s))   MAGNESIUM    Collection Time: 03/12/17  1:55 PM   Result Value Ref Range    Magnesium 2.0 1.8 - 2.4 mg/dL   GLUCOSE, POC    Collection Time: 03/12/17  5:13 PM   Result Value Ref Range    Glucose (POC) 122 (H) 70 - 110 mg/dL   URINALYSIS W/MICROSCOPIC    Collection Time: 03/12/17  8:10 PM   Result Value Ref Range    Color YELLOW      Appearance CLOUDY      Specific gravity 1.020 1.005 - 1.030      pH (UA) 5.5 5.0 - 8.0      Protein 100 (A) NEG mg/dL    Glucose NEGATIVE  NEG mg/dL    Ketone NEGATIVE  NEG mg/dL    Bilirubin NEGATIVE NEG      Blood LARGE (A) NEG      Urobilinogen 0.2 0.2 - 1.0 EU/dL    Nitrites NEGATIVE  NEG      Leukocyte Esterase LARGE (A) NEG      WBC TOO NUMEROUS TO COUNT 0 - 4 /hpf    RBC TOO NUMEROUS TO COUNT 0 - 5 /hpf    Epithelial cells FEW 0 - 5 /lpf    Bacteria 3+ (A) NEG /hpf   CULTURE, RESPIRATORY/SPUTUM/BRONCH W GRAM STAIN    Collection Time: 03/12/17  9:23 PM   Result Value Ref Range    Special Requests: NO SPECIAL REQUESTS      GRAM STAIN >25 WBC/lpf      GRAM STAIN <10 EPI/lpf      GRAM STAIN MUCUS PRESENT      GRAM STAIN RARE  YEAST        Culture result: PENDING    GLUCOSE, POC    Collection Time: 03/13/17 12:25 AM   Result Value Ref Range    Glucose (POC) 135 (H) 70 - 110 mg/dL   MAGNESIUM    Collection Time: 03/13/17  3:30 AM   Result Value Ref Range    Magnesium 1.7 (L) 1.8 - 2.4 mg/dL   CBC WITH AUTOMATED DIFF    Collection Time: 03/13/17  3:30 AM   Result Value Ref Range    WBC 30.4 (H) 4.6 - 13.2 K/uL    RBC 2.77 (L) 4.20 - 5.30 M/uL    HGB 7.6 (L) 12.0 - 16.0 g/dL    HCT 23.8 (L) 35.0 - 45.0 %    MCV 85.9 74.0 - 97.0 FL    MCH 27.4 24.0 - 34.0 PG    MCHC 31.9 31.0 - 37.0 g/dL    RDW 16.7 (H) 11.6 - 14.5 %    PLATELET 614 (L) 654 - 420 K/uL    MPV 10.5 9.2 - 11.8 FL    NEUTROPHILS 89 (H) 40 - 73 %    LYMPHOCYTES 7 (L) 21 - 52 %    MONOCYTES 4 3 - 10 %    EOSINOPHILS 0 0 - 5 %    BASOPHILS 0 0 - 2 %    ABS. NEUTROPHILS 27.1 (H) 1.8 - 8.0 K/UL    ABS. LYMPHOCYTES 2.0 0.9 - 3.6 K/UL    ABS. MONOCYTES 1.3 (H) 0.05 - 1.2 K/UL    ABS. EOSINOPHILS 0.0 0.0 - 0.4 K/UL    ABS.  BASOPHILS 0.0 0.0 - 0.06 K/UL    DF AUTOMATED     METABOLIC PANEL, BASIC    Collection Time: 03/13/17  3:30 AM   Result Value Ref Range    Sodium 141 136 - 145 mmol/L    Potassium 3.7 3.5 - 5.5 mmol/L    Chloride 113 (H) 100 - 108 mmol/L    CO2 12 (L) 21 - 32 mmol/L    Anion gap 16 3.0 - 18 mmol/L    Glucose 111 (H) 74 - 99 mg/dL    BUN 44 (H) 7.0 - 18 MG/DL    Creatinine 1.78 (H) 0.6 - 1.3 MG/DL    BUN/Creatinine ratio 25 (H) 12 - 20 GFR est AA 36 (L) >60 ml/min/1.73m2    GFR est non-AA 30 (L) >60 ml/min/1.73m2    Calcium 7.1 (L) 8.5 - 10.1 MG/DL   CALCIUM, IONIZED    Collection Time: 03/13/17  3:30 AM   Result Value Ref Range    Ionized Calcium 0.99 (L) 1.12 - 1.32 MMOL/L   PHOSPHORUS    Collection Time: 03/13/17  3:30 AM   Result Value Ref Range    Phosphorus 3.8 2.5 - 4.9 MG/DL   CARDIAC PANEL,(CK, CKMB & TROPONIN)    Collection Time: 03/13/17  3:30 AM   Result Value Ref Range     (H) 26 - 192 U/L    CK - MB 10.4 (H) <3.6 ng/ml    CK-MB Index 4.5 (H) 0.0 - 4.0 %    Troponin-I, Qt. 0.10 (H) 0.0 - 0.045 NG/ML   POC G3    Collection Time: 03/13/17  8:55 AM   Result Value Ref Range    Device: VENT      FIO2 (POC) 60 %    pH (POC) 7.294 (L) 7.35 - 7.45      pCO2 (POC) 24.2 (L) 35.0 - 45.0 MMHG    pO2 (POC) 209 (H) 80 - 100 MMHG    HCO3 (POC) 11.7 (L) 22 - 26 MMOL/L    sO2 (POC) 100 (H) 92 - 97 %    Base deficit (POC) 15 mmol/L    Mode ASSIST CONTROL      Tidal volume 484 ml    Set Rate 12 bpm    PEEP/CPAP (POC) 5 cmH2O    PIP (POC) 26      Allens test (POC) N/A      Inspiratory Time 1 sec    Total resp. rate 28      Site RIGHT RADIAL      Patient temp. 37.5      Specimen type (POC) ARTERIAL      Performed by Craig Watts     Pressure control YES               Assessment:     Wound Presentation:                   03/13/17 1213   Wound Thigh Posterior;Right;Medial   Date First Assessed/Time First Assessed: 03/05/17 1800   POA: Yes  Wound Type: Moisture Assoc. Skin Damage  Location: Thigh  Orientation: Posterior;Right;Medial   DRESSING TYPE Open to air   Tissue Type Red;Maroon/purple;Pink   Tissue Type Percent Maroon/Purple 50 %   Tissue Type Percent Pink 25   Tissue Type Percent Red 25   Periwound Skin Condition Ecchymosis   Dressing Type Applied Zinc based paste   Procedure Tolerated Well   Wound Leg Left; Lower;Medial   Date First Assessed/Time First Assessed: 12/29/15 1125   POA: Yes  Wound Type: Vascular  Location: Leg  Orientation: Left;Lower;Medial   DRESSING TYPE Open to air   Condition of Edges Closed   Tissue Type Maroon/purple   Tissue Type Percent Maroon/Purple 100 %   Dressing Type Applied Open to air   Procedure Tolerated Well   Wound Abdomen   Date First Assessed/Time First Assessed: 07/02/16 0900   POA: Yes  Wound Type: (c) Other (comment)  Location: (c) Abdomen   DRESSING STATUS Removed   DRESSING TYPE Other (Comment)  (Bordered Foam)   Condition of Edges Open   Tissue Type Pink;Red;Maroon/purple   Tissue Type Percent Maroon/Purple 50 %   Tissue Type Percent Pink 25   Tissue Type Percent Red 25   Drainage Amount  Small    Drainage Color Serous   Periwound Skin Condition Ecchymosis   Dressing Type Applied Other (Comment)  (Bordered Foam)   Procedure Tolerated Well   Wound Sacral/coccyx   Date First Assessed/Time First Assessed: 03/13/17 1212   POA: Yes  Wound Type: Pressure ulcer;Moisture Assoc. Skin Damage  Location: Sacral/coccyx   DRESSING STATUS Removed   DRESSING TYPE Other (Comment)  (Bordered Foam)   Condition of Base Pink   Tissue Type Pink   Tissue Type Percent Pink 100   Dressing Type Applied Other (Comment)  (Bordered Foam)   Procedure Tolerated Well       Principal Problem:    Severe sepsis with septic shock (CODE) ([facility]) (3/3/2017)    Active Problems:    History of ovarian cancer (3/18/2015)      Morbid obesity ([facility] (10/2/2014)      CAD (coronary artery disease) (12/29/2015)      DEV (acute kidney injury) ([facility] (12/29/2015)      A-fib ([facility] (12/29/2015)      UTI (urinary tract infection) (3/3/2017)      Severe thrombocytopenia ([facility] (3/4/2017)        Patient turned to left side; bed rails up x3, bed low and locked, lights dimmed for comfort, and call bell within patient's reach. Patient unable to verbalize understanding or participate in discussion of care plan as she is intubated and sedated. Plan:     Please continue to protect moisture prone areas on thighs and perineal area with zinc based barrier.     Please continue to protect sacrum with Mepilex Sacral border and change every three days or if soiled/saturated. Please continue to turn and reposition patient at least once every two hours. Please continue to keep Prevalon boots on bilateral feet at all times.     Lydia Shone BSN, RN, Cedric Mandujano

## 2017-03-13 NOTE — ROUTINE PROCESS
Bronhcoscopy completed. Patient tolerated well. Sedation of propofol increased during the procedure, Pressor of  Levophed decreased.

## 2017-03-14 NOTE — PROCEDURES
PROCEDURE    Mon3/13/2017   1300 PM  Bronchoscopy while intubated  Consent: 2 physician as POC not returning calls  Findings: no lesion or mucus plugging on right; SEVERE mucus plugging on left required 15 minutes or repeated suctioning to clear; may need repeat procedure before extubation. NOTE: ETT tip repositioned at 2.5cm above garth by direct viz: at teeth now 20cm.          -[facility] [phone]

## 2017-03-14 NOTE — PROGRESS NOTES
INFECTIOUS DISEASE FOLLOW UP NOTE :-     Admit Date: 3/3/2017    Current  Prior    Daptomycin 3/8-6  aztreonam/anidulafungin 3/12-2 linezolid 3/4 - 3, vanc/zosyn/levoflox 3/3 -0     ASSESSMENT: -> RECS     Sepsis POA  - fever 102 , leukocytosis 28K, tachycardia, hypotension  - source VRE UTI and BSI or DVT  -allergic allergic reaction and IE also in DDX  - WBC sl lower 42->30->19k today ->monitor on antimicrobials broadened 3/12- likely stop aztreonam soon if no GNR isolate  ->prognosis remains guarded    Resp failure NTB 3/11  -initial cxr nad ->white out L s/o mucus plug, SP bronch 3/13 extensive L mucus plugging ->pul toilet etc per PCCM- cxr better again today  ->monitor sput- GS yeast    VRE BSI with ?aortic endocarditis  - blcx 3/3 2/2 VRE positive   - repeat 3/6 ntd   - TTE aortic stenosis with possible aortic vegetation [ chronic mixed calcified density ]  - source likely UTI; pt refused ESSIE and removal of mediport - Cont Daptomycin  ->weekly CK on dapto- 209 3/14  ->monitor cultures-  3/8 bctx's ng, 3/12 ngtd   VRE UTI, complicated   - UA with large LE , wbc tntc, jamar 4+  - ucx >100k VRE  - CT with no hydronephrosis but has b/l ureteral stents  - appreciate urology consult and eventual plan for stent exchange/removal  - abx as above  ->stent mgmt per      ADR - Rash over abdomen/chest   - secondary to linezolid which has been listed from before as allergy - rash fading off linezolid  ->monitor off linezolid    Lymphedema with R medial thigh blister- not currently infected appearing  -massive pannus with intertrigo  ->added systemic antifungal to topical   RLE subacute DVT  - per primary team   Rt pelvic mass ? ovarian ca / endometrial ca with mets  - palliative care involved , was hospice at home and now revoked    DEV due to sepsis - ~slowly better, Cr 1.9->1.8  today     Thrombocytopenia -better 94-> 120k today  ->monitor   Blood loss anemia        MICROBIOLOGY:   3/3  Blcx x 2 - 2/2 VRE S gent, linezolid , daptomycin quinupristin         Ucx > 100K VRE  3/4 Flu Ag test neg   3/6  Blcx x 2 ng  3/8  Blcx x 2 ng   3/11 uctx ng  3/12 bctx x 2 ngtd   sput GS prelim C albicans    LINES AND CATHETERS:   Rt chest mediport     SUBJECTIVE :     Interval notes reviewed. Remains in ICU, sput prelim yeast and CXR better again today p therapeutic bronch yesterday. Pt unresponsive no family at bedside d/w nursing no new concerns reported.       MEDICATIONS :     Current Facility-Administered Medications   Medication Dose Route Frequency    0.9% sodium chloride infusion 250 mL  250 mL IntraVENous PRN    enoxaparin (LOVENOX) injection 40 mg  40 mg SubCUTAneous Q24H    chlorhexidine (PERIDEX) 0.12 % mouthwash 10 mL  10 mL Oral Q12H    albuterol-ipratropium (DUO-NEB) 2.5 MG-0.5 MG/3 ML  3 mL Nebulization Q6H RT    0.9% sodium chloride infusion 250 mL  250 mL IntraVENous PRN    0.9% sodium chloride infusion 250 mL  250 mL IntraVENous PRN    0.9% sodium chloride infusion 250 mL  250 mL IntraVENous PRN    propofol (DIPRIVAN) infusion  0-50 mcg/kg/min IntraVENous TITRATE    dextrose 5% lactated ringers infusion  50 mL/hr IntraVENous CONTINUOUS    pantoprazole (PROTONIX) 40 mg in sodium chloride 0.9 % 10 mL injection  40 mg IntraVENous DAILY    aztreonam (AZACTAM) 1 g in 0.9% sodium chloride (MBP/ADV) 100 mL MBP  1 g IntraVENous Q8H    chlorhexidine (PERIDEX) 0.12 % mouthwash 15 mL  15 mL Oral BID    sodium chloride (NS) flush 5-10 mL  5-10 mL IntraVENous Q8H    sodium chloride (NS) flush 5-10 mL  5-10 mL IntraVENous PRN    fentaNYL (PF)  mcg/30 ml   IntraVENous TITRATE    anidulafungin (ERAXIS) 100 mg in 0.9% sodium chloride 130 mL IVPB  100 mg IntraVENous Q24H    nystatin (MYCOSTATIN) 100,000 unit/gram powder   Topical BID    ELECTROLYTE REPLACEMENT PROTOCOL  1 Each Other PRN    ELECTROLYTE REPLACEMENT PROTOCOL  1 Each Other PRN    ELECTROLYTE REPLACEMENT PROTOCOL  1 Each Other PRN    ELECTROLYTE REPLACEMENT PROTOCOL  1 Each Other PRN    DAPTOmycin (CUBICIN) 719 mg in 0.9% sodium chloride 50 mL IVPB RF formulation  6 mg/kg IntraVENous Q24H    sodium bicarbonate tablet 650 mg  650 mg Oral TID    bisacodyl (DULCOLAX) suppository 10 mg  10 mg Rectal DAILY PRN    glucose chewable tablet 16 g  16 g Oral PRN    glucagon (GLUCAGEN) injection 1 mg  1 mg IntraMUSCular PRN    dextrose (D50W) injection syrg 12.5-25 g  25-50 mL IntraVENous PRN    acetaminophen (TYLENOL) tablet 650 mg  650 mg Oral Q4H PRN    ondansetron (ZOFRAN) injection 4 mg  4 mg IntraVENous Q4H PRN    sodium chloride (NS) flush 5-10 mL  5-10 mL IntraVENous PRN       OBJECTIVE :     Visit Vitals    /71 (BP 1 Location: Left arm, BP Patient Position: At rest)    Pulse 97    Temp 96.8 °F (36 °C)    Resp 22    Ht 5' 4\" (1.626 m)    Wt 121.1 kg (266 lb 15.6 oz)    SpO2 96%    BMI 45.83 kg/m2       Temp (24hrs), Av.9 °F (37.2 °C), Min:96.8 °F (36 °C), Max:99.7 °F (37.6 °C)    GEN - unresponsive ill appearing WF appears older than stated age in ICU on vent  HEENT - conj pale, AMADOU in place, NGT in place  NECK- R mediport accessed no LAD   RESP- scattered rhochi  CVS- tachy III/VI systolic murmur   ABD-massive pannus, large area of ecchymosis at lower abdominal fold fragile skin, bs present   EXT- (+) edema   SKIN -improved rash over abdomen ,intertrigo in groin folds, denuded skin R med thigh covered not infected appearing. (+) BLE edema   NEURO - unresponsive.      Labs: Results:   Chemistry Recent Labs      17   0345  17   0330  17   0415   GLU  90  111*  124*   NA  142  141  140   K  3.8  3.7  4.1   CL  113*  113*  111*   CO2  12*  12*  13*   BUN  45*  44*  50*   CREA  1.77*  1.78*  1.91*   CA  7.3*  7.1*  7.3*   AGAP  17  16  16   BUCR  25*  25*  26*      CBC w/Diff Recent Labs      17   0345  17   0330 03/12/17   0415   WBC  19.2*  30.4*  41.6*   RBC  2.34*  2.77*  3.00*   HGB  6.4*  7.6*  8.3*   HCT  20.0*  23.8*  26.5*   PLT  103*  120*  94*   GRANS  88*  89*  91*   LYMPH  9*  7*  5*   EOS  0  0  0        cxr 3/14 IMPRESSION:     1. Minimal patchy atelectasis or infiltrate at the left lung base, slightly  improved. CT abdo/pelvis 3/3 - IMPRESSION:  1. New cystic mass right adnexal region. Recurrent ovarian carcinoma cannot be  excluded. 2. New right middle lobe pulmonary nodule. Cannot rule out metastatic disease. 3. Bilateral ureteral stents with resolved hydronephrosis. 4. Stable or minimally increased left periaortic retroperitoneal adenopathy. Echo 3/7 - EF 55%, Aortic valve: The valve was trileaflet. Leaflets exhibited moderate  calcification and moderately reduced cuspal separation. There was moderate  stenosis. There was mild regurgitation. There was small-medium size fix  partially calcified echodensity noted attach to right coronary cusp. Could  represent vegetation. Valve mean gradient was 34 mmHg. Aortic valve area  was 1 cm-sq by the continuity equation. ua Results for Mike Gruber (MRN [id number]) as of 3/12/2017 17:51   Ref. Range 3/11/2017 04:30   Color Latest Units:   ORANGE   Appearance Latest Units:   TURBID   Specific gravity Latest Ref Range: 1.005 - 1.030   1.019   pH (UA) Latest Ref Range: 5.0 - 8.0   5.5   Protein Latest Ref Range: NEG mg/dL 100 (A)   Glucose Latest Ref Range: NEG mg/dL NEGATIVE   Ketone Latest Ref Range: NEG mg/dL NEGATIVE   Blood Latest Ref Range: NEG   LARGE (A)   Bilirubin Latest Ref Range: NEG   NEGATIVE   Urobilinogen Latest Ref Range: 0.2 - 1.0 EU/dL 0.2   Nitrites Latest Ref Range: NEG   NEGATIVE   Leukocyte Esterase Latest Ref Range: NEG   LARGE (A)   Epithelial cells Latest Ref Range: 0 - 5 /lpf NEGATIVE   WBC Latest Ref Range: 0 - 4 /hpf TOO NUMEROUS TO C. ..    RBC Latest Ref Range: 0 - 5 /hpf 21 to 35   Bacteria Latest Ref Range: NEG /hpf 2+ (A) Arina Ramirez MD  March 14, 2017  [facility] AT THE [facility]  [phone]

## 2017-03-14 NOTE — PROGRESS NOTES
2000 assumed care of pt.  2030 suctioned secretions as needed. 2200 pt.turned & repositioned. 0200 complete bath given. 1161 notified Rojas Benítez of low H&H results with orders made & carried out. Bedside and Verbal shift change report given to [address] [place] [address] Dr RN (oncoming nurse) by Yesenia Melvin RN (offgoing nurse). Report included the following information SBAR, Kardex, Intake/Output, MAR and Recent Results.

## 2017-03-14 NOTE — PROGRESS NOTES
Chart reviewed. Pt remains intubated. She has medicaid and may qualify for long term care in nursing home or at home with Personal care aide. All depends on pts condition and ability to extubate and her level of conciousness. Will continue to monitor for needs.

## 2017-03-14 NOTE — PROGRESS NOTES
0730 Bedside and Verbal shift change report given to Lucia Tobin RN and Sloan Edwards RN (oncoming nurse) by Chayo Ash RN (offgoing nurse). Report included the following information SBAR, Procedure Summary, Intake/Output, MAR and Recent Results. Pt resting comfortably in bed, NAD, no indication of pain. Bed locked and lowered, siderails up, HOB elevated to 30. Will continue to monitor. 0915 Assessment completed, scheduled meds provided. Oral and byrne care provided. Pt resting comfortably in bed, no other needs at this time. Will continue to monitor. 1000 PIV access obtained for blood administration. 1115 Blood transfusion started. Pt tolerating well, will continue to monitor. 1130 Rounding completed. Pt receiving 1 unit of PRBCs for H/H of 6.4/20.0 with lasix admin post transfusion, recheck CBC 2hr post transfusion. Spontaneous breathing trail ordered after transfusion completed. Pt started on lovenox for pt's history of DVT and Afib. Pt will begin nutrition based on results of spontaneous breathing test. Will continue to monitor. 1405 Blood transfusion completed. Lasix provided. Order for CBC placed per verbal order from Dr Severo Postal. 1522 Sedation stopped for spontaneous breathing trial.     1545 RT therapist not present, Sedation restarted. 1630 Pt turned and repositioned, byrne care completed. Zinc applied to wounds. Bed pads changed. mediport dressing changed. Pt needs met at this time, will continue to monitor. 36 RT unable to coordinate SBT post transfusion, as well as, with several attempts after. Per RT, therapist will notify Dr Severo Postal. [address] Dr Severo Postal paged in regards to decision about nutrition. Per Dr Severo Postal proceed with diet orders placed. 1910 NGT feeding started, placement verified via auscultation, no gastric residual. Feeding started at 10ml/hr per order.      1915 Bedside and Verbal shift change report given to Ella Paulino RN (oncoming nurse) by [facility] Jayde Sullivan RN (offgoing nurse). Report included the following information SBAR, Procedure Summary, Intake/Output, MAR and Recent Results.

## 2017-03-14 NOTE — PROGRESS NOTES
Daily Progress Note: 3/14/2017 2:48 PM   Admit Date: 3/3/2017    Patient seen in follow up for multiple medical problems as listed below:  Patient Active Problem List   Diagnosis Code    Anemia D64.9    Anxiety F41.9    Nausea and vomiting R11.2    Morbid obesity ([facility] E66.01    Chronic pain syndrome G89.4    History of ovarian cancer Z85.43    History of gastric bypass Z98.890    CAD (coronary artery disease) I25.10    History of Clostridium difficile Z87.19    History of endometrial cancer Z85.42    History of GI bleed Z87.19    Lymphedema I89.0    Hypokalemia E87.6    DEV (acute kidney injury) ([facility] N17.9    Pulmonary nodules R91.8    [facility] I48.91    Aortic stenosis I35.0    GI bleed K92.2    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 female who is admitted for severe sepsis and shock, urosepsis and severe thrombocytopenia. Patient was on Hospice until now. She presents with c/o altered mental status via EMS with cyanotic/desirae complextion. Patient was found AMS with blood sugar low 40's. There was no IV assess and she was given Glucagon and her mentation and blood sugar improved. Patient in non diabetic. She has endometrial cancer. In ED she has urosepsis and hypotension requiring pressor support after 30 cc/kg BOLUS normal saline. Patient has Mediport. She also was febrile with Tmax 101.6. Denies cough, sob, chest pain. She was started on antibiotics . Hx VRE UTI noted. She reports having foul smelling urine and vaginal discharge. Patient had a platelet level of 11,983 with no bleed requiring platelet transfusion initially.   She improved and was transferred to the floor until an RRT was called 3/11 for acute hypoxic respiratory failure requiring intubation. Bronchospy 3/13 with significant mucous plugging.      Hypoxic resp failure - intubated  -Bronchospy 3/13 with significant mucous plugging.   -Cx pending on eraxis, azactam, dapto    Subacute RLE DVT  -thought due to obstructing pelvic CA  -LMWH. S/p IVC filter placcement    Severe sepsis with shock  - 2/2 UTI. Leukocytosis, tachy, resolving  - off Levophed for pressor support goal MAP > 65, Zyvox, Levaquin, zosyn  - Follow urine and blood culture: GPC in blood  - Intensivist consulting      UTI  - hx VRE UTI,again with VRE. Immunosuppression endometrial cancer  - On Zyvox , Zosyn , Levaquin initially then to zyvox. - repeat UA/Cx 3/14    Severe thrombocytopenia  - Platelet down to 9k on admission s/p transfusions x2. Would try and keep >25K      Encephalopathy  - 2/2 Hypoglycemia vs urosepsis vs hypotension vs uremia  - resolved . CT head non-acute      Hypoglycemia   - Resolved       ARF  - cr 6.55/Wrw759 ->improving Cr into 1's  - severe prerenal with UREMIA  - IVF. Na Bicarb added      Anemia   - Hgb 8.8  - likely 2/2 Endometrial malignancy   - Transfuse if hemoglobin < 7      Hx of Ovarian and Endometrial Cancer with abdominal pain  - Patient was on Hospice until today. Daughter has rescinded her DDNR and Hospice and now wants to be FULL CODE  - Palliative care consult monday  - Lower abdomen pain Likely due to new R adnexal mass from ovarian CA. Discuss further pending hospice plans  - pain control  -Mediport for IV drug use, if no urinary source may need to be ruled out as a possible source     Hyponatremia   - IVF corrected.      Vaginal discharge   - Wet prep ordered     Chronic problems  1. Recurrent gastrointestinal bleed with multiple  esophagogastroduodenoscopies in the past year with negative results. 2. History of ovarian cancer. 3. History of endometrial cancer. 4. Bladder mass, status post cystoscopy and biopsy results pending. 5. Acute blood loss anemia, stable.   6. Chronic back pain. 7. Chronic abdominal pain. 8. Nausea and vomiting secondary to above     DVT Protocol Active: SCDs. consider arixtra  GI - PPI  Code Status: Full Code     Disposition: Req 48h hosp. Home with hospice. Subjective:     CC: Altered mental status    Interval History: steady improvement. Lungs sound excellent. Urine has been getting progressively dark. Continued SBT and vent wean        Objective:     Visit Vitals    /71 (BP 1 Location: Left arm, BP Patient Position: At rest)    Pulse 97    Temp 96.8 °F (36 °C)    Resp 22    Ht 5' 4\" (1.626 m)    Wt 121.1 kg (266 lb 15.6 oz)    SpO2 96%    BMI 45.83 kg/m2       Temp (24hrs), Av.9 °F (37.2 °C), Min:96.8 °F (36 °C), Max:99.7 °F (37.6 °C)        Intake/Output Summary (Last 24 hours) at 17 1357  Last data filed at 17 0600   Gross per 24 hour   Intake          2316.15 ml   Output              850 ml   Net          1466.15 ml       Gen: intubated, sedated, NAD  HEENT:  KARINA  Neck: No Bruits/JVD   Lungs:   CTAB. mod respiratory effort  Heart:   RR S1 S2 without M/R/G  Abdomen: obese large panus,NT, BSX4,   Extremities:   trace LE edema. No cyanosis.   Skin:  no jaundice/ lots of skin hanging off arms/legs      Data Review:     Meds/Labs/Tests reviewed    Current Shift:     Last three shifts:   1901 -  0700  In: 6786.8 [I.V.:6606.8]  Out: 192 [Urine:1360]  Recent Labs      17   0330  17   041   WBC  19.2*  30.4*  41.6*   RBC  2.34*  2.77*  3.00*   HGB  6.4*  7.6*  8.3*   HCT  20.0*  23.8*  26.5*   PLT  103*  120*  94*   GRANS  88*  89*  91*   LYMPH  9*  7*  5*   EOS  0  0  0       Recent Labs      17   03417   0330  17   0415  17   BUN  45*  44*  50*  51*   CREA  1.77*  1.78*  1.91*  1.97*   CA  7.3*  7.1*  7.3*  7.3*   K  3.8  3.7  4.1  4.4   NA  142  141  140  140   CL  113*  113*  111*  110*   CO2  12*  12*  13*  17*   PHOS  4.2  3.8  3.9  5.0* GLU  90  111*  124*  148*        Lab Results   Component Value Date/Time    Glucose 90 03/14/2017 03:45 AM    Glucose 111 03/13/2017 03:30 AM    Glucose 124 03/12/2017 04:15 AM    Glucose 148 03/11/2017 07:40 PM    Glucose 148 03/11/2017 10:45 AM          Care coordination with Nursing/Consultants/staff: 10  Prior history, labs, and charting reviewed: 15    Procedures/Imaging:  Bronch 3/13    Total time spent with chart review, patient examination/education, discussion with staff on case,documentation and medication management / adjustment  :  30 Minutes      Dr Carrion [facility]  Pager: [phone]

## 2017-03-14 NOTE — PROGRESS NOTES
Problem: Ventilator Management  Goal: *Adequate oxygenation and ventilation  Outcome: Progressing Towards Goal  Vent day 4  - vent orders :                Mode: PRESSURE CONTROL         PEEP 5.0 CM/H2O       Rate: 12       Inspiratory Pressure 20       FIO2 100%               - ABG showing progression towards goal: ABG PO2 303 on 60% / FIO2 reduced to 45%  - Vent concerns:              - vent seem to be self cycling with MV>15 lpm , trigger changed to assist              - VT > 650 stacking , PC to 15 VT >500     Plan: Discuss goals and concerns in ICU rounding                  1140 ICU rounds   - Okay with PC adjustment to meet VT goal of 500   - Try SBT following positive response to diuretic    - Discuss sedation concerns with RN    1800 Unable to coordinate SAB with SBT .  Will try in a.m    - new vent adjustment following today's assessments and trials     -     Answer Comment      Mode: PRESSURE CONTROL      PEEP 5.0 CM/H2O      Rate: 12      Inspiratory Pressure 15      FIO2 40%

## 2017-03-14 NOTE — PROGRESS NOTES
Patient in bed on back with head elevated - BBS equal and ess clear - ETT secure at 2- at the lips and moved to the right side of the mouth - patient suctioned and MARITZA tube cleared - MVB at [facility] - bent alarms on and functional    0040 - patient suctioned and MARITZA tube cleared - ETT moved to the center of the mouth    0432 - patient suctioned and MARITZA tube cleared - ETT moved to the right side of the mouth

## 2017-03-14 NOTE — PROGRESS NOTES
[facility]   Pulmonary, Critical Care, and Sleep Medicine     ICU Patient Progress Note    Name: Colby Christensen   : 1960   MRN: [id number]   Date: 3/14/2017    [x]I have reviewed the flowsheet and previous days notes. IMPRESSION/PLAN   Cardiovascular  Septic shock w/ wbc 41 and fever; urologic stent  2D echo with EF 55 - 48%, grade 1 diastolic, PA 45 - 50; also apparently SIGNIFICANT AS   Afib rate controlled  Vascular surgery team following      Respiratory  Multifactorial  Vent day # 4  Anasarca - diuresis before extubation,  Ascites? (vs obesity/ anasarca);  says she is \"very swollen\" c/w her baseline   Severe Mucus plugging LEFT side -> s/p bronch 3/13. Elevated right HD; no endocronchail lesion or mucus plugging seen at bronch today  Other: RML pulmonary nodule, possibly mets = not significant acute issue      Heme  Ovarian cancer  RLE DVT likely 2/2 venous outflow obstruction due to pelvic mass  Thrombocytopenia persistent but improved -> ok to start enoxaprin now at least VTEP dose  Hb down, 1 unit pRBC given, no external bleeding. Quality Care: PPI, DVT prophylaxis, HOB elevated, Infection control all reviewed and addressed. Events and notes from last 24 hours reviewed    Subjective/HPI:   3/12: Pt transferred from floor overnight with acute respiratory failure and hypotension, intubated emergently, started on levo and forrest. 3/13: Remain intubated, Bronchoscopy done, pt tolerated well. Off pressor  3/14: Hb down 6.4 today, 1 unit pRBC given, rpt CBC 2hr post transfusion, Lasix 40 mg X 1 given, follow periodic CXR to assess clearing. Creat stable, UO ok, HD stable-off pressor. SBT/sedation holiday per protocol. Plan to start TF if not met weaning extubation protocol.      [x]The patient is critically ill on  Unable to obtain    [x]Mechanical ventilation [x]Pressors   []BiPAP []               Medication Review:  · Pressors - none  · Sedation - Propofol and Fentanyl  · Antibiotics - Azactam, Daptomycin  · Pain - Fentanyl  · GI/ DVT - protonix/scd  · Others (other gtts)    Safety Bundles: VAP Bundle/ CAUTI/ Severe Sepsis Protocol    ROS:  Unable to obtain, intubated    Vital Signs:    Visit Vitals    /65    Pulse 87    Temp 96.8 °F (36 °C)    Resp 20    Ht 5' 4\" (1.626 m)    Wt 121.1 kg (266 lb 15.6 oz)    SpO2 99%    BMI 45.83 kg/m2       O2 Device: Endotracheal tube   O2 Flow Rate (L/min): 3 l/min   Temp (24hrs), Av.9 °F (37.2 °C), Min:96.8 °F (36 °C), Max:99.7 °F (37.6 °C)     Intake/Output:Last shift:       07 - 1900  In: -   Out: 325 [Urine:325]Last 3 shifts: 1901 -  0700  In: 6786.8 [I.V.:6606.8]  Out: 1925 [Urine:1360]    Intake/Output Summary (Last 24 hours) at 17 1512  Last data filed at 17 1100   Gross per 24 hour   Intake          2303.85 ml   Output             1100 ml   Net          1203.85 ml     Hemodynamics:   . @MAP     . @CVP       Ventilator Settings:  Ventilator  Mode: Pressure control  Respiratory Rate  Back-Up Rate: 12  Insp Time (sec): 1 sec  I:E Ratio: 1:1.8  Ventilator Volumes  Vt Set (ml): 450 ml  Vt Exhaled (Machine Breath) (ml): 673 ml  Vt Spont (ml): 415 ml  Ve Observed (l/min): 13.2 l/min  Ventilator Pressures  PC Set: 15  PIP Observed (cm H2O): 21 cm H2O  Plateau Pressure (cm H2O): 19 cm H2O  MAP (cm H2O): 10  PEEP/VENT (cm H2O): 5 cm H20    Mode Rate Tidal Volume Pressure FiO2 PEEP   Pressure control   450 ml    45 % 5 cm H20     Peak airway pressure: 21 cm H2O    Minute ventilation: 13.2 l/min      ARDS network Guidelines: Lung protective strategy and Pl pressure goals<30    Objective:     Physical Exam:   General: Intubated/vent, no acute distress  HEENT: ETT, pupils reactive, sclera anicteric, EOM intact  Neck: No adenopathy or thyroid swelling, no lymphadenopathy or JVD, supple  CVS: S1S2 no murmurs  RS: Mod AE bilaterally, no accessory muscle use, BS bilaterally decrease, no wheezing. Abd: soft, obese. Neuro: sedated  Extrm: +UE/LE edema, toes and fingers with color change  Lymph node: No obvious palpable lymph node appreciated.   Skin: multiple ulcers ( see wound care documentation) and scattered small areas ecchymosis    DATA:   Current Facility-Administered Medications   Medication Dose Route Frequency    0.9% sodium chloride infusion 250 mL  250 mL IntraVENous PRN    enoxaparin (LOVENOX) injection 40 mg  40 mg SubCUTAneous Q24H    chlorhexidine (PERIDEX) 0.12 % mouthwash 10 mL  10 mL Oral Q12H    albuterol-ipratropium (DUO-NEB) 2.5 MG-0.5 MG/3 ML  3 mL Nebulization Q6H RT    0.9% sodium chloride infusion 250 mL  250 mL IntraVENous PRN    0.9% sodium chloride infusion 250 mL  250 mL IntraVENous PRN    0.9% sodium chloride infusion 250 mL  250 mL IntraVENous PRN    propofol (DIPRIVAN) infusion  0-50 mcg/kg/min IntraVENous TITRATE    dextrose 5% lactated ringers infusion  50 mL/hr IntraVENous CONTINUOUS    pantoprazole (PROTONIX) 40 mg in sodium chloride 0.9 % 10 mL injection  40 mg IntraVENous DAILY    aztreonam (AZACTAM) 1 g in 0.9% sodium chloride (MBP/ADV) 100 mL MBP  1 g IntraVENous Q8H    chlorhexidine (PERIDEX) 0.12 % mouthwash 15 mL  15 mL Oral BID    sodium chloride (NS) flush 5-10 mL  5-10 mL IntraVENous Q8H    sodium chloride (NS) flush 5-10 mL  5-10 mL IntraVENous PRN    fentaNYL (PF)  mcg/30 ml   IntraVENous TITRATE    anidulafungin (ERAXIS) 100 mg in 0.9% sodium chloride 130 mL IVPB  100 mg IntraVENous Q24H    nystatin (MYCOSTATIN) 100,000 unit/gram powder   Topical BID    ELECTROLYTE REPLACEMENT PROTOCOL  1 Each Other PRN    ELECTROLYTE REPLACEMENT PROTOCOL  1 Each Other PRN    ELECTROLYTE REPLACEMENT PROTOCOL  1 Each Other PRN    ELECTROLYTE REPLACEMENT PROTOCOL  1 Each Other PRN    DAPTOmycin (CUBICIN) 719 mg in 0.9% sodium chloride 50 mL IVPB RF formulation  6 mg/kg IntraVENous Q24H    sodium bicarbonate tablet 650 mg  650 mg Oral TID  bisacodyl (DULCOLAX) suppository 10 mg  10 mg Rectal DAILY PRN    glucose chewable tablet 16 g  16 g Oral PRN    glucagon (GLUCAGEN) injection 1 mg  1 mg IntraMUSCular PRN    dextrose (D50W) injection syrg 12.5-25 g  25-50 mL IntraVENous PRN    acetaminophen (TYLENOL) tablet 650 mg  650 mg Oral Q4H PRN    ondansetron (ZOFRAN) injection 4 mg  4 mg IntraVENous Q4H PRN    sodium chloride (NS) flush 5-10 mL  5-10 mL IntraVENous PRN       Telemetry: [x]Sinus []A-flutter []Paced    []A-fib []Multiple PVCs     CBC w/Diff Recent Labs      03/14/17   0345  03/13/17   0330  03/12/17   0415   WBC  19.2*  30.4*  41.6*   RBC  2.34*  2.77*  3.00*   HGB  6.4*  7.6*  8.3*   HCT  20.0*  23.8*  26.5*   PLT  103*  120*  94*   GRANS  88*  89*  91*   LYMPH  9*  7*  5*   EOS  0  0  0        Chemistry Recent Labs      03/14/17   0345  03/13/17   1210  03/13/17   0330   03/12/17   0415   GLU  90   --   111*   --   124*   NA  142   --   141   --   140   K  3.8   --   3.7   --   4.1   CL  113*   --   113*   --   111*   CO2  12*   --   12*   --   13*   BUN  45*   --   44*   --   50*   CREA  1.77*   --   1.78*   --   1.91*   CA  7.3*   --   7.1*   --   7.3*   MG  1.9  2.0  1.7*   < >  1.8   PHOS  4.2   --   3.8   --   3.9   AGAP  17   --   16   --   16   BUCR  25*   --   25*   --   26*    < > = values in this interval not displayed.         Lactic Acid Lactic acid   Date Value Ref Range Status   03/11/2017 1.4 0.4 - 2.0 MMOL/L Final     Recent Labs      03/11/17   1940   LAC  1.4        ABG Recent Labs      03/14/17   0807  03/13/17   0855  03/12/17   1023   PHI  7.313*  7.294*  7.272*   PCO2I  24.5*  24.2*  24.8*   PO2I  304*  209*  174*   HCO3I  12.4*  11.7*  11.4*   FIO2I  60  60  0.1        Micro  Recent Labs      03/12/17 2123 03/12/17   1153  03/12/17   1143   CULT  FEW  CANDIDA ALBICANS  *  NO GROWTH 2 DAYS  NO GROWTH 2 DAYS     Recent Labs      03/12/17 2123 03/12/17   1153  03/12/17   1143   CULT  FEW  CANDIDA ALBICANS  *  NO GROWTH 2 DAYS  NO GROWTH 2 DAYS        Liver Enzymes Protein, total   Date Value Ref Range Status   03/06/2017 5.6 (L) 6.4 - 8.2 g/dL Final     Albumin   Date Value Ref Range Status   03/06/2017 1.6 (L) 3.4 - 5.0 g/dL Final     Globulin   Date Value Ref Range Status   03/06/2017 4.0 2.0 - 4.0 g/dL Final     A-G Ratio   Date Value Ref Range Status   03/06/2017 0.4 (L) 0.8 - 1.7   Final     AST (SGOT)   Date Value Ref Range Status   03/06/2017 33 15 - 37 U/L Final     Alk. phosphatase   Date Value Ref Range Status   03/06/2017 231 (H) 45 - 117 U/L Final     No results for input(s): TP, ALB, GLOB, AGRAT, SGOT, GPT, AP, TBIL in the last 72 hours. No lab exists for component: DBIL       Cardiac Enzymes Lab Results   Component Value Date/Time     (H) 03/14/2017 10:30 AM     (H) 03/14/2017 03:45 AM     (H) 03/13/2017 08:00 PM    CKMB 7.2 (H) 03/14/2017 10:30 AM    CKMB 8.3 (H) 03/14/2017 03:45 AM    CKMB 9.5 (H) 03/13/2017 08:00 PM    CKND1 3.4 03/14/2017 10:30 AM    CKND1 3.6 03/14/2017 03:45 AM    CKND1 3.6 03/13/2017 08:00 PM    TROIQ 0.03 03/14/2017 10:30 AM    TROIQ 0.03 03/14/2017 03:45 AM    TROIQ 0.05 (H) 03/13/2017 08:00 PM        BNP No results found for: BNP, BNPP, XBNPT     Coagulation No results for input(s): PTP, INR, APTT in the last 72 hours.     No lab exists for component: INREXT, INREXT      Thyroid  Lab Results   Component Value Date/Time    TSH 1.55 05/08/2016 06:21 AM          Lipid Panel No results found for: CHOL, CHOLPOCT, CHOLX, CHLST, CHOLV, 319336, HDL, LDL, NLDLCT, DLDL, LDLC, DLDLP, [id number], VLDLC, VLDL, TGL, [place], [id number], Jordon Onofre, [id number], [facility], [facility]       Urinalysis Lab Results   Component Value Date/Time    Color YELLOW 03/12/2017 08:10 PM    Appearance CLOUDY 03/12/2017 08:10 PM    Specific gravity 1.020 03/12/2017 08:10 PM    pH (UA) 5.5 03/12/2017 08:10 PM    Protein 100 03/12/2017 08:10 PM    Glucose NEGATIVE  03/12/2017 08:10 PM Ketone NEGATIVE  03/12/2017 08:10 PM    Bilirubin NEGATIVE  03/12/2017 08:10 PM    Urobilinogen 0.2 03/12/2017 08:10 PM    Nitrites NEGATIVE  03/12/2017 08:10 PM    Leukocyte Esterase LARGE 03/12/2017 08:10 PM    Epithelial cells FEW 03/12/2017 08:10 PM    Bacteria 3+ 03/12/2017 08:10 PM    WBC TOO NUMEROUS TO COUNT 03/12/2017 08:10 PM    RBC TOO NUMEROUS TO COUNT 03/12/2017 08:10 PM        XR (Most Recent). CXR reviewed by me and compared with previous CXR   Results from Hospital Encounter encounter on 03/03/17   XR CHEST PORT   Narrative Portable chest 3/13/2017. History: Status post bronchoscopy. Technique: A semierect portable radiograph of the chest was obtained. Comparison:  3/13/2017. Findings: The endotracheal tube has been slightly pulled back and the tip is now  approximately 5.5 cm above the level of the garth. Lines and tubes are  otherwise unchanged. The cardia mediastinal contours are unchanged. There is  improving aeration of the left lower lobe with only minimal residual patchy  atelectasis or infiltrate at the left lung base. The right lung remains clear. There is no pneumothorax or significant pleural effusion. Impression Impression:    1. Pronounced interval improvement in aeration of the left lower lobe with only  minimal residual patchy atelectasis or infiltrate at the left lung base. CT (Most Recent)   Results from Hospital Encounter encounter on 03/03/17   CT HEAD WO CONT   Narrative EXAM: CT head    INDICATION: Severe thrombocytopenia    COMPARISON: None. TECHNIQUE: Axial CT imaging of the head was performed without intravenous  contrast. Coronal and sagittal reconstructions were performed. One or more dose reduction techniques were used on this CT: automated exposure  control, adjustment of the mAs and/or kVp according to patient's size, and  iterative reconstruction techniques.  The specific techniques utilized on this CT  exam have been documented in the patient's electronic medical record.     _______________    FINDINGS:    BRAIN AND POSTERIOR FOSSA: The sulci, folia, ventricles and basal cisterns are  within normal limits for the patient?s age. There is no intracranial hemorrhage,  mass effect, or midline shift. There are no areas of abnormal parenchymal  attenuation. EXTRA-AXIAL SPACES AND MENINGES: There are no abnormal extra-axial fluid  collections. CALVARIUM: Intact. SINUSES: Clear. OTHER: Visual is global structures and mastoid air cells are unremarkable.    _______________         Impression IMPRESSION:      1. No evidence of acute infarct, hemorrhage or mass. Normal noncontrast CT  brain. As the attending radiologist I have personally reviewed the study and this  report, and concur with the above stated findings. EKG No results found for this or any previous visit. ECHO No results found for this or any previous visit. PFT No flowsheet data found.      Other ASA reactivity:   Pre-albumin:   Ionized Calcium:   NH4:   T3, FT4:  Cortisol:  Urine Osm:  Urine Lytes:   HbA1c:      The patient is: [x] acutely ill Risk of deterioration: [] moderate    [x] critically ill  [] high     [x]See my orders for details    My assessment, plan of care, findings, medications, side effects etc were discussed with:  [x]nursing []PT/OT    [x]respiratory therapy [x]Dr. Andres Duckworth   []family []      Nicola Mendez, NP  Pulmonary [address]  [phone]

## 2017-03-15 NOTE — PROGRESS NOTES
2030,received pt on vent with head elevated sxn for small amount of thick white secretions,ett moved to center of mouth and secured,topher tube cleared, inline resp neb given  -BVM at bed side,pt resting with eyes closed will monitor though the shift.

## 2017-03-15 NOTE — PROGRESS NOTES
NIV  -  To bed side to assess for placement on NIV  - Found patient very congested with increased coarse chest sounds  - NT suction to assist for thin tan secretions  - NIV followed

## 2017-03-15 NOTE — ROUTINE PROCESS
This RN spoke with pt's daughter this morning, she expressed her dissatisfaction with care and communication yesterday. I made her aware of the visiting hours and welcomed her to visit the unit. She was vulgar, refused to continue her conversation without vulgarity and hung up the phone.

## 2017-03-15 NOTE — PROGRESS NOTES
Daily Progress Note: 3/15/2017 2:48 PM   Admit Date: 3/3/2017    Patient seen in follow up for multiple medical problems as listed below:  Patient Active Problem List   Diagnosis Code    Anemia D64.9    Anxiety F41.9    Nausea and vomiting R11.2    Morbid obesity ([facility] E66.01    Chronic pain syndrome G89.4    History of ovarian cancer Z85.43    History of gastric bypass Z98.890    CAD (coronary artery disease) I25.10    History of Clostridium difficile Z87.19    History of endometrial cancer Z85.42    History of GI bleed Z87.19    Lymphedema I89.0    Hypokalemia E87.6    DEV (acute kidney injury) ([facility] N17.9    Pulmonary nodules R91.8    [facility] I48.91    Aortic stenosis I35.0    GI bleed K92.2    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 female who is admitted for severe sepsis and shock, urosepsis and severe thrombocytopenia. Patient was on Hospice until now. She presents with c/o altered mental status via EMS with cyanotic/desirea complextion. Patient was found AMS with blood sugar low 40's. There was no IV assess and she was given Glucagon and her mentation and blood sugar improved. Patient in non diabetic. She has endometrial cancer. In ED she has urosepsis and hypotension requiring pressor support after 30 cc/kg BOLUS normal saline. Patient has Mediport. She also was febrile with Tmax 101.6. Denies cough, sob, chest pain. She was started on antibiotics . Hx VRE UTI noted. She reports having foul smelling urine and vaginal discharge. Patient had a platelet level of 81,136 with no bleed requiring platelet transfusion initially.   She improved and was transferred to the floor until an RRT was called 3/11 for acute hypoxic respiratory failure requiring intubation. Bronchospy 3/13 with significant mucous plugging.      Hypoxic resp failure - intubated  -Bronchospy 3/13 with significant mucous plugging.   -Cx pending on eraxis, azactam, dapto    Subacute RLE DVT  -thought due to obstructing pelvic CA  -LMWH. S/p IVC filter placement    Severe sepsis with shock  - 2/2 UTI. Leukocytosis, tachy, resolving  - off Levophed for pressor support goal MAP > 65, Zyvox, Levaquin, zosyn  - Follow urine and blood culture: GPC in blood  - Intensivist consulting      UTI  - hx VRE UTI,again with VRE. Immunosuppression endometrial cancer  - On Zyvox , Zosyn , Levaquin initially then to zyvox with rash. then Dapto  - repeat UA/Cx 3/14-p    Severe thrombocytopenia  - Platelet down to 9k on admission s/p transfusions x2. keep >25K. resolved      Encephalopathy  - 2/2 Hypoglycemia vs urosepsis vs hypotension vs uremia  - resolved . CT head non-acute      Hypoglycemia   - Resolved       ARF  - cr 6.55/Ari043 ->improving Cr into 1's  - severe prerenal with UREMIA  - IVF. Na Bicarb added      Anemia   - Hgb 8.8  - likely 2/2 Endometrial malignancy   - Transfuse if hemoglobin < 7      Hx of Ovarian and Endometrial Cancer with abdominal pain  - Patient was on Hospice until today. Daughter has rescinded her DDNR and Hospice and now wants to be FULL CODE  - Palliative care consult monday  - Lower abdomen pain Likely due to new R adnexal mass from ovarian CA. Discuss further pending hospice plans  - pain control  -Mediport for IV drug use, if no urinary source may need to be ruled out as a possible source     Hyponatremia   - IVF corrected.      Vaginal discharge   - Wet prep ordered     Chronic problems  1. Recurrent gastrointestinal bleed with multiple  esophagogastroduodenoscopies in the past year with negative results. 2. History of ovarian cancer. 3. History of endometrial cancer. 4. Bladder mass, status post cystoscopy and biopsy results pending.   5. Acute blood loss anemia, stable. 6. Chronic back pain. 7. Chronic abdominal pain. 8. Nausea and vomiting secondary to above     DVT Protocol Active: SCDs. consider arixtra  GI - PPI  Code Status: Full Code     Disposition: Req 48h hosp. Home with hospice. Subjective:     CC: Altered mental status    Interval History: still intubated. CBC improving. Aztreonam to stop soon. Objective:     Visit Vitals    BP 98/48    Pulse 86    Temp 99 °F (37.2 °C)    Resp 25    Ht 5' 4\" (1.626 m)    Wt 122.8 kg (270 lb 11.6 oz)    SpO2 99%    BMI 46.47 kg/m2       Temp (24hrs), Av.9 °F (37.2 °C), Min:96.8 °F (36 °C), Max:99.9 °F (37.7 °C)        Intake/Output Summary (Last 24 hours) at 03/15/17 0938  Last data filed at 03/15/17 0800   Gross per 24 hour   Intake             1680 ml   Output             1690 ml   Net              -10 ml       Gen: intubated, sedated, NAD  HEENT:  KARINA  Neck: No Bruits/JVD   Lungs:   Course upper airway wounds. mod respiratory effort  Heart:   RR S1 S2 without M/R/G  Abdomen: obese large panus,NT, BSX4,   Extremities:   trace LE edema. No cyanosis.   Skin:  no jaundice/ lots of skin hanging off arms/legs      Data Review:     Meds/Labs/Tests reviewed    Current Shift:  03/15 0701 - 03/15 1900  In: 20   Out: -   Last three shifts:  1901 - 03/15 07  In: 2280.4 [I.V.:2160.4]  Out: 3351 [Urine:]  Recent Labs      03/15/17   03417   16217   034   WBC  13.9*  16.8*  19.2*   RBC  2.43*  2.62*  2.34*   HGB  6.8*  7.4*  6.4*   HCT  21.2*  22.4*  20.0*   PLT  111*  105*  103*   GRANS  85*  90*  88*   LYMPH  8*  4*  9*   EOS  0  0  0       Recent Labs      03/15/17   0345  17   16217   0345  17   0330   BUN  47*   --   45*  44*   CREA  1.82*   --   1.77*  1.78*   CA  7.3*   --   7.3*  7.1*   ALB  1.9*  1.5*   --    --    K  3.5  3.7  3.8  3.7   NA  142   --   142  141   CL  114*   --   113*  113*   CO2  13*   --   12*  12*   PHOS  5.1*   --   4.2  3.8 GLU  76   --   90  111*        Lab Results   Component Value Date/Time    Glucose 76 03/15/2017 03:45 AM    Glucose 90 03/14/2017 03:45 AM    Glucose 111 03/13/2017 03:30 AM    Glucose 124 03/12/2017 04:15 AM    Glucose 148 03/11/2017 07:40 PM          Care coordination with Nursing/Consultants/staff: 10  Prior history, labs, and charting reviewed: 15    Procedures/Imaging:  CT abd 3/4  CT head 3/3  Bronch 3/13  IVC filter placement  TTE 3/7  LE US 3/5      Total time spent with chart review, patient examination/education, discussion with staff on case,documentation and medication management / adjustment  :  30 Minutes      Dr Talon Wong  Pager: [phone]

## 2017-03-15 NOTE — PROGRESS NOTES
INFECTIOUS DISEASE FOLLOW UP NOTE :-     Admit Date: 3/3/2017    Current  Prior    Daptomycin 3/8-7  aztreonam/anidulafungin 3/12-3 linezolid 3/4 - 3, vanc/zosyn/levoflox 3/3 -0     ASSESSMENT: -> RECS     Sepsis POA  - fever 102 , leukocytosis 28K, tachycardia, hypotension  - source VRE UTI and BSI or DVT  -allergic allergic reaction and IE also in DDX  - WBC sl lower 42->30->19k -> 14K ->monitor on antimicrobials broadened 3/12- likely stop aztreonam tomorrow if no gnr   ->prognosis remains guarded    Resp failure NTB 3/11  -initial cxr nad ->white out L s/o mucus plug, SP bronch 3/13 extensive L mucus plugging ->pul toilet etc per PCCM- cxr better again today  ->monitor sput- c.albicans   VRE BSI with ?aortic endocarditis  - blcx 3/3 2/2 VRE positive   - repeat 3/6 ntd   - TTE aortic stenosis with possible aortic vegetation [ chronic mixed calcified density ]  - source likely UTI; pt refused ESSIE and removal of mediport - Cont Daptomycin  ->weekly CK on dapto- 206 3/15  ->monitor cultures-  3/8 bctx's ng, 3/12 ngtd   VRE UTI, complicated   - UA with large LE , wbc tntc, jamar 4+  - ucx >100k VRE  - CT with no hydronephrosis but has b/l ureteral stents  - appreciate urology consult and eventual plan for stent exchange/removal  - abx as above  ->stent mgmt per      ADR - Rash over abdomen/chest   - secondary to linezolid which has been listed from before as allergy - rash fading off linezolid  ->monitor off linezolid    Lymphedema with R medial thigh blister- not currently infected appearing  -massive pannus with intertrigo  ->added systemic antifungal to topical   RLE subacute DVT  - per primary team   Rt pelvic mass ? ovarian ca / endometrial ca with mets  - palliative care involved , was hospice at home and now revoked    DEV due to sepsis - ~slowly better, Cr 1.9->1.8  today     Thrombocytopenia -better 94-> 111 ->monitor   Blood loss anemia        MICROBIOLOGY:   3/3  Blcx x 2 - 2/2 VRE S gent, linezolid , daptomycin quinupristin         Ucx > 100K VRE  3/4 Flu Ag test neg   3/6  Blcx x 2 ng  3/8  Blcx x 2 ng   3/11 uctx ng  3/12 bctx x 2 ngtd   sput GS prelim C albicans  3/14     ucx ntd     LINES AND CATHETERS:   Rt chest mediport     SUBJECTIVE :     Interval notes reviewed. Remains in ICU, CXR better now, off sedation on weaning trial for vent. No fever overnight. Large liquid stool in bed seen around byrne catheter.     MEDICATIONS :     Current Facility-Administered Medications   Medication Dose Route Frequency    calcium gluconate injection 2 g  2 g IntraVENous ONCE    0.9% sodium chloride infusion 250 mL  250 mL IntraVENous PRN    enoxaparin (LOVENOX) injection 40 mg  40 mg SubCUTAneous Q24H    chlorhexidine (PERIDEX) 0.12 % mouthwash 10 mL  10 mL Oral Q12H    albuterol-ipratropium (DUO-NEB) 2.5 MG-0.5 MG/3 ML  3 mL Nebulization Q6H RT    0.9% sodium chloride infusion 250 mL  250 mL IntraVENous PRN    0.9% sodium chloride infusion 250 mL  250 mL IntraVENous PRN    0.9% sodium chloride infusion 250 mL  250 mL IntraVENous PRN    albumin human 25% (BUMINATE) solution 25 g  25 g IntraVENous Q6H    propofol (DIPRIVAN) infusion  0-50 mcg/kg/min IntraVENous TITRATE    dextrose 5% lactated ringers infusion  50 mL/hr IntraVENous CONTINUOUS    pantoprazole (PROTONIX) 40 mg in sodium chloride 0.9 % 10 mL injection  40 mg IntraVENous DAILY    aztreonam (AZACTAM) 1 g in 0.9% sodium chloride (MBP/ADV) 100 mL MBP  1 g IntraVENous Q8H    chlorhexidine (PERIDEX) 0.12 % mouthwash 15 mL  15 mL Oral BID    sodium chloride (NS) flush 5-10 mL  5-10 mL IntraVENous Q8H    sodium chloride (NS) flush 5-10 mL  5-10 mL IntraVENous PRN    fentaNYL (PF)  mcg/30 ml   IntraVENous TITRATE    anidulafungin (ERAXIS) 100 mg in 0.9% sodium chloride 130 mL IVPB  100 mg IntraVENous Q24H    nystatin (MYCOSTATIN) 100,000 unit/gram powder   Topical BID    ELECTROLYTE REPLACEMENT PROTOCOL  1 Each Other PRN    ELECTROLYTE REPLACEMENT PROTOCOL  1 Each Other PRN    ELECTROLYTE REPLACEMENT PROTOCOL  1 Each Other PRN    ELECTROLYTE REPLACEMENT PROTOCOL  1 Each Other PRN    DAPTOmycin (CUBICIN) 719 mg in 0.9% sodium chloride 50 mL IVPB RF formulation  6 mg/kg IntraVENous Q24H    sodium bicarbonate tablet 650 mg  650 mg Oral TID    bisacodyl (DULCOLAX) suppository 10 mg  10 mg Rectal DAILY PRN    glucose chewable tablet 16 g  16 g Oral PRN    glucagon (GLUCAGEN) injection 1 mg  1 mg IntraMUSCular PRN    dextrose (D50W) injection syrg 12.5-25 g  25-50 mL IntraVENous PRN    acetaminophen (TYLENOL) tablet 650 mg  650 mg Oral Q4H PRN    ondansetron (ZOFRAN) injection 4 mg  4 mg IntraVENous Q4H PRN    sodium chloride (NS) flush 5-10 mL  5-10 mL IntraVENous PRN       OBJECTIVE :     Visit Vitals    BP 98/48    Pulse 86    Temp 99 °F (37.2 °C)    Resp 25    Ht 5' 4\" (1.626 m)    Wt 122.8 kg (270 lb 11.6 oz)    SpO2 99%    BMI 46.47 kg/m2       Temp (24hrs), Av.9 °F (37.2 °C), Min:96.8 °F (36 °C), Max:99.9 °F (37.7 °C)    GEN - unresponsive ill appearing WF appears older than stated age in ICU on vent  HEENT - conj pale, AMADOU in place, NGT in place  NECK- R mediport accessed no LAD   RESP- scattered rhochi  CVS- tachy III/VI systolic murmur   ABD-massive pannus, large area of ecchymosis at lower abdominal fold fragile skin, bs present , large amount of liquid stool around byrne catheter present   EXT- (+) edema   SKIN -improved rash over abdomen ,intertrigo in groin folds, denuded skin R med thigh covered not infected appearing. (+) BLE edema   NEURO - awake off sedation but not following commands      Labs: Results:   Chemistry Recent Labs      03/15/17   0345  17   1620  17   0345  17   0330   GLU  76   --   90  111*   NA  142   --   142  141   K  3.5  3.7  3.8  3.7   CL  114*   --   113*  113*   CO2  13*   --   12* 12*   BUN  47*   --   45*  44*   CREA  1.82*   --   1.77*  1.78*   CA  7.3*   --   7.3*  7.1*   AGAP  15   --   17  16   BUCR  26*   --   25*  25*   AP  191*   --    --    --    TP  6.1*   --    --    --    ALB  1.9*  1.5*   --    --    GLOB  4.2*   --    --    --    AGRAT  0.5*   --    --    --       CBC w/Diff Recent Labs      03/15/17   0345  03/14/17   1620  03/14/17   0345   WBC  13.9*  16.8*  19.2*   RBC  2.43*  2.62*  2.34*   HGB  6.8*  7.4*  6.4*   HCT  21.2*  22.4*  20.0*   PLT  111*  105*  103*   GRANS  85*  90*  88*   LYMPH  8*  4*  9*   EOS  0  0  0        cxr 3/14 IMPRESSION:  1. Minimal patchy atelectasis or infiltrate at the left lung base, slightly  improved. CT abdo/pelvis 3/3 - IMPRESSION:  1. New cystic mass right adnexal region. Recurrent ovarian carcinoma cannot be  excluded. 2. New right middle lobe pulmonary nodule. Cannot rule out metastatic disease. 3. Bilateral ureteral stents with resolved hydronephrosis. 4. Stable or minimally increased left periaortic retroperitoneal adenopathy. Echo 3/7 - EF 55%, Aortic valve: The valve was trileaflet. Leaflets exhibited moderate  calcification and moderately reduced cuspal separation. There was moderate  stenosis. There was mild regurgitation. There was small-medium size fix  partially calcified echodensity noted attach to right coronary cusp. Could  represent vegetation. Valve mean gradient was 34 mmHg. Aortic valve area  was 1 cm-sq by the continuity equation.     Nimco Perez MD  March 15, 2017  [facility] AT THE [facility]  [phone]

## 2017-03-15 NOTE — PROGRESS NOTES
[address] [facility], [address] [facility]  3/15/2017    Progress Note    Assessment:     Glendy Alexander is a 49 y.o.  female with:     1. Septic shock ([facility] [address] A41.9 038. 9       R65.21 785.52         995.92     2. Acute renal failure, unspecified acute renal failure type ([facility] [address] N17.9 584. 9     3. Acute cystitis with hematuria N30.01 595.0     4. High anion gap metabolic acidosis B73.4 585.1          56yoF with hx of endometrial carcinoma and s/p XRT and bilateral ureteral obstruction with chronic stents last changed 16, now with VRE in blood and urine and ARF    CT 3/3/17 - No hydro  U Cx 3/317 - VRE       Plan:     Renal function continues to slowly improve. Nonresponsive on vent. Will change stent when septic event improves. Subjective:     No acute  changes / events. Patient on vent sedated. Not sure of present mental status due to sedation. Objective:     Admit weight: Weight: 270 lb (122.5 kg)  Last recorded weight: Weight: 270 lb 11.6 oz (122.8 kg)    Temp (24hrs), Av.9 °F (37.2 °C), Min:96.8 °F (36 °C), Max:99.9 °F (37.7 °C)    Physical Exam:    GEN:  Intubated and sedated. ABD: Soft, NT, ND,   : Duarte grossly clear urine. EXT:  Lower extremity edema present.     Diagnostics:      Recent Results (from the past 24 hour(s))   CARDIAC PANEL,(CK, CKMB & TROPONIN)    Collection Time: 17 10:30 AM   Result Value Ref Range     (H) 26 - 192 U/L    CK - MB 7.2 (H) <3.6 ng/ml    CK-MB Index 3.4 0.0 - 4.0 %    Troponin-I, Qt. 0.03 0.0 - 0.045 NG/ML   POTASSIUM    Collection Time: 17  4:20 PM   Result Value Ref Range    Potassium 3.7 3.5 - 5.5 mmol/L   CALCIUM, IONIZED    Collection Time: 17  4:20 PM   Result Value Ref Range    Ionized Calcium 1.13 1.12 - 1.32 MMOL/L   CBC WITH AUTOMATED DIFF    Collection Time: 17  4:20 PM   Result Value Ref Range    WBC 16.8 (H) 4.6 - 13.2 K/uL    RBC 2.62 (L) 4.20 - 5.30 M/uL    HGB 7.4 (L) 12.0 - 16.0 g/dL    HCT 22.4 (L) 35.0 - 45.0 %    MCV 85.5 74.0 - 97.0 FL    MCH 28.2 24.0 - 34.0 PG    MCHC 33.0 31.0 - 37.0 g/dL    RDW 16.2 (H) 11.6 - 14.5 %    PLATELET 499 (L) 823 - 420 K/uL    MPV 10.9 9.2 - 11.8 FL    NEUTROPHILS 90 (H) 42 - 75 %    BAND NEUTROPHILS 2 0 - 5 %    LYMPHOCYTES 4 (L) 20 - 51 %    MONOCYTES 3 2 - 9 %    EOSINOPHILS 0 0 - 5 %    BASOPHILS 0 0 - 3 %    MYELOCYTES 1 (H) 0 %    ABS. NEUTROPHILS 15.1 (H) 1.8 - 8.0 K/UL    ABS. LYMPHOCYTES 0.7 (L) 0.8 - 3.5 K/UL    ABS. MONOCYTES 0.5 0 - 1.0 K/UL    ABS. EOSINOPHILS 0.0 0.0 - 0.4 K/UL    ABS.  BASOPHILS 0.0 0.0 - 0.1 K/UL    PLATELET COMMENTS DECREASED PLATELETS      RBC COMMENTS ANISOCYTOSIS  1+        DF MANUAL     ALBUMIN    Collection Time: 03/14/17  4:20 PM   Result Value Ref Range    Albumin 1.5 (L) 3.4 - 5.0 g/dL   URINALYSIS W/MICROSCOPIC    Collection Time: 03/14/17  4:23 PM   Result Value Ref Range    Color YELLOW      Appearance TURBID      Specific gravity 1.012 1.005 - 1.030      pH (UA) 5.0 5.0 - 8.0      Protein 30 (A) NEG mg/dL    Glucose NEGATIVE  NEG mg/dL    Ketone NEGATIVE  NEG mg/dL    Bilirubin NEGATIVE  NEG      Blood LARGE (A) NEG      Urobilinogen 0.2 0.2 - 1.0 EU/dL    Nitrites NEGATIVE  NEG      Leukocyte Esterase LARGE (A) NEG      WBC TOO NUMEROUS TO COUNT 0 - 4 /hpf    RBC TOO NUMEROUS TO COUNT 0 - 5 /hpf    Epithelial cells FEW 0 - 5 /lpf    Bacteria 4+ (A) NEG /hpf   MAGNESIUM    Collection Time: 03/15/17  3:45 AM   Result Value Ref Range    Magnesium 1.8 1.8 - 2.4 mg/dL   CBC WITH AUTOMATED DIFF    Collection Time: 03/15/17  3:45 AM   Result Value Ref Range    WBC 13.9 (H) 4.6 - 13.2 K/uL    RBC 2.43 (L) 4.20 - 5.30 M/uL    HGB 6.8 (L) 12.0 - 16.0 g/dL    HCT 21.2 (L) 35.0 - 45.0 %    MCV 87.2 74.0 - 97.0 FL    MCH 28.0 24.0 - 34.0 PG    MCHC 32.1 31.0 - 37.0 g/dL    RDW 16.6 (H) 11.6 - 14.5 %    PLATELET 000 (L) 061 - 420 K/uL    MPV 10.5 9.2 - 11.8 FL    NEUTROPHILS 85 (H) 42 - 75 %    BAND NEUTROPHILS 1 0 - 5 %    LYMPHOCYTES 8 (L) 20 - 51 %    MONOCYTES 2 2 - 9 %    EOSINOPHILS 0 0 - 5 %    BASOPHILS 1 0 - 3 %    METAMYELOCYTES 3 (H) 0 %    ABS. NEUTROPHILS 11.8 (H) 1.8 - 8.0 K/UL    ABS. LYMPHOCYTES 1.1 0.8 - 3.5 K/UL    ABS. MONOCYTES 0.3 0 - 1.0 K/UL    ABS. EOSINOPHILS 0.0 0.0 - 0.4 K/UL    ABS. BASOPHILS 0.1 0.0 - 0.1 K/UL    RBC COMMENTS ANISOCYTOSIS  1+        RBC COMMENTS MICROCYTOSIS  1+        RBC COMMENTS HYPOCHROMIA  1+        RBC COMMENTS POLYCHROMASIA  1+        DF MANUAL     CALCIUM, IONIZED    Collection Time: 03/15/17  3:45 AM   Result Value Ref Range    Ionized Calcium 1.06 (L) 1.12 - 1.32 MMOL/L   PHOSPHORUS    Collection Time: 03/15/17  3:45 AM   Result Value Ref Range    Phosphorus 5.1 (H) 2.5 - 4.9 MG/DL   CARDIAC PANEL,(CK, CKMB & TROPONIN)    Collection Time: 03/15/17  3:45 AM   Result Value Ref Range     (H) 26 - 192 U/L    CK - MB 6.0 (H) <3.6 ng/ml    CK-MB Index 2.9 0.0 - 4.0 %    Troponin-I, Qt. 0.03 0.0 - 9.758 NG/ML   METABOLIC PANEL, COMPREHENSIVE    Collection Time: 03/15/17  3:45 AM   Result Value Ref Range    Sodium 142 136 - 145 mmol/L    Potassium 3.5 3.5 - 5.5 mmol/L    Chloride 114 (H) 100 - 108 mmol/L    CO2 13 (L) 21 - 32 mmol/L    Anion gap 15 3.0 - 18 mmol/L    Glucose 76 74 - 99 mg/dL    BUN 47 (H) 7.0 - 18 MG/DL    Creatinine 1.82 (H) 0.6 - 1.3 MG/DL    BUN/Creatinine ratio 26 (H) 12 - 20      GFR est AA 35 (L) >60 ml/min/1.73m2    GFR est non-AA 29 (L) >60 ml/min/1.73m2    Calcium 7.3 (L) 8.5 - 10.1 MG/DL    Bilirubin, total 0.5 0.2 - 1.0 MG/DL    ALT (SGPT) 21 13 - 56 U/L    AST (SGOT) 22 15 - 37 U/L    Alk.  phosphatase 191 (H) 45 - 117 U/L    Protein, total 6.1 (L) 6.4 - 8.2 g/dL    Albumin 1.9 (L) 3.4 - 5.0 g/dL    Globulin 4.2 (H) 2.0 - 4.0 g/dL    A-G Ratio 0.5 (L) 0.8 - 1.7       Lucius Brunner MD

## 2017-03-15 NOTE — PROGRESS NOTES
2000 assumed care of pt.  2030 suctioned secretions as needed. 2100 pt.turned & repositioned. 0430 complete bath given. 2897 paged Greyson Don awaiting.  0700 endorsed pt to day shift. Bedside and Verbal shift change report given to Raul Tompkins (oncoming nurse) by Minerva Panchal RN (offgoing nurse). Report included the following information SBAR, Kardex, Intake/Output, MAR and Recent Results.

## 2017-03-16 NOTE — PROGRESS NOTES
Problem: Ventilator Management  Goal: *Adequate oxygenation and ventilation  Outcome: Progressing Towards Goal  Vent day 1 following reintubation need after a 5 day vent stay  -    - Patient met criteria for extubation on 3 /15     -Was extubated at around 1700 and doing well    -At 1700 a route check revealed patient chest to coarse, dry with rhonchi              - patient NT sxn for tan thin secretions and placed on NIV  - Around 1200 of shift the patient's WOB did not improve and re-intubation was needed     Received patient this a.m awake and comfortable on ventilator very similar condition prior to extubation yesterday     ICU rounds:  - incident for reintubation discussed                Plan:   bronchoscopy , address H/H repeat blood transfusion     - allow for lung recruitment and resting today on vent following planned procedures    - discuss increased peep for lung recruitment following bronchoscopy and synchrony of ventilation with Dr Steve Pyle .  Believe would improve and assist possible extubation as soon as 3/17.    - Believe NIV/Bipap/cpap also needed upon extubation    - Follow palative care consult ( patient stated to me yesterday that she wished not to be re-intubated, but under the circumstances can understand her request for reintubation )

## 2017-03-16 NOTE — CONSULTS
Discussed with ICU RN: Presently stable on MV. VS stable, adequate UO. Dr Person Poag informed.  Discussed with ICU NP.

## 2017-03-16 NOTE — PROGRESS NOTES
Daily Progress Note: 3/16/2017 2:48 PM   Admit Date: 3/3/2017    Patient seen in follow up for multiple medical problems as listed below:  Patient Active Problem List   Diagnosis Code    Anemia D64.9    Anxiety F41.9    Nausea and vomiting R11.2    Morbid obesity ([facility] [address] E66.01    Chronic pain syndrome G89.4    History of ovarian cancer Z85.43    History of gastric bypass Z98.890    CAD (coronary artery disease) I25.10    History of Clostridium difficile Z87.19    History of endometrial cancer Z85.42    History of GI bleed Z87.19    Lymphedema I89.0    Hypokalemia E87.6    DEV (acute kidney injury) ([facility] [address] N17.9    Pulmonary nodules R91.8    [facility] I48.91    Aortic stenosis I35.0    GI bleed K92.2    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 female who is admitted for severe sepsis and shock, urosepsis and severe thrombocytopenia. Patient was on Hospice until now. She presents with c/o altered mental status via EMS with cyanotic/desirae complextion. Patient was found AMS with blood sugar low 40's. There was no IV assess and she was given Glucagon and her mentation and blood sugar improved. Patient in non diabetic. She has endometrial cancer. In ED she has urosepsis and hypotension requiring pressor support after 30 cc/kg BOLUS normal saline. Patient has Mediport. She also was febrile with Tmax 101.6. Denies cough, sob, chest pain. She was started on antibiotics . Hx VRE UTI noted. She reports having foul smelling urine and vaginal discharge. Patient had a platelet level of 10,383 with no bleed requiring platelet transfusion initially.   She improved and was transferred to the floor until an RRT was called 3/11 for acute hypoxic respiratory failure requiring intubation. Bronchospy 3/13 with significant mucous plugging. Repeat bronchoscopy 3/16. Respiratory cultures only with candida.      Hypoxic resp failure - intubated  -Bronchospy 3/13 with significant mucous plugging.   -Cx pending on eraxis, azactam, dapto    Subacute RLE DVT  -thought due to obstructing pelvic CA  -LMWH. S/p IVC filter placement    Severe sepsis with shock  - 2/2 UTI. Leukocytosis, tachy, resolving  - off Levophed for pressor support goal MAP > 65, Zyvox, Levaquin, zosyn  - Follow urine and blood culture: GPC in blood  - Intensivist consulting      UTI  - hx VRE UTI,again with VRE. Immunosuppression endometrial cancer  - On Zyvox , Zosyn , Levaquin initially then to zyvox with rash. then Dapto  - repeat UA/Cx 3/14-p    Severe thrombocytopenia  - Platelet down to 9k on admission s/p transfusions x2. keep >25K. resolved      Encephalopathy  - 2/2 Hypoglycemia vs urosepsis vs hypotension vs uremia  - resolved . CT head non-acute      Hypoglycemia   - Resolved       ARF  - cr 6.55/Ggb913 ->improving Cr into 1's  - severe prerenal with UREMIA  - IVF. Na Bicarb added      Anemia   - Hgb 8.8  - likely 2/2 Endometrial malignancy   - Transfuse if hemoglobin < 7      Hx of Ovarian and Endometrial Cancer with abdominal pain  - Patient was on Hospice until today. Daughter has rescinded her DDNR and Hospice and now wants to be FULL CODE  - Palliative care consult monday  - Lower abdomen pain Likely due to new R adnexal mass from ovarian CA. Discuss further pending hospice plans  - pain control  -Mediport for IV drug use, if no urinary source may need to be ruled out as a possible source     Hyponatremia   - IVF corrected.      Vaginal discharge   - Wet prep ordered     Chronic problems  1. Recurrent gastrointestinal bleed with multiple  esophagogastroduodenoscopies in the past year with negative results. 2. History of ovarian cancer. 3. History of endometrial cancer.   4. Bladder mass, status post cystoscopy and biopsy results pending. 5. Acute blood loss anemia, stable. 6. Chronic back pain. 7. Chronic abdominal pain. 8. Nausea and vomiting secondary to above     DVT Protocol Active: SCDs. consider arixtra  GI - PPI  Code Status: Full Code     Disposition: Req 48h hosp. Home with hospice. Subjective:     CC: Altered mental status    Interval History: still intubated. CBC still improving. Mild fever. Possible bronch today. More alert. Eyes track        Objective:     Visit Vitals    /53    Pulse 89    Temp (!) 38.1 °F (3.4 °C)    Resp 22    Ht 5' 4\" (1.626 m)    Wt 124.1 kg (273 lb 9.5 oz)    SpO2 (P) 100%    BMI 46.96 kg/m2       Temp (24hrs), Av.2 °F (36.2 °C), Min:38.1 °F (3.4 °C), Max:101.1 °F (38.4 °C)        Intake/Output Summary (Last 24 hours) at 17 1324  Last data filed at 17 1249   Gross per 24 hour   Intake             3720 ml   Output             6325 ml   Net            -2605 ml       Gen: intubated, eyes tracking, NAD  HEENT:  KARINA  Neck: No Bruits/JVD   Lungs:   Course upper airway wounds. mod respiratory effort  Heart:   RR S1 S2 without M/R/G  Abdomen: obese large panus,NT, BSX4,   Extremities:   trace LE edema. No cyanosis. Skin:  no jaundice/ lots of skin hanging off arms/legs.  RLE 1cm ulcer      Data Review:     Meds/Labs/Tests reviewed    Current Shift:   07 - 1900  In: 800 [I.V.:800]  Out: 2075 [Urine:2075]  Last three shifts:  1901 -  0700  In: 0910 [I.V.:3515]  Out: 6778 [Urine:4855]  Recent Labs      17   0520  03/15/17   2149  03/15/17   1100  03/15/17   0345  17   1620   WBC  9.8   --    --   13.9*  16.8*   RBC  2.34*   --    --   2.43*  2.62*   HGB  6.6*  7.5*  6.6*  6.8*  7.4*   HCT  20.3*  23.5*  20.5*  21.2*  22.4*   PLT  112*   --    --   111*  105*   GRANS  86*   --    --   85*  90*   LYMPH  9*   --    --   8*  4*   EOS  0   --    --   0  0       Recent Labs      17   0520  03/15/17   2140 03/15/17   0345  03/14/17   1620  03/14/17   0345   BUN  43*  43*  47*   --   45*   CREA  1.59*  1.66*  1.82*   --   1.77*   CA  7.5*  7.4*  7.3*   --   7.3*   ALB   --    --   1.9*  1.5*   --    K  3.1*  3.0*  3.5  3.7  3.8   NA  142  142  142   --   142   CL  112*  114*  114*   --   113*   CO2  15*  14*  13*   --   12*   PHOS  4.3  4.9  5.1*   --   4.2   GLU  86  79  76   --   90        Lab Results   Component Value Date/Time    Glucose 86 03/16/2017 05:20 AM    Glucose 79 03/15/2017 09:49 PM    Glucose 76 03/15/2017 03:45 AM    Glucose 90 03/14/2017 03:45 AM    Glucose 111 03/13/2017 03:30 AM          Care coordination with Nursing/Consultants/staff: 10  Prior history, labs, and charting reviewed: 15    Procedures/Imaging:  CT abd 3/4  CT head 3/3  Bronch 3/13, 3/16  IVC filter placement  TTE 3/7  LE US 3/5      Total time spent with chart review, patient examination/education, discussion with staff on case,documentation and medication management / adjustment  :  30 Minutes      Dr Gabi Williamson  Pager: [phone]

## 2017-03-16 NOTE — CONSULTS
ICU RN called: Respiratory distress, very tachypneic: > 40/min, respiratory paradox and thoracoabdominal dissociation and metabolic acidosis, the patient so distressed that, even on BIPAP, she requested herself to be reintubated: Orders given, ABGs examined with RRT. This AM much more calm and relaxed. SPO2 99%, CXR: I could NOT personally see the imaging thru PACS system. Will inform Dr Papa Ellis. Discussed with ICU RN.

## 2017-03-16 NOTE — PROGRESS NOTES
-9020-Dr. Ferreira at bedside. Pt. Is s/p bronchoscopy. ETT advanced to 19cm at lip by MD. Pt. Placed on PS-12, PEEP-5, FIO2-40%. RSBI-50 By MD. O2 Sats-99% HR-94, RR-22. Plan is to maintain patient on above settings throughout night. Then, wean PS to 7cmh20 in am in anticipation for extubation. -[address]

## 2017-03-16 NOTE — ROUTINE PROCESS
TRANSFER - IN REPORT:    Verbal report received from jorden rn(name) on Anna Labrum  being received from 2700(unit) for ordered procedure      Report consisted of patients Situation, Background, Assessment and   Recommendations(SBAR). Information from the following report(s) SBAR was reviewed with the receiving nurse. Opportunity for questions and clarification was provided. Assessment completed upon patients arrival to unit and care assumed.

## 2017-03-16 NOTE — ROUTINE PROCESS
Dr. Mela Steele ! Patient:  Radha Sims 49 y.o.  1960  Full Code  Was in hospice? 64\", 270 #. Next of Kin:  Daughter Yossi Hooker (lives with), [phone]  Allergies   Allergen Reactions    Latex, Natural Rubber Hives and Itching    Clindamycin Hives    Clindamycin Itching    Iodine Hives     Pt denies allergy. Patient reports this is an allergy to contrast media that caused temporary vision loss and vomiting but is fine if premedicated with antihistamine.  Keflex [Cephalexin] Hives    Linezolid Hives    Nsaids (Non-Steroidal Anti-Inflammatory Drug) Nausea and Vomiting     Emesis, Previous GI bleed    Piperacillin-Tazobactam Hives     Has tolerated Teflaro @ [address]  Has been tolerating cephalosporins SAPH     Sulfa (Sulfonamide Antibiotics) Hives and Contact Dermatitis    Vancomycin Hives     Contact Isolation:  Klebsiella in urine, blood. _____________________    Patient factors:      Why in hospital:  Severe sepsis with septic shock (CODE) ([address]  UTI (urinary tract infection)  Severe sepsis with septic shock (CODE) ([facility])  Severe thrombocytopenia (HCC)  mucous plugs *   03-03:  EMS called for AMS. (Home or hospice?).    GLU = 40, no access, gave glucagon. In ED:  Glu = 40, T = 101.6, WBC = 28.4. Sepsis w/u, started pressors. Duarte -> purulent urine. Admitted with cystitis, hematuria,purulent urine, ventilator. 03-09:  BLE duplex found RLE DVT -> To cardiac cath lab for placement of IVC filter. Transfer to . Days in hospital:   13    Why in ICU:  Respiratory distress requiring artificial airway and mechanical ventilation. 03-11:  RRT for dyspnea. Reintubated, restart pressors. 03-13:  Bronch'd for R lung white-out on CXR.      03-15:  Extubated. Reintubated 6 hrs later for incr WOB and metabolic acidosis. Days in ICU:  (03-11)  5  *  ____________________    Additional history:   Endometrial carcinoma. Ovarian cancer with mets. Partial hysterectomy. Oophorectomy.  delivery. Aortic Stenosis. Thrombocytopenia.  CAD. A-fib. Arthritis. Arthropathy. Cellulitis RLE. Lymphedema. Spinal stenosis. Anxiety, depression. Chronic pain. Opioid dependence. Munchausen syndrome. Gastric ulcer. Obesity. Nausea & vomiting. Chronic C-diff? Urethral obstruction, stent replacement when sepsis resolves. EGD x 6 at [facility]. Tobacco? former   ETOH?  no   \"Illicits? \"  None charted. Treatment team, in addition to intensivists (Alex Connor), hospitalists (Shirene Oliva):  ID (Chayo Arevalo), Urology Sharee Cortez), Vascular surgery:  [address] Cardiology:  NIRALI Goddard. Diabetes Management, Care Management, Palliative Care.  wound care.  ___________________________    Vent days:  (03-15) 1  *    ____________________    Overnight events--include procedures / blood / consults:  (see note)    ____________________________    Quality:    Line:      A-line:     No.   Central Line(s):   R chest mediport, single-lumen. ? Date. PIV's:  :  L AC # 22. Other lines:  No.  Needs IV antibiotics, MIVF, occasionally pressors. Duarte?:  Date:  :  [place] 12 F, 2-lumen. 10 ml balloon. Tamper seal intact, Stat-lock in place. Temp probe. Expected date of removal?: TBD. Need:  Strict I/O, pressure sores. DVT prophylaxis:    SCDs:  Bilateral.    Anticoagulant: enoxaparin sub-cut every day   Anitplatelet:   ASA not yet. GI Prophylaxis:   Pantoprazole IV every day     Inpat Anti-Infectives     Start     Ordered Stop    17 1000  aztreonam (AZACTAM) 1 g in 0.9% sodium chloride (MBP/ADV) 100 mL MBP  1 g,   IntraVENous,   EVERY 8 HOURS      17 0953 --    17 1300  DAPTOmycin (CUBICIN) 719 mg in 0.9% sodium chloride 50 mL IVPB RF formulation  6 mg/kg,   IntraVENous,   EVERY 24 HOURS      17 1229 --      :  Anidulafungin (Eraxis) 100 mg IV q 24 h.       .       Cultures:  Blood:  Latex factor, influenza A, B ( - ). * :  VRE.  : ( - ) x 6 d.  :  ( - ) x 6 d.  :  ( - ) x 3 d. Sputum:  :  Few candida. Urine:  :  VRE.  :  ( - ) x 2 d.  :  ( - ) x 14 h. *   Stool:    Not  yet   Other:    None yet. Imaging:  CT:  Head:  :  NAD. CT Chest/Abd/Pelvis:  :  New mass R adnexal region. New nodule RML. B ureteral stents. MRI:     Not yet   TTE:  :  EF 55 %. Grade I DD. Dilation:  RA.  Stenosis, Moderate: Aortic valve. R coronary cusp:  Echodensity, can't r/o vegetation. Duplex:  BLE :  (+)  DVT RLE. Ultrasound:    Not yet. EK-04:  ST, LAE, T-inversion in Lateral leads. Procedures:  Intubations x 3, IVC placement . Blood sugars: With labs. Coverage: Only for low sugar. Feeding: On hold for now. .  [facility]  Date:    25 F  OGT  45 CM. Labs:  daily. *  Labs needed? : T & C exp . Skin:  Many wounds. .    Palliative:  y. Care Management y    PT / OT:  neither    ______________________    Sedation vacation?:  Not tonight. RASS score:  -2.  Weaning:  FiO2 decreased. Airway:  7.5 mm MARITZA tube, 21 @ teeth. Vent Settings:  40 %, AC  20 / 12. ,  PEEP 7.5 cm H2O. Secretions:  Small, thick, white. Oxygenation:  decreased    IVF:  D5NS @ 50  Infusions:  NaHCO3 added 50 mEq / L. Procedures planned?:  Placement? .    Consults planned?:  Done for now? Seema Mendez Restraints?:  Renewed. .    Wounds:  (see Skin Man Note) many wounds. Any other concerns?:  Daughter apparently becomes very vociferous on the phone. Seema Mendez

## 2017-03-16 NOTE — PROGRESS NOTES
conducted a Follow up consultation and Spiritual Assessment for Brianna Frank, who is a 49 y.o.,female. The  provided the following Interventions:  Continued the relationship of care and support. Listened empathically. Offered prayer and assurance of continued prayer on patients behalf. Chart reviewed. The following outcomes were achieved:  Patient expressed gratitude for pastoral care visit. Assessment:  There are no further spiritual or Roman Catholic issues which require Spiritual Care Services interventions at this time. Plan:  Chaplains will continue to follow and will provide pastoral care on an as needed/requested basis.  recommends bedside caregivers page  on duty if patient shows signs of acute spiritual or emotional distress.      [address] [facility]   Staff [address] [facility]   [phone]

## 2017-03-16 NOTE — PROGRESS NOTES
PCCM AM Rounds  Thurs  3/16/2017    Reintubated ~ midnight when she apparently asked for it. Plan:   transfuse > 8   repeat bronchscopy (see Monday mucus plugging left side)     Comments:  1. Pt is awake & alert, FC and can express her needs. I do not understand what is going on with the family and code status and hospice. Palliative Care is involved but there is only a single very brief note. The patient is apparently back in FULL CODE status. 2. I also do not understand the metastatic nature of her disease or if it is only locally advanced. We will clarify this.    3. Her list of problems is extensive, but the EMR is nonspecifc for details so it is hard for me to put some of these in perspective right now:     Anemia D64.9    Anxiety F41.9    Nausea and vomiting R11.2    Morbid obesity (HCC) E66.01    Chronic pain syndrome G89.4    History of ovarian cancer Z85.43    History of gastric bypass Z98.890    CAD (coronary artery disease) I25.10    History of Clostridium difficile Z87.19    History of endometrial cancer Z85.42    History of GI bleed Z87.19    Lymphedema I89.0    Hypokalemia E87.6    DEV (acute kidney injury) ([facility] N17.9    Pulmonary nodules R91.8    A-fib (HCC) I48.91    Aortic stenosis I35.0    GI bleed K92.2    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    Acute blood loss anemia D62    Bladder mass N32.89    Flank pain, chronic R10.9, G89.29     -cheri [phone]

## 2017-03-16 NOTE — ROUTINE PROCESS
1900:  Rec'd Erskine Bamberger @ 1900 from Kiko Garcia, SHELLY. SBAR reviewed with two-nurse check-offs done of meds, dressings, wounds. 2115:  Previously sx'd large amt thick secr by RT. Since then, on BiPAP, RR ~ 40. Vt ~ 450, ETCO2 ~ 10. Nods \"no\" to getting enough air, nods \"yes\" to having breathing tube put back. Dr. Tiffanie Hernández has been paged. 2210:  ABG showing metabolic acidosis with respiratory compensation. Per Dr. Tiffanie Hernández, Dr. Eva Campos has been paged. Unable to give ordered oral Bicarb. 2315:  Dr. Verneice Simpson called, gave order to reintubate, add HCO3- to IVF. 0040: ABG better, CXR done, has fentanyl. Remained comfortable overnight, generally breathing 20/12. Verbal Patient-side shift change report given to B. 1535 Stella Varela RN, (oncoming nurse) by Luis Armando Guardado RN  (offgoing nurse). Report included the following information SBAR, Kardex, ED Summary, Procedure Summary, Intake/Output, MAR, Recent Results and Med Rec Status. Included:  Intro, hx, two-RN eval of relevant assessment findings, LDAs, skin, diagnostics and infusions.

## 2017-03-16 NOTE — PROGRESS NOTES
[facility]   Pulmonary, Critical Care, and Sleep Medicine     ICU Patient Progress Note    Name: Emanuel Carrasco   : 1960   MRN: [id number]   Date: 3/16/2017    [x]I have reviewed the flowsheet and previous days notes. IMPRESSION/PLAN   Acute cystitis w/ hematuria  * hx of endometrial carcinoma and s/p XRT and bilateral ureteral obstruction with chronic stents last changed 16  * CT 3/3/17 - No hydro     # Septic Shock  * VRE in blood and urine and ARF  * U Cx 3/317 - VRE     # Cardiovascular  2D echo with EF 55 - 32%, grade 1 diastolic, PA 45 - 50  also apparently has significant AS (S/P Aortic Valve Balloon Angioplasty-[facility] 2016). Afib rate resolved, Currently on SR/ST with PAC's  S/P IVC filter placement (3/9/2017)-Acute deep vein thrombosis (DVT) of femoral vein of right lower extremity( Duplex 3/5/2017)     # Respiratory failure   Multifactorial  Vent day # 5 Extubated 3/15, Re intubated 3/15 due to tachypnea and acidosis on ABG- Bicarb drip    Anasarca - Diuresis with Lasix  Morbid obesity  Severe Mucus plugging LEFT side -> s/p bronch 3/13. Elevated right HD; no endocronchail lesion or mucus plugging seen at bronch today  Other: RML pulmonary nodule, possibly mets = not significant acute issue      # Heme  RLE DVT likely 2/2 venous outflow obstruction due to pelvic mass  Thrombocytopenia persistent but improved -> enoxaprin VTEP   Anemia w/o obvious bleeding; no melena  Multiple blood transfusion requirement is unexplained   * check retics, LDH  * diuresis         Quality Care: PPI, DVT prophylaxis, HOB elevated, Infection control all reviewed and addressed. Events and notes from last 24 hours reviewed    Subjective/HPI:   3/12: Pt transferred from floor overnight with acute respiratory failure and hypotension, intubated emergently, started on levo and forrest. 3/13: Remain intubated, Bronchoscopy done, pt tolerated well.  Off pressor  3/14: Hb down 6.4 today, 1 unit pRBC given, rpt CBC 2hr post transfusion, Lasix 40 mg X 1 given, follow periodic CXR to assess clearing. Creat stable, UO ok, HD stable-off pressor. SBT/sedation holiday per protocol. Plan to start TF if not met weaning extubation protocol. 3/15: Hb still down, 1 unit pRBC given, Hb improved post transfusion. TF started as tolerated. Extubated per weaning protocol. 3/16: Re intubated late last night due to to tachypnea and pt requested intubation,  Acidosis per ABG-started on Bicarb drip. Hb low again this morning (6.6), 1 units pRBC, repeat Hb post transfusion, if still <8 plan to transfuse. Continue diuresis-monitor lytes, replace per protocol. Plan bronchoscopy today to reassess increase mucus secretions/plugging, ? Failed extubation. [x]The patient is critically ill on  Unable to obtain    [x]Mechanical ventilation []Pressors   []BiPAP []               Medication Review:  · Pressors - none  · Sedation - Fentanyl  · Antibiotics - Azactam, Daptomycin  · Pain - Fentanyl  · GI/ DVT - protonix/scd  · Others (other gtts)    Safety Bundles: VAP Bundle/ CAUTI/ Severe Sepsis Protocol    ROS:  Unable to obtain, intubated    Vital Signs:    Visit Vitals    /54    Pulse 89    Temp (!) 38.1 °F (3.4 °C)    Resp 22    Ht 5' 4\" (1.626 m)    Wt 124.1 kg (273 lb 9.5 oz)    SpO2 100%    BMI 46.96 kg/m2       O2 Device: Ventilator   O2 Flow Rate (L/min): 3 l/min   Temp (24hrs), Av.1 °F (36.2 °C), Min:38.1 °F (3.4 °C), Max:101.1 °F (38.4 °C)     Intake/Output:Last shift:       0701 -  1900  In: 800 [I.V.:800]  Out: 2425 [Urine:2425]Last 3 shifts:  1901 -  0700  In: 2839 [I.V.:3515]  Out: 4855 [Urine:4855]    Intake/Output Summary (Last 24 hours) at 17 1512  Last data filed at 17 1351   Gross per 24 hour   Intake             3670 ml   Output             6175 ml   Net            -2505 ml     Hemodynamics:   . @MAP     . @CVP       Ventilator Settings:  Ventilator  Mode: Assist control, VC+  Respiratory Rate  Back-Up Rate: 12  Insp Time (sec): 1 sec  I:E Ratio: 1:1.8  Ventilator Volumes  Vt Set (ml): 450 ml  Vt Exhaled (Machine Breath) (ml): 503 ml  Vt Spont (ml): 668 ml  Ve Observed (l/min): 9.29 l/min  Ventilator Pressures  PC Set: 15  Pressure Support (cm H2O): 7 cm H2O  PIP Observed (cm H2O): 19 cm H2O  Plateau Pressure (cm H2O):  (unable to obtain due to pt breathing pattern )  MAP (cm H2O): 11  PEEP/VENT (cm H2O): 7 cm H20    Mode Rate Tidal Volume Pressure FiO2 PEEP   Assist control, VC+   450 ml  7 cm H2O 40 % 7 cm H20     Peak airway pressure: 19 cm H2O    Minute ventilation: 9.29 l/min      ARDS network Guidelines: Lung protective strategy and Pl pressure goals<30    Objective:     Physical Exam:   General: Intubated/vent, no acute distress  HEENT: ETT, pupils reactive, sclera anicteric, EOM intact  Neck: No adenopathy or thyroid swelling, no lymphadenopathy or JVD, supple  CVS: S1S2 no murmurs  RS: Mod AE bilaterally, no accessory muscle use, BS bilaterally decrease, no wheezing. Abd: soft, obese, erythema/irritations between abdominal flaps-treated w/ cream and cloth barrier to prevent moistures. Neuro: Awake and follow commands  Extrm: +UE/LE edema, toes and fingers with color change, hx lymphedema both LE. Lymph node: No obvious palpable lymph node appreciated.   Skin: multiple ulcers ( see wound care documentation) and scattered small areas ecchymosis    DATA:   Current Facility-Administered Medications   Medication Dose Route Frequency    0.9% sodium chloride infusion 250 mL  250 mL IntraVENous PRN    chlorhexidine (PERIDEX) 0.12 % mouthwash 10 mL  10 mL Oral Q12H    furosemide (LASIX) injection 40 mg  40 mg IntraVENous Q12H    0.9% sodium chloride infusion 250 mL  250 mL IntraVENous PRN    sodium bicarbonate (8.4%) 50 mEq in dextrose 5% 1,000 mL infusion   IntraVENous CONTINUOUS    fentaNYL (PF)  mcg/30 ml   IntraVENous TITRATE    0.9% sodium chloride infusion 250 mL  250 mL IntraVENous PRN    enoxaparin (LOVENOX) injection 40 mg  40 mg SubCUTAneous Q24H    albuterol-ipratropium (DUO-NEB) 2.5 MG-0.5 MG/3 ML  3 mL Nebulization Q6H RT    0.9% sodium chloride infusion 250 mL  250 mL IntraVENous PRN    0.9% sodium chloride infusion 250 mL  250 mL IntraVENous PRN    0.9% sodium chloride infusion 250 mL  250 mL IntraVENous PRN    albumin human 25% (BUMINATE) solution 25 g  25 g IntraVENous Q6H    dextrose 5% lactated ringers infusion  50 mL/hr IntraVENous CONTINUOUS    pantoprazole (PROTONIX) 40 mg in sodium chloride 0.9 % 10 mL injection  40 mg IntraVENous DAILY    aztreonam (AZACTAM) 1 g in 0.9% sodium chloride (MBP/ADV) 100 mL MBP  1 g IntraVENous Q8H    sodium chloride (NS) flush 5-10 mL  5-10 mL IntraVENous Q8H    sodium chloride (NS) flush 5-10 mL  5-10 mL IntraVENous PRN    anidulafungin (ERAXIS) 100 mg in 0.9% sodium chloride 130 mL IVPB  100 mg IntraVENous Q24H    nystatin (MYCOSTATIN) 100,000 unit/gram powder   Topical BID    ELECTROLYTE REPLACEMENT PROTOCOL  1 Each Other PRN    ELECTROLYTE REPLACEMENT PROTOCOL  1 Each Other PRN    ELECTROLYTE REPLACEMENT PROTOCOL  1 Each Other PRN    ELECTROLYTE REPLACEMENT PROTOCOL  1 Each Other PRN    DAPTOmycin (CUBICIN) 719 mg in 0.9% sodium chloride 50 mL IVPB RF formulation  6 mg/kg IntraVENous Q24H    sodium bicarbonate tablet 650 mg  650 mg Oral TID    bisacodyl (DULCOLAX) suppository 10 mg  10 mg Rectal DAILY PRN    glucose chewable tablet 16 g  16 g Oral PRN    glucagon (GLUCAGEN) injection 1 mg  1 mg IntraMUSCular PRN    dextrose (D50W) injection syrg 12.5-25 g  25-50 mL IntraVENous PRN    acetaminophen (TYLENOL) tablet 650 mg  650 mg Oral Q4H PRN    ondansetron (ZOFRAN) injection 4 mg  4 mg IntraVENous Q4H PRN    sodium chloride (NS) flush 5-10 mL  5-10 mL IntraVENous PRN       Telemetry: [x]Sinus []A-flutter []Paced    []A-fib []Multiple PVCs     CBC w/Diff Recent Labs      03/16/17 7662  03/15/17   2149  03/15/17   1100  03/15/17   0345  03/14/17   1620   WBC  9.8   --    --   13.9*  16.8*   RBC  2.34*   --    --   2.43*  2.62*   HGB  6.6*  7.5*  6.6*  6.8*  7.4*   HCT  20.3*  23.5*  20.5*  21.2*  22.4*   PLT  112*   --    --   111*  105*   GRANS  86*   --    --   85*  90*   LYMPH  9*   --    --   8*  4*   EOS  0   --    --   0  0        Chemistry Recent Labs      03/16/17   0520  03/15/17   2149  03/15/17   0345  03/14/17   1620   GLU  86  79  76   --    NA  142  142  142   --    K  3.1*  3.0*  3.5  3.7   CL  112*  114*  114*   --    CO2  15*  14*  13*   --    BUN  43*  43*  47*   --    CREA  1.59*  1.66*  1.82*   --    CA  7.5*  7.4*  7.3*   --    MG  1.9  1.8  1.8   --    PHOS  4.3  4.9  5.1*   --    AGAP  15  14  15   --    BUCR  27*  26*  26*   --    AP   --    --   191*   --    TP   --    --   6.1*   --    ALB   --    --   1.9*  1.5*   GLOB   --    --   4.2*   --    AGRAT   --    --   0.5*   --         Lactic Acid Lactic acid   Date Value Ref Range Status   03/11/2017 1.4 0.4 - 2.0 MMOL/L Final     No results for input(s): LAC in the last 72 hours. ABG Recent Labs      03/16/17   0004  03/15/17   2152  03/14/17   0807   PHI  7.321*  7.292*  7.313*   PCO2I  30.9*  28.7*  24.5*   PO2I  162*  258*  304*   HCO3I  15.8*  13.8*  12.4*   FIO2I  0.40  0.50  60        Micro  Recent Labs      03/14/17   1623   CULT  NO GROWTH 2 DAYS     Recent Labs      03/14/17   1623   CULT  NO GROWTH 2 DAYS        Liver Enzymes Protein, total   Date Value Ref Range Status   03/15/2017 6.1 (L) 6.4 - 8.2 g/dL Final     Albumin   Date Value Ref Range Status   03/15/2017 1.9 (L) 3.4 - 5.0 g/dL Final     Globulin   Date Value Ref Range Status   03/15/2017 4.2 (H) 2.0 - 4.0 g/dL Final     A-G Ratio   Date Value Ref Range Status   03/15/2017 0.5 (L) 0.8 - 1.7   Final     AST (SGOT)   Date Value Ref Range Status   03/15/2017 22 15 - 37 U/L Final     Alk.  phosphatase   Date Value Ref Range Status   03/15/2017 191 (H) 45 - 117 U/L Final     Recent Labs      03/15/17   0345  03/14/17   1620   TP  6.1*   --    ALB  1.9*  1.5*   GLOB  4.2*   --    AGRAT  0.5*   --    SGOT  22   --    AP  191*   --           Cardiac Enzymes Lab Results   Component Value Date/Time     03/16/2017 11:30 AM     (H) 03/16/2017 05:20 AM     (H) 03/15/2017 09:49 PM    CKMB 5.7 (H) 03/16/2017 11:30 AM    CKMB 7.0 (H) 03/16/2017 05:20 AM    CKMB 8.8 (H) 03/15/2017 09:49 PM    CKND1 3.2 03/16/2017 11:30 AM    CKND1 3.5 03/16/2017 05:20 AM    CKND1 4.2 (H) 03/15/2017 09:49 PM    TROIQ 0.06 (H) 03/16/2017 11:30 AM    TROIQ 0.05 (H) 03/16/2017 05:20 AM    TROIQ 0.03 03/15/2017 09:49 PM        BNP No results found for: BNP, BNPP, XBNPT     Coagulation No results for input(s): PTP, INR, APTT in the last 72 hours. No lab exists for component: INREXT, INREXT      Thyroid  Lab Results   Component Value Date/Time    TSH 2.82 03/15/2017 10:10 AM          Lipid Panel No results found for: CHOL, CHOLPOCT, CHOLX, CHLST, CHOLV, [id number], HDL, LDL, NLDLCT, DLDL, LDLC, DLDLP, [id number], VLDLC, VLDL, TGL, Marnie Marking, [id number], Rosemarie Youssef, [facility], [facility]       Urinalysis Lab Results   Component Value Date/Time    Color YELLOW 03/14/2017 04:23 PM    Appearance TURBID 03/14/2017 04:23 PM    Specific gravity 1.012 03/14/2017 04:23 PM    pH (UA) 5.0 03/14/2017 04:23 PM    Protein 30 03/14/2017 04:23 PM    Glucose NEGATIVE  03/14/2017 04:23 PM    Ketone NEGATIVE  03/14/2017 04:23 PM    Bilirubin NEGATIVE  03/14/2017 04:23 PM    Urobilinogen 0.2 03/14/2017 04:23 PM    Nitrites NEGATIVE  03/14/2017 04:23 PM    Leukocyte Esterase LARGE 03/14/2017 04:23 PM    Epithelial cells FEW 03/14/2017 04:23 PM    Bacteria 4+ 03/14/2017 04:23 PM    WBC TOO NUMEROUS TO COUNT 03/14/2017 04:23 PM    RBC TOO NUMEROUS TO COUNT 03/14/2017 04:23 PM        XR (Most Recent).  CXR reviewed by me and compared with previous CXR   Results from [facility] [place]counter encounter on 03/03/17   XR ABD (KUB)   Narrative Abdomen radiograph    INDICATION: Verify gastric tube placement    COMPARISON: Same day chest x-ray. FINDINGS: The tip of the enteric tube terminates in the stomach. The side-port  is not confidently visualized, and may end near the distal gastroesophageal  junction. IVC filter, surgical clips, bilateral nephrostomy tubes noted. Mild spinal  curvature. No acute osseous abnormalities. Nonobstructive bowel gas pattern. Left lung base opacity. Impression IMPRESSION:    1. The tip of the enteric tube terminates in the stomach with the side port near  the GE junction. Recommend advancing approximately 10 cm. 2. Left lung base opacity. Please see concurrent chest radiograph. As attending radiologist I have assessed the study images, and dictated or  reviewed/edited the final report as needed. CT (Most Recent)   Results from Hospital Encounter encounter on 03/03/17   CT HEAD WO CONT   Narrative EXAM: CT head    INDICATION: Severe thrombocytopenia    COMPARISON: None. TECHNIQUE: Axial CT imaging of the head was performed without intravenous  contrast. Coronal and sagittal reconstructions were performed. One or more dose reduction techniques were used on this CT: automated exposure  control, adjustment of the mAs and/or kVp according to patient's size, and  iterative reconstruction techniques. The specific techniques utilized on this CT  exam have been documented in the patient's electronic medical record.     _______________    FINDINGS:    BRAIN AND POSTERIOR FOSSA: The sulci, folia, ventricles and basal cisterns are  within normal limits for the patient?s age. There is no intracranial hemorrhage,  mass effect, or midline shift. There are no areas of abnormal parenchymal  attenuation. EXTRA-AXIAL SPACES AND MENINGES: There are no abnormal extra-axial fluid  collections. CALVARIUM: Intact. SINUSES: Clear.     OTHER: Visual is global structures and mastoid air cells are unremarkable.    _______________         Impression IMPRESSION:      1. No evidence of acute infarct, hemorrhage or mass. Normal noncontrast CT  brain. As the attending radiologist I have personally reviewed the study and this  report, and concur with the above stated findings. EKG No results found for this or any previous visit. ECHO No results found for this or any previous visit. PFT No flowsheet data found.      Other ASA reactivity:   Pre-albumin:   Ionized Calcium:   NH4:   T3, FT4:  Cortisol:  Urine Osm:  Urine Lytes:   HbA1c:      The patient is: [x] acutely ill Risk of deterioration: [] moderate    [x] critically ill  [] high     [x]See my orders for details    My assessment, plan of care, findings, medications, side effects etc were discussed with:  [x]nursing []PT/OT    [x]respiratory therapy [x]Dr. Brooke Ferreira   []family []      Razia Scott, NP  Pulmonary [address]  [phone]

## 2017-03-16 NOTE — DIABETES MGMT
NUTRITIONAL RE-ASSESSMENT AND GLYCEMIC CONTROL PLAN OF CARE     Beth Eddy           49 y.o.           3/3/2017                 1. Septic shock ([facility] [address]    2. Acute renal failure, unspecified acute renal failure type ([facility] [address]    3. Acute cystitis with hematuria    4. High anion gap metabolic acidosis      Patient Active Problem List   Diagnosis Code    Anemia D64.9    Anxiety F41.9    Nausea and vomiting R11.2    Morbid obesity ([facility] [address] E66.01    Chronic pain syndrome G89.4    History of ovarian cancer Z85.43    History of gastric bypass Z98.890    CAD (coronary artery disease) I25.10    History of Clostridium difficile Z87.19    History of endometrial cancer Z85.42      [x]  Participated in discharge planning/Interdisciplinary rounds   Food allergies: [x]  No        []  Yes-  ASSESSMENT:   Pt is overweight related to excess caloric intake as evidenced by 191% ideal weight and BMI=46.9kg/m2. Pt meets criteria for morbid obesity. Diagnosis-Intake: Inadequate protein-energy intake related to respiratory distress AEB inability to take in oral nourishment while on ventilator and need for enteral feeding. Pt w/hypoalbuminemia as evidenced by albumin=1.9 mg/dl. INTERVENTIONS/PLAN:   Pt reintubated 3/15. Recommend FS Osmolite 1.2 calorie  Tube Feeding at 20ml/ hour to start. Advance TF q 8hrs as tolerated to goal rate of 55 ml/hr  TF will provide 73g protein, 1584 calories/day with 1 liter free water/day from TF.   SUBJECTIVE/OBJECTIVE:   Diet:npo  Patient Vitals for the past 100 hrs:   % Diet Eaten   03/13/17 1200 0 %     Medications: [x]                Reviewed-Na Bicarbonate t in 5% Dextrose at 50ml/hr, lasix, albumin , others noted  Most Recent POC Glucose:   Recent Labs      03/16/17   0520  03/15/17   2149  03/15/17   0345  03/14/17   0345   GLU  86  79  76  90    BG well controlled     Labs:   Lab Results   Component Value Date/Time    Hemoglobin A1c 5.4 03/04/2017 06:47 AM     Lab Results   Component Value Date/Time    Sodium 142 03/16/2017 05:20 AM    Potassium 3.1 03/16/2017 05:20 AM    Chloride 112 03/16/2017 05:20 AM    CO2 15 03/16/2017 05:20 AM    Anion gap 15 03/16/2017 05:20 AM    Glucose 86 03/16/2017 05:20 AM    BUN 43 03/16/2017 05:20 AM    Creatinine 1.59 03/16/2017 05:20 AM    Calcium 7.5 03/16/2017 05:20 AM    Magnesium 1.9 03/16/2017 05:20 AM    Phosphorus 4.3 03/16/2017 05:20 AM    Albumin 1.9 03/15/2017 03:45 AM       Anthropometrics: IBW : 59 kg (130 lb), % IBW (Calculated): 191.8 %, BMI (calculated): 46.9  Wt Readings from Last 1 Encounters:   03/16/17 124.1 kg (273 lb 9.5 oz)      Ht Readings from Last 1 Encounters:   03/06/17 5' 4\" (1.626 m)       Estimated Nutrition Needs: 1480 Kcals/day, Protein (g): 70 g Fluid (ml): 1800 ml  Based on:   []          Actual BW    [x]          IBW   []            Adjusted BW      Nutrition Diagnoses:      As stated above. Nutrition Interventions: Tubefeeding Recommendations  Goal:     Intake will meet >75% of energy and protein requirements by  3/21. Glucose will be within target range of 70-180mg/dl by   3/16.-met  No weight goals due to hospice status prior to admission.   Nutrition Monitoring and Evaluation      []     Monitor po intake on meal rounds  [x]     Continue inpatient monitoring and intervention  [x]     Other:TF initiation    Nutrition Risk:  [x]   High     []  Moderate    []  Minimal/Uncompromised    Renan Mclaughlin RD

## 2017-03-16 NOTE — PROGRESS NOTES
INFECTIOUS DISEASE FOLLOW UP NOTE :-     Admit Date: 3/3/2017    Current  Prior    Daptomycin 3/8-8  aztreonam/anidulafungin 3/12-4 linezolid 3/4 - 3, vanc/zosyn/levoflox 3/3 -0     ASSESSMENT: -> RECS     Sepsis POA  - fever 102 , leukocytosis 28K, tachycardia, hypotension  - source VRE UTI and BSI or DVT  -allergic allergic reaction and IE also in DDX  - WBC sl lower 42->30->19k -> 14K ->monitor on antimicrobials broadened 3/12-  Will stop aztreonam and anidulafungin tomorrow as it will be 5 days and sputum so far with C.albicans   -> new fever likely from left lung recurrent atelectasis   ->prognosis remains guarded    Resp failure NTB 3/11  -initial cxr nad ->white out L s/o mucus plug, SP bronch 3/13 extensive L mucus plugging ->reintubated 3/15 , with again left lung atelectasis   -> per [facility] plan for bronchoscopy today   -> monitor sput- c.albicans   VRE BSI with ?aortic endocarditis  - blcx 3/3 2/2 VRE positive   - repeat 3/6 ntd   - TTE aortic stenosis with possible aortic vegetation [ chronic mixed calcified density ]  - source likely UTI; pt refused ESSIE and removal of mediport  ->monitor cultures-  3/8 bctx's ng, 3/12 ngtd - Cont Daptomycin  ->weekly CK on dapto- 198 3/16      VRE UTI, complicated   - UA with large LE , wbc tntc, jamar 4+  - ucx >100k VRE  - CT with no hydronephrosis but has b/l ureteral stents  - appreciate urology consult and eventual plan for stent exchange/removal  - abx as above  ->stent mgmt per      ADR - Rash over abdomen/chest   - secondary to linezolid which has been listed from before as allergy - rash fading off linezolid  ->monitor off linezolid    Lymphedema with R medial thigh blister- not currently infected appearing  -massive pannus with intertrigo  ->added systemic antifungal to topical   RLE subacute DVT  - per primary team   Rt pelvic mass ? ovarian ca / endometrial ca with mets  - palliative care was involved , was hospice at home and now revoked   - may need to re involve them , d/w PCCM NP Ms Jhonatan Grimaldo, as pt again indicating she does not want trach/peg and aggressive measures. DEV due to sepsis - ~slowly better, Cr 1.9->1.8  today     Thrombocytopenia -better 94-> 111 ->monitor   Blood loss anemia        MICROBIOLOGY:   3/3  Blcx x 2 - 2/2 VRE S gent, linezolid , daptomycin quinupristin         Ucx > 100K VRE  3/4 Flu Ag test neg   3/6  Blcx x 2 ng  3/8  Blcx x 2 ng   3/11 uctx ng  3/12 bctx x 2 ngtd   sput GS prelim C albicans  3/14     ucx ntd     LINES AND CATHETERS:   Rt chest mediport     SUBJECTIVE :     Interval notes reviewed. Remains in ICU, pt was extubated yesterday however in night had resp distress and was re intubated again. She is again awake and alert on vent, on asking she says she will not want trach and peg. Fever of 100.6 overnight.     MEDICATIONS :     Current Facility-Administered Medications   Medication Dose Route Frequency    potassium chloride 10 mEq in 100 ml IVPB  10 mEq IntraVENous Q1H    0.9% sodium chloride infusion 250 mL  250 mL IntraVENous PRN    [COMPLETED] chlorhexidine (PERIDEX) 0.12 % mouthwash 10 mL  10 mL Oral ONCE    chlorhexidine (PERIDEX) 0.12 % mouthwash 10 mL  10 mL Oral Q12H    furosemide (LASIX) injection 40 mg  40 mg IntraVENous Q12H    0.9% sodium chloride infusion 250 mL  250 mL IntraVENous PRN    sodium bicarbonate (8.4%) 50 mEq in dextrose 5% 1,000 mL infusion   IntraVENous CONTINUOUS    fentaNYL (PF)  mcg/30 ml   IntraVENous TITRATE    0.9% sodium chloride infusion 250 mL  250 mL IntraVENous PRN    enoxaparin (LOVENOX) injection 40 mg  40 mg SubCUTAneous Q24H    albuterol-ipratropium (DUO-NEB) 2.5 MG-0.5 MG/3 ML  3 mL Nebulization Q6H RT    0.9% sodium chloride infusion 250 mL  250 mL IntraVENous PRN    0.9% sodium chloride infusion 250 mL  250 mL IntraVENous PRN    0.9% sodium chloride infusion 250 mL 250 mL IntraVENous PRN    albumin human 25% (BUMINATE) solution 25 g  25 g IntraVENous Q6H    dextrose 5% lactated ringers infusion  50 mL/hr IntraVENous CONTINUOUS    pantoprazole (PROTONIX) 40 mg in sodium chloride 0.9 % 10 mL injection  40 mg IntraVENous DAILY    aztreonam (AZACTAM) 1 g in 0.9% sodium chloride (MBP/ADV) 100 mL MBP  1 g IntraVENous Q8H    sodium chloride (NS) flush 5-10 mL  5-10 mL IntraVENous Q8H    sodium chloride (NS) flush 5-10 mL  5-10 mL IntraVENous PRN    anidulafungin (ERAXIS) 100 mg in 0.9% sodium chloride 130 mL IVPB  100 mg IntraVENous Q24H    nystatin (MYCOSTATIN) 100,000 unit/gram powder   Topical BID    ELECTROLYTE REPLACEMENT PROTOCOL  1 Each Other PRN    ELECTROLYTE REPLACEMENT PROTOCOL  1 Each Other PRN    ELECTROLYTE REPLACEMENT PROTOCOL  1 Each Other PRN    ELECTROLYTE REPLACEMENT PROTOCOL  1 Each Other PRN    DAPTOmycin (CUBICIN) 719 mg in 0.9% sodium chloride 50 mL IVPB RF formulation  6 mg/kg IntraVENous Q24H    sodium bicarbonate tablet 650 mg  650 mg Oral TID    bisacodyl (DULCOLAX) suppository 10 mg  10 mg Rectal DAILY PRN    glucose chewable tablet 16 g  16 g Oral PRN    glucagon (GLUCAGEN) injection 1 mg  1 mg IntraMUSCular PRN    dextrose (D50W) injection syrg 12.5-25 g  25-50 mL IntraVENous PRN    acetaminophen (TYLENOL) tablet 650 mg  650 mg Oral Q4H PRN    ondansetron (ZOFRAN) injection 4 mg  4 mg IntraVENous Q4H PRN    sodium chloride (NS) flush 5-10 mL  5-10 mL IntraVENous PRN       OBJECTIVE :     Visit Vitals    /52    Pulse 99    Temp 100.4 °F (38 °C)    Resp 22    Ht 5' 4\" (1.626 m)    Wt 124.1 kg (273 lb 9.5 oz)    SpO2 100%    BMI 46.96 kg/m2       Temp (24hrs), Av.2 °F (37.9 °C), Min:98.6 °F (37 °C), Max:101.1 °F (38.4 °C)    GEN - unresponsive ill appearing WF appears older than stated age in ICU on vent  HEENT - conj pale, AMADOU in place, NGT in place  NECK- R mediport accessed no LAD   RESP- scattered rhochi  CVS- tachy III/VI systolic murmur   ABD-massive pannus, large area of ecchymosis at lower abdominal fold fragile skin, bs present  EXT- (+) edema   SKIN -improved rash over abdomen, large pannus of lower abdomen with hematoma of skin and ulcer. (+) BLE edema   NEURO - awake off sedation following commands and nodding appropriately     Labs: Results:   Chemistry Recent Labs      03/16/17   0520  03/15/17   2149  03/15/17   0345  03/14/17   1620   GLU  86  79  76   --    NA  142  142  142   --    K  3.1*  3.0*  3.5  3.7   CL  112*  114*  114*   --    CO2  15*  14*  13*   --    BUN  43*  43*  47*   --    CREA  1.59*  1.66*  1.82*   --    CA  7.5*  7.4*  7.3*   --    AGAP  15  14  15   --    BUCR  27*  26*  26*   --    AP   --    --   191*   --    TP   --    --   6.1*   --    ALB   --    --   1.9*  1.5*   GLOB   --    --   4.2*   --    AGRAT   --    --   0.5*   --       CBC w/Diff Recent Labs      03/16/17   0520  03/15/17   2149  03/15/17   1100  03/15/17   0345  03/14/17   1620   WBC  9.8   --    --   13.9*  16.8*   RBC  2.34*   --    --   2.43*  2.62*   HGB  6.6*  7.5*  6.6*  6.8*  7.4*   HCT  20.3*  23.5*  20.5*  21.2*  22.4*   PLT  112*   --    --   111*  105*   GRANS  86*   --    --   85*  90*   LYMPH  9*   --    --   8*  4*   EOS  0   --    --   0  0        cxr 3/16 - IMPRESSION:  1. Endotracheal tube is within 1 cm of the garth and should be pulled back 2 cm  for better positioning. 2. Recurrent left lower lobe atelectasis    CT abdo/pelvis 3/3 - IMPRESSION:  1. New cystic mass right adnexal region. Recurrent ovarian carcinoma cannot be  excluded. 2. New right middle lobe pulmonary nodule. Cannot rule out metastatic disease. 3. Bilateral ureteral stents with resolved hydronephrosis. 4. Stable or minimally increased left periaortic retroperitoneal adenopathy. Echo 3/7 - EF 55%, Aortic valve: The valve was trileaflet. Leaflets exhibited moderate  calcification and moderately reduced cuspal separation.  There was moderate  stenosis. There was mild regurgitation. There was small-medium size fix  partially calcified echodensity noted attach to right coronary cusp. Could  represent vegetation. Valve mean gradient was 34 mmHg. Aortic valve area  was 1 cm-sq by the continuity equation.     Dieudonne Baker MD  March 16, 2017  [facility] AT THE [facility]  [phone]

## 2017-03-17 NOTE — PROGRESS NOTES
[facility]   Pulmonary, Critical Care, and Sleep Medicine     ICU Patient Progress Note    Name: Daisy Levy   : 1960   MRN: [id number]   Date: 3/17/2017    [x]I have reviewed the flowsheet and previous days notes. IMPRESSION/PLAN   Acute cystitis w/ hematuria  * hx of endometrial carcinoma and s/p XRT and bilateral ureteral obstruction with chronic stents last changed 16  * CT 3/3/17 - No hydro     # Septic Shock  * VRE in blood and urine and ARF  * U Cx 3/317 - VRE     # Cardiovascular  2D echo with EF 55 - 91%, grade 1 diastolic, PA 45 - 50  also apparently has significant AS (S/P Aortic Valve Balloon Angioplasty-[facility] 2016). Afib rate resolved, Currently on SR/ST with PAC's  S/P IVC filter placement (3/9/2017)-Acute deep vein thrombosis (DVT) of femoral vein of right lower extremity( Duplex 3/5/2017)     # Respiratory failure   Multifactorial  Vent day # 5 Extubated 3/15, Re intubated 3/15 due to tachypnea and acidosis on ABG- Bicarb drip    Anasarca - Diuresis with Lasix  Morbid obesity  Severe Mucus plugging LEFT side -> s/p bronch 3/13. Elevated right HD; no endocronchail lesion or mucus plugging seen at bronch today  Other: RML pulmonary nodule, possibly mets = not significant acute issue      # Heme  RLE DVT likely 2/2 venous outflow obstruction due to pelvic mass  Thrombocytopenia persistent but improved -> enoxaprin VTEP   Anemia w/o obvious bleeding; no melena  Multiple blood transfusion requirement is unexplained   * check retics, LDH  * diuresis         Quality Care: PPI, DVT prophylaxis, HOB elevated, Infection control all reviewed and addressed. Events and notes from last 24 hours reviewed    Subjective/HPI:   3/12: Pt transferred from floor overnight with acute respiratory failure and hypotension, intubated emergently, started on levo and forrest. 3/13: Remain intubated, Bronchoscopy done, pt tolerated well.  Off pressor  3/14: Hb down 6.4 today, 1 unit pRBC given, rpt CBC 2hr post transfusion, Lasix 40 mg X 1 given, follow periodic CXR to assess clearing. Creat stable, UO ok, HD stable-off pressor. SBT/sedation holiday per protocol. Plan to start TF if not met weaning extubation protocol. 3/15: Hb still down, 1 unit pRBC given, Hb improved post transfusion. TF started as tolerated. Extubated per weaning protocol. 3/16: Re intubated late last night due to to tachypnea and pt requested intubation,  Acidosis per ABG-started on Bicarb drip. Hb low again this morning (6.6), 1 units pRBC, repeat Hb post transfusion, if still <8 plan to transfuse. Continue diuresis-monitor lytes, replace per protocol. Plan bronchoscopy today to reassess increase mucus secretions/plugging, ? Failed extubation. 3/17: S/p Bronchoscopy 3/16, remain intubated, failed SBT trial due to Tachycardia , resolved after putting back on vent support. Febrile,cultures repeated, ID following. Palliative signed off due to complex family dynamic, will wait when extubated if pt willing to go back hospice and off sedation. Daughter not reliable per nursing staff, consider psyc consult to evaluate pt capacity, ? Guardianship.     [x]The patient is critically ill on  Unable to obtain    [x]Mechanical ventilation []Pressors   []BiPAP []               Medication Review:  · Pressors - none  · Sedation - Fentanyl  · Antibiotics - Azactam, Daptomycin, Eraxis  · Pain - Fentanyl  · GI/ DVT - protonix/scd  · Others (other gtts)    Safety Bundles: VAP Bundle/ CAUTI/ Severe Sepsis Protocol    ROS:  Unable to obtain, intubated    Vital Signs:    Visit Vitals    /67    Pulse (!) 103    Temp (!) 102.3 °F (39.1 °C)    Resp 22    Ht 5' 4\" (1.626 m)    Wt 114 kg (251 lb 5.2 oz)    SpO2 99%    BMI 43.14 kg/m2       O2 Device: Ventilator, Endotracheal tube   O2 Flow Rate (L/min): 3 l/min   Temp (24hrs), Av.4 °F (38 °C), Min:98.8 °F (37.1 °C), Max:102.3 °F (39.1 °C)     Intake/Output:Last shift: 03/17 0701 - 03/17 1900  In: 2005 [I.V.:1500]  Out: 2525 [Urine:2525]Last 3 shifts: 03/15 1901 - 03/17 0700  In: 4917.5 [I.V.:4095]  Out: 8910 [Urine:8910]    Intake/Output Summary (Last 24 hours) at 03/17/17 1812  Last data filed at 03/17/17 1600   Gross per 24 hour   Intake             3625 ml   Output             5885 ml   Net            -2260 ml     Hemodynamics:   . @MAP     . @CVP       Ventilator Settings:  Ventilator  Mode: Assist control, VC+  Respiratory Rate  Back-Up Rate: 12  Insp Time (sec): 1 sec  I:E Ratio: 1;2  Ventilator Volumes  Vt Set (ml): 450 ml  Vt Exhaled (Machine Breath) (ml): 450 ml  Vt Spont (ml): 474 ml  Ve Observed (l/min): 9 l/min  Ventilator Pressures  PC Set: 15  Pressure Support (cm H2O): 7 cm H2O  PIP Observed (cm H2O): 18 cm H2O  Plateau Pressure (cm H2O): 15 cm H2O  MAP (cm H2O): 10  PEEP/VENT (cm H2O): 5 cm H20  Auto PEEP Observed (cm H2O): 0 cm H2O    Mode Rate Tidal Volume Pressure FiO2 PEEP   Assist control, VC+   450 ml  7 cm H2O 40 % 5 cm H20     Peak airway pressure: 18 cm H2O    Minute ventilation: 9 l/min      ARDS network Guidelines: Lung protective strategy and Pl pressure goals<30    Objective:     Physical Exam:   General: Intubated/vent, no acute distress  HEENT: ETT, pupils reactive, sclera anicteric, EOM intact  Neck: No adenopathy or thyroid swelling, no lymphadenopathy or JVD, supple  CVS: S1S2 no murmurs  RS: Mod AE bilaterally, no accessory muscle use, BS bilaterally decrease, no wheezing. Abd: soft, obese, erythema/irritations between abdominal flaps-treated w/ cream and cloth barrier to prevent moistures. Neuro: Awake and follow commands  Extrm: +UE/LE edema, toes and fingers with color change, hx lymphedema both LE. Lymph node: No obvious palpable lymph node appreciated.   Skin: multiple ulcers ( see wound care documentation) and scattered small areas ecchymosis    DATA:   Current Facility-Administered Medications   Medication Dose Route Frequency    magnesium sulfate 2 g/50 ml 2 gram/50 mL (4 %) IVPB premix pgbk        0.9% sodium chloride infusion 250 mL  250 mL IntraVENous PRN    chlorhexidine (PERIDEX) 0.12 % mouthwash 10 mL  10 mL Oral Q12H    acetaminophen (TYLENOL) solution 650 mg  650 mg Oral Q4H PRN    furosemide (LASIX) injection 40 mg  40 mg IntraVENous Q12H    0.9% sodium chloride infusion 250 mL  250 mL IntraVENous PRN    sodium bicarbonate (8.4%) 50 mEq in dextrose 5% 1,000 mL infusion   IntraVENous CONTINUOUS    fentaNYL (PF)  mcg/30 ml   IntraVENous TITRATE    0.9% sodium chloride infusion 250 mL  250 mL IntraVENous PRN    enoxaparin (LOVENOX) injection 40 mg  40 mg SubCUTAneous Q24H    albuterol-ipratropium (DUO-NEB) 2.5 MG-0.5 MG/3 ML  3 mL Nebulization Q6H RT    0.9% sodium chloride infusion 250 mL  250 mL IntraVENous PRN    0.9% sodium chloride infusion 250 mL  250 mL IntraVENous PRN    0.9% sodium chloride infusion 250 mL  250 mL IntraVENous PRN    albumin human 25% (BUMINATE) solution 25 g  25 g IntraVENous Q6H    dextrose 5% lactated ringers infusion  50 mL/hr IntraVENous CONTINUOUS    pantoprazole (PROTONIX) 40 mg in sodium chloride 0.9 % 10 mL injection  40 mg IntraVENous DAILY    aztreonam (AZACTAM) 1 g in 0.9% sodium chloride (MBP/ADV) 100 mL MBP  1 g IntraVENous Q8H    sodium chloride (NS) flush 5-10 mL  5-10 mL IntraVENous Q8H    sodium chloride (NS) flush 5-10 mL  5-10 mL IntraVENous PRN    anidulafungin (ERAXIS) 100 mg in 0.9% sodium chloride 130 mL IVPB  100 mg IntraVENous Q24H    nystatin (MYCOSTATIN) 100,000 unit/gram powder   Topical BID    ELECTROLYTE REPLACEMENT PROTOCOL  1 Each Other PRN    ELECTROLYTE REPLACEMENT PROTOCOL  1 Each Other PRN    ELECTROLYTE REPLACEMENT PROTOCOL  1 Each Other PRN    ELECTROLYTE REPLACEMENT PROTOCOL  1 Each Other PRN    DAPTOmycin (CUBICIN) 719 mg in 0.9% sodium chloride 50 mL IVPB RF formulation  6 mg/kg IntraVENous Q24H    sodium bicarbonate tablet 650 mg  650 mg Oral TID    bisacodyl (DULCOLAX) suppository 10 mg  10 mg Rectal DAILY PRN    glucose chewable tablet 16 g  16 g Oral PRN    glucagon (GLUCAGEN) injection 1 mg  1 mg IntraMUSCular PRN    dextrose (D50W) injection syrg 12.5-25 g  25-50 mL IntraVENous PRN    ondansetron (ZOFRAN) injection 4 mg  4 mg IntraVENous Q4H PRN    sodium chloride (NS) flush 5-10 mL  5-10 mL IntraVENous PRN       Telemetry: [x]Sinus []A-flutter []Paced    []A-fib []Multiple PVCs     CBC w/Diff Recent Labs      03/17/17   0400  03/16/17   1615  03/16/17   0520   WBC  9.4  9.2  9.8   RBC  2.68*  2.54*  2.34*   HGB  7.6*  7.2*  6.6*   HCT  23.2*  22.0*  20.3*   PLT  113*  105*  112*   GRANS  85*  91*  86*   LYMPH  10*  8*  9*   EOS  0  0  0        Chemistry Recent Labs      03/17/17   1600  03/17/17   0400  03/16/17   1615  03/16/17   0520  03/15/17   2149  03/15/17   0345   GLU   --   90   --   86  79  76   NA   --   145   --   142  142  142   K  3.8  2.8*  2.8*  3.1*  3.0*  3.5   CL   --   112*   --   112*  114*  114*   CO2   --   18*   --   15*  14*  13*   BUN   --   38*   --   43*  43*  47*   CREA   --   1.36*   --   1.59*  1.66*  1.82*   CA   --   7.8*   --   7.5*  7.4*  7.3*   MG  2.2  1.6*   --   1.9  1.8  1.8   PHOS   --   3.4   --   4.3  4.9  5.1*   AGAP   --   15   --   15  14  15   BUCR   --   28*   --   27*  26*  26*   AP   --    --    --    --    --   191*   TP   --    --    --    --    --   6.1*   ALB   --    --    --    --    --   1.9*   GLOB   --    --    --    --    --   4.2*   AGRAT   --    --    --    --    --   0.5*        Lactic Acid Lactic acid   Date Value Ref Range Status   03/11/2017 1.4 0.4 - 2.0 MMOL/L Final     No results for input(s): LAC in the last 72 hours.      ABG Recent Labs      03/17/17   1046  03/16/17   0004  03/15/17   2152   PHI  7.399  7.321*  7.292*   PCO2I  29.9*  30.9*  28.7*   PO2I  158*  162*  258*   HCO3I  18.5*  15.8*  13.8*   FIO2I  0.35  0.40  0.50        Micro  Recent Labs 03/17/17   1210  03/17/17   1200   CULT  PENDING  PENDING     Recent Labs      03/17/17   1210  03/17/17   1200   CULT  PENDING  PENDING        Liver Enzymes Protein, total   Date Value Ref Range Status   03/15/2017 6.1 (L) 6.4 - 8.2 g/dL Final     Albumin   Date Value Ref Range Status   03/15/2017 1.9 (L) 3.4 - 5.0 g/dL Final     Globulin   Date Value Ref Range Status   03/15/2017 4.2 (H) 2.0 - 4.0 g/dL Final     A-G Ratio   Date Value Ref Range Status   03/15/2017 0.5 (L) 0.8 - 1.7   Final     AST (SGOT)   Date Value Ref Range Status   03/15/2017 22 15 - 37 U/L Final     Alk. phosphatase   Date Value Ref Range Status   03/15/2017 191 (H) 45 - 117 U/L Final     Recent Labs      03/15/17   0345   TP  6.1*   ALB  1.9*   GLOB  4.2*   AGRAT  0.5*   SGOT  22   AP  191*          Cardiac Enzymes No results found for: CPK, CKMMB, CKMB, RCK3, CKMBT, CKNDX, CKND1, LUIS CARLOS, TROPT, TROIQ, JEFFY, TROPT, TNIPOC, BNP, BNPP     BNP No results found for: BNP, BNPP, XBNPT     Coagulation No results for input(s): PTP, INR, APTT in the last 72 hours.     No lab exists for component: INREXT, INREXT      Thyroid  Lab Results   Component Value Date/Time    TSH 2.82 03/15/2017 10:10 AM          Lipid Panel No results found for: CHOL, CHOLPOCT, CHOLX, CHLST, CHOLV, 747101, HDL, LDL, NLDLCT, DLDL, LDLC, DLDLP, [id number], VLDLC, VLDL, TGL, Munira Gutierrez, [id number], Duane Ego, [facility], [facility]       Urinalysis Lab Results   Component Value Date/Time    Color YELLOW 03/14/2017 04:23 PM    Appearance TURBID 03/14/2017 04:23 PM    Specific gravity 1.012 03/14/2017 04:23 PM    pH (UA) 5.0 03/14/2017 04:23 PM    Protein 30 03/14/2017 04:23 PM    Glucose NEGATIVE  03/14/2017 04:23 PM    Ketone NEGATIVE  03/14/2017 04:23 PM    Bilirubin NEGATIVE  03/14/2017 04:23 PM    Urobilinogen 0.2 03/14/2017 04:23 PM    Nitrites NEGATIVE  03/14/2017 04:23 PM    Leukocyte Esterase LARGE 03/14/2017 04:23 PM    Epithelial cells FEW 03/14/2017 04:23 PM    Bacteria 4+ 03/14/2017 04:23 PM    WBC TOO NUMEROUS TO COUNT 03/14/2017 04:23 PM    RBC TOO NUMEROUS TO COUNT 03/14/2017 04:23 PM        XR (Most Recent). CXR reviewed by me and compared with previous CXR   Results from Hospital Encounter encounter on 03/03/17   XR ABD (KUB)   Narrative EXAM: KUB    INDICATION: Oral gastric tube placement    COMPARISON: KUB done earlier today.    _______________    FINDINGS:    Portable supine abdominal film was performed. NG tube has been advanced. It now  extends well into the stomach. Sidehole is well below the GE junction. Bilateral  renal stents and vena cava filter. Good position. There is gas in the colon. No  abnormally dilated loops of bowel. Stable left lower lobe atelectasis. _______________         Impression IMPRESSION:      1. NG tube good position. 2. Nonobstructive bowel gas pattern. 3. Left lower lobe atelectasis. CT (Most Recent)   Results from Hospital Encounter encounter on 03/03/17   CT HEAD WO CONT   Narrative EXAM: CT head    INDICATION: Severe thrombocytopenia    COMPARISON: None. TECHNIQUE: Axial CT imaging of the head was performed without intravenous  contrast. Coronal and sagittal reconstructions were performed. One or more dose reduction techniques were used on this CT: automated exposure  control, adjustment of the mAs and/or kVp according to patient's size, and  iterative reconstruction techniques. The specific techniques utilized on this CT  exam have been documented in the patient's electronic medical record.     _______________    FINDINGS:    BRAIN AND POSTERIOR FOSSA: The sulci, folia, ventricles and basal cisterns are  within normal limits for the patient?s age. There is no intracranial hemorrhage,  mass effect, or midline shift. There are no areas of abnormal parenchymal  attenuation. EXTRA-AXIAL SPACES AND MENINGES: There are no abnormal extra-axial fluid  collections. CALVARIUM: Intact. SINUSES: Clear.     OTHER: Visual is global structures and mastoid air cells are unremarkable.    _______________         Impression IMPRESSION:      1. No evidence of acute infarct, hemorrhage or mass. Normal noncontrast CT  brain. As the attending radiologist I have personally reviewed the study and this  report, and concur with the above stated findings. EKG No results found for this or any previous visit. ECHO No results found for this or any previous visit. PFT No flowsheet data found.      Other ASA reactivity:   Pre-albumin:   Ionized Calcium:   NH4:   T3, FT4:  Cortisol:  Urine Osm:  Urine Lytes:   HbA1c:      The patient is: [x] acutely ill Risk of deterioration: [] moderate    [x] critically ill  [] high     [x]See my orders for details    My assessment, plan of care, findings, medications, side effects etc were discussed with:  [x]nursing []PT/OT    [x]respiratory therapy [x]Dr. Razia Almonte   []family []      Jyothi Perez NP  Pulmonary [address] [place] [address]  [phone]

## 2017-03-17 NOTE — PROGRESS NOTES
Chart reviewed. Pt remains on vent and working towards weaning/extubation. She has medicaid and may qualify for long term care in nursing home or at home with Personal care aide. Case-management following.  Anoop Murrieta RN ext [phone]

## 2017-03-17 NOTE — PROGRESS NOTES
Patient continues to wean will continue to monitor   1200 patients heart rate increased to 160 PSV changed to setting to acvc+ 12,450,5 peep  45% fio2 patient apeares stable on vent at this time HR 90 spo2 99

## 2017-03-17 NOTE — PROGRESS NOTES
Daily Progress Note: 3/17/2017 2:48 PM   Admit Date: 3/3/2017    Patient seen in follow up for multiple medical problems as listed below:  Patient Active Problem List   Diagnosis Code    Anemia D64.9    Anxiety F41.9    Nausea and vomiting R11.2    Morbid obesity ([facility] E66.01    Chronic pain syndrome G89.4    History of ovarian cancer Z85.43    History of gastric bypass Z98.890    CAD (coronary artery disease) I25.10    History of Clostridium difficile Z87.19    History of endometrial cancer Z85.42    History of GI bleed Z87.19    Lymphedema I89.0    Hypokalemia E87.6    DEV (acute kidney injury) ([facility] N17.9    Pulmonary nodules R91.8    [facility] I48.91    Aortic stenosis I35.0    GI bleed K92.2    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 female who is admitted for severe sepsis and shock, urosepsis and severe thrombocytopenia. Patient was on Hospice until now. She presents with c/o altered mental status via EMS with cyanotic/desirae complextion. Patient was found AMS with blood sugar low 40's. There was no IV assess and she was given Glucagon and her mentation and blood sugar improved. Patient in non diabetic. She has endometrial cancer. In ED she has urosepsis and hypotension requiring pressor support after 30 cc/kg BOLUS normal saline. Patient has Mediport. She also was febrile with Tmax 101.6. Denies cough, sob, chest pain. She was started on antibiotics . Hx VRE UTI noted. She reports having foul smelling urine and vaginal discharge. Patient had a platelet level of 96,432 with no bleed requiring platelet transfusion initially.   She improved and was transferred to the floor until an RRT was called 3/11 for acute hypoxic respiratory failure requiring intubation. Bronchospy 3/13 with significant mucous plugging. Repeat bronchoscopy 3/16. Respiratory cultures only with candida. Extubated and re-intubated 3/15. Bronchoscopy 3/16. Fevers started 3/16 source unknown. Has required prn RBC transfusions for Hg<7.     Hypoxic resp failure - intubated  -Bronchospy 3/13, 3/16 with significant mucous plugging.   -Cx pending on eraxis, azactam, dapto    Subacute RLE DVT  -thought due to obstructing pelvic CA  -LMWH. S/p IVC filter placement    Severe sepsis with shock and VRE bacteremia  - 2/2 UTI. Leukocytosis, tachy, resolving  - off Levophed for pressor support goal MAP > 65, Zyvox, Levaquin, zosyn initially. - Follow urine and blood culture: GPC in blood on Daptomycin with ID consult + Eraxis and Azactam  - Intensivist consulting      VRE UTI  - hx VRE UTI,again with VRE. Immunosuppression endometrial cancer  - On Zyvox , Zosyn , Levaquin initially then to zyvox with rash. then Dapto  - repeat UA/Cx 3/14-p    Severe thrombocytopenia  - Platelet down to 9k on admission s/p transfusions x2. keep >25K. resolved      Encephalopathy  - 2/2 Hypoglycemia vs urosepsis vs hypotension vs uremia  - resolved . CT head non-acute      Hypoglycemia   - Resolved       ARF  - cr 6.55/Iwb009 ->improving Cr into 1's  - severe prerenal with UREMIA  - IVF. Na Bicarb added      Anemia   - Hgb 8.8  - likely 2/2 Endometrial malignancy   - Transfuse if hemoglobin < 7      Hx of Ovarian and Endometrial Cancer with abdominal pain  - Patient was on Hospice until today. Daughter has rescinded her DDNR and Hospice and now wants to be FULL CODE  - Palliative care consult monday  - Lower abdomen pain Likely due to new R adnexal mass from ovarian CA.  Discuss further pending hospice plans  - pain control  -Mediport for IV drug use, if no urinary source may need to be ruled out as a possible source     Hyponatremia   - IVF corrected.      Vaginal discharge   - Wet prep ordered     Chronic problems  1. Recurrent gastrointestinal bleed with multiple  esophagogastroduodenoscopies in the past year with negative results. 2. History of ovarian cancer. 3. History of endometrial cancer. 4. Bladder mass, status post cystoscopy and biopsy results pending. 5. Acute blood loss anemia, stable. 6. Chronic back pain. 7. Chronic abdominal pain. 8. Nausea and vomiting secondary to above     DVT Protocol Active: SCDs. consider arixtra  GI - PPI  Code Status: Full Code     Disposition: Req 48h hosp. Home with hospice. Subjective:     CC: Altered mental status    Interval History: transfusion PRBC yesterday Hg to 7.6   WBC<10 however fever to 101.7 o/n. Bronch yesterday. Hopeful extubation today        Objective:     Visit Vitals    /64    Pulse 94    Temp 100 °F (37.8 °C)    Resp 23    Ht 5' 4\" (1.626 m)    Wt 114 kg (251 lb 5.2 oz)    SpO2 100%    BMI 43.14 kg/m2       Temp (24hrs), Av.8 °F (38.2 °C), Min:99.9 °F (37.7 °C), Max:101.7 °F (38.7 °C)        Intake/Output Summary (Last 24 hours) at 17 0814  Last data filed at 17 0700   Gross per 24 hour   Intake           3182.5 ml   Output             5525 ml   Net          -2342.5 ml       Gen: intubated, eyes tracking, NAD  HEENT:  KARINA  Neck: No Bruits/JVD   Lungs:   Course upper airway wounds. mod respiratory effort  Heart:   RR S1 S2 without M/R/G  Abdomen: obese large panus,NT, BSX4,   Extremities:   trace LE edema. No cyanosis. Skin:  no jaundice/ lots of skin hanging off arms/legs.  RLE 1cm ulcer      Data Review:     Meds/Labs/Tests reviewed    Current Shift:     Last three shifts:  03/15 1901 -  0700  In: 4917.5 [I.V.:4095]  Out: 8260 [Urine:8260]  Recent Labs      17   0400  17   1615  17   0520   WBC  9.4  9.2  9.8   RBC  2.68*  2.54*  2.34*   HGB  7.6*  7.2*  6.6*   HCT  23.2*  22.0*  20.3*   PLT  113*  105*  112*   GRANS  85*  91*  86*   LYMPH  10*  8*  9*   EOS  0  0  0       Recent Labs 03/17/17   0400  03/16/17   1615  03/16/17   0520  03/15/17   2149  03/15/17   0345  03/14/17   1620   BUN  38*   --   43*  43*  47*   --    CREA  1.36*   --   1.59*  1.66*  1.82*   --    CA  7.8*   --   7.5*  7.4*  7.3*   --    ALB   --    --    --    --   1.9*  1.5*   K  2.8*  2.8*  3.1*  3.0*  3.5  3.7   NA  145   --   142  142  142   --    CL  112*   --   112*  114*  114*   --    CO2  18*   --   15*  14*  13*   --    PHOS  3.4   --   4.3  4.9  5.1*   --    GLU  90   --   86  79  76   --         Lab Results   Component Value Date/Time    Glucose 90 03/17/2017 04:00 AM    Glucose 86 03/16/2017 05:20 AM    Glucose 79 03/15/2017 09:49 PM    Glucose 76 03/15/2017 03:45 AM    Glucose 90 03/14/2017 03:45 AM          Care coordination with Nursing/Consultants/staff: 10  Prior history, labs, and charting reviewed: 15    Procedures/Imaging:  CT abd 3/4  CT head 3/3  Bronch 3/13, 3/16  IVC filter placement  TTE 3/7  LE US 3/5  3/12 intubated, 3/15 extubated re-intubated  Transfusion plateletes Y1V 3/3, 3/5, 1u PRBC 3/16      Total time spent with chart review, patient examination/education, discussion with staff on case,documentation and medication management / adjustment  :  27 Minutes      Dr Irma Aiken  Pager: [phone]

## 2017-03-17 NOTE — PROGRESS NOTES
2000,received pt on vent with head elevated,sxn for moderate amount of white blood ting secretions,ett moved to center of mouth secured and topher tube cleared,inline resp neb tx given,BVM at bedside pt resting with eyes closed will monitor though the shift.

## 2017-03-17 NOTE — ROUTINE PROCESS
Bedside verbal report received from [facility]. Assumed care of patient at 0700.

## 2017-03-17 NOTE — PROGRESS NOTES
INFECTIOUS DISEASE FOLLOW UP NOTE :-   Will follow back on Monday, Dr Nahid De La Rosa is on call this weekend, will ask him to check on cultures remotely, please call us at [phone] for any questions or concerns.      Admit Date: 3/3/2017    Current  Prior    Daptomycin 3/8-9  aztreonam/anidulafungin 3/12-5 linezolid 3/4 - 3, vanc/zosyn/levoflox 3/3 -0     ASSESSMENT: -> RECS     Sepsis POA  - fever 102 , leukocytosis 28K, tachycardia, hypotension  - source VRE UTI and BSI or DVT  -allergic allergic reaction and IE also in DDX  - WBC sl lower 42->30->19k -> 14K-> 9K ->monitor on antimicrobials broadened 3/12-  Will stop aztreonam and anidulafungin on Monday at 7 day.   -> new fever 3/16 likely from left lung recurrent atelectasis , will get Blcx, ucx, sputum cx today   ->prognosis remains guarded    Resp failure NTB 3/11  -initial cxr nad ->white out L s/o mucus plug, SP bronch 3/13 extensive L mucus plugging  - sputum cx C.albicans  ->reintubated 3/15 , with again left lung atelectasis   -> per PCCM s/p repeat bronch 3/16 - mucus plugs  -> repeat sputum cx today   VRE BSI with ?aortic endocarditis  - blcx 3/3 2/2 VRE positive   - repeat 3/6 ntd   - TTE aortic stenosis with possible aortic vegetation [ chronic mixed calcified density ]  - source likely UTI; pt refused ESSIE and removal of mediport  ->monitor cultures-  3/8 bctx's ng, 3/12 ngtd - Cont Daptomycin, plan 6 weeks on it.  ->weekly CK on dapto- 198 3/16      VRE UTI, complicated   - UA with large LE , wbc tntc, bac 4+  - ucx >100k VRE  - CT with no hydronephrosis but has b/l ureteral stents  - appreciate urology consult and eventual plan for stent exchange/removal  - abx as above  ->stent mgmt per      ADR - Rash over abdomen/chest   - secondary to linezolid which has been listed from before as allergy - rash fading off linezolid  ->monitor off linezolid Lymphedema with R medial thigh blister- not currently infected appearing  -massive pannus with intertrigo  ->added systemic antifungal to topical   RLE subacute DVT  - per primary team   Rt pelvic mass ? ovarian ca / endometrial ca with mets   - palliative care was involved , was hospice at home and now revoked  - may need to re involve palliative as prognosis guarded    DEV due to sepsis - ~slowly better, Cr 1.9->1.8  today     Thrombocytopenia -better 94-> 111 ->monitor   Blood loss anemia        MICROBIOLOGY:   3/3  Blcx x 2 - 2/2 VRE S gent, linezolid , daptomycin quinupristin         Ucx > 100K VRE  3/4 Flu Ag test neg   3/6  Blcx x 2 ng  3/8  Blcx x 2 ng   3/11 uctx ng  3/12 bctx x 2 ngtd   sput GS prelim C albicans  3/14     ucx ntd     LINES AND CATHETERS:   Rt chest mediport     SUBJECTIVE :     Interval notes reviewed. Remains in ICU, had bronchoscopy yesterday , plan is to do weaning trial again and extubate today. Rash resolved. Fever of 101.7 yesterday.     MEDICATIONS :     Current Facility-Administered Medications   Medication Dose Route Frequency    magnesium sulfate 2 g/50 ml 2 gram/50 mL (4 %) IVPB premix pgbk        0.9% sodium chloride infusion 250 mL  250 mL IntraVENous PRN    chlorhexidine (PERIDEX) 0.12 % mouthwash 10 mL  10 mL Oral Q12H    acetaminophen (TYLENOL) solution 650 mg  650 mg Oral Q4H PRN    furosemide (LASIX) injection 40 mg  40 mg IntraVENous Q12H    0.9% sodium chloride infusion 250 mL  250 mL IntraVENous PRN    sodium bicarbonate (8.4%) 50 mEq in dextrose 5% 1,000 mL infusion   IntraVENous CONTINUOUS    fentaNYL (PF)  mcg/30 ml   IntraVENous TITRATE    0.9% sodium chloride infusion 250 mL  250 mL IntraVENous PRN    enoxaparin (LOVENOX) injection 40 mg  40 mg SubCUTAneous Q24H    albuterol-ipratropium (DUO-NEB) 2.5 MG-0.5 MG/3 ML  3 mL Nebulization Q6H RT    0.9% sodium chloride infusion 250 mL  250 mL IntraVENous PRN    0.9% sodium chloride infusion 250 mL 250 mL IntraVENous PRN    0.9% sodium chloride infusion 250 mL  250 mL IntraVENous PRN    albumin human 25% (BUMINATE) solution 25 g  25 g IntraVENous Q6H    dextrose 5% lactated ringers infusion  50 mL/hr IntraVENous CONTINUOUS    pantoprazole (PROTONIX) 40 mg in sodium chloride 0.9 % 10 mL injection  40 mg IntraVENous DAILY    aztreonam (AZACTAM) 1 g in 0.9% sodium chloride (MBP/ADV) 100 mL MBP  1 g IntraVENous Q8H    sodium chloride (NS) flush 5-10 mL  5-10 mL IntraVENous Q8H    sodium chloride (NS) flush 5-10 mL  5-10 mL IntraVENous PRN    anidulafungin (ERAXIS) 100 mg in 0.9% sodium chloride 130 mL IVPB  100 mg IntraVENous Q24H    nystatin (MYCOSTATIN) 100,000 unit/gram powder   Topical BID    ELECTROLYTE REPLACEMENT PROTOCOL  1 Each Other PRN    ELECTROLYTE REPLACEMENT PROTOCOL  1 Each Other PRN    ELECTROLYTE REPLACEMENT PROTOCOL  1 Each Other PRN    ELECTROLYTE REPLACEMENT PROTOCOL  1 Each Other PRN    DAPTOmycin (CUBICIN) 719 mg in 0.9% sodium chloride 50 mL IVPB RF formulation  6 mg/kg IntraVENous Q24H    sodium bicarbonate tablet 650 mg  650 mg Oral TID    bisacodyl (DULCOLAX) suppository 10 mg  10 mg Rectal DAILY PRN    glucose chewable tablet 16 g  16 g Oral PRN    glucagon (GLUCAGEN) injection 1 mg  1 mg IntraMUSCular PRN    dextrose (D50W) injection syrg 12.5-25 g  25-50 mL IntraVENous PRN    ondansetron (ZOFRAN) injection 4 mg  4 mg IntraVENous Q4H PRN    sodium chloride (NS) flush 5-10 mL  5-10 mL IntraVENous PRN       OBJECTIVE :     Visit Vitals    /58    Pulse 100    Temp 98.8 °F (37.1 °C)    Resp 25    Ht 5' 4\" (1.626 m)    Wt 114 kg (251 lb 5.2 oz)    SpO2 100%    BMI 43.14 kg/m2       Temp (24hrs), Av.7 °F (38.2 °C), Min:98.8 °F (37.1 °C), Max:101.7 °F (38.7 °C)    GEN - unresponsive ill appearing WF appears older than stated age in ICU on vent  HEENT - conj pale, AMADOU in place, NGT in place  NECK- R mediport accessed no LAD   RESP- scattered rhochi  CVS- tachy III/VI systolic murmur   ABD-massive pannus, large area of ecchymosis at lower abdominal fold fragile skin, bs present  EXT- (+) edema   SKIN - rash over abdomen resolved, large pannus of lower abdomen with hematoma of skin and ulcer. (+) BLE edema   NEURO - awake off sedation following commands and nodding appropriately     Labs: Results:   Chemistry Recent Labs      03/17/17   0400  03/16/17   1615  03/16/17   0520  03/15/17   2149  03/15/17   0345   03/14/17   1620   GLU  90   --   86  79  76   < >   --    NA  145   --   142  142  142   < >   --    K  2.8*  2.8*  3.1*  3.0*  3.5   --   3.7   CL  112*   --   112*  114*  114*   < >   --    CO2  18*   --   15*  14*  13*   < >   --    BUN  38*   --   43*  43*  47*   < >   --    CREA  1.36*   --   1.59*  1.66*  1.82*   < >   --    CA  7.8*   --   7.5*  7.4*  7.3*   < >   --    AGAP  15   --   15  14  15   < >   --    BUCR  28*   --   27*  26*  26*   < >   --    AP   --    --    --    --   191*   --    --    TP   --    --    --    --   6.1*   --    --    ALB   --    --    --    --   1.9*   --   1.5*   GLOB   --    --    --    --   4.2*   --    --    AGRAT   --    --    --    --   0.5*   --    --     < > = values in this interval not displayed. CBC w/Diff Recent Labs      03/17/17   0400  03/16/17   1615  03/16/17   0520   WBC  9.4  9.2  9.8   RBC  2.68*  2.54*  2.34*   HGB  7.6*  7.2*  6.6*   HCT  23.2*  22.0*  20.3*   PLT  113*  105*  112*   GRANS  85*  91*  86*   LYMPH  10*  8*  9*   EOS  0  0  0        cxr 3/16 - IMPRESSION:  1. Endotracheal tube is within 1 cm of the garth and should be pulled back 2 cm  for better positioning. 2. Recurrent left lower lobe atelectasis    CT abdo/pelvis 3/3 - IMPRESSION:  1. New cystic mass right adnexal region. Recurrent ovarian carcinoma cannot be  excluded. 2. New right middle lobe pulmonary nodule. Cannot rule out metastatic disease. 3. Bilateral ureteral stents with resolved hydronephrosis.   4. Stable or minimally increased left periaortic retroperitoneal adenopathy. Echo 3/7 - EF 55%, Aortic valve: The valve was trileaflet. Leaflets exhibited moderate  calcification and moderately reduced cuspal separation. There was moderate  stenosis. There was mild regurgitation. There was small-medium size fix  partially calcified echodensity noted attach to right coronary cusp. Could  represent vegetation. Valve mean gradient was 34 mmHg. Aortic valve area  was 1 cm-sq by the continuity equation.     Yumiko Vazquez MD  March 17, 2017  [facility] AT THE [facility]  [phone]

## 2017-03-17 NOTE — PROGRESS NOTES
Problem: Ventilator Management  Goal: *Adequate oxygenation and ventilation  Outcome: Progressing Towards Goal  0753 patient on psv of 12 all night changed to PSV 7 and titrated fio2 to 35% patient VT are in the 400\"s  spo2 100% hr 102 patient bbs are clear will continue to monitor plan for day is to extubate when MD gives order    Am abg on weaning of psv 7 1050.  Ph 7.39 29, 158 18.5 19. 99. Titrated fio2 to 30% per abg spo2 100% patient doing well at this time on psv

## 2017-03-17 NOTE — ROUTINE PROCESS
Bedside and Verbal shift change report given to [address] (oncoming nurse) by Sarkis Soler RN   (offgoing nurse). Report included the following information SBAR and Kardex.

## 2017-03-17 NOTE — PROGRESS NOTES
2000 assumed care of pt.  2100 suctioned secretions as needed. 2130 pt.turned & repositioned. 0430 complete bath given. Bedside and Verbal shift change report given to Penny Hardy RN (oncoming nurse) by Steve Townsend RN (offgoing nurse). Report included the following information SBAR, Kardex, Intake/Output, MAR and Recent Results.

## 2017-03-18 NOTE — PROGRESS NOTES
Problem: Ventilator Management  Goal: *Adequate oxygenation and ventilation  Outcome: Progressing Towards Goal  0830 patient trial on PSV of 7 with tidal volumes of 440 hr 105 spo2 100%  RN aware will continue to monitor due to yesterdays increase HR of 160 during wean trial . Patient awake and alert Rn suction moderate amount of secretions. bbs are clear et tube secure .      1225 patient doing well on PSV of 7 hr 101 rr 23 spo2 100% will continue to monitor   1355 patient extubated good cuff leak no issues patient on 4lpm NC bipap at bedside for night use and prn per Dr. Joshua Cuenca

## 2017-03-18 NOTE — PROGRESS NOTES
2000  Pt alert, follows simple commands, appears comfortable X for occasional grimacing when any ext's moved, basic suctioning. Oral suctioning completed, small-med clear sputum /pt repositioned. Chaged fentanyl pca to 50 mics instead of 25 mics. VSS, will continue to monitor. 22:00  Pt appears slightly more comfortable. Still awake. Temp still elevated 101-range, rec'd tylenol 650mg via OGT, will continue to monitor. 22:30  Pt placed on cooling blanket per order for temp >101. Replaced mepilex to sacrum , washed back and replaced linen. Tolerated well.

## 2017-03-18 NOTE — PROGRESS NOTES
[facility]   Pulmonary, Critical Care, and Sleep Medicine     ICU Patient Progress Note    Name: Yaima Edmonds   : 1960   MRN: [id number]   Date: 3/18/2017    [x]I have reviewed the flowsheet and previous days notes. 3/18/2017  Still febrile to 102.3 overnight  Repeat cx NGTD  K+ very low requiring replacement  Cr improved, good UO 5.4 L in past 24 hours, on lasix, albumin  Acidosis unchanged  On SBT    IMPRESSION/PLAN   Acute cystitis w/ hematuria  * hx of endometrial carcinoma and s/p XRT and bilateral ureteral obstruction with chronic stents last changed 16  * CT 3/3/17 - No hydro     # ID/sepsis, persistent fever  * VRE in blood and urine   * ID following  * Currently on dapto, aztreonam eraxis  * remains febrile, repeat cx 3/17 NGTD     # Cardiovascular  * 2D echo with EF 55 - 98%, grade 1 diastolic, PA 45 - 50  also apparently has significant AS (S/P Aortic Valve Balloon Angioplasty-Lorena 2016). * Hx PAF  * S/P IVC filter placement (3/9/2017)-Acute deep vein thrombosis (DVT) of femoral vein of right lower extremity( Duplex 3/5/2017)     # Respiratory failure   * Cont vent management, SBT today  Vent day # 6 Extubated 3/15 and Re intubated 3/16 due to tachypnea    Anasarca - Diuresis with Lasix/albumin  Morbid obesity  Severe Mucus plugging LEFT side -> s/p bronch 3/13  Elevated right HD; no endocronchial lesion or mucus plugging seen at bronch 3/16  Other: RML pulmonary nodule, possibly mets = not significant acute issue      # Heme  RLE DVT likely 2/2 venous outflow obstruction due to pelvic mass. S/p IVCF  Thrombocytopenia persistent but improved -> enoxaprin VTEP   Anemia w/o obvious bleeding; no melena  Multiple blood transfusion requirement is unexplained   *  haptoglobin normal, retic up 3.4, LDH high normal 3/16  Ovarian Cancer    FULL CODE         Quality Care: PPI, DVT prophylaxis, HOB elevated, Infection control all reviewed and addressed.   Events and notes from last 24 hours reviewed      [x]The patient is critically ill on  Unable to obtain    [x]Mechanical ventilation []Pressors   []BiPAP []               Medication Review:  · Pressors - none  · Sedation - Fentanyl  · Antibiotics - Azactam, Daptomycin, Eraxis  · Pain - Fentanyl  · GI/ DVT - protonix/lovenox  · Others (other gtts)    Safety Bundles: VAP Bundle/ CAUTI/ Severe Sepsis Protocol    Vital Signs:    Visit Vitals    /65    Pulse 93    Temp 99 °F (37.2 °C)    Resp 17    Ht 5' 4\" (1.626 m)    Wt 114.2 kg (251 lb 12.3 oz)    SpO2 100%    BMI 43.22 kg/m2       O2 Device: Ventilator, Endotracheal tube   O2 Flow Rate (L/min): 3 l/min   Temp (24hrs), Av.8 °F (13.8 °C), Min:36.8 °F (2.7 °C), Max:102.3 °F (39.1 °C)     Intake/Output:Last shift:       07 -  1900  In: 300 [I.V.:300]  Out: 1000 [Urine:1000]Last 3 shifts:  1901 -  0700  In: 6325 [I.V.:4665]  Out: 8670 [Urine:8670]    Intake/Output Summary (Last 24 hours) at 17 1224  Last data filed at 17 1100   Gross per 24 hour   Intake             3820 ml   Output             4870 ml   Net            -1050 ml       Ventilator Settings:  Ventilator  Mode: Pressure support  Respiratory Rate  Back-Up Rate: 12  Insp Time (sec): 1 sec  I:E Ratio: 1;2  Ventilator Volumes  Vt Set (ml): 450 ml  Vt Exhaled (Machine Breath) (ml): 493 ml  Vt Spont (ml): 421 ml  Ve Observed (l/min): 10 l/min  Ventilator Pressures  PC Set: 15  Pressure Support (cm H2O): 7 cm H2O  PIP Observed (cm H2O): 13 cm H2O  Plateau Pressure (cm H2O): 18 cm H2O  MAP (cm H2O): 9  PEEP/VENT (cm H2O): 5 cm H20  Auto PEEP Observed (cm H2O): 0 cm H2O    Mode Rate Tidal Volume Pressure FiO2 PEEP   Pressure support   450 ml  7 cm H2O 40 % 5 cm H20     Peak airway pressure: 13 cm H2O    Minute ventilation: 10 l/min      ARDS network Guidelines: Lung protective strategy and Pl pressure goals<30    Objective:     Physical Exam:   General/Neuro: Intubated/vent, no acute distress, nodding appropriately and asking questions, wants ETT out, FC well in all 4 extremities  HEENT: ETT, sclera anicteric, EOM intact  Neck: supple  CVS: Regular, + m  RS: Adequate AE B, no wheezes, rales or rhonchi, diminished BLLF  Abd: soft, obese, erythema/irritations between abdominal flaps  Extrm: +UE/LE edema, improved per pt  Skin: multiple ulcers ( see wound care documentation) and scattered small areas ecchymosis    DATA:   Current Facility-Administered Medications   Medication Dose Route Frequency    0.9% sodium chloride infusion 250 mL  250 mL IntraVENous PRN    chlorhexidine (PERIDEX) 0.12 % mouthwash 10 mL  10 mL Oral Q12H    acetaminophen (TYLENOL) solution 650 mg  650 mg Oral Q4H PRN    furosemide (LASIX) injection 40 mg  40 mg IntraVENous Q12H    sodium bicarbonate (8.4%) 50 mEq in dextrose 5% 1,000 mL infusion   IntraVENous CONTINUOUS    fentaNYL (PF)  mcg/30 ml   IntraVENous TITRATE    enoxaparin (LOVENOX) injection 40 mg  40 mg SubCUTAneous Q24H    albuterol-ipratropium (DUO-NEB) 2.5 MG-0.5 MG/3 ML  3 mL Nebulization Q6H RT    albumin human 25% (BUMINATE) solution 25 g  25 g IntraVENous Q6H    dextrose 5% lactated ringers infusion  50 mL/hr IntraVENous CONTINUOUS    pantoprazole (PROTONIX) 40 mg in sodium chloride 0.9 % 10 mL injection  40 mg IntraVENous DAILY    aztreonam (AZACTAM) 1 g in 0.9% sodium chloride (MBP/ADV) 100 mL MBP  1 g IntraVENous Q8H    sodium chloride (NS) flush 5-10 mL  5-10 mL IntraVENous Q8H    sodium chloride (NS) flush 5-10 mL  5-10 mL IntraVENous PRN    anidulafungin (ERAXIS) 100 mg in 0.9% sodium chloride 130 mL IVPB  100 mg IntraVENous Q24H    nystatin (MYCOSTATIN) 100,000 unit/gram powder   Topical BID    ELECTROLYTE REPLACEMENT PROTOCOL  1 Each Other PRN    ELECTROLYTE REPLACEMENT PROTOCOL  1 Each Other PRN    ELECTROLYTE REPLACEMENT PROTOCOL  1 Each Other PRN    ELECTROLYTE REPLACEMENT PROTOCOL  1 Each Other PRN    DAPTOmycin (CUBICIN) 719 mg in 0.9% sodium chloride 50 mL IVPB RF formulation  6 mg/kg IntraVENous Q24H    sodium bicarbonate tablet 650 mg  650 mg Oral TID    bisacodyl (DULCOLAX) suppository 10 mg  10 mg Rectal DAILY PRN    glucose chewable tablet 16 g  16 g Oral PRN    glucagon (GLUCAGEN) injection 1 mg  1 mg IntraMUSCular PRN    dextrose (D50W) injection syrg 12.5-25 g  25-50 mL IntraVENous PRN    ondansetron (ZOFRAN) injection 4 mg  4 mg IntraVENous Q4H PRN       Telemetry: [x]Sinus []A-flutter []Paced    []A-fib []Multiple PVCs     CBC w/Diff Recent Labs      03/18/17   0330  03/17/17   0400  03/16/17   1615   WBC  8.6  9.4  9.2   RBC  2.70*  2.68*  2.54*   HGB  7.6*  7.6*  7.2*   HCT  23.7*  23.2*  22.0*   PLT  123*  113*  105*   GRANS  86*  85*  91*   LYMPH  10*  10*  8*   EOS  0  0  0        Chemistry Recent Labs      03/18/17   0330  03/17/17   1600  03/17/17   0400   03/16/17   0520   GLU  158*   --   90   --   86   NA  142   --   145   --   142   K  2.5*  3.8  2.8*   < >  3.1*   CL  108   --   112*   --   112*   CO2  19*   --   18*   --   15*   BUN  37*   --   38*   --   43*   CREA  1.06   --   1.36*   --   1.59*   CA  7.9*   --   7.8*   --   7.5*   MG  2.2  2.2  1.6*   --   1.9   PHOS  2.5   --   3.4   --   4.3   AGAP  15   --   15   --   15   BUCR  35*   --   28*   --   27*    < > = values in this interval not displayed. Lactic Acid Lactic acid   Date Value Ref Range Status   03/11/2017 1.4 0.4 - 2.0 MMOL/L Final     No results for input(s): LAC in the last 72 hours.      ABG Recent Labs      03/17/17   1046  03/16/17   0004  03/15/17   2152   PHI  7.399  7.321*  7.292*   PCO2I  29.9*  30.9*  28.7*   PO2I  158*  162*  258*   HCO3I  18.5*  15.8*  13.8*   FIO2I  0.35  0.40  0.50        Micro  Recent Labs      03/17/17   1600  03/17/17   1538  03/17/17   1210   CULT  NO GROWTH AFTER 13 HOURS  NO GROWTH AFTER 14 HOURS  PENDING     Recent Labs      03/17/17   1600  03/17/17   1538  03/17/17   1210 03/17/17   1200   CULT  NO GROWTH AFTER 13 HOURS  NO GROWTH AFTER 14 HOURS  PENDING  PENDING        Liver Enzymes Protein, total   Date Value Ref Range Status   03/15/2017 6.1 (L) 6.4 - 8.2 g/dL Final     Albumin   Date Value Ref Range Status   03/15/2017 1.9 (L) 3.4 - 5.0 g/dL Final     Globulin   Date Value Ref Range Status   03/15/2017 4.2 (H) 2.0 - 4.0 g/dL Final     A-G Ratio   Date Value Ref Range Status   03/15/2017 0.5 (L) 0.8 - 1.7   Final     AST (SGOT)   Date Value Ref Range Status   03/15/2017 22 15 - 37 U/L Final     Alk. phosphatase   Date Value Ref Range Status   03/15/2017 191 (H) 45 - 117 U/L Final     No results for input(s): TP, ALB, GLOB, AGRAT, SGOT, GPT, AP, TBIL in the last 72 hours. No lab exists for component: DBIL       Cardiac Enzymes Lab Results   Component Value Date/Time     03/18/2017 03:30 AM        BNP No results found for: BNP, BNPP, XBNPT     Coagulation No results for input(s): PTP, INR, APTT in the last 72 hours.     No lab exists for component: INREXT, INREXT      Thyroid  Lab Results   Component Value Date/Time    TSH 2.82 03/15/2017 10:10 AM          Lipid Panel No results found for: CHOL, CHOLPOCT, CHOLX, CHLST, CHOLV, [id number], HDL, LDL, NLDLCT, DLDL, LDLC, DLDLP, [id number], VLDLC, VLDL, TGL, [place], [id number], Laray Mola, [facility], [address]  [facility]       Urinalysis Lab Results   Component Value Date/Time    Color YELLOW 03/14/2017 04:23 PM    Appearance TURBID 03/14/2017 04:23 PM    Specific gravity 1.012 03/14/2017 04:23 PM    pH (UA) 5.0 03/14/2017 04:23 PM    Protein 30 03/14/2017 04:23 PM    Glucose NEGATIVE  03/14/2017 04:23 PM    Ketone NEGATIVE  03/14/2017 04:23 PM    Bilirubin NEGATIVE  03/14/2017 04:23 PM    Urobilinogen 0.2 03/14/2017 04:23 PM    Nitrites NEGATIVE  03/14/2017 04:23 PM    Leukocyte Esterase LARGE 03/14/2017 04:23 PM    Epithelial cells FEW 03/14/2017 04:23 PM    Bacteria 4+ 03/14/2017 04:23 PM    WBC TOO NUMEROUS TO COUNT 03/14/2017 04:23 PM RBC TOO NUMEROUS TO COUNT 03/14/2017 04:23 PM        XR (Most Recent). CXR reviewed by me and compared with previous CXR   Results from Hospital Encounter encounter on 03/03/17   XR ABD (KUB)   Narrative EXAM: KUB    INDICATION: Oral gastric tube placement    COMPARISON: KUB done earlier today.    _______________    FINDINGS:    Portable supine abdominal film was performed. NG tube has been advanced. It now  extends well into the stomach. Sidehole is well below the GE junction. Bilateral  renal stents and vena cava filter. Good position. There is gas in the colon. No  abnormally dilated loops of bowel. Stable left lower lobe atelectasis. _______________         Impression IMPRESSION:      1. NG tube good position. 2. Nonobstructive bowel gas pattern. 3. Left lower lobe atelectasis. CXR 3/18/17    Right chest port, endotracheal tube, and enteric tube are satisfactory in  position. Left basilar opacity again identified, similar to the prior exam. No  new focal consolidation or pneumothorax. Heart size stable.     IMPRESSION  IMPRESSION:     1. Stable support tubes and lines.     2. Persistent left basilar opacity, likely atelectasis and/or infiltrate. CT (Most Recent)   Results from Hospital Encounter encounter on 03/03/17   CT HEAD WO CONT   Narrative EXAM: CT head    INDICATION: Severe thrombocytopenia    COMPARISON: None. TECHNIQUE: Axial CT imaging of the head was performed without intravenous  contrast. Coronal and sagittal reconstructions were performed. One or more dose reduction techniques were used on this CT: automated exposure  control, adjustment of the mAs and/or kVp according to patient's size, and  iterative reconstruction techniques.  The specific techniques utilized on this CT  exam have been documented in the patient's electronic medical record.     _______________    FINDINGS:    BRAIN AND POSTERIOR FOSSA: The sulci, folia, ventricles and basal cisterns are  within normal limits for the patient?s age. There is no intracranial hemorrhage,  mass effect, or midline shift. There are no areas of abnormal parenchymal  attenuation. EXTRA-AXIAL SPACES AND MENINGES: There are no abnormal extra-axial fluid  collections. CALVARIUM: Intact. SINUSES: Clear. OTHER: Visual is global structures and mastoid air cells are unremarkable.    _______________         Impression IMPRESSION:      1. No evidence of acute infarct, hemorrhage or mass. Normal noncontrast CT  brain. As the attending radiologist I have personally reviewed the study and this  report, and concur with the above stated findings. EKG      ECHO 3/7/17 ECHO  Left ventricle: Systolic function was normal by visual assessment. Ejection fraction was estimated in the range of 55 % to 60 %. No obvious  wall motion abnormalities identified in the views obtained. Wall thickness  was mildly increased. Doppler parameters were consistent with abnormal  left ventricular relaxation (grade 1 diastolic dysfunction). Right ventricle: Systolic function was normal. Estimated peak pressure was  in the range of 45 mmHg to 50 mmHg. Right atrium: The atrium was mildly dilated. Mitral valve: No obvious mass, vegetation or thrombus noted. Aortic valve: The valve was trileaflet. Leaflets exhibited moderate  calcification and moderately reduced cuspal separation. There was moderate  stenosis. There was mild regurgitation. There was small-medium size fix  partially calcified echodensity noted attach to right coronary cusp. Could  represent vegetation. Valve mean gradient was 34 mmHg. Aortic valve area  was 1 cm-sq by the continuity equation. Tricuspid valve: There was mild regurgitation. Aorta, systemic arteries: There was mild dilatation of the ascending aorta  at 4.1. PFT No flowsheet data found.      Other ASA reactivity:   Pre-albumin:   Ionized Calcium:   NH4:   T3, FT4:  Cortisol:  Urine Osm:  Urine Eulates:   HbA1c:      The patient is: [] acutely ill Risk of deterioration: [] moderate    [x] critically ill  [x] high     [x]See my orders for details    My assessment, plan of care, findings, medications, side effects etc were discussed with:  [x]nursing []PT/OT    [x]respiratory therapy [x]Dr. Ghazal Garibay   []family []      [facility] [place]

## 2017-03-18 NOTE — PROGRESS NOTES
2000,received pt on vent with head elevated,eyes open and sxn for small amount of clear white secretions,ett moved to left side and secured topher tube cleared and inline resp neb tx given,BVM at bedside will continue to monitor though the shift.

## 2017-03-18 NOTE — PROGRESS NOTES
Daily Progress Note: 3/18/2017 2:48 PM   Admit Date: 3/3/2017    Patient seen in follow up for multiple medical problems as listed below:  Patient Active Problem List   Diagnosis Code    Anemia D64.9    Anxiety F41.9    Nausea and vomiting R11.2    Morbid obesity ([facility] E66.01    Chronic pain syndrome G89.4    History of ovarian cancer Z85.43    History of gastric bypass Z98.890    CAD (coronary artery disease) I25.10    History of Clostridium difficile Z87.19    History of endometrial cancer Z85.42    History of GI bleed Z87.19    Lymphedema I89.0    Hypokalemia E87.6    DEV (acute kidney injury) ([facility] N17.9    Pulmonary nodules R91.8    [facility] I48.91    Aortic stenosis I35.0    GI bleed K92.2    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 female who is admitted for severe sepsis and shock, urosepsis and severe thrombocytopenia. Patient was on Hospice until now. She presents with c/o altered mental status via EMS with cyanotic/desirae complextion. Patient was found AMS with blood sugar low 40's. There was no IV assess and she was given Glucagon and her mentation and blood sugar improved. Patient in non diabetic. She has endometrial cancer. In ED she has urosepsis and hypotension requiring pressor support after 30 cc/kg BOLUS normal saline. Patient has Mediport. She also was febrile with Tmax 101.6. Denies cough, sob, chest pain. She was started on antibiotics . Hx VRE UTI noted. She reports having foul smelling urine and vaginal discharge. Patient had a platelet level of 68,243 with no bleed requiring platelet transfusion initially.   She improved and was transferred to the floor until an RRT was called 3/11 for acute hypoxic respiratory failure requiring intubation. Bronchospy 3/13 with significant mucous plugging. Repeat bronchoscopy 3/16. Respiratory cultures only with candida. Extubated and re-intubated 3/15. Bronchoscopy 3/16 minimal mucous plugging. Fevers started 3/16 source unknown. Has required prn RBC transfusions for Hg<7. Re-cultured 3/17 however continued fevers worrisome for endocarditis.      Recurrent fevers  -etiology unk. Recent culture workup 3/17    Hypoxic resp failure - intubated  -Bronchospy 3/13, 3/16 with significant mucous plugging.   -Cx pending on eraxis, azactam, dapto    Subacute RLE DVT  -thought due to obstructing pelvic CA. Present on admission due to subacute nature. -LMWH. S/p IVC filter placement    Severe sepsis with shock and VRE bacteremia  - 2/2 UTI. Leukocytosis, tachy, resolving  - off Levophed for pressor support goal MAP > 65, Zyvox, Levaquin, zosyn initially. - Follow urine and blood culture: GPC in blood on Daptomycin with ID consult + Eraxis and Azactam  - Intensivist consulting      VRE UTI  - hx VRE UTI,again with VRE. Immunosuppression endometrial cancer  - On Zyvox , Zosyn , Levaquin initially then to zyvox with rash. then Dapto  - repeat UA/Cx 3/14-p    Severe thrombocytopenia  - Platelet down to 9k on admission s/p transfusions x2. keep >25K. resolved      Encephalopathy  - 2/2 Hypoglycemia vs urosepsis vs hypotension vs uremia  - resolved . CT head non-acute      Hypoglycemia   - Resolved       ARF  - cr 6.55/Mad494 ->improving Cr into 1's  - severe prerenal with UREMIA  - IVF. Na Bicarb prn      Anemia   - Hgb 8.8  - likely 2/2 Endometrial malignancy   - Transfuse if hemoglobin < 7      Hx of Ovarian and Endometrial Cancer with abdominal pain  - Patient was on Hospice until today. Daughter has rescinded her DDNR and Hospice and now wants to be FULL CODE  - Palliative care consult monday  - Lower abdomen pain Likely due to new R adnexal mass from ovarian CA.  Discuss further pending hospice plans  - pain control  -Mediport for IV drug use, if no urinary source may need to be ruled out as a possible source     Hyponatremia   - IVF corrected.      Vaginal discharge   - Wet prep ordered    Sacral decub ulcers  -POA. Wound care consulting     Chronic problems  1. Recurrent gastrointestinal bleed with multiple  esophagogastroduodenoscopies in the past year with negative results. 2. History of ovarian cancer. 3. History of endometrial cancer. 4. Bladder mass, status post cystoscopy and biopsy results pending. 5. Acute blood loss anemia, stable. 6. Chronic back pain. 7. Chronic abdominal pain. 8. Nausea and vomiting secondary to above     DVT Protocol Active: SCDs. consider arixtra  GI - PPI  Code Status: Full Code     Disposition: Req 48h hosp. Home with hospice. Subjective:     CC: Altered mental status    Interval History: continued high grade fevers to 102.3 last night. K to 2.5. She is alert and eyes tracking today  3/12 and 3/17 Bld Cx NGTD. 3/14 Urine Cx still NGTD. May need repeat TTE or complete ESSIE if no source found. May need to pull central lines and culture tips. Objective:     Visit Vitals    /63    Pulse (!) 105    Temp 99 °F (37.2 °C)    Resp 21    Ht 5' 4\" (1.626 m)    Wt 114.2 kg (251 lb 12.3 oz)    SpO2 100%    BMI 43.22 kg/m2       Temp (24hrs), Av.8 °F (14.9 °C), Min:36.8 °F (2.7 °C), Max:102.3 °F (39.1 °C)        Intake/Output Summary (Last 24 hours) at 17 0855  Last data filed at 17 0804   Gross per 24 hour   Intake             4340 ml   Output             5345 ml   Net            -1005 ml       Gen: intubated, eyes tracking, NAD  HEENT:  KARINA  Neck: No Bruits/JVD   Lungs:   Course upper airway wounds. mod respiratory effort  Heart:   RR S1 S2 without M/R/G  Abdomen: obese large panus,NT, BSX4,   Extremities:   trace LE edema. No cyanosis. Skin:  no jaundice/ lots of skin hanging off arms/legs.  RLE 1cm ulcer      Data Review: Meds/Labs/Tests reviewed    Current Shift:  03/18 0701 - 03/18 1900  In: 100 [I.V.:100]  Out: 225 [Urine:225]  Last three shifts:  03/16 1901 - 03/18 0700  In: 6325 [I.V.:4665]  Out: 8670 [Urine:8670]  Recent Labs      03/18/17   0330  03/17/17   0400  03/16/17   1615   WBC  8.6  9.4  9.2   RBC  2.70*  2.68*  2.54*   HGB  7.6*  7.6*  7.2*   HCT  23.7*  23.2*  22.0*   PLT  123*  113*  105*   GRANS  86*  85*  91*   LYMPH  10*  10*  8*   EOS  0  0  0       Recent Labs      03/18/17   0330  03/17/17   1600  03/17/17   0400  03/16/17   1615  03/16/17   0520  03/15/17   2149   BUN  37*   --   38*   --   43*  43*   CREA  1.06   --   1.36*   --   1.59*  1.66*   CA  7.9*   --   7.8*   --   7.5*  7.4*   K  2.5*  3.8  2.8*  2.8*  3.1*  3.0*   NA  142   --   145   --   142  142   CL  108   --   112*   --   112*  114*   CO2  19*   --   18*   --   15*  14*   PHOS  2.5   --   3.4   --   4.3  4.9   GLU  158*   --   90   --   86  79        Lab Results   Component Value Date/Time    Glucose 158 03/18/2017 03:30 AM    Glucose 90 03/17/2017 04:00 AM    Glucose 86 03/16/2017 05:20 AM    Glucose 79 03/15/2017 09:49 PM    Glucose 76 03/15/2017 03:45 AM          Care coordination with Nursing/Consultants/staff: 10  Prior history, labs, and charting reviewed: 15    Procedures/Imaging:  CT abd 3/4  CT head 3/3  Bronch 3/13, 3/16  IVC filter placement  TTE 3/7  LE US 3/5  3/12 intubated, 3/15 extubated re-intubated  Transfusion plateletes Z0L 3/3, 3/5, 1u PRBC 3/16      Total time spent with chart review, patient examination/education, discussion with staff on case,documentation and medication management / adjustment  :  27 Minutes      Dr Cleveland Public  Pager: [phone]

## 2017-03-18 NOTE — PROCEDURES
PROCEDURE  Thurs 3/16/2017  Bronchoscopy while intubated  Consent: 2 physician as POC again not returning calls. Pt is sufficently alert to assent. Findings: no lesions or mucus plugging. Some mucus on left but nothing like the other day. Moderate capillary injection but not severely inflammed - see photo. Some adherent mucus (oddly dessicated?) can be seen. ETT position ok. Overall: possibly some residual bronchitis from LRTI.  No recurrence of severe mucus impaction seen ward in week.     -[facility]  [phone]

## 2017-03-18 NOTE — PROGRESS NOTES
1450  Extubated by RT and placed on nasal cannula, mundo. Well.  1900  Bedside and Verbal shift change report given to [id number] Kamryn Broussard (oncoming nurse) by Laurel Black RN (offgoing nurse). Report included the following information SBAR, Kardex, Intake/Output, MAR, Accordion, Recent Results, Med Rec Status and Cardiac Rhythm NSR with PACs. Canary Malady

## 2017-03-19 NOTE — PROGRESS NOTES
[facility]   Pulmonary, Critical Care, and Sleep Medicine     ICU Patient Progress Note    Name: Anand Ryan   : 1960   MRN: [id number]   Date: 3/19/2017    [x]I have reviewed the flowsheet and previous days notes. 3/19/2017  Tmax 100.6 overnight, repeat cx NGTD  No respiratory distress since extubation yesterday, did not use BIPAP  Cr improved, good UO 5.6 L in past 24 hours, on lasix, albumin  CO2 improved  Clearly states she does not want to be reintubated even for short, potentially reversible periods of time. IMPRESSION/PLAN      # ID/sepsis, persistent fever  * VRE in blood and urine   * ID following  * Currently on dapto, aztreonam eraxis  * remains febrile, repeat cx 3/17 NGTD  * ECHO with possible AV vegetation     # Cardiovascular  * 2D echo with EF 55 - 70%, grade 1 diastolic, PA 45 - 50  also apparently has significant AS (S/P Aortic Valve Balloon Angioplasty-[facility] 2016). * Hx PAF  * S/P IVC filter placement (3/9/2017)-Acute deep vein thrombosis (DVT) of femoral vein of right lower extremity( Duplex 3/5/2017)     # Respiratory failure   * Extubated again 3/18, tolerating well, has not required BiPAP  Initially extubated 3/15 and Re intubated 3/16 due to tachypnea/?large component anxiety    Anasarca - cont Diuresis with Lasix/albumin  Morbid obesity  Severe Mucus plugging LEFT side -> s/p bronch 3/13  Elevated right HD; no endocronchial lesion or mucus plugging seen at bronch 3/16  Other: RML pulmonary nodule, possibly mets = not significant acute issue    Acute cystitis w/ hematuria  * hx of endometrial carcinoma and s/p XRT and bilateral ureteral obstruction with chronic stents last changed 16  * CT 3/3/17 - No hydro      # Heme  RLE DVT likely 2/2 venous outflow obstruction due to pelvic mass.  S/p IVCF  Thrombocytopenia persistent but improved -> enoxaprin VTEP   Anemia w/o obvious bleeding; no melena  Multiple blood transfusion requirement is unexplained   *  haptoglobin normal, retic up 3.4, LDH high normal 3/16  Ovarian Cancer    # FEN/GI  *d/c bicarb gtt, D5IVF to avoid hypoglycemia  * speech eval today    PT/OT/mobilize as tolerated    Stepdown status  Partial code: DNI  Palliative care following         Quality Care: PPI, DVT prophylaxis, HOB elevated, Infection control all reviewed and addressed.   Events and notes from last 24 hours reviewed                Medication Review:  · Pressors - none  · Sedation - none  · Antibiotics - Azactam, Daptomycin, Eraxis  · Pain - Fentanyl prn  · GI/ DVT - protonix/lovenox  · Others (other gtts)    Safety Bundles: VAP Bundle/ CAUTI/ Severe Sepsis Protocol    Vital Signs:    Visit Vitals    /71    Pulse 96    Temp (!) 100.9 °F (38.3 °C)    Resp 26    Ht 5' 4\" (1.626 m)    Wt 110.4 kg (243 lb 6.2 oz)    SpO2 100%    BMI 41.78 kg/m2       O2 Device: Nasal cannula   O2 Flow Rate (L/min): 3 l/min   Temp (24hrs), Av.8 °F (10.4 °C), Min:37.7 °F (3.2 °C), Max:100.9 °F (38.3 °C)     Intake/Output:Last shift:       07 - 1900  In: -   Out: 400 [Urine:400]Last 3 shifts: 1901 -  07  In: 7312 [P.O.:50; I.V.:3830]  Out: 8125 [Urine:8125]    Intake/Output Summary (Last 24 hours) at 17 1103  Last data filed at 17 0800   Gross per 24 hour   Intake             2110 ml   Output             5050 ml   Net            -2940 ml       Objective:     Physical Exam:   General/Neuro:Comfortable, A&O, no acute distress, no FND  HEENT: ETT, sclera anicteric, EOM intact  Neck: supple  CVS: Regular, + m  RS: Adequate AE B, no wheezes, rales or rhonchi, diminished BLLF  Abd: soft, obese, erythema/irritations between abdominal flaps  Extrm: +UE/LE edema, improved   Skin: multiple ulcers ( see wound care documentation) and scattered small areas ecchymosis    DATA:   Current Facility-Administered Medications   Medication Dose Route Frequency    fentaNYL citrate (PF) injection 25-50 mcg  25-50 mcg IntraVENous Q2H PRN    potassium chloride (KLOR-CON) packet 20 mEq  20 mEq Per NG tube DAILY    0.9% sodium chloride infusion 250 mL  250 mL IntraVENous PRN    chlorhexidine (PERIDEX) 0.12 % mouthwash 10 mL  10 mL Oral Q12H    acetaminophen (TYLENOL) solution 650 mg  650 mg Oral Q4H PRN    furosemide (LASIX) injection 40 mg  40 mg IntraVENous Q12H    enoxaparin (LOVENOX) injection 40 mg  40 mg SubCUTAneous Q24H    albuterol-ipratropium (DUO-NEB) 2.5 MG-0.5 MG/3 ML  3 mL Nebulization Q6H RT    albumin human 25% (BUMINATE) solution 25 g  25 g IntraVENous Q6H    dextrose 5% lactated ringers infusion  50 mL/hr IntraVENous CONTINUOUS    pantoprazole (PROTONIX) 40 mg in sodium chloride 0.9 % 10 mL injection  40 mg IntraVENous DAILY    aztreonam (AZACTAM) 1 g in 0.9% sodium chloride (MBP/ADV) 100 mL MBP  1 g IntraVENous Q8H    sodium chloride (NS) flush 5-10 mL  5-10 mL IntraVENous Q8H    sodium chloride (NS) flush 5-10 mL  5-10 mL IntraVENous PRN    anidulafungin (ERAXIS) 100 mg in 0.9% sodium chloride 130 mL IVPB  100 mg IntraVENous Q24H    nystatin (MYCOSTATIN) 100,000 unit/gram powder   Topical BID    ELECTROLYTE REPLACEMENT PROTOCOL  1 Each Other PRN    ELECTROLYTE REPLACEMENT PROTOCOL  1 Each Other PRN    ELECTROLYTE REPLACEMENT PROTOCOL  1 Each Other PRN    ELECTROLYTE REPLACEMENT PROTOCOL  1 Each Other PRN    DAPTOmycin (CUBICIN) 719 mg in 0.9% sodium chloride 50 mL IVPB RF formulation  6 mg/kg IntraVENous Q24H    sodium bicarbonate tablet 650 mg  650 mg Oral TID    bisacodyl (DULCOLAX) suppository 10 mg  10 mg Rectal DAILY PRN    glucose chewable tablet 16 g  16 g Oral PRN    glucagon (GLUCAGEN) injection 1 mg  1 mg IntraMUSCular PRN    dextrose (D50W) injection syrg 12.5-25 g  25-50 mL IntraVENous PRN    ondansetron (ZOFRAN) injection 4 mg  4 mg IntraVENous Q4H PRN       Telemetry: [x]Sinus []A-flutter []Paced    []A-fib []Multiple PVCs     CBC w/Diff Recent Labs      03/19/17 0430  03/18/17   0330  03/17/17   0400   WBC  6.9  8.6  9.4   RBC  2.66*  2.70*  2.68*   HGB  7.4*  7.6*  7.6*   HCT  23.7*  23.7*  23.2*   PLT  128*  123*  113*   GRANS  81*  86*  85*   LYMPH  11*  10*  10*   EOS  1  0  0        Chemistry Recent Labs      03/19/17   0430  03/18/17   2000  03/18/17   0330  03/17/17 1600 03/17/17   0400   GLU  98   --   158*   --   90   NA  141   --   142   --   145   K  4.0  3.1*  2.5*  3.8  2.8*   CL  106   --   108   --   112*   CO2  25   --   19*   --   18*   BUN  34*   --   37*   --   38*   CREA  0.84   --   1.06   --   1.36*   CA  8.1*   --   7.9*   --   7.8*   MG  1.7*   --   2.2  2.2  1.6*   PHOS  2.1*   --   2.5   --   3.4   AGAP  10   --   15   --   15   BUCR  40*   --   35*   --   28*        Lactic Acid Lactic acid   Date Value Ref Range Status   03/11/2017 1.4 0.4 - 2.0 MMOL/L Final     No results for input(s): LAC in the last 72 hours. ABG Recent Labs      03/17/17   1046   PHI  7.399   PCO2I  29.9*   PO2I  158*   HCO3I  18.5*   FIO2I  0.35        Micro  Recent Labs      03/17/17   1600  03/17/17   1538  03/17/17   1210   CULT  NO GROWTH 2 DAYS  RARE  CHRISTOPHER ALBICANS  *  NO GROWTH 2 DAYS     Recent Labs      03/17/17   1600  03/17/17   1538  03/17/17   1210  03/17/17   1200   CULT  NO GROWTH 2 DAYS  RARE  CHRISTOPHER ALBICANS  *  NO GROWTH 2 DAYS  NO GROWTH 2 DAYS        Liver Enzymes Protein, total   Date Value Ref Range Status   03/15/2017 6.1 (L) 6.4 - 8.2 g/dL Final     Albumin   Date Value Ref Range Status   03/15/2017 1.9 (L) 3.4 - 5.0 g/dL Final     Globulin   Date Value Ref Range Status   03/15/2017 4.2 (H) 2.0 - 4.0 g/dL Final     A-G Ratio   Date Value Ref Range Status   03/15/2017 0.5 (L) 0.8 - 1.7   Final     AST (SGOT)   Date Value Ref Range Status   03/15/2017 22 15 - 37 U/L Final     Alk.  phosphatase   Date Value Ref Range Status   03/15/2017 191 (H) 45 - 117 U/L Final     No results for input(s): TP, ALB, GLOB, AGRAT, SGOT, GPT, AP, TBIL in the last 72 hours. No lab exists for component: DBIL       Cardiac Enzymes No results found for: CPK, CKMMB, CKMB, RCK3, CKMBT, CKNDX, CKND1, LUIS CARLOS, TROPT, TROIQ, JEFFY, TROPT, TNIPOC, BNP, BNPP     BNP No results found for: BNP, BNPP, XBNPT     Coagulation No results for input(s): PTP, INR, APTT in the last 72 hours. No lab exists for component: INREXT, INREXT      Thyroid  Lab Results   Component Value Date/Time    TSH 2.82 03/15/2017 10:10 AM          Lipid Panel No results found for: CHOL, CHOLPOCT, CHOLX, CHLST, CHOLV, [id number], HDL, LDL, NLDLCT, DLDL, LDLC, DLDLP, [id number], VLDLC, VLDL, TGL, Alexandre Nickel, [id number], Romero Malathi, [facility], [facility]       Urinalysis Lab Results   Component Value Date/Time    Color YELLOW 03/14/2017 04:23 PM    Appearance TURBID 03/14/2017 04:23 PM    Specific gravity 1.012 03/14/2017 04:23 PM    pH (UA) 5.0 03/14/2017 04:23 PM    Protein 30 03/14/2017 04:23 PM    Glucose NEGATIVE  03/14/2017 04:23 PM    Ketone NEGATIVE  03/14/2017 04:23 PM    Bilirubin NEGATIVE  03/14/2017 04:23 PM    Urobilinogen 0.2 03/14/2017 04:23 PM    Nitrites NEGATIVE  03/14/2017 04:23 PM    Leukocyte Esterase LARGE 03/14/2017 04:23 PM    Epithelial cells FEW 03/14/2017 04:23 PM    Bacteria 4+ 03/14/2017 04:23 PM    WBC TOO NUMEROUS TO COUNT 03/14/2017 04:23 PM    RBC TOO NUMEROUS TO COUNT 03/14/2017 04:23 PM        XR (Most Recent). CXR reviewed by me and compared with previous CXR   Results from Hospital Encounter encounter on 03/03/17   XR ABD (KUB)   Narrative EXAM: KUB    INDICATION: Oral gastric tube placement    COMPARISON: KUB done earlier today.    _______________    FINDINGS:    Portable supine abdominal film was performed. NG tube has been advanced. It now  extends well into the stomach. Sidehole is well below the GE junction. Bilateral  renal stents and vena cava filter. Good position. There is gas in the colon. No  abnormally dilated loops of bowel.     Stable left lower lobe atelectasis. _______________         Impression IMPRESSION:      1. NG tube good position. 2. Nonobstructive bowel gas pattern. 3. Left lower lobe atelectasis. CXR 3/19/17        CT (Most Recent)   Results from Hospital Encounter encounter on 03/03/17   CT HEAD WO CONT   Narrative EXAM: CT head    INDICATION: Severe thrombocytopenia    COMPARISON: None. TECHNIQUE: Axial CT imaging of the head was performed without intravenous  contrast. Coronal and sagittal reconstructions were performed. One or more dose reduction techniques were used on this CT: automated exposure  control, adjustment of the mAs and/or kVp according to patient's size, and  iterative reconstruction techniques. The specific techniques utilized on this CT  exam have been documented in the patient's electronic medical record.     _______________    FINDINGS:    BRAIN AND POSTERIOR FOSSA: The sulci, folia, ventricles and basal cisterns are  within normal limits for the patient?s age. There is no intracranial hemorrhage,  mass effect, or midline shift. There are no areas of abnormal parenchymal  attenuation. EXTRA-AXIAL SPACES AND MENINGES: There are no abnormal extra-axial fluid  collections. CALVARIUM: Intact. SINUSES: Clear. OTHER: Visual is global structures and mastoid air cells are unremarkable.    _______________         Impression IMPRESSION:      1. No evidence of acute infarct, hemorrhage or mass. Normal noncontrast CT  brain. As the attending radiologist I have personally reviewed the study and this  report, and concur with the above stated findings. EKG      ECHO 3/7/17 ECHO  Left ventricle: Systolic function was normal by visual assessment. Ejection fraction was estimated in the range of 55 % to 60 %. No obvious  wall motion abnormalities identified in the views obtained. Wall thickness  was mildly increased.  Doppler parameters were consistent with abnormal  left ventricular relaxation (grade 1 diastolic dysfunction). Right ventricle: Systolic function was normal. Estimated peak pressure was  in the range of 45 mmHg to 50 mmHg. Right atrium: The atrium was mildly dilated. Mitral valve: No obvious mass, vegetation or thrombus noted. Aortic valve: The valve was trileaflet. Leaflets exhibited moderate  calcification and moderately reduced cuspal separation. There was moderate  stenosis. There was mild regurgitation. There was small-medium size fix  partially calcified echodensity noted attach to right coronary cusp. Could  represent vegetation. Valve mean gradient was 34 mmHg. Aortic valve area  was 1 cm-sq by the continuity equation. Tricuspid valve: There was mild regurgitation. Aorta, systemic arteries: There was mild dilatation of the ascending aorta  at 4.1. PFT No flowsheet data found.      Other ASA reactivity:   Pre-albumin:   Ionized Calcium:   NH4:   T3, FT4:  Cortisol:  Urine Osm:  Urine Lytes:   HbA1c:      [x]See my orders for details    My assessment, plan of care, findings, medications, side effects etc were discussed with:  [x]nursing []PT/OT    [x]respiratory therapy [x]Dr. Gamaliel Dhillon   []family []      RIK Alvarez

## 2017-03-19 NOTE — PROGRESS NOTES
2197 patient did not wear bipap last night patient states to me this am \" she is done and doesn't want to be a full code or be re intubated.  Will let RN  know

## 2017-03-19 NOTE — PROGRESS NOTES
Rec'd pt awake and oriented resting in bed. Forgetful occasionally, otherwise oriented x 4. VSS. Currently on 3 liters n/c 02 with sats 100 % range. Can move all ext's slightly though very weak. Encourage pt to TCDB. Is able to expectorate medium amount of clear/yellow sputum. Pt has occasional grimacing, verbalizes \"back aching at times. \"  Increased fentanyl gtt to 50 mics/ hour. Will continue to monitor. 22:00  Continue to turn and reposition Q 2 hour. Pt refuses SCD's and current time.

## 2017-03-19 NOTE — PROGRESS NOTES
Daily Progress Note: 3/19/2017 2:48 PM   Admit Date: 3/3/2017    Patient seen in follow up for multiple medical problems as listed below:  Patient Active Problem List   Diagnosis Code    Anemia D64.9    Anxiety F41.9    Nausea and vomiting R11.2    Morbid obesity ([facility] E66.01    Chronic pain syndrome G89.4    History of ovarian cancer Z85.43    History of gastric bypass Z98.890    CAD (coronary artery disease) I25.10    History of Clostridium difficile Z87.19    History of endometrial cancer Z85.42    History of GI bleed Z87.19    Lymphedema I89.0    Hypokalemia E87.6    DEV (acute kidney injury) ([facility] N17.9    Pulmonary nodules R91.8    [facility] I48.91    Aortic stenosis I35.0    GI bleed K92.2    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 female who is admitted for severe sepsis and shock, urosepsis and severe thrombocytopenia. Patient was on Hospice until now. She presents with c/o altered mental status via EMS with cyanotic/desirae complextion. Patient was found AMS with blood sugar low 40's. There was no IV assess and she was given Glucagon and her mentation and blood sugar improved. Patient in non diabetic. She has endometrial cancer. In ED she has urosepsis and hypotension requiring pressor support after 30 cc/kg BOLUS normal saline. Patient has Mediport. She also was febrile with Tmax 101.6. Denies cough, sob, chest pain. She was started on antibiotics . Hx VRE UTI noted. She reports having foul smelling urine and vaginal discharge. Patient had a platelet level of 39,891 with no bleed requiring platelet transfusion initially.   She improved and was transferred to the floor until an RRT was called 3/11 for acute hypoxic respiratory failure requiring intubation. Bronchospy 3/13 with significant mucous plugging. Repeat bronchoscopy 3/16. Respiratory cultures only with candida. Extubated and re-intubated 3/15. Bronchoscopy 3/16 minimal mucous plugging. Fevers started 3/16 source unknown. Has required prn RBC transfusions for Hg<7. Re-cultured 3/17 however continued fevers and TTE findings of questionable vegetation warrant treatment for endocarditis. Extubated 3/18 and has continued to improve clinically.      Recurrent fevers with probable Endocarditis  -currently planned to treat for endocarditis, ESSIE will not . Recent culture workup 3/17 NGTD  - consider pulling her chest port and culturing tip with PICC to replace    Hypoxic resp failure - intubated x2  -Bronchospy 3/13, 3/16 with significant mucous plugging.   -Cx pending on eraxis, azactam, dapto  Extubated 3/18    Subacute RLE DVT  -thought due to obstructing pelvic CA. Present on admission due to subacute nature. -LMWH. S/p IVC filter placement    Severe sepsis with shock and VRE bacteremia  - 2/2 UTI. Leukocytosis, tachy, resolving  - off Levophed for pressor support goal MAP > 65, Zyvox, Levaquin, zosyn initially. - Follow urine and blood culture: GPC in blood on Daptomycin with ID consult + Eraxis and Azactam  - Intensivist consulting      VRE UTI  - hx VRE UTI,again with VRE. Immunosuppression endometrial cancer  - On Zyvox , Zosyn , Levaquin initially then to zyvox with rash. then Dapto  - repeat UA/Cx 3/14-NGTD    Severe thrombocytopenia  - Platelet down to 9k on admission s/p transfusions x2. keep >25K. resolved      Encephalopathy  - 2/2 Hypoglycemia vs urosepsis vs hypotension vs uremia  - resolved . CT head non-acute      Hypoglycemia   - Resolved       ARF  - cr 6.55/Ezl025 ->improving Cr into 1's  - severe prerenal with UREMIA  - IVF.  Na Bicarb prn      Anemia   - Hgb 8.8  - likely 2/2 Endometrial malignancy   - Transfuse if hemoglobin < 7      Hx of Ovarian and Endometrial Cancer with abdominal pain  - Patient was on Hospice until today. Daughter has rescinded her DDNR and Hospice and now wants to be FULL CODE  - Palliative care consult monday  - Lower abdomen pain Likely due to new R adnexal mass from ovarian CA. Discuss further pending hospice plans  - pain control  -Mediport for IV drug use, if no urinary source may need to be ruled out as a possible source     Hyponatremia   - IVF corrected.      Vaginal discharge   - Wet prep ordered    Sacral decub ulcers  -POA. Wound care consulting     Chronic problems  1. Recurrent gastrointestinal bleed with multiple  esophagogastroduodenoscopies in the past year with negative results. 2. History of ovarian cancer. 3. History of endometrial cancer. 4. Bladder mass, status post cystoscopy and biopsy results pending. 5. Acute blood loss anemia, stable. 6. Chronic back pain. 7. Chronic abdominal pain. 8. Nausea and vomiting secondary to above     DVT Protocol Active: SCDs. consider arixtra  GI - PPI  Code Status: Full Code     Disposition: Req 48h hosp. Home with hospice. Subjective:     CC: Altered mental status    Interval History: extubated last night. Talking and tolerating apple sauce. Need to discuss with ID Monday the utility of pulling her chest port and placing PICC.        Objective:     Visit Vitals    /71    Pulse 96    Temp (!) 100.9 °F (38.3 °C)    Resp 26    Ht 5' 4\" (1.626 m)    Wt 110.4 kg (243 lb 6.2 oz)    SpO2 100%    BMI 41.78 kg/m2       Temp (24hrs), Av.7 °F (9.3 °C), Min:37.7 °F (3.2 °C), Max:100.9 °F (38.3 °C)        Intake/Output Summary (Last 24 hours) at 17 1252  Last data filed at 17 0900   Gross per 24 hour   Intake             2005 ml   Output             5050 ml   Net            -3045 ml       Gen: AOx3,  NAD  HEENT:  KARINA  Neck: No Bruits/JVD   Lungs:  CTAB. mod respiratory effort  Heart:   RR S1 S2 without M/R/G  Abdomen: obese large panus,NT, BSX4, Extremities:   trace LE edema/fat. No cyanosis. Skin:  no jaundice/ lots of skin hanging off arms/legs.  RLE 1cm ulcer      Data Review:     Meds/Labs/Tests reviewed    Current Shift:  03/19 0701 - 03/19 1900  In: 50 [P.O.:50]  Out: 400 [Urine:400]  Last three shifts:  03/17 1901 - 03/19 0700  In: 4855 [P.O.:50; I.V.:3830]  Out: 8125 [Urine:8125]  Recent Labs      03/19/17   0430  03/18/17   0330  03/17/17   0400   WBC  6.9  8.6  9.4   RBC  2.66*  2.70*  2.68*   HGB  7.4*  7.6*  7.6*   HCT  23.7*  23.7*  23.2*   PLT  128*  123*  113*   GRANS  81*  86*  85*   LYMPH  11*  10*  10*   EOS  1  0  0       Recent Labs      03/19/17   0430  03/18/17   2000  03/18/17   0330  03/17/17   1600  03/17/17   0400  03/16/17   1615   BUN  34*   --   37*   --   38*   --    CREA  0.84   --   1.06   --   1.36*   --    CA  8.1*   --   7.9*   --   7.8*   --    ALB  3.9   --    --    --    --    --    K  4.0  3.1*  2.5*  3.8  2.8*  2.8*   NA  141   --   142   --   145   --    CL  106   --   108   --   112*   --    CO2  25   --   19*   --   18*   --    PHOS  2.1*   --   2.5   --   3.4   --    GLU  98   --   158*   --   90   --         Lab Results   Component Value Date/Time    Glucose 98 03/19/2017 04:30 AM    Glucose 158 03/18/2017 03:30 AM    Glucose 90 03/17/2017 04:00 AM    Glucose 86 03/16/2017 05:20 AM    Glucose 79 03/15/2017 09:49 PM          Care coordination with Nursing/Consultants/staff: 10  Prior history, labs, and charting reviewed: 15    Procedures/Imaging:  CT abd 3/4  CT head 3/3  Bronch 3/13, 3/16  IVC filter placement  TTE 3/7  LE US 3/5  3/12 intubated, 3/15 extubated re-intubated, 3/18 extubated  Transfusion plateletes N0F 3/3, 3/5, 1u PRBC 3/16      Total time spent with chart review, patient examination/education, discussion with staff on case,documentation and medication management / adjustment  :  27 Minutes      Dr Eboni De La Rosa  Hospitalist Division  Pager: [phone]

## 2017-03-19 NOTE — PROGRESS NOTES
Problem: Mobility Impaired (Adult and Pediatric)  Goal: *Acute Goals and Plan of Care (Insert Text)  Physical Therapy Goals  Initiated 3/19/2017 and to be accomplished within 7 day(s)  1. Patient will move from supine to sit and sit to supine and roll side to side in bed with maximal assistance. 2. Patient will transfer from bed to chair and chair to bed with maximal assistance using the least restrictive device. 3. Patient will perform sit to stand with maximal assistance. 4. Patient will ambulate with maximal assistance for 5 feet with the least restrictive device. Outcome: Progressing Towards Goal  PHYSICAL THERAPY EVALUATION     Patient: Gracy Camara (23 y.o. female)  Date: 3/19/2017  Primary Diagnosis: Severe sepsis with septic shock (CODE) ([facility])  UTI (urinary tract infection)  Severe sepsis with septic shock (CODE) ([facility])  Severe thrombocytopenia (HCC)  mucous plugs  mucous plugs  Procedure(s) (LRB):  BRONCHOSCOPY (N/A) 3 Days Post-Op   Precautions:   Fall, Skin      ASSESSMENT :  Based on the objective data described below, the patient presents with increased B LE weakness, decreased activity tolerance, decreased bed mobility, decreased transfer tolerance, and overall body deconditioning leading to functional impairments. .     Patient will benefit from skilled intervention to address the above impairments.   Patients rehabilitation potential is considered to be Good  Factors which may influence rehabilitation potential include:   [ ]         None noted  [ ]         Mental ability/status  [X]         Medical condition  [ ]         Home/family situation and support systems  [ ]         Safety awareness  [ ]         Pain tolerance/management  [ ]         Other:      Recommendations for nursing:  Written on communication board: NA  Verbally communicated to: nurse       PLAN :Pt to be seen 3-5x/week to progress towards therapy goals  Recommendations and Planned Interventions:  [X]           Bed Mobility Training             [X]    Neuromuscular Re-Education  [X]           Transfer Training                   [ ]    Orthotic/Prosthetic Training  [X]           Gait Training                          [ ]    Modalities  [X]           Therapeutic Exercises          [ ]    Edema Management/Control  [X]           Therapeutic Activities            [X]    Patient and Family Training/Education supine bed therex, importance of PT and participation to return to PLOF  [ ]           Other (comment):     Frequency/Duration: Patient will be followed by physical therapy 3-5 times a week to address goals. Discharge Recommendations: [facility] Ledesma  Further Equipment Recommendations for Discharge:unknown at this time, recommended pt be DC'd to skilled nursing for continued physical therapy treatment        SUBJECTIVE:   Patient stated Pt reports increased back pain.       OBJECTIVE DATA SUMMARY:       Past Medical History:   Diagnosis Date    Anxiety and depression      Aortic stenosis      Arthritis      Arthropathy      Cardiac disorder      Cellulitis       left leg    Chronic pain      Endometrial adenocarcinoma (HCC)      Gastric ulcer      H/O ovarian cancer      Hematemesis      Infection      Lymphedema      Munchausen syndrome      Narcotic dependence ([facility] [address]      Obesity      Spinal stenosis      Status post partial hysterectomy      Urethral obstruction       Past Surgical History:   Procedure Laterality Date    HX  SECTION        HX ENDOSCOPY         EGD 6 x [facility]    HX HERNIA REPAIR        HX OOPHORECTOMY        HX TONSILLECTOMY        HX VASCULAR ACCESS Right       single lumen power port     Barriers to Learning/Limitations: None  Compensate with: visual, verbal, tactile, kinesthetic cues/model     G CODE:Mobility  Current  CN= 100%   Goal  CL= 60-79%.   The severity rating is based on the Other [address] [place] [address] sitting balance      [employer] Sitting Balance Scale  0: Pt performs 25% or less of sitting activity (Max assist) CN, 100% impaired. 1: Pt supports self with upper extremities but requires therapist assistance. Pt performs 25-50% of effort (Mod assist) CM, 80% to <100% impaired. 1+: Pt supports self with upper extremities but requires therapist assistance. Pt performs >50% effort. (Min assist). CL, 60% to <80% impaired. 2: Pt supports self independently with both upper extremities. CL, 60% to <80% impaired. 2+: Pt support self independently with 1 upper extremity. CK, 40% to <60% impaired. 3: Pt sits without upper extremity support for up to 30 seconds. CK, 40% to <60% impaired. 3+: Pt sits without upper extremity support for 30 seconds or greater. CJ, 20% to <40% impaired. 4: Pt moves and returns trunkal midpoint 1-2 inches in one plane. CJ, 20% to <40% impaired. 4+: Pt moves and returns trunkal midpoint 1-2 inches in multiple planes. CI, 1% to <20% impaired. 5: Pt moves and returns trunkal midpoint in all planes greater than 2 inches. CH, 0% impaired.      Eval Complexity: History: HIGH Complexity :3+ comorbidities / personal factors will impact the outcome/ POC Exam:MEDIUM Complexity : 3 Standardized tests and measures addressing body structure, function, activity limitation and / or participation in recreation  Presentation: MEDIUM Complexity : Evolving with changing characteristics  Clinical Decision Making:High Complexity [facility] sitting balance assessment, 100% impaired Overall Complexity:MEDIUM     Prior Level of Function/Home Situation:   Home Situation  Home Environment: Private residence  # Steps to Enter: 4  Rails to Enter: Yes  Hand Rails : Right  One/Two Story Residence: One story  Living Alone: No  Support Systems: Child(jyoti), Friends \ neighbors  Patient Expects to be Discharged to[de-identified] Apartment  Current DME Used/Available at Home: Cane, straight  Critical Behavior:  Neurologic State: Alert  Orientation Level: Oriented X4   Strength:     B UE: 2/5   B ankle DF: 3/5   B Hip/knee: 1/5     Tone & Sensation:    Tender to touch over B LE and feet     Range Of Motion:   attempted PROM of B LE however, increased pain with noted hip abduction, knee flexion   PT was able to passively flex B shoulders to ~90 deg of shoulder flexion however, pt was unable to keep arms there IND      Functional Mobility:  Bed Mobility:  Rolling: Total assistance      Transfers:   Not tested      Balance:   Not tested     Ambulation/Gait Training:   Not tested      Therapeutic Exercises:   Pt educated on supine therex ie hip abduction, quad sets, heel slides, ankle pumps, and glut squeezes 2x/10. Pt unable to perform them on evaluation however, verbally stated understanding and that she would attempt them IND. Pain:  Pre treatment pain level: 8/10   Post treatment pain level: 8/10  Pain Scale 1: Numeric (0 - 10)  Pain Intensity 1: 1  Pain Location 1: Back;Generalized  Pain Orientation 1: Posterior     Pain Intervention(s) 1: Medication (see MAR)  Activity Tolerance:   Pt presents with poor activity tolerance and decreased endurance due to increased weakness. Please refer to the flowsheet for vital signs taken during this treatment. After treatment:   [ ]         Patient left in no apparent distress sitting up in chair  [X]         Patient left in no apparent distress in bed  [X]         Call bell left within reach  [X]         Nursing notified  [ ]         Caregiver present  [ ]         Bed alarm activated      COMMUNICATION/EDUCATION:   [ ]         Fall prevention education was provided and the patient/caregiver indicated understanding. [X]         Patient/family have participated as able in goal setting and plan of care. [X]         Patient/family agree to work toward stated goals and plan of care. [ ]         Patient understands intent and goals of therapy, but is neutral about his/her participation.   [ ]         Patient is unable to participate in goal setting and plan of care.     Thank you for this referral.  Denver Sera, DPT   Time Calculation: 28 mins

## 2017-03-19 NOTE — PROGRESS NOTES
Problem: Dysphagia (Adult)  Goal: *Acute Goals and Plan of Care (Insert Text)  Recommendations:  Diet: puree/nectar  Meds: one at a time in pudding  Aspiration Precautions  Oral Care TID  Other: MBS once medically stable    Goals: Patient will:  1. Tolerate PO trials with 0 s/s overt distress in 4/5 trials  2. Utilize compensatory swallow strategies/maneuvers (decrease bite/sip, size/rate, alt. liq/sol) with min cues in 4/5 trials  3. Perform oral-motor/laryngeal exercises to increase oropharyngeal swallow function with min cues  4. Complete an objective swallow study (i.e., MBSS) to assess swallow integrity, r/o aspiration, and determine of safest LRD, min A  SPEECH LANGUAGE PATHOLOGY BEDSIDE SWALLOW EVALUATION     Patient: Isabel Diggs (50 y.o. female)  Date: 3/19/2017  Primary Diagnosis: Severe sepsis with septic shock (CODE) ([facility])  UTI (urinary tract infection)  Severe sepsis with septic shock (CODE) ([facility])  Severe thrombocytopenia (HCC)  mucous plugs  mucous plugs  Procedure(s) (LRB):  BRONCHOSCOPY (N/A) 3 Days Post-Op   Precautions: aspiration  Fall, Skin      ASSESSMENT :  Based on the objective data described below, the patient presents with moderate pharyngeal dysphagia in the setting of respiratory failure s/p extubation. Pt alert and Ox4; able to suction self with yankaeur. Accepted thin liquids via straw sips with immediate s/s aspiration; thin liquids via cup sips with delayed wet vocal quality. Nectar-thick via cup sips without aspiration s/s. Pudding WNL. Pt refused further trials. Pt safest for puree/nectar-thick liquid diet (cup sips); meds one at a time whole in applesauce. Further recommending MBS once medically stable to assess swallow integrity and rule-out aspiration. SLP to follow as indicated. D/w RN, Dank and PAJenniffer. Patient will benefit from skilled intervention to address the above impairments.   Patients rehabilitation potential is considered to be Good  Factors which may influence rehabilitation potential include:   [ ]            None noted  [ ]            Mental ability/status  [X]            Medical condition  [ ]            Home/family situation and support systems  [ ]            Safety awareness  [ ]            Pain tolerance/management  [ ]            Other:        PLAN :  Recommendations and Planned Interventions:  Puree/NTL; MBS once medically stable. Frequency/Duration: Patient will be followed by speech-language pathology 1-2 times per day/4-7 days per week to address goals. Discharge Recommendations: Skilled Nursing Facility       SUBJECTIVE:   Patient stated I like the nectar.       OBJECTIVE:       Past Medical History:   Diagnosis Date    Anxiety and depression      Aortic stenosis      Arthritis      Arthropathy      Cardiac disorder      Cellulitis       left leg    Chronic pain      Endometrial adenocarcinoma (HCC)      Gastric ulcer      H/O ovarian cancer      Hematemesis      Infection      Lymphedema      Munchausen syndrome      Narcotic dependence ([facility] [address]      Obesity      Spinal stenosis      Status post partial hysterectomy      Urethral obstruction       Past Surgical History:   Procedure Laterality Date    HX  SECTION        HX ENDOSCOPY         EGD [facility] x [facility]    HX HERNIA REPAIR        HX OOPHORECTOMY        HX TONSILLECTOMY        HX VASCULAR ACCESS Right       single lumen power port     Prior Level of Function/Home Situation: lives with family  Home Situation  Home Environment: Private residence  # Steps to Enter: 4  Rails to Enter: Yes  Hand Rails : Right  One/Two Story Residence: One story  Living Alone: No  Support Systems: Child(jyoti), Friends \ neighbors  Patient Expects to be Discharged to[de-identified] Apartment  Current DME Used/Available at Home: Cane, straight  Diet prior to admission: regular  Current Diet:  Puree/NTL   Cognitive and Communication Status:  Neurologic State: Alert  Orientation Level: Oriented X4  Cognition: Follows commands  Perception: Appears intact  Perseveration: No perseveration noted  Safety/Judgement: Awareness of environment  Oral Assessment:  Oral Assessment  Labial: No impairment  Dentition: Intact  Oral Hygiene: good  Lingual: No impairment  Velum: No impairment  Mandible: No impairment  P.O. Trials:  Patient Position: HOB 45  Vocal quality prior to P.O.: Breathy  Consistency Presented: Thin liquid; Nectar thick liquid;Pudding  How Presented: Self-fed/presented;SLP-fed/presented;Cup/sip     Bolus Acceptance: No impairment  Bolus Formation/Control: No impairment     Propulsion: No impairment  Oral Residue: None  Initiation of Swallow: Delayed (# of seconds)  Laryngeal Elevation: Decreased  Aspiration Signs/Symptoms: Strong cough  Pharyngeal Phase Characteristics: Poor endurance  Effective Modifications: Small sips and bites  Cues for Modifications: Minimal        Oral Phase Severity: No impairment  Pharyngeal Phase Severity : Moderate     GCODESwallowing:  Swallow Current Status CK= 40-59%   Swallow Goal Status CI= 1-19%     The severity rating is based on the following outcomes:  HEMANT Noms Swallow Level 4              Clinical Judgement     PAIN:  Start of Eval: 10, back/neck, RN alerted  End of Eval: 10, back/neck, RN alerted      After treatment:   [ ]            Patient left in no apparent distress sitting up in chair  [X]            Patient left in no apparent distress in bed  [X]            Call bell left within reach  [X]            Nursing notified  [ ]            Family present  [ ]            Caregiver present  [ ]            Bed alarm activated      COMMUNICATION/EDUCATION:   [X]            Aspiration precautions; swallow safety; compensatory techniques. [X]            Patient/family have participated as able in goal setting and plan of care. [ ]            Patient/family agree to work toward stated goals and plan of care.   [ ]            Patient understands intent and goals of therapy; neutral about participation. [ ]            Patient unable to participate in goal setting/plan of care; educ ongoing with interdisciplinary staff  [ ]             Posted safety precautions in patient's room.      Thank you for this referral.  LUDY Franklin  Time Calculation: 25 mins

## 2017-03-20 NOTE — PROGRESS NOTES
INFECTIOUS DISEASE FOLLOW UP NOTE :    Admit Date: 3/3/2017    Current  Prior    Daptomycin 3/8-12  aztreonam/anidulafungin 3/12-8 linezolid 3/4 - 3, vanc/zosyn/levoflox 3/3 -0     ASSESSMENT: -> RECS     Sepsis POA  - fever 102 , leukocytosis 28K, tachycardia, hypotension  - source VRE UTI and BSI or DVT  -allergic allergic reaction and IE also in DDX  - WBC sl lower 42->30->19k -> 14K-> 9K  - still with low grade fever, nl WBC ? drug -> dc aztreonam and anidulafungin    -> new fever 3/16 likely from left lung recurrent atelectasis vs drug fever   -> prognosis remains guarded    Resp failure NTB 3/11  -initial cxr nad ->white out L s/o mucus plug, SP bronch 3/13 extensive L mucus plugging  - sputum cx C.albicans   - extubated 3/18 -> per PCCM   VRE BSI with ?aortic endocarditis  - blcx 3/3 2/2 VRE positive   - repeat 3/6 ntd   - TTE aortic stenosis with possible aortic vegetation [ chronic mixed calcified density ]  - source likely UTI; pt refused ESSIE and removal of mediport  - 3/8 bctx's ng, 3/12 ng x2, 3/17 ngtd x 2  - CPK 3/18 128 -> Cont Daptomycin to complete 6 weeks.  -> weekly CK on dapto     VRE UTI, complicated   - UA with large LE , wbc tntc, jamar 4+  - ucx >100k VRE  - CT with no hydronephrosis but has b/l ureteral stents  - appreciate urology consult and eventual plan for stent exchange/removal   - persistent sterile pyuria -> abx as above  -> stent mgmt per      ADR - Rash over abdomen/chest   - secondary to linezolid which has been listed from before as allergy - rash fading off linezolid  -> monitor off linezolid    Lymphedema with R medial thigh blister- not currently infected appearing  -massive pannus with intertrigo  -> added systemic antifungal to topical   RLE subacute DVT  - per primary team   Rt pelvic mass ? ovarian ca / endometrial ca with mets   - palliative care was involved , was hospice at home and now revoked  - may need to re involve palliative as prognosis guarded    DEV due to sepsis - ~slowly better, Cr 1.9->1.8  today     Thrombocytopenia -better 94-> 111 ->monitor   Blood loss anemia        MICROBIOLOGY:   3/3  Blcx x 2 - 2/2 VRE S gent, linezolid , daptomycin quinupristin         Ucx > 100K VRE  3/4 Flu Ag test neg   3/6  Blcx x 2 ng  3/8  Blcx x 2 ng   3/11 uctx ng  3/12 bctx x 2 ng   sput GS C albicans  3/14     ucx ntd   3/17 blcx ngtd   spcx rare C albicans    LINES AND CATHETERS:   Rt chest mediport     SUBJECTIVE :     Interval notes reviewed. Extubated. Denies shortness of breath. C/o neck pain. Still with low grade fever.  WBC normal (no eos)    MEDICATIONS :     Current Facility-Administered Medications   Medication Dose Route Frequency    potassium chloride 10 mEq in 100 ml IVPB  10 mEq IntraVENous Q1H    fentaNYL citrate (PF) injection 25-50 mcg  25-50 mcg IntraVENous Q2H PRN    enoxaparin (LOVENOX) injection 40 mg  40 mg SubCUTAneous Q12H    potassium chloride (KLOR-CON) packet 20 mEq  20 mEq Per NG tube DAILY    0.9% sodium chloride infusion 250 mL  250 mL IntraVENous PRN    chlorhexidine (PERIDEX) 0.12 % mouthwash 10 mL  10 mL Oral Q12H    acetaminophen (TYLENOL) solution 650 mg  650 mg Oral Q4H PRN    furosemide (LASIX) injection 40 mg  40 mg IntraVENous Q12H    albuterol-ipratropium (DUO-NEB) 2.5 MG-0.5 MG/3 ML  3 mL Nebulization Q6H RT    dextrose 5% lactated ringers infusion  50 mL/hr IntraVENous CONTINUOUS    pantoprazole (PROTONIX) 40 mg in sodium chloride 0.9 % 10 mL injection  40 mg IntraVENous DAILY    aztreonam (AZACTAM) 1 g in 0.9% sodium chloride (MBP/ADV) 100 mL MBP  1 g IntraVENous Q8H    sodium chloride (NS) flush 5-10 mL  5-10 mL IntraVENous Q8H    sodium chloride (NS) flush 5-10 mL  5-10 mL IntraVENous PRN    anidulafungin (ERAXIS) 100 mg in 0.9% sodium chloride 130 mL IVPB  100 mg IntraVENous Q24H    nystatin (MYCOSTATIN) 100,000 unit/gram powder   Topical BID    ELECTROLYTE REPLACEMENT PROTOCOL  1 Each Other PRN    ELECTROLYTE REPLACEMENT PROTOCOL  1 Each Other PRN    ELECTROLYTE REPLACEMENT PROTOCOL  1 Each Other PRN    ELECTROLYTE REPLACEMENT PROTOCOL  1 Each Other PRN    DAPTOmycin (CUBICIN) 719 mg in 0.9% sodium chloride 50 mL IVPB RF formulation  6 mg/kg IntraVENous Q24H    sodium bicarbonate tablet 650 mg  650 mg Oral TID    bisacodyl (DULCOLAX) suppository 10 mg  10 mg Rectal DAILY PRN    glucose chewable tablet 16 g  16 g Oral PRN    glucagon (GLUCAGEN) injection 1 mg  1 mg IntraMUSCular PRN    dextrose (D50W) injection syrg 12.5-25 g  25-50 mL IntraVENous PRN    ondansetron (ZOFRAN) injection 4 mg  4 mg IntraVENous Q4H PRN       OBJECTIVE :     Visit Vitals    /72    Pulse (!) 120  Comment: following bed mobility; Alex Negron RN present and aware    Temp (!) 100.8 °F (38.2 °C)    Resp 28    Ht 5' 4\" (1.626 m)    Wt 105 kg (231 lb 7.7 oz)    SpO2 99%    BMI 39.73 kg/m2       Temp (24hrs), Av.3 °F (37.9 °C), Min:98.8 °F (37.1 °C), Max:100.8 °F (38.2 °C)    GEN - morbidly obese, ill appearing WF appears older than stated age lying comfortably in bed in ICU (extubated)  HEENT - conj pale, AMADOU in place, NGT in place  NECK- R mediport accessed no LAD   RESP- scattered rhochi  CVS- tachy III/VI systolic murmur   ABD- massive pannus, large area of ecchymosis at lower abdominal fold fragile skin, bs present  EXT- (+) edema   SKIN - rash over abdomen resolved, large pannus of lower abdomen with hematoma of skin and ulcer. (+) BLE edema  - appears less  NEURO - awake off sedation following commands and nodding appropriately      Labs: Results:   Chemistry Recent Labs      17   0500  17   0430  17   0330   GLU  103*  98   --   158*   NA  141  141   --   142   K  3.1*  4.0  3.1*  2.5*   CL  104  106   --   108   CO2  29  25   --   19*   BUN  31*  34*   --   37*   CREA  0.83  0.84   --   1.06 CA  8.4*  8.1*   --   7.9*   AGAP  8  10   --   15   BUCR  37*  40*   --   35*   ALB   --   3.9   --    --       CBC w/Diff Recent Labs      03/20/17   0500  03/19/17   0430  03/18/17   0330   WBC  7.4  6.9  8.6   RBC  2.94*  2.66*  2.70*   HGB  8.3*  7.4*  7.6*   HCT  26.6*  23.7*  23.7*   PLT  151  128*  123*   GRANS  81*  81*  86*   LYMPH  13*  11*  10*   EOS  0  1  0        cxr 3/16 - IMPRESSION:  1. Endotracheal tube is within 1 cm of the garth and should be pulled back 2 cm  for better positioning. 2. Recurrent left lower lobe atelectasis    CT abdo/pelvis 3/3 - IMPRESSION:  1. New cystic mass right adnexal region. Recurrent ovarian carcinoma cannot be  excluded. 2. New right middle lobe pulmonary nodule. Cannot rule out metastatic disease. 3. Bilateral ureteral stents with resolved hydronephrosis. 4. Stable or minimally increased left periaortic retroperitoneal adenopathy. Echo 3/7 - EF 55%, Aortic valve: The valve was trileaflet. Leaflets exhibited moderate  calcification and moderately reduced cuspal separation. There was moderate  stenosis. There was mild regurgitation. There was small-medium size fix  partially calcified echodensity noted attach to right coronary cusp. Could  represent vegetation. Valve mean gradient was 34 mmHg. Aortic valve area  was 1 cm-sq by the continuity equation.     Francie Jain MD  March 20, 2017  [facility] AT THE [facility]  [phone]

## 2017-03-20 NOTE — PROGRESS NOTES
Problem: Self Care Deficits Care Plan (Adult)  Goal: *Acute Goals and Plan of Care (Insert Text)  Occupational Therapy Goals  Initiated 3/20/2017 within 7 day(s). 1. Patient will perform grooming tasks at bed level with minimal assistance/contact guard assist   2. Patient will perform self-feeding with minimal assistance/contact guard assist.  3. Patient will perform bathing at bed level with moderate assistance . 4. Patient will perform functional task at bed level to increase endurance and strength for increased participation in ADLs. 5. Patient will participate in upper extremity therapeutic exercise/activities with minimal assistance/contact guard assist for 8 minutes to increase BUE strength for self care tasks. 6. Patient will utilize energy conservation techniques during functional activities with verbal cues. Outcome: Progressing Towards Goal  OCCUPATIONAL THERAPY EVALUATION     Patient: Lance Desai (89 y.o. female)  Date: 3/20/2017  Primary Diagnosis: Severe sepsis with septic shock (CODE) ([facility])  UTI (urinary tract infection)  Severe sepsis with septic shock (CODE) ([facility])  Severe thrombocytopenia ([facility])  mucous plugs  mucous plugs  Procedure(s) (LRB):  BRONCHOSCOPY (N/A) 4 Days Post-Op   Precautions:  Fall, Contact      ASSESSMENT :  Based on the objective data described below, the patient presents with impairments with regard to bed mobility in preparation for ADLs, activity tolerance, BUE function/strength, and overall independence in ADLs. Pt supine on arrival; leaning significantly towards R side. Pt repositioned with total A. Repositioned pt's head with pillow/towel roll secondary to lateral flexion of neck toward R side. Max vc's provided to turn head towards L side to increase AROM and decrease tightness/pain. Pt c/o 8/10 pain pre/post evaluation in BLEs and neck. Pt's BUEs significantly weak; performed full elbow flex; unable to hold shoulder flex in test position.  Pt's HR increased to a high of 135 during AROM/PROM of BUEs; mobility of BUEs immediately stopped. HR ranged from 100-120 t/o session (fluctuated at rest and during activity). Evaluation ended secondary to HR. Adrianna Gonzalez RN aware. Pt educated on importance of mobility and strengthening of BUEs for ADLs. She verbalized understanding and agreed to POC. Call bell left within reach. Patient will benefit from skilled intervention to address the above impairments. Patients rehabilitation potential is considered to be Fair  Factors which may influence rehabilitation potential include:   [ ]             None noted  [ ]             Mental ability/status  [X]             Medical condition  [ ]             Home/family situation and support systems  [ ]             Safety awareness  [ ]             Pain tolerance/management  [ ]             Other:      Recommendations for nursing: positioning of head to decreased neck tightness/pain  Written on communication board: no  Verbally communicated to: Adrianna Gonzalez RN          PLAN :  Recommendations and Planned Interventions:  [X]               Self Care Training                  [X]        Therapeutic Activities  [X]               Functional Mobility Training    [ ]        Cognitive Retraining  [X]               Therapeutic Exercises           [X]        Endurance Activities  [X]               Balance Training                   [ ]        Neuromuscular Re-Education  [ ]               Visual/Perceptual Training     [X]   Home Safety Training  [X]               Patient Education                 [X]        Family Training/Education  [ ]               Other (comment):     Frequency/Duration: Patient will be followed by occupational therapy 3-5 times a week to address goals. Discharge Recommendations: SNF vs. Home Health (pending overall accessibility of entrance of home and pending patient progress)  Further Equipment Recommendations for Discharge: hospital bed       SUBJECTIVE:   Patient stated My neck hurts.  OBJECTIVE DATA SUMMARY:       Past Medical History:   Diagnosis Date    Anxiety and depression      Aortic stenosis      Arthritis      Arthropathy      Cardiac disorder      Cellulitis       left leg    Chronic pain      Endometrial adenocarcinoma (HCC)      Gastric ulcer      H/O ovarian cancer      Hematemesis      Infection      Lymphedema      Munchausen syndrome      Narcotic dependence ([facility] [address]      Obesity      Spinal stenosis      Status post partial hysterectomy      Urethral obstruction       Past Surgical History:   Procedure Laterality Date    HX  SECTION        HX ENDOSCOPY         EGD 6 x [facility]    HX HERNIA REPAIR        HX OOPHORECTOMY        HX TONSILLECTOMY        HX VASCULAR ACCESS Right       single lumen power port     Barriers to Learning/Limitations: None  Compensate with: visual, verbal, tactile, kinesthetic cues/model     GCODES:  Self Care  Current  CL= 60-79%   Goal  CJ= 20-39%. The severity rating is based on the Other Functional Assessment, MMT, ROM     Eval Complexity: History: MEDIUM Complexity : Expanded review of history including physical, cognitive and psychosocial  history ; Examination: MEDIUM Complexity : 3-5 performance deficits relating to physical, cognitive , or psychosocial skils that result in activity limitations and / or participation restrictions; Decision Making:HIGH Complexity : Patient presents with comorbidities that affect occupational performance. Signifigant modification of tasks or assistance (eg, physical or verbal) with assessment (s) is necessary to enable patient to complete evaluation      Prior Level of Function/Home Situation: Per pt report, pt able to perform own sponge bathing at bed level (questionable secondary to functional observation during evaluation). Pt reports for the past few weeks she required assistance with basic ADLs such as UB dressing.   Home Situation  Home Environment: Private residence  # Steps to Enter: 4  Rails to Enter: Yes  [employer] : Right  One/Two Story Residence: One story  Living Alone: No  Support Systems: Child(jyoti)  Patient Expects to be Discharged to[de-identified] Private residence  Current DME Used/Available at Home: Cane, straight  Tub or Shower Type: Other (comment) (per pt report, pt has been sponge bathing for weeks)  [X]  Right hand dominant          [ ]  Left hand dominant     Cognitive/Behavioral Status:  Neurologic State: Alert  Orientation Level: Oriented to place;Oriented to person  Cognition: Follows commands  Safety/Judgement: Awareness of environment      Skin: Intact (BUEs)  Edema: Slight swelling in BUEs secondary to decreased AROM     Vision/Perceptual:    Acuity: Within Defined Limits       Coordination:  Coordination: Generally decreased, functional (BUEs)  Fine Motor Skills-Upper: Right Intact; Left Intact    Gross Motor Skills-Upper: Right Intact; Left Intact      Balance:  Sitting:  (not assessed secondary to PLOF and high HR)     Strength:  Strength: Generally decreased, functional (BUEs: approx 2/5 (shoulder flex); elbow flex (approx 3/5))     Tone & Sensation:  Tone: Normal (BUEs)  Sensation: Intact (BUEs)     Range of Motion:  AROM: Generally decreased, functional (BUEs: full elbow flex; unable to hold shoulder flexion)  PROM: Generally decreased, functional (BUEs: approx 90 degress shoulder flex)     Functional Mobility and Transfers for ADLs:  Bed Mobility:  Rolling: Total assistance  Supine to Sit:  (not assessed secondary to high HR)     Transfers:  Sit to Stand:  (not assessed)              Toilet Transfer :  (not assessed)                ADL Assessment:  Feeding: Moderate assistance  Oral Facial Hygiene/Grooming: Maximum assistance (secondary to decreased BUE strength)  Bathing: Maximum assistance  Upper Body Dressing: Maximum assistance  Lower Body Dressing: Total assistance  Toileting:  Total assistance (byrne catheter)     Cognitive Retraining  Safety/Judgement: Awareness of environment     Therapeutic Exercise:  Pt educated on importance of neck/head mobility in preparation for functional tasks. Also educated on mobility of BUEs; pt did not engaged in TherEx secondary to high HR. Pain:  Pre treatment pain level: 8/10  Post treatment pain level: 8/10  Pain Scale 1: Numeric (0 - 10)  Pain Intensity 1: 8  Pain Location 1: Neck;Leg (Legs)  Pain Orientation 1: Lower; Left (BLEs; pain when turning head towards left)  Pain Description 1: Aching;Constant  Pain Intervention(s) 1: Position;Repositioned      Activity Tolerance:  Poor  Please refer to the flowsheet for vital signs taken during this treatment. After treatment:   [ ] Patient left in no apparent distress sitting up in chair  [X] Patient left in no apparent distress in bed  [X] Call bell left within reach  [X] Nursing notified  [ ] Caregiver present  [ ] Bed alarm activated      COMMUNICATION/EDUCATION: Patient educated on role of OT and POC. She verbalized understanding.   [X] Home safety education was provided and the patient/caregiver indicated understanding. [X] Patient/family have participated as able in goal setting and plan of care. [X] Patient/family agree to work toward stated goals and plan of care. [ ] Patient understands intent and goals of therapy, but is neutral about his/her participation. [ ] Patient is unable to participate in goal setting and plan of care.      Thank you for this referral.     Mariola Ellis MS OTR/L  Time Calculation: 15 mins

## 2017-03-20 NOTE — PROGRESS NOTES
PCCM Progress Note                                              I have reviewed the flowsheet and previous days notes. Events, vitals, medications and notes from last 24 hours reviewed. Care plan discussed with staff, patient, and on multidisciplinary rounds. Subjective:  3/20/2017   Pt does not c/o dyspnea, chest pain, abd pain, nausea. She has back pain from lying in bed. Impression and Plan  UTI with VRE - Pt's WBC count is normal. Pt's Bld and Urine cx from 3/17 are negative. Pt is on Daptomycin for 6 weeks per ID. Defer to them   Sepsis - Resolving. Pt is on Eraxis, Aztreonam, Daptomycin. Defer to ID  Echo with poss AV vegetation - PT refused ESSIE, defer to ID  S/p VDRF - Pt is doing better, off Bipap. RLE DVT - S/p IVC Filter on 3/9/17  VRE Bacteremia - on Daptomycin per ID  ARF - Improving  Anemia - Hb is higher today, monitor  Thrombocytopenia - Plat count is higher today  Hx of endometrial cancer  Low K, Mg, Phos - Replace  DVT and GI Prophyl - on Lovenox and Protonix    OTHER:  Glycemic Control. Glucose stabilizer per ICU protocol when on insulin drip. Maintain blood glucose 140-180. Replace electrolytes per ICU electrolyte replacement protocol  Stress ulcer and DVT prophylaxis. HOB >=30 degree elevation all the time. HOB >=30 degree elevation all the time. Aggressive pulmonary toileting. Incentive spirometry when appropriate. Aspiration precautions. Byrne bundle followed, remove byrne catheter when not critically ill. Skin & Wound care. Weekly prealbumin, nutritional consult. PT/OT eval and treat. OOB/IS when appropriate. Further recommendations will be based on the patient's response to recommended treatment and results of the investigation ordered. Quality Care: Stress ulcer prophylaxis, DVT prophylaxis, HOB elevated, Infection control all reviewed and addressed. Events and notes from last 24 hours reviewed. Care plan discussed with nursing, MDR. See my orders for detail. CC TIME: >35 min     History of Present Illness:  Pt is a 51yr old  female with h/o AS, anxiety, endometrial ca, Munchausen syndrome, obesity who presented to [facility] 3/3/17 with fever 101.8, leukocytosis, sepsis. She was found to have UTI , her Ucx grew VRE, also her blcx grew VRE. She had TTE due to BSI which showed possible aortic valve vegetation. Pt has been intubated and extubated twice. Is s/p bronch    Medication Reviewed: Allergies   Allergen Reactions    Latex, Natural Rubber Hives and Itching    Clindamycin Hives    Clindamycin Itching    Iodine Hives     Pt denies allergy. Patient reports this is an allergy to contrast media that caused temporary vision loss and vomiting but is fine if premedicated with antihistamine.     Keflex [Cephalexin] Hives    Linezolid Hives    Nsaids (Non-Steroidal Anti-Inflammatory Drug) Nausea and Vomiting     Emesis, Previous GI bleed    Piperacillin-Tazobactam Hives     Has tolerated Teflaro @ [address]  Has been tolerating cephalosporins SAPH     Sulfa (Sulfonamide Antibiotics) Hives and Contact Dermatitis    Vancomycin Hives      Past Medical History:   Diagnosis Date    Anxiety and depression     Aortic stenosis     Arthritis     Arthropathy     Cardiac disorder     Cellulitis     left leg    Chronic pain     Endometrial adenocarcinoma (HCC)     Gastric ulcer     H/O ovarian cancer     Hematemesis     Infection     Lymphedema     Munchausen syndrome     Narcotic dependence ([address]     Obesity     Spinal stenosis     Status post partial hysterectomy     Urethral obstruction       Past Surgical History:   Procedure Laterality Date    HX  SECTION      HX ENDOSCOPY      EGD 6 x [facility]    HX HERNIA REPAIR      HX OOPHORECTOMY      HX TONSILLECTOMY      HX VASCULAR ACCESS Right     single lumen power port      Social History   Substance Use Topics    Smoking status: Former Smoker    Smokeless tobacco: Never Used    Alcohol use No      Family History   Problem Relation Age of Onset   Ray Schaefer Arthritis-osteo Mother     Heart Attack Mother     Cancer Father     Heart Attack Father     Hypertension Brother     Stroke Brother       Prior to Admission medications    Medication Sig Start Date End Date Taking? Authorizing Provider   HYDROmorphone (DILAUDID) 4 mg tablet Take 1 Tab by mouth every three (3) hours as needed. Max Daily Amount: 32 mg. 8/30/16  Yes Edson Garcia MD   morphine CR (MS CONTIN) 30 mg CR tablet Take 1 Tab by mouth every twelve (12) hours. Max Daily Amount: 60 mg. 8/30/16  Yes Edson Garcia MD   ondansetron (ZOFRAN ODT) 4 mg disintegrating tablet Take 1 Tab by mouth every six (6) hours as needed for Nausea (vomiting). 8/30/16  Yes Edson Garcia MD   dronabinol (MARINOL) 2.5 mg capsule Take 1 Cap by mouth two (2) times a day. Max Daily Amount: 5 mg. 8/30/16   Edson Garcia MD   metoprolol succinate (TOPROL-XL) 25 mg XL tablet Take 1 Tab by mouth daily. 8/30/16   Edson Garcia MD   pantoprazole (PROTONIX) 40 mg tablet Take 1 Tab by mouth Before breakfast and dinner. 8/30/16   Edson Garcia MD   oxyCODONE-acetaminophen (PERCOCET) 5-325 mg per tablet Take 1 Tab by mouth every four (4) hours as needed for Pain. Max Daily Amount: 6 Tabs.  8/19/16   Emmit Ina, DO     Current Facility-Administered Medications   Medication Dose Route Frequency    potassium chloride 10 mEq in 100 ml IVPB  10 mEq IntraVENous Q1H    DAPTOmycin (CUBICIN) 630 mg in 0.9% sodium chloride 50 mL IVPB RF formulation  6 mg/kg IntraVENous Q24H    enoxaparin (LOVENOX) injection 40 mg  40 mg SubCUTAneous Q12H    potassium chloride (KLOR-CON) packet 20 mEq  20 mEq Per NG tube DAILY    chlorhexidine (PERIDEX) 0.12 % mouthwash 10 mL  10 mL Oral Q12H    furosemide (LASIX) injection 40 mg  40 mg IntraVENous Q12H    albuterol-ipratropium (DUO-NEB) 2.5 MG-0.5 MG/3 ML  3 mL Nebulization Q6H RT    dextrose 5% lactated ringers infusion  50 mL/hr IntraVENous CONTINUOUS    pantoprazole (PROTONIX) 40 mg in sodium chloride 0.9 % 10 mL injection  40 mg IntraVENous DAILY    aztreonam (AZACTAM) 1 g in 0.9% sodium chloride (MBP/ADV) 100 mL MBP  1 g IntraVENous Q8H    sodium chloride (NS) flush 5-10 mL  5-10 mL IntraVENous Q8H    anidulafungin (ERAXIS) 100 mg in 0.9% sodium chloride 130 mL IVPB  100 mg IntraVENous Q24H    nystatin (MYCOSTATIN) 100,000 unit/gram powder   Topical BID    sodium bicarbonate tablet 650 mg  650 mg Oral TID        Venous Access Device:  Venous Access Device Mediport (Power port) Upper chest (subclavicular area, right (Active)   Central Line Being Utilized Yes 3/20/2017  8:00 AM   Criteria for Appropriate Use Limited/no vessel suitable for conventional peripheral access 3/20/2017  8:00 AM   Site Assessment Clean, dry, & intact 3/20/2017  8:00 AM   Date of Last Dressing Change 03/14/17 3/20/2017  8:00 AM   Dressing Status Clean, dry, & intact 3/20/2017  8:00 AM   Dressing Type Transparent 3/20/2017  8:00 AM   Action Taken Open ports on tubing capped 3/20/2017  8:00 AM   Date Accessed (Medial Site) 03/03/17 3/20/2017  8:00 AM   Positive Blood Return (Medial Site) Yes 3/20/2017  8:00 AM   Action Taken (Medial Site) Infusing 3/20/2017  8:00 AM   Action Taken (Lateral Site) Flushed 3/18/2017  8:00 PM   Alcohol Cap Used Yes 3/18/2017  8:00 PM     Peripheral Intravenous Line:  Peripheral IV 03/14/17 Left Arm (Active)   Site Assessment Clean, dry, & intact 3/20/2017  8:00 AM   Phlebitis Assessment 0 3/20/2017  8:00 AM   Infiltration Assessment 0 3/20/2017  8:00 AM   Dressing Status Clean, dry, & intact 3/20/2017  8:00 AM   Dressing Type Transparent 3/20/2017  8:00 AM   Hub Color/Line Status Blue;Capped 3/20/2017  8:00 AM   Action Taken Open ports on tubing capped 3/20/2017  8:00 AM   Alcohol Cap Used Yes 3/20/2017  8:00 AM       Objective:  Vital Signs:    Visit Vitals    /72    Pulse (!) 120  Comment: following bed mobility; Maurice Aldrich, RN present and aware    Temp (!) 100.8 °F (38.2 °C)    Resp 28    Ht 5' 4\" (1.626 m)    Wt 105 kg (231 lb 7.7 oz)    SpO2 99%    BMI 39.73 kg/m2      O2 Device: Nasal cannula  O2 Flow Rate (L/min): 3 l/min  Temp (24hrs), Av.3 °F (37.9 °C), Min:98.8 °F (37.1 °C), Max:100.8 °F (38.2 °C)      Intake/Output:   Last shift:       0701 -  190  In: 475 [P.O.:360; I.V.:365]  Out: 300 [Urine:300]    Last 3 shifts:  190 -  0700  In: 8188 [P.O.:155; I.V.:3160]  Out: 7805 [Urine:7805]      Intake/Output Summary (Last 24 hours) at 17 1208  Last data filed at 17 1018   Gross per 24 hour   Intake             2060 ml   Output             3855 ml   Net            -1795 ml       Last 3 Recorded Weights in this Encounter    17 0000 17 0135 17 0256   Weight: 114.2 kg (251 lb 12.3 oz) 110.4 kg (243 lb 6.2 oz) 105 kg (231 lb 7.7 oz)       Physical Exam:     General/Neurology: Alert, Awake,   Head:   Normocephalic, without obvious abnormality  Eye:   PERRL, EOM intact, no scleral icterus, no pallor  Oral:   Mucus membranes moist  Neck:   Supple, No lymphadenopathy  Lung: Moderate air entry bilateral equal. Few basal rales +nt   Heart:    S1 S2 present  Abdomen/: Soft. NT. ND. +BS.     Extremities:  Min pedal edema    Data:      Recent Results (from the past 24 hour(s))   GLUCOSE, POC    Collection Time: 17  4:57 PM   Result Value Ref Range    Glucose (POC) 81 70 - 110 mg/dL   MAGNESIUM    Collection Time: 17  5:00 AM   Result Value Ref Range    Magnesium 1.5 (L) 1.8 - 2.4 mg/dL   CBC WITH AUTOMATED DIFF    Collection Time: 17  5:00 AM   Result Value Ref Range    WBC 7.4 4.6 - 13.2 K/uL    RBC 2.94 (L) 4.20 - 5.30 M/uL    HGB 8.3 (L) 12.0 - 16.0 g/dL    HCT 26.6 (L) 35.0 - 45.0 %    MCV 90.5 74.0 - 97.0 FL    MCH 28.2 24.0 - 34.0 PG    MCHC 31.2 31.0 - 37.0 g/dL    RDW 18.1 (H) 11.6 - 14.5 %    PLATELET 140 311 - 274 K/uL    MPV 10.1 9.2 - 11.8 FL    NEUTROPHILS 81 (H) 40 - 73 %    LYMPHOCYTES 13 (L) 21 - 52 %    MONOCYTES 6 3 - 10 %    EOSINOPHILS 0 0 - 5 %    BASOPHILS 0 0 - 2 %    ABS. NEUTROPHILS 6.0 1.8 - 8.0 K/UL    ABS. LYMPHOCYTES 0.9 0.9 - 3.6 K/UL    ABS. MONOCYTES 0.4 0.05 - 1.2 K/UL    ABS. EOSINOPHILS 0.0 0.0 - 0.4 K/UL    ABS. BASOPHILS 0.0 0.0 - 0.06 K/UL    DF AUTOMATED     METABOLIC PANEL, BASIC    Collection Time: 03/20/17  5:00 AM   Result Value Ref Range    Sodium 141 136 - 145 mmol/L    Potassium 3.1 (L) 3.5 - 5.5 mmol/L    Chloride 104 100 - 108 mmol/L    CO2 29 21 - 32 mmol/L    Anion gap 8 3.0 - 18 mmol/L    Glucose 103 (H) 74 - 99 mg/dL    BUN 31 (H) 7.0 - 18 MG/DL    Creatinine 0.83 0.6 - 1.3 MG/DL    BUN/Creatinine ratio 37 (H) 12 - 20      GFR est AA >60 >60 ml/min/1.73m2    GFR est non-AA >60 >60 ml/min/1.73m2    Calcium 8.4 (L) 8.5 - 10.1 MG/DL   CALCIUM, IONIZED    Collection Time: 03/20/17  5:00 AM   Result Value Ref Range    Ionized Calcium 1.05 (L) 1.12 - 1.32 MMOL/L   PHOSPHORUS    Collection Time: 03/20/17  5:00 AM   Result Value Ref Range    Phosphorus 1.9 (L) 2.5 - 4.9 MG/DL         Chemistry Recent Labs      03/20/17   0500  03/19/17   0430  03/18/17 2000 03/18/17   0330   GLU  103*  98   --   158*   NA  141  141   --   142   K  3.1*  4.0  3.1*  2.5*   CL  104  106   --   108   CO2  29  25   --   19*   BUN  31*  34*   --   37*   CREA  0.83  0.84   --   1.06   CA  8.4*  8.1*   --   7.9*   MG  1.5*  1.7*   --   2.2   PHOS  1.9*  2.1*   --   2.5   AGAP  8  10   --   15   BUCR  37*  40*   --   35*   ALB   --   3.9   --    --        Lactic Acid   Lactic acid   Date Value Ref Range Status   03/11/2017 1.4 0.4 - 2.0 MMOL/L Final     No results for input(s): LAC in the last 72 hours.     Liver Enzymes Protein, total   Date Value Ref Range Status   03/15/2017 6.1 (L) 6.4 - 8.2 g/dL Final     Albumin   Date Value Ref Range Status   03/19/2017 3.9 3.4 - 5.0 g/dL Final     Globulin   Date Value Ref Range Status 03/15/2017 4.2 (H) 2.0 - 4.0 g/dL Final     A-G Ratio   Date Value Ref Range Status   03/15/2017 0.5 (L) 0.8 - 1.7   Final     AST (SGOT)   Date Value Ref Range Status   03/15/2017 22 15 - 37 U/L Final     Alk. phosphatase   Date Value Ref Range Status   03/15/2017 191 (H) 45 - 117 U/L Final     Recent Labs      03/19/17   0430   ALB  3.9       CBC w/Diff Recent Labs      03/20/17   0500  03/19/17   0430  03/18/17   0330   WBC  7.4  6.9  8.6   RBC  2.94*  2.66*  2.70*   HGB  8.3*  7.4*  7.6*   HCT  26.6*  23.7*  23.7*   PLT  151  128*  123*   GRANS  81*  81*  86*   LYMPH  13*  11*  10*   EOS  0  1  0         Thyroid  Lab Results   Component Value Date/Time    TSH 2.82 03/15/2017 10:10 AM        Urinalysis  Lab Results   Component Value Date/Time    Color YELLOW 03/14/2017 04:23 PM    Appearance TURBID 03/14/2017 04:23 PM    Specific gravity 1.012 03/14/2017 04:23 PM    pH (UA) 5.0 03/14/2017 04:23 PM    Protein 30 03/14/2017 04:23 PM    Glucose NEGATIVE  03/14/2017 04:23 PM    Ketone NEGATIVE  03/14/2017 04:23 PM    Bilirubin NEGATIVE  03/14/2017 04:23 PM    Urobilinogen 0.2 03/14/2017 04:23 PM    Nitrites NEGATIVE  03/14/2017 04:23 PM    Leukocyte Esterase LARGE 03/14/2017 04:23 PM    Epithelial cells FEW 03/14/2017 04:23 PM    Bacteria 4+ 03/14/2017 04:23 PM    WBC TOO NUMEROUS TO COUNT 03/14/2017 04:23 PM    RBC TOO NUMEROUS TO COUNT 03/14/2017 04:23 PM       Micro     Recent Labs      03/17/17   1600  03/17/17   1538  03/17/17   1210   CULT  NO GROWTH 2 DAYS  RARE  [facility]STOP[facility] ALBICANS  *  NO GROWTH 3 DAYS     Recent Labs      03/17/17   1600  03/17/17   1538  03/17/17   1210   CULT  NO GROWTH 2 DAYS  RARE  [facility]STOP[facility] ALBICANS  *  NO GROWTH 3 DAYS       CT (Most Recent)    Results from [place] encounter on 03/03/17   CT HEAD WO CONT   Narrative EXAM: CT head    INDICATION: Severe thrombocytopenia    COMPARISON: None.     TECHNIQUE: Axial CT imaging of the head was performed without intravenous  contrast. Coronal and sagittal reconstructions were performed. One or more dose reduction techniques were used on this CT: automated exposure  control, adjustment of the mAs and/or kVp according to patient's size, and  iterative reconstruction techniques. The specific techniques utilized on this CT  exam have been documented in the patient's electronic medical record.     _______________    FINDINGS:    BRAIN AND POSTERIOR FOSSA: The sulci, folia, ventricles and basal cisterns are  within normal limits for the patient?s age. There is no intracranial hemorrhage,  mass effect, or midline shift. There are no areas of abnormal parenchymal  attenuation. EXTRA-AXIAL SPACES AND MENINGES: There are no abnormal extra-axial fluid  collections. CALVARIUM: Intact. SINUSES: Clear. OTHER: Visual is global structures and mastoid air cells are unremarkable.    _______________         Impression IMPRESSION:      1. No evidence of acute infarct, hemorrhage or mass. Normal noncontrast CT  brain. As the attending radiologist I have personally reviewed the study and this  report, and concur with the above stated findings. XR (Most Recent). CXR reviewed by me and compared with previous CXR   Results from Hospital Encounter encounter on 03/03/17   XR ABD (KUB)   Narrative EXAM: KUB    INDICATION: Oral gastric tube placement    COMPARISON: KUB done earlier today.    _______________    FINDINGS:    Portable supine abdominal film was performed. NG tube has been advanced. It now  extends well into the stomach. Sidehole is well below the GE junction. Bilateral  renal stents and vena cava filter. Good position. There is gas in the colon. No  abnormally dilated loops of bowel. Stable left lower lobe atelectasis. _______________         Impression IMPRESSION:      1. NG tube good position. 2. Nonobstructive bowel gas pattern. 3. Left lower lobe atelectasis.             High complexity decision making was performed during the evaluation of this patient at high risk for decompensation with multiple organ involvement     Above mentioned total time spent on reviewing the case/medical record/data/notes/EMR/patient examination/documentation/coordinating care with nurse/consultants, exclusive of procedures with complex decision making performed and > 50% time spent in face to face evaluation.     Marisol Decker MD  3/20/2017

## 2017-03-20 NOTE — PROGRESS NOTES
Problem: Dysphagia (Adult)  Goal: *Acute Goals and Plan of Care (Insert Text)  Recommendations:  Diet: puree/nectar  Meds: one at a time in pudding  Aspiration Precautions  Oral Care TID  Other: MBS next am    Goals: Patient will:  1. Tolerate PO trials with 0 s/s overt distress in 4/5 trials  2. Utilize compensatory swallow strategies/maneuvers (decrease bite/sip, size/rate, alt. liq/sol) with min cues in 4/5 trials  3. Perform oral-motor/laryngeal exercises to increase oropharyngeal swallow function with min cues  4. Complete an objective swallow study (i.e., MBSS) to assess swallow integrity, r/o aspiration, and determine of safest LRD, min A     Outcome: Progressing Towards Goal  SPEECH LANGUAGE PATHOLOGY DYSPHAGIA TREATMENT     Patient: Chente Hinkle (80 y.o. female)  Date: 3/20/2017  Diagnosis: Severe sepsis with septic shock (CODE) (formerly [facility])  UTI (urinary tract infection)  Severe sepsis with septic shock (CODE) (formerly [facility])  Severe thrombocytopenia (formerly [facility])  mucous plugs  mucous plugs Severe sepsis with septic shock (CODE) (formerly [facility])  Procedure(s) (LRB):  BRONCHOSCOPY (N/A) 4 Days Post-Op  Precautions: aspiration Fall, Contact      ASSESSMENT:  Patient seen for swallowing therapy this day. Very drowsy. Patient with nectar thick liquid cup/sip with s/sx of aspiration in 1/6 trials including wet vocal quality and cough. Patient with weak hyolaryngeal elevation to palpation. Patient not appropriate for solid trials and reports no difficulties with puree. Recommend cont puree/nectar thick liquids with MBS next am to further assess patient swallowing function and rule put aspiration. Patient educated on importance of safe swallowing guidelines (small bites/sips, decreased rate, alt solids/liquids, HOB >60 for all PO intake); requires reinforcement.   Progression toward goals:  [ ]         Improving appropriately and progressing toward goals  [X]         Improving slowly and progressing toward goals  [ ]         Not making progress toward goals and plan of care will be adjusted       PLAN:  Recommendations and Planned Interventions:  Puree, nectar thick liquids  MBS  Patient continues to benefit from skilled intervention to address the above impairments. Continue treatment per established plan of care. Discharge Recommendations:  Home Health and [address]:   Patient stated The food is fine. OBJECTIVE:   Cognitive and Communication Status:  Neurologic State: Alert, Drowsy  Orientation Level: Oriented to person  Cognition: Decreased attention/concentration  Perception: Appears intact  Perseveration: No perseveration noted  Safety/Judgement: Awareness of environment  Dysphagia Treatment:  Oral Assessment:  Oral Assessment  Labial: Decreased rate, Decreased seal  Dentition: Limited  Oral Hygiene: fair  Lingual: Decreased rate, Decreased strength  Velum: No impairment  Mandible: No impairment  P.O. Trials:              Patient Position: [id number]              Vocal quality prior to P.O.: No impairment              Consistency Presented: Nectar thick liquid              How Presented: Cup/sip, SLP-fed/presented              How Much: 8              Bolus Acceptance: No impairment              Bolus Formation/Control: Impaired              Type of Impairment: Delayed              Propulsion: Delayed (# of seconds)              Oral Residue: None              Initiation of Swallow: Delayed (# of seconds)              Laryngeal Elevation: Decreased              Aspiration Signs/Symptoms: Change vocal quality, Strong cough              Pharyngeal Phase Characteristics: No impairment, issues, or problems               Effective Modifications: Small sips and bites              Cues for Modifications:  Moderate                                Oral Phase Severity: Moderate              Pharyngeal Phase Severity : Moderate                PAIN:  Start of Tx: 0  End of Tx: 0      After treatment:   [ ]              Patient left in no apparent distress sitting up in chair  [ ]              Patient left in no apparent distress in bed  [X]              Call bell left within reach  [X]              Nursing notified  [X]              Family present  [ ]              Caregiver present  [ ]              Bed alarm activated         COMMUNICATION/EDUCATION:   [X]        Aspiration precautions; swallow safety; compensatory techniques  [ ]        Patient unable to participate in education; education ongoing with staff  [ ]         Posted safety precautions in patient's room.   [ ]         Oral-motor/laryngeal strengthening exercises        Jose Crenshaw [facility] SLP  Time Calculation: 30 mins

## 2017-03-20 NOTE — PROGRESS NOTES
Bedside and Verbal shift change report given to [address] (oncoming nurse) by Brielle Green (offgoing nurse). Report included the following information SBAR, Kardex, Intake/Output, MAR and Recent Results.

## 2017-03-20 NOTE — PROGRESS NOTES
Rec'd pt resting in bed awake oriented x 4. Respiratory status stable, currently on 02 4 liters with sats 98%. Denies pain at current time, taking thickened liq's without difficulty. 23:00  Rec'd fentanyl 25mics for grimacing and complaints of pain\"all over. \"  VS remains stable. 00:00  Pt verbalized , \"feels better, no pain at current time. \"  Will continue to monitor. 04:00  Pt assist slightly with turning-all linen changed, replaced. Tolerated well. Wound care completed-see maurice. VSS.

## 2017-03-20 NOTE — DIABETES MGMT
NUTRITIONAL RE-ASSESSMENT AND GLYCEMIC CONTROL PLAN OF CARE     Erskine Bamberger           49 y.o.           3/3/2017                 1. Septic shock ([facility]    2. Acute renal failure, unspecified acute renal failure type ([facility]    3. Acute cystitis with hematuria    4. High anion gap metabolic acidosis      Patient Active Problem List   Diagnosis Code    Anemia D64.9    Anxiety F41.9    Nausea and vomiting R11.2    Morbid obesity ([facility] E66.01    Chronic pain syndrome G89.4    History of ovarian cancer Z85.43    History of gastric bypass Z98.890    CAD (coronary artery disease) I25.10    History of Clostridium difficile Z87.19    History of endometrial cancer Z85.42      [x]  Participated in discharge planning/Interdisciplinary rounds   Food allergies: [x]  No        []  Yes-  ASSESSMENT:   Pt is overweight related to excess caloric intake as evidenced by 191% ideal weight and BMI=46.9kg/m2. Pt meets criteria for morbid obesity. Diagnosis-Intake: Inadequate protein-energy intake related to extubation 3/18  AEB  less than 10% of pureed diet this morning. Pt drinking thickened liquids well. Pt with stage 2 wound on sacrum. INTERVENTIONS/PLAN:   Pt  extubated 3/18. Pt is now receiving  the pureed diet with nectar thick liquids. Will add Ensure Plus supplements to provide addtl calories and protein.   SUBJECTIVE/OBJECTIVE:   Diet-pureed with nectar thick liquids  :Patient Vitals for the past 100 hrs:   % Diet Eaten   03/19/17 1700 10 %     Medications: [x]                Reviewed LR 5% Dextrose at 50ml/hr  Most Recent POC Glucose:   Recent Labs      03/20/17   0500  03/19/17   0430  03/18/17   0330   GLU  103*  98  158*    BG well controlled and within target range     Labs:   Lab Results   Component Value Date/Time    Hemoglobin A1c 5.4 03/04/2017 06:47 AM     Lab Results   Component Value Date/Time    Sodium 141 03/20/2017 05:00 AM    Potassium 3.1 03/20/2017 05:00 AM    Chloride 104 03/20/2017 05:00 AM    CO2 29 03/20/2017 05:00 AM    Anion gap 8 03/20/2017 05:00 AM    Glucose 103 03/20/2017 05:00 AM    BUN 31 03/20/2017 05:00 AM    Creatinine 0.83 03/20/2017 05:00 AM    Calcium 8.4 03/20/2017 05:00 AM    Magnesium 1.5 03/20/2017 05:00 AM    Phosphorus 1.9 03/20/2017 05:00 AM    Albumin 3.9 03/19/2017 04:30 AM       Anthropometrics: IBW : 59 kg (130 lb), % IBW (Calculated): 191.8 %, BMI (calculated): 39.7  Wt Readings from Last 1 Encounters:   03/20/17 105 kg (231 lb 7.7 oz)      Ht Readings from Last 1 Encounters:   03/06/17 5' 4\" (1.626 m)       Estimated Nutrition Needs: 1480 Kcals/day, Protein (g): 70 g Fluid (ml): 1800 ml  Based on:   []          Actual BW    [x]          IBW   []            Adjusted BW      Nutrition Diagnoses:      As stated above. Nutrition Interventions: Ensure Plus supplements tid  Goal:     Intake will meet >75% of energy and protein requirements by  3/25. Glucose will be within target range of 70-180mg/dl by   3/16.-met  No weight goals due to hospice status prior to admission.   Nutrition Monitoring and Evaluation      [x]     Monitor po intake on meal rounds  [x]     Continue inpatient monitoring and intervention  []     Other:  Nutrition Risk:  []   High     [x]  Moderate    []  Minimal/Uncompromised    Mike Resendiz RD

## 2017-03-20 NOTE — ROUTINE PROCESS
TRANSFER - OUT REPORT:    Verbal report given to SHELLY Palma(name) on Alvarado Olsen  being transferred to 3008(unit) for routine progression of care       Report consisted of patients Situation, Background, Assessment and   Recommendations(SBAR). Information from the following report(s) SBAR, Kardex, Recent Results and Cardiac Rhythm ST was reviewed with the receiving nurse. Lines:   Venous Access Device Mediport (Power port) Upper chest (subclavicular area, right (Active)   Central Line Being Utilized Yes 3/20/2017  8:00 AM   Criteria for Appropriate Use Limited/no vessel suitable for conventional peripheral access 3/20/2017  8:00 AM   Site Assessment Clean, dry, & intact 3/20/2017  8:00 AM   Date of Last Dressing Change 03/14/17 3/20/2017  8:00 AM   Dressing Status Clean, dry, & intact 3/20/2017  8:00 AM   Dressing Type Transparent 3/20/2017  8:00 AM   Action Taken Open ports on tubing capped 3/20/2017  8:00 AM   Date Accessed (Medial Site) 03/03/17 3/20/2017  8:00 AM   Positive Blood Return (Medial Site) Yes 3/20/2017  8:00 AM   Action Taken (Medial Site) Infusing 3/20/2017  8:00 AM   Action Taken (Lateral Site) Flushed 3/18/2017  8:00 PM   Alcohol Cap Used Yes 3/18/2017  8:00 PM       Peripheral IV 03/14/17 Left Arm (Active)   Site Assessment Clean, dry, & intact 3/20/2017  8:00 AM   Phlebitis Assessment 0 3/20/2017  8:00 AM   Infiltration Assessment 0 3/20/2017  8:00 AM   Dressing Status Clean, dry, & intact 3/20/2017  8:00 AM   Dressing Type Transparent 3/20/2017  8:00 AM   Hub Color/Line Status Blue;Capped 3/20/2017  8:00 AM   Action Taken Open ports on tubing capped 3/20/2017  8:00 AM   Alcohol Cap Used Yes 3/20/2017  8:00 AM        Opportunity for questions and clarification was provided.       Patient transported with:   O2 @ 3 liters  Registered Nurse   Informed Dtr of new room location

## 2017-03-20 NOTE — INTERDISCIPLINARY ROUNDS
ICU Rounding: no pressors at this time. Low grade temp this am. Wound care is following for multiple skin issues. Possible transfer to floor today, will discuss with Dr. Rocio Kennedy who is attending. Psych consult needed, will check if seem at this point.

## 2017-03-20 NOTE — PROGRESS NOTES
Problem: Mobility Impaired (Adult and Pediatric)  Goal: *Acute Goals and Plan of Care (Insert Text)  Physical Therapy Goals  Initiated 3/19/2017 and to be accomplished within 7 day(s)  1. Patient will move from supine to sit and sit to supine and roll side to side in bed with maximal assistance. 2. Patient will transfer from bed to chair and chair to bed with maximal assistance using the least restrictive device. 3. Patient will perform sit to stand with maximal assistance. 4. Patient will ambulate with maximal assistance for 5 feet with the least restrictive device. Outcome: Progressing Towards Goal  PHYSICAL THERAPY TREATMENT     Patient: Chente Hinkle (36 y.o. female)  Date: 3/20/2017  Diagnosis: Severe sepsis with septic shock (CODE) ([facility])  UTI (urinary tract infection)  Severe sepsis with septic shock (CODE) ([facility])  Severe thrombocytopenia (H[facility])  mucous plugs  mucous plugs Severe sepsis with septic shock (CODE) ([facility])  Procedure(s) (LRB):  BRONCHOSCOPY (N/A) 4 Days Post-Op  Precautions: Fall, Contact  Chart, physical therapy assessment, plan of care and goals were reviewed. ASSESSMENT:  Patient recently moved up to tele unit and sleep when PT came in. Agreed to do exercise only. Right head turned and laterally flexed to right  Needing towel roll under pillow to keep in  Midline. Patient  Completed exercise- aarom to both legs with  Max A  Fatigued quickly and reported pain was 10/10 notified nursing for request for tylenol. Using thickened water small sips loosing liquids out right side even with head in midline. Not on tele at this time when checking with tele room.   education on exercises needs reinforcement   Progression toward goals:  [ ]      Improving appropriately and progressing toward goals  [X]      Improving slowly and progressing toward goals  [ ]      Not making progress toward goals and plan of care will be adjusted       PLAN:  Patient continues to benefit from skilled intervention to address the above impairments. Continue treatment per established plan of care. Discharge Recommendations:  Rehab and Skilled Nursing Facility  Further Equipment Recommendations for Discharge:  tbd       SUBJECTIVE:   Patient stated .      OBJECTIVE DATA SUMMARY:   Critical Behavior:  Neurologic State: Drowsy  Orientation Level:  (name only not date of birth )  Cognition: Decreased command following  Safety/Judgement: Fall prevention  Functional Mobility Training:  Bed Mobility:  Rolling: Moderate assistance (head rolling only )  Transfers:  Sit to Stand:  (not assessed)  Balance:  Sitting:  (not assessed secondary to PLOF and high HR)  Therapeutic Exercises:   Aarom/prom to both legs and head in midline. Pain:10/10  Pain Scale 1: Numeric (0 - 10)  Activity Tolerance:   Fair minus  Please refer to the flowsheet for vital signs taken during this treatment.   After treatment:   [ ] Patient left in no apparent distress sitting up in chair  [X] Patient left in no apparent distress in bed  [X] Call bell left within reach  [X] Nursing notified  [ ] Caregiver present  [ ] Bed alarm activated      Grace Serra PT   Time Calculation: 25 mins

## 2017-03-20 NOTE — PROGRESS NOTES
[address] [facility]  3/20/2017    Progress Note    Assessment:     Glendy Alexander is a 49 y.o. female with:   Patient Active Problem List   Diagnosis Code    Anemia D64.9    Anxiety F41.9    Nausea and vomiting R11.2    Morbid obesity ([facility] E66.01    Chronic pain syndrome G89.4    History of ovarian cancer Z85.43    History of gastric bypass Z98.890    CAD (coronary artery disease) I25.10    History of Clostridium difficile Z87.19    History of endometrial cancer Z85.42    History of GI bleed Z87.19    Lymphedema I89.0    Hypokalemia E87.6    DEV (acute kidney injury) ([facility] N17.9    Pulmonary nodules R91.8    A-fib ([facility]) I48.91    Aortic stenosis I35.0    GI bleed K92.2    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 6.1 on admission, 1.2 (baseline)     Plan:   When more stable we can consider ureteral stent exchange bilaterally. Creat better than baseline now. Subjective:     No acute  changes / events. Patient looks improved but had cardiac arrhythmia this evening and has been febrill today.       Objective:     Admit weight: Weight: 270 lb (122.5 kg)  Last recorded weight: Weight: 231 lb 7.7 oz (105 kg)    Temp (24hrs), Av.3 °F (37.9 °C), Min:98.8 °F (37.1 °C), Max:101.2 °F (38.4 °C)    Physical Exam:    GEN: NAD, Nonlabored, A&O x3  ABD: Soft, NT, ND, No CVAT Bilaterally  : Duarte - clear    Diagnostics:      Recent Results (from the past 24 hour(s))   MAGNESIUM    Collection Time: 17  5:00 AM Result Value Ref Range    Magnesium 1.5 (L) 1.8 - 2.4 mg/dL   CBC WITH AUTOMATED DIFF    Collection Time: 03/20/17  5:00 AM   Result Value Ref Range    WBC 7.4 4.6 - 13.2 K/uL    RBC 2.94 (L) 4.20 - 5.30 M/uL    HGB 8.3 (L) 12.0 - 16.0 g/dL    HCT 26.6 (L) 35.0 - 45.0 %    MCV 90.5 74.0 - 97.0 FL    MCH 28.2 24.0 - 34.0 PG    MCHC 31.2 31.0 - 37.0 g/dL    RDW 18.1 (H) 11.6 - 14.5 %    PLATELET 741 797 - 591 K/uL    MPV 10.1 9.2 - 11.8 FL    NEUTROPHILS 81 (H) 40 - 73 %    LYMPHOCYTES 13 (L) 21 - 52 %    MONOCYTES 6 3 - 10 %    EOSINOPHILS 0 0 - 5 %    BASOPHILS 0 0 - 2 %    ABS. NEUTROPHILS 6.0 1.8 - 8.0 K/UL    ABS. LYMPHOCYTES 0.9 0.9 - 3.6 K/UL    ABS. MONOCYTES 0.4 0.05 - 1.2 K/UL    ABS. EOSINOPHILS 0.0 0.0 - 0.4 K/UL    ABS.  BASOPHILS 0.0 0.0 - 0.06 K/UL    DF AUTOMATED     METABOLIC PANEL, BASIC    Collection Time: 03/20/17  5:00 AM   Result Value Ref Range    Sodium 141 136 - 145 mmol/L    Potassium 3.1 (L) 3.5 - 5.5 mmol/L    Chloride 104 100 - 108 mmol/L    CO2 29 21 - 32 mmol/L    Anion gap 8 3.0 - 18 mmol/L    Glucose 103 (H) 74 - 99 mg/dL    BUN 31 (H) 7.0 - 18 MG/DL    Creatinine 0.83 0.6 - 1.3 MG/DL    BUN/Creatinine ratio 37 (H) 12 - 20      GFR est AA >60 >60 ml/min/1.73m2    GFR est non-AA >60 >60 ml/min/1.73m2    Calcium 8.4 (L) 8.5 - 10.1 MG/DL   CALCIUM, IONIZED    Collection Time: 03/20/17  5:00 AM   Result Value Ref Range    Ionized Calcium 1.05 (L) 1.12 - 1.32 MMOL/L   PHOSPHORUS    Collection Time: 03/20/17  5:00 AM   Result Value Ref Range    Phosphorus 1.9 (L) 2.5 - 4.9 MG/DL     Sarah Heart MD

## 2017-03-20 NOTE — PROGRESS NOTES
Patient arrived on unit, she is alert and oriented. On 3l NC clear. Patient does c/o of chronic back pain. Mediport with fluids infusing.  Call bell in reach, will continue to monitor

## 2017-03-20 NOTE — ROUTINE PROCESS
Bedside and Verbal shift change report given to Jarrod Ritter RN   (oncoming nurse) by Amairani Rodriguez (offgoing nurse).  Report included the following information SBAR, Kardex and Cardiac Rhythm SR [address].

## 2017-03-20 NOTE — PROGRESS NOTES
Progress Note      Patient: Emanuel Carrasco               Sex: female          DOA: 3/3/2017       YOB: 1960      Age:  49 y.o.        LOS:  LOS: 17 days               Subjective:   Discussed with  Dr Dina Graves the pt is looking much brighter today . She is still febrile and difficulties with giving this pt antibiotics was discussed . presently she is on eraxis,azactam  and daptomycin. the pt will be in daptomycin for 6 weeks. The pt has a presumed endocarditis as seen in the 2 d echo . she has multiple allergies to antibiotics making her treatment very difficult. the pt's new fever is felt to be due to left lung atelectasis pt is now on only daptomycin . As noted the pt has had extensive mucous plugging  The opt has metastatic endometrial cancer. pt has a dvt .her platelet count is presently 151 . She will need to be treated with anticoagulation . difficult problem . Will repeat the duplex of the lower extremities . Objective:      Visit Vitals    /85    Pulse (!) 106    Temp (!) 101.2 °F (38.4 °C)    Resp 16    Ht 5' 4\" (1.626 m)    Wt 105 kg (231 lb 7.7 oz)    SpO2 95%    BMI 39.73 kg/m2       Physical Exam:  Pt is much more alert than she has been previously   Heart tachycardia s1 and s2  Systolic murmur heard .   Lungs rhonchi heard bilaterally   abdomen soft obese   Neuro alert nonfocal    Lab/Data Reviewed:  CMP:   Lab Results   Component Value Date/Time     03/20/2017 05:00 AM    K 3.1 (L) 03/20/2017 05:00 AM     03/20/2017 05:00 AM    CO2 29 03/20/2017 05:00 AM    AGAP 8 03/20/2017 05:00 AM     (H) 03/20/2017 05:00 AM    BUN 31 (H) 03/20/2017 05:00 AM    CREA 0.83 03/20/2017 05:00 AM    GFRAA >60 03/20/2017 05:00 AM    GFRNA >60 03/20/2017 05:00 AM    CA 8.4 (L) 03/20/2017 05:00 AM    MG 1.5 (L) 03/20/2017 05:00 AM    PHOS 1.9 (L) 03/20/2017 05:00 AM     CBC:   Lab Results   Component Value Date/Time    WBC 7.4 03/20/2017 05:00 AM    HGB 8.3 (L) 03/20/2017 05:00 AM    HCT 26.6 (L) 03/20/2017 05:00 AM     03/20/2017 05:00 AM           Assessment/Plan     Principal Problem:    Severe sepsis with septic shock (CODE) ([facility] (3/3/2017)    Active Problems:    History of ovarian cancer (3/18/2015)      Morbid obesity ([facility] (10/2/2014)      CAD (coronary artery disease) (12/29/2015)      DEV (acute kidney injury) ([facility] (12/29/2015)      A-fib ([facility] (12/29/2015)      UTI (urinary tract infection) (3/3/2017)      Severe thrombocytopenia ([facility] (3/4/2017)        Plan:will follow closely . The problem of the pt's dvt will need to be confronted .   Full anticoagulation may result in bleeding in   this pt with metastatic disease

## 2017-03-21 NOTE — PROGRESS NOTES
conducted a Follow up consultation and Spiritual Assessment for Ara Martinez, who is a 49 y.o.,female. The  provided the following Interventions:  Continued the relationship of care and support. Listened empathically. Offered prayer and assurance of continued prayer on patients behalf. Chart reviewed. The following outcomes were achieved:  Patient expressed gratitude for pastoral care visit. Assessment:  There are no further spiritual or [employer] issues which require Spiritual Care Services interventions at this time. Plan:  Chaplains will continue to follow and will provide pastoral care on an as needed/requested basis.  recommends bedside caregivers page  on duty if patient shows signs of acute spiritual or emotional distress.      [address] [facility]   Staff [address] [facility]   [phone]

## 2017-03-21 NOTE — CONSULTS
Chart reviewed: IM and ID notes read. Now DNI. We will be available as needed. PLEASE call ME for any PULMONARY questions/concerns/interventions or procedures. [facility].   [phone]  [phone]

## 2017-03-21 NOTE — PROGRESS NOTES
Speech Therapy Note:    MBS completed with aspiration on thin and VF penetration on nectar. At this time, pt safest for mech-soft, chopped meat, honey-thick liquid diet (cup or spoon sips only). Full report to follow. Emily Pineda., [id number] [facility]  Office: [phone]  Pager: [phone]

## 2017-03-21 NOTE — PROGRESS NOTES
Problem: Dysphagia (Adult)  Goal: *Acute Goals and Plan of Care (Insert Text)  Recommendations:  Diet: mech-soft, chopped/honey-thick liquids (cup sips)  Meds: one at a time in pudding  Aspiration Precautions  Oral Care TID  Other: MBS completed with aspiration on thin and nectar    Goals: Patient will:  1. Tolerate PO trials with 0 s/s overt distress in 4/5 trials  2. Utilize compensatory swallow strategies/maneuvers (decrease bite/sip, size/rate, alt. liq/sol) with min cues in 4/5 trials  3. Perform oral-motor/laryngeal exercises to increase oropharyngeal swallow function with min cues  4. Complete an objective swallow study (i.e., MBSS) to assess swallow integrity, r/o aspiration, and determine of safest LRD, min A - goal met 3/21/17     SPEECH PATHOLOGY MODIFIED BARIUM SWALLOW STUDY     Patient: Brianna Frank (57 y.o. female)  Date: 3/21/2017  Primary Diagnosis: Severe sepsis with septic shock (CODE) ([facility])  UTI (urinary tract infection)  Severe sepsis with septic shock (CODE) (HCC)  Severe thrombocytopenia (HCC)  mucous plugs  mucous plugs  Procedure(s) (LRB):  BRONCHOSCOPY (N/A) 5 Days Post-Op   Precautions: aspiration  Fall, Contact      ASSESSMENT :  MBS completed with large volume overt aspiration with thin liquid trials, as well as VF penetration with nectar-thick trials with aspiration most likely to follow on residuals in pyriforms. Honey-thick accepted without aspiration; however, secondary to poor trunk control and positioning in natural chin tuck position, pt required spoon presentations of honey-thick. Consistent premature spillage into valleculae across all trials secondary to lingual weakness. Further, overall weakness resulting in decreased hyolaryngeal excursion with residue in valleculae across 75% of trials. Pt presents with mild oral/moderately-severe pharyngeal dysphagia, as evidenced above, which places pt at risk for aspiration.  At this time, safest for mech-soft, chopped meat, honey-thick liquid diet (cup or spoon sips only). SLP utilized video of study for visual feedback, education, and recommendations for pt; verbalized comprehension. Patient will benefit from skilled intervention to address the above impairments. Patients rehabilitation potential is considered to be Fair  Factors which may influence rehabilitation potential include:   [ ]              None noted  [ ]              Mental ability/status  [X]              Medical condition  [X]              Home/family situation and support systems  [X]              Safety awareness  [ ]              Pain tolerance/management  [ ]              Other:        PLAN :  Recommendations and Planned Interventions:   mech-soft, chopped meat, honey-thick liquid diet (cup or spoon sips only). Frequency/Duration: Patient will be followed by speech-language pathology 1-2 times per day/4-7 days per week to address goals. Discharge Recommendations: Home Health and [address]:   Patient stated I don't know what I'll do.       OBJECTIVE:       Past Medical History:   Diagnosis Date    Anxiety and depression      Aortic stenosis      Arthritis      Arthropathy      Cardiac disorder      Cellulitis       left leg    Chronic pain      Endometrial adenocarcinoma (HCC)      Gastric ulcer      H/O ovarian cancer      Hematemesis      Infection      Lymphedema      Munchausen syndrome      Narcotic dependence ([facility] [address]      Obesity      Spinal stenosis      Status post partial hysterectomy      Urethral obstruction       Past Surgical History:   Procedure Laterality Date    HX  SECTION        HX ENDOSCOPY         EGD 6 x [facility]    HX HERNIA REPAIR        HX OOPHORECTOMY        HX TONSILLECTOMY        HX VASCULAR ACCESS Right       single lumen power port     Prior Level of Function/Home Situation: lives with children  Home Situation  Home Environment: Private residence  # Steps to Enter: 4  Rails to Enter: Yes  Hand Rails : Right  One/Two Story Residence: One story  Living Alone: No  Support Systems: Child(jyoti)  Patient Expects to be Discharged to[de-identified] Private residence  Current DME Used/Available at Home: Cane, straight  Tub or Shower Type: Other (comment) (per pt report, pt has been sponge bathing for weeks)  Diet prior to admission: regular  Current Diet:  mech-soft, chopped meat, honey-thick liquid diet (cup or spoon sips only). Radiologist: Aj Quezada Views: Lateral  Patient Position: 90      Trial 1: Trial 2:   Consistency Presented: Thin liquid Consistency Presented: Nectar thick liquid   How Presented: SLP-fed/presented;Straw;Successive swallows How Presented: SLP-fed/presented;Spoon         Bolus Acceptance: No impairment Bolus Acceptance: No impairment   Bolus Formation/Control: Impaired: Premature spillage Bolus Formation/Control: Impaired: Premature spillage   Propulsion: Delayed (# of seconds) Propulsion: Delayed (# of seconds)   Oral Residue: None Oral Residue: None   Initiation of Swallow: Triggered at vallecula Initiation of Swallow: Triggered at valleculae   Timing: Pooling 1-5 sec Timing: Pooling 1-5 sec   Penetration: During swallow; To cords Penetration: During swallow; After swallow; To cords   Aspiration/Timing: Repeated;During; After;From initial swallow;From residual Aspiration/Timing: Other (comment) (high risk of aspiration on residuals)   Pharyngeal Clearance: Vallecular residue;Pyriform residue ; Less than 10% Pharyngeal Clearance: No residue   Attempted Modifications: Spoon Attempted Modifications: Spoon   Effective Modifications: None Effective Modifications: None   Cues for Modifications: Minimal Cues for Modifications: Minimal             Trial 3: Trial 4:   Consistency Presented: Honey thick liquid Consistency Presented: Pudding;Mechanical soft   How Presented: Spoon;SLP-fed/presented How Presented: SLP-fed/presented         Bolus Acceptance: No impairment Bolus Acceptance: No impairment   Bolus Formation/Control: Impaired: Premature spillage Bolus Formation/Control: Impaired: Premature spillage   Propulsion: No impairment Propulsion: No impairment   Oral Residue: None Oral Residue: None   Initiation of Swallow: Triggered at valleculae     Timing: No impairment Timing: No impairment   Penetration: None Penetration: None   Aspiration/Timing: No evidence of aspiration Aspiration/Timing: No evidence of aspiration   Pharyngeal Clearance: Vallecular residue; Less than 10% Pharyngeal Clearance: Vallecular residue; Less than 10%               Cues for Modifications: Minimal Cues for Modifications: None            Decreased Tongue Base Retraction?: Yes  Laryngeal Elevation: Incomplete laryngeal closure  Aspiration/Penetration Score: 7 (Aspiration-Contrast passes below the cords/, but is not ejected despite attempt)  Pharyngeal Symmetry: Not assessed  Pharyngeal-Esophageal Segment: No impairment  Pharyngeal Dysfunction: Decreased elevation/closure;Decreased strength     Oral Phase Severity: Mild  Pharyngeal Phase Severity: Moderately severe     PAIN:  Start of Study: 0  End of Study: 0       COMMUNICATION/EDUCATION:   [X]  Aspiration risks/precautions; compensatory swallow techniques  [X]  Patient/family have participated as able in goal setting and plan of care. [X]  Patient/family agree to work toward stated goals and plan of care. [ ]  Patient understands intent and goals of therapy, but is neutral about his/her participation. [ ]  Patient is unable to participate in goal setting and plan of care.      Thank you for this referral.  LUDY Frey  Time Calculation: 30 mins

## 2017-03-21 NOTE — PROGRESS NOTES
0800 -- Bedside and Verbal shift change report given to [address] (oncoming nurse) by Jaquelin Green RN (offgoing nurse). Report included the following information SBAR, Procedure Summary, Intake/Output, MAR, Recent Results. Bed locked and lowered, siderails up x3. Whiteboard updated with information. Call bell at bedside, will continue to monitor.       0845 -- Pt off floor to vascular study, morning medications held.     0940 -- Call from [address] pt is positive for DVT in right leg, not improving. 1035 -- Pt returned to floor. 1110-- Morning medications administered, pt tolerated with ease. MD at bedside, will order Psych consult, IDR completed with pt.      1222 -- Shift reassessment, pt condition unchanged will continue to monitor.      1400 -- Shift reassessment, pt condition unchanged will continue to monitor.      1430-- Bedside and Verbal shift change report given to Dustin (oncoming nurse) by Nora Sullivan RN (offgoing nurse) Report included the following information SBAR, Procedure Summary, Intake/Output, MAR, Recent Results. Pt up in bed, no indication of pain or acute distress. Bed locked and lowered, siderails up x3.  Call bell at bedside.

## 2017-03-21 NOTE — ROUTINE PROCESS
2207 Called to check on patient due to increase WOB. Attempted to put patient on our RESMED but patient refused. Patient stated she wanted to be left alone.  Patient on 3 lpm via NC.

## 2017-03-21 NOTE — PROGRESS NOTES
Progress Note      Patient: Harper Larkin               Sex: female          DOA: 3/3/2017       YOB: 1960      Age:  49 y.o.        LOS:  LOS: 18 days               Subjective:   Pt is a dni . She is presently on daptomycin  And will be for 6 weeks . she will have a weekly cpk while on daptomycin . She has a dvt and her anticoagulation was held because of thrombocytopenia .will discuss with vascular surgery as to whether she would be a candidate for an ivc filter . .will call in the am      Objective:      Visit Vitals    /90 (BP 1 Location: Right arm, BP Patient Position: At rest)    Pulse 99    Temp 100 °F (37.8 °C)    Resp 18    Ht 5' 4\" (1.626 m)    Wt 105 kg (231 lb 7.7 oz)    SpO2 97%    BMI 39.73 kg/m2       Physical Exam:  Pt appears to be more alert since being treated with antibiotics ,.she is still having pain however .    Heart reg rate and rhythm   abdomen obese mildly tender to palpation    Lungs fair breath sounds heard a  Neuro more alert    Lab/Data Reviewed:  BMP:   Lab Results   Component Value Date/Time     03/21/2017 04:40 AM    K 3.3 (L) 03/21/2017 04:40 AM     03/21/2017 04:40 AM    CO2 32 03/21/2017 04:40 AM    AGAP 7 03/21/2017 04:40 AM     (H) 03/21/2017 04:40 AM    BUN 29 (H) 03/21/2017 04:40 AM    CREA 0.93 03/21/2017 04:40 AM    GFRAA >60 03/21/2017 04:40 AM    GFRNA >60 03/21/2017 04:40 AM     CMP:   Lab Results   Component Value Date/Time     03/21/2017 04:40 AM    K 3.3 (L) 03/21/2017 04:40 AM     03/21/2017 04:40 AM    CO2 32 03/21/2017 04:40 AM    AGAP 7 03/21/2017 04:40 AM     (H) 03/21/2017 04:40 AM    BUN 29 (H) 03/21/2017 04:40 AM    CREA 0.93 03/21/2017 04:40 AM    GFRAA >60 03/21/2017 04:40 AM    GFRNA >60 03/21/2017 04:40 AM    CA 8.4 (L) 03/21/2017 04:40 AM    MG 1.8 03/21/2017 04:40 AM    PHOS 1.9 (L) 03/21/2017 04:40 AM    ALB 3.4 03/21/2017 04:40 AM    TP 7.3 03/21/2017 04:40 AM    GLOB 3.9 03/21/2017 04:40 AM    AGRAT 0.9 03/21/2017 04:40 AM    SGOT 29 03/21/2017 04:40 AM    ALT 26 03/21/2017 04:40 AM     CBC:   Lab Results   Component Value Date/Time    WBC 8.2 03/21/2017 04:40 AM    HGB 8.9 (L) 03/21/2017 04:40 AM    HCT 28.9 (L) 03/21/2017 04:40 AM     03/21/2017 04:40 AM           Assessment/Plan     Principal Problem:    Severe sepsis with septic shock (CODE) ([facility] [address] (3/3/2017)    Active Problems:    History of ovarian cancer (3/18/2015)      Morbid obesity ([facility] [address] (10/2/2014)      CAD (coronary artery disease) (12/29/2015)      DEV (acute kidney injury) ([facility] [address] (12/29/2015)      A-fib ([facility] [address] (12/29/2015)      UTI (urinary tract infection) (3/3/2017)      Severe thrombocytopenia ([facility] [address] (3/4/2017)  dvt . Plan:will discuss with vascular surgery .

## 2017-03-21 NOTE — PROCEDURES
Debbie  *** FINAL REPORT ***    Name: Jarrod Keating  MRN: [id number]    Inpatient  : 17 May 1960  HIS Order #: [id number]  [id number] [facility] Visit #: [id number]  Date: 21 Mar 2017    TYPE OF TEST: Peripheral Venous Testing    REASON FOR TEST  Limb swelling    Right Leg:-  Deep venous thrombosis:           Yes  Proximal extent of thrombus:      Common Femoral  Superficial venous thrombosis:    Not examined  Deep venous insufficiency:        Not examined  Superficial venous insufficiency: Not examined    Left Leg:-  Deep venous thrombosis:           No  Superficial venous thrombosis:    Not examined  Deep venous insufficiency:        Not examined  Superficial venous insufficiency: Not examined      INTERPRETATION/FINDINGS  Duplex images were obtained using 2-D gray scale, color flow, and  spectral Doppler analysis. Technically difficult, limited examination due to patient's body  habitus, edema, and inability to position patient properly. Right leg :  1. Deep vein(s) visualized include the common femoral, proximal  femoral, mid femoral and distal femoral veins. Unable to visualize the   popliteal and calf veins. 2. Acute on chronic appearing deep venous thrombosis identified in the   common femoral, proximal femoral, mid femoral and distal femoral  veins. 3. Thrombus appears occlusive in the proximal and distal femoral  veins. Left leg :  1. Deep vein(s) visualized include the common femoral, proximal  femoral, mid femoral, distal femoral, popliteal(above knee),  popliteal(fossa), popliteal(below knee), posterior tibial and peroneal   veins. 2. No evidence of deep venous thrombosis detected in the veins  visualized. 3. The calf veins are not well visualized. Cannot exclude nonocclusive   thrombus in the tibial/peroneal veins. No significant change since previous examination done on 3/5/17. ADDITIONAL COMMENTS  Wet reading given to Nurse Nora Sullivan at 2:63 a.m.     I have personally reviewed the data relevant to the interpretation of  this  study. TECHNOLOGIST: Sneha Shipman.  Chelsie Hdez  Signed: 03/21/2017 09:43 AM    PHYSICIAN: Dar Pineda MD  Signed: 03/21/2017 04:36 PM

## 2017-03-21 NOTE — PROGRESS NOTES
INFECTIOUS DISEASE FOLLOW UP NOTE :    Admit Date: 3/3/2017    Current  Prior    Daptomycin 3/8-13 linezolid 3/4 - 3, vanc/zosyn/levoflox 3/3 -0   aztreonam/anidulafungin 3/12-8     ASSESSMENT: -> RECS     Sepsis POA  - fever 102 , leukocytosis 28K, tachycardia, hypotension  - source VRE UTI and BSI or DVT  -allergic allergic reaction and IE also in DDX  - WBC better 42->>8 k today   - still with fever, nl WBC- ?DVT, drug, atelectasis, ??tumor ->monitor on current therapy  - fever ? Still from left lung recurrent atelectasis vs drug fever   -> prognosis remains guarded    Resp failure NTB 3/11  -initial cxr nad ->white out L s/o mucus plug, SP bronch 3/13 extensive L mucus plugging  - sputum cx C.albicans   - extubated 3/18< NEW MBS aspiration risk -> per PCCM   VRE BSI with ?aortic endocarditis  - blcx 3/3 2/2 VRE positive   - repeat 3/6 ntd   - TTE aortic stenosis with possible aortic vegetation [ chronic mixed calcified density ]  - source likely UTI; pt refused ESSIE and removal of mediport  - 3/8 bctx's ng, 3/12 ng x2, 3/17 ngtd x 2  - CPK 3/18 128 -> Cont Daptomycin to complete 6 weeks.  -> weekly CK on dapto     VRE UTI, complicated   - UA with large LE , wbc tntc, jamar 4+  - ucx >100k VRE  - CT with no hydronephrosis but has b/l ureteral stents  - appreciate urology consult and eventual plan for stent exchange/removal   - persistent sterile pyuria -> abx as above  -> stent mgmt per      ADR - Rash over abdomen/chest   - secondary to linezolid which has been listed from before as allergy - rash fading off linezolid  -> monitor off linezolid    Lymphedema with R medial thigh blister- not currently infected appearing  -massive pannus with intertrigo  -> topical therapy p recent systemic antifungal   RLE subacute DVT  - per primary team   Rt pelvic mass ? ovarian ca / endometrial ca with mets   - palliative care was involved , was hospice at home and now revoked  - may need to re involve palliative as prognosis guarded    DEV due to sepsis - better, Cr 1.9->0.9 today     Thrombocytopenia -better 94-> 111 ->monitor   Blood loss anemia        MICROBIOLOGY:   3/3  Blcx x 2 - 2/2 VRE S gent, linezolid , daptomycin quinupristin         Ucx > 100K VRE  3/4 Flu Ag test neg   3/6  Blcx x 2 ng  3/8  Blcx x 2 ng   3/11 uctx ng  3/12 bctx x 2 ng   sput C albicans  3/14     ucx ng  3/17 blcx ngtd   spcx rare C albicans   uctx ng    LINES AND CATHETERS:   Rt chest mediport     SUBJECTIVE :     Interval notes reviewed. Extubated, out of ICU on daptomycin. Pt awake alert no specific c/o elicited. Fever remains up, Dr. Weiner Erps stopped anidulafungin and aztreonam in case drug fever, note aspiration risk, pos PVL for DVT additional sources for fever along with tumor potentially. D/w her monitoring on daptomycin for now, CK was fine 3/18, no myalgia arthalgia reported.      MEDICATIONS :     Current Facility-Administered Medications   Medication Dose Route Frequency    DAPTOmycin (CUBICIN) 630 mg in 0.9% sodium chloride 50 mL IVPB RF formulation  6 mg/kg IntraVENous Q24H    fentaNYL citrate (PF) injection 25-50 mcg  25-50 mcg IntraVENous Q2H PRN    enoxaparin (LOVENOX) injection 40 mg  40 mg SubCUTAneous Q12H    potassium chloride (KLOR-CON) packet 20 mEq  20 mEq Per NG tube DAILY    0.9% sodium chloride infusion 250 mL  250 mL IntraVENous PRN    chlorhexidine (PERIDEX) 0.12 % mouthwash 10 mL  10 mL Oral Q12H    acetaminophen (TYLENOL) solution 650 mg  650 mg Oral Q4H PRN    furosemide (LASIX) injection 40 mg  40 mg IntraVENous Q12H    albuterol-ipratropium (DUO-NEB) 2.5 MG-0.5 MG/3 ML  3 mL Nebulization Q6H RT    dextrose 5% lactated ringers infusion  50 mL/hr IntraVENous CONTINUOUS    pantoprazole (PROTONIX) 40 mg in sodium chloride 0.9 % 10 mL injection  40 mg IntraVENous DAILY    sodium chloride (NS) flush 5-10 mL  5-10 mL IntraVENous Q8H    sodium chloride (NS) flush 5-10 mL  5-10 mL IntraVENous PRN    nystatin (MYCOSTATIN) 100,000 unit/gram powder   Topical BID    ELECTROLYTE REPLACEMENT PROTOCOL  1 Each Other PRN    ELECTROLYTE REPLACEMENT PROTOCOL  1 Each Other PRN    ELECTROLYTE REPLACEMENT PROTOCOL  1 Each Other PRN    ELECTROLYTE REPLACEMENT PROTOCOL  1 Each Other PRN    sodium bicarbonate tablet 650 mg  650 mg Oral TID    bisacodyl (DULCOLAX) suppository 10 mg  10 mg Rectal DAILY PRN    glucose chewable tablet 16 g  16 g Oral PRN    glucagon (GLUCAGEN) injection 1 mg  1 mg IntraMUSCular PRN    dextrose (D50W) injection syrg 12.5-25 g  25-50 mL IntraVENous PRN    ondansetron (ZOFRAN) injection 4 mg  4 mg IntraVENous Q4H PRN       OBJECTIVE :     Visit Vitals    /90 (BP 1 Location: Right arm, BP Patient Position: At rest)    Pulse 99    Temp 100 °F (37.8 °C)    Resp 18    Ht 5' 4\" (1.626 m)    Wt 105 kg (231 lb 7.7 oz)    SpO2 97%    BMI 39.73 kg/m2       Temp (24hrs), Av.9 °F (38.3 °C), Min:100 °F (37.8 °C), Max:102 °F (38.9 °C)    GEN - morbidly obese, ill appearing WF appears older than stated age lying comfortably in bed out of ICU, no family at bedside  HEENT - conj pale no thrush lips cracked  NECK- R mediport accessed no LAD   RESP- scattered rhochi  CVS- tachy III/VI systolic murmur   ABD- massive pannus, large area of ecchymosis at lower abdominal fold fragile skin, bs present  EXT- (+) edema   SKIN - rash over abdomen resolved, large pannus of lower abdomen with hematoma of skin and ulcer, excoriated R ant thigh  NEURO - awake following commands and nodding appropriately      Labs: Results:   Chemistry Recent Labs      17   0440  17   0500  17   0430   GLU  116*  103*  98   NA  142  141  141   K  3.3*  3.1*  4.0   CL  103  104  106   CO2  32  29  25   BUN  29*  31*  34*   CREA  0.93  0.83  0.84   CA  8.4*  8.4*  8.1*   AGAP  7  8  10   BUCR  31*  37*  40*   AP  232*   -- --    TP  7.3   --    --    ALB  3.4   --   3.9   GLOB  3.9   --    --    AGRAT  0.9   --    --       CBC w/Diff Recent Labs      03/21/17   0440  03/20/17   0500  03/19/17   0430   WBC  8.2  7.4  6.9   RBC  3.14*  2.94*  2.66*   HGB  8.9*  8.3*  7.4*   HCT  28.9*  26.6*  23.7*   PLT  177  151  128*   GRANS  76*  81*  81*   LYMPH  18*  13*  11*   EOS  1  0  1        MBS 3/21 IMPRESSION:     Aspiration with cough reflex with thin consistencies. Airway penetration with  nectar consistencies. cxr 3/19 Impression:  Interval removal of the endotracheal and nasogastric tubes. Slightly less  conspicuous left basilar opacity, likely atelectasis or potentially infiltrate. CT abdo/pelvis 3/3 - IMPRESSION:  1. New cystic mass right adnexal region. Recurrent ovarian carcinoma cannot be  excluded. 2. New right middle lobe pulmonary nodule. Cannot rule out metastatic disease. 3. Bilateral ureteral stents with resolved hydronephrosis. 4. Stable or minimally increased left periaortic retroperitoneal adenopathy. 3/21 pvl Right Leg:-  Deep venous thrombosis: Yes  Proximal extent of thrombus: Common Femoral  Superficial venous thrombosis: Not examined  Deep venous insufficiency: Not examined  Superficial venous insufficiency: Not examined     Left Leg:-  Deep venous thrombosis: No  Superficial venous thrombosis: Not examined  Deep venous insufficiency: Not examined  Superficial venous insufficiency: Not examined    MAGNOLIA Malik MD  March 21, 2017  [facility] AT THE [facility]  [phone]

## 2017-03-21 NOTE — PROGRESS NOTES
Problem: Mobility Impaired (Adult and Pediatric)  Goal: *Acute Goals and Plan of Care (Insert Text)  Physical Therapy Goals  Initiated 3/19/2017 and to be accomplished within 7 day(s)  1. Patient will move from supine to sit and sit to supine and roll side to side in bed with maximal assistance. 2. Patient will transfer from bed to chair and chair to bed with maximal assistance using the least restrictive device. 3. Patient will perform sit to stand with maximal assistance. 4. Patient will ambulate with maximal assistance for 5 feet with the least restrictive device. Outcome: Not Progressing Towards Goal  PHYSICAL THERAPY TREATMENT     Patient: Erskine Bamberger (59 y.o. female)  Date: 3/21/2017  Diagnosis: Severe sepsis with septic shock (CODE) (HCC)  UTI (urinary tract infection)  Severe sepsis with septic shock (CODE) (HCC)  Severe thrombocytopenia (HCC)  mucous plugs  mucous plugs Severe sepsis with septic shock (CODE) ([facility])  Procedure(s) (LRB):  BRONCHOSCOPY (N/A) 5 Days Post-Op  Precautions: Fall, Contact   Chart, physical therapy assessment, plan of care and goals were reviewed. ASSESSMENT:  Pt unable to answer questions about prior level of functioning stating \"I don't know, I just woke up one day in the hospital.\"  Upon pulling back sheets pt bed saturated in urine. Called nurse for assistance and upon rolling pt the byrne tube was disconnected and pt also had a very large loose BM. Pt is totalA x2-3 for rolling R and L 2x. Much time spent on clean up and bed linen change. Pt unable to assist at all with bed mobility. Education: UE placement to assist with bed mobility  Progression toward goals:  [ ]      Improving appropriately and progressing toward goals  [ ]      Improving slowly and progressing toward goals  [X]      Not making progress toward goals and plan of care will be adjusted       PLAN:  Patient continues to benefit from skilled intervention to address the above impairments. Continue treatment per established plan of care. Discharge Recommendations:  LTC  Further Equipment Recommendations for Discharge:  wheelchair Bariatric       SUBJECTIVE:   Patient stated I don't know.       OBJECTIVE DATA SUMMARY:   Critical Behavior:  Neurologic State: Alert  Orientation Level: Oriented to person, Oriented to place, Oriented to situation  Cognition: Appropriate for age attention/concentration  Safety/Judgement: Fall prevention  Functional Mobility Training:  Bed Mobility:  Rolling: Total assistance;Assist x2 (x2-3)  Scooting: Total assistance;Assist x2 (x2-3)  Pain:  Pre 0  Post 0  Pain Scale 1: Numeric (0 - 10)  Activity Tolerance:   Poor  Please refer to the flowsheet for vital signs taken during this treatment.   After treatment:   [ ] Patient left in no apparent distress sitting up in chair  [X] Patient left in no apparent distress in bed  [X] Call bell left within reach  [ ] Nursing notified  [ ] Caregiver present  [ ] Bed alarm activated      [address], PTA   Time Calculation: 41 mins

## 2017-03-22 NOTE — PROGRESS NOTES
Progress Note      Patient: Tramaine Crawford               Sex: female          DOA: 3/3/2017       YOB: 1960      Age:  49 y.o.        LOS:  LOS: 19 days               Subjective:   Discussed with vascular surgery. The pt has an IVC filter in place. This was put in on march 5 2017 . At this point we might be able to send this patient home with home health . Will discuss           Objective:      Visit Vitals    /86 (BP 1 Location: Right arm, BP Patient Position: At rest)    Pulse 76    Temp 98.6 °F (37 °C)    Resp 17    Ht 5' 4\" (1.626 m)    Wt 105 kg (231 lb 7.7 oz)    SpO2 100%    BMI 39.73 kg/m2       Physical Exam:  Pt is awake and is alert . she denies pain at this time   Heart reg rate and rhythm   Lungs fair breath sounds heard   abdomen soft midly tender to palpation   Neuro nonfocal      Lab/Data Reviewed:  CMP:   Lab Results   Component Value Date/Time     03/22/2017 09:45 AM    K 3.0 (L) 03/22/2017 09:45 AM     03/22/2017 09:45 AM    CO2 31 03/22/2017 09:45 AM    AGAP 11 03/22/2017 09:45 AM     (H) 03/22/2017 09:45 AM    BUN 27 (H) 03/22/2017 09:45 AM    CREA 0.85 03/22/2017 09:45 AM    GFRAA >60 03/22/2017 09:45 AM    GFRNA >60 03/22/2017 09:45 AM    CA 8.3 (L) 03/22/2017 09:45 AM    MG 1.4 (L) 03/22/2017 09:45 AM    PHOS 2.0 (L) 03/22/2017 09:45 AM    ALB 3.0 (L) 03/22/2017 09:45 AM    TP 6.7 03/22/2017 09:45 AM    GLOB 3.7 03/22/2017 09:45 AM    AGRAT 0.8 03/22/2017 09:45 AM    SGOT 20 03/22/2017 09:45 AM    ALT 22 03/22/2017 09:45 AM     CBC:   Lab Results   Component Value Date/Time    WBC 7.2 03/22/2017 09:45 AM    HGB 8.2 (L) 03/22/2017 09:45 AM    HCT 27.3 (L) 03/22/2017 09:45 AM     03/22/2017 09:45 AM           Assessment/Plan     Principal Problem:    Severe sepsis with septic shock (CODE) ([facility] (3/3/2017)    Active Problems:    History of ovarian cancer (3/18/2015)      Morbid obesity ([facility] (10/2/2014)      CAD (coronary artery disease) (12/29/2015)      DEV (acute kidney injury) ([facility] (12/29/2015)      A-fib ([facility] (12/29/2015)      UTI (urinary tract infection) (3/3/2017)      Severe thrombocytopenia ([facility] (3/4/2017)  dvt .   S/p ivc filter placement       Plan: need to explore possibility of sending the pt home

## 2017-03-22 NOTE — PROGRESS NOTES
0725 -- Bedside and Verbal shift change report given to [address] (oncoming nurse) by Linda Servin RN (offgoing nurse). Report included the following information SBAR, Procedure Summary, Intake/Output, MAR, Recent Results. Bed locked and lowered, siderails up x3. Whiteboard updated with information. Call bell at bedside, will continue to monitor. Unable to weigh pt, option is not available on bed.    0925 -- Morning medications administered, pt tolerated with ease. 1155 -- IDR completed, plan is to contact Vascular for possible filter insertion, pt has R leg DVT. 1202  -- Shift reassessment, pt condition unchanged will continue to monitor. PRN pain medication administered, will continue to monitor.       1535 -- Shift reassessment, pt condition unchanged will continue to monitor. 1757 -- PRN pain medication administered, will continue ti monitor.      1900  -- Bedside and Verbal shift change report given to [address] (oncoming nurse) by Gerardo Ruiz RN (offgoing nurse) Report included the following information SBAR, Procedure Summary, Intake/Output, MAR, Recent Results. Pt up in bed, no indication of pain or acute distress. Bed locked and lowered, siderails up x3. Call bell at bedside.

## 2017-03-22 NOTE — ROUTINE PROCESS
1927: Assumed care. Behavior appropriate to situation. Assessment done. Denies any pain or discomfort at this time. Call light within reach. 2025: Incontinence care provided. Mepilex dressing to sacral & right thigh soiled. Changed. Will continue to monitor. 2214: Due meds given. 2230: No change from previous assessment. 2304: Medicated for generalized pain per patient request. Will continue to monitor. 8189: Due med given. 0220: No change from previous assessment. 3533: Incontinence care provided. Mepilex dressing to sacral & right thigh soiled. Changed. 0645: Slept on & off thru night. Needs attended. Kept dry & warm. 0720: Bedside and Verbal shift change report given to Gabe Valdez RN (oncoming nurse) by Julieta Israel RN (offgoing nurse). Report included the following information SBAR, Kardex, Intake/Output, MAR and Recent Results   3726: Right mediport dressing changed & labeled.

## 2017-03-22 NOTE — MANAGEMENT PLAN
Discharge Plan    Spoke with patient about discharge planning. Her preference for discharge is to return home and she confirmed she has family member present all the time and hospital bed, other equipment at home. Equipment may be from Hospice and need to be replaced with new equipment.  Plan will need to be made if pt to be on long term antibiotics      Pamela Monaco RN BSN  Outcomes Manager  Pager # [phone]

## 2017-03-22 NOTE — PROGRESS NOTES
Problem: Dysphagia (Adult)  Goal: *Acute Goals and Plan of Care (Insert Text)  Recommendations:  Diet: mech-soft, chopped/honey-thick liquids (cup sips)  Meds: one at a time in pudding  Aspiration Precautions  Oral Care TID  Other: MBS completed with aspiration on thin and nectar    Goals: Patient will:  1. Tolerate PO trials with 0 s/s overt distress in 4/5 trials  2. Utilize compensatory swallow strategies/maneuvers (decrease bite/sip, size/rate, alt. liq/sol) with min cues in 4/5 trials  3. Perform oral-motor/laryngeal exercises to increase oropharyngeal swallow function with min cues  4. Complete an objective swallow study (i.e., MBSS) to assess swallow integrity, r/o aspiration, and determine of safest LRD, min A - goal met 3/21/17     Outcome: Progressing Towards Goal  SPEECH LANGUAGE PATHOLOGY DYSPHAGIA TREATMENT     Patient: Glendy Alexander (24 y.o. female)  Date: 3/22/2017  Diagnosis: Severe sepsis with septic shock (CODE) (HCC)  UTI (urinary tract infection)  Severe sepsis with septic shock (CODE) (HCC)  Severe thrombocytopenia (HCC)  mucous plugs  mucous plugs Severe sepsis with septic shock (CODE) (HCC)  Procedure(s) (LRB):  BRONCHOSCOPY (N/A) 6 Days Post-Op  Precautions: aspiration Fall, Contact      ASSESSMENT:  Pt seen for skilled meal assessment this pm. Pt A&Ox4. 0 s/sx aspiration across mech-soft/honey thick liquid presentations. Pt accepted mech-soft/HTL via cup sip and tsp presentation with delayed laryngeal elevation to palpation. Recommend continuation of mech-soft/HTL diet with assistance. D/w SHELLY Melendez.   Progression toward goals:  [X]         Improving appropriately and progressing toward goals  [ ]         Improving slowly and progressing toward goals  [ ]         Not making progress toward goals and plan of care will be adjusted       PLAN:  Recommendations and Planned Interventions:  mech-soft/HTL diet with assistance  Patient continues to benefit from skilled intervention to address the above impairments. Continue treatment per established plan of care. Discharge Recommendations:  Skilled Nursing Facility       SUBJECTIVE:   Patient stated I love water. OBJECTIVE:   Cognitive and Communication Status:  Neurologic State: Alert  Orientation Level: Oriented X4  Cognition: Follows commands  Perception: Appears intact  Perseveration: No perseveration noted  Safety/Judgement: Fall prevention, Awareness of environment  Dysphagia Treatment:  Oral Assessment:  Oral Assessment  Labial: No impairment  Dentition: Intact  Oral Hygiene: good  Lingual: No impairment  Velum: No impairment  Mandible: No impairment  P.O.  Trials:              Patient Position: [id number]              Vocal quality prior to P.O.: No impairment              Consistency Presented: Honey thick liquid, Mechanical soft              How Presented: Self-fed/presented, Spoon              How Much: 8              Bolus Acceptance: No impairment              Bolus Formation/Control: No impairment              Type of Impairment: Delayed              Propulsion: No impairment              Oral Residue: None              Initiation of Swallow: Delayed (# of seconds)              Laryngeal Elevation: Decreased              Aspiration Signs/Symptoms: None              Pharyngeal Phase Characteristics: Poor endurance              Effective Modifications: Small sips and bites              Cues for Modifications: Minimal                                Oral Phase Severity: No impairment              Pharyngeal Phase Severity : Moderate                PAIN:  Start of Tx: 0  End of Tx: 0      After treatment:   [ ]              Patient left in no apparent distress sitting up in chair  [X]              Patient left in no apparent distress in bed  [X]              Call bell left within reach  [X]              Nursing notified  [ ]              Family present  [ ]              Caregiver present  [ ]              Bed alarm activated COMMUNICATION/EDUCATION:   [X]        Aspiration precautions; swallow safety; compensatory techniques  [ ]        Patient unable to participate in education; education ongoing with staff  [ ]         Posted safety precautions in patient's room.   [ ]         Oral-motor/laryngeal strengthening exercises        Irina Garza   SLP Intern     Time Calculation: 16 mins

## 2017-03-22 NOTE — PROGRESS NOTES
Problem: Self Care Deficits Care Plan (Adult)  Goal: *Acute Goals and Plan of Care (Insert Text)  Occupational Therapy Goals  Initiated 3/20/2017 within 7 day(s). 1. Patient will perform grooming tasks at bed level with minimal assistance/contact guard assist   2. Patient will perform self-feeding with minimal assistance/contact guard assist.  3. Patient will perform bathing at bed level with moderate assistance . 4. Patient will perform functional task at bed level to increase endurance and strength for increased participation in ADLs. 5. Patient will participate in upper extremity therapeutic exercise/activities with minimal assistance/contact guard assist for 8 minutes to increase BUE strength for self care tasks. 6. Patient will utilize energy conservation techniques during functional activities with verbal cues. Outcome: Progressing Towards Goal  OCCUPATIONAL THERAPY TREATMENT     Patient: Brianna Frank (81 y.o. female)  Date: 3/22/2017  Diagnosis: Severe sepsis with septic shock (CODE) ([facility])  UTI (urinary tract infection)  Severe sepsis with septic shock (CODE) ([facility])  Severe thrombocytopenia (HCC)  mucous plugs  mucous plugs Severe sepsis with septic shock (CODE) ([facility])  Procedure(s) (LRB):  BRONCHOSCOPY (N/A) 6 Days Post-Op  Precautions: Fall, Contact  Chart, occupational therapy assessment, plan of care, and goals were reviewed. ASSESSMENT:  Pt is pleasant, requires encouragement for participation. CNA finishing ADL upon entry. LUE remains edematous, provided retrograde massage and elevation at end of session. Pt improving w/BUE ROM as evidenced by pt performing self-feeding 3 trails taking sips of HTL from spoon. Pt requires assist to achieve full ROM w/UE TherEx.   EDUCATION Pt educated on importance of UE TherEx and encouraged to perform throughout the day  Progression toward goals:  [ ]          Improving appropriately and progressing toward goals  [X]          Improving slowly and progressing toward goals  [ ]          Not making progress toward goals and plan of care will be adjusted       PLAN:  Patient continues to benefit from skilled intervention to address the above impairments. Continue treatment per established plan of care. Discharge Recommendations:  Skilled Nursing Facility/LTC  Further Equipment Recommendations for Discharge:  Per EMR, pt has DME       SUBJECTIVE:   Patient stated I hurt all over honey.       OBJECTIVE DATA SUMMARY:         Cognitive/Behavioral Status:  Neurologic State: Alert  Orientation Level: Oriented X4  Cognition: Appropriate for age attention/concentration, Follows commands  Safety/Judgement: Fall prevention, Awareness of environment  Functional Mobility and Transfers for ADLs:  ADL Intervention:  Feeding  Container Management: Total assistance (dependent)  Utensil Management: Stand-by assistance  Drink to Mouth: Contact guard assistance (HTL via spoon to mouth)     Cognitive Retraining  Safety/Judgement: Fall prevention; Awareness of environment     Therapeutic Exercises:   PROM > AAROM BUE shoulder flexion/extension  AAROM > AROM BUE elbow flexion/extension     Pain:  Pre Treatment:9  Post Treatment:9  Pt c/o generalized pain w/minimal touch/movement     Activity Tolerance:    Poor     Please refer to the flowsheet for vital signs taken during this treatment.   After treatment:   [ ]  Patient left in no apparent distress sitting up in chair  [X]  Patient left in no apparent distress in bed  [X]  Call bell left within reach  [X]  Nursing notified  [ ]  Caregiver present  [ ]  Bed alarm activated     GRACIE Singh  Time Calculation: 23 mins

## 2017-03-22 NOTE — PROGRESS NOTES
[facility], [address] [place] [address]  3/22/2017    Progress Note    Assessment:     Ara Martinez is a 49 y.o. female with:   Patient Active Problem List   Diagnosis Code    Anemia D64.9    Anxiety F41.9    Nausea and vomiting R11.2    Morbid obesity ([facility] E66.01    Chronic pain syndrome G89.4    History of ovarian cancer Z85.43    History of gastric bypass Z98.890    CAD (coronary artery disease) I25.10    History of Clostridium difficile Z87.19    History of endometrial cancer Z85.42    History of GI bleed Z87.19    Lymphedema I89.0    Hypokalemia E87.6    DEV (acute kidney injury) ([facility] N17.9    Pulmonary nodules R91.8    A-fib ([facility]) I48.91    Aortic stenosis I35.0    GI bleed K92.2    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 6.1 on admission, 1.2 (baseline)     Plan:   When more stable we can consider ureteral stent exchange bilaterally. Creat better than baseline now. Patient with DVT needs IVC    Will see Monday, call with questions          Subjective:     No acute  changes / events.        Objective:     Admit weight: Weight: 270 lb (122.5 kg)  Last recorded weight: Weight: 231 lb 7.7 oz (105 kg)    Temp (24hrs), Av.4 °F (36.9 °C), Min:97.8 °F (36.6 °C), Max:99.3 °F (37.4 °C)    Physical Exam:    GEN: NAD, Nonlabored, A&O x3  ABD: Soft, NT, ND, No CVAT Bilaterally  : Duarte - clear    Diagnostics:      Recent Results (from the past 24 hour(s))   MAGNESIUM    Collection Time: 17  9:45 AM   Result Value Ref Range Magnesium 1.4 (L) 1.8 - 2.4 mg/dL   CBC WITH AUTOMATED DIFF    Collection Time: 03/22/17  9:45 AM   Result Value Ref Range    WBC 7.2 4.6 - 13.2 K/uL    RBC 2.93 (L) 4.20 - 5.30 M/uL    HGB 8.2 (L) 12.0 - 16.0 g/dL    HCT 27.3 (L) 35.0 - 45.0 %    MCV 93.2 74.0 - 97.0 FL    MCH 28.0 24.0 - 34.0 PG    MCHC 30.0 (L) 31.0 - 37.0 g/dL    RDW 17.5 (H) 11.6 - 14.5 %    PLATELET 998 501 - 669 K/uL    MPV 10.5 9.2 - 11.8 FL    NEUTROPHILS 79 (H) 40 - 73 %    LYMPHOCYTES 16 (L) 21 - 52 %    MONOCYTES 4 3 - 10 %    EOSINOPHILS 1 0 - 5 %    BASOPHILS 0 0 - 2 %    ABS. NEUTROPHILS 5.7 1.8 - 8.0 K/UL    ABS. LYMPHOCYTES 1.1 0.9 - 3.6 K/UL    ABS. MONOCYTES 0.3 0.05 - 1.2 K/UL    ABS. EOSINOPHILS 0.0 0.0 - 0.4 K/UL    ABS. BASOPHILS 0.0 0.0 - 0.06 K/UL    DF AUTOMATED     CALCIUM, IONIZED    Collection Time: 03/22/17  9:45 AM   Result Value Ref Range    Ionized Calcium 1.09 (L) 1.12 - 1.32 MMOL/L   PHOSPHORUS    Collection Time: 03/22/17  9:45 AM   Result Value Ref Range    Phosphorus 2.0 (L) 2.5 - 4.9 MG/DL   METABOLIC PANEL, COMPREHENSIVE    Collection Time: 03/22/17  9:45 AM   Result Value Ref Range    Sodium 143 136 - 145 mmol/L    Potassium 3.0 (L) 3.5 - 5.5 mmol/L    Chloride 101 100 - 108 mmol/L    CO2 31 21 - 32 mmol/L    Anion gap 11 3.0 - 18 mmol/L    Glucose 113 (H) 74 - 99 mg/dL    BUN 27 (H) 7.0 - 18 MG/DL    Creatinine 0.85 0.6 - 1.3 MG/DL    BUN/Creatinine ratio 32 (H) 12 - 20      GFR est AA >60 >60 ml/min/1.73m2    GFR est non-AA >60 >60 ml/min/1.73m2    Calcium 8.3 (L) 8.5 - 10.1 MG/DL    Bilirubin, total 0.4 0.2 - 1.0 MG/DL    ALT (SGPT) 22 13 - 56 U/L    AST (SGOT) 20 15 - 37 U/L    Alk.  phosphatase 163 (H) 45 - 117 U/L    Protein, total 6.7 6.4 - 8.2 g/dL    Albumin 3.0 (L) 3.4 - 5.0 g/dL    Globulin 3.7 2.0 - 4.0 g/dL    A-G Ratio 0.8 0.8 - 1.7       Jace Johnson MD

## 2017-03-22 NOTE — PROGRESS NOTES
INFECTIOUS DISEASE FOLLOW UP NOTE :    Admit Date: 3/3/2017    Current  Prior    Daptomycin 3/8-14 linezolid 3/4 - 3, vanc/zosyn/levoflox 3/3 -0   aztreonam/anidulafungin 3/12-8     ASSESSMENT: -> RECS     Sepsis POA  - fever 102 , leukocytosis 28K, tachycardia, hypotension  - source VRE UTI and BSI or DVT  -allergic allergic reaction and IE also in DDX  - WBC better 42->>7k today   - Fever improved past 24h, nl WBC- ? Drug with aztreonam/anidulangin stopped 3/20; ddx includes DVT, atelectasis, ??tumor ->monitor on current therapy  - prognosis remains guarded    Resp failure NTB 3/11  -initial cxr nad ->white out L s/o mucus plug, SP bronch 3/13 extensive L mucus plugging  - sputum cx C.albicans   - extubated 3/18< MBS aspiration risk -> per PCCM   VRE BSI with ?aortic endocarditis  - blcx 3/3 2/2 VRE positive   - repeat 3/6 ntd   - TTE aortic stenosis with possible aortic vegetation [ chronic mixed calcified density ]  - source likely UTI; pt refused ESSIE and removal of mediport  - 3/8 bctx's ng, 3/12 ng x2, 3/17 ngtd x 2  - CPK 3/18 128 -> Cont Daptomycin to complete 6 weeks.  -> weekly CK on dapto- next ordered for Friday 3/24     VRE UTI, complicated   - UA with large LE , wbc tntc, jamar 4+  - ucx >100k VRE  - CT with no hydronephrosis but has b/l ureteral stents  - appreciate urology consult and eventual plan for stent exchange/removal   - persistent sterile pyuria -> abx as above  -> stent mgmt per      ADR - Rash over abdomen/chest   - secondary to linezolid which has been listed from before as allergy - rash improved off linezolid  -> monitor   Lymphedema with R medial thigh blister- not currently infected appearing  -massive pannus with intertrigo  -> topical therapy p recent systemic antifungal   RLE subacute DVT  - per primary team   Rt pelvic mass ? ovarian ca / endometrial ca with mets   - palliative care was involved , was hospice at home and now revoked  - may need to re involve palliative as prognosis guarded    DEV due to sepsis - better, Cr 1.9->0.9 today     Thrombocytopenia -better 94-> 111 ->monitor   Blood loss anemia        MICROBIOLOGY:   3/3  Blcx x 2 - 2/2 VRE S gent, linezolid , daptomycin quinupristin         Ucx > 100K VRE  3/4 Flu Ag test neg   3/6  Blcx x 2 ng  3/8  Blcx x 2 ng   3/11 uctx ng  3/12 bctx x 2 ng   sput C albicans  3/14     ucx ng  3/17 blcx ngtd   spcx rare C albicans   uctx ng    LINES AND CATHETERS:   Rt chest mediport     SUBJECTIVE :     Interval notes reviewed. Management of DVT being determined. Pt thinks redness  R arm from BP cuff, denied sob or fever, d/w her T better of anidulafungin and aztreonam suggestive of drug fever, though atelect, DVT in ddx, wbc remains nl along with recent neg bctx's again new infection now.   D/w her monitoring on daptomycin for now, will repeat CK Friday, was fine 3/18    MEDICATIONS :     Current Facility-Administered Medications   Medication Dose Route Frequency    DAPTOmycin (CUBICIN) 630 mg in 0.9% sodium chloride 50 mL IVPB RF formulation  6 mg/kg IntraVENous Q24H    fentaNYL citrate (PF) injection 25-50 mcg  25-50 mcg IntraVENous Q2H PRN    enoxaparin (LOVENOX) injection 40 mg  40 mg SubCUTAneous Q12H    potassium chloride (KLOR-CON) packet 20 mEq  20 mEq Per NG tube DAILY    0.9% sodium chloride infusion 250 mL  250 mL IntraVENous PRN    chlorhexidine (PERIDEX) 0.12 % mouthwash 10 mL  10 mL Oral Q12H    acetaminophen (TYLENOL) solution 650 mg  650 mg Oral Q4H PRN    furosemide (LASIX) injection 40 mg  40 mg IntraVENous Q12H    albuterol-ipratropium (DUO-NEB) 2.5 MG-0.5 MG/3 ML  3 mL Nebulization Q6H RT    dextrose 5% lactated ringers infusion  50 mL/hr IntraVENous CONTINUOUS    pantoprazole (PROTONIX) 40 mg in sodium chloride 0.9 % 10 mL injection  40 mg IntraVENous DAILY    sodium chloride (NS) flush 5-10 mL  5-10 mL IntraVENous Q8H    sodium chloride (NS) flush 5-10 mL  5-10 mL IntraVENous PRN    nystatin (MYCOSTATIN) 100,000 unit/gram powder   Topical BID    ELECTROLYTE REPLACEMENT PROTOCOL  1 Each Other PRN    ELECTROLYTE REPLACEMENT PROTOCOL  1 Each Other PRN    ELECTROLYTE REPLACEMENT PROTOCOL  1 Each Other PRN    ELECTROLYTE REPLACEMENT PROTOCOL  1 Each Other PRN    sodium bicarbonate tablet 650 mg  650 mg Oral TID    bisacodyl (DULCOLAX) suppository 10 mg  10 mg Rectal DAILY PRN    glucose chewable tablet 16 g  16 g Oral PRN    glucagon (GLUCAGEN) injection 1 mg  1 mg IntraMUSCular PRN    dextrose (D50W) injection syrg 12.5-25 g  25-50 mL IntraVENous PRN    ondansetron (ZOFRAN) injection 4 mg  4 mg IntraVENous Q4H PRN       OBJECTIVE :     Visit Vitals    /82    Pulse 90    Temp 98.5 °F (36.9 °C)    Resp 20    Ht 5' 4\" (1.626 m)    Wt 105 kg (231 lb 7.7 oz)    SpO2 100%    BMI 39.73 kg/m2       Temp (24hrs), Av.4 °F (36.9 °C), Min:97.8 °F (36.6 °C), Max:99.3 °F (37.4 °C)    GEN - morbidly obese, ill appearing WF appears older than stated age lying comfortably in bed, no family at bedside  HEENT - conj pale no thrush lips cracked  NECK- R mediport accessed no LAD   RESP- coarse bs few scattered rhochi  CVS- tachy III/VI systolic murmur   ABD- massive pannus, large area of ecchymosis at lower abdominal fold fragile skin, bs present  EXT- (+) edema   SKIN - rash over abdomen resolved, large pannus of lower abdomen with hematoma of skin and ulcer, excoriated R ant thigh.   R upper arm red  NEURO - awake following commands and nodding appropriately      Labs: Results:   Chemistry Recent Labs      17   0945  17   0440  17   0500   GLU  113*  116*  103*   NA  143  142  141   K  3.0*  3.3*  3.1*   CL  101  103  104   CO2  31  32  29   BUN  27*  29*  31*   CREA  0.85  0.93  0.83   CA  8.3*  8.4*  8.4*   AGAP  11  7  8   BUCR  32*  31*  37*   AP  163*  232*   --    TP  6.7  7.3   -- ALB  3.0*  3.4   --    GLOB  3.7  3.9   --    AGRAT  0.8  0.9   --       CBC w/Diff Recent Labs      03/22/17   0945  03/21/17   0440  03/20/17   0500   WBC  7.2  8.2  7.4   RBC  2.93*  3.14*  2.94*   HGB  8.2*  8.9*  8.3*   HCT  27.3*  28.9*  26.6*   PLT  175  177  151   GRANS  79*  76*  81*   LYMPH  16*  18*  13*   EOS  1  1  0        MBS 3/21 IMPRESSION:     Aspiration with cough reflex with thin consistencies. Airway penetration with  nectar consistencies. cxr 3/19 Impression:  Interval removal of the endotracheal and nasogastric tubes. Slightly less  conspicuous left basilar opacity, likely atelectasis or potentially infiltrate. CT abdo/pelvis 3/3 - IMPRESSION:  1. New cystic mass right adnexal region. Recurrent ovarian carcinoma cannot be  excluded. 2. New right middle lobe pulmonary nodule. Cannot rule out metastatic disease. 3. Bilateral ureteral stents with resolved hydronephrosis. 4. Stable or minimally increased left periaortic retroperitoneal adenopathy. 3/21 pvl Right Leg:-  Deep venous thrombosis: Yes  Proximal extent of thrombus: Common Femoral  Superficial venous thrombosis: Not examined  Deep venous insufficiency: Not examined  Superficial venous insufficiency: Not examined     Left Leg:-  Deep venous thrombosis: No  Superficial venous thrombosis: Not examined  Deep venous insufficiency: Not examined  Superficial venous insufficiency: Not examined    MAGNOLIA Chun MD  March 22, 2017  [facility] AT THE [facility]  [phone]

## 2017-03-23 NOTE — PROGRESS NOTES
Progress Note      Patient: Filemon Bruno               Sex: female          DOA: 3/3/2017       YOB: 1960      Age:  49 y.o.        LOS:  LOS: 20 days               Subjective: The pt has  An IVC filter in place and she has a dvt in the common femoral vein. the ct scan of the abdomen shows the presence of metastatic disease. the pt has endometrial cancer . She is on daptomycin for 6 weeks for endocarditis. .  .the problem is trying to find a place which will take the opt and also pay for the daptomycin . will discuss with dcp. the pt is ready to go to a snf or to home as the case may be. Pt has endometrial and ovarian primary cancer .   This is metastaatic        Objective:      Visit Vitals    /74 (BP 1 Location: Left arm, BP Patient Position: At rest)    Pulse 78    Temp 97.5 °F (36.4 °C)    Resp 18    Ht 5' 4\" (1.626 m)    Wt 105 kg (231 lb 7.7 oz)    SpO2 100%    BMI 39.73 kg/m2       Physical Exam:  Pt is awake and appears to bve comfortable   Heart reg rate and rhythm  Lungs fair breath sounds heard   Abdomen soft and obese   Neuro nonfocal      Lab/Data Reviewed:  CMP:   Lab Results   Component Value Date/Time     03/23/2017 10:35 AM    K 2.9 (LL) 03/23/2017 10:35 AM     03/23/2017 10:35 AM    CO2 33 (H) 03/23/2017 10:35 AM    AGAP 11 03/23/2017 10:35 AM    GLU 97 03/23/2017 10:35 AM    BUN 23 (H) 03/23/2017 10:35 AM    CREA 0.74 03/23/2017 10:35 AM    GFRAA >60 03/23/2017 10:35 AM    GFRNA >60 03/23/2017 10:35 AM    CA 7.9 (L) 03/23/2017 10:35 AM    MG 1.3 (L) 03/23/2017 10:35 AM    PHOS 2.2 (L) 03/23/2017 10:35 AM    ALB 3.0 (L) 03/23/2017 10:35 AM    TP 6.8 03/23/2017 10:35 AM    GLOB 3.8 03/23/2017 10:35 AM    AGRAT 0.8 03/23/2017 10:35 AM    SGOT 23 03/23/2017 10:35 AM    ALT 21 03/23/2017 10:35 AM     CBC:   Lab Results   Component Value Date/Time    WBC 6.9 03/23/2017 10:35 AM    HGB 8.2 (L) 03/23/2017 10:35 AM    HCT 27.0 (L) 03/23/2017 10:35 AM     03/23/2017 10:35 AM           Assessment/Plan     Principal Problem:    Severe sepsis with septic shock (CODE) ([facility] [address] (3/3/2017)  Pt has endocarditis   Active Problems:    History of ovarian cancer (3/18/2015) and endometrial cancer       Morbid obesity ([address] (10/2/2014)      CAD (coronary artery disease) (12/29/2015)      DEV (acute kidney injury) ([facility] [address] (12/29/2015)      A-fib ([facility] [address] (12/29/2015)      UTI (urinary tract infection) (3/3/2017)      Severe thrombocytopenia ([address] (3/4/2017)  S/p ivc filter.  She has a dvt in the right femoral vein      Plan:pt is on daptomycin for 6 weeks wilol need to place the pt

## 2017-03-23 NOTE — ROUTINE PROCESS
1930: Assumed care. Behavior appropriate to situation. Assessment done. Denies any pain or discomfort at this time. Call light within reach. Will continue to monitor. 2213: No change from previous assessment. 2246: Due meds given. 0103: Due meds given. Medicated for pain per patient request. Will continue to monitor. 0214: No change from previous assessment. 0996: Medicated for pain per patient request. Will continue to monitor. Incontinence care provided. 0645: Slept good thru night. Needs attended. 0745: Bedside and Verbal shift change report given to Cornelia Spatz, RN (oncoming nurse) by Clayton Arteaga RN (offgoing nurse). Report included the following information SBAR, Kardex, Intake/Output, MAR and Recent Results.

## 2017-03-23 NOTE — PROGRESS NOTES
1150 Critical result received from lab. Dr. Ernie Jones and the primary nurse caring for the patient made aware.

## 2017-03-23 NOTE — PROGRESS NOTES
5146 --  Bedside and Verbal shift change report given to Wilma. [address] (oncoming nurse) by Katie Vazquez RN (offgoing nurse). Report included the following information SBAR, Procedure Summary, Intake/Output, MAR, Recent Results. Bed locked and lowered, siderails up x3. Whiteboard updated with information. Call bell at bedside, will continue to monitor.       1021 -- Morning medications administered, pt tolerated with ease. 1150 -- Critical lab K+2.9 taken by Jimmy Bowen, she verbally informed MD Moody Garcia as we exited this room from rounding. Awaiting orders.     1207 -- Shift reassessment, pt condition unchanged will continue to monitor.       1210 -- IDR completed, concern is pt needs 6 weeks IV antibiotic which is expensive, she does not qualify for SNF perhaps Home Health is an option, pt states daughter is willing and able to care for her. Will continue to evaluate.     9649 -- Paged MD addressed Critical lab potassium, states he will place an order today. 1705 -- Shift reassessment, pt condition unchanged will continue to monitor. 1844 -- MD Moody Garcia order for Potassium.     1900 -- Bedside and Verbal shift change report given to Daniela BRAVO (oncoming nurse) by Nevin Valdez RN (offgoing nurse) Report included the following information SBAR, Procedure Summary, Intake/Output, MAR, Recent Results. Pt up in bed, no indication of pain or acute distress. Bed locked and lowered, siderails up x3. Call bell at bedside.

## 2017-03-23 NOTE — PROGRESS NOTES
INFECTIOUS DISEASE FOLLOW UP NOTE :    Admit Date: 3/3/2017    Current  Prior    Daptomycin 3/8-15 linezolid 3/4 - 3, vanc/zosyn/levoflox 3/3 -0   aztreonam/anidulafungin 3/12-8     ASSESSMENT: -> RECS     Sepsis POA  - fever 102 , leukocytosis 28K, tachycardia, hypotension  - source VRE UTI and BSI or DVT  -allergic allergic reaction and IE also in DDX  - WBC better 42->>7k today   - Fever improved past 48h, nl WBC- ? Drug with aztreonam/anidulangin stopped 3/20; ddx includes DVT, atelectasis, ??tumor ->monitor on current therapy    Resp failure NTB 3/11  -initial cxr nad ->white out L s/o mucus plug, SP bronch 3/13 extensive L mucus plugging  - sputum cx C.albicans   - extubated 3/18< MBS aspiration risk -> per PCCM   VRE BSI with ?aortic endocarditis  - blcx 3/3 2/2 VRE positive   - repeat 3/6 ntd   - TTE aortic stenosis with possible aortic vegetation [ chronic mixed calcified density ]  - source likely UTI; pt refused ESSIE and removal of mediport  - 3/8 bctx's ng, 3/12 ng x2, 3/17 ngtd x 2  - CPK 3/18 128 -> Cont Daptomycin to complete 6 weeks.  -> weekly CK on dapto- next ordered for Friday 3/24     VRE UTI, complicated   - UA with large LE , wbc tntc, jamar 4+  - ucx >100k VRE  - CT with no hydronephrosis but has b/l ureteral stents  - appreciate urology consult and eventual plan for stent exchange/removal   - persistent sterile pyuria -> abx as above  -> stent mgmt per      ADR - Rash over abdomen/chest   - secondary to linezolid which has been listed from before as allergy - rash improved off linezolid  -> monitor   Lymphedema with R medial thigh blister- not currently infected appearing  -massive pannus with intertrigo  -> topical therapy p recent systemic antifungal   RLE subacute DVT  - per primary team   Rt pelvic mass ? ovarian ca / endometrial ca with mets   - palliative care was involved , was hospice at home and now revoked  - may need to re involve palliative as prognosis guarded    DEV due to sepsis - better, Cr 1.9->0.9 today     Thrombocytopenia -better 94-> 111 ->monitor   Blood loss anemia        MICROBIOLOGY:   3/3  Blcx x 2 - 2/2 VRE S gent, linezolid , daptomycin quinupristin         Ucx > 100K VRE  3/4 Flu Ag test neg   3/6  Blcx x 2 ng  3/8  Blcx x 2 ng   3/11 uctx ng  3/12 bctx x 2 ng   sput C albicans  3/14     ucx ng  3/17 blcx ngtd   spcx rare C albicans   uctx ng    LINES AND CATHETERS:   Rt chest mediport     SUBJECTIVE :     Interval notes reviewed.  Arms less red, reports fatigued no fever breathing ok, d/w her last bctx ng final, monitoring on daptomycin for now, will repeat CK Friday, was fine 3/18    MEDICATIONS :     Current Facility-Administered Medications   Medication Dose Route Frequency    DAPTOmycin (CUBICIN) 630 mg in 0.9% sodium chloride 50 mL IVPB RF formulation  6 mg/kg IntraVENous Q24H    fentaNYL citrate (PF) injection 25-50 mcg  25-50 mcg IntraVENous Q2H PRN    enoxaparin (LOVENOX) injection 40 mg  40 mg SubCUTAneous Q12H    potassium chloride (KLOR-CON) packet 20 mEq  20 mEq Per NG tube DAILY    0.9% sodium chloride infusion 250 mL  250 mL IntraVENous PRN    chlorhexidine (PERIDEX) 0.12 % mouthwash 10 mL  10 mL Oral Q12H    acetaminophen (TYLENOL) solution 650 mg  650 mg Oral Q4H PRN    furosemide (LASIX) injection 40 mg  40 mg IntraVENous Q12H    albuterol-ipratropium (DUO-NEB) 2.5 MG-0.5 MG/3 ML  3 mL Nebulization Q6H RT    dextrose 5% lactated ringers infusion  50 mL/hr IntraVENous CONTINUOUS    pantoprazole (PROTONIX) 40 mg in sodium chloride 0.9 % 10 mL injection  40 mg IntraVENous DAILY    sodium chloride (NS) flush 5-10 mL  5-10 mL IntraVENous Q8H    sodium chloride (NS) flush 5-10 mL  5-10 mL IntraVENous PRN    nystatin (MYCOSTATIN) 100,000 unit/gram powder   Topical BID    ELECTROLYTE REPLACEMENT PROTOCOL  1 Each Other PRN    ELECTROLYTE REPLACEMENT PROTOCOL  1 Each Other PRN    ELECTROLYTE REPLACEMENT PROTOCOL  1 Each Other PRN    ELECTROLYTE REPLACEMENT PROTOCOL  1 Each Other PRN    sodium bicarbonate tablet 650 mg  650 mg Oral TID    bisacodyl (DULCOLAX) suppository 10 mg  10 mg Rectal DAILY PRN    glucose chewable tablet 16 g  16 g Oral PRN    glucagon (GLUCAGEN) injection 1 mg  1 mg IntraMUSCular PRN    dextrose (D50W) injection syrg 12.5-25 g  25-50 mL IntraVENous PRN    ondansetron (ZOFRAN) injection 4 mg  4 mg IntraVENous Q4H PRN       OBJECTIVE :     Visit Vitals    /74 (BP 1 Location: Left arm, BP Patient Position: At rest)    Pulse 78    Temp 97.5 °F (36.4 °C)    Resp 18    Ht 5' 4\" (1.626 m)    Wt 105 kg (231 lb 7.7 oz)    SpO2 100%    BMI 39.73 kg/m2       Temp (24hrs), Av.2 °F (36.8 °C), Min:97.5 °F (36.4 °C), Max:98.8 °F (37.1 °C)    GEN - morbidly obese, ill appearing WF appears older than stated age lying comfortably in bed, no family at bedside  HEENT - conj pale no thrush lips cracked  NECK- R mediport accessed no LAD   RESP- coarse bs few scattered rhochi  CVS- tachy III/VI systolic murmur   ABD- massive pannus, large area of ecchymosis at lower abdominal fold fragile skin, bs present  EXT- (+) edema   SKIN - rash over abdomen resolved, large pannus of lower abdomen with hematoma of skin and ulcer, excoriated R ant thigh.   R upper arm less red  NEURO - awake answers questions seems appropriately      Labs: Results:   Chemistry Recent Labs      17   0945  17   0440   GLU  113*  116*   NA  143  142   K  3.0*  3.3*   CL  101  103   CO2  31  32   BUN  27*  29*   CREA  0.85  0.93   CA  8.3*  8.4*   AGAP  11  7   BUCR  32*  31*   AP  163*  232*   TP  6.7  7.3   ALB  3.0*  3.4   GLOB  3.7  3.9   AGRAT  0.8  0.9      CBC w/Diff Recent Labs      17   1035  17   0945  17   0440   WBC  6.9  7.2  8.2   RBC  2.88*  2.93*  3.14*   HGB  8.2*  8.2*  8.9*   HCT  27.0*  27.3*  28.9*   PLT  182  175  177 GRANS  84*  79*  76*   LYMPH  11*  16*  18*   EOS  1  1  1        MBS 3/21 IMPRESSION:     Aspiration with cough reflex with thin consistencies. Airway penetration with  nectar consistencies. cxr 3/19 Impression:  Interval removal of the endotracheal and nasogastric tubes. Slightly less  conspicuous left basilar opacity, likely atelectasis or potentially infiltrate. CT abdo/pelvis 3/3 - IMPRESSION:  1. New cystic mass right adnexal region. Recurrent ovarian carcinoma cannot be  excluded. 2. New right middle lobe pulmonary nodule. Cannot rule out metastatic disease. 3. Bilateral ureteral stents with resolved hydronephrosis. 4. Stable or minimally increased left periaortic retroperitoneal adenopathy. 3/21 pvl Right Leg:-  Deep venous thrombosis: Yes  Proximal extent of thrombus: Common Femoral  Superficial venous thrombosis: Not examined  Deep venous insufficiency: Not examined  Superficial venous insufficiency: Not examined     Left Leg:-  Deep venous thrombosis: No  Superficial venous thrombosis: Not examined  Deep venous insufficiency: Not examined  Superficial venous insufficiency: Not examined    MAGNOLIA Owens MD  March 23, 2017  [facility] AT THE [facility]  [phone]

## 2017-03-23 NOTE — PROGRESS NOTES
Notified by [facility] rep, Brad, the pt has 100% coverage for home IV ABX. Cheril Seip notified.

## 2017-03-23 NOTE — PROGRESS NOTES
Pt to need 6 weeks of IV antibiotic, pt with [place] [employer], will explore cost of home infusion with that infusion.

## 2017-03-23 NOTE — PROGRESS NOTES
Chart reviewed. Pt prefers to go home after dc. Therapy recommended LTC. Plan is not yet set. Will continue to monitor.

## 2017-03-23 NOTE — PROGRESS NOTES
Plan:  To provide an enjoyable diversion. Implementation:  Provided live bedside harp music, varied styles of music  . Evaluation:  Patient smiling and chatting, much more alert than my last visit. States thanks for music.

## 2017-03-24 NOTE — MANAGEMENT PLAN
Spoke with Dr Hayde Anton about medical transportation not being able to get pt till 12pm tomorrow. Discharge will be tomorrow morning. Daughter, Marvine Pilon called back and was notified. Patient aware. Was notified after transportation set that [facility] is unable to see pt till Monday and has no openings to outsource. If pt does discharge tomorrow, will get dose of antibiotic in hospital and will miss dose of antibiotic on Sunday.       Yuliana Alas RN BSN  Outcomes Manager  Pager # [phone]

## 2017-03-24 NOTE — PROGRESS NOTES
NUTRITION follow-up/Plan of care    RECOMMENDATIONS:     1. Dental Soft diet; chopped meats; HTL per SLP  2. SF CIB TID  3. Monitor weight, labs and PO intake  4. RD to follow    GOALS:     1. Ongoing: PO intake meets >75% of protein/calorie needs by 3/29  2. Ongoing: Maintain weight/Gradual weight loss ( 1-2 lb) by 3/31    ASSESSMENT:     Weight status is classified as obese per BMI of 39.7. PO intake is poor. Added CIB TID for additional calories/protein. Labs noted. Nutrition recommendations listed. RD to follow. Nutrition Risk:  []   High [x]  Moderate [] Low    SUBJECTIVE/OBJECTIVE:     Transferred from ICU. Stage 2 sacral pressure ulcer per nursing documentation. S/P MBS on 3/21. Reviewed SLP recommendations. Patient with poor PO intake. Observed less than 25% intake of lunch meal. Will monitor. Information Obtained From:   [x] Chart Review  [x] Patient  [] Family/Caregiver  [] Nurse/Physician   [] Patient Rounds/Interdisciplinary Meeting    Diet: Dental Soft diet; chopped meats; HTL per SLP  Patient Vitals for the past 100 hrs:   % Diet Eaten   03/23/17 1533 0 %   03/23/17 1004 0 %   03/22/17 1851 0 %   03/22/17 1034 0 %   03/20/17 1018 10 %     Medications: [x] Reviewed   Encounter Diagnoses     ICD-10-CM ICD-9-CM   1. Septic shock (HCC) A41.9 038.9    R65.21 785.52     995.92   2. Acute renal failure, unspecified acute renal failure type ([facility] N17.9 584.9   3. Acute cystitis with hematuria N30.01 595.0   4.  High anion gap metabolic acidosis W12.8 763.1     Past Medical History:   Diagnosis Date    Anxiety and depression     Aortic stenosis     Arthritis     Arthropathy     Cardiac disorder     Cellulitis     left leg    Chronic pain     Endometrial adenocarcinoma (HCC)     Gastric ulcer     H/O ovarian cancer     Hematemesis     Infection     Lymphedema     Munchausen syndrome     Narcotic dependence ([facility]     Obesity     Spinal stenosis     Status post partial hysterectomy     Urethral obstruction      Labs:    Lab Results   Component Value Date/Time    Sodium 144 03/24/2017 04:00 AM    Potassium 3.0 03/24/2017 04:00 AM    Chloride 102 03/24/2017 04:00 AM    CO2 36 03/24/2017 04:00 AM    Anion gap 6 03/24/2017 04:00 AM    Glucose 93 03/24/2017 04:00 AM    BUN 25 03/24/2017 04:00 AM    Creatinine 0.78 03/24/2017 04:00 AM    Calcium 8.1 03/24/2017 04:00 AM    Magnesium 1.5 03/24/2017 04:00 AM    Phosphorus 2.1 03/24/2017 04:00 AM    Albumin 2.9 03/24/2017 04:00 AM     Anthropometrics: BMI (calculated): 39.7   Last 3 Recorded Weights in this Encounter    03/18/17 0000 03/19/17 0135 03/20/17 0256   Weight: 114.2 kg (251 lb 12.3 oz) 110.4 kg (243 lb 6.2 oz) 105 kg (231 lb 7.7 oz)      Ht Readings from Last 1 Encounters:   03/06/17 5' 4\" (1.626 m)     [x]  Weight Loss (on Lasix)  []  Weight Gain  []  Weight Stable   []  New wt n/a on record     Estimated Nutrition Needs:   1480 Kcals/day  Protein (g): 70 g    Nutrition Problems Identified:   [x] Suboptimal PO intake   [] Food Allergies  [x] Difficulty chewing/swallowing/poor dentition  [] Constipation/Diarrhea   [] Nausea/Vomiting   [] None  [] Other:     Plan:   [] Therapeutic Diet  []  Obtained/adjusted food preferences/tolerances and/or snacks options   [x]  Supplements added   [x] Occupational therapy following for feeding techniques  []  HS snack added   [x]  Modify diet texture   []  Modify diet for food allergies   []  Assist with menu selection   [x]  Monitor PO intake on meal rounds   [x]  Continue inpatient monitoring and intervention   []  Participated in discharge planning/Interdisciplinary rounds/Team meetings   []  Other:     Education Needs:   [x] Not appropriate for teaching at this time    [] Identified and addressed    Nutrition Monitoring and Evaluation:   [x] Continue inpatient monitoring and interventions    [] Other:     Rafal Reaves

## 2017-03-24 NOTE — PROGRESS NOTES
Progress Note      Patient: Emily Matos               Sex: female          DOA: 3/3/2017       YOB: 1960      Age:  49 y.o.        LOS:  LOS: 21 days               Subjective: Attempts were made to send the pt home today but no transportation could be found . she will be on 6 weeks of daptomycin      Objective:      Visit Vitals    /77 (BP 1 Location: Right arm, BP Patient Position: At rest)    Pulse 82    Temp 97.8 °F (36.6 °C)    Resp 18    Ht 5' 4\" (1.626 m)    Wt 105 kg (231 lb 7.7 oz)    SpO2 100%    BMI 39.73 kg/m2       Physical Exam:  Pt is awake . she does not appear to be in distress   Heart  Reg rate and rhythm   Lungs fair breath sounds heard a  Abdomen soft and no pain on palpation  Neuro intact       Lab/Data Reviewed:  CMP:   Lab Results   Component Value Date/Time     03/24/2017 04:00 AM    K 3.0 (L) 03/24/2017 04:00 AM     03/24/2017 04:00 AM    CO2 36 (H) 03/24/2017 04:00 AM    AGAP 6 03/24/2017 04:00 AM    GLU 93 03/24/2017 04:00 AM    BUN 25 (H) 03/24/2017 04:00 AM    CREA 0.78 03/24/2017 04:00 AM    GFRAA >60 03/24/2017 04:00 AM    GFRNA >60 03/24/2017 04:00 AM    CA 8.1 (L) 03/24/2017 04:00 AM    MG 1.5 (L) 03/24/2017 04:00 AM    PHOS 2.1 (L) 03/24/2017 04:00 AM    ALB 2.9 (L) 03/24/2017 04:00 AM    TP 6.7 03/24/2017 04:00 AM    GLOB 3.8 03/24/2017 04:00 AM    AGRAT 0.8 03/24/2017 04:00 AM    SGOT 26 03/24/2017 04:00 AM    ALT 22 03/24/2017 04:00 AM     CBC:   Lab Results   Component Value Date/Time    WBC 6.9 03/24/2017 04:00 AM    HGB 8.3 (L) 03/24/2017 04:00 AM    HCT 27.4 (L) 03/24/2017 04:00 AM     03/24/2017 04:00 AM           Assessment/Plan     Principal Problem:    Severe sepsis with septic shock (CODE) ([facility] (3/3/2017)    Active Problems:    History of ovarian cancer (3/18/2015)      Morbid obesity ([facility] (10/2/2014)      CAD (coronary artery disease) (12/29/2015)      DEV (acute kidney injury) ([facility] (12/29/2015)      A-fib ([facility] (12/29/2015)      UTI (urinary tract infection) (3/3/2017)      Severe thrombocytopenia (HCC) (3/4/2017)        Plan:will send the pt home in the am with daptomycin

## 2017-03-24 NOTE — PROGRESS NOTES
Care Management Interventions  PCP Verified by CM: Yes  Palliative Care Consult (Criteria: CHF and RRAT>21): No  Reason for No Palliative Care Consult: Patient declined palliative services at this time  Mode of Transport at Discharge: Other (see comment) ([facility] cab)  Transition of Care Consult (CM Consult):  Other, Home Health (home)  [facility] - INPATIENT: No  Reason Outside [place]: Managed care specific requirement ([address] [facility])  [place] Book: No  Discharge Durable Medical Equipment: No  Physical Therapy Consult: No  Occupational Therapy Consult: No  Speech Therapy Consult: No  Current Support Network: Relative's Home, Lives with Caregiver (live with daughter and her boyfriend)  Confirm Follow Up Transport: [facility] discussed with Pt/Family/Caregiver: Yes  Discharge Location  Discharge Placement: Home with home health

## 2017-03-24 NOTE — PROGRESS NOTES
Problem: Self Care Deficits Care Plan (Adult)  Goal: *Acute Goals and Plan of Care (Insert Text)  Occupational Therapy Goals  Initiated 3/20/2017 within 7 day(s). 1. Patient will perform grooming tasks at bed level with minimal assistance/contact guard assist   2. Patient will perform self-feeding with minimal assistance/contact guard assist.  3. Patient will perform bathing at bed level with moderate assistance . 4. Patient will perform functional task at bed level to increase endurance and strength for increased participation in ADLs. 5. Patient will participate in upper extremity therapeutic exercise/activities with minimal assistance/contact guard assist for 8 minutes to increase BUE strength for self care tasks. 6. Patient will utilize energy conservation techniques during functional activities with verbal cues. Outcome: Progressing Towards Goal  OCCUPATIONAL THERAPY TREATMENT     Patient: Dionna White (65 y.o. female)  Date: 3/24/2017  Diagnosis: Severe sepsis with septic shock (CODE) ([facility])  UTI (urinary tract infection)  Severe sepsis with septic shock (CODE) ([facility])  Severe thrombocytopenia (HCC)  mucous plugs  mucous plugs Severe sepsis with septic shock (CODE) ([facility])  Procedure(s) (LRB):  BRONCHOSCOPY (N/A) 8 Days Post-Op  Precautions: Fall, Contact  Chart, occupational therapy assessment, plan of care, and goals were reviewed. ASSESSMENT:  Pt appears brighter today and motivated for d/c home today. LUE remains edematous, performed retrograde massage and PROM TherEx. Pt educated on compensatory strategies w/ADL grooming tasks 2/2 decrease LUE ROM and poor FM strength/coordination. (see functional levels below). Pt performing simple ADL grooming tasks w/set-up and self-feeding tasks as well, bringing drink to mouth w/RUE. EDUCATION Pt educated on importance of UE TherEx and encouraged to perform throughout the day.   Progression toward goals:  [ ]          Improving appropriately and progressing toward goals  [X]          Improving slowly and progressing toward goals  [ ]          Not making progress toward goals and plan of care will be adjusted       PLAN:  Patient continues to benefit from skilled intervention to address the above impairments. Continue treatment per established plan of care. Discharge Recommendations:  Home Health per CM notes  Further Equipment Recommendations for Discharge:  N/A, pt states she has DME       SUBJECTIVE:   Patient stated I'm going home.       OBJECTIVE DATA SUMMARY:         Cognitive/Behavioral Status:  Neurologic State: Alert  Orientation Level: Oriented X4  Cognition: Appropriate for age attention/concentration, Follows commands  Safety/Judgement: Fall prevention, Awareness of environment  Functional Mobility and Transfers for ADLs:  ADL Intervention:  Grooming  Washing Face: Supervision/set-up  Washing Hands: Minimum assistance  Brushing Teeth: Minimum assistance; Compensatory technique training   Pt educated on compensatory strategy using LUE as gross stabilizer, opening toothpaste and w/application to toothbrush. Pt poor FM strength requires assist w/toothpaste application and increase time w/task completion. Therapeutic Activity:  Performed retrograde massage to LUE and elevated at end of session. Encoouraged pt to perform LUE hand squeezes for edema control. Therapeutic Exercises:   PROM LUE shoulder/elow flexion/extension  LUE hand squeezes 5x  AAROM > AROM RUE shoulder flexion/extension  AROM RUE elbow flexion/extension     Pain:  Pre Treatment:0  Post Treatment:0     Activity Tolerance:    Fair, pt fatigues easily     Please refer to the flowsheet for vital signs taken during this treatment.   After treatment:   [ ]  Patient left in no apparent distress sitting up in chair  [X]  Patient left in no apparent distress in bed  [X]  Call bell left within reach  [ ]  Nursing notified  [ ]  Caregiver present  [ ]  Bed alarm activated     Caesar Swain BOWMAN  Time Calculation: 23 mins

## 2017-03-24 NOTE — PROGRESS NOTES
5694 --  Bedside and Verbal shift change report given to [address] (oncoming nurse) by Daniela BRAVO (offgoing nurse). Report included the following information SBAR, Procedure Summary, Intake/Output, MAR, Recent Results. Bed locked and lowered, siderails up x3. Whiteboard updated with information. Call bell at bedside, will continue to monitor. Unable to weigh pt, option is not available on bed.     0900 -- Morning medications administered, pt tolerated with ease.      1150 -- IDR completed, plan is to discharge pt with long term antibiotic IV administration. 1202  -- Shift reassessment, pt condition unchanged will continue to monitor.      1515 -- Shift reassessment, pt condition unchanged will continue to monitor. PRN pain medication administered, will continue to monitor. 1800 -- Due to transportation issues, discharged has been delayed until 03/27/2017 2000  -- Bedside and Verbal shift change report given to [address] [place] [address] (oncoming nurse) by Nevin Valdez RN (offgoing nurse) Report included the following information SBAR, Procedure Summary, Intake/Output, MAR, Recent Results. Pt up in bed, no indication of pain or acute distress. Bed locked and lowered, siderails up x3. Call bell at bedside.

## 2017-03-24 NOTE — PROGRESS NOTES
Due to pts insurance, transportation not available until tomorrow 3/25. Transportation set up for 3/25 at 12pm with [employer] to go home. Patient notified, message left with daughter about discharge and  time. PCS at nurses station, nurse notified.

## 2017-03-24 NOTE — PROGRESS NOTES
Call to the pt's dgt, Anna Loera , to discuss home care and offer FOC. She chose [facility] # [phone] and verified the contact information on the face sheet is correct. Referral sent to [facility] via fax # [phone] and called to the intake rep, Ella Plant  # [phone] option # 2. She reported due to staffing they will not be able to start care until Monday [id number]. Due to insurance , [facility] is the only provider for the pt's home IV ABX. Vivek Sanchez notified.

## 2017-03-24 NOTE — PROGRESS NOTES
INFECTIOUS DISEASE FOLLOW UP NOTE :    Admit Date: 3/3/2017     Will plan to check back on Monday 3/27/2107 if still here. Dr.A Rosa Gary is on call for ID this weekend and can be  reached at [phone] if needed. Current  Prior    Daptomycin 3/8-16 linezolid 3/4 - 3, vanc/zosyn/levoflox 3/3 -0   aztreonam/anidulafungin 3/12-8     ASSESSMENT: -> RECS     Sepsis POA  - fever 102 , leukocytosis 28K, tachycardia, hypotension  - source VRE UTI and BSI or DVT  -allergic allergic reaction and IE also in DDX  - WBC better 42->>7k today   - Afebrile > 72 h now, nl WBC- ? Drug  Aztreonam / anidulangin stopped 3/20; ddx includes DVT, atelectasis, ??tumor -> monitor on current therapy    Resp failure NTB 3/11  -initial cxr nad ->white out L s/o mucus plug, SP bronch 3/13 extensive L mucus plugging  - sputum cx C.albicans   - extubated 3/18< MBS aspiration risk -> per PCCM   VRE BSI with ?aortic endocarditis  - blcx 3/3 2/2 VRE positive   - repeat 3/6 ntd   - TTE aortic stenosis with possible aortic vegetation  [chronic mixed calcified density]  - source likely UTI; pt refused ESSIE and removal of mediport  - 3/8 bctx's ng, 3/12 ng x2, 3/17 ngtd x 2  - CPK 3/18 128 -> Cont Daptomycin to complete 6 weeks (end date: 4/17)   -> OPAT entered  -> CBC, BMP, ESR, CRP, CPK q Monday  Follow up w/ [place] [facility] after 1 week.   Call [phone] for appt w/ Marjorie Dewey, NP     VRE UTI, complicated   - UA with large LE , wbc tntc, bac 4+  - ucx >100k VRE  - CT with no hydronephrosis but has b/l ureteral stents  - appreciate urology consult and eventual plan for stent exchange/removal   - persistent sterile pyuria -> abx as above  -> stent mgmt per      ADR - Rash over abdomen/chest   - secondary to linezolid which has been listed from before as allergy - rash improved off linezolid  -> monitor   Lymphedema with R medial thigh blister- not currently infected appearing  -massive pannus with intertrigo  -> topical therapy p recent systemic antifungal   RLE subacute DVT  - per primary team   Rt pelvic mass ? ovarian ca / endometrial ca with mets   - palliative care was involved , was hospice at home and now revoked  - may need to re involve palliative as prognosis guarded    DEV due to sepsis - better, Cr 1.9->0.9 today     Thrombocytopenia -better 94-> 111 ->monitor   Blood loss anemia        MICROBIOLOGY:   3/3  Blcx x 2 - 2/2 VRE S gent, linezolid , daptomycin quinupristin         Ucx > 100K VRE  3/4 Flu Ag test neg   3/6  Blcx x 2 ng  3/8  Blcx x 2 ng   3/11 uctx ng  3/12 bctx x 2 ng   sput C albicans  3/14     ucx ng  3/17 blcx ngtd   spcx rare C albicans   uctx ng    LINES AND CATHETERS:   Rt chest mediport     SUBJECTIVE :     Interval notes reviewed. Sitting up in bed disappointed that she is not discharged today but says she is feeling better. No shortness of breath or pain. Rash had cleared.  Afebrile    MEDICATIONS :     Current Facility-Administered Medications   Medication Dose Route Frequency    potassium chloride (K-DUR, KLOR-CON) SR tablet 20 mEq  20 mEq Oral BID    DAPTOmycin (CUBICIN) 630 mg in 0.9% sodium chloride 50 mL IVPB RF formulation  6 mg/kg IntraVENous Q24H    fentaNYL citrate (PF) injection 25-50 mcg  25-50 mcg IntraVENous Q2H PRN    enoxaparin (LOVENOX) injection 40 mg  40 mg SubCUTAneous Q12H    0.9% sodium chloride infusion 250 mL  250 mL IntraVENous PRN    chlorhexidine (PERIDEX) 0.12 % mouthwash 10 mL  10 mL Oral Q12H    acetaminophen (TYLENOL) solution 650 mg  650 mg Oral Q4H PRN    furosemide (LASIX) injection 40 mg  40 mg IntraVENous Q12H    albuterol-ipratropium (DUO-NEB) 2.5 MG-0.5 MG/3 ML  3 mL Nebulization Q6H RT    dextrose 5% lactated ringers infusion  50 mL/hr IntraVENous CONTINUOUS    pantoprazole (PROTONIX) 40 mg in sodium chloride 0.9 % 10 mL injection  40 mg IntraVENous DAILY    sodium chloride (NS) flush 5-10 mL  5-10 mL IntraVENous Q8H    sodium chloride (NS) flush 5-10 mL  5-10 mL IntraVENous PRN    nystatin (MYCOSTATIN) 100,000 unit/gram powder   Topical BID    sodium bicarbonate tablet 650 mg  650 mg Oral TID    bisacodyl (DULCOLAX) suppository 10 mg  10 mg Rectal DAILY PRN    glucose chewable tablet 16 g  16 g Oral PRN    glucagon (GLUCAGEN) injection 1 mg  1 mg IntraMUSCular PRN    dextrose (D50W) injection syrg 12.5-25 g  25-50 mL IntraVENous PRN    ondansetron (ZOFRAN) injection 4 mg  4 mg IntraVENous Q4H PRN       OBJECTIVE :     Visit Vitals    /77 (BP 1 Location: Right arm, BP Patient Position: At rest)    Pulse 82    Temp 97.8 °F (36.6 °C)    Resp 18    Ht 5' 4\" (1.626 m)    Wt 105 kg (231 lb 7.7 oz)    SpO2 100%    BMI 39.73 kg/m2       Temp (24hrs), Av.1 °F (36.7 °C), Min:97.8 °F (36.6 °C), Max:98.3 °F (36.8 °C)    GEN - morbidly obese, ill appearing WF appears older than stated age lying comfortably in bed, no family at bedside  HEENT - conj pale no thrush lips cracked  NECK- R mediport accessed no LAD   RESP- coarse bs few scattered rhochi  CVS- tachy III/VI systolic murmur   ABD- massive pannus, large area of ecchymosis at lower abdominal fold fragile skin, bs present  EXT- (+) edema   SKIN - rash over abdomen resolved, large pannus of lower abdomen with hematoma of skin and ulcer, excoriated R ant thigh.   R upper arm no erythema  NEURO - awake answers questions seems appropriately      Labs: Results:   Chemistry Recent Labs      17   0400  17   1035  17   0945   GLU  93  97  113*   NA  144  144  143   K  3.0*  2.9*  3.0*   CL  102  100  101   CO2  36*  33*  31   BUN  25*  23*  27*   CREA  0.78  0.74  0.85   CA  8.1*  7.9*  8.3*   AGAP  6  11  11   BUCR  32*  31*  32*   AP  146*  144*  163*   TP  6.7  6.8  6.7   ALB  2.9*  3.0*  3.0*   GLOB  3.8  3.8  3.7   AGRAT  0.8  0.8  0.8      CBC w/Diff Recent Labs      17   0400  17   1035  17 0945   WBC  6.9  6.9  7.2   RBC  2.89*  2.88*  2.93*   HGB  8.3*  8.2*  8.2*   HCT  27.4*  27.0*  27.3*   PLT  205  182  175   GRANS  81*  84*  79*   LYMPH  14*  11*  16*   EOS  1  1  1        MBS 3/21 IMPRESSION:     Aspiration with cough reflex with thin consistencies. Airway penetration with  nectar consistencies. cxr 3/19 Impression:  Interval removal of the endotracheal and nasogastric tubes. Slightly less  conspicuous left basilar opacity, likely atelectasis or potentially infiltrate. CT abdo/pelvis 3/3 - IMPRESSION:  1. New cystic mass right adnexal region. Recurrent ovarian carcinoma cannot be  excluded. 2. New right middle lobe pulmonary nodule. Cannot rule out metastatic disease. 3. Bilateral ureteral stents with resolved hydronephrosis. 4. Stable or minimally increased left periaortic retroperitoneal adenopathy.     3/21 pvl Right Leg:-  Deep venous thrombosis: Yes  Proximal extent of thrombus: Common Femoral  Superficial venous thrombosis: Not examined  Deep venous insufficiency: Not examined  Superficial venous insufficiency: Not examined     Left Leg:-  Deep venous thrombosis: No  Superficial venous thrombosis: Not examined  Deep venous insufficiency: Not examined  Superficial venous insufficiency: Not examined    Leonel Taylor MD  March 24, 2017  [facility] AT THE [facility]  [phone]

## 2017-03-24 NOTE — PROGRESS NOTES
Bedside shift change report given to Arrie Severance (oncoming nurse) by Mariah Murrieta (offgoing nurse). Report included the following information SBAR, Kardex, Procedure Summary, Intake/Output, MAR, Recent Results and Med Rec Status.

## 2017-03-24 NOTE — PROGRESS NOTES
Attempted to see pt. For PT. States she just had PT however   Described working with OT . Refusing PT at this time. Sitting upright in bed with  LUE  Elevated . Possible  Discharge today. Will  Need ambulance  Transport and [facility]. [place]ett Line

## 2017-03-24 NOTE — MANAGEMENT PLAN
Discharge Plan    Interdisciplinary Rounds completed at bedside. Pt states plan is still to return home at discharge with daughter assistance and family help. Pt states she has someone with her 24 hours a day. Discussed discharge transportation; pt verbalizes apprehension since unable to walk. Patient will need medical transport. Verbal order, read back for Home Health order. Discussed with Dr David Ma the need for Home Health antibiotic order. He will converse with ID and place as soon as possible. Tentative discharge today.        Lucius Ramey RN BSN  Outcomes Manager  Pager # [phone]

## 2017-03-25 NOTE — PROGRESS NOTES
Pt needing home health ABX, they will start Monday at home. Discussed with Dr Ernie Jones, postponed discharge until tomorrow after abx given here. Pt aware, she will call dtr and let her know. Called [facility] [phone] and let [facility] know transport was not to be today. Placed pt on \"will call\" for transport tomorrow. Staff notified.

## 2017-03-25 NOTE — PROGRESS NOTES
Pt incontinent of stool, cleaned turned and repositioned. Mepelix to sacrum and upper R thigh changed.

## 2017-03-25 NOTE — PROGRESS NOTES
1955- Bedside and Verbal shift change report given to True Martínez RN (oncoming nurse) by Dexter Patel RN (offgoing nurse). Report included the following information SBAR, Kardex and Med Rec Status. Home care services not able to be started until Mon 3/27/17 per care management. 2100- Scheduled meds given along with PRN pain medication. Patient utilizes numeric scale to rate pain. Patient tolerates well. 2220- Bed bath given to patient. Dressingt change occurred. Patient tolerated activity fairly well. Catheter care performed. Linen and gown changed. 0240- Platelets 875. Lovenox administered subQ to abdomen. 0350- No blood return from metiport. Labs drawn peripherally. 1718- Patient c/o sever headache. 50 mcg of fentanyl administered through L side PIV.      0700- Bedside and Verbal shift change report given to (oncoming nurse) by True Martínez RN  (offgoing nurse). Report included the following information SBAR, Kardex and MAR.

## 2017-03-25 NOTE — PROGRESS NOTES
Progress Note      Patient: Lance Desai               Sex: female          DOA: 3/3/2017       YOB: 1960      Age:  49 y.o.        LOS:  LOS: 22 days               Subjective:   Pt is doing well today . she will be dc'd in the am      Objective:      Visit Vitals    /75    Pulse 78    Temp 98.5 °F (36.9 °C)    Resp 18    Ht 5' 4\" (1.626 m)    Wt 105 kg (231 lb 7.7 oz)    SpO2 100%    BMI 39.73 kg/m2       Physical Exam:  Pt is awake and responsive   Heart reg rate and rhythm   Lungs good breath sounds heard   Abdomen soft and obese   Neuro nonfocal        Lab/Data Reviewed:  CMP:   Lab Results   Component Value Date/Time     03/25/2017 04:00 AM    K 3.0 (L) 03/25/2017 04:00 AM    CL 96 (L) 03/25/2017 04:00 AM    CO2 34 (H) 03/25/2017 04:00 AM    AGAP 10 03/25/2017 04:00 AM    GLU 86 03/25/2017 04:00 AM    BUN 22 (H) 03/25/2017 04:00 AM    CREA 0.80 03/25/2017 04:00 AM    GFRAA >60 03/25/2017 04:00 AM    GFRNA >60 03/25/2017 04:00 AM    CA 7.7 (L) 03/25/2017 04:00 AM    MG 1.2 (L) 03/25/2017 04:00 AM    PHOS 2.1 (L) 03/25/2017 04:00 AM    ALB 2.7 (L) 03/25/2017 04:00 AM    TP 6.6 03/25/2017 04:00 AM    GLOB 3.9 03/25/2017 04:00 AM    AGRAT 0.7 (L) 03/25/2017 04:00 AM    SGOT 24 03/25/2017 04:00 AM    ALT 20 03/25/2017 04:00 AM     CBC:   Lab Results   Component Value Date/Time    WBC 7.3 03/25/2017 04:00 AM    HGB 8.1 (L) 03/25/2017 04:00 AM    HCT 27.0 (L) 03/25/2017 04:00 AM     03/25/2017 04:00 AM           Assessment/Plan     Principal Problem:    Severe sepsis with septic shock (CODE) ([address] (3/3/2017)    Active Problems:    History of ovarian cancer (3/18/2015)      Morbid obesity ([facility] (10/2/2014)      CAD (coronary artery disease) (12/29/2015)      DEV (acute kidney injury) ([facility] (12/29/2015)      A-fib ([facility] (12/29/2015)      UTI (urinary tract infection) (3/3/2017)      Severe thrombocytopenia (HCC) (3/4/2017)        Plan:will dc in the am

## 2017-03-25 NOTE — PROGRESS NOTES
Difficulty noted obtaining blood return from pts mediport. Pt repositioned into high fowlers position with right arm above head and blood return noted. Flushed with 30 cc of normal saline.

## 2017-03-25 NOTE — ROUTINE PROCESS
Asked by RN to evaluate mediport for no blood return. Blood return obtained with patient in upright position, arm over head and head turned left; adequate flow during flush. PCXR shows CT marking on mediport.

## 2017-03-25 NOTE — ROUTINE PROCESS
Bedside shift change report given to [address] [place] [address] (oncoming nurse) by Tomeka Cummins RN (offgoing nurse).  Report included the following information SBAR, Kardex, MAR, Recent Results and Cardiac Rhythm SR.

## 2017-03-26 NOTE — DISCHARGE SUMMARY
Discharge Summary    Patient: Isaac Conteh               Sex: female          DOA: 3/3/2017         YOB: 1960      Age:  49 y.o.        LOS:  LOS: 23 days                Admit Date: 3/3/2017    Discharge Date: 3/26/2017    Admission Diagnoses: Severe sepsis with septic shock (CODE) ([facility]  UTI (urinary tract infection)  Severe sepsis with septic shock (CODE) (HCC)  Severe thrombocytopenia (HCC)  mucous plugs  mucous plugs    Discharge Diagnoses:    Problem List as of 3/26/2017  Date Reviewed: 8/25/2016          Codes Class Noted - Resolved    Severe thrombocytopenia ([facility] ICD-10-CM: D69.6  ICD-9-CM: 287.5  3/4/2017 - Present        UTI (urinary tract infection) ICD-10-CM: N39.0  ICD-9-CM: 599.0  3/3/2017 - Present        * (Principal)Severe sepsis with septic shock (CODE) ([facility] ICD-10-CM: R65.21  ICD-9-CM: 038.9, 995.92, 785.52  3/3/2017 - Present        Bladder mass ICD-10-CM: N32.89  ICD-9-CM: 596.89  8/29/2016 - Present        Flank pain, chronic ICD-10-CM: R10.9, G89.29  ICD-9-CM: 789.09, 338.29  8/29/2016 - Present        Acute blood loss anemia ICD-10-CM: D62  ICD-9-CM: 285.1  7/2/2016 - Present        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  ICD-9-CM: 301.51  12/31/2015 - Present        CAD (coronary artery disease) (Chronic) ICD-10-CM: I25.10  ICD-9-CM: 414.00  12/29/2015 - Present        History of Clostridium difficile (Chronic) ICD-10-CM: Z87.19  ICD-9-CM: V12.79  12/29/2015 - Present        History of endometrial cancer (Chronic) ICD-10-CM: Z85.42  ICD-9-CM: V10.42  12/29/2015 - Present        History of GI bleed (Chronic) ICD-10-CM: Z87.19  ICD-9-CM: V12.79  12/29/2015 - Present        Lymphedema (Chronic) ICD-10-CM: I89.0  ICD-9-CM: 457.1  12/29/2015 - Present        Hypokalemia ICD-10-CM: E87.6  ICD-9-CM: 276.8  12/29/2015 - Present        DEV (acute kidney injury) ([facility] ICD-10-CM: N17.9  ICD-9-CM: 584.9  12/29/2015 - Present        Pulmonary nodules (Chronic) ICD-10-CM: R91.8  ICD-9-CM: 793.19  12/29/2015 - Present        A-fib ([facility] (Chronic) ICD-10-CM: I48.91  ICD-9-CM: 427.31  12/29/2015 - Present        Aortic stenosis (Chronic) ICD-10-CM: I35.0  ICD-9-CM: 424.1  12/29/2015 - Present        GI bleed ICD-10-CM: K92.2  ICD-9-CM: 578.9  12/29/2015 - Present        Chronic pain syndrome (Chronic) ICD-10-CM: G89.4  ICD-9-CM: 338.4  3/18/2015 - Present        History of ovarian cancer (Chronic) ICD-10-CM: Z85.43  ICD-9-CM: V10.43  3/18/2015 - Present        History of gastric bypass (Chronic) ICD-10-CM: Z98.890  ICD-9-CM: V45.86  3/18/2015 - Present        Morbid obesity (HCC) (Chronic) ICD-10-CM: E66.01  ICD-9-CM: 278.01  10/2/2014 - Present        Nausea and vomiting ICD-10-CM: R11.2  ICD-9-CM: 787.01  10/23/2013 - Present        Anxiety (Chronic) ICD-10-CM: F41.9  ICD-9-CM: 300.00  3/15/2013 - Present        Anemia ICD-10-CM: D64.9  ICD-9-CM: 285.9  1/16/2013 - Present        RESOLVED: New onset a-fib ([facility] ICD-10-CM: I48.91  ICD-9-CM: 427.31  12/3/2015 - 12/29/2015        RESOLVED: Hemoptysis ICD-10-CM: E33.5  ICD-9-CM: 786.30  11/14/2015 - 12/29/2015        RESOLVED: Facial rash ICD-10-CM: R21  ICD-9-CM: 782.1  11/12/2015 - 12/29/2015        RESOLVED: Clostridium difficile diarrhea ICD-10-CM: A04.7  ICD-9-CM: 008.45  8/12/2015 - 12/29/2015        RESOLVED: GI bleed ICD-10-CM: K92.2  ICD-9-CM: 578.9  8/10/2015 - 12/29/2015        RESOLVED: Lymphedema ICD-10-CM: I89.0  ICD-9-CM: 457.1  8/10/2015 - 12/29/2015        RESOLVED: Pulmonary nodule ICD-10-CM: R91.1  ICD-9-CM: 793.11  8/10/2015 - 12/29/2015        RESOLVED: Cellulitis of leg without foot, left ICD-10-CM: L03.116  ICD-9-CM: 682.6  8/8/2015 - 12/29/2015        RESOLVED: Hematemesis ICD-10-CM: K92.0  ICD-9-CM: 578.0  7/16/2015 - 12/29/2015        RESOLVED: Pelvic pain in female ICD-10-CM: R10.2  ICD-9-CM: 625.9  3/18/2015 - 12/29/2015        RESOLVED: Encounter for long-term (current) use of other medications ICD-10-CM: Z79.899  ICD-9-CM: V58.69  3/18/2015 - 12/29/2015        RESOLVED: Endometrial cancer ([facility] ICD-10-CM: C54.1  ICD-9-CM: 182.0  3/18/2015 - 12/29/2015        RESOLVED: Lymphedema of lower extremity ICD-10-CM: I89.0  ICD-9-CM: 457.1  10/2/2014 - 12/29/2015        RESOLVED: Venous insufficiency of lower extremity ICD-10-CM: I87.2  ICD-9-CM: 459.81  10/2/2014 - 12/29/2015        RESOLVED: Cellulitis of lower extremity ICD-10-CM: L03.119  ICD-9-CM: 682.6  9/27/2014 - 12/29/2015        RESOLVED: Fall ICD-10-CM: W19. Cate Monteiro  ICD-9-CM: E888.9  7/28/2014 - 12/29/2015        RESOLVED: Gastrointestinal hemorrhage ICD-10-CM: K92.2  ICD-9-CM: 578.9  7/28/2014 - 12/29/2015        RESOLVED: Lower urinary tract infectious disease ICD-10-CM: N39.0  ICD-9-CM: 599.0  7/28/2014 - 12/29/2015        RESOLVED: Upper gastrointestinal hemorrhage ICD-10-CM: K92.2  ICD-9-CM: 578.9  3/11/2014 - 12/29/2015        RESOLVED: Cardiac disease ICD-10-CM: I51.9  ICD-9-CM: 429.9  2/11/2014 - 12/29/2015    Overview Signed 8/13/2015 11:13 AM by Carola Valle     Overview:   A.  Echo (3/12/13)EF 65%; probable aortic stenosis with trace AR; PAP 36mmHg. B. Lexiscan NST (2/10/14)EF 71%; small area of mild apical ischemia. C. Echo 2/12/14 EF 99%, Mild Diastolic Dysfunction, LAE, Mod AS (1.2cm2), Mid MR/AR/CA  D, Cardiac CTA: 2/18/14 1. Minor nonobstructive epicardial coronary artery disease appropriate for medical therapy. 2. Unremarkable great vessels, systemic thoracic veins and pulmonary veins. 3. Thin noncalcified pericardium with no evidence of pericardial effusion.               RESOLVED: Chest pain ICD-10-CM: R07.9  ICD-9-CM: 786.50  2/9/2014 - 12/29/2015        RESOLVED: Hematuria ICD-10-CM: R31.9  ICD-9-CM: 599.70  2/9/2014 - 12/29/2015        RESOLVED: Aphthous ulcer ICD-10-CM: K12.0  ICD-9-CM: 528.2  1/13/2014 - 12/29/2015        RESOLVED: Chronic pain ICD-10-CM: I27.65  ICD-9-CM: 338.29  12/25/2013 - 12/29/2015        RESOLVED: Disorder of uterus ICD-10-CM: N85.9  ICD-9-CM: 621.9  10/24/2013 - 12/29/2015    Overview Signed 8/13/2015 11:13 AM by Marce Brush     Overview:   TRANSVAGINAL US 10/23/2013:  Nonspecific echogenic focus in the uterine fundus. No calcification or metallic foreign body seen on recent CT. This could represent a small focus of gas in the endometrial canal. Rounded lesion in the lower uterine segment/cervix of uncertain etiology. A uterine fibroid could have this appearance, however endometrial base neoplastic process is not excluded. Consider direct visualization and/or biopsy if clinically warranted. RESOLVED: Chronic low back pain ICD-10-CM: M54.5, G89.29  ICD-9-CM: 724.2, 338.29  10/23/2013 - 12/29/2015        RESOLVED: Diarrhea ICD-10-CM: R19.7  ICD-9-CM: 787.91  10/23/2013 - 12/29/2015        RESOLVED: History of bilateral oophorectomy ICD-10-CM: Q00.459  ICD-9-CM: V45.77  10/23/2013 - 12/29/2015        RESOLVED: Pelvic and perineal pain ICD-10-CM: R10.2  ICD-9-CM: 625.9  10/23/2013 - 12/29/2015        RESOLVED: Spinal stenosis ICD-10-CM: M48.00  ICD-9-CM: 724.00  10/23/2013 - 12/29/2015        RESOLVED: Vaginal bleeding ICD-10-CM: N93.9  ICD-9-CM: 623.8  10/23/2013 - 12/29/2015        RESOLVED: Generalized abdominal pain ICD-10-CM: R10.84  ICD-9-CM: 789.07  6/22/2013 - 12/29/2015        RESOLVED: Cellulitis ICD-10-CM: L03.90  ICD-9-CM: 682.9  6/22/2013 - 12/29/2015    Overview Signed 8/13/2015 11:13 AM by Marce Brush     Overview:   CT 10/22/2013: Morbid obesity with subcutaneous infiltration and skin thickening along the lower abdominal wall which could represent underlying cellulitis and panniculitis. Correlation with physical examination is suggested.              RESOLVED: Bacteremia ICD-10-CM: R78.81  ICD-9-CM: 790.7  3/15/2013 - 12/29/2015        RESOLVED: Hemorrhage complicating a procedure ICD-10-CM: [id number]  ICD-9-CM: 998.11  3/15/2013 - 12/29/2015        RESOLVED: Postoperative state ICD-10-CM: Y13.656  ICD-9-CM: V45.89  2/1/2013 - 12/29/2015        RESOLVED: Small bowel obstruction ([facility] ICD-10-CM: K56.69  ICD-9-CM: 560.9  11/25/2012 - 12/29/2015        RESOLVED: Malignant neoplasm of endometrium ([facility] ICD-10-CM: C54.1  ICD-9-CM: 182.0  9/1/2012 - 12/29/2015        RESOLVED: Adiposity ICD-10-CM: E66.9  ICD-9-CM: 278.02  9/1/2012 - 12/29/2015        RESOLVED: Malignant neoplasm of ovary ([facility] ICD-10-CM: C56.9  ICD-9-CM: 183.0  10/4/2011 - 12/29/2015    Overview Signed 8/13/2015 11:13 AM by Tonia Sewell     Overview:   LEFT             RESOLVED: Adnexal mass ICD-10-CM: N94.9  ICD-9-CM: 625.8  9/27/2011 - 12/29/2015        RESOLVED: Localized peritonitis ([facility] ICD-10-CM: K65.9  ICD-9-CM: 567.9  9/27/2011 - 12/29/2015              Discharge Condition:  Improved    Hospital Course:the pt is an unfortunate female who has a diagnosis of metastatic endometrial adenocarcinoma , she was admitted on this occasion because of sepsis and shock . The pt was initially a dnr and was on hospice . she was found at home with a blood glucose in the 40 s and she was confused . She was put on the sepsis protocol and was hydrated and put on antibiotics     The platelets were low at 10,000 and she was put into the icu . The pt's blood cultures grew out vancomycin resistant enterococcus faecemium . she was followed by both id and urology . She had bilateral [id number] obstruction and had stents put in by urology her acute renal failure improved with antibiotics for the vre . the pt developed dvt and an ivc filter was put in place as she could not be anticoagulated . The pt made herself a full code and then she was a dnr not to be intubated . The pt slowly improved and became more alert . The pt then was found to have an endocarditis and is being treated with daptomycin as earlier she   grew out Marceil Prier the blood cultures . She will be on 6 weeks of daptomycin the pt will go home with home health nursing. Her prognosis is poor. Consults:    Treatment Team: Attending Provider: Kandice Kim MD; Care Manager: Delbert Regan; Utilization Review: Lisa Abbott; Consulting Provider: Sarita Weaver MD; Nurse Practitioner: Jaida Andrade NP; Consulting Provider: Trish Victor MD; Occupational Therapy Assistant: GRACIE Doherty; Utilization Review: Yifan Jaime RN    Significant Diagnostic Studies:     Discharge Medications:     Current Discharge Medication List      START taking these medications    Details   albuterol-ipratropium (DUO-NEB) 2.5 mg-0.5 mg/3 ml nebu 3 mL by Nebulization route every six (6) hours as needed. Indications: CHRONIC OBSTRUCTIVE PULMONARY DISEASE WITH BRONCHOSPASMS  Qty: 30 Nebule, Refills: 5      chlorhexidine (PERIDEX) 0.12 % solution Take 10 mL by mouth every twelve (12) hours for 14 days. Qty: 1 Bottle, Refills: 5      enoxaparin (LOVENOX) 40 mg/0.4 mL 0.4 mL by SubCUTAneous route every twelve (12) hours every twelve (12) hours. Qty: 30 Syringe, Refills: 5      furosemide (LASIX) 40 mg tablet onetab po bid  Qty: 60 Tab, Refills: 5      magnesium oxide (MAG-OX) 400 mg tablet Take 1 Tab by mouth daily. Qty: 10 Tab, Refills: 0      nystatin (MYCOSTATIN) powder Apply  to affected area two (2) times a day. Qty: 1 Bottle, Refills: 3      potassium chloride (K-DUR, KLOR-CON) 20 mEq tablet Take 1 Tab by mouth two (2) times a day. Qty: 30 Tab, Refills: 2      sodium bicarbonate 650 mg tablet Take 1 Tab by mouth three (3) times daily. Qty: 100 Tab, Refills: 3         CONTINUE these medications which have NOT CHANGED    Details   ondansetron (ZOFRAN ODT) 4 mg disintegrating tablet Take 1 Tab by mouth every six (6) hours as needed for Nausea (vomiting). Qty: 30 Tab, Refills: 0      pantoprazole (PROTONIX) 40 mg tablet Take 1 Tab by mouth Before breakfast and dinner.   Qty: 60 Tab, Refills: 0      oxyCODONE-acetaminophen (PERCOCET) 5-325 mg per tablet Take 1 Tab by mouth every four (4) hours as needed for Pain. Max Daily Amount: 6 Tabs.   Qty: 8 Tab, Refills: 0         STOP taking these medications       HYDROmorphone (DILAUDID) 4 mg tablet Comments:   Reason for Stopping:         morphine CR (MS CONTIN) 30 mg CR tablet Comments:   Reason for Stopping:         dronabinol (MARINOL) 2.5 mg capsule Comments:   Reason for Stopping:         metoprolol succinate (TOPROL-XL) 25 mg XL tablet Comments:   Reason for Stopping:               Activity:as tolerated    Diet: regular    Wound Care: none    Follow-up: with pcp

## 2017-03-26 NOTE — DISCHARGE INSTRUCTIONS
DISCHARGE SUMMARY from Nurse    The following personal items are in your possession at time of discharge:    Dental Appliances: None        Home Medications: None  Jewelry: None  Clothing: None  Other Valuables: None             PATIENT INSTRUCTIONS:    After general anesthesia or intravenous sedation, for 24 hours or while taking prescription Narcotics:  · Limit your activities  · Do not drive and operate hazardous machinery  · Do not make important personal or business decisions  · Do  not drink alcoholic beverages  · If you have not urinated within 8 hours after discharge, please contact your surgeon on call. Report the following to your surgeon:  · Excessive pain, swelling, redness or odor of or around the surgical area  · Temperature over 100.5  · Nausea and vomiting lasting longer than 4 hours or if unable to take medications  · Any signs of decreased circulation or nerve impairment to extremity: change in color, persistent  numbness, tingling, coldness or increase pain  · Any questions        What to do at Home:  Recommended activity: Activity as tolerated. If you experience any of the following symptoms fever, chills, increased pain, please follow up with your primary care provider or local ED. *  Please give a list of your current medications to your Primary Care Provider. *  Please update this list whenever your medications are discontinued, doses are      changed, or new medications (including over-the-counter products) are added. *  Please carry medication information at all times in case of emergency situations. These are general instructions for a healthy lifestyle:    No smoking/ No tobacco products/ Avoid exposure to second hand smoke    Surgeon General's Warning:  Quitting smoking now greatly reduces serious risk to your health.     Obesity, smoking, and sedentary lifestyle greatly increases your risk for illness    A healthy diet, regular physical exercise & weight monitoring are important for maintaining a healthy lifestyle    You may be retaining fluid if you have a history of heart failure or if you experience any of the following symptoms:  Weight gain of 3 pounds or more overnight or 5 pounds in a week, increased swelling in our hands or feet or shortness of breath while lying flat in bed. Please call your doctor as soon as you notice any of these symptoms; do not wait until your next office visit. Recognize signs and symptoms of STROKE:    F-face looks uneven    A-arms unable to move or move unevenly    S-speech slurred or non-existent    T-time-call 911 as soon as signs and symptoms begin-DO NOT go       Back to bed or wait to see if you get better-TIME IS BRAIN. Warning Signs of HEART ATTACK     Call 911 if you have these symptoms:   Chest discomfort. Most heart attacks involve discomfort in the center of the chest that lasts more than a few minutes, or that goes away and comes back. It can feel like uncomfortable pressure, squeezing, fullness, or pain.  Discomfort in other areas of the upper body. Symptoms can include pain or discomfort in one or both arms, the back, neck, jaw, or stomach.  Shortness of breath with or without chest discomfort.  Other signs may include breaking out in a cold sweat, nausea, or lightheadedness. Don't wait more than five minutes to call 911 - MINUTES MATTER! Fast action can save your life. Calling 911 is almost always the fastest way to get lifesaving treatment. Emergency Medical Services staff can begin treatment when they arrive -- up to an hour sooner than if someone gets to the hospital by car. The discharge information has been reviewed with the patient. The patient verbalized understanding. Discharge medications reviewed with the patient and appropriate educational materials and side effects teaching were provided. Patient armband removed and shredded.

## 2017-03-26 NOTE — PROGRESS NOTES
1853 pt to be picked up by transport at 2000. mediport will need to be flushed with 20ml NS and 3ml (300 units) heparin, order is in [facility]    1955 Bedside and Verbal shift change report given to kellie barfield (oncoming nurse) by Linh Shelton RN   (offgoing nurse). Report included the following information Kardex, MAR and Recent Results.

## 2017-03-27 NOTE — PROGRESS NOTES
Called to patient room for rapid response - patient sats 100% - patient having active seizures - neb tx stopped due to seizures

## 2017-03-27 NOTE — PROGRESS NOTES
Nursing supervisor called Joanluigi Welch, pt.'s daughter, to inform patient condition change. Informed daughter that pt had a change in status which caused the doctor to cancel the patient's discharge for this evening. Daughter speaking loudly and in an angry tone. State's, \"And you are just calling me. \"  Informed that this occurred an hour ago, we were working with her mother and that her mother was now in [address]. States, \"Did you overmedicate her like you do all your patients. My boyfriend was there at 4:30 and she was awake and talking about how happy she was to go home. \"  Inquired about discharge now. Informed that the doctors will review her discharge in the morning. Daughter closes the conversation by stating, \"Well have a fucking good night. \"

## 2017-03-27 NOTE — PROGRESS NOTES
- Breathing tx administered    - actively seizing     - 72%, 2 ativan     - seizure ends    .4axillary,  86, 147/84,100%, 36RR,     - 123/56, 100 bpm, 100% 7L, 40 RR    0400- Patient awaken stating that she \"feels sick, like Im going to throw up. \" 4 mg of Zofran administered. Patient alert to self. Able to state name and , but unable to recall where she is at, the year, or the president. Patient is mumbling incomprehensible words and responding inappropriately. Patient appears confused, with increased restlessness. Patient repositioned at this time.

## 2017-03-27 NOTE — PROGRESS NOTES
Speech Therapy Note:    SLP attempted to see pt this am for skilled meal assessment. Pt had seizures last pm, and is inappropriate for therapy at this time. Will follow up as indicated. D/w RN Balaji Nickerson.     Thank you for this referral,  Jess Marmolejo, SLP Intern  Ph: [phone]

## 2017-03-27 NOTE — PROGRESS NOTES
2009-RRT called for patient with altered mental status, pt assessed, found to be poorly responsive, drooling noted, VSS, , per primary RN who has cared for patient for several shifts this is a dramatic change in patients baseline, pt did have pain medication changed from fentanyl to morphine,  2018-Narcan 0.4mg IV given  2030-pt noted to have an active tonic clonic seizure lasting until 2033, during seizure activity pt oxygen saturations decreased to 72% and stool incontinence was noted, oxygen levels returned to baseline after seizure activity stopped  2030-Ativan 2mg IV given  2104-pt transported to CT scan by myself, pt opens eyes to verbal stimuli, slow to follow commands  2116-pt in CT scanner  2125-pt back to room, requested sitter to be at bedside to monitor for any seizure activity, nursing supervisor aware  2127-sitter at bedside, Rubén Rodriguez updated on status  1281-IV Bulic ([phone]) on screen in pt room for evaluation, assisted with exam, recommendations received and ordered per Dr Zoya Lewis  2212-RRT ended, primary RN notified and updates provided, will reactivate RRT if any further concerns

## 2017-03-27 NOTE — PROGRESS NOTES
INFECTIOUS DISEASE FOLLOW UP NOTE :    Admit Date: 3/3/2017     Current  Prior    Daptomycin 3/8-19 linezolid 3/4 - 3, vanc/zosyn/levoflox 3/3 -0   aztreonam/anidulafungin 3/12-8     ASSESSMENT: -> RECS     NEW seizure activity 3/26 ->EEG and neurol consult underway   Sepsis POA  - fever 102 , leukocytosis 28K, tachycardia, hypotension  - source VRE UTI and BSI or DVT  -allergic allergic reaction and IE also in DDX  - WBC normal again today    - Afebrile > 72 h now, nl WBC- ? Drug  Aztreonam / anidulangin stopped 3/20; ddx includes DVT, atelectasis, ??tumor -> monitor on current therapy    Resp failure NTB 3/11  -initial cxr nad ->white out L s/o mucus plug, SP bronch 3/13 extensive L mucus plugging  - sputum cx C.albicans   - extubated 3/18< MBS aspiration risk [facility]   VRE BSI with ?aortic endocarditis  - blcx 3/3 2/2 VRE positive   - repeat 3/6 ntd   - TTE aortic stenosis with possible aortic vegetation  [chronic mixed calcified density]  - source likely UTI; pt refused ESSIE and removal of mediport  - 3/8 bctx's ng, 3/12 ng x2, 3/17 ngtd x 2  - CPK 3/18 128 -> Cont Daptomycin to complete 6 weeks (end date: 4/17)   -> OPAT entered  -> CBC, BMP, ESR, CRP, CPK q Monday  Follow up w/ Gabrielle ID after 1 week.   Call [phone] for appt w/ Trisha Young, MARCOS     VRE UTI, complicated   - UA with large LE , wbc tntc, jamar 4+  - ucx >100k VRE  - CT with no hydronephrosis but has b/l ureteral stents  - appreciate urology consult and eventual plan for stent exchange/removal   - persistent sterile pyuria -> abx as above  -> stent mgmt per      ADR - Rash over abdomen/chest   - secondary to linezolid which has been listed from before as allergy - rash improved off linezolid  -> monitor   Lymphedema with R medial thigh blister- not currently infected appearing  -massive pannus with intertrigo  -> topical therapy p recent systemic antifungal   RLE subacute DVT  - per primary team   Rt pelvic mass ? ovarian ca / endometrial ca with mets   - palliative care was involved , was hospice at home and now revoked  - may need to re involve palliative as prognosis guarded    DEV due to sepsis - better, Cr 1.9->0.9 today     Thrombocytopenia -better 94-> 111 ->monitor   Blood loss anemia        MICROBIOLOGY:   3/3  Blcx x 2 - 2/2 VRE S gent, linezolid , daptomycin quinupristin         Ucx > 100K VRE  3/4 Flu Ag test neg   3/6  Blcx x 2 ng  3/8  Blcx x 2 ng   3/11 uctx ng  3/12 bctx x 2 ng   sput C albicans  3/14     ucx ng  3/17 blcx ngtd   spcx rare C albicans   uctx ng    LINES AND CATHETERS:   Rt chest mediport     SUBJECTIVE :     Interval notes reviewed. Pt had apparent sz activity last night, neurol to see, CTH ok and EEG underway. With tech's permission pt did briefly rouse to voice, no ROS and not further examined during this test No family at bedside.       MEDICATIONS :     Current Facility-Administered Medications   Medication Dose Route Frequency    dextrose 5% - 0.45% NaCl with KCl 40 mEq/L infusion   IntraVENous CONTINUOUS    furosemide (LASIX) tablet 40 mg  40 mg Oral BID    ondansetron (ZOFRAN ODT) tablet 4 mg  4 mg Oral Q8H PRN    heparin (porcine) 100 unit/mL injection 300 Units  300 Units InterCATHeter ONCE PRN    levETIRAcetam (KEPPRA) 1500 mg in 100 ml IVPB  1,500 mg IntraVENous Q12H    morphine injection 2 mg  2 mg IntraVENous Q6H PRN    DAPTOmycin (CUBICIN) 630 mg in 0.9% sodium chloride 50 mL IVPB RF formulation  630 mg IntraVENous Q24H    magnesium oxide (MAG-OX) tablet 400 mg  400 mg Oral DAILY    enoxaparin (LOVENOX) injection 40 mg  40 mg SubCUTAneous Q12H    0.9% sodium chloride infusion 250 mL  250 mL IntraVENous PRN    chlorhexidine (PERIDEX) 0.12 % mouthwash 10 mL  10 mL Oral Q12H    acetaminophen (TYLENOL) solution 650 mg  650 mg Oral Q4H PRN    albuterol-ipratropium (DUO-NEB) 2.5 MG-0.5 MG/3 ML  3 mL Nebulization Q6H RT    pantoprazole (PROTONIX) 40 mg in sodium chloride 0.9 % 10 mL injection  40 mg IntraVENous DAILY    sodium chloride (NS) flush 5-10 mL  5-10 mL IntraVENous Q8H    sodium chloride (NS) flush 5-10 mL  5-10 mL IntraVENous PRN    nystatin (MYCOSTATIN) 100,000 unit/gram powder   Topical BID    sodium bicarbonate tablet 650 mg  650 mg Oral TID    bisacodyl (DULCOLAX) suppository 10 mg  10 mg Rectal DAILY PRN    glucose chewable tablet 16 g  16 g Oral PRN    glucagon (GLUCAGEN) injection 1 mg  1 mg IntraMUSCular PRN    dextrose (D50W) injection syrg 12.5-25 g  25-50 mL IntraVENous PRN    ondansetron (ZOFRAN) injection 4 mg  4 mg IntraVENous Q4H PRN       OBJECTIVE :     Visit Vitals    /62 (BP 1 Location: Left arm)    Pulse 92    Temp 98.3 °F (36.8 °C)    Resp 20    Ht 5' 4\" (1.626 m)    Wt 105 kg (231 lb 7.7 oz)    SpO2 99%    BMI 39.73 kg/m2       Temp (24hrs), Av.6 °F (37 °C), Min:98.3 °F (36.8 °C), Max:99.4 °F (37.4 °C)    GEN - morbidly obese, ill appearing WF lying in bed, EEG IP  HEENT - conj pale no thrush lips cracked  NECK- R mediport accessed  CHEST- no examined today  CVS- not examined today   ABD- not examined today  NEURO - opened eyes to voice      Labs: Results:   Chemistry Recent Labs      17   0600  17   0500  17   0400   GLU  95  84  86   NA  136  137  140   K  3.0*  3.5  3.0*   CL  92*  93*  96*   CO2  34*  34*  34*   BUN  15  18  22*   CREA  0.69  0.76  0.80   CA  7.9*  7.0*  7.7*   AGAP  10  10  10   BUCR  22*  24*  28*   AP   --    --   141*   TP   --    --   6.6   ALB   --    --   2.7*   GLOB   --    --   3.9   AGRAT   --    --   0.7*      CBC w/Diff Recent Labs      17   0600  17   0500  17   0400   WBC  9.4  7.5  7.3   RBC  2.99*  2.94*  2.87*   HGB  8.5*  8.3*  8.1*   HCT  27.7*  27.2*  27.0*   PLT  238  226  207   GRANS  83*  83*  81*   LYMPH  11*  11*  13*   EOS  1  1  1        MBS 3/21 IMPRESSION:     Aspiration with cough reflex with thin consistencies. Airway penetration with  nectar consistencies. [facility] 3/26 IMPRESSION:     No acute intracranial abnormalities. cxr 3/26 Impression:        1. Atelectasis/consolidation left lower lobe about the same. 2. Right central line satisfactory position without pneumothorax. CT abdo/pelvis 3/3 - IMPRESSION:  1. New cystic mass right adnexal region. Recurrent ovarian carcinoma cannot be  excluded. 2. New right middle lobe pulmonary nodule. Cannot rule out metastatic disease. 3. Bilateral ureteral stents with resolved hydronephrosis. 4. Stable or minimally increased left periaortic retroperitoneal adenopathy. 3/21 pvl Right Leg:-  Deep venous thrombosis: Yes  Proximal extent of thrombus: Common Femoral  Superficial venous thrombosis: Not examined  Deep venous insufficiency: Not examined  Superficial venous insufficiency: Not examined     Left Leg:-  Deep venous thrombosis: No  Superficial venous thrombosis: Not examined  Deep venous insufficiency: Not examined  Superficial venous insufficiency: Not examined    MAGNOLIA Rojas MD  March 27, 2017  [facility] AT THE [facility]  [phone]

## 2017-03-27 NOTE — CONSULTS
[facility]    Name:  Eva Michael  MR#:  [id number]  :  1960  Account #:  [de-identified]  Date of Adm:  2017  Date of Consultation:  2017      ATTENDING: Dr. Nimco Cox: Seizures. HISTORY: This is a 57-year-old with history of endometrial cell cancer,  metastatic, admitted with sepsis and shock. She was successfully  resuscitated through this and the plans were to discharge her, and  yesterday she had a seizure, according to the chart, and apparently  the chart lists at  the patient was seizing and stopped at ,  was given 2 mg of Ativan. Vital signs were okay. Earlier that evening,  at , was found nonresponsive, drooling, eyes rolled back, given  Narcan and became more responsive, after that was at baseline, mildly  tachycardic, and then she had the witnessed seizure at . A CT  scan of the head, which I reviewed, is unremarkable. EEG is currently  in progress. PAST MEDICAL HISTORY: Notable for endometrial metastatic cancer. 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 Obesity,  ovarian cancer, spinal stenosis, status post partial hysterectomy,  obesity, urethral obstruction, , hernia repairs, oophorectomy,  tonsillectomy, vascular access. MEDICATIONS: Are on the chart, but she is currently on:    1. Daptomycin from an infection standpoint, also:  2. Lovenox. 3. Lasix. 4. Has been started on Keppra 1500 mg every 12 hours. 5. Magnesium. 6. Mycostatin. 7. Protonix. 8. Gets p.r.n. Tylenol. 9. Dulcolax. 10. Dextrose. [place]. 12. Morphine 2 mg IV every 6 hours p.r.n. 13. Zofran. PHYSICAL EXAMINATION: She is awake, currently placing leads on  her for EEG. She is slow answering. Eyes are open, looks left and  right, counts fingers.  Pupils are about 3 mm. No facial asymmetry or  weakness. She weakly grasps both hands, does not wiggle her feet. Did say \"ow\" when checking a Babinski with my thumb. Toes appear to  be downgoing bilaterally. IMPRESSION: Seizure. She has multiple medical problems, multiple  medications. She has been started on Keppra. EEG is pending. MRI is  also pending. Will follow with you.         MD FRANKLIN Escobar / DAYSI  D:  03/27/2017   14:49  T:  03/27/2017   15:25  Job #:  [id number]

## 2017-03-27 NOTE — PROGRESS NOTES
Daily Progress Note: 3/27/2017 11:49 AM   Admit Date: 3/3/2017    Patient seen in follow up for multiple medical problems as listed below:  Patient Active Problem List   Diagnosis Code    Anemia D64.9    Anxiety F41.9    Nausea and vomiting R11.2    Morbid obesity ([facility] [address] E66.01    Chronic pain syndrome G89.4    History of ovarian cancer Z85.43    History of gastric bypass Z98.890    CAD (coronary artery disease) I25.10    History of Clostridium difficile Z87.19    History of endometrial cancer Z85.42    History of GI bleed Z87.19    Lymphedema I89.0    Hypokalemia E87.6    DEV (acute kidney injury) ([facility] [address] N17.9    Pulmonary nodules R91.8    [facility]-fib ([facility] I48.91    Aortic stenosis I35.0    GI bleed K92.2    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    Acute blood loss anemia D62    Bladder mass N32.89    Flank pain, chronic R10.9, G89.29    UTI (urinary tract infection) N39.0    Severe sepsis with septic shock (CODE) ([facility]) R65.21    Severe thrombocytopenia ([facility]) D69.6       Assesment   49 y.o. female who is admitted for severe sepsis and shock, urosepsis and severe thrombocytopenia. Patient was on Hospice until now. She presents with c/o altered mental status via EMS with cyanotic/desirae complextion. Patient was found AMS with blood sugar low 40's. There was no IV assess and she was given Glucagon and her mentation and blood sugar improved. Patient in non diabetic. She has endometrial cancer. In ED she has urosepsis and hypotension requiring pressor support after 30 cc/kg BOLUS normal saline. Patient has Mediport. She also was febrile with Tmax 101.6. Denies cough, sob, chest pain. She was started on antibiotics . Hx VRE UTI noted. She reports having foul smelling urine and vaginal discharge. Patient had a platelet level of 51,402 with no bleed requiring platelet transfusion initially.  She improved and was transferred to the floor until an RRT was called 3/11 for acute hypoxic respiratory failure requiring intubation. Bronchospy 3/13 with significant mucous plugging. Repeat bronchoscopy 3/16. Respiratory cultures only with candida. Extubated and re-intubated 3/15. Bronchoscopy 3/16 minimal mucous plugging. Fevers started 3/16 source unknown. Has required prn RBC transfusions for Hg<7. Re-cultured 3/17 however continued fevers and TTE findings of questionable vegetation warrant treatment for endocarditis. Extubated 3/18 and has continued to improve clinically.       Unresponsive with New onset seizure  witnessed seizure 2030 on 3/26 given 2m IV ativan. No report of what activity was seen. Drowsy/post ictal 3/27 am. CT head wnl. Neurology consult for further opinion, patient has a history of tricks prior to discharge. Endocarditis  -plan for Dapto till 5/1 has central access     Hypoxic resp failure - intubated x2, resolved  -Bronchospy 3/13, 3/16 with significant mucous plugging.   -Cx pending on eraxis, azactam, dapto   Extubated 3/18     Subacute RLE DVT  -thought due to obstructing pelvic CA. Present on admission due to subacute nature. -LMWH. S/p IVC filter placement     Severe sepsis with shock and VRE bacteremia  - 2/2 UTI. Leukocytosis, tachy, resolving  - off Levophed for pressor support goal MAP > 65, Zyvox, Levaquin, zosyn initially. - Follow urine and blood culture: GPC in blood on Daptomycin with ID consult + Eraxis and Azactam  - Intensivist consulting      VRE UTI  - hx VRE UTI,again with VRE. Immunosuppression endometrial cancer  - On Zyvox , Zosyn , Levaquin initially then to zyvox with rash. then Dapto  - repeat UA/Cx 3/14-NGTD     Severe thrombocytopenia  - Platelet down to 9k on admission s/p transfusions x2. keep >25K. resolved      Encephalopathy  - 2/2 Hypoglycemia vs urosepsis vs hypotension vs uremia  - resolved . CT head non-acute      Hypoglycemia   - Resolved       ARF  - cr 6.55/Wpa800 ->improving Cr into 1's  - severe prerenal with UREMIA  - IVF.  Na Bicarb prn      Anemia - Hgb 8.8  - likely 2/2 Endometrial malignancy   - Transfuse if hemoglobin < 7      Hx of Ovarian and Endometrial Cancer with abdominal pain  - Patient was on Hospice until today. Daughter has rescinded her DDNR and Hospice and now wants to be FULL CODE  - Palliative care consult monday  - Lower abdomen pain Likely due to new R adnexal mass from ovarian CA. Discuss further pending hospice plans  - pain control  -Mediport for IV drug use, if no urinary source may need to be ruled out as a possible source      Hyponatremia   Hypokalemia  - IVF corrected. Kcl prn      Vaginal discharge   - Wet prep ordered     Sacral decub ulcers  -POA. Wound care consulting      Chronic problems  1. Recurrent gastrointestinal bleed with multiple  esophagogastroduodenoscopies in the past year with negative results. 2. History of ovarian cancer. 3. History of endometrial cancer. 4. Bladder mass, status post cystoscopy and biopsy results pending. 5. Acute blood loss anemia, stable. 6. Chronic back pain. 7. Chronic abdominal pain. 8. Nausea and vomiting secondary to above      DVT Protocol Active: yes  Code Status:  Partial Code     Disposition: will need to monitor at least 1 night. Plan for [facility] family not wanting further hospice    Subjective:     CC: Altered mental status    Interval History:drowsy this am but did open her eyes and talk to me when i gently shook her arm. States she had pain everywhere. When asking further questions she goes right back to sleep and ignores me, unable to complete neuro exam.  Nursing reports she felt nausea at 0400. No prior hx or seizures.  Will need to monitor      Objective:     Visit Vitals    /62 (BP 1 Location: Left arm)    Pulse 92    Temp 98.3 °F (36.8 °C)    Resp 20    Ht 5' 4\" (1.626 m)    Wt 105 kg (231 lb 7.7 oz)    SpO2 100%    BMI 39.73 kg/m2       Temp (24hrs), Av.5 °F (36.9 °C), Min:98.1 °F (36.7 °C), Max:99.4 °F (37.4 °C)        Intake/Output Summary (Last 24 hours) at 03/27/17 1149  Last data filed at 03/27/17 0612   Gross per 24 hour   Intake           599.17 ml   Output             1400 ml   Net          -800.83 ml       Gen: sleepy but alert, NAD  HEENT:  KARINA  Neck: No Bruits/JVD   Lungs:   CTAB. Good respiratory effort  Heart:   RR S1 S2 without M/R/G  Abdomen: voery obese,NT, BSX4,   Extremities:   Obese/ LE edema. No cyanosis. Skin:  no jaundice/lesions  Neuro: moving arms. Patient non-compliant with neuro exam      Data Review:     Meds/Labs/Tests reviewed    Current Shift:     Last three shifts:  03/25 1901 - 03/27 0700  In: 1518.3 [P.O.:320;  I.V.:1198.3]  Out: 1400 [Urine:1400]  Recent Labs      03/27/17   0600  03/26/17   0500  03/25/17   0400   WBC  9.4  7.5  7.3   RBC  2.99*  2.94*  2.87*   HGB  8.5*  8.3*  8.1*   HCT  27.7*  27.2*  27.0*   PLT  238  226  207   GRANS  83*  83*  81*   LYMPH  11*  11*  13*   EOS  1  1  1       Recent Labs      03/27/17   0600  03/26/17   0500  03/25/17   0400   BUN  15  18  22*   CREA  0.69  0.76  0.80   CA  7.9*  7.0*  7.7*   ALB   --    --   2.7*   K  3.0*  3.5  3.0*   NA  136  137  140   CL  92*  93*  96*   CO2  34*  34*  34*   PHOS  2.7  2.3*  2.1*   GLU  95  84  86        Lab Results   Component Value Date/Time    Glucose 95 03/27/2017 06:00 AM    Glucose 84 03/26/2017 05:00 AM    Glucose 86 03/25/2017 04:00 AM    Glucose 93 03/24/2017 04:00 AM    Glucose 97 03/23/2017 10:35 AM          Care coordination with Nursing/Consultants/staff: 10  Prior history, labs, and charting reviewed: 15    Procedures/Imaging:  CT head 3/26 evening  EEG 3/27    Total time spent with chart review, patient examination/education, discussion with staff on case,documentation and medication management / adjustment  :  30 Minutes      Dr Tatyana Baugh  Pager: [phone]

## 2017-03-27 NOTE — PROGRESS NOTES
Problem: Mobility Impaired (Adult and Pediatric)  Goal: *Acute Goals and Plan of Care (Insert Text)  Physical Therapy Goals  Initiated 3/19/2017 and to be accomplished within 7 day(s)  1. Patient will move from supine to sit and sit to supine and roll side to side in bed with maximal assistance. 2. Patient will transfer from bed to chair and chair to bed with maximal assistance using the least restrictive device. 3. Patient will perform sit to stand with maximal assistance. 4. Patient will ambulate with maximal assistance for 5 feet with the least restrictive device. Outcome: Not Progressing Towards Goal  PHYSICAL THERAPY RE-EVALUATION AND DISCHARGE     Patient: Beth Eddy (33 y.o. female)  Date: 3/27/2017  Diagnosis: Severe sepsis with septic shock (CODE) (HCC)  UTI (urinary tract infection)  Severe sepsis with septic shock (CODE) (HCC)  Severe thrombocytopenia (HCC)  mucous plugs  mucous plugs Severe sepsis with septic shock (CODE) (HCC)  Procedure(s) (LRB):  BRONCHOSCOPY (N/A) 11 Days Post-Op  Precautions: Contact      ASSESSMENT:  Pt rec'd in semifowler position in bed w/ tech and med student present preparing pt for electrode placement. Pt was drowsy and not able to follow any commands for AROM or exercises. Pt was oriented x1. Pt is currently not appropriate for continued skilled intervention due to lack of progression. PT performed PROM to bilateral upper and lower extremities. Pt groaned with ankle movement. Pt was left in NAD, call bell in reach, in bed. Medical student, neurologist, and tech present. Patient's progression toward goals since last assessment: not progressing       PLAN:  Pt will be discharged from therapy as she is not appropriate for skilled intervention at this time due to lack of progression. Discharge Recommendations: Paulino Ledesma  Further Equipment Recommendations for Discharge: hospital bed       SUBJECTIVE:   Patient stated I'm not doing great.       OBJECTIVE DATA SUMMARY:   GCODES(GP): Mobility [id number] Current  CN= 100%   Goal  CN= 100%  D/C  CN= 100%. The severity rating is based on the Other : Pt is currently unable to perform any AROM in arms or legs, Pt can't perform any bed mobility  Critical Behavior:  Neurologic State: Drowsy  Orientation Level: Disoriented to place, Disoriented to situation, Disoriented to time  Cognition: Decreased command following  Safety/Judgement: Decreased awareness of environment, Lack of insight into deficits  Functional Mobility Training:  Therapeutic Exercises:   PROM to bilateral upper and lower extremities  Pain: 8/10 across whole body pre/post treatment  Activity Tolerance:   Poor  Please refer to the flowsheet for vital signs taken during this treatment.   After treatment:   [ ]  Patient left in no apparent distress sitting up in chair  [X]  Patient left in no apparent distress in bed  [X]  Call bell left within reach  [X]  Nursing notified  [ ]  Caregiver present  [X]  Bed alarm activated     Pj Walls   Time Calculation: 15 mins

## 2017-03-27 NOTE — PROGRESS NOTES
RRT called 2009. Patient found non responsive, drooling with eyes rolled back. She was given narcan. Shortly after, patient more responsive however not at baseline. Blood glucose and vital signs taken. All normal with the exception of mild tachycardia, 111. Patient was scheduled to go home. Due to the rapid response, transportation was canceled. At 2030, patient had a witnessed seizure. She was given 2mg of ativan. Plan:  Stat CT head, EEG(likely in am), tele neurology consult.     Bereket Abarca MD

## 2017-03-27 NOTE — ROUTINE PROCESS
Bedside and Verbal shift change report given to [address] [facility] (oncoming nurse) by Rodrigue Schneider (offgoing nurse). Report included the following information SBAR, Kardex, Intake/Output, MAR, Recent Results and Cardiac Rhythm Sinus Tach. Overnight pt had an  RRT called for change in mental status and witnessed new onset seizure. 1000 Pt still mildly lethargic. Pt can answer questions and interact when aroused. 1145 Pt evaluated by speech therapy and found to have swallowing intact and with no difficulty. 1230 Unable to complete MRI screening form due to pt's level of alertness. Will attempt to complete again. 1315  Pt's EEG underway at bedside due to pt's contact precautions status. 1600 Pt's bedside complete. 1745 Pt sleeping in bed. Pt is more alert and easily aroused. Pt continues to refuse food. Pt states that she is not hungry and does not have an appetite. Bedside shift change report given to Jazlyn HURD (oncoming nurse) by [address] [facility] (offgoing nurse). Report included the following information SBAR, Kardex, Intake/Output, MAR, Recent Results and Cardiac Rhythm NSR.     2030 16 FR Duarte catheter placed. Pt's previous catheter was leaking. Initial insertion went into the vaginal canal as a result pt had some bloody output. Secondary catheter placed without difficulty in the urethra.

## 2017-03-27 NOTE — MANAGEMENT PLAN
Discharge Plan is Home with Home Health and daughter/family assistance      Ming Gonsalves RN BSN  Outcomes Manager  Pager # [phone]

## 2017-03-27 NOTE — PROGRESS NOTES
Problem: Dysphagia (Adult)  Goal: *Acute Goals and Plan of Care (Insert Text)  Recommendations:  Diet: mech-soft, chopped/honey-thick liquids (cup sips)  Meds: one at a time in pudding  Aspiration Precautions  Oral Care TID  Other: MBS completed with aspiration on thin and nectar    Goals: Patient will:  1. Tolerate PO trials with 0 s/s overt distress in 4/5 trials  2. Utilize compensatory swallow strategies/maneuvers (decrease bite/sip, size/rate, alt. liq/sol) with min cues in 4/5 trials  3. Perform oral-motor/laryngeal exercises to increase oropharyngeal swallow function with min cues  4. Complete an objective swallow study (i.e., MBSS) to assess swallow integrity, r/o aspiration, and determine of safest LRD, min A - goal met 3/21/17     Outcome: Progressing Towards Goal  SPEECH LANGUAGE PATHOLOGY DYSPHAGIA TREATMENT     Patient: Chente Hinkle (78 y.o. female)  Date: 3/27/2017  Diagnosis: Severe sepsis with septic shock (CODE) (HCC)  UTI (urinary tract infection)  Severe sepsis with septic shock (CODE) (HCC)  Severe thrombocytopenia (HCC)  mucous plugs  mucous plugs Severe sepsis with septic shock (CODE) (HCC)  Procedure(s) (LRB):  BRONCHOSCOPY (N/A) 11 Days Post-Op  Precautions: aspiration Fall, Contact      ASSESSMENT:  Pt seen for skilled meal assessment this pm. RN and sitter at bedside. Pt A&Ox3. Pt suffered seizure last night, drowsy but cooperative during PO intake. Pt with 0 s/sx aspiration across mech-soft/honey thick liquid via tsp trials. Delayed mastication and laryngeal elevation to palpation with mech-soft solid. Pt accepted honey thick liquid via tsp presentation with delayed laryngeal elevation to palpation. Recommend mech-soft/chopped meats/honey thick liquid diet via cup sips. ST to continue to follow for meal tolerance and education. D/w SHELLY Smith.   Progression toward goals:  [ ]         Improving appropriately and progressing toward goals  [X]         Improving slowly and progressing toward goals  [ ]         Not making progress toward goals and plan of care will be adjusted       PLAN:  Recommendations and Planned Interventions:  mech-soft/chopped meats/thin liquid via cup sip diet  Patient continues to benefit from skilled intervention to address the above impairments. Continue treatment per established plan of care. Discharge Recommendations:  Skilled Nursing Facility       SUBJECTIVE:   Patient stated I don't feel like eating. OBJECTIVE:   Cognitive and Communication Status:  Neurologic State: Drowsy  Orientation Level: Oriented to person, Oriented to place, Oriented to situation  Cognition: Follows commands  Perception: Appears intact  Perseveration: No perseveration noted  Safety/Judgement: Fall prevention  Dysphagia Treatment:  Oral Assessment:  Oral Assessment  Labial: Decreased rate, Decreased seal  Dentition: Intact  Oral Hygiene: good  Lingual: No impairment  Velum: No impairment  Mandible: No impairment  P.O. Trials:              Patient Position: HOB30              Vocal quality prior to P.O.: Low volume              Consistency Presented: Mechanical soft, Honey thick liquid              How Presented: SLP-fed/presented, Cup/sip              How Much: 8              Bolus Acceptance: Impaired              Bolus Formation/Control: Impaired              Type of Impairment: Delayed, Mastication, Lip closure              Propulsion: Delayed (# of seconds)              Oral Residue: None              Initiation of Swallow: Delayed (# of seconds)              Laryngeal Elevation: Decreased              Aspiration Signs/Symptoms: None              Pharyngeal Phase Characteristics: Poor endurance              Effective Modifications: Small sips and bites              Cues for Modifications:  Moderate                                Oral Phase Severity: Mild              Pharyngeal Phase Severity : Moderate     PAIN:  Start of Tx: 8  End of Tx: 8      After treatment:   [ ] Patient left in no apparent distress sitting up in chair  [X]              Patient left in no apparent distress in bed  [X]              Call bell left within reach  [X]              Nursing notified  [ ]              Family present  [X]              Caregiver present  [ ]              Bed alarm activated         COMMUNICATION/EDUCATION:   [X]        Aspiration precautions; swallow safety; compensatory techniques  [ ]        Patient unable to participate in education; education ongoing with staff  [ ]         Posted safety precautions in patient's room.   [ ]         Oral-motor/laryngeal strengthening exercises        Marlena Noriega   SLP Intern     Time Calculation: 11 mins

## 2017-03-27 NOTE — PROGRESS NOTES
[address]es [facility], [address] [facility]  3/27/2017    Progress Note    Assessment:     Beth Eddy is a 49 y.o. female with:     Patient Active Problem List   Diagnosis Code    Anemia D64.9    Anxiety F41.9    Nausea and vomiting R11.2    Morbid obesity ([facility] [address] E66.01    Chronic pain syndrome G89.4    History of ovarian cancer Z85.43    History of gastric bypass Z98.890    CAD (coronary artery disease) I25.10    History of Clostridium difficile Z87.19    History of endometrial cancer Z85.42    History of GI bleed Z87.19    Lymphedema I89.0    Hypokalemia E87.6    DEV (acute kidney injury) ([facility] N17.9    Pulmonary nodules R91.8    A-fib ([facility]) I48.91    Aortic stenosis I35.0    GI bleed K92.2    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 Her temp seems on a downward trend and perhaps stent exchange is possible during this admission. Plan:   Stent exchange when more stable. Subjective:     No acute  changes / events. Temp down but new onset of seizures noted. Objective:     Admit weight: Weight: 270 lb (122.5 kg)  Last recorded weight: Weight: 231 lb 7.7 oz (105 kg)    Temp (24hrs), Av.6 °F (37 °C), Min:98.3 °F (36.8 °C), Max:99.4 °F (37.4 °C)    [unfilled]  [unfilled]  Drains:          Physical Exam:    GEN: NAD, Nonlabored, A&O x3  ABD: Soft, NT, ND, No CVAT Bilaterally  : no bladder distention.     Diagnostics:      Recent Results (from the past 24 hour(s))   GLUCOSE, POC    Collection Time: 17  8:13 PM   Result Value Ref Range    Glucose (POC) 123 (H) 70 - 110 mg/dL   MAGNESIUM    Collection Time: 17  6:00 AM   Result Value Ref Range    Magnesium 1.7 (L) 1.8 - 2.4 mg/dL   CBC WITH AUTOMATED DIFF    Collection Time: 03/27/17  6:00 AM   Result Value Ref Range    WBC 9.4 4.6 - 13.2 K/uL    RBC 2.99 (L) 4.20 - 5.30 M/uL    HGB 8.5 (L) 12.0 - 16.0 g/dL    HCT 27.7 (L) 35.0 - 45.0 %    MCV 92.6 74.0 - 97.0 FL    MCH 28.4 24.0 - 34.0 PG    MCHC 30.7 (L) 31.0 - 37.0 g/dL    RDW 16.5 (H) 11.6 - 14.5 %    PLATELET 162 535 - 197 K/uL    MPV 9.8 9.2 - 11.8 FL    NEUTROPHILS 83 (H) 40 - 73 %    LYMPHOCYTES 11 (L) 21 - 52 %    MONOCYTES 5 3 - 10 %    EOSINOPHILS 1 0 - 5 %    BASOPHILS 0 0 - 2 %    ABS. NEUTROPHILS 7.8 1.8 - 8.0 K/UL    ABS. LYMPHOCYTES 1.1 0.9 - 3.6 K/UL    ABS. MONOCYTES 0.4 0.05 - 1.2 K/UL    ABS. EOSINOPHILS 0.1 0.0 - 0.4 K/UL    ABS.  BASOPHILS 0.0 0.0 - 0.06 K/UL    DF AUTOMATED     METABOLIC PANEL, BASIC    Collection Time: 03/27/17  6:00 AM   Result Value Ref Range    Sodium 136 136 - 145 mmol/L    Potassium 3.0 (L) 3.5 - 5.5 mmol/L    Chloride 92 (L) 100 - 108 mmol/L    CO2 34 (H) 21 - 32 mmol/L    Anion gap 10 3.0 - 18 mmol/L    Glucose 95 74 - 99 mg/dL    BUN 15 7.0 - 18 MG/DL    Creatinine 0.69 0.6 - 1.3 MG/DL    BUN/Creatinine ratio 22 (H) 12 - 20      GFR est AA >60 >60 ml/min/1.73m2    GFR est non-AA >60 >60 ml/min/1.73m2    Calcium 7.9 (L) 8.5 - 10.1 MG/DL   CALCIUM, IONIZED    Collection Time: 03/27/17  6:00 AM   Result Value Ref Range    Ionized Calcium 0.97 (L) 1.12 - 1.32 MMOL/L   PHOSPHORUS    Collection Time: 03/27/17  6:00 AM   Result Value Ref Range    Phosphorus 2.7 2.5 - 4.9 MG/DL     Hazel Pan MD

## 2017-03-28 NOTE — PROGRESS NOTES
Bedside and Verbal shift change report given to Blossom Eddy RN (oncoming nurse) by Celia Acharya RN (offgoing nurse). Report included the following information SBAR, Kardex and Intake/Output.

## 2017-03-28 NOTE — PROGRESS NOTES
Neurology Progress Note    Admit Date: 3/3/2017  Primary Care: DASHA PIERRE MD     Subjective:   Principle Problem: Severe sepsis with septic shock (CODE) ([facility])   Awake, fairly alert, but slow. No seizures. Objective:  Patient Vitals for the past 8 hrs:   BP Temp Pulse Resp SpO2   03/28/17 0951 121/82 98.2 °F (36.8 °C) 88 18 100 %       Physical Exam:  Awake, slow, follows commands. Looks left and right, counts finger  Speech ok, no facial weakness  Good drips. Complains of weak left upper arm since admission. Assessment:     Severe sepsis with septic shock (CODE) ([facility])    Plan:       Possible seizure. EEG just slow.   Ok to continue keppra, but will lower dose a little      Medications:      Current Facility-Administered Medications   Medication Dose Route Frequency    levETIRAcetam (KEPPRA) 1,000 mg in 100 ml IVPB  1,000 mg IntraVENous Q12H    dextrose 5% - 0.45% NaCl with KCl 40 mEq/L infusion   IntraVENous CONTINUOUS    furosemide (LASIX) tablet 40 mg  40 mg Oral BID    ondansetron (ZOFRAN ODT) tablet 4 mg  4 mg Oral Q8H PRN    heparin (porcine) 100 unit/mL injection 300 Units  300 Units InterCATHeter ONCE PRN    morphine injection 2 mg  2 mg IntraVENous Q6H PRN    DAPTOmycin (CUBICIN) 630 mg in 0.9% sodium chloride 50 mL IVPB RF formulation  630 mg IntraVENous Q24H    magnesium oxide (MAG-OX) tablet 400 mg  400 mg Oral DAILY    enoxaparin (LOVENOX) injection 40 mg  40 mg SubCUTAneous Q12H    0.9% sodium chloride infusion 250 mL  250 mL IntraVENous PRN    chlorhexidine (PERIDEX) 0.12 % mouthwash 10 mL  10 mL Oral Q12H    acetaminophen (TYLENOL) solution 650 mg  650 mg Oral Q4H PRN    albuterol-ipratropium (DUO-NEB) 2.5 MG-0.5 MG/3 ML  3 mL Nebulization Q6H RT    pantoprazole (PROTONIX) 40 mg in sodium chloride 0.9 % 10 mL injection  40 mg IntraVENous DAILY    sodium chloride (NS) flush 5-10 mL  5-10 mL IntraVENous Q8H    sodium chloride (NS) flush 5-10 mL  5-10 mL IntraVENous PRN  nystatin (MYCOSTATIN) 100,000 unit/gram powder   Topical BID    sodium bicarbonate tablet 650 mg  650 mg Oral TID    bisacodyl (DULCOLAX) suppository 10 mg  10 mg Rectal DAILY PRN    glucose chewable tablet 16 g  16 g Oral PRN    glucagon (GLUCAGEN) injection 1 mg  1 mg IntraMUSCular PRN    dextrose (D50W) injection syrg 12.5-25 g  25-50 mL IntraVENous PRN    ondansetron (ZOFRAN) injection 4 mg  4 mg IntraVENous Q4H PRN      Data:     Recent Results (from the past 24 hour(s))   MAGNESIUM    Collection Time: 03/28/17  4:50 AM   Result Value Ref Range    Magnesium 1.7 (L) 1.8 - 2.4 mg/dL   CBC WITH AUTOMATED DIFF    Collection Time: 03/28/17  4:50 AM   Result Value Ref Range    WBC 7.7 4.6 - 13.2 K/uL    RBC 2.94 (L) 4.20 - 5.30 M/uL    HGB 8.4 (L) 12.0 - 16.0 g/dL    HCT 27.7 (L) 35.0 - 45.0 %    MCV 94.2 74.0 - 97.0 FL    MCH 28.6 24.0 - 34.0 PG    MCHC 30.3 (L) 31.0 - 37.0 g/dL    RDW 16.7 (H) 11.6 - 14.5 %    PLATELET 329 651 - 901 K/uL    MPV 9.7 9.2 - 11.8 FL    NEUTROPHILS 80 (H) 40 - 73 %    LYMPHOCYTES 12 (L) 21 - 52 %    MONOCYTES 6 3 - 10 %    EOSINOPHILS 2 0 - 5 %    BASOPHILS 0 0 - 2 %    ABS. NEUTROPHILS 6.2 1.8 - 8.0 K/UL    ABS. LYMPHOCYTES 0.9 0.9 - 3.6 K/UL    ABS. MONOCYTES 0.4 0.05 - 1.2 K/UL    ABS. EOSINOPHILS 0.1 0.0 - 0.4 K/UL    ABS.  BASOPHILS 0.0 0.0 - 0.06 K/UL    DF AUTOMATED     METABOLIC PANEL, BASIC    Collection Time: 03/28/17  4:50 AM   Result Value Ref Range    Sodium 138 136 - 145 mmol/L    Potassium 3.2 (L) 3.5 - 5.5 mmol/L    Chloride 94 (L) 100 - 108 mmol/L    CO2 39 (H) 21 - 32 mmol/L    Anion gap 5 3.0 - 18 mmol/L    Glucose 93 74 - 99 mg/dL    BUN 13 7.0 - 18 MG/DL    Creatinine 0.71 0.6 - 1.3 MG/DL    BUN/Creatinine ratio 18 12 - 20      GFR est AA >60 >60 ml/min/1.73m2    GFR est non-AA >60 >60 ml/min/1.73m2    Calcium 8.4 (L) 8.5 - 10.1 MG/DL   CALCIUM, IONIZED    Collection Time: 03/28/17  4:50 AM   Result Value Ref Range    Ionized Calcium 1.04 (L) 1.12 - 1.32 MMOL/L   PHOSPHORUS    Collection Time: 03/28/17  4:50 AM   Result Value Ref Range    Phosphorus 2.8 2.5 - 4.9 MG/DL           Tommie Meng MD  3/28/2017  2:17 PM

## 2017-03-28 NOTE — PROGRESS NOTES
Progress Note    Patient: Lance Desai MRN: [id number]  SSN: [id number]    YOB: 1960  Age: 49 y.o. Sex: female      Admit Date: 3/3/2017    LOS: 25 days     Subjective:     Talked to nurse: no new seizures, but lethargic. Objective:     Vitals:    03/28/17 0205 03/28/17 0951 03/28/17 1523 03/28/17 1732   BP: 125/68 121/82 131/81 108/67   Pulse: 96 88 95 91   Resp: 20 18 18 18   Temp: 98.7 °F (37.1 °C) 98.2 °F (36.8 °C) 98.5 °F (36.9 °C) 98.5 °F (36.9 °C)   SpO2: 100% 100% 100% 100%   Weight:       Height:            Intake and Output:  Current Shift:    Last three shifts: 03/27 0701 - 03/28 1900  In: 1049.2 [I.V.:1049.2]  Out: 2200 [Urine:2200]    Physical Exam:   GENERAL: uncooperative, no distress, confused    Lab/Data Review:  BMP:   Lab Results   Component Value Date/Time     03/28/2017 04:50 AM    K 3.2 (L) 03/28/2017 04:50 AM    CL 94 (L) 03/28/2017 04:50 AM    CO2 39 (H) 03/28/2017 04:50 AM    AGAP 5 03/28/2017 04:50 AM    GLU 93 03/28/2017 04:50 AM    BUN 13 03/28/2017 04:50 AM    CREA 0.71 03/28/2017 04:50 AM    GFRAA >60 03/28/2017 04:50 AM    GFRNA >60 03/28/2017 04:50 AM     CBC:   Lab Results   Component Value Date/Time    WBC 7.7 03/28/2017 04:50 AM    HGB 8.4 (L) 03/28/2017 04:50 AM    HCT 27.7 (L) 03/28/2017 04:50 AM     03/28/2017 04:50 AM        Assessment:     56yoF with hx of endometrial carcinoma and s/p XRT and bilateral ureteral obstruction with chronic stents last changed 8/19/17   Cr improving now 0.71 (6.1 on admission)    Plan:      Will continue waiting for stabilization before considering JJ exchange  We will see her again Thursday  Please call for urology advise sooner if needed    Signed By: Rehan Anand MD     March 28, 2017

## 2017-03-28 NOTE — ROUTINE PROCESS
Bedside and Verbal shift change report given to [address] [facility] Saira (oncoming nurse) by Lissa Ann (offgoing nurse). Report included the following information SBAR, Kardex, Intake/Output, MAR, Recent Results and Cardiac Rhythm Sinus Tach   1010 Pt very adamant about not eating. Educated pt on the role of nutrition in healing. Pt states that she does not have an appetite. 1230 Pt resting in bed with no change to condition and no complaints of pain. Will continue to monitor. Pt alert and able to communicate her needs. 1600 Pt resting in bed with no change to condition and no complaints of pain. Will continue to monitor. [address] and turned pt. Pt had a bowel movement and a small amount of bloody discharge from the vaginal area. Dr. Carlos Pitts informed during rounds about blood from vaginal area. Bedside and Verbal shift change report given to Penelope BRAVO (oncoming nurse) by [facility] (offgoing nurse).  Report included the following information SBAR, Kardex, Intake/Output, MAR, Recent Results and Cardiac Rhythm Sinus Tach

## 2017-03-28 NOTE — PROCEDURES
[address] [facility]    Name:  Hardeep William  MR#:  [id number]  :  1960  Account #:  [de-identified]  Date of Adm:  2017  Date of Service:  2017      CHIEF COMPLAINT:  Seizures. FINDINGS:  The digital EEG started with the patient in the awake  state. She had a posterior dominant rhythm that was in the 5 to 6  Hertz range. The background was symmetrical.  Photic stimulation  was unremarkable. The patient was briefly asleep, with occasional  sleep spindles. No focal epileptiform abnormalities were noted. IMPRESSION:  This was an abnormal electroencephalogram secondary  to the mild slowing and poor organization, consistent with a mild  encephalopathy and nonspecific etiology.         Juan Rich MD    RL / [address]  D:  2017   17:49  T:  2017   05:47  Job #:  [id number]

## 2017-03-28 NOTE — PROGRESS NOTES
INFECTIOUS DISEASE FOLLOW UP NOTE :    Admit Date: 3/3/2017     Current  Prior    Daptomycin 3/8-20 linezolid 3/4 - 3, vanc/zosyn/levoflox 3/3 -0   aztreonam/anidulafungin 3/12-8     ASSESSMENT: -> RECS     NEW seizure activity 3/26 -> per neuro   VRE BSI with ?aortic endocarditis  - blcx 3/3 2/2 VRE positive   - repeat 3/6 ntd   - TTE aortic stenosis with possible aortic vegetation  [chronic mixed calcified density]  - source likely UTI; pt refused ESSIE and removal of mediport  - 3/8 bctx's ng, 3/12 ng x2, 3/17 ngtd x 2  - CPK 3/18 128 -> Cont Daptomycin as ordered (end date: 4/17)   -> CBC, BMP, ESR, CRP, CPK q Monday  Follow up w/ Gabrielle ID after 1 week. Call [phone] for appt w/ Adolfo Ascencio, MARCOS     VRE UTI, complicated   - UA with large LE , wbc tntc, jamar 4+  - ucx >100k VRE  - CT with no hydronephrosis but has b/l ureteral stents  - appreciate urology consult and eventual plan for stent exchange/removal   - persistent sterile pyuria -> abx as above  -> stent mgmt per      Sepsis POA  - fever 102 , leukocytosis 28K, tachycardia, hypotension  - source VRE UTI and BSI or DVT  -allergic allergic reaction and IE also in DDX  - WBC normal  ?Drug  Aztreonam / anidulangin stopped 3/20  - resolved -> continue current therapy    Resp failure NTB 3/11  -initial cxr nad ->white out L s/o mucus plug, SP bronch 3/13 extensive L mucus plugging  - sputum cx C.albicans   - extubated 3/18< MBS aspiration risk -> per PCCM   ADR - Rash over abdomen/chest   - secondary to linezolid which has been listed from before as allergy - rash improved off linezolid  -> monitor   Lymphedema with R medial thigh blister- not currently infected appearing  -massive pannus with intertrigo  -> topical therapy p recent systemic antifungal   RLE subacute DVT  - per primary team   Rt pelvic mass ? ovarian ca / endometrial ca with mets   - palliative care was involved , was hospice at home and now revoked  - may need to re involve palliative as prognosis guarded    DEV due to sepsis - better, Cr 1.9->0.9 today     Thrombocytopenia -better 94-> 111 ->monitor   Blood loss anemia        MICROBIOLOGY:   3/3  Blcx x 2 - 2/2 VRE S gent, linezolid , daptomycin quinupristin         Ucx > 100K VRE  3/4 Flu Ag test neg   3/6  Blcx x 2 ng  3/8  Blcx x 2 ng   3/11 uctx ng  3/12 bctx x 2 ng   sput C albicans  3/14     ucx ng  3/17 blcx ngtd   spcx rare C albicans   uctx ng    LINES AND CATHETERS:   Rt chest mediport     SUBJECTIVE :     Interval notes reviewed. Lying in bed not in distress. Currently oriented but asks \"what happened to me?\".   Afebrile    MEDICATIONS :     Current Facility-Administered Medications   Medication Dose Route Frequency    dextrose 5% - 0.45% NaCl with KCl 40 mEq/L infusion   IntraVENous CONTINUOUS    furosemide (LASIX) tablet 40 mg  40 mg Oral BID    ondansetron (ZOFRAN ODT) tablet 4 mg  4 mg Oral Q8H PRN    heparin (porcine) 100 unit/mL injection 300 Units  300 Units InterCATHeter ONCE PRN    levETIRAcetam (KEPPRA) 1500 mg in 100 ml IVPB  1,500 mg IntraVENous Q12H    morphine injection 2 mg  2 mg IntraVENous Q6H PRN    DAPTOmycin (CUBICIN) 630 mg in 0.9% sodium chloride 50 mL IVPB RF formulation  630 mg IntraVENous Q24H    magnesium oxide (MAG-OX) tablet 400 mg  400 mg Oral DAILY    enoxaparin (LOVENOX) injection 40 mg  40 mg SubCUTAneous Q12H    0.9% sodium chloride infusion 250 mL  250 mL IntraVENous PRN    chlorhexidine (PERIDEX) 0.12 % mouthwash 10 mL  10 mL Oral Q12H    acetaminophen (TYLENOL) solution 650 mg  650 mg Oral Q4H PRN    albuterol-ipratropium (DUO-NEB) 2.5 MG-0.5 MG/3 ML  3 mL Nebulization Q6H RT    pantoprazole (PROTONIX) 40 mg in sodium chloride 0.9 % 10 mL injection  40 mg IntraVENous DAILY    sodium chloride (NS) flush 5-10 mL  5-10 mL IntraVENous Q8H    sodium chloride (NS) flush 5-10 mL  5-10 mL IntraVENous PRN    nystatin (MYCOSTATIN) 100,000 unit/gram powder   Topical BID    sodium bicarbonate tablet 650 mg  650 mg Oral TID    bisacodyl (DULCOLAX) suppository 10 mg  10 mg Rectal DAILY PRN    glucose chewable tablet 16 g  16 g Oral PRN    glucagon (GLUCAGEN) injection 1 mg  1 mg IntraMUSCular PRN    dextrose (D50W) injection syrg 12.5-25 g  25-50 mL IntraVENous PRN    ondansetron (ZOFRAN) injection 4 mg  4 mg IntraVENous Q4H PRN       OBJECTIVE :     Visit Vitals    /82    Pulse 88    Temp 98.2 °F (36.8 °C)    Resp 18    Ht 5' 4\" (1.626 m)    Wt 105 kg (231 lb 7.7 oz)    SpO2 100%    BMI 39.73 kg/m2       Temp (24hrs), Av.5 °F (36.9 °C), Min:98.2 °F (36.8 °C), Max:98.7 °F (37.1 °C)    GEN - morbidly obese, ill appearing WF lying in bed, not in distress  HEENT - conj pale no thrush lips cracked  NECK- R mediport accessed  CHEST- no examined today  CVS- 2/6 sys murmur, no rubs or gallops  ABD- soft, nontender  NEURO - alert, oriented, cooperative      Labs: Results:   Chemistry Recent Labs      17   0450  17   0600  17   0500   GLU  93  95  84   NA  138  136  137   K  3.2*  3.0*  3.5   CL  94*  92*  93*   CO2  39*  34*  34*   BUN  13  15  18   CREA  0.71  0.69  0.76   CA  8.4*  7.9*  7.0*   AGAP  5  10  10   BUCR  18  22*  24*      CBC w/Diff Recent Labs      17   0450  17   0600  17   0500   WBC  7.7  9.4  7.5   RBC  2.94*  2.99*  2.94*   HGB  8.4*  8.5*  8.3*   HCT  27.7*  27.7*  27.2*   PLT  249  238  226   GRANS  80*  83*  83*   LYMPH  12*  11*  11*   EOS  2  1  1        MBS 3/21 IMPRESSION:     Aspiration with cough reflex with thin consistencies. Airway penetration with  nectar consistencies. [facility] 3/26 IMPRESSION:     No acute intracranial abnormalities. cxr 3/26 Impression:        1. Atelectasis/consolidation left lower lobe about the same. 2. Right central line satisfactory position without pneumothorax. CT abdo/pelvis 3/3 - IMPRESSION:  1. New cystic mass right adnexal region. Recurrent ovarian carcinoma cannot be  excluded. 2. New right middle lobe pulmonary nodule. Cannot rule out metastatic disease. 3. Bilateral ureteral stents with resolved hydronephrosis. 4. Stable or minimally increased left periaortic retroperitoneal adenopathy.     3/21 pvl Right Leg:-  Deep venous thrombosis: Yes  Proximal extent of thrombus: Common Femoral  Superficial venous thrombosis: Not examined  Deep venous insufficiency: Not examined  Superficial venous insufficiency: Not examined     Left Leg:-  Deep venous thrombosis: No  Superficial venous thrombosis: Not examined  Deep venous insufficiency: Not examined  Superficial venous insufficiency: Not examined    Portia Rosenthal MD  March 28, 2017  [facility]  [phone]

## 2017-03-29 NOTE — PROGRESS NOTES
0830 Assumed care of patient. patien in bed resting. Denies any pain or discomfort. Patient is very soft spoken. Remains on contact precautions for VRE in the urine. 1200 IDR rounds completed. No new orders at this time    1415 Patient in bed resting. Bed in low position. Afternoon medications given. 1900 Bedside shift change report given to  (oncoming nurse) by Avila Villa RN (offgoing nurse). Report included the following information SBAR, Kardex, Intake/Output and MAR.

## 2017-03-29 NOTE — PROGRESS NOTES
1940 Bedside and Verbal shift change report given to MARK Rothman RN (oncoming nurse) by FOSTER Pina RN (offgoing nurse). Report included the following information SBAR, Kardex, Intake/Output, MAR and Recent Results. Bed in low popsition, call bell within reach, and patient offered no complains. Will continue to monitor. 2010 Shift assessment completed. Patient denies any pain. Will continue to monitor. 8061 Reassessment completed. No changes to previous assessment. Patient pulled up in bed. Will continue to monitor. 6650 Blood drawn from [facility] for lab. Patient tolerated well. Will continue to monitor. 4300 Bedside and Verbal shift change report given to FOSTER Pina RN (oncoming nurse) by Gogo Cote RN (offgoing nurse). Report included the following information SBAR, Kardex, MAR and Recent Results.

## 2017-03-29 NOTE — PROGRESS NOTES
NUTRITION follow-up/Plan of care    RECOMMENDATIONS:     1. Dental Soft diet; chopped meats; HTL per SLP  2. SF CIB TID  3. Monitor weight, labs and PO intake  4. RD to follow    GOALS:     1. Ongoing: PO intake meets >75% of protein/calorie needs by 4/3  2. Ongoing: Maintain weight/Gradual weight loss ( 1-2 lb) by 4/5    ASSESSMENT:     Weight status is classified as obese per BMI of 39.7. New weight n/a on record. PO intake is poor. Added CIB TID for additional calories/protein. Labs noted. Nutrition recommendations listed. RD to follow. Nutrition Risk:  []   High [x]  Moderate [] Low    SUBJECTIVE/OBJECTIVE:     Transferred from ICU. Stage 2 sacral pressure ulcer per nursing documentation. S/P MBS on 3/21. Reviewed SLP recommendations. Patient with poor PO intake. Observed minimal intake of lunch meal. Will monitor. Information Obtained From:   [x] Chart Review  [x] Patient  [] Family/Caregiver  [] Nurse/Physician   [] Patient Rounds/Interdisciplinary Meeting    Diet: Dental Soft diet; chopped meats; HTL per SLP  Patient Vitals for the past 100 hrs:   % Diet Eaten   03/29/17 1431 0 %   03/29/17 0959 0 %   03/28/17 1800 0 %   03/28/17 1527 0 %   03/28/17 1010 0 %   03/28/17 0945 0 %   03/27/17 1745 0 %   03/27/17 1230 0 %   03/27/17 0900 0 %     Medications: [x] Reviewed   Encounter Diagnoses     ICD-10-CM ICD-9-CM   1. Septic shock (HCC) A41.9 038.9    R65.21 785.52     995.92   2. Acute renal failure, unspecified acute renal failure type ([facility] [address] N17.9 584.9   3. Acute cystitis with hematuria N30.01 595.0   4.  High anion gap metabolic acidosis W46.0 523.5     Past Medical History:   Diagnosis Date    Anxiety and depression     Aortic stenosis     Arthritis     Arthropathy     Cardiac disorder     Cellulitis     left leg    Chronic pain     Endometrial adenocarcinoma (HCC)     Gastric ulcer     H/O ovarian cancer     Hematemesis     Infection     Lymphedema     Munchausen syndrome     Narcotic dependence ([facility] [address]     Obesity     Spinal stenosis     Status post partial hysterectomy     Urethral obstruction      Labs:    Lab Results   Component Value Date/Time    Sodium 139 03/29/2017 05:00 AM    Potassium 3.3 03/29/2017 05:00 AM    Chloride 96 03/29/2017 05:00 AM    CO2 36 03/29/2017 05:00 AM    Anion gap 7 03/29/2017 05:00 AM    Glucose 104 03/29/2017 05:00 AM    BUN 13 03/29/2017 05:00 AM    Creatinine 0.65 03/29/2017 05:00 AM    Calcium 8.3 03/29/2017 05:00 AM    Magnesium 1.6 03/29/2017 05:00 AM    Phosphorus 2.3 03/29/2017 05:00 AM    Albumin 2.7 03/25/2017 04:00 AM     Anthropometrics: BMI (calculated): 39.7   Last 3 Recorded Weights in this Encounter    03/18/17 0000 03/19/17 0135 03/20/17 0256   Weight: 114.2 kg (251 lb 12.3 oz) 110.4 kg (243 lb 6.2 oz) 105 kg (231 lb 7.7 oz)      Ht Readings from Last 1 Encounters:   03/06/17 5' 4\" (1.626 m)     []  Weight Loss (on Lasix)  []  Weight Gain  []  Weight Stable   [x]  New wt n/a on record     Estimated Nutrition Needs:   1480 Kcals/day  Protein (g): 70 g    Nutrition Problems Identified:   [x] Suboptimal PO intake   [] Food Allergies  [x] Difficulty chewing/swallowing/poor dentition  [] Constipation/Diarrhea   [x] Nausea/Vomiting   [] None  [] Other:     Plan:   [] Therapeutic Diet  []  Obtained/adjusted food preferences/tolerances and/or snacks options   [x]  Continue supplements as prescribed  [x] Occupational therapy following for feeding techniques  []  HS snack added   [x]  Modify diet texture   []  Modify diet for food allergies   []  Assist with menu selection   [x]  Monitor PO intake on meal rounds   [x]  Continue inpatient monitoring and intervention   []  Participated in discharge planning/Interdisciplinary rounds/Team meetings   []  Other:     Education Needs:   [x] Not appropriate for teaching at this time    [] Identified and addressed    Nutrition Monitoring and Evaluation:   [x] Continue inpatient monitoring and interventions [] Other:     Lillette Hiwot

## 2017-03-29 NOTE — PROGRESS NOTES
Problem: Self Care Deficits Care Plan (Adult)  Goal: *Acute Goals and Plan of Care (Insert Text)  Occupational Therapy Goals  Initiated 3/20/2017; re-evaluated on 3/29/17. Pt met goals 1 and 2. Will d/c as pt is performing at PLOF. 1. Patient will perform grooming tasks at bed level with minimal assistance/contact guard assist (goal met 3/29/17; Horace Darryl, MS OTR/L)  2. Patient will perform self-feeding with minimal assistance/contact guard assist (goal met 3/29/17; Jillian Miramontes, MS OTR/L)  3. Patient will perform bathing at bed level with moderate assistance . 4. Patient will perform functional task at bed level to increase endurance and strength for increased participation in ADLs. 5. Patient will participate in upper extremity therapeutic exercise/activities with minimal assistance/contact guard assist for 8 minutes to increase BUE strength for self care tasks. 6. Patient will utilize energy conservation techniques during functional activities with verbal cues. Outcome: Resolved/Met Date Met:  03/29/17  OCCUPATIONAL THERAPY RE-EVALUATION/DISCHARGE     Patient: Colby Christensen (31 y.o. female)  Date: 3/29/2017  Diagnosis: Severe sepsis with septic shock (CODE) ([facility])  UTI (urinary tract infection)  Severe sepsis with septic shock (CODE) (HCC)  Severe thrombocytopenia (HCC)  mucous plugs  mucous plugs Severe sepsis with septic shock (CODE) ([facility])  Procedure(s) (LRB):  BRONCHOSCOPY (N/A) 13 Days Post-Op  Precautions: Fall, Contact  Chart, occupational therapy assessment, plan of care, and goals were reviewed. ASSESSMENT: Pt supine on arrival with HOB elevated. Pt now able to perform self-feeding with RUE, which is significant progress since initial evaluation (unable to reach mouth with RUE). Pt has met self-feeding/grooming goals at bed level (pt's PLOF; bed level). Pt does demonstrate increased weakness of LUE; encouraged to utilize LUE as much as possible.  Pt continues to be max A/total A for LB dressing, bathing at bed level (baseline). Pt encourage to continue performing self-feeding to maintain strength of RUE, she verbalized understanding. Pt requesting icechips, stating the doctor said it was okay; clarified with SHELLY Castillo who confirmed the doctor is allowing icechips. However, after one bite of icechips, pt was coughing. Per therapist's discretion, placed icechips back on tray table and advised pt to drink thickened liquid if thirsty; SHELLY Castillo made aware. Call bell left within reach. Will discharge pt from OT caseload at this time, as pt is performing at baseline in terms of assisting with self-feeding and grooming. EDUCATION: Patient educated on importance of continuing to perform self-feeding to maintain independence. PLAN:  Patient will be discharged from occupational therapy at this time. Rationale for discharge:  [X] Goals Achieved  [ ] Bertrand Been  [ ] Patient not participating in therapy  [ ] Other:  Discharge Recommendations:  Home Health  Further Equipment Recommendations for Discharge:  N/A       SUBJECTIVE:   Patient stated Am I going home tomorrow? \"      OBJECTIVE DATA SUMMARY:   G CODES: Self Care  Current  CL= 60-79%   Goal  CL= 60-79%   D/C  CM= 80-99%. The severity rating is based on the Other Functional Assessment, MMT, ROM     Hospital course since last seen and reason for reevaluation: Patient has met self-feeding and grooming (at bed level) goals. Pt was initially unable to perform self-feeding secondary to significant weakness of RUE (dominant hand). Pt now able to self-feeding and perform simple grooming tasks at bed level with RUE (baseline). Pt demonstrates weaker LUE, questionable if due to recent seizures and subsequent decline in functional ability.      Cognitive/Behavioral Status:  Neurologic State: Alert  Orientation Level: Oriented to person, Oriented to place  Cognition: Follows commands  Safety/Judgement: Fall prevention      Skin: Intact (BUEs)  Edema: None noted (BUEs)     Vision/Perceptual:    Acuity: Within Defined Limits       Coordination:  Coordination: Generally decreased, functional (BUEs; RUE WFL; LUE: decreased)  Fine Motor Skills-Upper: Right Intact; Left Impaired    Gross Motor Skills-Upper: Right Intact; Left Intact      Balance:  Sitting:  (pt seen at bed level (baseline))  Standing:  (pt seen at bed level (baseline))      Strength:  Strength: Grossly decreased, non-functional (BUEs; RUE: 3/5 elbow flex; LUE: 2-/5 elbow flex)     Tone & Sensation:  Tone: Normal (BUEs)  Sensation: Intact (BUEs)     Range of Motion:  AROM: Grossly decreased, non-functional (RUE: full elbow flex; LUE: lifts hand; slight elbow flexion)  PROM: Generally decreased, functional (BUEs)                 ADL Assessment:  Feeding: Setup  Oral Facial Hygiene/Grooming: Setup  Bathing: Maximum assistance  Upper Body Dressing: Maximum assistance  Lower Body Dressing: Total assistance  Toileting: Total assistance     Cognitive Retraining  Safety/Judgement: Fall prevention     Pain:  Pre treatment pain: 0/10  Post treatment pain: 0/10  Pain Scale 1: Numeric (0 - 10)  Pain Intensity 1: 0     Activity Tolerance:  Poor+  Please refer to the flowsheet for vital signs taken during this treatment.   After treatment:   [ ]  Patient left in no apparent distress sitting up in chair  [X]  Patient left in no apparent distress in bed  [X]  Call bell left within reach  [X]  Nursing notified  [ ]  Caregiver present  [ ]  Bed alarm activated     Thank you for this referral.  Finn Hooker OT  Time Calculation: 13 mins

## 2017-03-29 NOTE — PROGRESS NOTES
2015 Shift assessment complete, pt was incontinent of bowel, note small amount of jay red blood on byrne. Pt denies pain, no reported needs, pt turned to left, will continue to monitor. 0026 Pt reported HA given tylenol solution, pt tolerated well. Bed alarm on will continue to monitor. 1495 Pt still needs MRI will pass off to day RN     0648 Pt had an uneventful night, no needs or concerns reported. Bedside shift change report given to ( (oncoming nurse) by Lawrence Cook (offgoing nurse). Report included the following information Kardex, Procedure Summary, MAR, Accordion and Med Rec Status.

## 2017-03-29 NOTE — PROGRESS NOTES
Daily Progress Note: 3/29/2017 11:49 AM   Admit Date: 3/3/2017    Patient seen in follow up for multiple medical problems as listed below:  Patient Active Problem List   Diagnosis Code    Anemia D64.9    Anxiety F41.9    Nausea and vomiting R11.2    Morbid obesity ([facility] [address] E66.01    Chronic pain syndrome G89.4    History of ovarian cancer Z85.43    History of gastric bypass Z98.890    CAD (coronary artery disease) I25.10    History of Clostridium difficile Z87.19    History of endometrial cancer Z85.42    History of GI bleed Z87.19    Lymphedema I89.0    Hypokalemia E87.6    DEV (acute kidney injury) ([facility] [address] N17.9    Pulmonary nodules R91.8    A[facility]fib ([facility] I48.91    Aortic stenosis I35.0    GI bleed K92.2    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    Acute blood loss anemia D62    Bladder mass N32.89    Flank pain, chronic R10.9, G89.29    UTI (urinary tract infection) N39.0    Severe sepsis with septic shock (CODE) ([facility]) R65.21    Severe thrombocytopenia ([facility]) D69.6       Assesment   49 y.o. female who is admitted for severe sepsis and shock, urosepsis and severe thrombocytopenia. Patient was on Hospice until now. She presents with c/o altered mental status via EMS with cyanotic/desirae complextion. Patient was found AMS with blood sugar low 40's. There was no IV assess and she was given Glucagon and her mentation and blood sugar improved. Patient in non diabetic. She has endometrial cancer. In ED she has urosepsis and hypotension requiring pressor support after 30 cc/kg BOLUS normal saline. Patient has Mediport. She also was febrile with Tmax 101.6. Denies cough, sob, chest pain. She was started on antibiotics . Hx VRE UTI noted. She reports having foul smelling urine and vaginal discharge. Patient had a platelet level of 59,321 with no bleed requiring platelet transfusion initially.  She improved and was transferred to the floor until an RRT was called 3/11 for acute hypoxic respiratory failure requiring intubation. Bronchospy 3/13 with significant mucous plugging. Repeat bronchoscopy 3/16. Respiratory cultures only with candida. Extubated and re-intubated 3/15. Bronchoscopy 3/16 minimal mucous plugging. Fevers started 3/16 source unknown. Has required prn RBC transfusions for Hg<7. Re-cultured 3/17 however continued fevers and TTE findings of questionable vegetation warrant treatment for endocarditis. Extubated 3/18 and has continued to improve clinically. She will daptomycin until 4/17. She was to be discharged 3/26 but hours prior to discharge she had seizure like activity and remained sleepy afterwards. Mentation has slowly improved she was started on keppra BID. EEG no epileptic activity, CT head no acute findings. MRI head pending. She will need her bilateral ureter stents (JJ) exchanged due to her VRE UTI.   Jacque Ralphs with New onset seizure  witnessed seizure 2030 on 3/26 given 2m IV ativan. No report of what activity was seen. Drowsy/post ictal 3/27 am. CT head wnl. MRI head pending. EEG no epileptic activity. Neurology consult for further opinion, Initiated on Keppra BID      Endocarditis  -plan for Dapto till 4/17 has central IV access     Hypoxic resp failure - intubated x2, resolved  -Bronchospy 3/13, 3/16 with significant mucous plugging.   -Cx pending on eraxis, azactam, dapto   Extubated 3/18     Subacute RLE DVT  -thought due to obstructing pelvic CA. Present on admission due to subacute nature. -LMWH. S/p IVC filter placement     Severe sepsis with shock and VRE bacteremia  - 2/2 UTI. Leukocytosis, tachy, resolving  - off Levophed for pressor support goal MAP > 65, Zyvox, Levaquin, zosyn initially. - Follow urine and blood culture: GPC in blood on Daptomycin with ID consult + Eraxis and Azactam  - Intensivist consulting      VRE UTI  - hx VRE UTI,again with VRE. Immunosuppression endometrial cancer  - On Zyvox , Zosyn , Levaquin initially then to zyvox with rash.  then Dapto  - repeat UA/Cx 3/14-NGTD  - Hx of bilateral ureter stents placed 8/16 need to re exchanged.     Severe thrombocytopenia  - Platelet down to 9k on admission s/p transfusions x2. keep >25K. resolved      Encephalopathy x2 events  - 2/2 Hypoglycemia vs urosepsis vs hypotension vs uremia-resolved CT head non-acute  - Metabolic encephalopathy again 3/27 - improving. CT head wnl. Etiology unk      Hypoglycemia   - Resolved       ARF  - cr 6.55/Vvs588 ->improving Cr into 1's  - severe prerenal with UREMIA  - IVF. Na Bicarb prn      Anemia   - Hgb 8.8  - likely 2/2 Endometrial malignancy   - Transfuse if hemoglobin < 7      Hx of Ovarian and Endometrial Cancer with abdominal pain  - Patient was on Hospice until today. Daughter has rescinded her DDNR and Hospice and now wants to be FULL CODE  - Palliative care consult monday  - Lower abdomen pain Likely due to new R adnexal mass from ovarian CA. Discuss further pending hospice plans  - pain control  -Mediport for IV drug use, if no urinary source may need to be ruled out as a possible source      Hyponatremia   Hypokalemia  - IVF corrected. Kcl prn      Vaginal discharge   - Wet prep ordered     Sacral decub ulcers  -POA. Wound care consulting      Chronic problems  1. Recurrent gastrointestinal bleed with multiple  esophagogastroduodenoscopies in the past year with negative results. 2. History of ovarian cancer. 3. History of endometrial cancer. 4. Bladder mass, status post cystoscopy and biopsy results pending. 5. Acute blood loss anemia, stable. 6. Chronic back pain. 7. Chronic abdominal pain. 8. Nausea and vomiting secondary to above      DVT Protocol Active: yes  Code Status:  Partial Code     Disposition: Home tod/hina pending JJ stent exchange. [facility]    Subjective:     CC: Altered mental status    Interval History: mentation improved. Patient alert and talking. MRI head canceled.    She is appropriate for discharge today however she needs JJ exchanges. Will schedule as outpatient and DC today or plan on exchange tomorrow. Called [facility] awaiting call back    Objective:     Visit Vitals    /80    Pulse 89    Temp 98.3 °F (36.8 °C)    Resp 16    Ht 5' 4\" (1.626 m)    Wt 105 kg (231 lb 7.7 oz)    SpO2 99%    BMI 39.73 kg/m2       Temp (24hrs), Av.5 °F (36.9 °C), Min:98.3 °F (36.8 °C), Max:98.6 °F (37 °C)        Intake/Output Summary (Last 24 hours) at 17 1213  Last data filed at 17 1017   Gross per 24 hour   Intake                0 ml   Output             3101 ml   Net            -3101 ml       Gen: sleepy but alert, NAD  HEENT:  KARINA  Neck: No Bruits/JVD   Lungs:   CTAB. Good respiratory effort  Heart:   RR S1 S2 without M/R/G  Abdomen: voery obese,NT, BSX4,   Extremities:   Obese/ LE edema. No cyanosis. Skin:  no jaundice/lesions  Neuro: moving arms.  Patient non-compliant with neuro exam      Data Review:     Meds/Labs/Tests reviewed    Current Shift:  701 - 1900  In: 0   Out: 451 [Urine:450]  Last three shifts:  1901 -  07  In: 699.2 [I.V.:699.2]  Out: 3400 [Urine:3400]  Recent Labs      17   0500  17   0450  17   0600   WBC  7.7  7.7  9.4   RBC  2.84*  2.94*  2.99*   HGB  8.1*  8.4*  8.5*   HCT  26.8*  27.7*  27.7*   PLT  240  249  238   GRANS  77*  80*  83*   LYMPH  17*  12*  11*   EOS  2  2  1       Recent Labs      17   0500  17   0450  17   0600   BUN  13  13  15   CREA  0.65  0.71  0.69   CA  8.3*  8.4*  7.9*   K  3.3*  3.2*  3.0*   NA  139  138  136   CL  96*  94*  92*   CO2  36*  39*  34*   PHOS  2.3*  2.8  2.7   GLU  104*  93  95        Lab Results   Component Value Date/Time    Glucose 104 2017 05:00 AM    Glucose 93 2017 04:50 AM    Glucose 95 2017 06:00 AM    Glucose 84 2017 05:00 AM    Glucose 86 2017 04:00 AM          Care coordination with Nursing/Consultants/staff: 10  Prior history, labs, and charting reviewed: 15    Procedures/Imaging:  CT head 3/26 evening  EEG 3/27  MRI head    Total time spent with chart review, patient examination/education, discussion with staff on case,documentation and medication management / adjustment  :  30 Minutes      Dr Carrion [facility]  Pager: [phone]

## 2017-03-29 NOTE — PROGRESS NOTES
Problem: Dysphagia (Adult)  Goal: *Acute Goals and Plan of Care (Insert Text)  Recommendations:  Diet: mech-soft, chopped/honey-thick liquids (cup sips)  Meds: one at a time in pudding  Aspiration Precautions  Oral Care TID  Other: MBS completed with aspiration on thin and nectar    Goals: Patient will:  1. Tolerate PO trials with 0 s/s overt distress in 4/5 trials  2. Utilize compensatory swallow strategies/maneuvers (decrease bite/sip, size/rate, alt. liq/sol) with min cues in 4/5 trials  3. Perform oral-motor/laryngeal exercises to increase oropharyngeal swallow function with min cues  4. Complete an objective swallow study (i.e., MBSS) to assess swallow integrity, r/o aspiration, and determine of safest LRD, min A - goal met 3/21/17     Outcome: Progressing Towards Goal  SPEECH LANGUAGE PATHOLOGY DYSPHAGIA TREATMENT     Patient: Heydi Sosa (78 y.o. female)  Date: 3/29/2017  Diagnosis: Severe sepsis with septic shock (CODE) (HCC)  UTI (urinary tract infection)  Severe sepsis with septic shock (CODE) (HCC)  Severe thrombocytopenia (HCC)  mucous plugs  mucous plugs Severe sepsis with septic shock (CODE) (HCC)  Procedure(s) (LRB):  BRONCHOSCOPY (N/A) 13 Days Post-Op  Precautions: aspiration Contact      ASSESSMENT:  Pt seen for laryngeal/pharyngeal strengthening exercises this pm. Pt A&Ox3. Utilized thermal carbonated thin liquid to target pharyngeal swallow timeliness; via tsp presented for use with effortful swallow trials. Pt with x1 cough with thin liquid presentation when not using compensatory strategies. Strong laryngeal elevation to palpation on 50% of trials. Patty method incoorporated for increased strength; 100% participation. SLP educated pt on strengthening strategies, MBS results, restricted diet vs. comfort diet (as pt requesting thin liquids outside of tx session); verbalized comprehension. Recommend mech-soft solid/honey thick liquid diet. D/w RN Anna. ST to continue to follow.   Progression toward goals:  [X]         Improving appropriately and progressing toward goals  [ ]         Improving slowly and progressing toward goals  [ ]         Not making progress toward goals and plan of care will be adjusted       PLAN:  Recommendations and Planned Interventions:  mech-soft/honey thick liquid diet via cup sip  Patient continues to benefit from skilled intervention to address the above impairments. Continue treatment per established plan of care. Discharge Recommendations:  Home Health and [address]:   Patient stated Jose Bell. OBJECTIVE:   Cognitive and Communication Status:  Neurologic State: Drowsy  Orientation Level: Oriented to person, Oriented to place, Oriented to situation  Cognition: Follows commands  Perception: Appears intact  Perseveration: No perseveration noted  Safety/Judgement: Fall prevention  Dysphagia Treatment:  Oral Assessment:  Oral Assessment  Labial: Decreased rate, Decreased seal  Dentition: Intact  Oral Hygiene: fair  Lingual: Decreased rate, Decreased strength  Velum: No impairment  Mandible: No impairment  P.O. Trials:              Patient Position: [id number]              Vocal quality prior to P.O.: Low volume              Consistency Presented: Thin liquid, Ice chips              How Presented: SLP-fed/presented, Spoon              How Much:  (x10)              Bolus Acceptance: No impairment              Bolus Formation/Control: Impaired              Type of Impairment: Delayed              Propulsion: Delayed (# of seconds)              Oral Residue: None              Initiation of Swallow: Delayed (# of seconds)              Laryngeal Elevation: Decreased              Aspiration Signs/Symptoms: Delayed cough/throat clear              Pharyngeal Phase Characteristics: Poor endurance              Effective Modifications: Small sips and bites              Cues for Modifications:  Moderate                                Oral Phase Severity: Mild Pharyngeal Phase Severity : Mild-moderate                            Exercises:  Laryngeal Exercises:                    Effortful Swallow: Yes  Sets : 2  Reps : 10           Patty: Yes  Sets : 1  Reps : 10           PAIN:  Start of Tx: 0  End of Tx: 0      After treatment:   [ ]              Patient left in no apparent distress sitting up in chair  [X]              Patient left in no apparent distress in bed  [X]              Call bell left within reach  [ ]              Nursing notified  [ ]              Family present  [ ]              Caregiver present  [ ]              Bed alarm activated         COMMUNICATION/EDUCATION:   [X]        Aspiration precautions; swallow safety; compensatory techniques  [ ]        Patient unable to participate in education; education ongoing with staff  [ ]         Posted safety precautions in patient's room.   [ ]         Oral-motor/laryngeal strengthening exercises        Conrado Crenshaw [facility] SLP  Time Calculation: 8 mins

## 2017-03-29 NOTE — PROGRESS NOTES
INFECTIOUS DISEASE FOLLOW UP NOTE :    Admit Date: 3/3/2017     Current  Prior    Daptomycin 3/8-20 linezolid 3/4 - 3, vanc/zosyn/levoflox 3/3 -0   aztreonam/anidulafungin 3/12-8     ASSESSMENT: -> RECS     NEW seizure activity 3/26  - no further seizures on Keppra -> per neuro   VRE BSI with ?aortic endocarditis  - blcx 3/3 2/2 VRE positive   - repeat 3/6 ntd   - TTE aortic stenosis with possible aortic vegetation  [chronic mixed calcified density]  - source likely UTI; pt refused ESSIE and removal of mediport  - 3/8 bctx's ng, 3/12 ng x2, 3/17 ngtd x 2  - CPK 3/18 128 -> Cont Daptomycin as ordered (end date: 4/17)   -> CBC, BMP, ESR, CRP, CPK q Monday  Follow up w/ [facility] after 1 week.   Call [phone] for appt w/ Donny Jennings, NP  -> check CPK today     VRE UTI, complicated   - UA with large LE , wbc tntc, jamar 4+  - ucx >100k VRE  - CT with no hydronephrosis but has b/l ureteral stents  - appreciate urology consult and eventual plan for stent exchange/removal   - persistent sterile pyuria -> abx as above  -> stent mgmt per      Sepsis POA  - fever 102 , leukocytosis 28K, tachycardia, hypotension  - source VRE UTI and BSI or DVT  -allergic allergic reaction and IE also in DDX  - WBC normal  ?Drug  Aztreonam / anidulangin stopped 3/20  - resolved -> continue current therapy    Resp failure NTB 3/11  -initial cxr nad ->white out L s/o mucus plug, SP bronch 3/13 extensive L mucus plugging  - sputum cx C.albicans   - extubated 3/18< MBS aspiration risk -> per PCCM   ADR - Rash over abdomen/chest   - secondary to linezolid which has been listed from before as allergy - rash improved off linezolid  -> monitor   Lymphedema with R medial thigh blister- not currently infected appearing  -massive pannus with intertrigo  -> topical therapy p recent systemic antifungal   RLE subacute DVT  - per primary team   Rt pelvic mass ?ovarian ca / endometrial ca with mets   - palliative care was involved , was hospice at home and now revoked  - may need to re involve palliative as prognosis guarded    DEV due to sepsis - better, Cr 1.9->0.9 today     Thrombocytopenia -better 94-> 111 ->monitor   Blood loss anemia        MICROBIOLOGY:   3/3  Blcx x 2 - 2/2 VRE S gent, linezolid , daptomycin quinupristin         Ucx > 100K VRE  3/4 Flu Ag test neg   3/6  Blcx x 2 ng  3/8  Blcx x 2 ng   3/11 uctx ng  3/12 bctx x 2 ng   sput C albicans  3/14     ucx ng  3/17 blcx ngtd   spcx rare C albicans   uctx ng    LINES AND CATHETERS:   Rt chest mediport     SUBJECTIVE :     Interval notes reviewed. Appears comfortable lying in bed with NC O2. Remains afebrile. Denies pain or shortness of breath. Specifically denies myalgias.     MEDICATIONS :     Current Facility-Administered Medications   Medication Dose Route Frequency    levETIRAcetam (KEPPRA) 1,000 mg in 100 ml IVPB  1,000 mg IntraVENous Q12H    dextrose 5% - 0.45% NaCl with KCl 40 mEq/L infusion   IntraVENous CONTINUOUS    furosemide (LASIX) tablet 40 mg  40 mg Oral BID    ondansetron (ZOFRAN ODT) tablet 4 mg  4 mg Oral Q8H PRN    heparin (porcine) 100 unit/mL injection 300 Units  300 Units InterCATHeter ONCE PRN    morphine injection 2 mg  2 mg IntraVENous Q6H PRN    DAPTOmycin (CUBICIN) 630 mg in 0.9% sodium chloride 50 mL IVPB RF formulation  630 mg IntraVENous Q24H    magnesium oxide (MAG-OX) tablet 400 mg  400 mg Oral DAILY    enoxaparin (LOVENOX) injection 40 mg  40 mg SubCUTAneous Q12H    0.9% sodium chloride infusion 250 mL  250 mL IntraVENous PRN    chlorhexidine (PERIDEX) 0.12 % mouthwash 10 mL  10 mL Oral Q12H    acetaminophen (TYLENOL) solution 650 mg  650 mg Oral Q4H PRN    albuterol-ipratropium (DUO-NEB) 2.5 MG-0.5 MG/3 ML  3 mL Nebulization Q6H RT    pantoprazole (PROTONIX) 40 mg in sodium chloride 0.9 % 10 mL injection  40 mg IntraVENous DAILY    sodium chloride (NS) flush 5-10 mL  5-10 mL IntraVENous Q8H    sodium chloride (NS) flush 5-10 mL  5-10 mL IntraVENous PRN    nystatin (MYCOSTATIN) 100,000 unit/gram powder   Topical BID    sodium bicarbonate tablet 650 mg  650 mg Oral TID    bisacodyl (DULCOLAX) suppository 10 mg  10 mg Rectal DAILY PRN    glucose chewable tablet 16 g  16 g Oral PRN    glucagon (GLUCAGEN) injection 1 mg  1 mg IntraMUSCular PRN    dextrose (D50W) injection syrg 12.5-25 g  25-50 mL IntraVENous PRN    ondansetron (ZOFRAN) injection 4 mg  4 mg IntraVENous Q4H PRN       OBJECTIVE :     Visit Vitals    /80    Pulse 89    Temp 98.3 °F (36.8 °C)    Resp 16    Ht 5' 4\" (1.626 m)    Wt 105 kg (231 lb 7.7 oz)    SpO2 98%    BMI 39.73 kg/m2       Temp (24hrs), Av.5 °F (36.9 °C), Min:98.3 °F (36.8 °C), Max:98.6 °F (37 °C)    GEN - morbidly obese, ill appearing WF lying in bed, not in distress  HEENT - conj pale no thrush lips cracked  NECK- R mediport accessed  CHEST- no examined today  CVS- 2/6 sys murmur, no rubs or gallops  ABD- soft, nontender  NEURO - alert, oriented, cooperative      Labs: Results:   Chemistry Recent Labs      17   0500  17   0450  17   0600   GLU  104*  93  95   NA  139  138  136   K  3.3*  3.2*  3.0*   CL  96*  94*  92*   CO2  36*  39*  34*   BUN  13  13  15   CREA  0.65  0.71  0.69   CA  8.3*  8.4*  7.9*   AGAP  7  5  10   BUCR  20  18  22*      CBC w/Diff Recent Labs      17   0500  17   0450  17   0600   WBC  7.7  7.7  9.4   RBC  2.84*  2.94*  2.99*   HGB  8.1*  8.4*  8.5*   HCT  26.8*  27.7*  27.7*   PLT  240  249  238   GRANS  77*  80*  83*   LYMPH  17*  12*  11*   EOS  2  2  1        MBS 3/21 IMPRESSION:     Aspiration with cough reflex with thin consistencies. Airway penetration with  nectar consistencies. [facility] 3/26 IMPRESSION:     No acute intracranial abnormalities. cxr 3/26 Impression:        1. Atelectasis/consolidation left lower lobe about the same.   2. Right central line satisfactory position without pneumothorax. CT abdo/pelvis 3/3 - IMPRESSION:  1. New cystic mass right adnexal region. Recurrent ovarian carcinoma cannot be  excluded. 2. New right middle lobe pulmonary nodule. Cannot rule out metastatic disease. 3. Bilateral ureteral stents with resolved hydronephrosis. 4. Stable or minimally increased left periaortic retroperitoneal adenopathy.     3/21 pvl Right Leg:-  Deep venous thrombosis: Yes  Proximal extent of thrombus: Common Femoral  Superficial venous thrombosis: Not examined  Deep venous insufficiency: Not examined  Superficial venous insufficiency: Not examined     Left Leg:-  Deep venous thrombosis: No  Superficial venous thrombosis: Not examined  Deep venous insufficiency: Not examined  Superficial venous insufficiency: Not examined    Ernestine Homans, MD  March 29, 2017  [facility] AT THE [facility]  [phone]

## 2017-03-29 NOTE — MANAGEMENT PLAN
Bedside Interdisciplinary Rounds completed.  Discharge Plan remains home with home health      Marley Guerra RN BSN  Outcomes Manager  Pager # [phone]

## 2017-03-30 NOTE — PROGRESS NOTES
1916 Bedside and Verbal shift change report given to MARK Garcia RN (oncoming nurse) by FOSTER Whyte RN (offgoing nurse). Report included the following information SBAR, Kardex, Intake/Output, MAR and Recent Results. Patient in bed awake watching tv. Bed in low position and call bell within reach. Will continue to monitor. 2040 Shift assessment completed. Administered morphine prn for bilateral leg pain 8/10. Will continue to monitor. 1730 Reassessment completed. No changes to previous assessment. Patient in bed awake watching tv. Patient repositioned and given ice. Patient denies any pain. Will continue to monitor. 0236 Patient complains of nausea. Administered prn Zofran IV. Will continue to monitor. 5090 Blood drawn from [facility] for lab, patient tolerated well. Reassessment completed, no changes to previous assessment. Patient in bed watching tv and denies any pain. Will continue to monitor. 1994 Patient leaving for surgery. Report given to Kalee Malik RN. 0800 Bedside and Verbal shift change report given to NIRALI Galdamez RN (oncoming nurse) by Soraya Rand RN (offgoing nurse). Report included the following information SBAR, Kardex, MAR and Recent Results. Lactated ringers take with patient.

## 2017-03-30 NOTE — PROGRESS NOTES
Speech Therapy Note:    SLP attempted to see pt for dysphagia treatment. Pt NPO for procedure. Will follow up tomorrow as indicated. D/w RN Anna.     Thank you for this referral,  Venson Apley, SLP Intern  Ph: [phone]

## 2017-03-30 NOTE — ANESTHESIA PREPROCEDURE EVALUATION
Anesthetic History     PONV          Review of Systems / Medical History  Patient summary reviewed and pertinent labs reviewed    Pulmonary  Within defined limits                 Neuro/Psych   Within defined limits           Cardiovascular            Dysrhythmias : atrial fibrillation  CAD    Exercise tolerance: <4 METS     GI/Hepatic/Renal         Renal disease: CRI  PUD     Endo/Other        Morbid obesity, arthritis, cancer and anemia     Other Findings   Comments:   Risk Factors for Postoperative nausea/vomiting:       History of postoperative nausea/vomiting? YES       Female? YES       Motion sickness? NO       Intended opioid administration for postoperative analgesia?   NO         Physical Exam    Airway  Mallampati: II  TM Distance: 4 - 6 cm  Neck ROM: normal range of motion   Mouth opening: Normal     Cardiovascular  Regular rate and rhythm,  S1 and S2 normal,  no murmur, click, rub, or gallop             Dental    Dentition: Poor dentition     Pulmonary  Breath sounds clear to auscultation               Abdominal  GI exam deferred       Other Findings            Anesthetic Plan    ASA: 4  Anesthesia type: general          Induction: Intravenous  Anesthetic plan and risks discussed with: Patient

## 2017-03-30 NOTE — PROGRESS NOTES
Progress Note    Patient: Brianna Frank MRN: [id number]  SSN: [id number]    YOB: 1960  Age: 49 y.o. Sex: female      Admit Date: 3/3/2017    LOS: 27 days     Subjective:     More alert. No complains. Objective:     Vitals:    03/30/17 0653 03/30/17 1037 03/30/17 1350 03/30/17 1415   BP: 135/78 115/75 120/63    Pulse: (!) 102 93 88    Resp: 18 18 18    Temp: 98.3 °F (36.8 °C) 98.8 °F (37.1 °C) 98.3 °F (36.8 °C)    SpO2: 99% 100% 100% 98%   Weight:       Height:            Intake and Output:  Current Shift: 03/30 0701 - 03/30 1900  In: 240 [P.O.:240]  Out: 300 [Urine:300]  Last three shifts: 03/28 1901 - 03/30 0700  In: 740 [P.O.:40; I.V.:700]  Out: 4076 [Urine:4075]    Physical Exam:   GENERAL: alert, cooperative, no distress  ABDOMEN: soft, non-tender.  Bowel sounds normal. No masses,  no organomegaly      Lab/Data Review:  BMP:   Lab Results   Component Value Date/Time     03/30/2017 06:15 AM    K 3.8 03/30/2017 06:15 AM    CL 95 (L) 03/30/2017 06:15 AM    CO2 34 (H) 03/30/2017 06:15 AM    AGAP 9 03/30/2017 06:15 AM    GLU 91 03/30/2017 06:15 AM    BUN 13 03/30/2017 06:15 AM    CREA 0.61 03/30/2017 06:15 AM    GFRAA >60 03/30/2017 06:15 AM    GFRNA >60 03/30/2017 06:15 AM     CBC:   Lab Results   Component Value Date/Time    WBC 8.3 03/30/2017 06:15 AM    HGB 8.1 (L) 03/30/2017 06:15 AM    HCT 26.6 (L) 03/30/2017 06:15 AM     03/30/2017 06:15 AM        Assessment:        56yoF with hx of endometrial carcinoma and s/p XRT and bilateral ureteral obstruction with chronic stents last changed 8/19/17   Cr improving now 0.71 (6.1 on admission)     Plan:     NPO after midnight (order placed)  To OR for JJ exchange 3/31 7:30 AM    Signed By: Stas Barreto MD     March 30, 2017

## 2017-03-30 NOTE — PROGRESS NOTES
0745 Assumed care of patient. Remains on contact precautions. New order for patient to be NPO. Patient awake and denies any pain at this time. Call bell within reach      1050 All am medications given. Mediport flushed and dressing changed. Patient receiving bed bath at this time. Will continue to monitor. 1200 IDR rounds completed and no new orders. 1400 Repositioned patient in the bed. C/o numbness and tingling in legs and feet. Repositioned patient and did ROM with legs. Patient now denies numbness or tingling      1840 All evening medications given    1900 Bedside shift change report given to  (oncoming nurse) by Mauri Crawford (offgoing nurse). Report included the following information SBAR, Kardex, Intake/Output and MAR.

## 2017-03-30 NOTE — PROGRESS NOTES
Daily Progress Note: 3/30/2017 11:49 AM   Admit Date: 3/3/2017    Patient seen in follow up for multiple medical problems as listed below:  Patient Active Problem List   Diagnosis Code    Anemia D64.9    Anxiety F41.9    Nausea and vomiting R11.2    Morbid obesity ([facility] E66.01    Chronic pain syndrome G89.4    History of ovarian cancer Z85.43    History of gastric bypass Z98.890    CAD (coronary artery disease) I25.10    History of Clostridium difficile Z87.19    History of endometrial cancer Z85.42    History of GI bleed Z87.19    Lymphedema I89.0    Hypokalemia E87.6    DEV (acute kidney injury) ([facility] N17.9    Pulmonary nodules R91.8    [facility] I48.91    Aortic stenosis I35.0    GI bleed K92.2    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 female who is admitted for severe sepsis and shock, urosepsis and severe thrombocytopenia. Patient was on Hospice until now. She presents with c/o altered mental status via EMS with cyanotic/desirae complextion. Patient was found AMS with blood sugar low 40's. There was no IV assess and she was given Glucagon and her mentation and blood sugar improved. Patient in non diabetic. She has endometrial cancer. In ED she has urosepsis and hypotension requiring pressor support after 30 cc/kg BOLUS normal saline. Patient has Mediport. She also was febrile with Tmax 101.6. Denies cough, sob, chest pain. She was started on antibiotics . Hx VRE UTI noted. She reports having foul smelling urine and vaginal discharge. Patient had a platelet level of 47,299 with no bleed requiring platelet transfusion initially.  She improved and was transferred to the floor until an RRT was called 3/11 for acute hypoxic respiratory failure requiring intubation. Bronchospy 3/13 with significant mucous plugging. Repeat bronchoscopy 3/16. Respiratory cultures only with candida. Extubated and re-intubated 3/15. Bronchoscopy 3/16 minimal mucous plugging. Fevers started 3/16 source unknown. Has required prn RBC transfusions for Hg<7. Re-cultured 3/17 however continued fevers and TTE findings of questionable vegetation warrant treatment for endocarditis. Extubated 3/18 and has continued to improve clinically. She will daptomycin until 4/17. She was to be discharged 3/26 but hours prior to discharge she had seizure like activity and remained sleepy afterwards. Mentation has slowly improved she was started on keppra BID. EEG no epileptic activity, CT head no acute findings. MRI head pending. She will need her bilateral ureter stents (JJ) exchanged due to her VRE UTI.   Daisy Kunh with New onset seizure  witnessed seizure 2030 on 3/26 given 2m IV ativan. No report of what activity was seen. Drowsy/post ictal 3/27 am. CT head wnl. MRI head pending. EEG no epileptic activity. Neurology consult for further opinion, Initiated on Keppra BID      Endocarditis  -plan for Dapto till 4/17 has central IV access     Hypoxic resp failure - intubated x2, resolved  -Bronchospy 3/13, 3/16 with significant mucous plugging.   -Cx pending on eraxis, azactam, dapto   Extubated 3/18     Subacute RLE DVT  -thought due to obstructing pelvic CA. Present on admission due to subacute nature. -LMWH. S/p IVC filter placement     Severe sepsis with shock and VRE bacteremia  - 2/2 UTI. Leukocytosis, tachy, resolving  - off Levophed for pressor support goal MAP > 65, Zyvox, Levaquin, zosyn initially. - Follow urine and blood culture: GPC in blood on Daptomycin with ID consult + Eraxis and Azactam  - Intensivist consulting      VRE UTI  - hx VRE UTI,again with VRE. Immunosuppression endometrial cancer  - On Zyvox , Zosyn , Levaquin initially then to zyvox with rash.  then Dapto  - repeat UA/Cx 3/14-NGTD  - Hx of bilateral ureter stents placed 8/16 need to re exchanged.     Severe thrombocytopenia  - Platelet down to 9k on admission s/p transfusions x2. keep >25K. resolved      Encephalopathy x2 events  - 2/2 Hypoglycemia vs urosepsis vs hypotension vs uremia-resolved CT head non-acute  - Metabolic encephalopathy again 3/27 - improving. CT head wnl. Etiology unk      Hypoglycemia   - Resolved       ARF  - cr 6.55/Sdk276 ->improving Cr into 1's  - severe prerenal with UREMIA  - IVF. Na Bicarb prn      Anemia   - Hgb 8.8  - likely 2/2 Endometrial malignancy   - Transfuse if hemoglobin < 7      Hx of Ovarian and Endometrial Cancer with abdominal pain  - Patient was on Hospice until today. Daughter has rescinded her DDNR and Hospice and now wants to be FULL CODE  - Palliative care consult monday  - Lower abdomen pain Likely due to new R adnexal mass from ovarian CA. Discuss further pending hospice plans  - pain control  -Mediport for IV drug use, if no urinary source may need to be ruled out as a possible source      Hyponatremia   Hypokalemia  - IVF corrected. Kcl prn      Vaginal discharge   - Wet prep ordered     Sacral decub ulcers  -POA. Wound care consulting      Chronic problems  1. Recurrent gastrointestinal bleed with multiple  esophagogastroduodenoscopies in the past year with negative results. 2. History of ovarian cancer. 3. History of endometrial cancer. 4. Bladder mass, status post cystoscopy and biopsy results pending. 5. Acute blood loss anemia, stable. 6. Chronic back pain. 7. Chronic abdominal pain. 8. Nausea and vomiting secondary to above      DVT Protocol Active: yes  Code Status:  Partial Code     Disposition: Home hina after JJ stent exchange. Plan for [facility] family not wanting further hospice. IV ABx till 4/17    Subjective:     CC: Altered mental status    Interval History: continues to improve.  i am highly suspicious she may have saved medication and taken everything at once last  night. She is scheduled for stent exchange tomorrow am then home in afternoon. Objective:     Visit Vitals    /63 (BP 1 Location: Right arm)    Pulse 88    Temp 98.3 °F (36.8 °C)    Resp 18    Ht 5' 4\" (1.626 m)    Wt 105 kg (231 lb 7.7 oz)    SpO2 98%    BMI 39.73 kg/m2       Temp (24hrs), Av.4 °F (36.9 °C), Min:97.9 °F (36.6 °C), Max:98.8 °F (37.1 °C)        Intake/Output Summary (Last 24 hours) at 17 1445  Last data filed at 17 1351   Gross per 24 hour   Intake              960 ml   Output             2275 ml   Net            -1315 ml       Gen: sleepy but alert, NAD  HEENT:  KARINA  Neck: No Bruits/JVD   Lungs:   CTAB. Good respiratory effort  Heart:   RR S1 S2 without M/R/G  Abdomen: voery obese,NT, BSX4,   Extremities:   Obese/ LE edema. No cyanosis. Skin:  no jaundice/lesions  Neuro: moving arms.  Patient non-compliant with neuro exam      Data Review:     Meds/Labs/Tests reviewed    Current Shift:   0701 - 0  In: 240 [P.O.:240]  Out: 300 [Urine:300]  Last three shifts:  1901 -  0700  In: 740 [P.O.:40; I.V.:700]  Out: 4076 [Urine:4075]  Recent Labs      17   0615  17   0500  17   0450   WBC  8.3  7.7  7.7   RBC  2.82*  2.84*  2.94*   HGB  8.1*  8.1*  8.4*   HCT  26.6*  26.8*  27.7*   PLT  240  240  249   GRANS  82*  77*  80*   LYMPH  11*  17*  12*   EOS  1  2  2       Recent Labs      17   0615  17   0500  17   0450   BUN  13  13  13   CREA  0.61  0.65  0.71   CA  8.3*  8.3*  8.4*   K  3.8  3.3*  3.2*   NA  138  139  138   CL  95*  96*  94*   CO2  34*  36*  39*   PHOS  2.1*  2.3*  2.8   GLU  91  104*  93        Lab Results   Component Value Date/Time    Glucose 91 2017 06:15 AM    Glucose 104 2017 05:00 AM    Glucose 93 2017 04:50 AM    Glucose 95 2017 06:00 AM    Glucose 84 2017 05:00 AM          Care coordination with Nursing/Consultants/staff: 10  Prior history, labs, and charting reviewed: 15    Procedures/Imaging:  CT head 3/26 evening  EEG 3/27  MRI head    Total time spent with chart review, patient examination/education, discussion with staff on case,documentation and medication management / adjustment  :  30 Minutes      Dr Francisco Willams  Pager: [phone]

## 2017-03-30 NOTE — PROGRESS NOTES
INFECTIOUS DISEASE FOLLOW UP NOTE :    Admit Date: 3/3/2017     Current  Prior    Daptomycin 3/8-21 linezolid 3/4 - 3, vanc/zosyn/levoflox 3/3 -0   aztreonam/anidulafungin 3/12-8     ASSESSMENT: -> RECS     NEW seizure activity 3/26  - no further seizures on Keppra -> per neuro   VRE BSI with ?aortic endocarditis  - blcx 3/3 2/2 VRE positive   - repeat 3/6 ntd   - TTE aortic stenosis with possible aortic vegetation  [chronic mixed calcified density]  - source likely UTI; pt refused ESSIE and removal of mediport  - 3/8 bctx's ng, 3/12 ng x2, 3/17 ngtd x 2  - CPK normal -> Cont Daptomycin as ordered (end date: 4/17)   -> CBC, BMP, ESR, CRP, CPK q Monday  Follow up w/ Gabrielle ID after 1 week. Call [phone] for appt w/ Snehal Hart, NP  -> CPK 42 on 3/29 but continue to c/o cramps. D/w her to continue dapto and recheck labs in few days.  Unfortunately we don't have many Abx options given Allergies, intense infection and resistant bacteria     VRE UTI, complicated   - UA with large LE , wbc tntc, jamar 4+  - ucx >100k VRE  - CT with no hydronephrosis but has b/l ureteral stents  - appreciate urology consult and eventual plan for stent exchange/removal   - persistent sterile pyuria -> abx as above  -> stent mgmt per      Sepsis POA  - fever 102 , leukocytosis 28K, tachycardia, hypotension  - source VRE UTI and BSI or DVT  -allergic allergic reaction and IE also in DDX  - WBC normal  ?Drug  Aztreonam / anidulangin stopped 3/20  - resolved -> continue current therapy    Resp failure NTB 3/11  -initial cxr nad ->white out L s/o mucus plug, SP bronch 3/13 extensive L mucus plugging  - sputum cx C.albicans   - extubated 3/18< MBS aspiration risk -> per PCCM   ADR - Rash over abdomen/chest   - secondary to linezolid which has been listed from before as allergy - rash improved off linezolid  -> monitor   Lymphedema with R medial thigh blister- not currently infected appearing  -massive pannus with intertrigo  -> topical therapy p recent systemic antifungal   RLE subacute DVT  - per primary team   Rt pelvic mass ? ovarian ca / endometrial ca with mets   - palliative care was involved , was hospice at home and now revoked  - may need to re involve palliative as prognosis guarded    DEV due to sepsis - better, Cr 1.9->0.9 today     Thrombocytopenia -better 94-> 111 ->monitor   Blood loss anemia        MICROBIOLOGY:   3/3  Blcx x 2 - 2/2 VRE S gent, linezolid , daptomycin quinupristin         Ucx > 100K VRE  3/4 Flu Ag test neg   3/6  Blcx x 2 ng  3/8  Blcx x 2 ng   3/11 uctx ng  3/12 bctx x 2 ng   sput C albicans  3/14     ucx ng  3/17 blcx ngtd   spcx rare C albicans   uctx ng    LINES AND CATHETERS:   Rt chest mediport     SUBJECTIVE :     Interval notes reviewed. Appears comfortable lying in bed with NC O2. Remains afebrile. Denies pain or shortness of breath. Specifically denies myalgias.     MEDICATIONS :     Current Facility-Administered Medications   Medication Dose Route Frequency    levETIRAcetam (KEPPRA) 1,000 mg in 100 ml IVPB  1,000 mg IntraVENous Q12H    dextrose 5% - 0.45% NaCl with KCl 40 mEq/L infusion   IntraVENous CONTINUOUS    furosemide (LASIX) tablet 40 mg  40 mg Oral BID    ondansetron (ZOFRAN ODT) tablet 4 mg  4 mg Oral Q8H PRN    heparin (porcine) 100 unit/mL injection 300 Units  300 Units InterCATHeter ONCE PRN    morphine injection 2 mg  2 mg IntraVENous Q6H PRN    DAPTOmycin (CUBICIN) 630 mg in 0.9% sodium chloride 50 mL IVPB RF formulation  630 mg IntraVENous Q24H    magnesium oxide (MAG-OX) tablet 400 mg  400 mg Oral DAILY    enoxaparin (LOVENOX) injection 40 mg  40 mg SubCUTAneous Q12H    0.9% sodium chloride infusion 250 mL  250 mL IntraVENous PRN    acetaminophen (TYLENOL) solution 650 mg  650 mg Oral Q4H PRN    albuterol-ipratropium (DUO-NEB) 2.5 MG-0.5 MG/3 ML  3 mL Nebulization Q6H RT    pantoprazole (PROTONIX) 40 mg in sodium chloride 0.9 % 10 mL injection  40 mg IntraVENous DAILY    sodium chloride (NS) flush 5-10 mL  5-10 mL IntraVENous Q8H    sodium chloride (NS) flush 5-10 mL  5-10 mL IntraVENous PRN    nystatin (MYCOSTATIN) 100,000 unit/gram powder   Topical BID    sodium bicarbonate tablet 650 mg  650 mg Oral TID    bisacodyl (DULCOLAX) suppository 10 mg  10 mg Rectal DAILY PRN    glucose chewable tablet 16 g  16 g Oral PRN    glucagon (GLUCAGEN) injection 1 mg  1 mg IntraMUSCular PRN    dextrose (D50W) injection syrg 12.5-25 g  25-50 mL IntraVENous PRN    ondansetron (ZOFRAN) injection 4 mg  4 mg IntraVENous Q4H PRN       OBJECTIVE :     Visit Vitals    /75 (BP 1 Location: Right arm, BP Patient Position: Supine)    Pulse 93    Temp 98.8 °F (37.1 °C)    Resp 18    Ht 5' 4\" (1.626 m)    Wt 105 kg (231 lb 7.7 oz)    SpO2 100%    BMI 39.73 kg/m2       Temp (24hrs), Av.4 °F (36.9 °C), Min:97.9 °F (36.6 °C), Max:98.8 °F (37.1 °C)    GEN - morbidly obese, ill appearing WF lying in bed, not in distress  HEENT - conj pale no thrush lips cracked  NECK- R mediport accessed  CHEST- no examined today  CVS- 2/6 sys murmur, no rubs or gallops  ABD- soft, nontender  NEURO - alert, oriented, cooperative      Labs: Results:   Chemistry Recent Labs      17   0615  17   0500  17   0450   GLU  91  104*  93   NA  138  139  138   K  3.8  3.3*  3.2*   CL  95*  96*  94*   CO2  34*  36*  39*   BUN  13  13  13   CREA  0.61  0.65  0.71   CA  8.3*  8.3*  8.4*   AGAP  9  7  5   BUCR  21*  20  18      CBC w/Diff Recent Labs      17   0615  17   0500  17   0450   WBC  8.3  7.7  7.7   RBC  2.82*  2.84*  2.94*   HGB  8.1*  8.1*  8.4*   HCT  26.6*  26.8*  27.7*   PLT  240  240  249   GRANS  82*  77*  80*   LYMPH  11*  17*  12*   EOS  1  2  2        MBS 3/21 IMPRESSION:     Aspiration with cough reflex with thin consistencies.  Airway penetration with  nectar consistencies. [facility] 3/26 IMPRESSION:     No acute intracranial abnormalities. cxr 3/26 Impression:        1. Atelectasis/consolidation left lower lobe about the same. 2. Right central line satisfactory position without pneumothorax. CT abdo/pelvis 3/3 - IMPRESSION:  1. New cystic mass right adnexal region. Recurrent ovarian carcinoma cannot be  excluded. 2. New right middle lobe pulmonary nodule. Cannot rule out metastatic disease. 3. Bilateral ureteral stents with resolved hydronephrosis. 4. Stable or minimally increased left periaortic retroperitoneal adenopathy.     3/21 pvl Right Leg:-  Deep venous thrombosis: Yes  Proximal extent of thrombus: Common Femoral  Superficial venous thrombosis: Not examined  Deep venous insufficiency: Not examined  Superficial venous insufficiency: Not examined     Left Leg:-  Deep venous thrombosis: No  Superficial venous thrombosis: Not examined  Deep venous insufficiency: Not examined  Superficial venous insufficiency: Not examined    Savana Beal MD  March 30, 2017  [facility] AT THE [facility]  [phone]

## 2017-03-31 NOTE — PROGRESS NOTES
Problem: Dysphagia (Adult)  Goal: *Acute Goals and Plan of Care (Insert Text)  Recommendations:  Diet: mech-soft, chopped/honey-thick liquids (cup sips)  Meds: one at a time in pudding  Aspiration Precautions  Oral Care TID  Other: MBS completed with aspiration on thin and nectar    Goals: Patient will:  1. Tolerate PO trials with 0 s/s overt distress in 4/5 trials  2. Utilize compensatory swallow strategies/maneuvers (decrease bite/sip, size/rate, alt. liq/sol) with min cues in 4/5 trials  3. Perform oral-motor/laryngeal exercises to increase oropharyngeal swallow function with min cues  4. Complete an objective swallow study (i.e., MBSS) to assess swallow integrity, r/o aspiration, and determine of safest LRD, min A - goal met 3/21/17     Outcome: Progressing Towards Goal  SPEECH LANGUAGE PATHOLOGY DYSPHAGIA TREATMENT     Patient: Vic Loera (89 y.o. female)  Date: 3/31/2017  Diagnosis: Severe sepsis with septic shock (CODE) ([facility])  UTI (urinary tract infection)  Severe sepsis with septic shock (CODE) ([facility])  Severe thrombocytopenia ([facility])  mucous plugs  mucous plugs  n13.0 Severe sepsis with septic shock (CODE) ([facility])  Procedure(s) (LRB):  CYSTOSCOPY,  BILATERAL RETROGRADES EXCHANGE BILATERAL JJ STENTS (Bilateral) Day of Surgery  Precautions: Aspiration Fall, Contact      ASSESSMENT:  Patient seen for swallowing therapy this day. Completed laryngeal strengthening exercises with thermal stimulation (ice chips). Patient with no s/sx of aspiration with ice chips. Patient would continue to benefit from skilled ST for laryngeal strengthening exercises.   Progression toward goals:  [X]         Improving appropriately and progressing toward goals  [ ]         Improving slowly and progressing toward goals  [ ]         Not making progress toward goals and plan of care will be adjusted       PLAN:  Recommendations and Planned Interventions:  Dental soft, honey thick liquids  Patient continues to benefit from skilled intervention to address the above impairments. Continue treatment per established plan of care. Discharge Recommendations:  Home Health       SUBJECTIVE:   Patient stated I am just thirsty. OBJECTIVE:   Cognitive and Communication Status:  Neurologic State: Alert  Orientation Level: Oriented to person, Oriented to place, Oriented to situation, Disoriented to time  Cognition: Follows commands  Perception: Appears intact  Perseveration: No perseveration noted  Safety/Judgement: Fall prevention  Dysphagia Treatment:  Oral Assessment:  Oral Assessment  Labial: Decreased rate, Decreased seal  Dentition: Intact  Oral Hygiene: fair  Lingual: Decreased rate, Decreased strength  Velum: No impairment  Mandible: No impairment  P.O. Trials:              Patient Position: [facility]              Vocal quality prior to P.O.: Low volume              Consistency Presented: Ice chips              How Presented: SLP-fed/presented              How Much: 8              Bolus Acceptance: No impairment              Bolus Formation/Control: No impairment              Type of Impairment: Delayed              Propulsion: No impairment              Oral Residue: None              Initiation of Swallow: No impairment              Laryngeal Elevation: Decreased              Aspiration Signs/Symptoms: None              Pharyngeal Phase Characteristics: No impairment, issues, or problems               Effective Modifications: Small sips and bites              Cues for Modifications:  Moderate                                Oral Phase Severity: Mild              Pharyngeal Phase Severity : Mild-moderate              Oral Motor Exercises: Exercises:  Laryngeal Exercises:                    Effortful Swallow: Yes  Sets : 1  Reps : 10                                Patty: Yes  Sets : 1  Reps : 10                                                                    PAIN:  Start of Tx: 0  End of Tx: 0      After treatment:   [ ]              Patient left in no apparent distress sitting up in chair  [X]              Patient left in no apparent distress in bed  [X]              Call bell left within reach  [X]              Nursing notified  [ ]              Family present  [ ]              Caregiver present  [ ]              Bed alarm activated         COMMUNICATION/EDUCATION:   [X]        Aspiration precautions; swallow safety; compensatory techniques  [ ]        Patient unable to participate in education; education ongoing with staff  [ ]         Posted safety precautions in patient's room.   [ ]         Oral-motor/laryngeal strengthening exercises        Janie Crenshaw MS Keisha Davis SLP  Time Calculation: 15 mins

## 2017-03-31 NOTE — PROGRESS NOTES
Notified [address] [facility] care / Infusion rep, [facility] SYS  RAJWINDER, the pt is dc'd today. She confirmed start of care for [id number].

## 2017-03-31 NOTE — ROUTINE PROCESS
Plan:  To provide an enjoyable diversion. Implementation:  Provided live bedside harp music, varied styles of music. Evaluation:  Patient initially awake, pleased to have music; states discharge is tomorrow. Maxwelljonathan Yefri asleep during music time, and remained asleep as I left.

## 2017-03-31 NOTE — PERIOP NOTES
Pt arrived from OR via bed; NAD, VSS, breathing even and unlabored. Connected to monitor. MAR and anesthesia reports acknowledged. Will continue to monitor. 0940 TRANSFER - OUT REPORT:    Verbal report given to King Choudhary RN (name) on Anand Ryan  being transferred to [facility] (unit) for routine progression of care       Report consisted of patients Situation, Background, Assessment and   Recommendations(SBAR). Information from the following report(s) SBAR, Procedure Summary, Intake/Output and MAR was reviewed with the receiving nurse. Lines:   Venous Access Device Mediport (Power port) Upper chest (subclavicular area, right (Active)   Central Line Being Utilized Yes 3/31/2017  9:01 AM   Criteria for Appropriate Use Hemodynamically unstable, requiring monitoring lines, vasopressors, or volume resuscitation 3/31/2017  9:01 AM   Site Assessment Clean, dry, & intact 3/31/2017  9:01 AM   Date of Last Dressing Change 03/20/17 3/31/2017  9:01 AM   Dressing Status Clean, dry, & intact 3/31/2017  9:01 AM   Dressing Type Disk with Chlorhexadine gluconate (CHG) 3/31/2017  9:01 AM   Action Taken Open ports on tubing capped 3/31/2017  9:01 AM   Date Accessed (Medial Site) 03/03/17 3/31/2017  4:28 AM   Positive Blood Return (Medial Site) Yes 3/30/2017  8:39 AM   Action Taken (Medial Site) Infusing 3/30/2017  8:39 AM   Positive Blood Return (Lateral Site) Yes 3/31/2017  4:28 AM   Action Taken (Lateral Site) Blood drawn 3/31/2017  4:28 AM   Alcohol Cap Used Yes 3/31/2017  4:28 AM        Opportunity for questions and clarification was provided.       Patient transported with:   [employer]

## 2017-03-31 NOTE — BRIEF OP NOTE
BRIEF OPERATIVE NOTE    Date of Procedure: 3/31/2017   Preoperative Diagnosis: n13.0 inpatient   Postoperative Diagnosis: n13.0 inpatient     Procedure(s):  CYSTOSCOPY,  BILATERAL RETROGRADES EXCHANGE BILATERAL JJ STENTS  Surgeon(s) and Role:     * Connie Hutchins MD - Primary            Surgical Staff:  Circ-1: Jasbir Matamoros RN  Radiology Technician: Tyra Gutierrez  Scrub Tech-1: ePrla Gutierrez  Event Time In   Incision Start  8:25 AM   Incision Close  8:51 AM     Anesthesia: General   Estimated Blood Loss: Minimal  Specimens: * No specimens in log *   Findings: Bullous edema and likely metastatic tumor seen in bladder. Bilateral JJ stents not encrusted. Bilateral ureteral obstruction (contrast did not drain freely on retrograde pyelogram). Complications: None  Implants:   Implant Name Type Inv. Item Serial No.  Lot No. LRB No. Used Action   STENT URET POLARIS 7FR 26CM --  - [id number]  [employer] POLARIS 7FR 26CM --  [id number] [employer] [place] [employer] [id number] Left 1 Implanted   STENT URET POLARIS 7FR 26CM --  - [id number]   [employer] POLARIS 7FR 26CM --  [id number] [facility] [id number] Right 1 Implanted         Plan:  Observe for several hours post-op with byrne in place (~6 hours to ensure no septic spike post-op). Ok to d/c home with ABx (per medical team) with byrne to leg bag if no signs of infection. Follow up as scheduled with Dr. Kvng Tong (message sent to ). Will see over weekend if patient still here.       Connie Hutchins MD

## 2017-03-31 NOTE — PROGRESS NOTES
INFECTIOUS DISEASE FOLLOW UP NOTE :    Admit Date: 3/3/2017     Current  Prior    Daptomycin 3/8-22 linezolid 3/4 - 3, vanc/zosyn/levoflox 3/3 -0   aztreonam/anidulafungin 3/12-8     ASSESSMENT: -> RECS     NEW seizure activity 3/26  - no further seizures on Keppra -> per neuro   VRE BSI with ?aortic endocarditis  - blcx 3/3 2/2 VRE positive   - repeat 3/6 ntd   - TTE aortic stenosis with possible aortic vegetation  [chronic mixed calcified density]  - source likely UTI; pt refused ESSIE and removal of mediport  - 3/8 bctx's ng, 3/12 ng x2, 3/17 ngtd x 2  - still w/ myalgias but CPK remains normal 39 today -> Cont Daptomycin as ordered (end date: 4/17)   -> CBC, BMP, ESR, CRP, CPK q Monday  Follow up w/ [place] [facility] after 1 week.   Call [phone] for appt w/ Diannia Opitz, MARCOS       VRE UTI, complicated   - UA with large LE , wbc tntc, jamar 4+  - ucx >100k VRE  - CT with no hydronephrosis but has b/l ureteral stents  - appreciate urology consult and eventual plan for stent exchange/removal   - persistent sterile pyuria  - s/p bilateral stent exchange today 3/31 -> abx as above       Sepsis POA  - fever 102 , leukocytosis 28K, tachycardia, hypotension  - source VRE UTI and BSI or DVT  -allergic allergic reaction and IE also in DDX  - WBC normal  ?Drug  Aztreonam / anidulangin stopped 3/20  - resolved -> continue current therapy    Resp failure NTB 3/11  -initial cxr nad ->white out L s/o mucus plug, SP bronch 3/13 extensive L mucus plugging  - sputum cx C.albicans   - extubated 3/18< MBS aspiration risk -> per PCCM   ADR - Rash over abdomen/chest   - secondary to linezolid which has been listed from before as allergy - rash improved off linezolid  -> monitor   Lymphedema with R medial thigh blister- not currently infected appearing  -massive pannus with intertrigo  -> topical therapy p recent systemic antifungal   RLE subacute DVT  - per primary team   Rt pelvic mass ? ovarian ca / endometrial ca with mets   - palliative care was involved , was hospice at home and now revoked  - may need to re involve palliative as prognosis guarded    DEV due to sepsis - better, Cr 1.9->0.9 today     Thrombocytopenia -better 94-> 111 ->monitor   Blood loss anemia        MICROBIOLOGY:   3/3  Blcx x 2 - 2/2 VRE S gent, linezolid , daptomycin quinupristin         Ucx > 100K VRE  3/4 Flu Ag test neg   3/6  Blcx x 2 ng  3/8  Blcx x 2 ng   3/11 uctx ng  3/12 bctx x 2 ng   sput C albicans  3/14     ucx ng  3/17 blcx ngtd   spcx rare C albicans   uctx ng    LINES AND CATHETERS:   Rt chest mediport     SUBJECTIVE :     Interval notes reviewed. Back from stent exchange. Feels \"better\" but still with myalgias. CPK still normal today.   No fever or chills    MEDICATIONS :     Current Facility-Administered Medications   Medication Dose Route Frequency    calcium gluconate 1 g, magnesium sulfate 1 g in 0.9% sodium chloride 250 mL IVPB   IntraVENous ONCE    levETIRAcetam (KEPPRA) 1,000 mg in 100 ml IVPB  1,000 mg IntraVENous Q12H    dextrose 5% - 0.45% NaCl with KCl 40 mEq/L infusion   IntraVENous CONTINUOUS    furosemide (LASIX) tablet 40 mg  40 mg Oral BID    ondansetron (ZOFRAN ODT) tablet 4 mg  4 mg Oral Q8H PRN    heparin (porcine) 100 unit/mL injection 300 Units  300 Units InterCATHeter ONCE PRN    morphine injection 2 mg  2 mg IntraVENous Q6H PRN    DAPTOmycin (CUBICIN) 630 mg in 0.9% sodium chloride 50 mL IVPB RF formulation  630 mg IntraVENous Q24H    magnesium oxide (MAG-OX) tablet 400 mg  400 mg Oral DAILY    enoxaparin (LOVENOX) injection 40 mg  40 mg SubCUTAneous Q12H    0.9% sodium chloride infusion 250 mL  250 mL IntraVENous PRN    acetaminophen (TYLENOL) solution 650 mg  650 mg Oral Q4H PRN    albuterol-ipratropium (DUO-NEB) 2.5 MG-0.5 MG/3 ML  3 mL Nebulization Q6H RT    pantoprazole (PROTONIX) 40 mg in sodium chloride 0.9 % 10 mL injection  40 mg IntraVENous DAILY    sodium chloride (NS) flush 5-10 mL  5-10 mL IntraVENous Q8H    sodium chloride (NS) flush 5-10 mL  5-10 mL IntraVENous PRN    nystatin (MYCOSTATIN) 100,000 unit/gram powder   Topical BID    sodium bicarbonate tablet 650 mg  650 mg Oral TID    bisacodyl (DULCOLAX) suppository 10 mg  10 mg Rectal DAILY PRN    glucose chewable tablet 16 g  16 g Oral PRN    glucagon (GLUCAGEN) injection 1 mg  1 mg IntraMUSCular PRN    dextrose (D50W) injection syrg 12.5-25 g  25-50 mL IntraVENous PRN    ondansetron (ZOFRAN) injection 4 mg  4 mg IntraVENous Q4H PRN       OBJECTIVE :     Visit Vitals    /78    Pulse 82    Temp 97.3 °F (36.3 °C)    Resp 17    Ht 5' 4\" (1.626 m)    Wt 105 kg (231 lb 7.7 oz)    SpO2 99%    BMI 39.73 kg/m2       Temp (24hrs), Av °F (36.7 °C), Min:97.3 °F (36.3 °C), Max:98.9 °F (37.2 °C)    GEN - morbidly obese, ill appearing WF lying in bed, not in distress  HEENT - conj pale no thrush lips cracked  NECK- R mediport accessed  CHEST- no examined today  CVS- 2/6 sys murmur, no rubs or gallops  ABD- soft, nontender  NEURO - alert, oriented, cooperative      Labs: Results:   Chemistry Recent Labs      17   0615  17   0500   GLU  89  91  104*   NA  137  138  139   K  3.9  3.8  3.3*   CL  94*  95*  96*   CO2  33*  34*  36*   BUN  14  13  13   CREA  0.66  0.61  0.65   CA  8.4*  8.3*  8.3*   AGAP  10  9  7   BUCR  21*  21*  20      CBC w/Diff Recent Labs      17   0615  17   0500   WBC  7.5  8.3  7.7   RBC  2.99*  2.82*  2.84*   HGB  8.5*  8.1*  8.1*   HCT  28.1*  26.6*  26.8*   PLT  258  240  240   GRANS  81*  82*  77*   LYMPH  11*  11*  17*   EOS  1  1  2        MBS 3/21 IMPRESSION:     Aspiration with cough reflex with thin consistencies. Airway penetration with  nectar consistencies. [facility] 3/26 IMPRESSION:     No acute intracranial abnormalities. cxr 3/26 Impression:        1. Atelectasis/consolidation left lower lobe about the same. 2. Right central line satisfactory position without pneumothorax. CT abdo/pelvis 3/3 - IMPRESSION:  1. New cystic mass right adnexal region. Recurrent ovarian carcinoma cannot be  excluded. 2. New right middle lobe pulmonary nodule. Cannot rule out metastatic disease. 3. Bilateral ureteral stents with resolved hydronephrosis. 4. Stable or minimally increased left periaortic retroperitoneal adenopathy.     3/21 pvl Right Leg:-  Deep venous thrombosis: Yes  Proximal extent of thrombus: Common Femoral  Superficial venous thrombosis: Not examined  Deep venous insufficiency: Not examined  Superficial venous insufficiency: Not examined     Left Leg:-  Deep venous thrombosis: No  Superficial venous thrombosis: Not examined  Deep venous insufficiency: Not examined  Superficial venous insufficiency: Not examined    Nelli Rivera MD  March 31, 2017  [facility] AT THE [facility]  [phone]

## 2017-03-31 NOTE — DISCHARGE SUMMARY
2 [facility]  Hospitalist Division    Discharge Summary      Patient: Isaac Conteh MRN: [id number]  CSN: [id number]    YOB: 1960  Age: 49 y.o. Sex: female    DOA: 3/3/2017 LOS:  LOS: 28 days   Discharge Date: 03/31/17     PCP:  Susie Baldwin MD    Chief Complaint:    Chief Complaint   Patient presents with    Altered mental status     Severe sepsis with septic shock (CODE) ([facility]    Admission Diagnosis:   Hospital Problems as of 3/31/2017  Date Reviewed: 8/25/2016          Codes Class Noted - Resolved POA    Severe thrombocytopenia ([facility] ICD-10-CM: D69.6  ICD-9-CM: 287.5  3/4/2017 - Present Unknown        UTI (urinary tract infection) ICD-10-CM: N39.0  ICD-9-CM: 599.0  3/3/2017 - Present Unknown        * (Principal)Severe sepsis with septic shock (CODE) (H[facility]) ICD-10-CM: R65.21  ICD-9-CM: 038.9, 995.92, 785.52  3/3/2017 - Present Unknown        CAD (coronary artery disease) (Chronic) ICD-10-CM: I25.10  ICD-9-CM: 414.00  12/29/2015 - Present Yes        DEV (acute kidney injury) ([facility] ICD-10-CM: N17.9  ICD-9-CM: 584.9  12/29/2015 - Present Yes        A-fib ([facility] (Chronic) ICD-10-CM: I48.91  ICD-9-CM: 427.31  12/29/2015 - Present Yes        History of ovarian cancer (Chronic) ICD-10-CM: Z85.43  ICD-9-CM: V10.43  3/18/2015 - Present Yes        Morbid obesity ([facility] (Chronic) ICD-10-CM: E66.01  ICD-9-CM: 278.01  10/2/2014 - Present Yes              Discharge Diagnoses:    Endocarditis  -plan for Dapto till 4/17 has central IV access      Hypoxic resp failure - intubated x2, resolved  -Bronchospy 3/13, 3/16 with significant mucous plugging.   -Cx pending on eraxis, azactam, dapto  Extubated 3/18      Subacute RLE DVT  -thought due to obstructing pelvic CA. Present on admission due to subacute nature. -LMWH. S/p IVC filter placement      Severe sepsis with shock and VRE bacteremia  - 2/2 UTI.  Leukocytosis, tachy, resolving  - off Levophed for pressor support goal MAP > 65, Zyvox, Levaquin, zosyn initially. - Follow urine and blood culture: GPC in blood on Daptomycin with ID consult + Eraxis and Azactam  - Intensivist consulting      VRE UTI  - hx VRE UTI,again with VRE. Immunosuppression endometrial cancer  - On Zyvox , Zosyn , Levaquin initially then to zyvox with rash. then Dapto  - repeat UA/Cx 3/14-NGTD  - Hx of bilateral ureter stents placed 8/16 need to re exchanged.      Severe thrombocytopenia  - Platelet down to 9k on admission s/p transfusions x2. keep >25K. resolved      Encephalopathy x2 events  - 2/2 Hypoglycemia vs urosepsis vs hypotension vs uremia-resolved CT head non-acute  - Metabolic encephalopathy again 3/27 - improving. CT head wnl. Etiology unk      Hypoglycemia   - Resolved       ARF  - cr 6.55/Vts253 ->improving Cr into 1's  - severe prerenal with UREMIA  - IVF. Na Bicarb prn      Anemia   - Hgb 8.8  - likely 2/2 Endometrial malignancy   - Transfuse if hemoglobin < 7      Hx of Ovarian and Endometrial Cancer with abdominal pain  - Patient was on Hospice until today. Daughter has rescinded her DDNR and Hospice and now wants to be FULL CODE  - Palliative care consult monday  - Lower abdomen pain Likely due to new R adnexal mass from ovarian CA. Discuss further pending hospice plans  - pain control  -Mediport for IV drug use, if no urinary source may need to be ruled out as a possible source      Hyponatremia Hypokalemia  - IVF corrected. Kcl prn      Vaginal discharge   - Wet prep ordered      Sacral decub ulcers  -POA. Wound care consulting      Chronic problems  1. Recurrent gastrointestinal bleed with multiple  esophagogastroduodenoscopies in the past year with negative results. 2. History of ovarian cancer. 3. History of endometrial cancer. 4. Bladder mass, status post cystoscopy and biopsy results pending. 5. Acute blood loss anemia, stable. 6. Chronic back pain. 7. Chronic abdominal pain.   8. Nausea and vomiting secondary to above    Hospital Course: 49 y.o. female who is admitted for severe sepsis and shock, urosepsis and severe thrombocytopenia. Patient was on Hospice until now. She presents with c/o altered mental status via EMS with cyanotic/desirae complextion. Patient was found AMS with blood sugar low 40's. There was no IV assess and she was given Glucagon and her mentation and blood sugar improved. Patient in non diabetic. She has endometrial cancer. In ED she has urosepsis and hypotension requiring pressor support after 30 cc/kg BOLUS normal saline. Patient has Mediport. She also was febrile with Tmax 101.6. Denies cough, sob, chest pain. She was started on antibiotics . Hx VRE UTI noted. She reports having foul smelling urine and vaginal discharge. Patient had a platelet level of 71,269 with no bleed requiring platelet transfusion initially. She improved and was transferred to the floor until an RRT was called 3/11 for acute hypoxic respiratory failure requiring intubation. Bronchospy 3/13 with significant mucous plugging. Repeat bronchoscopy 3/16. Respiratory cultures only with candida. Extubated and re-intubated 3/15. Bronchoscopy 3/16 minimal mucous plugging. Fevers started 3/16 source unknown. Has required prn RBC transfusions for Hg<7. Re-cultured 3/17 however continued fevers and TTE findings of questionable vegetation warrant treatment for endocarditis. Extubated 3/18 and has continued to improve clinically. She will daptomycin until 4/17. She was to be discharged 3/26 but hours prior to discharge she had seizure like activity and remained sleepy afterwards. Mentation has slowly improved to baseline she was started on keppra BID. EEG no epileptic activity, CT head no acute findings. She completed bilateral ureter stents (JJ) exchange 3/31, will keep in byrne catheter and follow-up with urology. Family not wanting further hospice, plan to go home.      Significant Diagnostic Studies:  CT head 3/26   EEG 3/27  CT abd 3/4  CT head 3/3  Bronch 3/13, 3/16  TTE 3/7  LE US 3/5  3/12 intubated, 3/15 extubated re-intubated, 3/18 extubated  Transfusion plateletes R3A 3/3, 3/5, 1u PRBC 3/16    Consults:  ID  Pulm  Urology  Neurology    Operative Procedures:  bilateral ureter stents (Dortha Cea) exchange 3/31  IVC filter placement  PICC    Discharge Condition:   Good. Discharge Medications:    Current Discharge Medication List      START taking these medications    Details   DAPTOmycin (CUBICIN RF) IVPB 630 mg by IntraVENous route every twenty-four (24) hours for 19 days. Qty: 19 Dose, Refills: 0      levETIRAcetam (KEPPRA) 1,000 mg tablet Take 1 Tab by mouth two (2) times a day. Qty: 180 Tab, Refills: 1      albuterol-ipratropium (DUO-NEB) 2.5 mg-0.5 mg/3 ml nebu 3 mL by Nebulization route every six (6) hours as needed. Indications: CHRONIC OBSTRUCTIVE PULMONARY DISEASE WITH BRONCHOSPASMS  Qty: 30 Nebule, Refills: 5      chlorhexidine (PERIDEX) 0.12 % solution Take 10 mL by mouth every twelve (12) hours for 14 days. Qty: 1 Bottle, Refills: 5      enoxaparin (LOVENOX) 40 mg/0.4 mL 0.4 mL by SubCUTAneous route every twelve (12) hours every twelve (12) hours. Qty: 30 Syringe, Refills: 5      furosemide (LASIX) 40 mg tablet onetab po bid  Qty: 60 Tab, Refills: 5      magnesium oxide (MAG-OX) 400 mg tablet Take 1 Tab by mouth daily. Qty: 10 Tab, Refills: 0      nystatin (MYCOSTATIN) powder Apply  to affected area two (2) times a day. Qty: 1 Bottle, Refills: 3      potassium chloride (K-DUR, KLOR-CON) 20 mEq tablet Take 1 Tab by mouth two (2) times a day. Qty: 30 Tab, Refills: 2      sodium bicarbonate 650 mg tablet Take 1 Tab by mouth three (3) times daily. Qty: 100 Tab, Refills: 3         CONTINUE these medications which have NOT CHANGED    Details   famotidine (PEPCID) 20 mg tablet Take 20 mg by mouth daily. ondansetron (ZOFRAN ODT) 4 mg disintegrating tablet Take 1 Tab by mouth every six (6) hours as needed for Nausea (vomiting).   Qty: 30 Tab, Refills: 0 pantoprazole (PROTONIX) 40 mg tablet Take 1 Tab by mouth Before breakfast and dinner. Qty: 60 Tab, Refills: 0      oxyCODONE-acetaminophen (PERCOCET) 5-325 mg per tablet Take 1 Tab by mouth every four (4) hours as needed for Pain. Max Daily Amount: 6 Tabs. Qty: 8 Tab, Refills: 0         STOP taking these medications       dronabinol (MARINOL) 2.5 mg capsule Comments:   Reason for Stopping:         HYDROmorphone (DILAUDID) 4 mg tablet Comments:   Reason for Stopping:         metoprolol succinate (TOPROL-XL) 25 mg XL tablet Comments:   Reason for Stopping:         morphine CR (MS CONTIN) 30 mg CR tablet Comments:   Reason for Stopping: Follow-Up And Discharge Instructions:    Follow-up Information     Follow up With Details Comments 500 Fauzia Logan MD In 1 week  55 [facility]  [address] [facility] [address] [address] Mark Figueredo MD Schedule an appointment as soon as possible for a visit urology follow-up [address] [place] [address]      Devaughn Conte MD Call in 2 weeks Infectious disease follow-up Truong Hahn [address] [address]  [phone]              Wound Care:   NA      Dr Irma Aiken        Time Spent:  50min    Cc: Srinivasa Kirk MD

## 2017-03-31 NOTE — ANESTHESIA POSTPROCEDURE EVALUATION
Post-Anesthesia Evaluation and Assessment    Patient: Iliana Burnette MRN: [id number]  SSN: [id number]    YOB: 1960  Age: 49 y.o. Sex: female       Cardiovascular Function/Vital Signs  Visit Vitals    /81    Pulse 78    Temp 36.3 °C (97.3 °F)    Resp 15    Ht 5' 4\" (1.626 m)    Wt 105 kg (231 lb 7.7 oz)    SpO2 100%    BMI 39.73 kg/m2       Patient is status post general anesthesia for Procedure(s):  CYSTOSCOPY,  BILATERAL RETROGRADES EXCHANGE BILATERAL JJ STENTS. Nausea/Vomiting: None    Postoperative hydration reviewed and adequate. Pain:  Pain Scale 1: Visual (03/31/17 0916)  Pain Intensity 1: 0 (03/31/17 0916)   Managed    Neurological Status:   Neuro (WDL): Within Defined Limits (03/31/17 0915)  Neuro  Neurologic State: Alert (03/30/17 2040)  Orientation Level: Oriented to person;Oriented to place;Oriented to situation;Disoriented to time (03/30/17 2040)  Cognition: Follows commands (03/30/17 2040)  Speech: Clear (03/30/17 2040)  Assessment L Pupil: Round (03/30/17 2040)  Size L Pupil (mm): 3 (03/30/17 0828)  Assessment R Pupil: Round (03/30/17 2040)  Size R Pupil (mm): 3 (03/30/17 0828)  LUE Motor Response: Purposeful;Spontaneous  (03/31/17 0915)  LLE Motor Response: Purposeful;Spontaneous  (03/31/17 0915)  RUE Motor Response: Purposeful;Spontaneous  (03/31/17 0915)  RLE Motor Response: Purposeful;Spontaneous  (03/31/17 0915)   At baseline    Mental Status and Level of Consciousness: Arousable    Pulmonary Status:   O2 Device: Oxygen mask (03/31/17 0901)   Adequate oxygenation and airway patent    Complications related to anesthesia: None    Post-anesthesia assessment completed.  No concerns      Signed By: Soheila Garcia MD     March 31, 2017

## 2017-03-31 NOTE — PROGRESS NOTES
0730 Bedside and Verbal shift change report given to Estela Lunsford RN   (oncoming nurse) by Daphne Redd RN (offgoing nurse). Report included the following information SBAR, Kardex, Procedure Summary, Intake/Output, MAR, Recent Results and Cardiac Rhythm NSR/ST. Pt resting in bed, OR RN  at bedside. NAD. Pt taken off floor to OR.     1000 Pt returned to floor, NAD. VS obatined, assessment completed, scheduled meds provided, will continue to monitor. 1313 PRN pain meds provided, will reassess. [address] arrived to transport pt. Pt still waiting on pharm to verify and deliver electrolyte replacement. Will call transport once infusion completed. Pt informed. 1754 Bedside and Verbal shift change report given to Binu Crenshaw RN (oncoming nurse) by Estela Lunsford RN (offgoing nurse).  Report included the following information SBAR, Procedure Summary, Intake/Output, MAR, Recent Results and Cardiac Rhythm ST.

## 2017-03-31 NOTE — PROGRESS NOTES
Speech Therapy Note:    SLP attempted 1st attempt for follow up. Pt off the floor for procedure. Will re-attempt later this day.     Vladimir Lockhart M.S. [facility] SLP  Ph: [phone]  Pager: [phone]

## 2017-03-31 NOTE — MANAGEMENT PLAN
Discharge Plan    Discussed with Dr Carlos Pitts. Pt okay to discharge today. Attempted to call daughter on [phone], phone rang with no answer and cut off. Tried [phone] twice, did not ring at all.  Will try again      Sera Wesley RN BSN  Outcomes Manager  Pager # [phone]

## 2017-03-31 NOTE — OP NOTES
Paulino Oconnell    Name:  Karla Rivera  MR#:  [id number]  :  1960  Account #:  [de-identified]  Date of Adm:  2017  Date of Surgery:  2017          PROCEDURES PERFORMED  1. Cystoscopy. 2. Bilateral retrograde pyelograms. 3. Bilateral stent exchange. SURGEON: Arian Davey MD    ANESTHESIA: General LMA. COMPLICATIONS: None. ESTIMATED BLOOD LOSS: Minimal.    SPECIMENS REMOVED: None. DRAINS: Bilateral 7 x 26 double-J stents and 16-Central African Duarte. PREOPERATIVE DIAGNOSIS: Bilateral ureteral obstruction secondary  to metastatic endometrial carcinoma. POSTOPERATIVE DIAGNOSIS: Bilateral ureteral obstruction  secondary to metastatic endometrial carcinoma. INDICATIONS: This is a 42-year-old female who has a history of  bilateral ureteral obstruction secondary to endometrial carcinoma,  status post chemoradiation. She had bilateral stents last placed in  2016 and presented to the hospital with a UTI and acute kidney  injury. At this point, her last urine culture is negative and her creatinine  has nadired to a normal value, so she was consented for bilateral stent  exchange. DESCRIPTION OF PROCEDURE: After obtaining informed consent,  the patient was brought back to the operating room table, and placed  in supine position where general anesthesia was induced. Then, SCDs  were placed and confirmed to be working. She was prepped and  padded in the usual fashion with all pressure points padded and  secured. She was prepped and draped in the usual sterile fashion and  a timeout was performed using the hospital policy code.  film  demonstrated bilateral stents in place. A 21-Central African cystoscope was  used to cannulate the urethra after removing the indwelling Duarte. Upon entry into the bladder, both stents were found to be curled with  no stones seen.  There was quite a bit of sediment within the bladder,  which was washed and irrigated out through the cystoscope. There  was again bullous edema and likely metastatic tumor within the  bladder as confirmed on the last cystoscopic evaluation, which  included biopsy. The left double-J stent was removed and a wire was  easily passed through it up to the level of the kidney and a retrograde  pyelogram was shot around the wire, which highlighted the collecting  system. A delayed shot demonstrated that the contrast did not drain  freely with just the wire in place. A 7 x 26 stent was placed over the  wire and confirmed to be in good position fluoroscopically in the kidney  and cystoscopically within the bladder. This was the same size stent  that she had prior. The same thing was then done on the right side and  a 7 x 26 stent was also placed on this side. The bladder was again  irrigated and filled, the scope was withdrawn. Duarte catheter was  inserted with clear efflux of urine. The patient was cleaned, reversed  from anesthesia, transferred to [facility] in PACU in satisfactory  condition. PLAN: Will be for her to be discharged home after several hours of  observation with a Duarte in place and follow up as scheduled with Dr. Thomas Salguero in the office.         Gabi Wheeler MD MS / [place]  D:  03/31/2017   12:39  T:  03/31/2017   14:31  Job #:  [id number]

## 2017-03-31 NOTE — PROGRESS NOTES
Care Management Interventions  PCP Verified by CM: Yes  Palliative Care Consult (Criteria: CHF and RRAT>21): No  Reason for No Palliative Care Consult: Patient declined palliative services at this time  Mode of Transport at Discharge: Other (see comment) (mediciad cab)  Transition of Care Consult (CM Consult):  Other, Home Health (home)  [address]: No  Reason [facility]: Managed care specific requirement ([address] [facility])  [address]: No  Discharge Durable Medical Equipment: No  Physical Therapy Consult: No  Occupational Therapy Consult: No  Speech Therapy Consult: No  Current Support Network: Relative's Home, Lives with Caregiver (live with daughter and her boyfriend)  Confirm Follow Up Transport: [facility] discussed with Pt/Family/Caregiver: Yes  Discharge Location  Discharge Placement: Home with home health

## 2017-03-31 NOTE — H&P
----------------------------------------------------------------  H&P Update [unfilled]    Patient seen and examined on day of surgery. No changes to H&P. Prior to Admission Medications   Prescriptions Last Dose Informant Patient Reported? Taking?   famotidine (PEPCID) 20 mg tablet   Yes Yes   Sig: Take 20 mg by mouth daily. ondansetron (ZOFRAN ODT) 4 mg disintegrating tablet 2/25/2017 at Unknown time  No Yes   Sig: Take 1 Tab by mouth every six (6) hours as needed for Nausea (vomiting). oxyCODONE-acetaminophen (PERCOCET) 5-325 mg per tablet Not Taking at Unknown time  No No   Sig: Take 1 Tab by mouth every four (4) hours as needed for Pain. Max Daily Amount: 6 Tabs. pantoprazole (PROTONIX) 40 mg tablet Unknown at Unknown time  No No   Sig: Take 1 Tab by mouth Before breakfast and dinner. Facility-Administered Medications: None        Allergies   Allergen Reactions    Latex, Natural Rubber Hives and Itching    Clindamycin Hives    Clindamycin Itching    Iodine Hives     Pt denies allergy. Patient reports this is an allergy to contrast media that caused temporary vision loss and vomiting but is fine if premedicated with antihistamine.  Keflex [Cephalexin] Hives    Linezolid Hives    Nsaids (Non-Steroidal Anti-Inflammatory Drug) Nausea and Vomiting     Emesis, Previous GI bleed    Piperacillin-Tazobactam Hives     Has tolerated Teflaro @ [address]  Has been tolerating cephalosporins SAPH     Sulfa (Sulfonamide Antibiotics) Hives and Contact Dermatitis    Vancomycin Hives        CV: RRR  PULM: CTAB, no wheezing    All questions answered. Consent on chart. Site marked - N/A. U/A, urine culture negative. On daptomycin, cipro on-call to OR.   OK to proceed with surgery as planned - cysto and bilateral JJ stent exchange    Tala Stephens MD  [facility] [address] [place] [address]

## 2017-04-01 NOTE — PROGRESS NOTES
The patients son called and is concerned that home care has not called or visited the patient and the patient did not have any discharge prescriptions. The unit secretary found the patient prescription under a chart at the nurses station. I notified the patients son that her discharge prescription were on the unit, he has made arrangements to pick this medication up. He was very concerned about the patient pain medications, I explained that  she was to continue taking her percocet. The patient son stated that the patient did not have any percocet. I called Dr Roland who will include a script with the prescription on the nursing unit. [facility] called and claimed they had been trying to reach the patients family. [facility] sons phone number [phone].    Patricia Broderick RN

## 2017-04-14 NOTE — MANAGEMENT PLAN
4/14 730am: Received message from pt son, Mr Adolfo Pacheco. Number sounded like [phone].  Called and left message to return call if it was Mr Mario Ruiz RN BSN  Outcomes Manager  Pager # [phone]

## 2017-05-06 NOTE — ED PROVIDER NOTES
[facility] EMERGENCY DEPT       9:43 AM Celia German is a 49 y.o. female with noted PMHx who presents to the ED c/o pelvic pain that began last night. The patient explains she has a byrne in after she was in the hospital for 44 days for losing the ability to use her legs. Pt has the byrne for convenience. Pt states her urine is \"milky white. \" Pt also c/o back pain, vomiting, and dysuria Pt is not complaining of any other symptoms currently. There are no other concerns at this time. No other complaints. Current Facility-Administered Medications   Medication Dose Route Frequency    levoFLOXacin (LEVAQUIN) 500 mg in D5W IVPB  500 mg IntraVENous NOW     Current Outpatient Prescriptions   Medication Sig    famotidine (PEPCID) 20 mg tablet Take 20 mg by mouth daily.  levETIRAcetam (KEPPRA) 1,000 mg tablet Take 1 Tab by mouth two (2) times a day.  albuterol-ipratropium (DUO-NEB) 2.5 mg-0.5 mg/3 ml nebu 3 mL by Nebulization route every six (6) hours as needed. Indications: CHRONIC OBSTRUCTIVE PULMONARY DISEASE WITH BRONCHOSPASMS    enoxaparin (LOVENOX) 40 mg/0.4 mL 0.4 mL by SubCUTAneous route every twelve (12) hours every twelve (12) hours.  furosemide (LASIX) 40 mg tablet onetab po bid    magnesium oxide (MAG-OX) 400 mg tablet Take 1 Tab by mouth daily.  nystatin (MYCOSTATIN) powder Apply  to affected area two (2) times a day.  potassium chloride (K-DUR, KLOR-CON) 20 mEq tablet Take 1 Tab by mouth two (2) times a day.  sodium bicarbonate 650 mg tablet Take 1 Tab by mouth three (3) times daily.  ondansetron (ZOFRAN ODT) 4 mg disintegrating tablet Take 1 Tab by mouth every six (6) hours as needed for Nausea (vomiting).  pantoprazole (PROTONIX) 40 mg tablet Take 1 Tab by mouth Before breakfast and dinner.        Past Medical History:   Diagnosis Date    Anxiety and depression     Aortic stenosis     Arthritis     Arthropathy     Cardiac disorder     Cellulitis     left leg    Chronic pain     [facility]rial adenocarcinoma [facility]     Gastric ulcer     H/O ovarian cancer     Hematemesis     Infection     Lymphedema     Munchausen syndrome     Narcotic dependence ([facility]     Obesity     Spinal stenosis     Status post partial hysterectomy     Urethral obstruction        Past Surgical History:   Procedure Laterality Date    HX  SECTION      HX ENDOSCOPY      EGD 6 x [facility]    HX HERNIA REPAIR      HX OOPHORECTOMY      HX TONSILLECTOMY      HX VASCULAR ACCESS Right     single lumen power port       Family History   Problem Relation Age of Onset    Arthritis-osteo Mother     Heart Attack Mother     Cancer Father     Heart Attack Father     Hypertension Brother     Stroke Brother        Social History     Social History    Marital status:      Spouse name: N/A    Number of children: N/A    Years of education: N/A     Occupational History    Not on file. Social History Main Topics    Smoking status: Former Smoker     Quit date: 3/31/2010    Smokeless tobacco: Never Used    Alcohol use No    Drug use: No      Comment: hx of thc    Sexual activity: No     Other Topics Concern    Not on file     Social History Narrative       Allergies   Allergen Reactions    Latex, Natural Rubber Hives and Itching    Clindamycin Hives    Clindamycin Itching    Iodine Hives     Pt denies allergy. Patient reports this is an allergy to contrast media that caused temporary vision loss and vomiting but is fine if premedicated with antihistamine.     Keflex [Cephalexin] Hives    Linezolid Hives    Nsaids (Non-Steroidal Anti-Inflammatory Drug) Nausea and Vomiting     Emesis, Previous GI bleed    Piperacillin-Tazobactam Hives     Has tolerated Teflaro @ [address]  Has been tolerating cephalosporins SAPH     Sulfa (Sulfonamide Antibiotics) Hives and Contact Dermatitis    Vancomycin Hives       Patient's primary care provider (as noted in EPIC):  Medina Hall MD    REVIEW OF SYSTEMS:    Constitutional:  Negative for diaphoresis. HENT:  Negative for congestion. Respiratory:  Negative for cough and shortness of breath. Cardiovascular:  Negative for chest pain and palpitations. Gastrointestinal:  No rectal bleeding. Genitourinary:  Negative for flank pain. Musculoskeletal:  Negative for back pain. Skin:  Negative for pallor. Neurological:  Negative for weakness. Visit Vitals    /73    Pulse 83    Temp 98.1 °F (36.7 °C)    Resp 16    Ht 5' 3\" (1.6 m)    Wt 104.8 kg (231 lb)    SpO2 98%    BMI 40.92 kg/m2       PHYSICAL EXAM:    CONSTITUTIONAL:  Alert, in no apparent distress;  well developed;  well nourished. HEAD:  Normocephalic, atraumatic. EYES:  EOMI. Non-icteric sclera. Normal conjunctiva. ENTM:  Nose:  no rhinorrhea. Throat:  no erythema or exudate, mucous membranes moist.  NECK:  No JVD. Supple  RESPIRATORY:  Chest clear, equal breath sounds, good air movement. CARDIOVASCULAR:  Regular rate and rhythm. No murmurs, rubs, or gallops. GI:  Normal bowel sounds, abdomen soft and non-tender. No rebound or guarding. BACK:  Non-tender. No CVAT. UPPER EXT:  Normal inspection. LOWER EXT:  No edema, no calf tenderness. Distal pulses intact. NEURO:  Moves all four extremities, and grossly normal motor exam.  SKIN:  No rashes;  Normal for age. PSYCH:  Alert and normal affect.     Abnormal lab results from this emergency department encounter:  Labs Reviewed   CBC WITH AUTOMATED DIFF - Abnormal; Notable for the following:        Result Value    RBC 2.43 (*)     HGB 6.9 (*)     HCT 22.3 (*)     MCHC 30.9 (*)     RDW 15.4 (*)     MPV 9.0 (*)     NEUTROPHILS 82 (*)     LYMPHOCYTES 12 (*)     All other components within normal limits   METABOLIC PANEL, BASIC - Abnormal; Notable for the following:     Potassium 3.0 (*)     Chloride 99 (*)     BUN 43 (*)     Creatinine 2.04 (*)     BUN/Creatinine ratio 21 (*)     GFR est AA 31 (*)     GFR est non-AA 25 (*)     Calcium 7.6 (*)     All other components within normal limits   HEPATIC FUNCTION PANEL - Abnormal; Notable for the following: Albumin 2.0 (*)     Globulin 5.1 (*)     A-G Ratio 0.4 (*)     Alk. phosphatase 227 (*)     AST (SGOT) 11 (*)     ALT (SGPT) 10 (*)     All other components within normal limits   URINALYSIS W/ RFLX MICROSCOPIC - Abnormal; Notable for the following:     Protein 300 (*)     Ketone TRACE (*)     Bilirubin SMALL (*)     Blood MODERATE (*)     Leukocyte Esterase LARGE (*)     All other components within normal limits   POC LACTIC ACID - Abnormal; Notable for the following:     Lactic Acid (POC) <0.3 (*)     All other components within normal limits   LIPASE   URINE MICROSCOPIC ONLY       Lab values for this patient within approximately the last 12 hours:  Recent Results (from the past 12 hour(s))   CBC WITH AUTOMATED DIFF    Collection Time: 05/06/17 10:31 AM   Result Value Ref Range    WBC 9.5 4.6 - 13.2 K/uL    RBC 2.43 (L) 4.20 - 5.30 M/uL    HGB 6.9 (L) 12.0 - 16.0 g/dL    HCT 22.3 (L) 35.0 - 45.0 %    MCV 91.8 74.0 - 97.0 FL    MCH 28.4 24.0 - 34.0 PG    MCHC 30.9 (L) 31.0 - 37.0 g/dL    RDW 15.4 (H) 11.6 - 14.5 %    PLATELET 130 809 - 484 K/uL    MPV 9.0 (L) 9.2 - 11.8 FL    NEUTROPHILS 82 (H) 40 - 73 %    LYMPHOCYTES 12 (L) 21 - 52 %    MONOCYTES 6 3 - 10 %    EOSINOPHILS 0 0 - 5 %    BASOPHILS 0 0 - 2 %    ABS. NEUTROPHILS 7.8 1.8 - 8.0 K/UL    ABS. LYMPHOCYTES 1.1 0.9 - 3.6 K/UL    ABS. MONOCYTES 0.5 0.05 - 1.2 K/UL    ABS. EOSINOPHILS 0.0 0.0 - 0.4 K/UL    ABS.  BASOPHILS 0.0 0.0 - 0.06 K/UL    DF AUTOMATED     METABOLIC PANEL, BASIC    Collection Time: 05/06/17 10:31 AM   Result Value Ref Range    Sodium 137 136 - 145 mmol/L    Potassium 3.0 (L) 3.5 - 5.5 mmol/L    Chloride 99 (L) 100 - 108 mmol/L    CO2 26 21 - 32 mmol/L    Anion gap 12 3.0 - 18 mmol/L    Glucose 94 74 - 99 mg/dL    BUN 43 (H) 7.0 - 18 MG/DL    Creatinine 2.04 (H) 0.6 - 1.3 MG/DL    BUN/Creatinine ratio 21 (H) 12 - 20      GFR est AA 31 (L) >60 ml/min/1.73m2    GFR est non-AA 25 (L) >60 ml/min/1.73m2    Calcium 7.6 (L) 8.5 - 10.1 MG/DL   LIPASE    Collection Time: 05/06/17 10:31 AM   Result Value Ref Range    Lipase 93 73 - 393 U/L   HEPATIC FUNCTION PANEL    Collection Time: 05/06/17 10:31 AM   Result Value Ref Range    Protein, total 7.1 6.4 - 8.2 g/dL    Albumin 2.0 (L) 3.4 - 5.0 g/dL    Globulin 5.1 (H) 2.0 - 4.0 g/dL    A-G Ratio 0.4 (L) 0.8 - 1.7      Bilirubin, total 0.2 0.2 - 1.0 MG/DL    Bilirubin, direct <0.1 0.0 - 0.2 MG/DL    Alk. phosphatase 227 (H) 45 - 117 U/L    AST (SGOT) 11 (L) 15 - 37 U/L    ALT (SGPT) 10 (L) 13 - 56 U/L   POC LACTIC ACID    Collection Time: 05/06/17 10:31 AM   Result Value Ref Range    Lactic Acid (POC) <0.3 (L) 0.4 - 2.0 mmol/L   URINALYSIS W/ RFLX MICROSCOPIC    Collection Time: 05/06/17 11:14 AM   Result Value Ref Range    Color DARK YELLOW      Appearance TURBID      Specific gravity 1.017 1.005 - 1.030      pH (UA) 6.0 5.0 - 8.0      Protein 300 (A) NEG mg/dL    Glucose NEGATIVE  NEG mg/dL    Ketone TRACE (A) NEG mg/dL    Bilirubin SMALL (A) NEG      Blood MODERATE (A) NEG      Urobilinogen 0.2 0.2 - 1.0 EU/dL    Nitrites NEGATIVE  NEG      Leukocyte Esterase LARGE (A) NEG         Radiologist and cardiologist interpretations if available at time of this note:  No orders to display       Medication(s) ordered for patient during this emergency visit encounter:  Medications   levoFLOXacin (LEVAQUIN) 500 mg in D5W IVPB (not administered)   morphine injection 2 mg (2 mg IntraVENous Given 5/6/17 1111)       ED COURSE:      IMPRESSION AND MEDICAL DECISION MAKING:  Based upon the patient's presentation with noted HPI and PE, along with the work up done in the emergency department, I believe that the patient is having an urinary tract infection. Will treat with macrodantin/macrobid and pyridium. DIAGNOSIS:  1. Urinary tract infection.     SPECIFIC PATIENT INSTRUCTIONS FROM THE PHYSICIAN WHO TREATED YOU IN THE ER TODAY:  1. Return if any concerns or worsening of condition(s)  2. Take the Levaquin and pyridium as prescribed until finished. 3. FOLLOW UP APPOINTMENT:  Your primary doctor in 1-2 days. Ignacio Osman M.D. Provider Attestation:  If a scribe was utilized in generation of this patient record, I personally performed the services described in the documentation, reviewed the documentation, as recorded by the scribe in my presence, and it accurately records the patient's history of presenting illness, review of systems, patient physical examination, and procedures performed by me as the attending physician. Ignacio Osman M.D. [facility] Board Certified Emergency Physician  5/6/2017.  9:53 AM  SCRIBE ATTESTATION STATEMENT  Documented by: Rosie carrilloing for, and in the presence of, Fredrick Krmaer MD 11:36 AM     Signed by: Shanti Corft. 05/06/17, 11:36 AM.    PROVIDER ATTESTATION STATEMENT  I personally performed the services described in the documentation, reviewed the documentation, as recorded by the scribe in my presence, and it accurately and completely records my words and actions.   Fredrick Kramer MD

## 2017-05-06 NOTE — DISCHARGE INSTRUCTIONS
SPECIFIC PATIENT INSTRUCTIONS FROM THE PHYSICIAN WHO TREATED YOU IN THE ER TODAY:  1. Return if any concerns or worsening of condition(s)  2. Take the Levaquin and pyridium as prescribed until finished. 3. FOLLOW UP APPOINTMENT:  Your primary doctor in 1-2 days. Urinary Tract Infection in Women: Care Instructions  Your Care Instructions    A urinary tract infection, or UTI, is a general term for an infection anywhere between the kidneys and the urethra (where urine comes out). Most UTIs are bladder infections. They often cause pain or burning when you urinate. UTIs are caused by bacteria and can be cured with antibiotics. Be sure to complete your treatment so that the infection goes away. Follow-up care is a key part of your treatment and safety. Be sure to make and go to all appointments, and call your doctor if you are having problems. It's also a good idea to know your test results and keep a list of the medicines you take. How can you care for yourself at home? · Take your antibiotics as directed. Do not stop taking them just because you feel better. You need to take the full course of antibiotics. · Drink extra water and other fluids for the next day or two. This may help wash out the bacteria that are causing the infection. (If you have kidney, heart, or liver disease and have to limit fluids, talk with your doctor before you increase your fluid intake.)  · Avoid drinks that are carbonated or have caffeine. They can irritate the bladder. · Urinate often. Try to empty your bladder each time. · To relieve pain, take a hot bath or lay a heating pad set on low over your lower belly or genital area. Never go to sleep with a heating pad in place. To prevent UTIs  · Drink plenty of water each day. This helps you urinate often, which clears bacteria from your system.  (If you have kidney, heart, or liver disease and have to limit fluids, talk with your doctor before you increase your fluid intake.)  · Urinate when you need to. · Urinate right after you have sex. · Change sanitary pads often. · Avoid douches, bubble baths, feminine hygiene sprays, and other feminine hygiene products that have deodorants. · After going to the bathroom, wipe from front to back. When should you call for help? Call your doctor now or seek immediate medical care if:  · Symptoms such as fever, chills, nausea, or vomiting get worse or appear for the first time. · You have new pain in your back just below your rib cage. This is called flank pain. · There is new blood or pus in your urine. · You have any problems with your antibiotic medicine. Watch closely for changes in your health, and be sure to contact your doctor if:  · You are not getting better after taking an antibiotic for 2 days. · Your symptoms go away but then come back. Where can you learn more? Go to http://shun-juan.info/. Enter V243 in the search box to learn more about \"Urinary Tract Infection in Women: Care Instructions. \"  Current as of: November 28, 2016  Content Version: 11.2  © 6103-4655 [employer] Care instructions adapted under license by [employer] (which disclaims liability or warranty for this information). If you have questions about a medical condition or this instruction, always ask your healthcare professional. [address] any warranty or liability for your use of this information. [employer] Activation    Thank you for requesting access to [employer]. Please follow the instructions below to securely access and download your online medical record. [employer] allows you to send messages to your doctor, view your test results, renew your prescriptions, schedule appointments, and more. How Do I Sign Up? 1. In your internet browser, go to [email or web address]. 2. Click on the First Time User? Click Here link in the Sign In box.  You will see the New Member Sign Up page. 3. Enter your Results Scorecard Access Code exactly as it appears below. You will not need to use this code after youve completed the sign-up process. If you do not sign up before the expiration date, you must request a new code. Results Scorecard Access Code: [email or web address]  Expires: 2017  8:16 PM (This is the date your Results Scorecard access code will )    4. Enter the last four digits of your Social Security Number (xxxx) and Date of Birth (mm/dd/yyyy) as indicated and click Submit. You will be taken to the next sign-up page. 5. Create a [employer] ID. This will be your Results Scorecard login ID and cannot be changed, so think of one that is secure and easy to remember. 6. Create a Results Scorecard password. You can change your password at any time. 7. Enter your Password Reset Question and Answer. This can be used at a later time if you forget your password. 8. Enter your e-mail address. You will receive e-mail notification when new information is available in [address] 9. Click Sign Up. You can now view and download portions of your medical record. 10. Click the Download Summary menu link to download a portable copy of your medical information. Additional Information    If you have questions, please visit the Frequently Asked Questions section of the Results Scorecard website at [email or web address] Remember, Results Scorecard is NOT to be used for urgent needs. For medical emergencies, dial 911.

## 2017-05-06 NOTE — ED NOTES
I have reviewed discharge instructions with the patient. The patient verbalized understanding. I have reviewed discharge instructions with the patient. The patient verbalized understanding. Patient now awaiting transport. MD made aware of byrne.  Patient instructed to speak with ordering primary physician regarding byrne removal.

## 2017-06-13 NOTE — PROGRESS NOTES
[facility]   Pulmonary, Critical Care, and Sleep Medicine     ICU Patient Progress Note    Name: Armani Parker   : 1960   MRN: [id number]   Date: 3/15/2017    [x]I have reviewed the flowsheet and previous days notes. IMPRESSION/PLAN   # Acute cystitis w/ hematuria    * hx of endometrial carcinoma and s/p XRT and bilateral ureteral obstruction with chronic stents last changed 16    * CT 3/3/17 - No hydro    # Septic Shock    * VRE in blood and urine and ARF    * U Cx 3/317 - VRE    # Cardiovascular  2D echo with EF 55 - 02%, grade 1 diastolic, PA 45 - 50  also apparently has significant AS   Afib rate resolved,  Currently on SR/ST with PAC's    # Respiratory failure   Multifactorial  Vent day # 5  Anasarca - Diuresis with Lasix  Morbid obesity  Severe Mucus plugging LEFT side -> s/p bronch 3/13. Elevated right HD; no endocronchail lesion or mucus plugging seen at bronch today  Other: RML pulmonary nodule, possibly mets = not significant acute issue      # Heme  RLE DVT likely 2/2 venous outflow obstruction due to pelvic mass  Thrombocytopenia persistent but improved -> now on enoxaprin VTEP   Anemia w/o obvious bleeding; no melena  Multiple blood transfusion requirement is unexplained    * check retics, LDH   * diuresis      Quality Care: PPI, DVT prophylaxis, HOB elevated, Infection control all reviewed and addressed. Events and notes from last 24 hours reviewed    Subjective/HPI:   3/12: Pt transferred from floor overnight with acute respiratory failure and hypotension, intubated emergently, started on levo and forrest. 3/13: Remain intubated, Bronchoscopy done, pt tolerated well. Off pressor  3/14: Hb down 6.4 today, 1 unit pRBC given, rpt CBC 2hr post transfusion, Lasix 40 mg X 1 given, follow periodic CXR to assess clearing. Creat stable, UO ok, HD stable-off pressor. SBT/sedation holiday per protocol. Plan to start TF if not met weaning extubation protocol.    3/15: Remain intubated, SBT-wean per parameter. Failed SBT yesterday due to Anasarca, anemia. S/p 1 unit pRBC 3/14,  2 hr post BT, Hb 7.4. Received Lasix X 1, CXR 3/15 some improvement. Hb down again today 6.8 will repeat if  <7 plan 1 unit pRBC. Shelvy Setting No external active bleeding noted. Pt was started on Lovenox 3/14 for VTEP 3/14. Start Lasix 40 mg BID, monitor creat. 1.8 today, UO-Ok  HD stable, off pressors. TF started and tolerating well. [x]The patient is critically ill on  Unable to obtain    [x]Mechanical ventilation [x]Pressors   []BiPAP []               Medication Review:  · Pressors - none  · Sedation - Propofol and Fentanyl  · Antibiotics - Azactam, Daptomycin  · Pain - Fentanyl  · GI/ DVT - protonix/scd  · Others (other gtts)    Safety Bundles: VAP Bundle/ CAUTI/ Severe Sepsis Protocol    ROS:  Unable to obtain, intubated    Vital Signs:    Visit Vitals    /62    Pulse 98    Temp 99 °F (37.2 °C)    Resp 25    Ht 5' 4\" (1.626 m)    Wt 122.8 kg (270 lb 11.6 oz)    SpO2 100%    BMI 46.47 kg/m2       O2 Device: Endotracheal tube   O2 Flow Rate (L/min): 3 l/min   Temp (24hrs), Av.8 °F (37.1 °C), Min:96.8 °F (36 °C), Max:99.9 °F (37.7 °C)     Intake/Output:Last shift:      03/15 0701 - 03/15 1900  In: 90   Out: 125 [Urine:125]Last 3 shifts:  1901 - 03/15 0700  In: 2280.4 [I.V.:2160.4]  Out: 2559 [Urine:2035]    Intake/Output Summary (Last 24 hours) at 03/15/17 1136  Last data filed at 03/15/17 1020   Gross per 24 hour   Intake             1750 ml   Output             1665 ml   Net               85 ml     Hemodynamics:   . @MAP     . @CVP       Ventilator Settings:  Ventilator  Mode: (P) Pressure support (start SBT in note)  Respiratory Rate  Back-Up Rate: 12  Insp Time (sec): 1 sec  I:E Ratio: 1:1.8  Ventilator Volumes  Vt Set (ml): 507 ml  Vt Exhaled (Machine Breath) (ml): 499 ml  Vt Spont (ml): 415 ml  Ve Observed (l/min): 11 l/min  Ventilator Pressures  PC Set: 15  PIP Observed (cm H2O): 21 cm H2O  Plateau Pressure (cm H2O): 18 cm H2O  MAP (cm H2O): 11  PEEP/VENT (cm H2O): 5 cm H20    Mode Rate Tidal Volume Pressure FiO2 PEEP   (P) Pressure support (start SBT in note)   507 ml    40 % 5 cm H20     Peak airway pressure: 21 cm H2O    Minute ventilation: 11 l/min      ARDS network Guidelines: Lung protective strategy and Pl pressure goals<30    Objective:     Physical Exam:   General: Intubated/vent, no acute distress  HEENT: ETT, pupils reactive, sclera anicteric, EOM intact  Neck: No adenopathy or thyroid swelling, no lymphadenopathy or JVD, supple  CVS: S1S2 no murmurs  RS: Mod AE bilaterally, no accessory muscle use, BS bilaterally decrease, no wheezing. Abd: soft, obese. Neuro: sedated  Extrm: +UE/LE edema, toes and fingers with color change  Lymph node: No obvious palpable lymph node appreciated.   Skin: multiple ulcers ( see wound care documentation) and scattered small areas ecchymosis    DATA:   Current Facility-Administered Medications   Medication Dose Route Frequency    furosemide (LASIX) injection 40 mg  40 mg IntraVENous Q12H    0.9% sodium chloride infusion 250 mL  250 mL IntraVENous PRN    enoxaparin (LOVENOX) injection 40 mg  40 mg SubCUTAneous Q24H    chlorhexidine (PERIDEX) 0.12 % mouthwash 10 mL  10 mL Oral Q12H    albuterol-ipratropium (DUO-NEB) 2.5 MG-0.5 MG/3 ML  3 mL Nebulization Q6H RT    0.9% sodium chloride infusion 250 mL  250 mL IntraVENous PRN    0.9% sodium chloride infusion 250 mL  250 mL IntraVENous PRN    0.9% sodium chloride infusion 250 mL  250 mL IntraVENous PRN    albumin human 25% (BUMINATE) solution 25 g  25 g IntraVENous Q6H    propofol (DIPRIVAN) infusion  0-50 mcg/kg/min IntraVENous TITRATE    dextrose 5% lactated ringers infusion  50 mL/hr IntraVENous CONTINUOUS    pantoprazole (PROTONIX) 40 mg in sodium chloride 0.9 % 10 mL injection  40 mg IntraVENous DAILY    aztreonam (AZACTAM) 1 g in 0.9% sodium chloride (MBP/ADV) 100 mL MBP  1 g IntraVENous Q8H  chlorhexidine (PERIDEX) 0.12 % mouthwash 15 mL  15 mL Oral BID    sodium chloride (NS) flush 5-10 mL  5-10 mL IntraVENous Q8H    sodium chloride (NS) flush 5-10 mL  5-10 mL IntraVENous PRN    fentaNYL (PF)  mcg/30 ml   IntraVENous TITRATE    anidulafungin (ERAXIS) 100 mg in 0.9% sodium chloride 130 mL IVPB  100 mg IntraVENous Q24H    nystatin (MYCOSTATIN) 100,000 unit/gram powder   Topical BID    ELECTROLYTE REPLACEMENT PROTOCOL  1 Each Other PRN    ELECTROLYTE REPLACEMENT PROTOCOL  1 Each Other PRN    ELECTROLYTE REPLACEMENT PROTOCOL  1 Each Other PRN    ELECTROLYTE REPLACEMENT PROTOCOL  1 Each Other PRN    DAPTOmycin (CUBICIN) 719 mg in 0.9% sodium chloride 50 mL IVPB RF formulation  6 mg/kg IntraVENous Q24H    sodium bicarbonate tablet 650 mg  650 mg Oral TID    bisacodyl (DULCOLAX) suppository 10 mg  10 mg Rectal DAILY PRN    glucose chewable tablet 16 g  16 g Oral PRN    glucagon (GLUCAGEN) injection 1 mg  1 mg IntraMUSCular PRN    dextrose (D50W) injection syrg 12.5-25 g  25-50 mL IntraVENous PRN    acetaminophen (TYLENOL) tablet 650 mg  650 mg Oral Q4H PRN    ondansetron (ZOFRAN) injection 4 mg  4 mg IntraVENous Q4H PRN    sodium chloride (NS) flush 5-10 mL  5-10 mL IntraVENous PRN       Telemetry: [x]Sinus []A-flutter []Paced    []A-fib []Multiple PVCs     CBC w/Diff Recent Labs      03/15/17   0345 03/14/17   1620  03/14/17   0345   WBC  13.9*  16.8*  19.2*   RBC  2.43*  2.62*  2.34*   HGB  6.8*  7.4*  6.4*   HCT  21.2*  22.4*  20.0*   PLT  111*  105*  103*   GRANS  85*  90*  88*   LYMPH  8*  4*  9*   EOS  0  0  0        Chemistry Recent Labs      03/15/17   0345  03/14/17   1620  03/14/17   0345  03/13/17   1210  03/13/17   0330   GLU  76   --   90   --   111*   NA  142   --   142   --   141   K  3.5  3.7  3.8   --   3.7   CL  114*   --   113*   --   113*   CO2  13*   --   12*   --   12*   BUN  47*   --   45*   --   44*   CREA  1.82*   --   1.77*   --   1.78*   CA  7.3*   -- 7.3*   --   7.1*   MG  1.8   --   1.9  2.0  1.7*   PHOS  5.1*   --   4.2   --   3.8   AGAP  15   --   17   --   16   BUCR  26*   --   25*   --   25*   AP  191*   --    --    --    --    TP  6.1*   --    --    --    --    ALB  1.9*  1.5*   --    --    --    GLOB  4.2*   --    --    --    --    AGRAT  0.5*   --    --    --    --         Lactic Acid Lactic acid   Date Value Ref Range Status   03/11/2017 1.4 0.4 - 2.0 MMOL/L Final     No results for input(s): LAC in the last 72 hours. ABG Recent Labs      03/14/17   0807  03/13/17   0855   PHI  7.313*  7.294*   PCO2I  24.5*  24.2*   PO2I  304*  209*   HCO3I  12.4*  11.7*   FIO2I  60  60        Micro  Recent Labs      03/12/17 2123 03/12/17   1153  03/12/17   1143   CULT  FEW  CANDIDA ALBICANS  *  NO GROWTH 3 DAYS  NO GROWTH 3 DAYS     Recent Labs      03/12/17 2123  03/12/17   1153  03/12/17   1143   CULT  FEW  CANDIDA ALBICANS  *  NO GROWTH 3 DAYS  NO GROWTH 3 DAYS        Liver Enzymes Protein, total   Date Value Ref Range Status   03/15/2017 6.1 (L) 6.4 - 8.2 g/dL Final     Albumin   Date Value Ref Range Status   03/15/2017 1.9 (L) 3.4 - 5.0 g/dL Final     Globulin   Date Value Ref Range Status   03/15/2017 4.2 (H) 2.0 - 4.0 g/dL Final     A-G Ratio   Date Value Ref Range Status   03/15/2017 0.5 (L) 0.8 - 1.7   Final     AST (SGOT)   Date Value Ref Range Status   03/15/2017 22 15 - 37 U/L Final     Alk.  phosphatase   Date Value Ref Range Status   03/15/2017 191 (H) 45 - 117 U/L Final     Recent Labs      03/15/17   0345  03/14/17   1620   TP  6.1*   --    ALB  1.9*  1.5*   GLOB  4.2*   --    AGRAT  0.5*   --    SGOT  22   --    AP  191*   --           Cardiac Enzymes Lab Results   Component Value Date/Time     (H) 03/15/2017 03:45 AM    CKMB 6.0 (H) 03/15/2017 03:45 AM    CKND1 2.9 03/15/2017 03:45 AM    TROIQ 0.03 03/15/2017 03:45 AM        BNP No results found for: BNP, BNPP, XBNPT     Coagulation No results for input(s): PTP, INR, APTT in the last 72 hours. No lab exists for component: INREXT, INREXT      Thyroid  Lab Results   Component Value Date/Time    TSH 2.82 03/15/2017 10:10 AM          Lipid Panel No results found for: CHOL, CHOLPOCT, CHOLX, CHLST, CHOLV, [id number], HDL, LDL, NLDLCT, DLDL, LDLC, DLDLP, [id number], VLDLC, VLDL, TGL, Hurman Bowen, [id number], Shade Theron, [facility], [facility]       Urinalysis Lab Results   Component Value Date/Time    Color YELLOW 03/14/2017 04:23 PM    Appearance TURBID 03/14/2017 04:23 PM    Specific gravity 1.012 03/14/2017 04:23 PM    pH (UA) 5.0 03/14/2017 04:23 PM    Protein 30 03/14/2017 04:23 PM    Glucose NEGATIVE  03/14/2017 04:23 PM    Ketone NEGATIVE  03/14/2017 04:23 PM    Bilirubin NEGATIVE  03/14/2017 04:23 PM    Urobilinogen 0.2 03/14/2017 04:23 PM    Nitrites NEGATIVE  03/14/2017 04:23 PM    Leukocyte Esterase LARGE 03/14/2017 04:23 PM    Epithelial cells FEW 03/14/2017 04:23 PM    Bacteria 4+ 03/14/2017 04:23 PM    WBC TOO NUMEROUS TO COUNT 03/14/2017 04:23 PM    RBC TOO NUMEROUS TO COUNT 03/14/2017 04:23 PM        XR (Most Recent). CXR reviewed by me and compared with previous CXR   Results from Hospital Encounter encounter on 03/03/17   XR CHEST PORT   Narrative Portable chest 3/13/2017. History: Status post bronchoscopy. Technique: A semierect portable radiograph of the chest was obtained. Comparison:  3/13/2017. Findings: The endotracheal tube has been slightly pulled back and the tip is now  approximately 5.5 cm above the level of the garth. Lines and tubes are  otherwise unchanged. The cardia mediastinal contours are unchanged. There is  improving aeration of the left lower lobe with only minimal residual patchy  atelectasis or infiltrate at the left lung base. The right lung remains clear. There is no pneumothorax or significant pleural effusion. Impression Impression:    1.  Pronounced interval improvement in aeration of the left lower lobe with only  minimal residual patchy atelectasis or infiltrate at the left lung base. CT (Most Recent)   Results from Hospital Encounter encounter on 03/03/17   CT HEAD WO CONT   Narrative EXAM: CT head    INDICATION: Severe thrombocytopenia    COMPARISON: None. TECHNIQUE: Axial CT imaging of the head was performed without intravenous  contrast. Coronal and sagittal reconstructions were performed. One or more dose reduction techniques were used on this CT: automated exposure  control, adjustment of the mAs and/or kVp according to patient's size, and  iterative reconstruction techniques. The specific techniques utilized on this CT  exam have been documented in the patient's electronic medical record.     _______________    FINDINGS:    BRAIN AND POSTERIOR FOSSA: The sulci, folia, ventricles and basal cisterns are  within normal limits for the patient?s age. There is no intracranial hemorrhage,  mass effect, or midline shift. There are no areas of abnormal parenchymal  attenuation. EXTRA-AXIAL SPACES AND MENINGES: There are no abnormal extra-axial fluid  collections. CALVARIUM: Intact. SINUSES: Clear. OTHER: Visual is global structures and mastoid air cells are unremarkable.    _______________         Impression IMPRESSION:      1. No evidence of acute infarct, hemorrhage or mass. Normal noncontrast CT  brain. As the attending radiologist I have personally reviewed the study and this  report, and concur with the above stated findings. EKG No results found for this or any previous visit. ECHO No results found for this or any previous visit. PFT No flowsheet data found.      Other ASA reactivity:   Pre-albumin:   Ionized Calcium:   NH4:   T3, FT4:  Cortisol:  Urine Osm:  Urine Lytes:   HbA1c:      The patient is: [x] acutely ill Risk of deterioration: [] moderate    [x] critically ill  [] high     [x]See my orders for details    My assessment, plan of care, findings, medications, side effects etc were discussed with:  [x]nursing []PT/OT    [x]respiratory therapy [x]Dr. Kely Landrum   []family []      Machelle Oneal, MARCOS  Pulmonary Critical Care & Sleep Medicine   [address] [place] [address]  [phone] family member

## 2017-06-19 NOTE — Clinical Note
Status[de-identified] Inpatient [101] Type of Bed: Intensive Care [6] Inpatient Hospitalization Certified Necessary for the Following Reasons: 3. Patient receiving treatment that can only be provided in an inpatient setting (further clarification in H&P documentation) Admitting Diagnosis: Septic shock ([facility] [address] [[id number]] Admitting Physician: Jeremie Rao Attending Physician: Jeremie Rao Estimated Length of Stay: > or = to 2 Midnights Discharge Plan[de-identified] Home with Office Follow-up

## 2017-06-20 PROBLEM — R65.21 SEPTIC SHOCK (HCC): Status: ACTIVE | Noted: 2017-01-01

## 2017-06-20 PROBLEM — A41.9 SEPTIC SHOCK (HCC): Status: ACTIVE | Noted: 2017-01-01

## 2017-06-20 PROBLEM — L98.429 ULCER OF SACRAL REGION, UNSTAGEABLE (HCC): Status: ACTIVE | Noted: 2017-01-01

## 2017-06-20 NOTE — PROGRESS NOTES
Physical Exam   Skin:        -Flaking skin throughout body with scattered redness, excoriation/redness in all folds

## 2017-06-20 NOTE — PROGRESS NOTES
TRANSFER - OUT REPORT:    Verbal report given to Alireza(name) on Hailee Diaz  being transferred to ICU(unit) for routine progression of care       Report consisted of patients Situation, Background, Assessment and   Recommendations(SBAR). Information from the following report(s) SBAR was reviewed with the receiving nurse. Lines:   Venous Access Device Mediport (Power port) Upper chest (subclavicular area, right (Active)       Peripheral IV 06/20/17 Right Antecubital (Active)   Site Assessment Clean, dry, & intact 6/20/2017  4:00 AM   Phlebitis Assessment 0 6/20/2017  4:00 AM   Infiltration Assessment 0 6/20/2017  4:00 AM   Dressing Status Clean, dry, & intact 6/20/2017  4:00 AM   Dressing Type Transparent 6/20/2017  4:00 AM   Hub Color/Line Status Blue 6/20/2017  4:00 AM        Opportunity for questions and clarification was provided.       Patient transported with:   Monitor  Registered Nurse

## 2017-06-20 NOTE — PROGRESS NOTES
Early AM admit for septic shock  IR eval nephrostomy tube and unclamped, now draining again  Palliative care and ID on board  Cont broad spectrum antibx  Cont pressors and titrate down as able  Appreciate consulting services    Jesus Reilly,   Internal Medicine, Hospitalist  Pager: [phone]

## 2017-06-20 NOTE — CONSULTS
INFECTIOUS DISEASE CONSULT NOTE       Requested by: Dr Melonie Hayward    Reason for consult: sepsis, Rt hydro, complicated UTI    Date of admission: 6/19/2017    Date of consult: June 20, 2017      ABX:      Cefepime 6/19-19, Dapto 6/20-0 Levoflox 6/19-1       ASSESSMENT-> RECOMMENDATIONS     SIRS/ ?sepsis POA- pt with hypotension requiring levophed, subjective fever but no leucocytsois  - suspect sec to infected Rt stent ? Cx UTI, at risk for BSI - Await cx  - Agree with BSabx. Looking at her previous Cx and given multiple Abx allergies- management is difficult  - Will start on Dapto along with cefepime. Dc Levaquin as previous E coli isolates were resistant  - check CPK   Infected obstructed stents- suspect Rt ? left  - severe hydro on CT  - placement of b/l PCNs 5/11 at [facility] - urology and IR consulted  - Apparently tube was clamped on admission and now opened up and will see if that helps  - Await urine cx to decide final Abx   H/o multiple UTIs   - ucx >879G VRE was complicated with BSI and valve veg and required prolong hospitalization  - ucx 5/2017 with E.coli -unclear if got Rx or not  - s/p bilateral stent exchange 3/31, byrne changed 5/10 in urology office -> CT with b/l hydro Rt worse than left and concern for obstructed stent on Rt  ->Ua with sig pyuria  -> Await ucx   Sacral ulcer stage 4  - CT concerning for progression of ulcer eroding into rectal wall now- suspect recto-cut fistula  - pt denies any stool in ulcer - Will need colo-rectal input is feces in ulcer though prognosis is extremely poor given CT findings and doubt a candidate for any Sx intervention   Rt pelvic mass ? ovarian ca / endometrial ca with mets  - CT showing worsening of adnexal mass with increasing pulmonary nodules  - palliative care was involved in past and pt was hospice but it was revoked last admission ->  prognosis remains extremely poor  -> pt doesn't understand she can't revoke hospice on and off - will need to involve palliative    H/o VRE BSI with ?aortic endocarditis in 3/2017   -  TTE aortic stenosis with possible aortic vegetation [chronic mixed calcified density]  -pt refused ESSIE and removal of mediport  - pt had many +ve subsequent blood cx ( In [place] reported as Veillonella species and Staph species in May 2017 -> treated with 6 weeks of Daptomycin    -> Still with same mediport and suspect if infected and needs removal but pt not agreeable   DEV- Cr 2.04  - suspect sec to obstruction, infection ->Monitor    Hypoalbuminemia    Lymphedema legs - ch  - suspect thrombus seen on CT also contributing     H/o LE DVT with IVC filter  - CT again showing progression    Seizure activity 3/26- was new  - no further seizures on Keppra    Co-morb- Anxiety depression, AS, ch pain, hemetemesis s/p EGD x6, MO, ch anemia     Ax- Clinda, Keflex, Linezolid, Zosyn, Sulfa, Vanco- most of the Abx cause rash and hives though had tolerated on and off in past with bendryl - Monitor on current regime with cefepime                 Highly complicated course. Time spent 56 minutes just reviewing all labs, imaging and [facility]. 28 minutes D/w other attendings. Pt has overall poor prognosis and unfortunately she is not able to comprehend this despite good understanding otherwise. Her biggest problem is metastatic endometrial cancer which is progressing as seen on recent CT and also has infected port (which she doesn't want to remove) and obstructed kidneys. She is at very high risk of recurrent infections and unfortunately we are unable to fix her underlying cancer which is the main etiology of her problem. She had seizure last admission and DVT and given previous GI bleeds, she is at high risk for both bleeding and thrombosis. Pt also with many allergies which makes her management and care exteremly difficult. I have discussed this with pt and Dr Fuad Armando.  Daughter who makes decision for her, is not present currently. Will wait to hear from palliative care. MICROBIOLOGY:   (3/3  Blcx x 2 - 2/2 VRE S gent, linezolid , daptomycin quinupristin, Ucx > 100K VRE  3/14 ucx ng  3/17 uctx ng  [facility]-  5/1 blcx x3 with Veillonella species  5/10 blcx Staph epi, Staph cohnii  5/21 ucx >100k E.coli R Quinolones)     LINES/DRAINS:     Rt chest mediport   Byrne changed 5/10    HPI:     Ms Pelon Oliveros is a 56 year very unfortunate WF who is known to our service from previous many consults and prolonged hospitalizations in past at [place] [address] and [facility]. She has multiple medical problems including advanced metastatic endometrial cancer, MO, AS, Anxiety/depression, ch pain, lymphedema, ch leg ulcers. She was admitted at [place] [address] from 3/3 to 3/31/17 for sepsis, UTI, BSI with VRE and also felt to have AV endocarditis. She was on home hospice but revoked on admission. She has mediport for long time and given positive cx was suggested to get out but she refused. She also refused ESSIE and was treated empirically with long term Abx with Dapto. She also had RLE DVT and was felt from advanced cancer and had placement of IVC given h/o many GI bleed in past. Her course was complicated byu new seizure onset, prolong intubation/resp failure and met encephalopathy. She also had DEV and finally had stents exchanged on 3/31 by urology. She continued to c/o chronic pain and needed pain meds while in hospital. She also had multiple decubitus ulcers including in sacrum and was evaluated by [facility] and not felt to be infected. Pt and daughter refused hospice and was sent home in stable condition. Pt was seen in Urology office on 5/10 and byrne was changed and ucx sent. She had US in office s/o b/l hydro and was sent to ED as was c/o pain in flank. She was evaluated at [facility] and admitted from 5/11 to 5/30. She had complicated course and reported her byrne was changed for 3 months.  She was in DEV and had placement of b/l PCNs 5/11 by IR for severe hydro. She had 1/4 blcx +ve for GPC and was considered contaminant. She was found with severe constipation and required manual disimpaction. Pt also had bleeding from left PCN and byrne which was thought sec to progression of her cancer. She was on Zosyn and developed rash. She was switched to Keflex and discharged on Levaquin + Keflex (both meds in [facility]). She was also evaluated by Psych for depression but didn't think needs any medications. Pt was seen by palliative and went home with home hospice. She says was doing fine and developed increasing flank pain for almost 1 week. She also noted decreased UOP from her PCNs and right was leaking. Her daughter called ED and was advised to come to ED for eval. In ED, she was found with low BP and required levophed. CT was s/o severe hydro on right side. Urology was consulted. Pt was started on Cefepime and Vanco and given her previous many infections and complications and allergies, we were asked for further eval and managemnt. Past medical history:     Past Medical History:   Diagnosis Date    Anxiety and depression     Aortic stenosis     Arthritis     Arthropathy     Cardiac disorder     Cellulitis     left leg    Chronic pain     Endometrial adenocarcinoma (HCC)     Gastric ulcer     H/O ovarian cancer     Hematemesis     Infection     Lymphedema     Munchausen syndrome     Narcotic dependence ([facility] [address]     Obesity     Spinal stenosis     Status post partial hysterectomy     Urethral obstruction        Social History:     Social History     Social History    Marital status:      Spouse name: N/A    Number of children: N/A    Years of education: N/A     Occupational History    Not on file.      Social History Main Topics    Smoking status: Former Smoker     Quit date: 3/31/2010    Smokeless tobacco: Never Used    Alcohol use No    Drug use: No      Comment: hx of thc    Sexual activity: No     Other Topics Concern    Not on file     Social History Narrative       Family History:     Family History   Problem Relation Age of Onset   [facility] Arthritis-osteo Mother     Heart Attack Mother     Cancer Father     Heart Attack Father     Hypertension Brother     Stroke Brother        Allergies: Allergies   Allergen Reactions    Latex, Natural Rubber Hives and Itching    Clindamycin Hives    Clindamycin Itching    Iodine Hives     Pt denies allergy. Patient reports this is an allergy to contrast media that caused temporary vision loss and vomiting but is fine if premedicated with antihistamine.     Keflex [Cephalexin] Hives    Linezolid Hives    Nsaids (Non-Steroidal Anti-Inflammatory Drug) Nausea and Vomiting     Emesis, Previous GI bleed    Piperacillin-Tazobactam Hives     Has tolerated Teflaro @ [address]  Has been tolerating cephalosporins SAPH     Sulfa (Sulfonamide Antibiotics) Hives and Contact Dermatitis    Vancomycin Hives         Home Medications:     (Not in a hospital admission)    Current Medications:     Current Facility-Administered Medications   Medication Dose Route Frequency    0.9% sodium chloride infusion  100 mL/hr IntraVENous CONTINUOUS    morphine injection 2 mg  2 mg IntraVENous Q4H PRN    naloxone (NARCAN) injection 0.4 mg  0.4 mg IntraVENous PRN    cefepime (MAXIPIME) 2 g in 0.9% sodium chloride (MBP/ADV) 100 mL MBP  2 g IntraVENous Q12H    [START ON 6/21/2017] levoFLOXacin (LEVAQUIN) 750 mg in D5W IVPB  750 mg IntraVENous Q48H    LORazepam (ATIVAN) tablet 1 mg  1 mg Oral Q6H PRN    nystatin (MYCOSTATIN) 100,000 unit/gram powder   Topical BID    polyethylene glycol (MIRALAX) packet 17 g  17 g Oral DAILY    potassium chloride (K-DUR, KLOR-CON) SR tablet 20 mEq  20 mEq Oral BID    sodium bicarbonate tablet 650 mg  650 mg Oral TID    triamcinolone acetonide (KENALOG) 0.025 % cream 1 g  1 g Topical TID    trospium (SANCTURA) tablet 20 mg  20 mg Oral BID    cefepime (MAXIPIME) 1 gram injection        0.9% sodium chloride (MBP/ADV) 0.9 % infusion        cefepime (MAXIPIME) 1 gram injection        0.9% sodium chloride (MBP/ADV) 0.9 % infusion        pantoprazole (PROTONIX) tablet 40 mg  40 mg Oral ACB&D    sodium chloride (NS) flush 5-10 mL  5-10 mL IntraVENous PRN    NOREPINephrine (LEVOPHED) 8,000 mcg in dextrose 5% 250 mL infusion  2-16 mcg/min IntraVENous TITRATE     Current Outpatient Prescriptions   Medication Sig Dispense    triamcinolone acetonide (KENALOG) 0.025 % topical cream Apply 1 g to affected area three (3) times daily as needed for Skin Irritation. use thin layer     trospium (SANCTURA) 20 mg tablet Take 20 mg by mouth two (2) times a day.  HYDROmorphone (DILAUDID) 4 mg tablet Take 4 mg by mouth every six (6) hours as needed for Pain (breakthrough pain).  polyethylene glycol (MIRALAX) 17 gram packet Take 17 g by mouth daily.  LORazepam (ATIVAN) 1 mg tablet Take 1 mg by mouth every six (6) hours as needed for Anxiety.  morphine CR (MS CONTIN) 30 mg CR tablet Take 90 mg by mouth every twelve (12) hours.  famotidine (PEPCID) 20 mg tablet Take 20 mg by mouth daily.  nystatin (MYCOSTATIN) powder Apply  to affected area two (2) times a day. 1 Bottle    potassium chloride (K-DUR, KLOR-CON) 20 mEq tablet Take 1 Tab by mouth two (2) times a day. 30 Tab    sodium bicarbonate 650 mg tablet Take 1 Tab by mouth three (3) times daily. 100 Tab    ondansetron (ZOFRAN ODT) 4 mg disintegrating tablet Take 1 Tab by mouth every six (6) hours as needed for Nausea (vomiting). 30 Tab    levETIRAcetam (KEPPRA) 1,000 mg tablet Take 1 Tab by mouth two (2) times a day. 180 Tab    albuterol-ipratropium (DUO-NEB) 2.5 mg-0.5 mg/3 ml nebu 3 mL by Nebulization route every six (6) hours as needed.  Indications: CHRONIC OBSTRUCTIVE PULMONARY DISEASE WITH BRONCHOSPASMS 30 Nebule    enoxaparin (LOVENOX) 40 mg/0.4 mL 0.4 mL by SubCUTAneous route every twelve (12) hours every twelve (12) hours. 30 Syringe    furosemide (LASIX) 40 mg tablet onetab po bid 60 Tab    magnesium oxide (MAG-OX) 400 mg tablet Take 1 Tab by mouth daily. 10 Tab    pantoprazole (PROTONIX) 40 mg tablet Take 1 Tab by mouth Before breakfast and dinner. 60 Tab       Review of Systems:   12 points ROS done. Pertinent positives and negatives are as follows, ROS otherwise negative. Constitutional: +ve for fever, chills,No  diaphoresis    HENT: Negative for ear pain, congestion, sore throat, rhinorrhea. Eyes: Negative for pain, redness and visual disturbance. Respiratory: negative for shortness of breath, cough, chest tightness, wheezing. Cardiovascular: Negative for chest pain, palpitations, +ve ch leg swelling. Gastrointestinal: Negative for nausea, vomiting, +ve abdominal pain, constipation  Genitourinary: +ve CVA pain, leak around Rt PCN and low UOP   Musculoskeletal: +ve for back pain, joint pain and muscle aches   Skin: +ve for multiple ulcers, no rash  Neurological: Negative for dizziness, syncope, light-headedness or headaches. Hematological:Negative for easy bruising or bleeding. Psychiatric/Behavioral: +ve anxiety, depression. Physical Exam:  Vitals  Temp (24hrs), Av.6 °F (37 °C), Min:98.6 °F (37 °C), Max:98.6 °F (37 °C)    Visit Vitals    BP 96/80    Pulse 76    Temp 98.6 °F (37 °C)    Resp 19    Wt 112 kg (247 lb)    SpO2 99%    BMI 43.75 kg/m2       General: Obese, fairly-developed, poorly nourished, 56y.o. year-old, female, in no acute distress  HEENT: Normocephalic, anicteric sclerae, Pale appearing, pupils equal, round reactive to light, no oropharyngeal lesions. No sinus tenderness.   Neck:  Supple, no lymphadenopathy, masses or thyromegaly  Chest:  Symmetrical expansion, Rt chest mediport accessed  Lungs:  Clear to auscultation bilaterally, no dullness  Heart:  Regular rhythm, no murmurs, rubs or gallops, No JVD  Abdomen: Soft, ? deep-tenderness, obese, large pannus with ulceration, BS+, +ve CVA tenderness on Rt, B/L PCN, byrne+  Musculoskeletal:2+ edema. No clubbing or cyanosis  CNS:               AAOx3. Follows command. Good comprehension. No NR  SKIN:               Multiple skin ulcers on thigh, legs, sacrum- under dressing. Labs: Results:   Chemistry Recent Labs      06/19/17 1945   GLU  98   NA  141   K  4.4   CL  108   CO2  23   BUN  28*   CREA  1.32*   CA  7.6*   AGAP  10   BUCR  21*   AP  198*   TP  5.6*   ALB  1.1*   GLOB  4.5*   AGRAT  0.2*      CBC w/Diff Recent Labs      06/20/17   0320  06/19/17   1945   WBC  9.0  9.7   RBC  2.83*  3.00*   HGB  7.7*  8.1*   HCT  25.3*  26.7*   PLT  279  315   GRANS  82*  82*   LYMPH  10*  12*   EOS  1  0      Microbiology Recent Labs      06/19/17   2105  06/19/17   2030   CULT  NO GROWTH AFTER 9 HOURS  NO GROWTH AFTER 10 HOURS          Imaging-      Results from [place] encounter on 03/03/17   [facility] XR TECHNOLOGIST SERVICE   Narrative Fluoroscopy was used during surgery for this procedure under the supervision of  the attending surgeon. Start Time:     0745 hours   End Time:      0900 hours  # of Images:  4           Impression IMPRESSION:     Administrative report. KL          Results from Hospital Encounter encounter on 06/19/17   CT ABD PELV WO CONT   Narrative EXAM: CT of the abdomen and pelvis    INDICATION: 61-year-old patient with advanced stage endometrial cancer,  bilateral nephroureteral stents, and concern for possible infection adjacent to  the right percutaneous nephrostomy tube site. Nephrostomy catheter placement  occurred at an outside institution on 5/11/2017. COMPARISON: Several prior exams, most recent CT of the abdomen and pelvis  available for comparison: March 3, 2017. TECHNIQUE: Axial CT imaging of the abdomen and pelvis was performed without  intravenous contrast. Multiplanar reformats were generated.     One or more dose reduction techniques were used on this CT: automated exposure  control, adjustment of the mAs and/or kVp according to patient's size, and  iterative reconstruction techniques. The specific techniques utilized on this CT  exam have been documented in the patient's electronic medical record.  _______________    FINDINGS:    LOWER CHEST: Minor dependent atelectasis is noted at each lung base. No focal  pneumonic consolidation, pneumothorax or pleural effusion. Normal cardiac size. No pericardial effusion. There are several pulmonary nodules noted. The largest  is present in the right lower lobe (series 4, image 10) which measures  approximately 8 x 8 mm in size, and is similar to prior CT of 3/4/2017. Additional right lower lobe pulmonary nodule noted (series 4, image 3), as well  as in the left lower lobe (series 4, image 12). The right lower lobe nodule  measures approximately 3 mm and is new in appearance from prior, with slight  enlargement of the subpleural left lower lobe pulmonary nodule, which measures  approximately 5 mm. LIVER, BILIARY: The unenhanced appearance of the liver demonstrates no focal  abnormality. No biliary dilation. The gallbladder is surgically absent. PANCREAS: There is mild fatty infiltration of the pancreas without evidence of  pancreatic ductal dilatation. SPLEEN: Normal.    ADRENALS: Nodular thickening of the inferior left adrenal gland is similar to  prior exam.    KIDNEYS/URETERS/BLADDER:     On the right, there is percutaneous nephrostomy catheter, with formed loop  present with in the interpolar collecting system. In addition, there is a  nephroureteral stent, also with proximal formed loop in the interpolar  collecting system, and distal formed loop within the bladder. There is  moderately severe right-sided hydronephrosis, which is increased from prior CT  examination.     On the left, a nephroureteral stent is in place, with proximal formed loop noted  in the interpolar collecting system, and distal formed loop present within the  bladder. Mild left-sided hydronephrosis is present, similar to prior CT exam.    A Duarte catheter is present within the bladder, which is decompressed. PELVIC ORGANS: The uterus contains an unremarkable unenhanced appearance. Right  adnexal soft tissue lesion noted, measuring approximately 5.1 x 2.9 cm in size,  with exact measurements limited by lack of intravenous contrast as well as beam  hardening artifact. VASCULATURE: Abdominal aorta is normal in course and caliber. There is an  inferior vena cava filter which spans the renal veins, with several tines which  project external to the lumen of the inferior vena cava, potentially within the  right renal vein (images 46-47). LYMPH NODES: Periaortic adenopathy along the left side of the retroperitoneum is  similar in appearance to prior exams. GASTROINTESTINAL TRACT: There is a small hiatal hernia. Small intestine is  normal in course and caliber. Moderate burden of formed stool throughout the  large intestine, which is similarly normal in caliber. No bowel obstruction. No  free intraperitoneal gas. There is a advanced age sacral decubitus ulcer, which  freely communicates with the posterior wall of the rectum (series 3, image 107)  with associated soft tissue thickening/stranding at the site of decubitus ulcer  formation. BONES: There is accentuation of the lumbar lordosis, with unchanged grade 2  isthmic spondylolisthesis L5 on S1. Multilevel spondylosis noted, most advanced  L4-S1. Vertebral body heights are maintained intact. Moderately severe bilateral  hip joint osteoarthritis is present. OTHER: Numerous small foci of fat necrosis/fat-containing ventral abdominal  hernias are noted within the anterior abdominal wall, similar to prior.  Diffuse  subcutaneous body wall edema is present, along with redemonstration of multiple  abdominal wall collateral vessels, the appearance of which is increased in the  interval from prior CT examination. _______________         Impression IMPRESSION:    1. Bilateral nephroureteral stents and percutaneous nephrostomy catheter in  stable and expected position. There is moderately severe right hydronephrosis,  which is increased in magnitude from prior CT examination, with mild left  hydronephrosis, similar to prior. 2. Bladder decompressed with a Duarte catheter. 3. Enlargement of a right adnexal lesion from recent prior CT examination. 4. New and slightly enlarged pulmonary nodules as above. Follow-up per oncology  protocol. 5. Inferior vena cava filter which spans across the renal veins as described  above. Increased formation of abdominal wall collaterals as well as anasarca  noted. 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 6. Progression of an advanced stage sacral decubitus ulcer which directly  communicates with the posterior wall of the rectum with associated skin  thickening/cellulitis. 7. Unchanged, grade 2 isthmic spondylolisthesis L5-S1. Note: Preliminary report sent to the Emergency Department by the radiology  resident at the time of the study. The additional findings of hydronephrosis and advanced sacral decubitus ulcer  were discussed with Dr. Chastity Chu, hospitalist caring for the patient at 8:25 AM on  6/20/2017.            ---------------------------------------------------------------------------------------------------------------  I have independently examined the patient and reviewed all lab studies and imgaing as well as review of nursing notes and physican notes from the past 24 hours. The plan of care has been discussed with the patient/relative and all questions are answered.        Kenya Turcios MD  6/20/2017    [facility] AT THE [facility]  [phone]

## 2017-06-20 NOTE — PROGRESS NOTES
[facility]: [phone]  [facility]: [phone]   [facility]: [phone]    Patient Name: Francisco Rodriguez  YOB: 1960    Date of Initial Consult: 6/20/2017  Reason for Consult: care decisions  Requesting Provider: Dr. Evan Small  Primary Care Physician: Anahi Tony MD      SUMMARY:   Francisco Rodriguez is a 56y.o. year old with a past history of endometrial, ovarian cancer no further chemotherapy or surgical options available, has been on hospice care, chronic pain, AS, Urethral obstruction, who was admitted on 6/19/2017 from home with a diagnosis of fatigue, found to be in septic shock likely due to infected right stent, UTI. Current medical issues leading to Palliative Medicine involvement include: 56year old female who is well known to our service, with end stage endometrial and ovarian cancer, multiple co morbidities who was on hospice care, has now revoked their services. Palliative medicine is consulted to discuss care decisions. PALLIATIVE DIAGNOSES:   1. Metastatic endometrial cancer   2. Sepsis /UTI  3. Anxiety          PLAN:   1. Patient seen along with Ms. Jaquelin Melvin MEGA. She was in ICU, lying in bed, but easily arousable. Comfortable appearing. No family at bedside. Well known to our service. Social lives with daughter was on hospice care at home  2. Goals of care / care decisions multiple conversations have been attempted with Ms. Rausch Adjutant concerning her poor prognosis. She is no longer eligible for any treatment for her cancer. She has been on hospice service and revoked several times. Today we discussed with Nahed Bill her reasoning for revoking hospice, she shared with us she did not believe they were helping her \" doing what they are supposed to\" and she wanted to live. Briefly discussed what this means to her, which is employing all aggressive medical treatments. At this time would not re refer her to hospice.  Also discussed code status, again she shared with us she wanted to live, wanted CPR and intubation for cardio/ resp arrest and if needed for resp distress. We will continue to follow with you and continue support and discussions with Selina Narayanan and her daughter. 3. Sepsis / UTI ID on board, cx pending had b/l PCN's placed in May at [facility]. ID note reviewed on IVAB. Hypotensive requiring pressor support. 4. Disposition unknown lives with daughter ? How much support she has at home. She has revoked hospice / doubt she should return home with this level of care. 5. Initial consult note routed to primary continuity provider  6.  Communicated plan of care with: Palliative IDT, attending        GOALS OF CARE:     [====Goals of Care====]  GOALS OF CARE:  Patient / health care proxy stated goals:       TREATMENT PREFERENCES:   Code Status: Full Code    Advance Care Planning:  Advance Care Planning 6/20/2017   Patient's Healthcare Decision Maker is: Named in scanned ACP document   Primary Decision Maker Name Namita Heredia   Primary Decision Maker Phone Number -   Primary Decision Maker Relationship to Patient -   Confirm Advance Directive Yes, on file   Does the patient have other document types -       Other:    The palliative care team has discussed with patient / health care proxy about goals of care / treatment preferences for patient.  [====Goals of Care====]    Advance Care Planning 6/20/2017   Patient's Healthcare Decision Maker is: Named in scanned ACP document   Primary Decision Maker Name Namita Heredia   Primary Decision Maker Phone Number -   Primary Decision Maker Relationship to Patient -   Confirm Advance Directive Yes, on file   Does the patient have other document types -           HISTORY:     History obtained from: chart    CHIEF COMPLAINT: septic shock    HPI/SUBJECTIVE:    The patient is:   [x] Verbal and participatory  [] Non-participatory due to:   Please see summary   6/20/2017 CT scan abd / pelvis   Bilateral nephroureteral stents and percutaneous nephrostomy catheter in stable and expected position. There is moderately severe right hydronephrosis,  which is increased in magnitude from prior CT examination, with mild left hydronephrosis, similar to prior. 2. Bladder decompressed with a Duarte catheter. 3. Enlargement of a right adnexal lesion from recent prior CT examination. 4. New and slightly enlarged pulmonary nodules as above. Follow-up per oncology protocol. 5. Inferior vena cava filter which spans across the renal veins as described above. Increased formation of abdominal wall collaterals as well as anasarca  noted. 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 6. Progression of an advanced stage sacral decubitus ulcer which directly communicates with the posterior wall of the rectum with associated skin thickening/cellulitis. 7. Unchanged, grade 2 isthmic spondylolisthesis L5-S1. Clinical Pain Assessment (nonverbal scale for nonverbal patients): Pain: 0  Denied pain at current time       FUNCTIONAL ASSESSMENT:     Palliative Performance Scale (PPS): 40%          PSYCHOSOCIAL/SPIRITUAL SCREENING:     Advance Care Planning:  Advance Care Planning 6/20/2017   Patient's Healthcare Decision Maker is: Named in scanned ACP document   Primary Decision Maker Name Cindy Johns   Primary Decision Maker Phone Number -   Primary Decision Maker Relationship to Patient -   Confirm Advance Directive Yes, on file   Does the patient have other document types -        Any spiritual / Latter day concerns:  [] Yes /  [] No    Caregiver Burnout:  [] Yes /  [] No /  [] No Caregiver Present      Anticipatory grief assessment:   [] Normal  / [x] Maladaptive       ESAS Anxiety:      ESAS Depression:          REVIEW OF SYSTEMS:     Positive and pertinent negative findings in ROS are noted above in HPI.   The following systems were [] reviewed / [x] unable to be reviewed as noted in HPI due to fatigue   Other findings are noted below. Systems: constitutional, ears/nose/mouth/throat, respiratory, gastrointestinal, genitourinary, musculoskeletal, integumentary, neurologic, psychiatric, endocrine. Positive findings noted below. Modified ESAS Completed by: provider           Pain: 0           Dyspnea: 0                    PHYSICAL EXAM:     Wt Readings from Last 3 Encounters:   17 112 kg (247 lb)   05/10/17 136.5 kg (301 lb)   17 104.8 kg (231 lb)     Blood pressure (!) 113/94, pulse 86, temperature 98.9 °F (37.2 °C), resp. rate 17, height 5' 4\" (1.626 m), weight 112 kg (247 lb), SpO2 100 %.   Pain:  Pain Scale 1: Numeric (0 - 10)  Pain Intensity 1: 5     Pain Location 1: Pelvis, Back                Constitutional: chronically ill appearing female who was lying in bed appeared comfortable, in NAD   Respiratory: breathing not labored  Neurologic: following commands, alert and oriented x 3          HISTORY:     Principal Problem:    Septic shock ([address] (2017)    Active Problems:    DEV (acute kidney injury) ([address] (2015)      UTI (urinary tract infection) (3/3/2017)      Ulcer of sacral region, unstageable ([address] (2017)      Past Medical History:   Diagnosis Date    Anxiety and depression     Aortic stenosis     Arthritis     Arthropathy     Cardiac disorder     Cellulitis     left leg    Chronic pain     Endometrial adenocarcinoma (HCC)     Gastric ulcer     H/O ovarian cancer     Hematemesis     Infection     Lymphedema     Munchausen syndrome     Narcotic dependence ([address]     Obesity     Spinal stenosis     Status post partial hysterectomy     Urethral obstruction       Past Surgical History:   Procedure Laterality Date    HX  SECTION      HX ENDOSCOPY      EGD 6 x [facility]    HX HERNIA REPAIR      HX OOPHORECTOMY      HX TONSILLECTOMY      HX VASCULAR ACCESS Right     single lumen power port      Family History   Problem Relation Age of Onset    Arthritis-osteo Mother     Heart Attack Mother     Cancer Father     Heart Attack Father     Hypertension Brother     Stroke Brother      History reviewed, no pertinent family history. Social History   Substance Use Topics    Smoking status: Former Smoker     Quit date: 3/31/2010    Smokeless tobacco: Never Used    Alcohol use No     Allergies   Allergen Reactions    Latex, Natural Rubber Hives and Itching    Clindamycin Hives    Clindamycin Itching    Iodine Hives     Pt denies allergy. Patient reports this is an allergy to contrast media that caused temporary vision loss and vomiting but is fine if premedicated with antihistamine.     Keflex [Cephalexin] Hives    Linezolid Hives    Nsaids (Non-Steroidal Anti-Inflammatory Drug) Nausea and Vomiting     Emesis, Previous GI bleed    Piperacillin-Tazobactam Hives     Has tolerated Teflaro @ [address]  Has been tolerating cephalosporins SAPH     Sulfa (Sulfonamide Antibiotics) Hives and Contact Dermatitis    Vancomycin Hives      Current Facility-Administered Medications   Medication Dose Route Frequency    0.9% sodium chloride infusion  100 mL/hr IntraVENous CONTINUOUS    morphine injection 2 mg  2 mg IntraVENous Q4H PRN    naloxone (NARCAN) injection 0.4 mg  0.4 mg IntraVENous PRN    cefepime (MAXIPIME) 2 g in 0.9% sodium chloride (MBP/ADV) 100 mL MBP  2 g IntraVENous Q12H    LORazepam (ATIVAN) tablet 1 mg  1 mg Oral Q6H PRN    nystatin (MYCOSTATIN) 100,000 unit/gram powder   Topical BID    polyethylene glycol (MIRALAX) packet 17 g  17 g Oral DAILY    potassium chloride (K-DUR, KLOR-CON) SR tablet 20 mEq  20 mEq Oral BID    sodium bicarbonate tablet 650 mg  650 mg Oral TID    triamcinolone acetonide (KENALOG) 0.025 % cream 1 g  1 g Topical TID    trospium (SANCTURA) tablet 20 mg  20 mg Oral BID    famotidine (PF) (PEPCID) 20 mg in sodium chloride 0.9 % 10 mL injection  20 mg IntraVENous Q12H    DAPTOmycin (CUBICIN) 672 mg in 0.9% sodium chloride 50 mL IVPB  6 mg/kg IntraVENous Q24H    sodium chloride (NS) flush 5-10 mL  5-10 mL IntraVENous PRN    NOREPINephrine (LEVOPHED) 8,000 mcg in dextrose 5% 250 mL infusion  2-16 mcg/min IntraVENous TITRATE        LAB AND IMAGING FINDINGS:     Lab Results   Component Value Date/Time    WBC 9.0 06/20/2017 03:20 AM    HGB 7.7 06/20/2017 03:20 AM    PLATELET 486 57/91/0571 03:20 AM     Lab Results   Component Value Date/Time    Sodium 144 06/20/2017 12:30 PM    Potassium 4.2 06/20/2017 12:30 PM    Chloride 112 06/20/2017 12:30 PM    CO2 22 06/20/2017 12:30 PM    BUN 26 06/20/2017 12:30 PM    Creatinine 1.25 06/20/2017 12:30 PM    Calcium 7.1 06/20/2017 12:30 PM    Magnesium 1.6 06/20/2017 12:30 PM    Phosphorus 3.5 06/20/2017 12:30 PM      Lab Results   Component Value Date/Time    AST (SGOT) 16 06/19/2017 07:45 PM    Alk. phosphatase 198 06/19/2017 07:45 PM    Protein, total 5.6 06/19/2017 07:45 PM    Albumin 1.1 06/19/2017 07:45 PM    Globulin 4.5 06/19/2017 07:45 PM     Lab Results   Component Value Date/Time    INR 1.2 06/19/2017 07:45 PM    Prothrombin time 14.9 06/19/2017 07:45 PM    aPTT 48.6 06/19/2017 07:45 PM      Lab Results   Component Value Date/Time    Iron 33 12/29/2015 09:30 AM    TIBC 301 12/29/2015 09:30 AM    Iron % saturation 11 12/29/2015 09:30 AM    Ferritin 75 08/11/2015 07:04 AM      No results found for: PH, PCO2, PO2  No components found for: [place]   Lab Results   Component Value Date/Time    CK 37 06/20/2017 12:30 PM    CK - MB 5.7 03/16/2017 11:30 AM              Total time: 50 minutes   Counseling / coordination time, spent as noted above: 40 minutes   > 50% counseling / coordination?: yes     Prolonged service was provided for  []30 min   []75 min in face to face time in the presence of the patient, spent as noted above. Time Start:   Time End:   Note: this can only be billed with 96071 (initial) or 97249 (follow up). If multiple start / stop times, list each separately.

## 2017-06-20 NOTE — ROUTINE PROCESS
Evaluated Huma Maxon Nephrostomy tube and found that it was turn off at the stop cock. Opened the tube to gravity and urine flowed into drainage bag.

## 2017-06-20 NOTE — PROGRESS NOTES
1930 Bedside shift change report given to meryl carter (oncoming nurse) by Aparna Avlaos (offgoing nurse). Report included the following information SBAR, Kardex and Recent Results. Remains on contact isolation. 2000 ASSESSMENT COMPLETED. ORAL AND byrne care completed. Bed bath provided. Wound care provided. [place] and [place] given to pt to ea per her request.  2200 resting in bed.  2305 morphine 2 mg for c/o pain. 0000 assessment completed. . Oral and byrne care provided. Turned to left side with wedges behind pt.pt states pain better. 0100 resting with eyes closed. resp unlabored. 0200 resting with eye closed. 0400 assessment complete. byrne and oral care provided.hob elvated 30 derees. Call light within reach. Sie rails up x 3.  0500 sleeping.  0600 resting in bed.   0730 Bedside shift change report given to meryl carter (oncoming nurse) by Aparna Avalos (offgoing nurse). Report included the following information SBAR, Kardex and Recent Results. Side rails up x 3. Call light within reach. Hob elevated 30 degrees. Pt watching tv.

## 2017-06-20 NOTE — CONSULTS
Urology Consult    Subjective:     Date of Consultation:  2017    Referring Physician: Arcenio Joseph    Reason for Consultation:  Hydronephrosis, suspect malfunctioning PCNT    History of Present Illness:   Patient is a 56 y.o. female who is being seen for above reasons. She was admitted to the hospital for Septic shock ([facility] [address] [facility] Septic shock (HCC);UTI (urinary tract in*. She presented for fatigue after revoking hospice care. She was found to be in septic shock with poor drainage and leakage form the right PCNT. She is a poor historian but from the notes it appears she was most recently seen by Dr Delores Salazar for mgmt of her hydronephrosis. Last CT showed stable left and increased right hydronephrosis with both stents and PCNT in good position.      Past Medical History:   Diagnosis Date    Anxiety and depression     Aortic stenosis     Arthritis     Arthropathy     Cardiac disorder     Cellulitis     left leg    Chronic pain     Endometrial adenocarcinoma (HCC)     Gastric ulcer     H/O ovarian cancer     Hematemesis     Infection     Lymphedema     Munchausen syndrome     Narcotic dependence ([facility]     Obesity     Spinal stenosis     Status post partial hysterectomy     Urethral obstruction       Past Surgical History:   Procedure Laterality Date    HX  SECTION      H[facility] ENDOSCOPY      [facility]GD [facility] x [facility]    HX HERNIA REPAIR      HX OOPHORECTOMY      HX TONSILLECTOMY      HX VASCULAR ACCESS Right     single lumen power port      Family History   Problem Relation Age of Onset    Arthritis-osteo Mother     Heart Attack Mother     Cancer Father     Heart Attack Father     Hypertension Brother     Stroke Brother       Social History   Substance Use Topics    Smoking status: Former Smoker     Quit date: 3/31/2010    Smokeless tobacco: Never Used    Alcohol use No     Allergies   Allergen Reactions    Latex, Natural Rubber Hives and Itching    Clindamycin Hives    Clindamycin Itching    Iodine Hives     Pt denies allergy. Patient reports this is an allergy to contrast media that caused temporary vision loss and vomiting but is fine if premedicated with antihistamine.  Keflex [Cephalexin] Hives    Linezolid Hives    Nsaids (Non-Steroidal Anti-Inflammatory Drug) Nausea and Vomiting     Emesis, Previous GI bleed    Piperacillin-Tazobactam Hives     Has tolerated Teflaro @ [address]  Has been tolerating cephalosporins SAPH     Sulfa (Sulfonamide Antibiotics) Hives and Contact Dermatitis    Vancomycin Hives      Prior to Admission medications    Medication Sig Start Date End Date Taking? Authorizing Provider   triamcinolone acetonide (KENALOG) 0.025 % topical cream Apply 1 g to affected area three (3) times daily as needed for Skin Irritation. use thin layer   Yes Deacon Leone MD   trospium (SANCTURA) 20 mg tablet Take 20 mg by mouth two (2) times a day. Yes Deacon Leone MD   HYDROmorphone (DILAUDID) 4 mg tablet Take 4 mg by mouth every six (6) hours as needed for Pain (breakthrough pain). Yes Deacon Leone MD   polyethylene glycol (MIRALAX) 17 gram packet Take 17 g by mouth daily. Yes Deacon Leone MD   LORazepam (ATIVAN) 1 mg tablet Take 1 mg by mouth every six (6) hours as needed for Anxiety. Yes Deacon Leone MD   morphine CR (MS CONTIN) 30 mg CR tablet Take 90 mg by mouth every twelve (12) hours. Yes Deacon Leone MD   famotidine (PEPCID) 20 mg tablet Take 20 mg by mouth daily. 6/14/16  Yes Historical Provider   nystatin (MYCOSTATIN) powder Apply  to affected area two (2) times a day. 3/26/17  Yes Binh Raymundo MD   potassium chloride (K-DUR, KLOR-CON) 20 mEq tablet Take 1 Tab by mouth two (2) times a day. 3/26/17  Yes Binh Raymundo MD   sodium bicarbonate 650 mg tablet Take 1 Tab by mouth three (3) times daily. 3/26/17  Yes Binh Raymundo MD   ondansetron (ZOFRAN ODT) 4 mg disintegrating tablet Take 1 Tab by mouth every six (6) hours as needed for Nausea (vomiting).  8/30/16  Yes Linnette Francisco MD   levETIRAcetam (KEPPRA) 1,000 mg tablet Take 1 Tab by mouth two (2) times a day. 3/29/17   Irena Monique DO   albuterol-ipratropium (DUO-NEB) 2.5 mg-0.5 mg/3 ml nebu 3 mL by Nebulization route every six (6) hours as needed. Indications: CHRONIC OBSTRUCTIVE PULMONARY DISEASE WITH BRONCHOSPASMS 3/26/17   Lucrecia Hameed MD   enoxaparin (LOVENOX) 40 mg/0.4 mL 0.4 mL by SubCUTAneous route every twelve (12) hours every twelve (12) hours. 3/26/17   Lucrecia Hameed MD   furosemide (LASIX) 40 mg tablet onetab po bid 3/26/17   Lucrecia Hameed MD   magnesium oxide (MAG-OX) 400 mg tablet Take 1 Tab by mouth daily. 3/26/17   Lucrecia Hameed MD   pantoprazole (PROTONIX) 40 mg tablet Take 1 Tab by mouth Before breakfast and dinner. 16   Linnette Francisco MD         Review of Systems:  Review of systems not obtained due to patient factors.     Objective:     Patient Vitals for the past 8 hrs:   BP Temp Pulse Resp SpO2 Height   17 1600 (!) 113/94 98.9 °F (37.2 °C) 86 17 100 % -   17 1530 96/63 - 88 12 100 % -   17 1500 95/55 - 93 15 97 % -   17 1445 118/67 - 91 16 100 % -   17 1430 124/73 - 88 17 91 % -   17 1415 117/62 - 86 12 100 % -   17 1400 136/63 - 79 15 100 % -   17 1345 125/76 - 80 12 100 % -   17 1330 111/47 - 78 10 100 % -   17 1315 137/83 - 77 16 98 % -   17 1300 125/87 - 79 15 100 % -   17 1245 140/69 - 74 12 100 % -   17 1233 - - - - - 5' 4\" (1.626 m)   17 1230 (!) 87/57 - 78 13 99 % -   17 1215 106/41 98.7 °F (37.1 °C) 81 10 100 % -     Temp (24hrs), Av.7 °F (37.1 °C), Min:98.6 °F (37 °C), Max:98.9 °F (37.2 °C)      Intake and Output:        Physical Exam:            General:    no distress, appears stated age, lethargic and ill appearing                     Skin:  NA                HEENT:  NCAT        Throat/Neck:  NA   Lymph nodes:  NA                   Lungs:  clear to auscultation bilaterally      Cardiovascular:  Regular rate and rhythm             Abdomen[de-identified]  soft, non-tender. Bowel sounds normal. No masses,  no organomegaly. All tubes with clear UOP in the bags             Genitalia:  defer exam          Extremities:  + LE edema       Assessment:     Principal Problem:    Septic shock ([facility] [address] [facility] (6/20/2017)    Active Problems:    DEV (acute kidney injury) ([address] (12/29/2015)      UTI (urinary tract infection) (3/3/2017)      Ulcer of sacral region, unstageable ([address] (6/20/2017)        Hydronephrosis-bilateral Rt >>Lt    Plan:   Since admission it was discovered that her right PCNT had been clamped and was then unclamped allowing for renal drainage. Tubes appear in good position on CT.   Would flush PCNT q shift  Urine culture pending and ID consulted for ABX mgmt  Following      Signed By: Lisbeth Clemons MD                         June 20, 2017

## 2017-06-20 NOTE — PALLIATIVE CARE
Tracey Romo, NP, PA student Gtget Vicenta and I met at bedside with pt who had eyes closed but was easily aroused. Pt was AOX4. We asked pt why she keeps revoking hospice and returning to the hospital. \"Because I have problems! \" she said indignantly. When asked if she intends to continue to come back to the hospital she said tearfully \"If I have problems because I want to live. \" We informed her that we would not be referring her to hospice and she said OK. Informed Constanza Carranza. We asked pt what she would like her code status to be and she said \"I want to live, I want to be full code and if I decide to switch it back I will take care of all the paperwork! \"

## 2017-06-20 NOTE — ED PROVIDER NOTES
HPI Comments: Eric Mariee is a 56 y.o. Female with h/o end stage endometrial ca, bilateral nephrostomy, poor compliance with treatment, refusal of hospice with possible infection at right nephrostomy site which is leaking and causing some pain. Her left side had fallen out over a week ago. Currently not on chem, abx. Also has large wound to left buttock area as well. States she has has not felt well for last few days. Is requesting admission if needed. The history is provided by the patient and medical records. Past Medical History:   Diagnosis Date    Anxiety and depression     Aortic stenosis     Arthritis     Arthropathy     Cardiac disorder     Cellulitis     left leg    Chronic pain     Endometrial adenocarcinoma (HCC)     Gastric ulcer     H/O ovarian cancer     Hematemesis     Infection     Lymphedema     Munchausen syndrome     Narcotic dependence ([facility] [address]     Obesity     Spinal stenosis     Status post partial hysterectomy     Urethral obstruction        Past Surgical History:   Procedure Laterality Date    HX  SECTION      HX ENDOSCOPY      EGD 6 x [facility]    HX HERNIA REPAIR      HX OOPHORECTOMY      HX TONSILLECTOMY      HX VASCULAR ACCESS Right     single lumen power port         Family History:   Problem Relation Age of Onset    Arthritis-osteo Mother     Heart Attack Mother     Cancer Father     Heart Attack Father     Hypertension Brother     Stroke Brother        Social History     Social History    Marital status:      Spouse name: N/A    Number of children: N/A    Years of education: N/A     Occupational History    Not on file.      Social History Main Topics    Smoking status: Former Smoker     Quit date: 3/31/2010    Smokeless tobacco: Never Used    Alcohol use No    Drug use: No      Comment: hx of thc    Sexual activity: No     Other Topics Concern    Not on file     Social History Narrative         ALLERGIES: Latex, natural rubber; Clindamycin; Clindamycin; Iodine; Keflex [cephalexin]; Linezolid; Nsaids (non-steroidal anti-inflammatory drug); Piperacillin-tazobactam; Sulfa (sulfonamide antibiotics); and Vancomycin    Review of Systems   Constitutional: Positive for chills and fatigue. HENT: Negative for trouble swallowing. Eyes: Negative for visual disturbance. Respiratory: Positive for shortness of breath. Cardiovascular: Positive for chest pain and leg swelling. Endocrine: Positive for cold intolerance. Genitourinary: Positive for difficulty urinating. Musculoskeletal: Positive for gait problem. Skin: Positive for color change (most of skin has rash, scaling), rash and wound. Allergic/Immunologic: Positive for immunocompromised state. Neurological: Positive for weakness. Hematological: Bruises/bleeds easily. Vitals:    06/20/17 0110 06/20/17 0122 06/20/17 0130 06/20/17 0136   BP: 91/54 96/58 (!) 74/46 101/61   Pulse: 84 80 76 73   Resp: 20 20 15 14   Temp:       SpO2: 97% 98% 100% 100%   Weight:                Physical Exam   Constitutional: She is oriented to person, place, and time. She appears well-developed and well-nourished. She has a sickly appearance. She appears ill. She appears distressed. HENT:   Head: Normocephalic and atraumatic. Right Ear: External ear normal.   Left Ear: External ear normal.   Nose: Nose normal.   Mouth/Throat: Uvula is midline, oropharynx is clear and moist and mucous membranes are normal.   Eyes: Conjunctivae are normal. No scleral icterus. Neck: Neck supple. Cardiovascular: Normal rate, regular rhythm, normal heart sounds and intact distal pulses. Pulmonary/Chest: Effort normal and breath sounds normal.   Abdominal: Soft. She exhibits no distension. There is tenderness. There is no rebound and no guarding. Right nephrostomy site with tube in place but mostly out with urinary output     Musculoskeletal: She exhibits edema and tenderness.    Neurological: She is alert and oriented to person, place, and time. Gait normal.   Skin: Skin is warm and dry. Rash (skin very dry, mult bruising) noted. She is not diaphoretic. Psychiatric: Her behavior is normal.   Nursing note and vitals reviewed.        [facility]  ED Course       Procedures    Vitals:  Patient Vitals for the past 12 hrs:   Temp Pulse Resp BP SpO2   06/20/17 0136 - 73 14 101/61 100 %   06/20/17 0130 - 76 15 (!) 74/46 100 %   06/20/17 0122 - 80 20 96/58 98 %   06/20/17 0110 - 84 20 91/54 97 %   06/20/17 0050 - 82 17 (!) 87/73 100 %   06/20/17 0048 - 81 19 104/82 (!) 80 %   06/20/17 0034 - 82 14 104/46 99 %   06/20/17 0020 - - - 101/80 -   06/19/17 2300 - 86 16 (!) 80/45 -   06/19/17 2250 - 90 14 103/77 100 %   06/19/17 2240 - 87 14 (!) 84/50 -   06/19/17 2230 - 92 16 (!) 89/55 100 %   06/19/17 2220 - 95 18 (!) 90/30 100 %   06/19/17 2150 - 89 20 107/81 100 %   06/19/17 2140 - 92 16 (!) 77/52 (!) 72 %   06/19/17 2130 - 90 18 (!) 83/51 (!) 82 %   06/19/17 2120 - 92 22 (!) 83/54 -   06/19/17 2100 - (!) 102 14 (!) 83/67 -   06/19/17 2050 - 93 17 (!) 78/32 -   06/19/17 2045 - 96 18 99/66 -   06/19/17 2030 - 97 16 (!) 69/49 -   06/19/17 2021 98.6 °F (37 °C) - - - -   06/19/17 2021 - 100 19 (!) 79/44 (!) 62 %   06/19/17 2010 - (!) 101 14 (!) 64/46 (!) 77 %   06/19/17 2002 - (!) 106 14 - 90 %   06/19/17 2000 - (!) 108 14 (!) 65/39 -   06/19/17 1950 - - - 112/82 -   06/19/17 1942 - - - - 100 %   06/19/17 1940 - - - 103/76 -   06/19/17 1930 - - - (!) 87/49 100 %   06/19/17 1926 - - - - 97 %   06/19/17 1924 - - - 91/49 -         Medications ordered:   Medications   sodium chloride (NS) flush 5-10 mL (not administered)   levoFLOXacin (LEVAQUIN) 750 mg in D5W IVPB (750 mg IntraVENous New Bag 6/19/17 2145)   lactated Ringers infusion (200 mL/hr IntraVENous New Bag 6/19/17 2345)   NOREPINephrine (LEVOPHED) 8,000 mcg in dextrose 5% 250 mL infusion (10 mcg/min IntraVENous Rate Change 6/20/17 0132)   cefepime (MAXIPIME) 1 g in 0.9% sodium chloride (MBP/ADV) 50 mL MBP (not administered)   sodium chloride 0.9 % bolus infusion 3,000 mL (3,000 mL IntraVENous New Bag 6/19/17 2024)   aztreonam (AZACTAM) 1 g in 0.9% sodium chloride (MBP/ADV) 100 mL MBP (1 g IntraVENous New Bag 6/19/17 2131)   sodium chloride 0.9 % bolus infusion 500 mL (500 mL IntraVENous New Bag 6/19/17 2137)   ondansetron (ZOFRAN) injection 4 mg (4 mg IntraVENous Given 6/19/17 2345)         Lab findings:  Recent Results (from the past 12 hour(s))   METABOLIC PANEL, COMPREHENSIVE    Collection Time: 06/19/17  7:45 PM   Result Value Ref Range    Sodium 141 136 - 145 mmol/L    Potassium 4.4 3.5 - 5.5 mmol/L    Chloride 108 100 - 108 mmol/L    CO2 23 21 - 32 mmol/L    Anion gap 10 3.0 - 18 mmol/L    Glucose 98 74 - 99 mg/dL    BUN 28 (H) 7.0 - 18 MG/DL    Creatinine 1.32 (H) 0.6 - 1.3 MG/DL    BUN/Creatinine ratio 21 (H) 12 - 20      GFR est AA 51 (L) >60 ml/min/1.73m2    GFR est non-AA 42 (L) >60 ml/min/1.73m2    Calcium 7.6 (L) 8.5 - 10.1 MG/DL    Bilirubin, total 0.3 0.2 - 1.0 MG/DL    ALT (SGPT) 10 (L) 13 - 56 U/L    AST (SGOT) 16 15 - 37 U/L    Alk. phosphatase 198 (H) 45 - 117 U/L    Protein, total 5.6 (L) 6.4 - 8.2 g/dL    Albumin 1.1 (L) 3.4 - 5.0 g/dL    Globulin 4.5 (H) 2.0 - 4.0 g/dL    A-G Ratio 0.2 (L) 0.8 - 1.7     CBC WITH AUTOMATED DIFF    Collection Time: 06/19/17  7:45 PM   Result Value Ref Range    WBC 9.7 4.6 - 13.2 K/uL    RBC 3.00 (L) 4.20 - 5.30 M/uL    HGB 8.1 (L) 12.0 - 16.0 g/dL    HCT 26.7 (L) 35.0 - 45.0 %    MCV 89.0 74.0 - 97.0 FL    MCH 27.0 24.0 - 34.0 PG    MCHC 30.3 (L) 31.0 - 37.0 g/dL    RDW 15.4 (H) 11.6 - 14.5 %    PLATELET 192 960 - 377 K/uL    MPV 9.4 9.2 - 11.8 FL    NEUTROPHILS 82 (H) 40 - 73 %    LYMPHOCYTES 12 (L) 21 - 52 %    MONOCYTES 6 3 - 10 %    EOSINOPHILS 0 0 - 5 %    BASOPHILS 0 0 - 2 %    ABS. NEUTROPHILS 7.9 1.8 - 8.0 K/UL    ABS. LYMPHOCYTES 1.2 0.9 - 3.6 K/UL    ABS. MONOCYTES 0.6 0.05 - 1.2 K/UL    ABS.  EOSINOPHILS 0.0 0.0 - 0.4 K/UL ABS. BASOPHILS 0.0 0.0 - 0.06 K/UL    DF AUTOMATED     PROTHROMBIN TIME + INR    Collection Time: 06/19/17  7:45 PM   Result Value Ref Range    Prothrombin time 14.9 11.5 - 15.2 sec    INR 1.2 0.8 - 1.2     PTT    Collection Time: 06/19/17  7:45 PM   Result Value Ref Range    aPTT 48.6 (H) 23.0 - 36.4 SEC   TYPE & SCREEN    Collection Time: 06/19/17  7:45 PM   Result Value Ref Range    Crossmatch Expiration 06/22/2017     ABO/Rh(D) A POSITIVE     Antibody screen NEG    POC LACTIC ACID    Collection Time: 06/19/17  8:13 PM   Result Value Ref Range    Lactic Acid (POC) 0.8 0.4 - 2.0 mmol/L   EKG, 12 LEAD, INITIAL    Collection Time: 06/19/17  8:17 PM   Result Value Ref Range    Ventricular Rate 104 BPM    Atrial Rate 104 BPM    P-R Interval 118 ms    QRS Duration 74 ms    Q-T Interval 328 ms    QTC Calculation (Bezet) 431 ms    Calculated P Axis 25 degrees    Calculated R Axis 6 degrees    Calculated T Axis 18 degrees    Diagnosis       Sinus tachycardia  Cannot rule out Anterior infarct , age undetermined  Abnormal ECG  When compared with ECG of 08-MAR-2017 16:56,  No significant change was found     URINALYSIS W/ RFLX MICROSCOPIC    Collection Time: 06/19/17  8:50 PM   Result Value Ref Range    Color DARK YELLOW      Appearance TURBID      Specific gravity 1.011 1.005 - 1.030      pH (UA) 6.5 5.0 - 8.0      Protein 100 (A) NEG mg/dL    Glucose NEGATIVE  NEG mg/dL    Ketone NEGATIVE  NEG mg/dL    Bilirubin NEGATIVE  NEG      Blood LARGE (A) NEG      Urobilinogen 0.2 0.2 - 1.0 EU/dL    Nitrites POSITIVE (A) NEG      Leukocyte Esterase LARGE (A) NEG     URINE MICROSCOPIC ONLY    Collection Time: 06/19/17  8:50 PM   Result Value Ref Range    WBC TOO NUMEROUS TO COUNT 0 - 4 /hpf    RBC 11 to 20 0 - 5 /hpf    Epithelial cells 0 0 - 5 /lpf    Bacteria 4+ (A) NEG /hpf       EKG interpretation by ED Physician:  Sinus tach with no acute st tw changes  Rate 104, qtc 431  No sig change from previous    X-Ray, CT or other radiology findings or impressions:  XR CHEST PORT    (Results Pending)   CT ABD PELV WO CONT    (Results Pending)   clear with port cath in place  Ct with nephrostomy tube in place. anasarca  Progress notes, Consult notes or additional Procedure notes:   bp improved after fluids, levophed. Making urine  Mult resistance to abx for gram pos coverage. Given last urine cult will place on cefepime pending results  Doubt need for another cvl at this time. Cap refill intact peripherally  ED Critical Care Note    System at risk for life threatening failure: cardiac, pulm, renal, hepatic  Associated problems: hypotension, metabolic, anemia      Critical Care services provided: bedside management, consult, documentation   Excluded procedures (time not included in critical care): ecg interp    Total Critical Care Time (in minutes) 50    6:25 AM  D/w dr noble on call for icu will consult    Reevaluation of patient:   Stable, improved    Disposition:  Diagnosis:   1. Recurrent UTI    2. Hypotension, unspecified hypotension type    3. Endometrial cancer ([facility] [address]    4. Severe sepsis with septic shock (CODE)    5. Wound of sacral region, subsequent encounter        Disposition: admit      Follow-up Information     None            Patient's Medications   Start Taking    No medications on file   Continue Taking    ALBUTEROL-IPRATROPIUM (DUO-NEB) 2.5 MG-0.5 MG/3 ML NEBU    3 mL by Nebulization route every six (6) hours as needed. Indications: CHRONIC OBSTRUCTIVE PULMONARY DISEASE WITH BRONCHOSPASMS    ENOXAPARIN (LOVENOX) 40 MG/0.4 ML    0.4 mL by SubCUTAneous route every twelve (12) hours every twelve (12) hours. FAMOTIDINE (PEPCID) 20 MG TABLET    Take 20 mg by mouth daily. FUROSEMIDE (LASIX) 40 MG TABLET    onetab po bid    LEVETIRACETAM (KEPPRA) 1,000 MG TABLET    Take 1 Tab by mouth two (2) times a day. MAGNESIUM OXIDE (MAG-OX) 400 MG TABLET    Take 1 Tab by mouth daily.     NYSTATIN (MYCOSTATIN) POWDER    Apply  to affected area two (2) times a day. ONDANSETRON (ZOFRAN ODT) 4 MG DISINTEGRATING TABLET    Take 1 Tab by mouth every six (6) hours as needed for Nausea (vomiting). PANTOPRAZOLE (PROTONIX) 40 MG TABLET    Take 1 Tab by mouth Before breakfast and dinner. POTASSIUM CHLORIDE (K-DUR, KLOR-CON) 20 MEQ TABLET    Take 1 Tab by mouth two (2) times a day. SODIUM BICARBONATE 650 MG TABLET    Take 1 Tab by mouth three (3) times daily.    These Medications have changed    No medications on file   Stop Taking    No medications on file

## 2017-06-20 NOTE — H&P
History and Physical    Patient: Pawan Peres               Sex: female          DOA: 2017       YOB: 1960      Age:  56 y.o.        LOS:  LOS: 0 days        Chief Complaint   Patient presents with    Fatigue         HPI:     Pawan Peres is a 56 y.o. female with endometrial cancer ovarian cancer who presents with fatigue. Patient was active hospice but revoked today. She presented in ED via EMS for fatigue. Patient is poor historian. Patient in ED was hypotensive with sepsis. Patient started on Levophed after getting 30 cc/kg IV fluid. Patient also received Levaquin, aztreonam. Patient also c/o pain in the buttock at ulcer area. Patient will be admitted for further treatment. PCCM consulted. Past Medical History:   Diagnosis Date    Anxiety and depression     Aortic stenosis     Arthritis     Arthropathy     Cardiac disorder     Cellulitis     left leg    Chronic pain     Endometrial adenocarcinoma (HCC)     Gastric ulcer     H/O ovarian cancer     Hematemesis     Infection     Lymphedema     Munchausen syndrome     Narcotic dependence ([facility] [address]     Obesity     Spinal stenosis     Status post partial hysterectomy     Urethral obstruction    . Past Surgical History:   Procedure Laterality Date    HX  SECTION      H[facility] ENDOSCOPY      [facility]    HX HERNIA REPAIR      HX OOPHORECTOMY      HX TONSILLECTOMY      HX VASCULAR ACCESS Right     single lumen power port       No current facility-administered medications on file prior to encounter. Current Outpatient Prescriptions on File Prior to Encounter   Medication Sig Dispense Refill    famotidine (PEPCID) 20 mg tablet Take 20 mg by mouth daily.  nystatin (MYCOSTATIN) powder Apply  to affected area two (2) times a day. 1 Bottle 3    potassium chloride (K-DUR, KLOR-CON) 20 mEq tablet Take 1 Tab by mouth two (2) times a day.  30 Tab 2    sodium bicarbonate 650 mg tablet Take 1 Tab by mouth three (3) times daily. 100 Tab 3    ondansetron (ZOFRAN ODT) 4 mg disintegrating tablet Take 1 Tab by mouth every six (6) hours as needed for Nausea (vomiting). 30 Tab 0    levETIRAcetam (KEPPRA) 1,000 mg tablet Take 1 Tab by mouth two (2) times a day. 180 Tab 1    albuterol-ipratropium (DUO-NEB) 2.5 mg-0.5 mg/3 ml nebu 3 mL by Nebulization route every six (6) hours as needed. Indications: CHRONIC OBSTRUCTIVE PULMONARY DISEASE WITH BRONCHOSPASMS 30 Nebule 5    enoxaparin (LOVENOX) 40 mg/0.4 mL 0.4 mL by SubCUTAneous route every twelve (12) hours every twelve (12) hours. 30 Syringe 5    furosemide (LASIX) 40 mg tablet onetab po bid 60 Tab 5    magnesium oxide (MAG-OX) 400 mg tablet Take 1 Tab by mouth daily. 10 Tab 0    pantoprazole (PROTONIX) 40 mg tablet Take 1 Tab by mouth Before breakfast and dinner. 61 Tab 0       Social History     Social History    Marital status:      Spouse name: N/A    Number of children: N/A    Years of education: N/A     Occupational History    Not on file. Social History Main Topics    Smoking status: Former Smoker     Quit date: 3/31/2010    Smokeless tobacco: Never Used    Alcohol use No    Drug use: No      Comment: hx of thc    Sexual activity: No     Other Topics Concern    Not on file     Social History Narrative       Prior to Admission Medications   Prescriptions Last Dose Informant Patient Reported? Taking? albuterol-ipratropium (DUO-NEB) 2.5 mg-0.5 mg/3 ml nebu   No No   Sig: 3 mL by Nebulization route every six (6) hours as needed. Indications: CHRONIC OBSTRUCTIVE PULMONARY DISEASE WITH BRONCHOSPASMS   enoxaparin (LOVENOX) 40 mg/0.4 mL   No No   Si.4 mL by SubCUTAneous route every twelve (12) hours every twelve (12) hours. famotidine (PEPCID) 20 mg tablet   Yes No   Sig: Take 20 mg by mouth daily.    furosemide (LASIX) 40 mg tablet   No No   Sig: onetab po bid   levETIRAcetam (KEPPRA) 1,000 mg tablet   No No   Sig: Take 1 Tab by mouth two (2) times a day.   magnesium oxide (MAG-OX) 400 mg tablet   No No   Sig: Take 1 Tab by mouth daily. nystatin (MYCOSTATIN) powder   No No   Sig: Apply  to affected area two (2) times a day. ondansetron (ZOFRAN ODT) 4 mg disintegrating tablet   No No   Sig: Take 1 Tab by mouth every six (6) hours as needed for Nausea (vomiting). pantoprazole (PROTONIX) 40 mg tablet   No No   Sig: Take 1 Tab by mouth Before breakfast and dinner. potassium chloride (K-DUR, KLOR-CON) 20 mEq tablet   No No   Sig: Take 1 Tab by mouth two (2) times a day. sodium bicarbonate 650 mg tablet   No No   Sig: Take 1 Tab by mouth three (3) times daily. Facility-Administered Medications: None       Family History   Problem Relation Age of Onset    Arthritis-osteo Mother     Heart Attack Mother     Cancer Father     Heart Attack Father     Hypertension Brother     Stroke Brother        Review of Systems: unable to obtain due to acuity      Physical Exam:       Visit Vitals    /61    Pulse 73    Temp 98.6 °F (37 °C)    Resp 14    Wt 112 kg (247 lb)    SpO2 100%    BMI 43.75 kg/m2     Physical Exam   Constitutional: Vital signs are normal. She appears lethargic. She is sleeping. She has a sickly appearance. She appears ill. No distress. obese   HENT:   Head: Normocephalic and atraumatic. Mouth/Throat: No oropharyngeal exudate. Eyes: Conjunctivae and EOM are normal. Pupils are equal, round, and reactive to light. No scleral icterus. Neck: Neck supple. Cardiovascular: Normal rate, regular rhythm and normal heart sounds. No murmur heard. Pulmonary/Chest: Breath sounds normal. No respiratory distress. She has no wheezes. She has no rales. Abdominal: Soft. Bowel sounds are normal.   Musculoskeletal: She exhibits edema. Neurological: She appears lethargic. Skin: Skin is warm. There is pallor. Scaly dry skin    Sacral decubitus infected wound ulcer , unstageable       Ancillary Studies:   All lab and imaging reviewed for the past 24 hours. Recent Results (from the past 24 hour(s))   METABOLIC PANEL, COMPREHENSIVE    Collection Time: 06/19/17  7:45 PM   Result Value Ref Range    Sodium 141 136 - 145 mmol/L    Potassium 4.4 3.5 - 5.5 mmol/L    Chloride 108 100 - 108 mmol/L    CO2 23 21 - 32 mmol/L    Anion gap 10 3.0 - 18 mmol/L    Glucose 98 74 - 99 mg/dL    BUN 28 (H) 7.0 - 18 MG/DL    Creatinine 1.32 (H) 0.6 - 1.3 MG/DL    BUN/Creatinine ratio 21 (H) 12 - 20      GFR est AA 51 (L) >60 ml/min/1.73m2    GFR est non-AA 42 (L) >60 ml/min/1.73m2    Calcium 7.6 (L) 8.5 - 10.1 MG/DL    Bilirubin, total 0.3 0.2 - 1.0 MG/DL    ALT (SGPT) 10 (L) 13 - 56 U/L    AST (SGOT) 16 15 - 37 U/L    Alk. phosphatase 198 (H) 45 - 117 U/L    Protein, total 5.6 (L) 6.4 - 8.2 g/dL    Albumin 1.1 (L) 3.4 - 5.0 g/dL    Globulin 4.5 (H) 2.0 - 4.0 g/dL    A-G Ratio 0.2 (L) 0.8 - 1.7     CBC WITH AUTOMATED DIFF    Collection Time: 06/19/17  7:45 PM   Result Value Ref Range    WBC 9.7 4.6 - 13.2 K/uL    RBC 3.00 (L) 4.20 - 5.30 M/uL    HGB 8.1 (L) 12.0 - 16.0 g/dL    HCT 26.7 (L) 35.0 - 45.0 %    MCV 89.0 74.0 - 97.0 FL    MCH 27.0 24.0 - 34.0 PG    MCHC 30.3 (L) 31.0 - 37.0 g/dL    RDW 15.4 (H) 11.6 - 14.5 %    PLATELET 630 944 - 254 K/uL    MPV 9.4 9.2 - 11.8 FL    NEUTROPHILS 82 (H) 40 - 73 %    LYMPHOCYTES 12 (L) 21 - 52 %    MONOCYTES 6 3 - 10 %    EOSINOPHILS 0 0 - 5 %    BASOPHILS 0 0 - 2 %    ABS. NEUTROPHILS 7.9 1.8 - 8.0 K/UL    ABS. LYMPHOCYTES 1.2 0.9 - 3.6 K/UL    ABS. MONOCYTES 0.6 0.05 - 1.2 K/UL    ABS. EOSINOPHILS 0.0 0.0 - 0.4 K/UL    ABS.  BASOPHILS 0.0 0.0 - 0.06 K/UL    DF AUTOMATED     PROTHROMBIN TIME + INR    Collection Time: 06/19/17  7:45 PM   Result Value Ref Range    Prothrombin time 14.9 11.5 - 15.2 sec    INR 1.2 0.8 - 1.2     PTT    Collection Time: 06/19/17  7:45 PM   Result Value Ref Range    aPTT 48.6 (H) 23.0 - 36.4 SEC   TYPE & SCREEN    Collection Time: 06/19/17  7:45 PM   Result Value Ref Range    Crossmatch Expiration 06/22/2017     ABO/Rh(D) A POSITIVE     Antibody screen NEG    POC LACTIC ACID    Collection Time: 06/19/17  8:13 PM   Result Value Ref Range    Lactic Acid (POC) 0.8 0.4 - 2.0 mmol/L   EKG, 12 LEAD, INITIAL    Collection Time: 06/19/17  8:17 PM   Result Value Ref Range    Ventricular Rate 104 BPM    Atrial Rate 104 BPM    P-R Interval 118 ms    QRS Duration 74 ms    Q-T Interval 328 ms    QTC Calculation (Bezet) 431 ms    Calculated P Axis 25 degrees    Calculated R Axis 6 degrees    Calculated T Axis 18 degrees    Diagnosis       Sinus tachycardia  Cannot rule out Anterior infarct , age undetermined  Abnormal ECG  When compared with ECG of 08-MAR-2017 16:56,  No significant change was found     URINALYSIS W/ RFLX MICROSCOPIC    Collection Time: 06/19/17  8:50 PM   Result Value Ref Range    Color DARK YELLOW      Appearance TURBID      Specific gravity 1.011 1.005 - 1.030      pH (UA) 6.5 5.0 - 8.0      Protein 100 (A) NEG mg/dL    Glucose NEGATIVE  NEG mg/dL    Ketone NEGATIVE  NEG mg/dL    Bilirubin NEGATIVE  NEG      Blood LARGE (A) NEG      Urobilinogen 0.2 0.2 - 1.0 EU/dL    Nitrites POSITIVE (A) NEG      Leukocyte Esterase LARGE (A) NEG     URINE MICROSCOPIC ONLY    Collection Time: 06/19/17  8:50 PM   Result Value Ref Range    WBC TOO NUMEROUS TO COUNT 0 - 4 /hpf    RBC 11 to 20 0 - 5 /hpf    Epithelial cells 0 0 - 5 /lpf    Bacteria 4+ (A) NEG /hpf       Assessment/Plan     Principal Problem:    Septic shock (HCC) (6/20/2017)    Active Problems:    DEV (acute kidney injury) ([facility] [address] (12/29/2015)      UTI (urinary tract infection) (3/3/2017)      Ulcer of sacral region, unstageable ([facility] [address] (6/20/2017)      Hypoalbuminemia   Chronic pain  Anemia  S/P Rt Nephrostomy tube   Metastatic Endometrial Cancer   Bilateral Ureteral stents  Pulmonary nodules        PLAN:    Septic shock   2/2 UTI vs Sacral wound ulcer  Cefepime , Levaquin .    Wound , blood and urine culture ordered  Levophed titrate goal MAP 65  Monitor UOP and BP  Intensivist consulted    Complicated UTI   Possible VRE UTI   Continue cefepime.  Levaquin pending urine culture   ID consult in AM     DEV  Prerenal   Continue IVF  Follow BMP    Sacral ulcer  Unstageable  Wound culture pending   Wound care consult     Metastatic endometrial cancer   Revoked hospice today   Pain management as needed     Chronic pain  2/2 cancer  Pain control prn    Anemia   2/2 malignancy  Monitor CBC and transfuse Hgb < 7    Rt Nephrostomy tube   2/2 metastatic endometrial CA     Bilateral ureteral stents  2/2 metastatic endometrial CA    Edema   2/2 hypoalbuminemia   Nutritional consult   Consider albumin infusion    Pulmonary nodules  2/2 metastatic cancer    DVT Prophylaxis     GI Prophylaxis     Full code       Brigida Aguayo MD  6/20/2017  1:45 AM

## 2017-06-20 NOTE — ROUTINE PROCESS
Bedside and Verbal shift change report given to [address] [place] [address] (oncoming nurse) by Marissa Carreno RN   (offgoing nurse).  Report included the following information SBAR, Kardex, Intake/Output, MAR, Recent Results, Med Rec Status and Cardiac Rhythm SR.

## 2017-06-20 NOTE — PROGRESS NOTES
attempted to complete the initial Spiritual Assessment of the patient in bed 6 of the emergency room , and offer Pastoral Care  But found patient resting peacefully. Patient appeared to be in some long term distress. No family seen at this visit. Patient does not have any Zoroastrianism/cultural needs that will affect patients preferences in health care.    Chaplains will continue to follow and will provide pastoral care on an as needed/requested basis     Chaplain Hussain Hennessy   Board Certified [address] [place] [address]   [phone]

## 2017-06-20 NOTE — CONSULTS
[facility]  Pulmonary, Critical Care, and Sleep Medicine    Name: Mercy Hazel MRN: [id number]   : 1960 Hospital: [facility]   Date: 2017        Critical Care Initial Patient Consult      IMPRESSION:      Patient Active Problem List   Diagnosis Code    Anemia D64.9    Anxiety F41.9    Nausea and vomiting R11.2    Morbid obesity ([facility] [address] E66.01    Chronic pain syndrome G89.4    History of ovarian cancer Z85.43    History of gastric bypass Z98.890    CAD (coronary artery disease) I25.10    History of Clostridium difficile Z87.19    History of endometrial cancer Z85.42    History of GI bleed Z87.19    Lymphedema I89.0    Hypokalemia E87.6    DEV (acute kidney injury) ([facility] [address] N17.9    Pulmonary nodules R91.8    A-fib ([facility] [address] I48.91    Aortic stenosis I35.0    GI bleed K92.2    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) L98.429        RECOMMENDATIONS:   Neuro  Awake, alert    Cardiovascular  Required initiation of levophed, will wean if able  Continue aggressive fluid hydration  2D echo with EF 55 - 53%, grade 1 diastolic, PA 45 - 50  Afib rate controlled  Lactic acid 0.8    Pulmonary  RML pulmonary nodule, suspect mets  Oxygen as needed to keep sats over 90%    GI  Protonix to pepsid due to shortage  CT abdomen with enlarged abdominal mass, sacral decub communicating with wall of rectum    Renal  Nephrostomy tubes in place  Renal function improved from May 2017    ID  Suspect sepsis  Continue broad spectrum abx, now levaquin and cefepime  Bcx, ucx  pending  Continue aggressive hydration    Heme  Chronic anemia  Ovarian/endometrial cancer  HO RLE DVT likely 2/2 venous outflow obstruction due to pelvic mass    Pain  Will need adjustment of pain regimen  Will use caution initially given risk of dropping BP further with pain meds       Subjective/History: This patient has been seen and evaluated at the request of Dr. Shad Qureshi for sepsis. Patient is a 56 y.o. female with ho endometrial cancer, ho ovarian cancer, bilateral nephrostomy tubes admitted with fatigue and found to have hypotension and presumed sepsis. Pt remained hypotensive despite fluid resuscitation and was started on levophed. Pt was previously on hospice but has revoked it. Pt reports her main concern has been inadequate pain management. She states she is on MS Contin and dilaudid q 4 for breakthrough. She states the pain medication would wear off before the next dose was due and her pain would become unbearable. She states the pain is both abdominal and back pain. Past Medical History:   Diagnosis Date    Anxiety and depression     Aortic stenosis     Arthritis     Arthropathy     Cardiac disorder     Cellulitis     left leg    Chronic pain     Endometrial adenocarcinoma (HCC)     Gastric ulcer     H/O ovarian cancer     Hematemesis     Infection     Lymphedema     Munchausen syndrome     Narcotic dependence ([facility] [address]     Obesity     Spinal stenosis     Status post partial hysterectomy     Urethral obstruction       Past Surgical History:   Procedure Laterality Date    HX  SECTION      HX ENDOSCOPY      EGD 6 x [facility]    HX HERNIA REPAIR      HX OOPHORECTOMY      HX TONSILLECTOMY      HX VASCULAR ACCESS Right     single lumen power port      Prior to Admission medications    Medication Sig Start Date End Date Taking? Authorizing Provider   triamcinolone acetonide (KENALOG) 0.025 % topical cream Apply 1 g to affected area three (3) times daily as needed for Skin Irritation. use thin layer   Yes Phys Other, MD   trospium (SANCTURA) 20 mg tablet Take 20 mg by mouth two (2) times a day. Yes Deacon Leone MD   HYDROmorphone (DILAUDID) 4 mg tablet Take 4 mg by mouth every six (6) hours as needed for Pain (breakthrough pain). Yes Deacon Leone MD   polyethylene glycol (MIRALAX) 17 gram packet Take 17 g by mouth daily. Yes Deacon Leone MD   LORazepam (ATIVAN) 1 mg tablet Take 1 mg by mouth every six (6) hours as needed for Anxiety. Yes Deacon Leone MD   morphine CR (MS CONTIN) 30 mg CR tablet Take 90 mg by mouth every twelve (12) hours. Yes Deacon Leone MD   famotidine (PEPCID) 20 mg tablet Take 20 mg by mouth daily. 6/14/16  Yes Historical Provider   nystatin (MYCOSTATIN) powder Apply  to affected area two (2) times a day. 3/26/17  Yes Lucrecia Hameed MD   potassium chloride (K-DUR, KLOR-CON) 20 mEq tablet Take 1 Tab by mouth two (2) times a day. 3/26/17  Yes Lucrecia Hameed MD   sodium bicarbonate 650 mg tablet Take 1 Tab by mouth three (3) times daily. 3/26/17  Yes Lucrecia Hameed MD   ondansetron (ZOFRAN ODT) 4 mg disintegrating tablet Take 1 Tab by mouth every six (6) hours as needed for Nausea (vomiting). 8/30/16  Yes Linnette Francisco MD   levETIRAcetam (KEPPRA) 1,000 mg tablet Take 1 Tab by mouth two (2) times a day. 3/29/17   Irena Monique,    albuterol-ipratropium (DUO-NEB) 2.5 mg-0.5 mg/3 ml nebu 3 mL by Nebulization route every six (6) hours as needed. Indications: CHRONIC OBSTRUCTIVE PULMONARY DISEASE WITH BRONCHOSPASMS 3/26/17   Lucrecia Hameed MD   enoxaparin (LOVENOX) 40 mg/0.4 mL 0.4 mL by SubCUTAneous route every twelve (12) hours every twelve (12) hours. 3/26/17   Lucrecia Hameed MD   furosemide (LASIX) 40 mg tablet onetab po bid 3/26/17   Lucrecia Hameed MD   magnesium oxide (MAG-OX) 400 mg tablet Take 1 Tab by mouth daily. 3/26/17   Lucrecia Hameed MD   pantoprazole (PROTONIX) 40 mg tablet Take 1 Tab by mouth Before breakfast and dinner.  8/30/16   Linnette Francisco MD     Current Facility-Administered Medications   Medication Dose Route Frequency    0.9% sodium chloride infusion  100 mL/hr IntraVENous CONTINUOUS    cefepime (MAXIPIME) 2 g in 0.9% sodium chloride (MBP/ADV) 100 mL MBP  2 g IntraVENous Q12H    [START ON 6/21/2017] levoFLOXacin (LEVAQUIN) 750 mg in D5W IVPB  750 mg IntraVENous Q48H    nystatin (MYCOSTATIN) 100,000 unit/gram powder   Topical BID    polyethylene glycol (MIRALAX) packet 17 g  17 g Oral DAILY    potassium chloride (K-DUR, KLOR-CON) SR tablet 20 mEq  20 mEq Oral BID    sodium bicarbonate tablet 650 mg  650 mg Oral TID    triamcinolone acetonide (KENALOG) 0.025 % cream 1 g  1 g Topical TID    trospium (SANCTURA) tablet 20 mg  20 mg Oral BID    cefepime (MAXIPIME) 1 gram injection        0.9% sodium chloride (MBP/ADV) 0.9 % infusion        cefepime (MAXIPIME) 1 gram injection        0.9% sodium chloride (MBP/ADV) 0.9 % infusion        pantoprazole (PROTONIX) tablet 40 mg  40 mg Oral ACB&D    Please enter patient height for dosing purpose. 1 Each Other DAILY    NOREPINephrine (LEVOPHED) 8,000 mcg in dextrose 5% 250 mL infusion  2-16 mcg/min IntraVENous TITRATE     Allergies   Allergen Reactions    Latex, Natural Rubber Hives and Itching    Clindamycin Hives    Clindamycin Itching    Iodine Hives     Pt denies allergy. Patient reports this is an allergy to contrast media that caused temporary vision loss and vomiting but is fine if premedicated with antihistamine.     Keflex [Cephalexin] Hives    Linezolid Hives    Nsaids (Non-Steroidal Anti-Inflammatory Drug) Nausea and Vomiting     Emesis, Previous GI bleed    Piperacillin-Tazobactam Hives     Has tolerated Teflaro @ [facility]  Has been tolerating cephalosporins SAPH     Sulfa (Sulfonamide Antibiotics) Hives and Contact Dermatitis    Vancomycin Hives      Social History   Substance Use Topics    Smoking status: Former Smoker     Quit date: 3/31/2010    Smokeless tobacco: Never Used    Alcohol use No      Family History   Problem Relation Age of Onset    Arthritis-osteo Mother    [facility] Heart Attack Mother     Cancer Father     Heart Attack Father     Hypertension Brother     Stroke Brother         Review of Systems:  A comprehensive review of systems was negative except for that written in the HPI. Objective:   Vital Signs:    Visit Vitals    BP 96/80    Pulse 76    Temp 98.6 °F (37 °C)    Resp 19    Wt 112 kg (247 lb)    SpO2 99%    BMI 43.75 kg/m2       O2 Device: Room air       Temp (24hrs), Av.6 °F (37 °C), Min:98.6 °F (37 °C), Max:98.6 °F (37 °C)       Intake/Output:   Last shift:         Last 3 shifts:    No intake or output data in the 24 hours ending 17 1040      Ventilator Settings:  Mode Rate Tidal Volume Pressure FiO2 PEEP                    Peak airway pressure:      Minute ventilation:        ARDS network Guidelines: Lung protective strategy and Pl pressure goals____________    Physical Exam:    General:  Alert, cooperative, no distress, appears chronically ill   Head:  Normocephalic, without obvious abnormality, atraumatic. Eyes:  Conjunctivae/corneas clear. PERRL, EOMs intact. Nose: Nares normal. Septum midline. Mucosa normal. No drainage or sinus tenderness. Throat: Lips, mucosa, and tongue normal. Teeth and gums normal.   Neck: Supple, symmetrical, trachea midline, no adenopathy, thyroid: no enlargment/tenderness/nodules, no carotid bruit and no JVD. Back:   Symmetric, no curvature. ROM normal.   Lungs:   Clear to auscultation bilaterally. Chest wall:  No tenderness or deformity. Heart:  Regular rate and rhythm, S1, S2 normal, no murmur, click, rub or gallop. Abdomen:   Soft, non-tender. Bowel sounds normal. No masses,  No organomegaly. Extremities: Extremities normal, atraumatic, no cyanosis + edema. Pulses: 2+ and symmetric all extremities.    Skin: Skin color, texture, turgor normal. No rashes or lesions   Lymph nodes:      Cervical, supraclavicular, and axillary nodes normal.   Neurologic: Grossly nonfocal       Data:     Recent Results (from the past 24 hour(s))   METABOLIC PANEL, COMPREHENSIVE    Collection Time: 06/19/17  7:45 PM   Result Value Ref Range    Sodium 141 136 - 145 mmol/L    Potassium 4.4 3.5 - 5.5 mmol/L    Chloride 108 100 - 108 mmol/L    CO2 23 21 - 32 mmol/L    Anion gap 10 3.0 - 18 mmol/L    Glucose 98 74 - 99 mg/dL    BUN 28 (H) 7.0 - 18 MG/DL    Creatinine 1.32 (H) 0.6 - 1.3 MG/DL    BUN/Creatinine ratio 21 (H) 12 - 20      GFR est AA 51 (L) >60 ml/min/1.73m2    GFR est non-AA 42 (L) >60 ml/min/1.73m2    Calcium 7.6 (L) 8.5 - 10.1 MG/DL    Bilirubin, total 0.3 0.2 - 1.0 MG/DL    ALT (SGPT) 10 (L) 13 - 56 U/L    AST (SGOT) 16 15 - 37 U/L    Alk. phosphatase 198 (H) 45 - 117 U/L    Protein, total 5.6 (L) 6.4 - 8.2 g/dL    Albumin 1.1 (L) 3.4 - 5.0 g/dL    Globulin 4.5 (H) 2.0 - 4.0 g/dL    A-G Ratio 0.2 (L) 0.8 - 1.7     CBC WITH AUTOMATED DIFF    Collection Time: 06/19/17  7:45 PM   Result Value Ref Range    WBC 9.7 4.6 - 13.2 K/uL    RBC 3.00 (L) 4.20 - 5.30 M/uL    HGB 8.1 (L) 12.0 - 16.0 g/dL    HCT 26.7 (L) 35.0 - 45.0 %    MCV 89.0 74.0 - 97.0 FL    MCH 27.0 24.0 - 34.0 PG    MCHC 30.3 (L) 31.0 - 37.0 g/dL    RDW 15.4 (H) 11.6 - 14.5 %    PLATELET 307 747 - 859 K/uL    MPV 9.4 9.2 - 11.8 FL    NEUTROPHILS 82 (H) 40 - 73 %    LYMPHOCYTES 12 (L) 21 - 52 %    MONOCYTES 6 3 - 10 %    EOSINOPHILS 0 0 - 5 %    BASOPHILS 0 0 - 2 %    ABS. NEUTROPHILS 7.9 1.8 - 8.0 K/UL    ABS. LYMPHOCYTES 1.2 0.9 - 3.6 K/UL    ABS. MONOCYTES 0.6 0.05 - 1.2 K/UL    ABS. EOSINOPHILS 0.0 0.0 - 0.4 K/UL    ABS.  BASOPHILS 0.0 0.0 - 0.06 K/UL    DF AUTOMATED     PROTHROMBIN TIME + INR    Collection Time: 06/19/17  7:45 PM   Result Value Ref Range    Prothrombin time 14.9 11.5 - 15.2 sec    INR 1.2 0.8 - 1.2     PTT    Collection Time: 06/19/17  7:45 PM   Result Value Ref Range    aPTT 48.6 (H) 23.0 - 36.4 SEC   TYPE & SCREEN    Collection Time: 06/19/17  7:45 PM   Result Value Ref Range    Crossmatch Expiration 06/22/2017     ABO/Rh(D) A POSITIVE Antibody screen NEG    POC LACTIC ACID    Collection Time: 06/19/17  8:13 PM   Result Value Ref Range    Lactic Acid (POC) 0.8 0.4 - 2.0 mmol/L   EKG, 12 LEAD, INITIAL    Collection Time: 06/19/17  8:17 PM   Result Value Ref Range    Ventricular Rate 104 BPM    Atrial Rate 104 BPM    P-R Interval 118 ms    QRS Duration 74 ms    Q-T Interval 328 ms    QTC Calculation (Bezet) 431 ms    Calculated P Axis 25 degrees    Calculated R Axis 6 degrees    Calculated T Axis 18 degrees    Diagnosis       Sinus tachycardia  Poor data quality, interpretation may be adversely affected  Abnormal ECG  When compared with ECG of 08-MAR-2017 16:56,  No significant change was found  Confirmed by Suha Rizo ([id number]) on 6/20/2017 8:18:23 AM     CULTURE, BLOOD    Collection Time: 06/19/17  8:30 PM   Result Value Ref Range    Special Requests: NO SPECIAL REQUESTS      Culture result: NO GROWTH AFTER 10 HOURS     URINALYSIS W/ RFLX MICROSCOPIC    Collection Time: 06/19/17  8:50 PM   Result Value Ref Range    Color DARK YELLOW      Appearance TURBID      Specific gravity 1.011 1.005 - 1.030      pH (UA) 6.5 5.0 - 8.0      Protein 100 (A) NEG mg/dL    Glucose NEGATIVE  NEG mg/dL    Ketone NEGATIVE  NEG mg/dL    Bilirubin NEGATIVE  NEG      Blood LARGE (A) NEG      Urobilinogen 0.2 0.2 - 1.0 EU/dL    Nitrites POSITIVE (A) NEG      Leukocyte Esterase LARGE (A) NEG     URINE MICROSCOPIC ONLY    Collection Time: 06/19/17  8:50 PM   Result Value Ref Range    WBC TOO NUMEROUS TO COUNT 0 - 4 /hpf    RBC 11 to 20 0 - 5 /hpf    Epithelial cells 0 0 - 5 /lpf    Bacteria 4+ (A) NEG /hpf   CULTURE, BLOOD    Collection Time: 06/19/17  9:05 PM   Result Value Ref Range    Special Requests: NO SPECIAL REQUESTS      Culture result: NO GROWTH AFTER 9 HOURS     C REACTIVE PROTEIN, QT    Collection Time: 06/20/17  3:20 AM   Result Value Ref Range    C-Reactive protein 22.0 (H) 0 - 0.3 mg/dL   CBC WITH AUTOMATED DIFF    Collection Time: 06/20/17  3:20 AM   Result Value Ref Range    WBC 9.0 4.6 - 13.2 K/uL    RBC 2.83 (L) 4.20 - 5.30 M/uL    HGB 7.7 (L) 12.0 - 16.0 g/dL    HCT 25.3 (L) 35.0 - 45.0 %    MCV 89.4 74.0 - 97.0 FL    MCH 27.2 24.0 - 34.0 PG    MCHC 30.4 (L) 31.0 - 37.0 g/dL    RDW 15.4 (H) 11.6 - 14.5 %    PLATELET 755 227 - 673 K/uL    MPV 9.6 9.2 - 11.8 FL    NEUTROPHILS 82 (H) 40 - 73 %    LYMPHOCYTES 10 (L) 21 - 52 %    MONOCYTES 7 3 - 10 %    EOSINOPHILS 1 0 - 5 %    BASOPHILS 0 0 - 2 %    ABS. NEUTROPHILS 7.4 1.8 - 8.0 K/UL    ABS. LYMPHOCYTES 0.9 0.9 - 3.6 K/UL    ABS. MONOCYTES 0.6 0.05 - 1.2 K/UL    ABS. EOSINOPHILS 0.1 0.0 - 0.4 K/UL    ABS. BASOPHILS 0.0 0.0 - 0.06 K/UL    DF AUTOMATED             No results for input(s): FIO2I, IFO2, HCO3I, IHCO3, HCOPOC, PCO2I, PCOPOC, IPHI, PHI, PHPOC, PO2I, PO2POC in the last 72 hours. No lab exists for component: IPOC2  Telemetry:normal sinus rhythm    Imaging:  I have personally reviewed the patients radiographs and have reviewed the reports:  CXR   Best practice :  Core measures:          Total critical care time exclusive of procedures: 32 minutes  Craig Best MD

## 2017-06-20 NOTE — PROGRESS NOTES
1215-Received pt from ED via stretcher, no sign of distress, vs stable on pressor, anxious at times, mepilex placed on sacrum, activity poorly tolerated, will continue to monitor  1600-Resting in bed with eyes closed, vs stable on pressor, will continue to monitor

## 2017-06-21 NOTE — PROGRESS NOTES
INFECTIOUS DISEASE FOLLOW UP NOTE :-     Admit Date: 6/19/2017       ABX:       Cefepime 6/19-2, Dapto 6/20-1   Levoflox 6/19-1       ASSESSMENT-> RECOMMENDATIONS      SIRS/ ?sepsis POA- pt with hypotension requiring levophed, subjective fever but no leucocytsois  - suspect sec to infected Rt stent ? Cx UTI, at risk for BSI - Await cx- Blood cx again with GPC and in setting of already suspected infected port- likely source  - Continue with BSabx while waiting on other cx. Looking at her previous Cx and given multiple Abx allergies- management is difficult and will continue on Dapto and Cefepime for now  - normal CPK   Infected obstructed stents- suspect Rt ? left  - severe hydro on CT  - placement of b/l PCNs 5/11 at [facility] and left fell off few weeks ago - urology consult appreciated  - Apparently tube was clamped on admission and now opened up and will see if that helps  - Await urine cx to decide final Abx   BSI 1 0f 2 blood cx +ve for GPC - should be covered by Dapto but given infected port and previous resistant organisms, will be worried about Dapto R too  - will ask not to given anything through port except Abx- d/w ICU team  -> Need to get Mediport out and d/w pt but she is not interested   H/o multiple UTIs   - ucx >668F VRE was complicated with BSI and valve veg and required prolong hospitalization  - ucx 5/2017 with E.coli -unclear if got Rx or not  - s/p bilateral stent exchange 3/31, byrne changed 5/10 in urology office -> CT with b/l hydro Rt worse than left and concern for obstructed stent on Rt  ->Ua with sig pyuria  -> Await ucx   Sacral ulcer stage 4  - CT concerning for progression of ulcer eroding into rectal wall now- suspect recto-cut fistula  - pt denies any stool in ulcer - Will need colo-rectal input if feces in ulcer though prognosis is extremely poor given CT findings and doubt a candidate for any Sx intervention   Rt pelvic mass ?ovarian ca / endometrial ca with mets  - CT showing worsening of adnexal mass with increasing pulmonary nodules  - palliative care was involved in past and pt was hospice but it was revoked last admission ->  prognosis remains extremely poor  -> pt doesn't understand and appreciate palliative care input     H/o VRE BSI with ?aortic endocarditis in 3/2017   -  TTE aortic stenosis with possible aortic vegetation [chronic mixed calcified density]  -pt refused ESSIE and removal of mediport  - pt had many +ve subsequent blood cx ( In [facility] reported as Veillonella species and Staph species in May 2017 -> treated with 6 weeks of Daptomycin    -> Still with same mediport and suspect infected    DEV- Cr 2.04  - suspect sec to obstruction, infection ->Monitor    Hypoalbuminemia     Lymphedema legs - ch  - suspect thrombus seen on CT also contributing      H/o LE DVT with IVC filter  - CT again showing progression     Seizure activity 3/26- was new  - no further seizures on Keppra     Co-morb- Anxiety depression, AS, ch pain, hemetemesis s/p EGD x6, MO, ch anemia      Ax- Clinda, Keflex, Linezolid, Zosyn, Sulfa, Vanco- most of the Abx cause rash and hives though had tolerated on and off in past with bendryl - Monitor on current regime with cefepime                    D/w ICU, palliative care and medicine teams in detail.     MICROBIOLOGY:   (3/3  Blcx x 2 - 2/2 VRE S gent, linezolid , daptomycin quinupristin, Ucx > 100K VRE  3/14 ucx ng  3/17 uctx ng  [facility]-  5/1 blcx x3 with Veillonella species  5/10 blcx Staph epi, Staph cohnii  5/21 ucx >100k E.coli R Quinolones)     6/19 ucx >100k GNR, blcx 1 of 2 GPC  6/20 blcx x2 IP    LINES/DRAINS:   Rt chest mediport   Duarte changed 5/10    MEDICATIONS:     Current Facility-Administered Medications   Medication Dose Route Frequency    0.9% sodium chloride infusion  100 mL/hr IntraVENous CONTINUOUS    morphine injection 2 mg  2 mg IntraVENous Q4H PRN    naloxone (NARCAN) injection 0.4 mg  0.4 mg IntraVENous PRN    cefepime (MAXIPIME) 2 g in 0.9% sodium chloride (MBP/ADV) 100 mL MBP  2 g IntraVENous Q12H    LORazepam (ATIVAN) tablet 1 mg  1 mg Oral Q6H PRN    nystatin (MYCOSTATIN) 100,000 unit/gram powder   Topical BID    polyethylene glycol (MIRALAX) packet 17 g  17 g Oral DAILY    potassium chloride (K-DUR, KLOR-CON) SR tablet 20 mEq  20 mEq Oral BID    sodium bicarbonate tablet 650 mg  650 mg Oral TID    triamcinolone acetonide (KENALOG) 0.025 % cream 1 g  1 g Topical TID    trospium (SANCTURA) tablet 20 mg  20 mg Oral BID    famotidine (PF) (PEPCID) 20 mg in sodium chloride 0.9 % 10 mL injection  20 mg IntraVENous Q12H    DAPTOmycin (CUBICIN) 672 mg in 0.9% sodium chloride 50 mL IVPB  6 mg/kg IntraVENous Q24H    ELECTROLYTE REPLACEMENT PROTOCOL  1 Each Other PRN    ELECTROLYTE REPLACEMENT PROTOCOL  1 Each Other PRN    ELECTROLYTE REPLACEMENT PROTOCOL  1 Each Other PRN    ELECTROLYTE REPLACEMENT PROTOCOL  1 Each Other PRN    ELECTROLYTE REPLACEMENT PROTOCOL  1 Each Other PRN    ELECTROLYTE REPLACEMENT PROTOCOL  1 Each Other PRN    ELECTROLYTE REPLACEMENT PROTOCOL  1 Each Other PRN    sodium chloride (NS) flush 5-10 mL  5-10 mL IntraVENous PRN    NOREPINephrine (LEVOPHED) 8,000 mcg in dextrose 5% 250 mL infusion  2-16 mcg/min IntraVENous TITRATE       SUBJECTIVE :     Interval notes reviewed. Pt in ICU, remains on pressors. D/w her positive blood culture and concern for infected port. Also d/w many previous positive blcx and complications despite long term Rx with Abx. Pt refused removal of port. She wants to continue aggressive treatment but wants to make decision on whether she wants that procedure or not. She says she knows she is dying but she is enjoying her life and would like to continue everything that is going on. I explained we won't be able to use port for anything but Abx and will have to stick her and she is fine with that. She says want to go back to home.  I asked who takes care of her at home and she says her daughter. 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 She doesn't want to change anything. OBJECTIVE     Visit Vitals    /78    Pulse 89    Temp 99.7 °F (37.6 °C)    Resp 17    Ht 5' 4\" (1.626 m)    Wt 112.4 kg (247 lb 12.8 oz)    SpO2 98%    BMI 42.53 kg/m2       Temp (24hrs), Av.3 °F (37.4 °C), Min:98.7 °F (37.1 °C), Max:99.7 °F (37.6 °C)      Gen-No distress, ch sick appearing  HENT- No thrush, dry mm, pale  Chest- Symmetric expansion, CTA  CV-S1S2 RR, No JVD, 2+ edema  Abd-Soft, ? deep-tenderness, obese, large pannus with ulceration, BS+, +ve CVA tenderness on Rt, rt PCN, byrne+  Musculoskeletal:2+ edema. No clubbing or cyanosis  Skin-Multiple skin ulcers on thigh, legs, sacrum- under dressing.      Muscsk- 2+ edema        Labs: Results:   Chemistry Recent Labs      17   0408  17   1230  17   GLU  134*  109*  98   NA  141  144  141   K  4.1  4.2  4.4   CL  112*  112*  108   CO2  20*  22  23   BUN  24*  26*  28*   CREA  1.32*  1.25  1.32*   CA  7.4*  7.1*  7.6*   AGAP  9  10  10   BUCR  18  21*  21*   AP   --    --   198*   TP   --    --   5.6*   ALB   --    --   1.1*   GLOB   --    --   4.5*   AGRAT   --    --   0.2*      CBC w/Diff Recent Labs      17   0408  17   0320  17   WBC  11.3  9.0  9.7   RBC  2.78*  2.83*  3.00*   HGB  7.5*  7.7*  8.1*   HCT  24.7*  25.3*  26.7*   PLT  277  279  315   GRANS  81*  82*  82*   LYMPH  12*  10*  12*   EOS  1  1  0          RADIOLOGY :          Imaging    Results from Hospital Encounter encounter on 17   XR CHEST PORT   Narrative CHEST AP PORTABLE    Indication: Chest pain and shortness of breath. Comparison: 2017. Findings: Right port with catheter tip near cavoatrial junction. The lungs  appear clear. No evidence for pneumothorax or pleural effusion.  Cardiac  silhouette and pulmonary vascularity appear within normal limits. Impression IMPRESSION:    No acute cardiopulmonary disease. Results from [place] encounter on 06/19/17   CT ABD PELV WO CONT   Narrative EXAM: CT of the abdomen and pelvis    INDICATION: 56-year-old patient with advanced stage endometrial cancer,  bilateral nephroureteral stents, and concern for possible infection adjacent to  the right percutaneous nephrostomy tube site. Nephrostomy catheter placement  occurred at an outside institution on 5/11/2017. COMPARISON: Several prior exams, most recent CT of the abdomen and pelvis  available for comparison: March 3, 2017. TECHNIQUE: Axial CT imaging of the abdomen and pelvis was performed without  intravenous contrast. Multiplanar reformats were generated. One or more dose reduction techniques were used on this CT: automated exposure  control, adjustment of the mAs and/or kVp according to patient's size, and  iterative reconstruction techniques. The specific techniques utilized on this CT  exam have been documented in the patient's electronic medical record.  _______________    FINDINGS:    LOWER CHEST: Minor dependent atelectasis is noted at each lung base. No focal  pneumonic consolidation, pneumothorax or pleural effusion. Normal cardiac size. No pericardial effusion. There are several pulmonary nodules noted. The largest  is present in the right lower lobe (series 4, image 10) which measures  approximately 8 x 8 mm in size, and is similar to prior CT of 3/4/2017. Additional right lower lobe pulmonary nodule noted (series 4, image 3), as well  as in the left lower lobe (series 4, image 12). The right lower lobe nodule  measures approximately 3 mm and is new in appearance from prior, with slight  enlargement of the subpleural left lower lobe pulmonary nodule, which measures  approximately 5 mm. LIVER, BILIARY: The unenhanced appearance of the liver demonstrates no focal  abnormality. No biliary dilation.  The gallbladder is surgically absent. PANCREAS: There is mild fatty infiltration of the pancreas without evidence of  pancreatic ductal dilatation. SPLEEN: Normal.    ADRENALS: Nodular thickening of the inferior left adrenal gland is similar to  prior exam.    KIDNEYS/URETERS/BLADDER:     On the right, there is percutaneous nephrostomy catheter, with formed loop  present with in the interpolar collecting system. In addition, there is a  nephroureteral stent, also with proximal formed loop in the interpolar  collecting system, and distal formed loop within the bladder. There is  moderately severe right-sided hydronephrosis, which is increased from prior CT  examination. On the left, a nephroureteral stent is in place, with proximal formed loop noted  in the interpolar collecting system, and distal formed loop present within the  bladder. Mild left-sided hydronephrosis is present, similar to prior CT exam.    A Udarte catheter is present within the bladder, which is decompressed. PELVIC ORGANS: The uterus contains an unremarkable unenhanced appearance. Right  adnexal soft tissue lesion noted, measuring approximately 5.1 x 2.9 cm in size,  with exact measurements limited by lack of intravenous contrast as well as beam  hardening artifact. VASCULATURE: Abdominal aorta is normal in course and caliber. There is an  inferior vena cava filter which spans the renal veins, with several tines which  project external to the lumen of the inferior vena cava, potentially within the  right renal vein (images 46-47). LYMPH NODES: Periaortic adenopathy along the left side of the retroperitoneum is  similar in appearance to prior exams. GASTROINTESTINAL TRACT: There is a small hiatal hernia. Small intestine is  normal in course and caliber. Moderate burden of formed stool throughout the  large intestine, which is similarly normal in caliber. No bowel obstruction. No  free intraperitoneal gas.  There is a advanced age sacral decubitus ulcer, which  freely communicates with the posterior wall of the rectum (series 3, image 107)  with associated soft tissue thickening/stranding at the site of decubitus ulcer  formation. BONES: There is accentuation of the lumbar lordosis, with unchanged grade 2  isthmic spondylolisthesis L5 on S1. Multilevel spondylosis noted, most advanced  L4-S1. Vertebral body heights are maintained intact. Moderately severe bilateral  hip joint osteoarthritis is present. OTHER: Numerous small foci of fat necrosis/fat-containing ventral abdominal  hernias are noted within the anterior abdominal wall, similar to prior. Diffuse  subcutaneous body wall edema is present, along with redemonstration of multiple  abdominal wall collateral vessels, the appearance of which is increased in the  interval from prior CT examination. _______________         Impression IMPRESSION:    1. Bilateral nephroureteral stents and percutaneous nephrostomy catheter in  stable and expected position. There is moderately severe right hydronephrosis,  which is increased in magnitude from prior CT examination, with mild left  hydronephrosis, similar to prior. 2. Bladder decompressed with a Duarte catheter. 3. Enlargement of a right adnexal lesion from recent prior CT examination. 4. New and slightly enlarged pulmonary nodules as above. Follow-up per oncology  protocol. 5. Inferior vena cava filter which spans across the renal veins as described  above. Increased formation of abdominal wall collaterals as well as anasarca  noted. 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 6. Progression of an advanced stage sacral decubitus ulcer which directly  communicates with the posterior wall of the rectum with associated skin  thickening/cellulitis. 7. Unchanged, grade 2 isthmic spondylolisthesis L5-S1.     Note: Preliminary report sent to the Emergency Department by the radiology  resident at the time of the study. The additional findings of hydronephrosis and advanced sacral decubitus ulcer  were discussed with Dr. Cy Mcgregor, hospitalist caring for the patient at 8:25 AM on  6/20/2017. I have independently examined the patient and reviewed all lab studies and imgaing as well as review of nursing notes and physican notes from the past 24 hours. The plan of care has been discussed with the patient/relative and all questions are answered.      Kojo Baker MD  June 21, 2017    [facility] AT THE [facility]  [phone]

## 2017-06-21 NOTE — DIABETES MGMT
NUTRITIONAL ASSESSMENT AND GLYCEMIC CONTROL PLAN OF CARE     Ella Camacho           56 y.o.           6/19/2017                 1. Recurrent UTI    2. Hypotension, unspecified hypotension type    3. Endometrial cancer ([address]    4. Severe sepsis with septic shock (CODE)    5. Wound of sacral region, subsequent encounter    6. Anxiety       [x]  No Cultural, Alevism or ethnic dietary need identified. []  Cultural, Alevism and ethnic food preferences identified and addressed    [x]  Participated in discharge planning/Interdisciplinary rounds   Food allergies: [x]  No        []  Yes-  ASSESSMENT:   Pt is overweight related to excess caloric intake as evidenced by 207% ideal weight and BMI= 42.5kg/m2. Pt meets criteria for obesity. Pt with increased calorie and protein requirements related to sacral wound and Ayo score =13. Morna Rout Pt w/hypoalbuminemia as evidenced by albumin=1.1 mg/dl. INTERVENTIONS/PLAN:   Liberalized diet from renal to 2g Na with reduced intake. Ensure supplement 8 oz/day. SUBJECTIVE/OBJECTIVE:   Information obtained from: ICU rounds    Diet: Renal- Observed intake of breakfast meal <50%.   Patient Vitals for the past 100 hrs:   % Diet Eaten   06/20/17 2000 80 %   06/20/17 1400 90 %     Medications: [x]                Reviewed   Corrective lispro- not requiring  Most Recent POC Glucose:   Recent Labs      06/21/17   0408  06/20/17   1230  06/19/17   1945   GLU  134*  109*  98         Labs:   Lab Results   Component Value Date/Time    Hemoglobin A1c <3.5 06/21/2017 04:08 AM     Lab Results   Component Value Date/Time    Sodium 141 06/21/2017 04:08 AM    Potassium 4.1 06/21/2017 04:08 AM    Chloride 112 06/21/2017 04:08 AM    CO2 20 06/21/2017 04:08 AM    Anion gap 9 06/21/2017 04:08 AM    Glucose 134 06/21/2017 04:08 AM    BUN 24 06/21/2017 04:08 AM    Creatinine 1.32 06/21/2017 04:08 AM    Calcium 7.4 06/21/2017 04:08 AM    Magnesium 1.9 06/21/2017 04:08 AM    Phosphorus 3.2 06/21/2017 04:08 AM Albumin 1.1 06/19/2017 07:45 PM     Anthropometrics: IBW : 54.4 kg (120 lb), % IBW (Calculated): 206.5 %, BMI (calculated): 42.5  Wt Readings from Last 1 Encounters:   06/21/17 112.4 kg (247 lb 12.8 oz)      Ht Readings from Last 1 Encounters:   06/21/17 5' 4\" (1.626 m)     Estimated Nutrition Needs: 1700 Kcals/day, Protein (g): 77 g Fluid (ml): 1800 ml  Based on:   []          Actual BW    [x]          IBW   []            Adjusted BW    Nutrition Diagnoses:      As stated above. Nutrition Interventions: as astated above  Goal:     Intake will meet >75% of energy and protein requirements by  6/26 to promote wound healing. No wt goals due to dx.     Nutrition Monitoring and Evaluation      []     Monitor po intake on meal rounds  [x]     Continue inpatient monitoring and intervention  []     Other:      Nutrition Risk:  []   High     [x]  Moderate    []  Minimal/Uncompromised    Pb Javier RD

## 2017-06-21 NOTE — PROGRESS NOTES
Problem: Discharge Planning  Goal: *Discharge to safe environment  Outcome: Progressing Towards Goal  hme with  and personal care

## 2017-06-21 NOTE — ROUTINE PROCESS
1916 Bedside shift change report given to meryl carter (oncoming nurse) by Alison Leal (offgoing nurse). Report included the following information SBAR, Kardex and Recent Results. Remains on contact isolation . Hob elevated 30 degrees. Side rails up x 3. , call light within reach. 2000 assessment completed. byrne and oral care provided. .pt resting in bed watching tv and sleeping on/off.  2145 c/o pain, morphine 2 mg given iv.   2230 pt sleeping. 0000 assessment completed. byrne and oral care provided. 0120 sleeping.  0200 sleeping.  0400 assessment completed. Oral and byrne care completed. Bed bath given . Wound care provided to sacral wound cleansed with normal saline them mepilex applied. Wound 3-4 cm deep. . Changed mepilex over nephrostomy tube, stitch around  tube but not connected to patient. Tape added to hold tube in place. Noted area under pannus and breast on left side looking better unblanchable red , no yellow drainage. Nystatin powder and pillow case. 36 dr Henriquez Can notified of hgb down to 6.7 from 7.5 yesterday. Orders received to tr ansfuse one unit blood. Dr Henriquez Can suggested to have day team to talk to pt about her wished to have a palliative consult ,  0600 dr Henriquez Can notified pt wishes to talk to md before signing consent, dr Henriquez Can suggested that day team will come discuss benefits receiving blood transfusion. 0730 Bedside shift change report given to [facility] RN (oncoming nurse) by Anirudh Olivo (offgoing nurse). Report included the following information SBAR, Kardex and Recent Results. Pt remains on contact isolation. Hob elevated 30 degrees. Call light within reach. Side rails up x 3. Pt sleeping.

## 2017-06-21 NOTE — PROGRESS NOTES
Problem: Sepsis: Day of Diagnosis  Goal: Consults, if ordered  Outcome: Progressing Towards Goal  awaitng wound consult

## 2017-06-21 NOTE — PROGRESS NOTES
[address]sabel Cortes [facility], [facility]  2017    Progress Note    Assessment:     Jacklynn Lanes is a 56 y.o.  female with:   Patient Active Problem List   Diagnosis Code    Anemia D64.9    Anxiety F41.9    Nausea and vomiting R11.2    Morbid obesity ([facility] E66.01    Chronic pain syndrome G89.4    History of ovarian cancer Z85.43    History of gastric bypass Z98.890    CAD (coronary artery disease) I25.10    History of Clostridium difficile Z87.19    History of endometrial cancer Z85.42    History of GI bleed Z87.19    Lymphedema I89.0    Hypokalemia E87.6    DEV (acute kidney injury) ([facility] N17.9    Pulmonary nodules R91.8    A-fib (HCC) I48.91    Aortic stenosis I35.0    GI bleed K92.2    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 Plan:   Continuing to monitor clinical progress. After she has completely recovered we can discuss chronic right PCN tube vs trial of a right Resonance metal ureteral stent. Following, keep right PCNT open to drainage. Subjective:     No acute  changes / events. Patient tolerating byrne catheter well and tolerating right PCNT well as well. She is in chronic diffuse pain and due to sepsis is unable to take her normal pain med regimen. Pt is still on low dose Levophed but this has been tapered over the course of today.      Objective:     Admit weight: Weight: 247 lb (112 kg)  Last recorded weight: Weight: 247 lb 12.8 oz (112.4 kg)    Temp (24hrs), Av.4 °F (37.4 °C), Min:98.6 °F (37 °C), Max:99.7 °F (37.6 °C)    Physical Exam:    GEN: NAD, Nonlabored, A&O x3  ABD: Soft, NT, ND, No CVAT Bilaterally, right PCN tube draining 300cc or more per shift, slight purulent but clearing. : Duarte - clear      Diagnostics:      Recent Results (from the past 24 hour(s))   MAGNESIUM    Collection Time: 06/20/17  6:20 PM   Result Value Ref Range    Magnesium 1.6 1.6 - 2.6 mg/dL   CALCIUM, IONIZED    Collection Time: 06/20/17  9:20 PM   Result Value Ref Range    Ionized Calcium 1.03 (L) 1.12 - 1.32 MMOL/L   GLUCOSE, POC    Collection Time: 06/20/17  9:25 PM   Result Value Ref Range    Glucose (POC) 137 (H) 70 - 110 mg/dL   PROTHROMBIN TIME + INR    Collection Time: 06/21/17  4:08 AM   Result Value Ref Range    Prothrombin time 16.3 (H) 11.5 - 15.2 sec    INR 1.4 (H) 0.8 - 1.2     METABOLIC PANEL, BASIC    Collection Time: 06/21/17  4:08 AM   Result Value Ref Range    Sodium 141 136 - 145 mmol/L    Potassium 4.1 3.5 - 5.5 mmol/L    Chloride 112 (H) 100 - 108 mmol/L    CO2 20 (L) 21 - 32 mmol/L    Anion gap 9 3.0 - 18 mmol/L    Glucose 134 (H) 74 - 99 mg/dL    BUN 24 (H) 7.0 - 18 MG/DL    Creatinine 1.32 (H) 0.6 - 1.3 MG/DL    BUN/Creatinine ratio 18 12 - 20      GFR est AA 51 (L) >60 ml/min/1.73m2    GFR est non-AA 42 (L) >60 ml/min/1.73m2    Calcium 7.4 (L) 8.5 - 10.1 MG/DL   CBC WITH AUTOMATED DIFF    Collection Time: 06/21/17  4:08 AM   Result Value Ref Range    WBC 11.3 4.6 - 13.2 K/uL    RBC 2.78 (L) 4.20 - 5.30 M/uL    HGB 7.5 (L) 12.0 - 16.0 g/dL    HCT 24.7 (L) 35.0 - 45.0 %    MCV 88.8 74.0 - 97.0 FL    MCH 27.0 24.0 - 34.0 PG    MCHC 30.4 (L) 31.0 - 37.0 g/dL    RDW 15.8 (H) 11.6 - 14.5 %    PLATELET 270 827 - 143 K/uL    MPV 8.9 (L) 9.2 - 11.8 FL    NEUTROPHILS 81 (H) 40 - 73 %    LYMPHOCYTES 12 (L) 21 - 52 %    MONOCYTES 6 3 - 10 %    EOSINOPHILS 1 0 - 5 %    BASOPHILS 0 0 - 2 %    ABS. NEUTROPHILS 9.2 (H) 1.8 - 8.0 K/UL    ABS. LYMPHOCYTES 1.4 0.9 - 3.6 K/UL    ABS. MONOCYTES 0.6 0.05 - 1.2 K/UL    ABS. EOSINOPHILS 0.1 0.0 - 0.4 K/UL    ABS.  BASOPHILS 0.0 0.0 - 0.06 K/UL    DF AUTOMATED     PHOSPHORUS    Collection Time: 06/21/17  4:08 AM   Result Value Ref Range    Phosphorus 3.2 2.5 - 4.9 MG/DL   CALCIUM, IONIZED    Collection Time: 06/21/17  4:08 AM   Result Value Ref Range    Ionized Calcium 1.11 (L) 1.12 - 1.32 MMOL/L   MAGNESIUM    Collection Time: 06/21/17  4:08 AM   Result Value Ref Range    Magnesium 1.9 1.6 - 2.6 mg/dL   HEMOGLOBIN A1C WITH EAG    Collection Time: 06/21/17  4:08 AM   Result Value Ref Range    Hemoglobin A1c <3.5 (L) 4.2 - 5.6 %    Est. average glucose Cannot be calulated mg/dL   GLUCOSE, POC    Collection Time: 06/21/17  8:34 AM   Result Value Ref Range    Glucose (POC) 152 (H) 70 - 110 mg/dL   GLUCOSE, POC    Collection Time: 06/21/17 11:23 AM   Result Value Ref Range    Glucose (POC) 143 (H) 70 - 110 mg/dL   GLUCOSE, POC    Collection Time: 06/21/17  4:28 PM   Result Value Ref Range    Glucose (POC) 110 70 - 110 mg/dL     Ilene Wilkes MD

## 2017-06-21 NOTE — PROGRESS NOTES
Patient has designated ____________________daughter____ to participate in his/her discharge plan and to receive any needed information.      Name: Melinda Alexander sees  Address:  Phone number:[phone]

## 2017-06-21 NOTE — PROGRESS NOTES
0730-Received pt resting in bed with eyes closed, easily awakened, confused at times, calm and cooperative with periodic anxiety, vs stable on pressor, will continue to monitor  1000-Resting in bed with eyes closed, no sign of distress, vs stable, rounded on pt with treatment team, new orders received  1200-No sign of distress, vs stable  1400-Pt seen by ID, no new orders received  1630-Pt seen by Dr. Luda Allan, new orders received  1842-Complaining of anxiety and not being able to rest, ativan given, will continue to monitor

## 2017-06-21 NOTE — PROGRESS NOTES
Physical Exam   Skin:           Dual skin check with Sheila Navarro RN, flaking skin with multiple reddened areas throughout

## 2017-06-21 NOTE — PROGRESS NOTES
[facility]   Discharge Planning/ Assessment    Reasons for Intervention: Spoke with pts dtr, pt in icu and  Asleep, diff to arouse her. Called dtr. She states pt lives with her. She has hosp bed  Walker  Trapeze and bedside table. She  Has personal care  From agency called [facility]. 9a-3p 5 days/wk. pcp dr Consuelo Gallardo. Demographics correct. dtr  States pt had hospice, told her  I was notified  By palliative care that pt no longer  Wants  Hospice  Wants  Therapy. dtr  States that is  Fine  Whatever  Her decision is is fine  With her. Told her pt can  Get therapy  At home  With hh. She would need to be  disenrolled from hospice. I will confirm  With pt that  She  Wants hh services, also  Discussed long term care in a facility under [facility], dtr  States pt  Has declined in past. Plan home with hh  Will likely need dme  Switched out.      High Risk Criteria  [x] Yes  []No   Physician Referral  [] Yes  [x]No        Date    Nursing Referral  [] Yes  [x]No        Date    Patient/Family Request  [] Yes  [x]No        Date       Resources:    Medicare  [] Yes  []No   Medicaid  [x] Yes  []No   No Resources  [] Yes  []No   Private Insurance  [] Yes  []No    Name/Phone Number    Other  [] Yes  []No        (i.e. Workman's Comp)         Prior Services:    Prior Services  [x] Yes  []No   Home Health  [x] Yes  []No   [address]  [] Yes  []No        Number of [address]  [] Yes  []No       Meals on Wheels  [] Yes  []No   Office on Aging  [] Yes  []No   Transportation Services  [] Yes  []No   Nursing Home  [] Yes  []No        Nursing Home Name    [address]  [] Yes  []No        [address] Name    Other hospice      Information Source:      Information obtained from  [x] Patient  [] Parent   [] [address]  [] Child  [] Spouse   [] Significant Other/Partner   [] Friend      [] EMS    [] Nursing Home Chart          [] Other:   Chart Review  [] Yes  []No Family/Support System:    Patient lives with  [] Alone    [] Spouse   [] Significant Other  [x] Children  [] Caretaker   [] Parent  [] Sibling     [] Other       Other Support System:    Is the patient responsible for care of others  [] Yes  [x]No   Information of person caring for patient on  discharge    Managers financial affairs independently  [] Yes  [x]No   If no, explain:      Status Prior to Admission:    Mental Status  [] Awake  [] Alert  [x] Oriented  [] Quiet/Calm [] Lethargic/Sedated   [] Disoriented  [] Restless/Anxious  [] Combative   Personal Care  [] Dependent  [] [email or web address]  [x] Requires Assistance   Meal Preparation Ability  [] Independent   [] Standby Assistance   [] Minimal Assistance   [] Moderate Assistance  [] Maximum Assistance     [x] Total Assistance   Chores  [] Independent with Chores   [] N/A Nursing Home Resident   [x] Requires Assistance   Bowel/Bladder  [] Continent  [] Catheter  [] Incontinent  [] Ostomy Self-Care    [] Urine Diversion Self-Care  [] Maximum Assistance     [] Total Assistance   Number of Persons needed for assistance    DME at home  [] Lola Cancer, Vermell Guevara  [] Lola Cancer, Straight   [] Commode    [] Bathroom/Grab Bars  [x] Hospital Bed  [] Nebulizer  [] Oxygen           [] Raised Toilet Seat  [] Shower Chair  [] Side Rails for Bed   [] Tub Transfer Bench   [] Kayleen Ko  [] Khai Verma, Standard      [] Other:   Vendor      Treatment Presently Receiving:    Current Treatments  [] Chemotherapy  [] Dialysis  [] Insulin  [] IVAB [] IVF   [] O2  [] PCA   [] PT   [] RT   [] Tube Feedings   [] Wound Care     Psychosocial Evaluation:    Verbalized Knowledge of Disease Process  [x] Patient  []Family   Coping with Disease Process  [] Patient  []Family   Requires Further Counseling Coping with Disease Process  [] Patient  []Family     Identified Projected Needs:    Home Health Aid  [] Yes  [x]No   Transportation  [x] Yes  []No   Education  [] Yes  []No        Specific Education     Financial Counseling  [] Yes  []No   Inability to Care for Self/Will Require 24 hour care  [x] Yes  []No   Pain Management  [] Yes  []No   Home Infusion Therapy  [] Yes  []No   Oxygen Therapy  [] Yes  []No   DME  [] Yes  []No   Long Term Care Placement  [] Yes  []No   Rehab  [] Yes  []No   Physical Therapy  [] Yes  []No   Needs Anticipated At This Time  [] Yes  []No     Intra-Hospital Referral:    [address] [facility]  [] Yes  []No     [] Yes  []No   Patient Representative  [] Yes  []No   Staff for Teaching Needs  [] Yes  []No   Specialty Teaching Needs     Diabetic Educator  [] Yes  []No   Referral for Diabetic Educator Needed  [] Yes  []No  If Yes, place order for Nutritionist or Diabetic Consult     Tentative Discharge Plan:    Home with No Services  [] Yes  []No   Home with [address]  [] Yes  []No        If Yes, specify type    Home Care Program  [] Yes  []No        If Yes, specify type    Meals on Wheels  [] Yes  []No   Office of Aging  [] Yes  []No   NHP  [] Yes  []No   Return to the Nursing Home  [] Yes  []No   Rehab Therapy  [] Yes  []No   Acute Rehab  [] Yes  []No   Subacute Rehab  [] Yes  []No   Private Care  [] Yes  []No   Substance Abuse Referral  [] Yes  []No   Transportation  [x] Yes  []No   Chore Service  [] Yes  []No   Inpatient Hospice  [] Yes  []No   OP RT  [] Yes  [] No   OP Hemo  [] Yes  [] No   OP PT  [] Yes  []No   Support Group  [] Yes  []No   Reach to Recovery  [] Yes  []No   OP Oncology Clinic  [] Yes  []No   Clinic Appointment  [] Yes  []No   DME  [] Yes  []No   Comments    Name of D/C Planner or  Given to Patient or Family Casandra still rn cm    Phone Number [phone]        Extension    Date 6/21/17   Time    If you are discharged home, whom do you designate to participate in your discharge plan and receive any information needed?      Enter name of designee         Phone # of designee         Address of designee         Updated         Patient refused to designate any           individual

## 2017-06-21 NOTE — PROGRESS NOTES
Physical Exam   Musculoskeletal: She exhibits edema. Skin:           Primary Nurse Gary Wolf, RN and ,Diego Whitley     RN performed a dual skin assessment on this patient Impairment noted- see wound doc flow sheet  Ayo score is 13. Awaiting wound/skin consult , will ask which type of bed to order / Preston Victor.  Quentin Oconnell RN AWARE WOUND CARE NOT BY YET, WILL CALL IN AM

## 2017-06-21 NOTE — ROUTINE PROCESS
Bedside and Verbal shift change report given to [facility] Anival Paredes (oncoming nurse) by Dion Meckel, RN (offgoing nurse).  Report included the following information SBAR, Kardex, Intake/Output, MAR, Recent Results, Med Rec Status and Cardiac Rhythm SR.

## 2017-06-21 NOTE — PROGRESS NOTES
Physical Exam   Skin:           Primary Nurse Greyson Grace, RN and ,Soraya Carias rn   RN performed a dual skin assessment on this patient Impairment noted- see wound doc flow sheet  Ayo score is 13.  Wound/skin consult in will ask which typeof bed to order / Onita Breslow

## 2017-06-21 NOTE — PROGRESS NOTES
[facility]. Pulmonary, Critical Care, and Sleep Medicine    Name: Amira Olsen MRN: [id number]   : 1960 Hospital: [address] [place] [address]   Date: 2017  Admission Date: 2017     Chart and notes reviewed. Data reviewed. I have evaluated all findings. [x]I have reviewed the flowsheet and previous days notes. : Still requiring levophed, bcx + for gram + and gram - organisms                ROS:A comprehensive review of systems was negative. Events and notes from last 24 hours reviewed. Care plan discussed on multidisciplinary rounds.   Patient Active Problem List   Diagnosis Code    Anemia D64.9    Anxiety F41.9    Nausea and vomiting R11.2    Morbid obesity ([facility] [address] E66.01    Chronic pain syndrome G89.4    History of ovarian cancer Z85.43    History of gastric bypass Z98.890    CAD (coronary artery disease) I25.10    History of Clostridium difficile Z87.19    History of endometrial cancer Z85.42    History of GI bleed Z87.19    Lymphedema I89.0    Hypokalemia E87.6    DEV (acute kidney injury) ([facility] [address] N17.9    Pulmonary nodules R91.8    A-fib ([facility] [address] I48.91    Aortic stenosis I35.0    GI bleed K92.2    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6 °C)       Intake/Output:   Last shift:      701 - 1900  In: -   Out: 75 [Urine:75]  Last 3 shifts: 1901 -  0700  In: 4470 [P.O.:780; I.V.:3690]  Out: 1385 [Urine:1385]    Intake/Output Summary (Last 24 hours) at 06/21/17 1023  Last data filed at 06/21/17 0900   Gross per 24 hour   Intake             4470 ml   Output             1460 ml   Net             3010 ml      Ventilator Settings:                Current Facility-Administered Medications   Medication Dose Route Frequency    insulin lispro (HUMALOG) injection   SubCUTAneous AC&HS    cefepime (MAXIPIME) 2 g in 0.9% sodium chloride (MBP/ADV) 100 mL MBP  2 g IntraVENous Q12H    nystatin (MYCOSTATIN) 100,000 unit/gram powder   Topical BID    polyethylene glycol (MIRALAX) packet 17 g  17 g Oral DAILY    potassium chloride (K-DUR, KLOR-CON) SR tablet 20 mEq  20 mEq Oral BID    sodium bicarbonate tablet 650 mg  650 mg Oral TID    triamcinolone acetonide (KENALOG) 0.025 % cream 1 g  1 g Topical TID    trospium (SANCTURA) tablet 20 mg  20 mg Oral BID    famotidine (PF) (PEPCID) 20 mg in sodium chloride 0.9 % 10 mL injection  20 mg IntraVENous Q12H    DAPTOmycin (CUBICIN) 672 mg in 0.9% sodium chloride 50 mL IVPB  6 mg/kg IntraVENous Q24H    NOREPINephrine (LEVOPHED) 8,000 mcg in dextrose 5% 250 mL infusion  2-16 mcg/min IntraVENous TITRATE       Telemetry:     Physical Exam:   General:  Alert, cooperative, no distress, appears chronically ill   Head:  Normocephalic, without obvious abnormality, atraumatic. Eyes:  Conjunctivae/corneas clear. PERRL, EOMs intact. Nose: Nares normal. Septum midline. Mucosa normal. No drainage or sinus tenderness. Throat: Lips, mucosa, and tongue normal. Teeth and gums normal.   Neck: Supple, symmetrical, trachea midline, no adenopathy, thyroid: no enlargment/tenderness/nodules, no carotid bruit and no JVD. Back:   Symmetric, no curvature. ROM normal.   Lungs:   Clear to auscultation bilaterally. Chest wall:  No tenderness or deformity. Heart:  Regular rate and rhythm, S1, S2 normal, no murmur, click, rub or gallop. Abdomen:   Soft, non-tender. Bowel sounds normal. No masses,  No organomegaly. Extremities: Extremities normal, atraumatic, no cyanosis + edema. Pulses: 2+ and symmetric all extremities. Skin: Skin color, texture, turgor normal. No rashes or lesions   Lymph nodes:        Cervical, supraclavicular, and axillary nodes normal.   Neurologic: Grossly nonfocal       DATA:  MAR reviewed and pertinent medications noted or modified as needed    Labs:  Recent Labs      06/21/17   0408  06/20/17   0320  06/19/17 1945   WBC  11.3  9.0  9.7   HGB  7.5*  7.7*  8.1*   HCT  24.7*  25.3*  26.7*   PLT  277  279  315     Recent Labs      06/21/17   0408  06/20/17   1820  06/20/17   1230  06/19/17 1945   NA  141   --   144  141   K  4.1   --   4.2  4.4   CL  112*   --   112*  108   CO2  20*   --   22  23   GLU  134*   --   109*  98   BUN  24*   --   26*  28*   CREA  1.32*   --   1.25  1.32*   CA  7.4*   --   7.1*  7.6*   MG  1.9  1.6  1.6   --    PHOS  3.2   --   3.5   --    ALB   --    --    --   1.1*   SGOT   --    --    --   16   ALT   --    --    --   10*   INR  1.4*   --    --   1.2     No results for input(s): PH, PCO2, PO2, HCO3, FIO2 in the last 72 hours. No results for input(s): FIO2I, IFO2, HCO3I, IHCO3, HCOPOC, PCO2I, PCOPOC, IPHI, PHI, PHPOC, PO2I, PO2POC in the last 72 hours. No lab exists for component: IPOC2  Imaging:  [x]                           I have personally reviewed the patients radiographs and reports    High complexity decision making was performed during this consultation and evaluation. [x]       Pt is at high risk for further organ failure and dysfunction. Critical care time spent  minutes with patient exclusive of procedures.     IMPRESSION:    Anemia D64.9    Anxiety F41.9    Nausea and vomiting R11.2    Morbid obesity (HCC) E66.01    Chronic pain syndrome G89.4    History of ovarian cancer Z85.43    History of gastric bypass Z98.890    CAD (coronary artery disease) I25.10    History of Clostridium difficile Z87.19    History of endometrial cancer Z85.42    History of GI bleed Z87.19    Lymphedema I89.0    Hypokalemia E87.6    DEV (acute kidney injury) ([facility] [address] N17.9    Pulmonary nodules R91.8    A-fib (HCC) I48.91    Aortic stenosis I35.0    GI bleed K92.2    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    Acute blood loss anemia D62    Bladder mass N32.89    Flank pain, chronic R10.9, G89.29    UTI (urinary tract infection) N39.0    Severe sepsis with septic shock (CODE) R65.21    Severe thrombocytopenia (HCC) D69.6    Septic shock (HCC) A41.9, R65.21    Ulcer of sacral region, unstageable ([facility]) L98.429     ·       PLAN:   Neuro  Awake, alert     Cardiovascular  Continue levophed, weaning down  Continue fluid hydration  2D echo with EF 55 - 21%, grade 1 diastolic, PA 45 - 50  Afib rate controlled  Lactic acid 0.8     Pulmonary  RML pulmonary nodule, suspect mets  Oxygen as needed to keep sats over 90%     GI  Continue pepsid   CT abdomen with enlarged abdominal mass, sacral decub communicating with wall of rectum     Renal  Nephrostomy tubes in place  Renal function improved from May 2017    ID  Suspect sepsis  Continue broad spectrum abx, now dapto  and cefepime  Bcx 6/19 with gram negative rods and non hemolytic strep   ucx 6/19 negative to date  Continue aggressive hydration     Heme  Chronic anemia  Ovarian/endometrial cancer  HO RLE DVT likely 2/2 venous outflow obstruction due to pelvic mass  Not currently on anticoagulation, lovenox is home med, will restart and monitor for decrease in Hb     Pain  Will need adjustment of chronic pain regimen  Will use caution given risk of dropping BP further and respiratory depression with pain meds     Critical care time 32 minutes    Mando Aceves MD

## 2017-06-21 NOTE — PROGRESS NOTES
Progress Note      Patient: Carmen Lino               Sex: female          DOA: 6/19/2017       YOB: 1960      Age:  56 y.o.        LOS:  LOS: 1 day             CHIEF COMPLAINT:  Septic shock on vasopressor    Subjective:     Awake and interacting  Remains on vasopressor    Objective:      Visit Vitals    BP 99/61    Pulse 91    Temp 99.7 °F (37.6 °C)    Resp 17    Ht 5' 4\" (1.626 m)    Wt 112.4 kg (247 lb 12.8 oz)    SpO2 99%    BMI 42.53 kg/m2       Physical Exam:  Gen:  Patient is in no distress. No complaint  HEENT and Neck:  PERRL, No cervical tenderness or masses  Lungs:  Clear bilaterally. No rales, no wheeze. Normal effort. Heart:  Regular Rate and Rhythm. No murmur or gallop. No JVD. No edema.   Abdomen:  Soft, non tender, no masses, no Hepatosplenomegally  Extremities:  No digital clubbing, no cyanosis  Neuro:  Alert and oriented, Normal affect, Cranial nerves intact, No sensory deficits        Lab/Data Reviewed:  BMP:   Lab Results   Component Value Date/Time     06/21/2017 04:08 AM    K 4.1 06/21/2017 04:08 AM     (H) 06/21/2017 04:08 AM    CO2 20 (L) 06/21/2017 04:08 AM    AGAP 9 06/21/2017 04:08 AM     (H) 06/21/2017 04:08 AM    BUN 24 (H) 06/21/2017 04:08 AM    CREA 1.32 (H) 06/21/2017 04:08 AM    GFRAA 51 (L) 06/21/2017 04:08 AM    GFRNA 42 (L) 06/21/2017 04:08 AM     CBC:   Lab Results   Component Value Date/Time    WBC 11.3 06/21/2017 04:08 AM    HGB 7.5 (L) 06/21/2017 04:08 AM    HCT 24.7 (L) 06/21/2017 04:08 AM     06/21/2017 04:08 AM           Assessment/Plan     Principal Problem:    Septic shock ([facility] [address] (6/20/2017)    Active Problems:    DEV (acute kidney injury) ([facility] [address] (12/29/2015)      UTI (urinary tract infection) (3/3/2017)      Ulcer of sacral region, unstageable ([facility] [address] (6/20/2017)        Plan:  Continue with IV antibiotics  BP control  Continue Vasopressor and IV fluid  Monitor renal function  Follow H/H  GI prophylaxis  DVT prophylaxis

## 2017-06-22 NOTE — ROUTINE PROCESS
Dr Lloyd Peters in recommendation for mediport to be used only for antibiotics. This was endorsed to the MD during rounds.

## 2017-06-22 NOTE — DIABETES MGMT
NUTRITIONAL ASSESSMENT AND GLYCEMIC CONTROL PLAN OF CARE     Tejal Dimas           56 y.o.           6/19/2017                 1. Recurrent UTI    2. Hypotension, unspecified hypotension type    3. Endometrial cancer ([address]    4. Severe sepsis with septic shock (CODE)    5. Wound of sacral region, subsequent encounter    6. Anxiety       [x]  No Cultural, Hindu or ethnic dietary need identified. []  Cultural, Hindu and ethnic food preferences identified and addressed    [x]  Participated in discharge planning/Interdisciplinary rounds   Food allergies: [x]  No        []  Yes-  ASSESSMENT:   Pt is overweight related to excess caloric intake as evidenced by 207% ideal weight and BMI= 43.3 kg/m2. Pt meets criteria for obesity. Pt with increased calorie and protein requirements related to sacral wound and Ayo score =13. Pt w/hypoalbuminemia as evidenced by albumin=1.1 mg/dl. Pt seen on meal rounds , appetite is fair. She does not want additional Ensure. INTERVENTIONS/PLAN:   Encouraged increased intake at meals to meet calorie and protein requirements. Recommend vitamin/mineral supplement for wound healing. SUBJECTIVE/OBJECTIVE:   Information obtained from: Pt and ICU rounds  Pt states she is trying to eat more.  Did not drink her Ensure this am.  Diet: 2 g Na with Ensure 8oz /d   Patient Vitals for the past 100 hrs:   % Diet Eaten   06/20/17 2000 80 %   06/20/17 1400 90 %     Medications: [x]                Reviewed   Corrective lispro- not requiring  Most Recent POC Glucose:   Recent Labs      06/22/17   0355  06/21/17   0408  06/20/17   1230  06/19/17   1945   GLU  115*  134*  109*  98         Labs:   Lab Results   Component Value Date/Time    Hemoglobin A1c <3.5 06/21/2017 04:08 AM     Lab Results   Component Value Date/Time    Sodium 140 06/22/2017 03:55 AM    Potassium 4.2 06/22/2017 03:55 AM    Chloride 110 06/22/2017 03:55 AM    CO2 19 06/22/2017 03:55 AM    Anion gap 11 06/22/2017 03:55 AM Glucose 115 06/22/2017 03:55 AM    BUN 24 06/22/2017 03:55 AM    Creatinine 1.30 06/22/2017 03:55 AM    Calcium 7.0 06/22/2017 03:55 AM    Magnesium 1.8 06/22/2017 03:55 AM    Phosphorus 2.9 06/22/2017 03:55 AM    Albumin 1.1 06/19/2017 07:45 PM     Anthropometrics: IBW : 54.4 kg (120 lb), % IBW (Calculated): 206.5 %, BMI (calculated): 43.3  Wt Readings from Last 1 Encounters:   06/22/17 114.4 kg (252 lb 3.3 oz)      Ht Readings from Last 1 Encounters:   06/21/17 5' 4\" (1.626 m)     Estimated Nutrition Needs: 1700 Kcals/day, Protein (g): 77 g Fluid (ml): 1800 ml  Based on:   []          Actual BW    [x]          IBW   []            Adjusted BW    Nutrition Diagnoses:      As stated above. Nutrition Interventions: as astated above  Goal:     Intake will meet >75% of energy and protein requirements by  6/26 to promote wound healing. No wt goals due to dx.     Nutrition Monitoring and Evaluation      []     Monitor po intake on meal rounds  [x]     Continue inpatient monitoring and intervention  []     Other:      Nutrition Risk:  []   High     [x]  Moderate    []  Minimal/Uncompromised    Hoang Singh RD

## 2017-06-22 NOTE — PROGRESS NOTES
INFECTIOUS DISEASE FOLLOW UP NOTE :-     Admit Date: 6/19/2017       ABX:       Cefepime 6/19-3, Dapto 6/20-2   Levoflox 6/19-1       ASSESSMENT-> RECOMMENDATIONS      SIRS/ ?sepsis POA- pt with hypotension requiring levophed, subjective fever but no leucocytsois  - suspect sec to infected Rt stent ? Cx UTI, at risk for BSI  - Await cx- Blood cx again with NHS, Staph aureus and GNR in setting of already suspected infected port- likely source along with urine and sacral wound - d/w pt regarding removal of port and she refused. I talked to her about inability to completely eradicate bacteria otherwise and increasing threat for development of resistance bacteria to the point that we might not have safe and appropriate Abx to treat her and she acknowledges that. We talked about her multiple allergies which makes it even tougher to treat and she agrees with just monitoring.  - I told ICU staff to use port for Abx only and use PIVs for other meds. I explained we might come to a point where we can't get peripherals as she is a difficult stick and we used all our peripherals veins and might not have an option to give her anything. Pt at that point said that she will agree for port removal if we come to that point but not now  - continue on Dapto and Cefepime for now and based on final cx results, change accordingly in am   Infected obstructed stents- suspect Rt ? left  - severe hydro on CT  - placement of b/l PCNs 5/11 at [facility] and left fell off few weeks ago - urology consult appreciated  - Apparently tube was clamped on admission and now opened up   - urine cx with GNR, await final cx to decide final Abx   BSI 1 of 2 blood cx +ve for SA, NHS and GNR -continue Abx as above  - will get echo  - Need to get Mediport out and pt wants to wait till no other option left    H/o multiple UTIs   - ucx >059M VRE was complicated with BSI and valve veg and required prolong hospitalization  - ucx 5/2017 with E.coli -unclear if got Rx or not  - s/p bilateral stent exchange 3/31, byrne changed 5/10 in urology office -> CT with b/l hydro Rt worse than left and concern for obstructed stent on Rt  ->Ua with sig pyuria  -> Await ucx   Sacral ulcer stage 4  - CT concerning for progression of ulcer eroding into rectal wall now- suspect recto-cut fistula  - pt denies any stool in ulcer - wd cx multiple organisms- as expected  - Will need colo-rectal input if feces in ulcer though prognosis is extremely poor given CT findings and doubt a candidate for any Sx intervention  - LWC for now   Rt pelvic mass ? ovarian ca / endometrial ca with mets  - CT showing worsening of adnexal mass with increasing pulmonary nodules  - palliative care was involved in past and pt was hospice but it was revoked last admission ->  prognosis remains extremely poor  -> pt doesn't understand ?  Psych eval  -> appreciate palliative care input     H/o VRE BSI with ?aortic endocarditis in 3/2017   -  TTE aortic stenosis with possible aortic vegetation [chronic mixed calcified density]  -pt refused ESSIE and removal of mediport  - pt had many +ve subsequent blood cx ( In [facility] reported as Veillonella species and Staph species in May 2017 -> treated with 6 weeks of Daptomycin    -> Still with same mediport    DEV- Cr 2.04  - suspect sec to obstruction, infection ->Monitor as stable with 1.3 today   Hypoalbuminemia     Lymphedema legs - ch  - suspect thrombus seen on CT also contributing      H/o LE DVT with IVC filter  - CT again showing progression     Seizure activity 3/26- was new  - no further seizures on Keppra     Co-morb- Anxiety depression, AS, ch pain, hemetemesis s/p EGD x6, MO, ch anemia      Ax- Clinda, Keflex, Linezolid, Zosyn, Sulfa, Vanco- most of the Abx cause rash and hives though had tolerated on and off in past with bendryl - Monitor  As tolerating cefepime for now                   D/w ICU, palliative care and medicine teams in detail.     MICROBIOLOGY:   (3/3  Blcx x 2 - 2/2 VRE S gent, linezolid , daptomycin quinupristin, Ucx > 100K VRE  3/14 ucx ng  3/17 uctx ng  [facility]-  5/1 blcx x3 with Veillonella species  5/10 blcx Staph epi, Staph cohnii  5/21 ucx >100k E.coli R Quinolones)     6/19 ucx >100k GNR, blcx 1 of 2 SA, NHS, GNR  6/20 blcx x2 IP, wd cx Staph species, SA, NHS, GNR x3, Diptheroids     LINES/DRAINS:   Rt chest mediport   Duarte changed 5/10    MEDICATIONS:     Current Facility-Administered Medications   Medication Dose Route Frequency    0.9% sodium chloride infusion 250 mL  250 mL IntraVENous PRN    insulin lispro (HUMALOG) injection   SubCUTAneous AC&HS    glucose chewable tablet 16 g  4 Tab Oral PRN    glucagon (GLUCAGEN) injection 1 mg  1 mg IntraMUSCular PRN    dextrose (D50) infusion 12.5-25 g  25-50 mL IntraVENous PRN    enoxaparin (LOVENOX) injection 40 mg  40 mg SubCUTAneous Q12H    NOREPINephrine (LEVOPHED) 8,000 mcg in dextrose 5% 250 mL infusion  2-16 mcg/min IntraVENous TITRATE    morphine injection 2 mg  2 mg IntraVENous Q4H PRN    naloxone (NARCAN) injection 0.4 mg  0.4 mg IntraVENous PRN    cefepime (MAXIPIME) 2 g in 0.9% sodium chloride (MBP/ADV) 100 mL MBP  2 g IntraVENous Q12H    LORazepam (ATIVAN) tablet 1 mg  1 mg Oral Q6H PRN    nystatin (MYCOSTATIN) 100,000 unit/gram powder   Topical BID    polyethylene glycol (MIRALAX) packet 17 g  17 g Oral DAILY    potassium chloride (K-DUR, KLOR-CON) SR tablet 20 mEq  20 mEq Oral BID    sodium bicarbonate tablet 650 mg  650 mg Oral TID    triamcinolone acetonide (KENALOG) 0.025 % cream 1 g  1 g Topical TID    trospium (SANCTURA) tablet 20 mg  20 mg Oral BID    famotidine (PF) (PEPCID) 20 mg in sodium chloride 0.9 % 10 mL injection  20 mg IntraVENous Q12H    DAPTOmycin (CUBICIN) 672 mg in 0.9% sodium chloride 50 mL IVPB  6 mg/kg IntraVENous Q24H    ELECTROLYTE REPLACEMENT PROTOCOL  1 Each Other PRN    ELECTROLYTE REPLACEMENT PROTOCOL  1 Each Other PRN    ELECTROLYTE REPLACEMENT PROTOCOL  1 Each Other PRN    ELECTROLYTE REPLACEMENT PROTOCOL  1 Each Other PRN    ELECTROLYTE REPLACEMENT PROTOCOL  1 Each Other PRN    ELECTROLYTE REPLACEMENT PROTOCOL  1 Each Other PRN    ELECTROLYTE REPLACEMENT PROTOCOL  1 Each Other PRN    sodium chloride (NS) flush 5-10 mL  5-10 mL IntraVENous PRN       SUBJECTIVE :     Interval notes reviewed. Pt in ICU, remains on pressors. Pt with low Hb and was asked for blood tx which she refused in am and now agreed. She had also asked to talk to palliative care team in am per RN. Cx results d/w her and multiple organisms. Suspect infected port and UTI contributing. She understands but doesn't want port out. I explained will not be using port for anything then except Abx and will get PICC team to get peripherals. We might not have option to keep getting PIV and might not be able to give her Iv Rx. She said that she knows and if she comes to that point, she will agree to get her port out. She remains with gen pain and more in her back. C/o poor appetite. No family at bedside. OBJECTIVE     Visit Vitals    BP (!) 87/60    Pulse 81    Temp 99.4 °F (37.4 °C)    Resp 13    Ht 5' 4\" (1.626 m)    Wt 114.4 kg (252 lb 3.3 oz)    SpO2 98%    BMI 43.29 kg/m2       Temp (24hrs), Av.4 °F (37.4 °C), Min:99.1 °F (37.3 °C), Max:99.7 °F (37.6 °C)      Gen-No distress, ch sick appearing  HENT- No thrush, dry mm, pale  Chest- Symmetric expansion, CTA  CV-S1S2 RR, No JVD, 2+ edema  Abd-Soft, ? deep-tenderness, obese, large pannus with ulceration, BS+, +ve CVA tenderness on Rt, rt PCN, byrne+  Musculoskeletal:2+ edema.  No clubbing or cyanosis  Skin-Multiple skin ulcers on thigh, legs, sacrum- under dressing.      Muscsk- 2+ edema        Labs: Results:   Chemistry Recent Labs      17   0355  17   0408  17   1230  17   1945   GLU  115*  134*  109*  98   NA  140  141  144  141   K 4.2  4.1  4.2  4.4   CL  110*  112*  112*  108   CO2  19*  20*  22  23   BUN  24*  24*  26*  28*   CREA  1.30  1.32*  1.25  1.32*   CA  7.0*  7.4*  7.1*  7.6*   AGAP  11  9  10  10   BUCR  18  18  21*  21*   AP   --    --    --   198*   TP   --    --    --   5.6*   ALB   --    --    --   1.1*   GLOB   --    --    --   4.5*   AGRAT   --    --    --   0.2*      CBC w/Diff Recent Labs      06/22/17   0355  06/21/17   0408  06/20/17   0320   WBC  10.0  11.3  9.0   RBC  2.47*  2.78*  2.83*   HGB  6.6*  7.5*  7.7*   HCT  22.0*  24.7*  25.3*   PLT  210  277  279   GRANS  81*  81*  82*   LYMPH  12*  12*  10*   EOS  1  1  1          RADIOLOGY :          Imaging    Results from Hospital Encounter encounter on 06/19/17   XR CHEST PORT   Narrative CHEST AP PORTABLE    Indication: Chest pain and shortness of breath. Comparison: 03/26/2017. Findings: Right port with catheter tip near cavoatrial junction. The lungs  appear clear. No evidence for pneumothorax or pleural effusion. Cardiac  silhouette and pulmonary vascularity appear within normal limits. Impression IMPRESSION:    No acute cardiopulmonary disease. Results from [place] encounter on 06/19/17   CT ABD PELV WO CONT   Narrative EXAM: CT of the abdomen and pelvis    INDICATION: 61-year-old patient with advanced stage endometrial cancer,  bilateral nephroureteral stents, and concern for possible infection adjacent to  the right percutaneous nephrostomy tube site. Nephrostomy catheter placement  occurred at an outside institution on 5/11/2017. COMPARISON: Several prior exams, most recent CT of the abdomen and pelvis  available for comparison: March 3, 2017. TECHNIQUE: Axial CT imaging of the abdomen and pelvis was performed without  intravenous contrast. Multiplanar reformats were generated.     One or more dose reduction techniques were used on this CT: automated exposure  control, adjustment of the mAs and/or kVp according to patient's size, and  iterative reconstruction techniques. The specific techniques utilized on this CT  exam have been documented in the patient's electronic medical record.  _______________    FINDINGS:    LOWER CHEST: Minor dependent atelectasis is noted at each lung base. No focal  pneumonic consolidation, pneumothorax or pleural effusion. Normal cardiac size. No pericardial effusion. There are several pulmonary nodules noted. The largest  is present in the right lower lobe (series 4, image 10) which measures  approximately 8 x 8 mm in size, and is similar to prior CT of 3/4/2017. Additional right lower lobe pulmonary nodule noted (series 4, image 3), as well  as in the left lower lobe (series 4, image 12). The right lower lobe nodule  measures approximately 3 mm and is new in appearance from prior, with slight  enlargement of the subpleural left lower lobe pulmonary nodule, which measures  approximately 5 mm. LIVER, BILIARY: The unenhanced appearance of the liver demonstrates no focal  abnormality. No biliary dilation. The gallbladder is surgically absent. PANCREAS: There is mild fatty infiltration of the pancreas without evidence of  pancreatic ductal dilatation. SPLEEN: Normal.    ADRENALS: Nodular thickening of the inferior left adrenal gland is similar to  prior exam.    KIDNEYS/URETERS/BLADDER:     On the right, there is percutaneous nephrostomy catheter, with formed loop  present with in the interpolar collecting system. In addition, there is a  nephroureteral stent, also with proximal formed loop in the interpolar  collecting system, and distal formed loop within the bladder. There is  moderately severe right-sided hydronephrosis, which is increased from prior CT  examination. On the left, a nephroureteral stent is in place, with proximal formed loop noted  in the interpolar collecting system, and distal formed loop present within the  bladder.  Mild left-sided hydronephrosis is present, similar to prior CT exam.    A Duarte catheter is present within the bladder, which is decompressed. PELVIC ORGANS: The uterus contains an unremarkable unenhanced appearance. Right  adnexal soft tissue lesion noted, measuring approximately 5.1 x 2.9 cm in size,  with exact measurements limited by lack of intravenous contrast as well as beam  hardening artifact. VASCULATURE: Abdominal aorta is normal in course and caliber. There is an  inferior vena cava filter which spans the renal veins, with several tines which  project external to the lumen of the inferior vena cava, potentially within the  right renal vein (images 46-47). LYMPH NODES: Periaortic adenopathy along the left side of the retroperitoneum is  similar in appearance to prior exams. GASTROINTESTINAL TRACT: There is a small hiatal hernia. Small intestine is  normal in course and caliber. Moderate burden of formed stool throughout the  large intestine, which is similarly normal in caliber. No bowel obstruction. No  free intraperitoneal gas. There is a advanced age sacral decubitus ulcer, which  freely communicates with the posterior wall of the rectum (series 3, image 107)  with associated soft tissue thickening/stranding at the site of decubitus ulcer  formation. BONES: There is accentuation of the lumbar lordosis, with unchanged grade 2  isthmic spondylolisthesis L5 on S1. Multilevel spondylosis noted, most advanced  L4-S1. Vertebral body heights are maintained intact. Moderately severe bilateral  hip joint osteoarthritis is present. OTHER: Numerous small foci of fat necrosis/fat-containing ventral abdominal  hernias are noted within the anterior abdominal wall, similar to prior. Diffuse  subcutaneous body wall edema is present, along with redemonstration of multiple  abdominal wall collateral vessels, the appearance of which is increased in the  interval from prior CT examination. _______________         Impression IMPRESSION:    1. Bilateral nephroureteral stents and percutaneous nephrostomy catheter in  stable and expected position. There is moderately severe right hydronephrosis,  which is increased in magnitude from prior CT examination, with mild left  hydronephrosis, similar to prior. 2. Bladder decompressed with a Duarte catheter. 3. Enlargement of a right adnexal lesion from recent prior CT examination. 4. New and slightly enlarged pulmonary nodules as above. Follow-up per oncology  protocol. 5. Inferior vena cava filter which spans across the renal veins as described  above. Increased formation of abdominal wall collaterals as well as anasarca  noted. 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 6. Progression of an advanced stage sacral decubitus ulcer which directly  communicates with the posterior wall of the rectum with associated skin  thickening/cellulitis. 7. Unchanged, grade 2 isthmic spondylolisthesis L5-S1. Note: Preliminary report sent to the Emergency Department by the radiology  resident at the time of the study. The additional findings of hydronephrosis and advanced sacral decubitus ulcer  were discussed with Dr. Racheal Streeter, hospitalist caring for the patient at 8:25 AM on  6/20/2017. I have independently examined the patient and reviewed all lab studies and imgaing as well as review of nursing notes and physican notes from the past 24 hours. The plan of care has been discussed with the patient/relative and all questions are answered.      Jr Chiu MD  June 22, 2017    [facility] AT THE [facility]  [phone]

## 2017-06-22 NOTE — PROGRESS NOTES
conducted a Spiritual Care consult with Rita Rangel, who is a 56 y.o.,female. The  provided the following Interventions:  Initiated a relationship of care and support. Patient welcomed my visit. As the patient shared about her 25year old daughter and patient not feeling like her daughter will be ok. As we shared about patient being afraid of her daughter not knowing what to do patient admitted that--\"they tell me I'm dying (with crying) i'm afraid of dying. \"   As she began to share about this fear the nurse brought in a psychiatrist ([employer]) to speak with her. I will return tomorrow at her request.  Offered prayer and assurance of continued prayers on patient's behalf. Plan:  Chaplains will continue to follow and will provide pastoral care on an as needed/requested basis.  recommends bedside caregivers page  on duty if patient shows signs of acute spiritual or emotional distress.       Arjun Ababsi   Board Certified   [phone] - Office

## 2017-06-22 NOTE — ROUTINE PROCESS
Bedside and Verbal shift change report given to Yolie Call RN   (oncoming nurse) by Faustino Mccoy (offgoing nurse).  Report included the following information SBAR, Kardex, Recent Results and Cardiac Rhythm SR.

## 2017-06-22 NOTE — PROGRESS NOTES
Progress Note      Patient: Sandi Neil               Sex: female          DOA: 6/19/2017       YOB: 1960      Age:  56 y.o.        LOS:  LOS: 2 days             CHIEF COMPLAINT:  Sepsis    Subjective:     Awake and alert  Talking  Feels poor subjectively, but no distress    Objective:      Visit Vitals    /63    Pulse 86    Temp 97.8 °F (36.6 °C)    Resp 16    Ht 5' 4\" (1.626 m)    Wt 114.4 kg (252 lb 3.3 oz)    SpO2 98%    BMI 43.29 kg/m2       Physical Exam:  Gen:  Patient is in no distress. No complaint  HEENT and Neck:  PERRL, No cervical tenderness or masses  Lungs:  Clear bilaterally. No rales, no wheeze. Normal effort. Heart:  Regular Rate and Rhythm. No murmur or gallop. No JVD. No edema. Abdomen:  Soft, non tender, no masses, no Hepatosplenomegally  Extremities:  No digital clubbing, no cyanosis  Neuro:  Alert and oriented, Normal affect, Cranial nerves intact, No sensory deficits        Lab/Data Reviewed: All lab results for the last 24 hours reviewed. Assessment/Plan     Principal Problem:    Septic shock ([facility] [address] (6/20/2017)    Active Problems:    DEV (acute kidney injury) ([facility] [address] (12/29/2015)      UTI (urinary tract infection) (3/3/2017)      Ulcer of sacral region, unstageable [facility]) (6/20/2017)        Plan:  Discussed with ID  What to do with the port is an issue under discussion  Patient is very reluctant to remove her port at this time.   Continue with antibiotics  Follow renal function  Wound care

## 2017-06-22 NOTE — ROUTINE PROCESS
Bedside and Verbal shift change report given to SHELLY Monteiro (oncoming nurse) by Kary Shell RN   (offgoing nurse). Report included the following information SBAR, Kardex and Cardiac Rhythm SR   Primary Nurse Kary Shell RN and SHELLY Monteiro performed a dual skin assessment on this patient Impairment noted- see wound doc flow sheet  Ayo score is 13. Wound consult was done today, treatment plan in progress. Patient is now on a [place] bed. Najma William

## 2017-06-22 NOTE — WOUND CARE
General Progress Note    Patient: Aquilino Aguirre MRN: [id number] LOS: 2     YOB: 1960  Age: 56 y.o. Sex: female        Admit Date: 6/19/2017      Subjective:   Patient complains of generalized pain./discomfort during wound care assessment/treatment. Patient was able to assist with turning. Patient primary RN at bedside during initial assessment to assist with turning. Tita,RN bedside to assist.  Please see wound assessment below. Report to [address] [place] [address]; orders received, Dr. Brian Madera. Objective:     Vitals  Patient Vitals for the past 8 hrs:   BP Temp Pulse Resp SpO2   06/22/17 1430 101/63 97.8 °F (36.6 °C) 86 16 98 %   06/22/17 1347 - 98.9 °F (37.2 °C) 83 16 100 %   06/22/17 1200 - 98.3 °F (36.8 °C) - - -   06/22/17 1030 (!) 86/37 - 93 16 100 %   06/22/17 1000 100/69 - (!) 102 17 92 %   06/22/17 0930 118/53 - (!) 101 22 97 %   06/22/17 0900 107/73 - 87 13 100 %   06/22/17 0800 (!) 87/60 98.3 °F (36.8 °C) 81 13 98 %   06/22/17 0700 90/54 - 79 13 100 %       I/O Current Shift  06/22 0701 - 06/22 1900  In: 34.3 [I.V.:34.3]  Out: 260 [Urine:260]    I/O Last Three Shifts  06/20 1901 - 06/22 0700  In: 3243.3 [P.O.:540; I.V.:2703.3]  Out: 2004 [Urine:2004]                Assessment:     Wound Presentation:     06/22/17 1402   Wound Sacral/coccyx   Date First Assessed/Time First Assessed: 03/13/17 1212   POA: Yes  Wound Type: Pressure ulcer;Moisture Assoc. Skin Damage  Location: Sacral/coccyx   DRESSING STATUS Removed   DRESSING TYPE Silicone  (Mepilex)   Pressure Injury Unstageable   Wound Length (cm) 10.7 cm   Wound Width (cm) 17 cm   Wound Depth (cm) 3.1   Wound Surface area (cm^3) 563.89 cm^2   Condition of [facility]; Other (comment)   Condition of Edges Open   Tissue Type Yellow;Black   Tissue Type Percent Black 80 %   Tissue Type Percent Yellow 20   Direction of Tunnels 12  oclock   Depth of Tunnel/Sinus (cm) 2.6 cm   Direction of Undermining 12    oclock;1    oclock;2    oclock;3    oclock;4 oclock   Depth of Undermining (cm) 2.2   Drainage Amount  Large   Drainage Color Brown   Wound Odor Foul;Strong   Periwound Skin Condition Erythema, blanchable   Cleansing and Cleansing Agents  Normal saline   Dressing Type Applied Other (Comment)  (Saline Moist Mesalt, Saline MOist Gauze, Mepilex)   Procedure Tolerated Well   Wound Back Left;Posterior   Date First Assessed/Time First Assessed: 06/20/17 2000   POA: Yes  Wound Type: Pressure ulcer  Location: (c) Back  Wound Description (Optional):  two area left flank open slough /yellow bas rolled red edges  Orientation: Left;Posterior   DRESSING STATUS Removed   DRESSING TYPE Other (Comment)  (MepilexBorder)   Pressure Injury Unstageable   Wound Length (cm) 1 cm  (A second area measured 0.3x1. 3x0.4)   Wound Width (cm) 1.5 cm   Wound Depth (cm) 0.9   Wound Surface area (cm^3) 1.35 cm^2   Condition of Edges Open   Tissue Type Yellow;Red   Tissue Type Percent Red 10   Tissue Type Percent Yellow 90   Drainage Amount  Moderate   Drainage Color Serosanguinous   Wound Odor Foul   Periwound Skin Condition Other (comment)  (Dry/Flaky)   Cleansing and Cleansing Agents  Normal saline   Dressing Type Applied Other (Comment)  (MepilexLite)   Procedure Tolerated Fair   Wound Thigh Posterior   Date First Assessed/Time First Assessed: 06/20/17 2000   POA: Yes  Wound Type:  Other (comment)  Location: Thigh  Orientation: Posterior   DRESSING TYPE Open to air   Non-Pressure Injury Partial thickness (epider/derm)   Wound Length (cm) 1 cm   Wound Width (cm) 3.5 cm   Wound Depth (cm) 0.1   Wound Surface area (cm^3) 0.35 cm^2   Condition of Edges Open   Tissue Type Red;Yellow   Tissue Type Percent Pink 20   Tissue Type Percent Yellow 80   Drainage Amount  Small    Drainage Color Serosanguinous   Wound Odor None   Periwound Skin Condition Excoriated   Cleansing and Cleansing Agents  Normal saline   Dressing Type Applied Other (Comment)  (MepilexBorder)   Procedure Tolerated Fair   Wound Buttocks Right   Date First Assessed/Time First Assessed: 06/20/17 2000   POA: Yes  Wound Type: Pressure ulcer  Location: Buttocks  Orientation: Right   DRESSING STATUS Removed   DRESSING TYPE Silicone  (Mepilex)   Pressure Injury Unstageable   Wound Length (cm) 2 cm   Wound Width (cm) 3 cm   Wound Depth (cm) 0.2   Wound Surface area (cm^3) 1.2 cm^2   Condition of Base Slough   Condition of Edges Open   Tissue Type Yellow   Tissue Type Percent Yellow 100   Drainage Amount  Small    Drainage Color Serosanguinous   Wound Odor Mild   Periwound Skin Condition Erythema, non-blanchable   Cleansing and Cleansing Agents  Normal saline   Dressing Type Applied Silicone  (Mepilex)   Procedure Tolerated Well   Wound Abdomen Anterior   Date First Assessed/Time First Assessed: 06/20/17 2000   POA: Yes  Wound Type: (c) Other (comment)  Location: (c) Abdomen  Wound Description (Optional): under pannus  Orientation: Anterior   DRESSING STATUS Removed   DRESSING TYPE Other (Comment)  (LinenPillowCase)   Non-Pressure Injury Partial thickness (epider/derm)   Wound Length (cm) 0.4 cm  (Pannua)   Wound Width (cm) 13.5 cm   Wound Depth (cm) 0.1   Wound Surface area (cm^3) 0.54 cm^2   Condition of Edges Open   Tissue Type Red   Tissue Type Percent Red 100   Drainage Amount  Small    Drainage Color Serosanguinous   Wound Odor None   Periwound Skin Condition Excoriated   Cleansing and Cleansing Agents  Normal saline   Dressing Type Applied Other (Comment)  (ZincOxide)   Procedure Tolerated Fair   Wound Breast   Date First Assessed/Time First Assessed: 06/20/17 2000   POA: Yes  Wound Type: Moisture Assoc.  Skin Damage  Location: Breast  Wound Description (Optional): under bilat breast   DRESSING STATUS Removed   DRESSING TYPE Other (Comment)  (PillowLinenCase)   Non-Pressure Injury Partial thickness (epider/derm)   Wound Length (cm) 0.2 cm  (Multiplesareasofexcoriationunderbilateralbreasts)   Wound Width (cm) 0.2 cm   Wound Depth (cm) 0.1 Wound Surface area (cm^3) 0 cm^2   Tissue Type Red   Tissue Type Percent Red 100   Drainage Amount  Small    Drainage Color Serosanguinous   Wound Odor None   Periwound Skin Condition Excoriated   Cleansing and Cleansing Agents  Normal saline   Dressing Type Applied Other (Comment)  (Zinc/Patienthasnystatinpowder)   Procedure Tolerated Fair   Wound Foot Right;Lateral   Date First Assessed/Time First Assessed: 06/20/17 2000   POA: Yes  Wound Type: Pressure ulcer  Location: Foot  Orientation: Right;Lateral   DRESSING STATUS Removed   DRESSING TYPE Other (Comment)  (PrevalonBoot)   Pressure Injury DTI   Wound Length (cm) 0.5 cm   Wound Width (cm) 0.8 cm   Tissue Type Maroon/purple   Tissue Type Percent Maroon/Purple 100 %   Drainage Amount  None   Wound Odor None   Periwound Skin Condition Other (comment)  (Dry/Flaky)   Cleansing and Cleansing Agents  Normal saline   Dressing Type Applied Other (Comment)  (PrevalonBoot)   Procedure Tolerated Fair   Wound Leg Right;Lateral   Date First Assessed/Time First Assessed: 06/20/17 2000   POA: Yes  Wound Type: Pressure ulcer  Location: Leg  Orientation: Right;Lateral   DRESSING STATUS Removed   DRESSING TYPE Silicone  (Mepilex)   Pressure Injury Stage ll   Wound Length (cm) 1.2 cm  (Proximal 1.8x1x0. 1   Distal 1.2x1.7)   Wound Width (cm) 1.7 cm   Wound Depth (cm) 0.1   Wound Surface area (cm^3) 0.2 cm^2   Condition of Base Pink   Condition of Edges Open   Tissue Type Pink   Tissue Type Percent Pink 100   Drainage Amount  Scant   Drainage Color Serous   Wound Odor None   Periwound Skin Condition Erythema, non-blanchable   Cleansing and Cleansing Agents  Normal saline   Dressing Type Applied Silicone  (Mepilex)   Procedure Tolerated Well   Wound Back Right;Posterior   Date First Assessed/Time First Assessed: 06/21/17 2000   POA: Yes  Wound Type: Pressure ulcer  Location: Back  Orientation: Right;Posterior   DRESSING STATUS Removed   DRESSING TYPE Other (Comment)  (MepilexBorder)   Pressure Injury Stage ll   Wound Length (cm) 0.7 cm   Wound Width (cm) 0.9 cm   Wound Depth (cm) 0.1   Wound Surface area (cm^3) 0.06 cm^2   Tissue Type Red   Tissue Type Percent Red 100   Drainage Amount  Scant   Drainage Color Serosanguinous   Wound Odor None   Periwound Skin Condition Other (comment)  (Dry/Flaky)   Cleansing and Cleansing Agents  Normal saline   Dressing Type Applied Other (Comment)  (MepilexBorder)   Procedure Tolerated Fair   Wound Leg Left;Lateral;Distal   Date First Assessed/Time First Assessed: 06/21/17 2000   POA: Yes  Wound Type: Pressure ulcer  Location: Leg  Orientation: Left;Lateral;Distal   DRESSING STATUS Removed   DRESSING TYPE Silicone  (Mepilex)   Pressure Injury Stage ll   Wound Length (cm) 2 cm   Wound Width (cm) 0.7 cm   Wound Depth (cm) 0.1   Wound Surface area (cm^3) 0.14 cm^2   Condition of Base Pink   Condition of Edges Open   Tissue Type Pink   Tissue Type Percent Pink 100   Drainage Amount  Scant   Drainage Color Serous   Wound Odor None   Cleansing and Cleansing Agents  Normal saline   Dressing Type Applied Silicone  (Mepilex)   Procedure Tolerated Fair   Wound Back Right;Lateral   Date First Assessed/Time First Assessed: 06/21/17 2000   POA: Yes  Wound Type: Pressure ulcer  Location: Back  Orientation: Right;Lateral   DRESSING STATUS Removed   DRESSING TYPE Other (Comment)  (MepilexBorder/Tape)   Pressure Injury Stage ll   Wound Length (cm) 1 cm   Wound Width (cm) 0.5 cm   Wound Depth (cm) 0.1   Wound Surface area (cm^3) 0.05 cm^2   Condition of Edges Open   Tissue Type Pink  (PalePink)   Tissue Type Percent Pink 100   Drainage Amount  Small    Drainage Color Serosanguinous   Wound Odor None   Periwound Skin Condition Excoriated   Cleansing and Cleansing Agents  Normal saline   Dressing Type Applied Other (Comment)  (MepilexBorder)   Procedure Tolerated Fair   Wound Leg Left;Lateral;Proximal   Date First Assessed/Time First Assessed: 06/21/17 2000   POA: Yes  Wound Type: Pressure ulcer  Location: Leg  Orientation: Left;Lateral;Proximal   DRESSING STATUS Removed   DRESSING TYPE Silicone  (Mepilex)   Pressure Injury DTI   Wound Length (cm) 5.4 cm   Wound Width (cm) 0.7 cm   Condition of Base Purple   Condition of Edges Closed   Tissue Type Maroon/purple   Tissue Type Percent Maroon/Purple 100 %   Drainage Amount  None   Wound Odor None   Periwound Skin Condition Erythema, blanchable;Edematous   Cleansing and Cleansing Agents  Normal saline   Dressing Type Applied Silicone  (Mepilex)   Procedure Tolerated Fair   Wound Leg Right;Lateral;Medial   Date First Assessed/Time First Assessed: 06/21/17 2000   POA: Yes  Wound Type: Pressure ulcer  Location: Leg  Orientation: Right;Lateral;Medial   DRESSING STATUS Removed   DRESSING TYPE Silicone  (Mepilex)   Pressure Injury Unstageable   Wound Length (cm) 2 cm   Wound Width (cm) 2.6 cm   Wound Depth (cm) 0.2   Wound Surface area (cm^3) 1.04 cm^2   Condition of Base Slough;Pink   Condition of Edges Open   Tissue Type Pink;Yellow   Tissue Type Percent Pink 15   Tissue Type Percent Yellow 85   Drainage Amount  Small    Drainage Color Serous   Wound Odor None   Periwound Skin Condition Erythema, blanchable;Edematous   Cleansing and Cleansing Agents  Normal saline   Dressing Type Applied Silicone  (Mepilex)   Procedure Tolerated Fair                                                             Dressing changed to nephrostomy tube sites. Nephrosotomy tubes observed to not be sutured in. Mepilex Foam placed beneath tube as well as on top to secure. [facility] made aware.      Principal Problem:    Septic shock ([facility] [address] (6/20/2017)    Active Problems:    DEV (acute kidney injury) ([address] (12/29/2015)      UTI (urinary tract infection) (3/3/2017)      Ulcer of sacral region, unstageable ([address] (6/20/2017)          Patient Supine side; bed rails up x3, bed low and locked, lights dimmed for comfort, and call bell within patient's reach. Patient verbalized understanding to all instructions provided but needs additional instruction. Plan:     Nursing to Perform Daily  Wound Care/Dressing change to Sacrum as well as Right Buttock Wound: Cleanse wound with normal saline only (do not use dermal wound cleanser due to it inactivates santyl), apply no sting skin prep to periwound skin then Convatec Zinc Oxide Cream, apply santyl nickel-thickness to entire wound base/sides (to include necrotic tissue), then fill undermined area with 1/4 Strength Dakin's Moistened Mesalt Ribbon and Wound base/sides with 1/4 Strength Dakin's Moistened Mesalt Sheets, fill any dead space with Dakin's Moistened Kerlix, cover all with 4x4s and abd pads, secure with hypafix tape. Nursing to Perform Daily  Wound Care/Dressing change to Left Posterior Back and Left Lateral (middle)Leg: Cleanse wound(s) with normal saline only (do not use dermal wound cleanser due to it inactivates santyl), apply no sting skin prep then zinc oxide cream to periwound skin, apply santyl (nickel-thickness) to entire wound, cover with folded saline-moistened 4x4 and Mepilex, secure with hypafix or paper tape. Nursing to Perform Daily  Wound Care/Dressing change to Right Lateral Foot:  Cleanse wound with normal saline or dermal wound cleanser, apply no sting skin prep to wound      Nursing to Perform Daily  Cleanse between all skin folds (under breast, pannus, arms and legs) and apply zinc oxide cream     Nursing to Perform Tuesday Thursday Saturday  Wound Care/Dressing change to Right Lateral Leg, Right Lateral Side, Right Posterior Back, Left Lateral Leg (Distal and Proximal):  Cleanse wound with normal saline or dermal wound cleanser, apply no sting skin prep to periwound skin, cover with Mepilex Silicone Border, may secure with hypafix tape.       Continue Low air loss bed, TAP system, Q2hr Turning and Prevalon boots    Prabhakar Landaverde RN, [facility]

## 2017-06-22 NOTE — PROGRESS NOTES
Physical Exam   Musculoskeletal: She exhibits edema. Skin:           Primary Nurse Gogo Montelongo, RN and ,Latanya Butler RN   RN performed a dual skin assessment on this patient Impairment noted- see wound doc flow sheet  Ayo score is 13.   Awaiting wound/skin consult,

## 2017-06-22 NOTE — PROGRESS NOTES
Progress Note    Patient: Lisa Jenkins MRN: [id number]  SSN: [id number]    YOB: 1960  Age: 56 y.o. Sex: female      Admit Date: 6/19/2017    LOS: 2 days     Subjective:     Rt PCN draining well.  Minimal pain    Objective:     Vitals:    06/22/17 0900 06/22/17 0930 06/22/17 1000 06/22/17 1030   BP: 107/73 118/53 100/69 (!) 86/37   Pulse: 87 (!) 101 (!) 102 93   Resp: 13 22 17 16   Temp:       SpO2: 100% 97% 92% 100%   Weight:       Height:            Intake and Output:  Current Shift: 06/22 0701 - 06/22 1900  In: 34.3 [I.V.:34.3]  Out: 260 [Urine:260]  Last three shifts: 06/20 1901 - 06/22 0700  In: 3243.3 [P.O.:540; I.V.:2703.3]  Out: 2004 [Urine:2004]    Physical Exam:   GENERAL: alert, cooperative, no distress, appears stated age  LUNG: unlabored  HEART: regular rate and rhythm  ABDOMEN: Soft, Non tender  EXTREMITIES:  extremities normal, atraumatic, no cyanosis or edema  SKIN: Normal.  PSYCHIATRIC: non focal  INCISION: clean, dry, intact    Lab/Data Review:    Lab Results   Component Value Date/Time    WBC 10.0 06/22/2017 03:55 AM    Hemoglobin (POC) 7.5 07/02/2016 12:12 AM    HGB 6.6 06/22/2017 03:55 AM    Hematocrit (POC) 22 07/02/2016 12:12 AM    HCT 22.0 06/22/2017 03:55 AM    PLATELET 199 62/97/6916 03:55 AM    MCV 89.1 06/22/2017 03:55 AM       Lab Results   Component Value Date/Time    Sodium 140 06/22/2017 03:55 AM    Potassium 4.2 06/22/2017 03:55 AM    Chloride 110 06/22/2017 03:55 AM    CO2 19 06/22/2017 03:55 AM    Anion gap 11 06/22/2017 03:55 AM    Glucose 115 06/22/2017 03:55 AM    BUN 24 06/22/2017 03:55 AM    Creatinine 1.30 06/22/2017 03:55 AM    BUN/Creatinine ratio 18 06/22/2017 03:55 AM    GFR est AA 52 06/22/2017 03:55 AM    GFR est non-AA 43 06/22/2017 03:55 AM    Calcium 7.0 06/22/2017 03:55 AM     U Cx 6/19/17 E coli  Blood Cx gram -ve and staph      This is a 61-year-old female who has a history of bilateral ureteral obstruction secondary to endometrial carcinoma, status post chemoradiation. She had bilateral stents last placed in 3/17 and presented to the hospital with a UTI and acute kidney injury and had PCN placed 5/11/17 now presents with sepsis and Rt PCN capped     Assessment:     Principal Problem:    Septic shock ([facility] [address] (6/20/2017)    Active Problems:    DEV (acute kidney injury) ([facility] [address] (12/29/2015)      UTI (urinary tract infection) (3/3/2017)      Ulcer of sacral region, unstageable ([facility] [address] (6/20/2017)        Plan:     Con't all tubes, byrne to drainage  Abx per primary - Cefepime and Dapto  Ct reviewed - minimal left hydro.   Cr 1.3  Will need L stent exchange, Rt stent removal once over acute issue      Signed By: Jett Beyer MD     June 22, 2017    Jett Beyer MD  [facility]  [address] [facility], [address] [facility]  Phone: [phone]  Pager: [phone]

## 2017-06-22 NOTE — CDMP QUERY
Please clarify if this patient is being treated/managed for:    =>Moderate Protein Calorie Malnutrition  =>Other Explanation of clinical findings  =>Unable to Determine (no explanation of clinical findings)    The medical record reflects the following:    Risk: Sepsis, sacral decubitus and multiple skin wounds over entire body, endometrial cancer on hospice PTA    Clinical Indicators: Pt is overweight related to excess caloric intake as evidenced by 207% ideal weight and BMI= 43.3 kg/m2. Pt meets criteria for obesity. Pt with increased calorie and protein requirements related to sacral wound and Ayo score =13. Pt w/hypoalbuminemia as evidenced by albumin=1.1 mg/dl. ED MD notes weakness and leg swelling. Treatment: Encouraged increased intake at meals to meet calorie and protein requirements. Ensure. Continue inpatient monitoring and intervention.      Please clarify and document your clinical opinion in the progress notes and discharge summary    Thank you,  Dearl Mike RN, [address] [facility] RN  [phone]

## 2017-06-22 NOTE — CONSULTS
This evaluation was conducted via telepsychiatry with the assistance of onsite staff. Reason for consult: Assessment of capacity to decide on hospice care. History of Present Illness: 57 y/o female with history of end stage endometrial cancer, admitted on 6/20/17 for sepsis and DEV after revoking hospice care. Per RN, pt has been intermittently confused or inappropriate, is now once again requesting palliative consult and starting hospice care. Unclear why she has kept changing her mind on this, concern for pt's capacity in this setting. Today, pt reports mood to be \"okay\". States that she gets sad with the knowledge that she is dying, but is coping well considering. Denies hopelessness, frequent crying, anhedonia or amotivation. Denies current or past SI or HI. She has had intermittent anxiety, as well as insomnia, but was started on ativan as needed and that has helped both. States that she did want hospice care, but her daughter \"is afraid of me dying\", and she is the one who led pt to revoke that decision. At this point, pt is not certain of what she would like to do but is leaning towards hospice because she wants to be comfortable and does not want to keep ending up in the hospital.  Pt reports understanding of hospice as end of life care, primarily for comfort and not aggressive treatment. She also states that this could affect how or when she dies. Understands that certain types of treatment can only be done in the hospital.  At this point, she is not sure what her daughter will say about this but wants to be in agreement with her if possible. SI/Self harm: Denies  HI/Violence: Denies  Legal: Denies  Collateral: SHELLY SIBLEY  Psychiatric History/Treatment History: No prior outpatient treatment or psychiatric hospitalizations. Had prior psychiatry consultation while on medical floor in the past, no treatment recommended. Drug/Alcohol History: Denies current use.   History of cannabis use in the past.  Medical History:   Past Medical History:   Diagnosis Date    Anxiety and depression     Aortic stenosis     Arthritis     Arthropathy     Cardiac disorder     Cellulitis     left leg    Chronic pain     Endometrial adenocarcinoma (HCC)     Gastric ulcer     H/O ovarian cancer     Hematemesis     Infection     Lymphedema     Munchausen syndrome     Narcotic dependence ([facility] [address]     Obesity     Spinal stenosis     Status post partial hysterectomy     Urethral obstruction      Medications & Freq:     Current Facility-Administered Medications:     0.9% sodium chloride infusion 250 mL, 250 mL, IntraVENous, PRN, Riley Penn MD    insulin lispro (HUMALOG) injection, , SubCUTAneous, AC&HS, RIK Andino, Stopped at 06/21/17 1130    glucose chewable tablet 16 g, 4 Tab, Oral, PRN, RIK Andino    glucagon (GLUCAGEN) injection 1 mg, 1 mg, IntraMUSCular, PRN, RIK Andino    dextrose (D50) infusion 12.5-25 g, 25-50 mL, IntraVENous, PRN, RIK Andino    enoxaparin (LOVENOX) injection 40 mg, 40 mg, SubCUTAneous, Q12H, Kely Wilburn MD, 40 mg at 06/22/17 0015    NOREPINephrine (LEVOPHED) 8,000 mcg in dextrose 5% 250 mL infusion, 2-16 mcg/min, IntraVENous, TITRATE, Kely Wilburn MD, Last Rate: 5.6 mL/hr at 06/22/17 1018, 3 mcg/min at 06/22/17 1018    morphine injection 2 mg, 2 mg, IntraVENous, Q4H PRN, Riley Penn MD, 2 mg at 06/22/17 0941    naloxone (NARCAN) injection 0.4 mg, 0.4 mg, IntraVENous, PRN, Riley Penn MD    cefepime (MAXIPIME) 2 g in 0.9% sodium chloride (MBP/ADV) 100 mL MBP, 2 g, IntraVENous, Q12H, Nay Perry MD, Stopped at 06/22/17 0400    LORazepam (ATIVAN) tablet 1 mg, 1 mg, Oral, Q6H PRN, Riley Penn MD, 1 mg at 06/21/17 1842    nystatin (MYCOSTATIN) 100,000 unit/gram powder, , Topical, BID, Nay Montes MD    polyethylene glycol (MIRALAX) packet 17 g, 17 g, Oral, DAILY, Nay Perry MD, 17 g at 06/22/17 0940    potassium chloride (K-DUR, KLOR-CON) SR tablet 20 mEq, 20 mEq, Oral, BID, Nay Perry MD, 20 mEq at 06/22/17 0940    sodium bicarbonate tablet 650 mg, 650 mg, Oral, TID, Zenon Cain MD, 650 mg at 06/22/17 0940    triamcinolone acetonide (KENALOG) 0.025 % cream 1 g, 1 g, Topical, TID, Nay Perry MD, 1 g at 06/22/17 0941    trospium (SANCTURA) tablet 20 mg, 20 mg, Oral, BID, Nay Perry MD, 20 mg at 06/21/17 2100    famotidine (PF) (PEPCID) 20 mg in sodium chloride 0.9 % 10 mL injection, 20 mg, IntraVENous, Q12H, Lizzeth Ca MD, 20 mg at 06/21/17 2142    DAPTOmycin (CUBICIN) 672 mg in 0.9% sodium chloride 50 mL IVPB, 6 mg/kg, IntraVENous, Q24H, Kenya Turcios MD, 672 mg at 06/21/17 1303    ELECTROLYTE REPLACEMENT PROTOCOL, 1 Each, Other, PRN, Juliette Gallery, PA    ELECTROLYTE REPLACEMENT PROTOCOL, 1 Each, Other, PRN, Juliette Gallery, PA    ELECTROLYTE REPLACEMENT PROTOCOL, 1 Each, Other, PRN, Juliette Gallery, PA    ELECTROLYTE REPLACEMENT PROTOCOL, 1 Each, Other, PRN, [place] [employer], PA    ELECTROLYTE REPLACEMENT PROTOCOL, 1 Each, Other, PRN, Juliette Gallery, PA    ELECTROLYTE REPLACEMENT PROTOCOL, 1 Each, Other, PRN, [place] [employer], PA    ELECTROLYTE REPLACEMENT PROTOCOL, 1 Each, Other, PRN, Juliette Gallery, PA    sodium chloride (NS) flush 5-10 mL, 5-10 mL, IntraVENous, PRN, Zander Evans MD, 10 mL at 06/22/17 0602  Allergies: Allergies   Allergen Reactions    Latex, Natural Rubber Hives and Itching    Clindamycin Hives    Clindamycin Itching    Iodine Hives     Pt denies allergy. Patient reports this is an allergy to contrast media that caused temporary vision loss and vomiting but is fine if premedicated with antihistamine.     Keflex [Cephalexin] Hives    Linezolid Hives    Nsaids (Non-Steroidal Anti-Inflammatory Drug) Nausea and Vomiting     Emesis, Previous GI bleed    Piperacillin-Tazobactam Hives     Has tolerated Teflaro @ [address]  Has been tolerating cephalosporins SAPH     Sulfa (Sulfonamide Antibiotics) Hives and Contact Dermatitis    Vancomycin Hives     Family Psych History/History of suicide: Denies. Social History: Lives with daughter and daughter's boyfriend. Employment: On disability. Education: High school graduate. Stressors: Terminal illness, pain, multiple other medical issues. Mental Status Exam:   Appearance and attire: Fair grooming, appears older than stated age. Wearing hospital gown, sitting up in bed. Attitude and behavior: Pleasant, calm, cooperative. Speech: Normal in rate/volume/tone. Mood: Neutral currently  Affect: Appropriate, full range. Association and thought processes: Linear, logical, goal-directed. Thought content: Denies SI or HI. Perception: No delusions evident. Not responding to internal stimuli, no report of AVH. Sensorium and orientation: Alert, oriented to person, place, situation. Reports date as March 2017, not sure about day of week. Reports president as Bambi Currie. Memory and intellectual functioning: Grossly intact, other than recalling date. Pt able to intelligently discuss recent situation, past treatments. Insight and judgment: Fair  Impression/Risk Assessment: 55 y/o female with history of end stage cancer, admitted for sepsis and DEV. Has had recent situational anxiety and low mood at times, but no other behavioral health concerns. Pt has had multiple changes in decision regarding hospice care. Though pt is not oriented to time, she is fully oriented in all other areas currently and is able to express basic understanding of hospice care, benefits and risks of this decision. Reports conflicting views of daughter leading to these changes.   Diagnosis: Unspecified disorientation (R41.0); Rule out adjustment disorder with anxiety  Treatment Recommendations:  1. Based on current exam, pt demonstrates capacity to make decisions regarding hospice care. However, capacity changes over time and can vary for different decisions. Should therefore be reassessed as indicated for other decisions or if cognitive status changes. 2.  Recommend family meeting if not already done, with both pt and daughter, to discuss details of hospice care and make informed decision. 3.  Ativan has been helpful at current dose for anxiety. No other med recommendations. 4.  Behvaioral interventions: Frequent re-orientation and access to date, visits from family when possible and regulation of light and dark. Maximal periods of uninterrupted sleep. The above recommendations were discussed with pt who expressed understanding.

## 2017-06-23 NOTE — PROGRESS NOTES
[facility] 1600 patient B/P 80/43. Patient c/o pain 8/10 in pelvis. Given PRN pain medication and Levophed increased see MAR.     1574 Patient was hypoglycemic earlier today. Blood glucose 107mg/dL    1700 patient sleeping. Appears comfortable. 1700 Patient refused 1700 medications    1800 Patient not ready to eat dinner. Sleeping    1900 Bedside and Verbal shift change report given to Elio BRAVO (oncoming nurse) by Destini Saab RN (offgoing nurse). Report included the following information SBAR, Kardex, Intake/Output, MAR, Recent Results and Cardiac Rhythm NSR .

## 2017-06-23 NOTE — PROGRESS NOTES
[facility]. Pulmonary, Critical Care, and Sleep Medicine    Name: Susie Rodney MRN: [id number]   : 1960 Hospital: [address] [place] [address]   Date: 2017  Admission Date: 2017     Chart and notes reviewed. Data reviewed. I have evaluated all findings. [x]I have reviewed the flowsheet and previous days notes. : Still requiring levophed, bcx + for gram + and gram - organisms  : Levophed weaning down, Hb 6.6 but initially refused transfusion, tearful this morning  : Still requiring levophed, refused labs this AM                ROS:A comprehensive review of systems was negative. Events and notes from last 24 hours reviewed. Care plan discussed on multidisciplinary rounds.   Patient Active Problem List   Diagnosis Code    Anemia D64.9    Anxiety F41.9    Nausea and vomiting R11.2    Morbid obesity ([facility] [address] E66.01    Chronic pain syndrome G89.4    History of ovarian cancer Z85.43    History of gastric bypass Z98.890    CAD (coronary artery disease) I25.10    History of Clostridium difficile Z87.19    History of endometrial cancer Z85.42    History of GI bleed Z87.19    Lymphedema I89.0    Hypokalemia E87.6    DEV (acute kidney injury) ([facility] [address] N17.9    Pulmonary nodules R91.8    A-fib ([facility] [address] I48.91    Aortic stenosis I35.0    GI bleed K92.2    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    Acute blood loss anemia D62    Bladder mass N32.89    Flank pain, chronic R10.9, G89.29    UTI (urinary tract infection) N39.0    Severe sepsis with septic shock (CODE) R65.21    Severe thrombocytopenia (HCC) D69.6    Septic shock (HCC) A41.9, R65.21    Ulcer of sacral region, unstageable (HCC) L98.429       Vital Signs:  Visit Vitals    /62    Pulse 82    Temp 96.4 °F (35.8 °C)    Resp 15    Ht 5' 4\" (1.626 m)    Wt 112 kg (246 lb 14.6 oz)  Comment: pt has no scale on bed    SpO2 100%    BMI 42.38 kg/m2       O2 Device: Room air       Temp (24hrs), Av.7 °F (36.5 °C), Min:96.4 °F (35.8 °C), Max:98.9 °F (37.2 °C)       Intake/Output:   Last shift:      06/22 1901 - 06/23 0700  In: 38.7 [I.V.:38.7]  Out: 555 [Urine:555]  Last 3 shifts: 06/21 0701 - 06/22 1900  In: 1385.5 [P.O.:320; I.V.:1065.5]  Out: 1954 [Urine:1954]    Intake/Output Summary (Last 24 hours) at 06/23/17 0631  Last data filed at 06/23/17 0447   Gross per 24 hour   Intake           550.48 ml   Output             1290 ml   Net          -739.52 ml      Ventilator Settings:                Current Facility-Administered Medications   Medication Dose Route Frequency    sodium hypochlorite (QUARTER STRENGTH DAKIN'S) 0.125% irrigation (bottle)   Topical DAILY    collagenase (SANTYL) 250 unit/gram ointment   Topical DAILY    multivitamin, tx-iron-ca-min (THERA-M w/ IRON) tablet 1 Tab  1 Tab Oral DAILY    insulin lispro (HUMALOG) injection   SubCUTAneous AC&HS    enoxaparin (LOVENOX) injection 40 mg  40 mg SubCUTAneous Q12H    NOREPINephrine (LEVOPHED) 8,000 mcg in dextrose 5% 250 mL infusion  2-16 mcg/min IntraVENous TITRATE    cefepime (MAXIPIME) 2 g in 0.9% sodium chloride (MBP/ADV) 100 mL MBP  2 g IntraVENous Q12H    nystatin (MYCOSTATIN) 100,000 unit/gram powder   Topical BID    polyethylene glycol (MIRALAX) packet 17 g  17 g Oral DAILY    potassium chloride (K-DUR, KLOR-CON) SR tablet 20 mEq  20 mEq Oral BID    sodium bicarbonate tablet 650 mg  650 mg Oral TID    triamcinolone acetonide (KENALOG) 0.025 % cream 1 g  1 g Topical TID    trospium (SANCTURA) tablet 20 mg  20 mg Oral BID    famotidine (PF) (PEPCID) 20 mg in sodium chloride 0.9 % 10 mL injection  20 mg IntraVENous Q12H    DAPTOmycin (CUBICIN) 672 mg in 0.9% sodium chloride 50 mL IVPB  6 mg/kg IntraVENous Q24H       Telemetry:     Physical Exam:   General:  Alert, cooperative, no distress, appears chronically ill   Head:  Normocephalic, without obvious abnormality, atraumatic. Eyes:  Conjunctivae/corneas clear. PERRL, EOMs intact.    Nose: Nares normal. Septum midline. Mucosa normal. No drainage or sinus tenderness. Throat: Lips, mucosa, and tongue normal. Teeth and gums normal.   Neck: Supple, symmetrical, trachea midline, no adenopathy, thyroid: no enlargment/tenderness/nodules, no carotid bruit and no JVD. Back:   Symmetric, no curvature. ROM normal.   Lungs:   Clear to auscultation bilaterally. Chest wall:  No tenderness or deformity. Heart:  Regular rate and rhythm, S1, S2 normal, no murmur, click, rub or gallop. Abdomen:   Soft, non-tender. Bowel sounds normal. No masses,  No organomegaly. Extremities: Extremities normal, atraumatic, no cyanosis + edema. Pulses: 2+ and symmetric all extremities. Skin: Skin color, texture, turgor normal. No rashes or lesions   Lymph nodes:        Cervical, supraclavicular, and axillary nodes normal.   Neurologic: Grossly nonfocal       DATA:  MAR reviewed and pertinent medications noted or modified as needed    Labs:  Recent Labs      06/22/17   0355  06/21/17   0408   WBC  10.0  11.3   HGB  6.6*  7.5*   HCT  22.0*  24.7*   PLT  210  277     Recent Labs      06/22/17   0355  06/21/17   0408  06/20/17   1820  06/20/17   1230   NA  140  141   --   144   K  4.2  4.1   --   4.2   CL  110*  112*   --   112*   CO2  19*  20*   --   22   GLU  115*  134*   --   109*   BUN  24*  24*   --   26*   CREA  1.30  1.32*   --   1.25   CA  7.0*  7.4*   --   7.1*   MG  1.8  1.9  1.6  1.6   PHOS  2.9  3.2   --   3.5   INR   --   1.4*   --    --      No results for input(s): PH, PCO2, PO2, HCO3, FIO2 in the last 72 hours. No results for input(s): FIO2I, IFO2, HCO3I, IHCO3, HCOPOC, PCO2I, PCOPOC, IPHI, PHI, PHPOC, PO2I, PO2POC in the last 72 hours. No lab exists for component: IPOC2  Imaging:  [x]                           I have personally reviewed the patients radiographs and reports    High complexity decision making was performed during this consultation and evaluation.   [x]       Pt is at high risk for further organ failure and dysfunction. Critical care time spent  minutes with patient exclusive of procedures.     IMPRESSION:    Anemia D64.9    Anxiety F41.9    Nausea and vomiting R11.2    Morbid obesity ([facility]) E66.01    Chronic pain syndrome G89.4    History of ovarian cancer Z85.43    History of gastric bypass Z98.890    CAD (coronary artery disease) I25.10    History of Clostridium difficile Z87.19    History of endometrial cancer Z85.42    History of GI bleed Z87.19    Lymphedema I89.0    Hypokalemia E87.6    DEV (acute kidney injury) ([facility] [address] N17.9    Pulmonary nodules R91.8    A-fib ([facility]) I48.91    Aortic stenosis I35.0    GI bleed K92.2    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    Acute blood loss anemia D62    Bladder mass N32.89    Flank pain, chronic R10.9, G89.29    UTI (urinary tract infection) N39.0    Severe sepsis with septic shock (CODE) R65.21    Severe thrombocytopenia ([facility]) D69.6    Septic shock ([facility]) A41.9, R65.21    Ulcer of sacral region, unstageable ([facility]) L98.429     ·       PLAN:   Neuro  Awake, alert no acute issues  Seen by psych, no new recommendations     Cardiovascular  Continue levophed, wean down if able  Continue fluid hydration  2D echo with EF 55 - 53%, grade 1 diastolic, PA 45 - 50  Afib rate controlled     Pulmonary  RML pulmonary nodule, suspect mets  Oxygen as needed to keep sats over 90%     GI  Continue pepsid   CT abdomen with enlarged abdominal mass, sacral decub communicating with wall of rectum     Renal  Nephrostomy tubes in place  Creatinine slightly elevated but stable  Renal function improved from May 2017    ID  Septic shock, persists  Continue broad spectrum abx, now dapto  and cefepime  Bcx 6/19 with gram negative rods and non hemolytic strep, staph   ucx 6/19 Ecoli  Multi organism infection with multiple abx used in the past increasing risk for MDR organism, however current Ecoli sensitive to multiple abx   Appreciate ID's assistance with regimen   Per ID removal of port would be ideal however it is currently our only central access and must remain in place until pt no longer requiring vasopressors     Heme  Chronic anemia  Hb 6.6 yesterday, s/p transfusion, refused labs today  Ovarian/endometrial cancer  HO RLE DVT likely 2/2 venous outflow obstruction due to pelvic mass  Hb dropped after restart of lovenox, will need to discuss risk v benefit of full anticoagulation as pt is both a bleed risk and a recurrent VTE risk          Critical care time 32 minutes    David Burton MD

## 2017-06-23 NOTE — PROGRESS NOTES
Promised I would stop by to see Patient today--she was sleeping.       [employer]   Board Certified   [phone] - Office

## 2017-06-23 NOTE — PROGRESS NOTES
Physical Exam   Musculoskeletal: She exhibits edema. Skin:           Primary Nurse Nusrat Isaacs RN and ,SHELLY Monteiro    performed a dual skin assessment on this patient Impairment noted- see wound doc flow sheet  Ayo score is 10. Pt on Clinitron bed, Wound treatments per wound nurses recommendations.

## 2017-06-23 NOTE — PROGRESS NOTES
Bedside and Verbal shift change report given to Pinky Mac RN   (oncoming nurse) by Lexis Alvarado   (offgoing nurse).  Report included the following information SBAR, Kardex and Cardiac Rhythm Sr.

## 2017-06-23 NOTE — ROUTINE PROCESS
Pt became very teary eyed, and cried after speaking with Dr Daniel Hearn relating to her prognosis of the severity of her infection in her wound and blood. Remained with he patient   spirituall guidance rendered.

## 2017-06-23 NOTE — ROUTINE PROCESS
Post dressing change , patient  Crying staing;\" I dont want to die, I dont want to leave my daughter\" Reassurance rendered. Pt denied the need for the torin.

## 2017-06-23 NOTE — MANAGEMENT PLAN
Discharge Plan    Reviewed chart. Plan is Home with [address] [facility].  Pt has revoked Hospice and with full code now and will discontinue home hospice services      Fabricio Gongora RN BSN  Outcomes Manager  Pager # [phone]

## 2017-06-23 NOTE — PROGRESS NOTES
Progress Note      Patient: Brody Edwards               Sex: female          DOA: 6/19/2017       YOB: 1960      Age:  56 y.o.        LOS:  LOS: 3 days             CHIEF COMPLAINT:  Complicated sepsis picture with shock and negative cultures    Subjective:     Quiet, no distress    Objective:      Visit Vitals    /57    Pulse 68    Temp 97.6 °F (36.4 °C)    Resp 10    Ht 5' 4\" (1.626 m)    Wt 112 kg (246 lb 14.6 oz)    SpO2 100%    BMI 42.38 kg/m2       Physical Exam:  Gen:  Patient is in no distress. No complaint  HEENT and Neck:  PERRL, No cervical tenderness or masses  Lungs:  Clear bilaterally. No rales, no wheeze. Normal effort. Heart:  Regular Rate and Rhythm. No murmur or gallop. No JVD. No edema. Abdomen:  Soft, non tender, no masses, no Hepatosplenomegally  Extremities:  No digital clubbing, no cyanosis  Neuro:  Alert and oriented, Normal affect, Cranial nerves intact, No sensory deficits        Lab/Data Reviewed: All lab results for the last 24 hours reviewed.         Assessment/Plan     Principal Problem:    Septic shock ([facility] [address] (6/20/2017)    Active Problems:    DEV (acute kidney injury) ([facility] [address] (12/29/2015)      UTI (urinary tract infection) (3/3/2017)      Ulcer of sacral region, unstageable ([facility] [address] (6/20/2017)        Plan:  Continue with vasopressors  Following cultures, patient still has port  Follow renal function  Wound care  Follow mental status  GI prophylaxis  DVT prophylaxis

## 2017-06-23 NOTE — PROGRESS NOTES
Physical Exam   Musculoskeletal: She exhibits edema. Skin:           Primary Nurse Miroslava Sim RN and ,Humberto Gong RN     performed a dual skin assessment on this patient Impairment noted- see wound doc flow sheet  Ayo score is 10. Pt on klinitron bed.

## 2017-06-23 NOTE — PROGRESS NOTES
Progress Note    Patient: Amira Olsen MRN: [id number]  SSN: [id number]    YOB: 1960  Age: 56 y.o. Sex: female      Admit Date: 6/19/2017    LOS: 3 days     Assessment: This is a 71-year-old female who has a history of bilateral ureteral obstruction secondary to endometrial carcinoma, status post chemoradiation. She had bilateral stents last placed in 3/17 and presented to the hospital with a UTI and acute kidney injury and had PCN placed 5/11/17 now presents with sepsis and Rt PCN capped   PCN has been to drainage  Clinically improving. DEV -     Plan:        Con't all tubes, byrne to drainage  Abx per primary  SCr 1.1. Will need L stent exchange, Rt stent removal once over acute issue       Subjective:     No acute issues. Objective:     Vitals:    06/23/17 0900 06/23/17 0930 06/23/17 1231 06/23/17 1236   BP: 93/61 (!) 86/55 90/65 92/58   Pulse: 66 67 75 74   Resp: 12 16 12 16   Temp:    97.6 °F (36.4 °C)   SpO2: 100% 100%  94%   Weight:       Height:            Intake and Output:  Current Shift: 06/23 0701 - 06/23 1900  In: 109.1 [I.V.:109.1]  Out: 500 [Urine:500]  Last three shifts: 06/21 1901 - 06/23 0700  In: 965.3 [P.O.:320;  I.V.:645.3]  Out: 2589 [Urine:2589]    Current Facility-Administered Medications   Medication Dose Route Frequency    albumin human 25% (BUMINATE) solution 25 g  25 g IntraVENous Q6H    famotidine (PEPCID) tablet 20 mg  20 mg Oral BID    dextrose 5% infusion  50 mL/hr IntraVENous CONTINUOUS    calcium gluconate 2 g in 0.9% sodium chloride 50 mL IVPB  2 g IntraVENous ONCE    0.9% sodium chloride infusion 250 mL  250 mL IntraVENous PRN    sodium hypochlorite (QUARTER STRENGTH DAKIN'S) 0.125% irrigation (bottle)   Topical DAILY    collagenase (SANTYL) 250 unit/gram ointment   Topical DAILY    multivitamin, tx-iron-ca-min (THERA-M w/ IRON) tablet 1 Tab  1 Tab Oral DAILY    insulin lispro (HUMALOG) injection   SubCUTAneous AC&HS    glucose chewable tablet 16 g  4 Tab Oral PRN    glucagon (GLUCAGEN) injection 1 mg  1 mg IntraMUSCular PRN    dextrose (D50) infusion 12.5-25 g  25-50 mL IntraVENous PRN    enoxaparin (LOVENOX) injection 40 mg  40 mg SubCUTAneous Q12H    NOREPINephrine (LEVOPHED) 8,000 mcg in dextrose 5% 250 mL infusion  2-16 mcg/min IntraVENous TITRATE    morphine injection 2 mg  2 mg IntraVENous Q4H PRN    naloxone (NARCAN) injection 0.4 mg  0.4 mg IntraVENous PRN    cefepime (MAXIPIME) 2 g in 0.9% sodium chloride (MBP/ADV) 100 mL MBP  2 g IntraVENous Q12H    LORazepam (ATIVAN) tablet 1 mg  1 mg Oral Q6H PRN    nystatin (MYCOSTATIN) 100,000 unit/gram powder   Topical BID    polyethylene glycol (MIRALAX) packet 17 g  17 g Oral DAILY    potassium chloride (K-DUR, KLOR-CON) SR tablet 20 mEq  20 mEq Oral BID    sodium bicarbonate tablet 650 mg  650 mg Oral TID    triamcinolone acetonide (KENALOG) 0.025 % cream 1 g  1 g Topical TID    trospium (SANCTURA) tablet 20 mg  20 mg Oral BID    DAPTOmycin (CUBICIN) 672 mg in 0.9% sodium chloride 50 mL IVPB  6 mg/kg IntraVENous Q24H    ELECTROLYTE REPLACEMENT PROTOCOL  1 Each Other PRN    ELECTROLYTE REPLACEMENT PROTOCOL  1 Each Other PRN    ELECTROLYTE REPLACEMENT PROTOCOL  1 Each Other PRN    ELECTROLYTE REPLACEMENT PROTOCOL  1 Each Other PRN    ELECTROLYTE REPLACEMENT PROTOCOL  1 Each Other PRN    ELECTROLYTE REPLACEMENT PROTOCOL  1 Each Other PRN    ELECTROLYTE REPLACEMENT PROTOCOL  1 Each Other PRN    sodium chloride (NS) flush 5-10 mL  5-10 mL IntraVENous PRN         Physical Exam:   GENERAL: alert, cooperative, no distress, appears stated age  LUNG: unlabored breathing  ABDOMEN: soft, non-tender.  No masses,  no organomegaly  EXTREMITIES:  extremities normal, atraumatic, no cyanosis or edema  SKIN: no jaundice  : Rt NT clear yellow      Lab/Data Review:  BMP: Lab Results   Component Value Date/Time     06/23/2017 10:24 AM    K 3.9 06/23/2017 10:24 AM     (H) 06/23/2017 10:24 AM    CO2 19 (L) 06/23/2017 10:24 AM    AGAP 10 06/23/2017 10:24 AM    GLU 75 06/23/2017 10:24 AM    BUN 22 (H) 06/23/2017 10:24 AM    CREA 1.16 06/23/2017 10:24 AM    GFRAA 59 (L) 06/23/2017 10:24 AM    GFRNA 49 (L) 06/23/2017 10:24 AM     CBC: Lab Results   Component Value Date/Time    WBC 12.2 06/23/2017 10:24 AM    HGB 10.1 (L) 06/23/2017 10:24 AM    HCT 32.9 (L) 06/23/2017 10:24 AM     06/23/2017 10:24 AM     COAGS: No results found for: APTT, PTP, INR       CT Results:    Results from Hospital Encounter encounter on 06/19/17   CT ABD PELV WO CONT   Narrative EXAM: CT of the abdomen and pelvis    INDICATION: 82-year-old patient with advanced stage endometrial cancer,  bilateral nephroureteral stents, and concern for possible infection adjacent to  the right percutaneous nephrostomy tube site. Nephrostomy catheter placement  occurred at an outside institution on 5/11/2017. COMPARISON: Several prior exams, most recent CT of the abdomen and pelvis  available for comparison: March 3, 2017. TECHNIQUE: Axial CT imaging of the abdomen and pelvis was performed without  intravenous contrast. Multiplanar reformats were generated. One or more dose reduction techniques were used on this CT: automated exposure  control, adjustment of the mAs and/or kVp according to patient's size, and  iterative reconstruction techniques. The specific techniques utilized on this CT  exam have been documented in the patient's electronic medical record.  _______________    FINDINGS:    LOWER CHEST: Minor dependent atelectasis is noted at each lung base. No focal  pneumonic consolidation, pneumothorax or pleural effusion. Normal cardiac size. No pericardial effusion. There are several pulmonary nodules noted. The largest  is present in the right lower lobe (series 4, image 10) which measures  approximately 8 x 8 mm in size, and is similar to prior CT of 3/4/2017.   Additional right lower lobe pulmonary nodule noted (series 4, image 3), as well  as in the left lower lobe (series 4, image 12). The right lower lobe nodule  measures approximately 3 mm and is new in appearance from prior, with slight  enlargement of the subpleural left lower lobe pulmonary nodule, which measures  approximately 5 mm. LIVER, BILIARY: The unenhanced appearance of the liver demonstrates no focal  abnormality. No biliary dilation. The gallbladder is surgically absent. PANCREAS: There is mild fatty infiltration of the pancreas without evidence of  pancreatic ductal dilatation. SPLEEN: Normal.    ADRENALS: Nodular thickening of the inferior left adrenal gland is similar to  prior exam.    KIDNEYS/URETERS/BLADDER:     On the right, there is percutaneous nephrostomy catheter, with formed loop  present with in the interpolar collecting system. In addition, there is a  nephroureteral stent, also with proximal formed loop in the interpolar  collecting system, and distal formed loop within the bladder. There is  moderately severe right-sided hydronephrosis, which is increased from prior CT  examination. On the left, a nephroureteral stent is in place, with proximal formed loop noted  in the interpolar collecting system, and distal formed loop present within the  bladder. Mild left-sided hydronephrosis is present, similar to prior CT exam.    A Duarte catheter is present within the bladder, which is decompressed. PELVIC ORGANS: The uterus contains an unremarkable unenhanced appearance. Right  adnexal soft tissue lesion noted, measuring approximately 5.1 x 2.9 cm in size,  with exact measurements limited by lack of intravenous contrast as well as beam  hardening artifact. VASCULATURE: Abdominal aorta is normal in course and caliber. There is an  inferior vena cava filter which spans the renal veins, with several tines which  project external to the lumen of the inferior vena cava, potentially within the  right renal vein (images 46-47).     LYMPH NODES: Periaortic adenopathy along the left side of the retroperitoneum is  similar in appearance to prior exams. GASTROINTESTINAL TRACT: There is a small hiatal hernia. Small intestine is  normal in course and caliber. Moderate burden of formed stool throughout the  large intestine, which is similarly normal in caliber. No bowel obstruction. No  free intraperitoneal gas. There is a advanced age sacral decubitus ulcer, which  freely communicates with the posterior wall of the rectum (series 3, image 107)  with associated soft tissue thickening/stranding at the site of decubitus ulcer  formation. BONES: There is accentuation of the lumbar lordosis, with unchanged grade 2  isthmic spondylolisthesis L5 on S1. Multilevel spondylosis noted, most advanced  L4-S1. Vertebral body heights are maintained intact. Moderately severe bilateral  hip joint osteoarthritis is present. OTHER: Numerous small foci of fat necrosis/fat-containing ventral abdominal  hernias are noted within the anterior abdominal wall, similar to prior. Diffuse  subcutaneous body wall edema is present, along with redemonstration of multiple  abdominal wall collateral vessels, the appearance of which is increased in the  interval from prior CT examination. _______________         Impression IMPRESSION:    1. Bilateral nephroureteral stents and percutaneous nephrostomy catheter in  stable and expected position. There is moderately severe right hydronephrosis,  which is increased in magnitude from prior CT examination, with mild left  hydronephrosis, similar to prior. 2. Bladder decompressed with a Duarte catheter. 3. Enlargement of a right adnexal lesion from recent prior CT examination. 4. New and slightly enlarged pulmonary nodules as above. Follow-up per oncology  protocol. 5. Inferior vena cava filter which spans across the renal veins as described  above. Increased formation of abdominal wall collaterals as well as anasarca  noted. 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 6. Progression of an advanced stage sacral decubitus ulcer which directly  communicates with the posterior wall of the rectum with associated skin  thickening/cellulitis. 7. Unchanged, grade 2 isthmic spondylolisthesis L5-S1. Note: Preliminary report sent to the Emergency Department by the radiology  resident at the time of the study. The additional findings of hydronephrosis and advanced sacral decubitus ulcer  were discussed with Dr. Chloe Bojorquez, hospitalist caring for the patient at 8:25 AM on  6/20/2017. US Results:    Results from Abstract encounter on 06/14/17   US RETROPERITONEUM COMP   Impression    Procedure Location       [facility]         Results    [address] (Accession [id number]) (Order [id number])      Result Information     Status Provider Status       Final result (5/17/2017 10:32 AM) Open      Order Date/Time     Order Date/Time    05/16/17 09:42 AM     PACS Images     Show images for US RETROPERITONEAL     Impression  IMPRESSION:     1.  Mild right-sided hydronephrosis. Right nephrostomy tube is evident. Slightly distal positioning of the ureteral stent in the ureter. 2. No left-sided hydronephrosis. Left nephrostomy is evident. Nonvisualization of a left ureteral stent. 3. Consider plain film to localize stents.     Narrative  RENAL ULTRASOUND COMPLETE     INDICATION: Flank pain, chronic ureteral stents; recent placement of nephrostomy tubes.     TECHNIQUE: Select images from retroperitoneal ultrasound provided for interpretation.       Comparison: June 2016 ultrasound and May 2017 CT     FINDINGS: Limited by body habitus and clinical condition.     The right kidney measures 11.3 x 6.7 x 5.4 cm in size.  Normal cortical echogenicity compared with adjacent liver. Mild hydronephrosis. Percutaneous nephrostomy tube evident.  The proximal aspect of the ureteral stent is not evident and renal pelvis, but is seen within the uppermost ureter.  No renal calculi evident. No solid mass.  Intact kidney vascularity.  No adjacent free fluid or fluid collections.       The left kidney measures 9.3 x 3.9 x 4.4 cm in size. Normal cortical echogenicity compared with adjacent spleen. There is no collecting system dilatation or evidence of obstruction. No renal calculi evident. No solid mass. Nephrostomy tube present. A left-sided ureteral stent is not evident. Intact kidney vascularity.  No adjacent free fluid or fluid collections. Proximal ureter does not appear dilated.     Bladder decompressed around Duarte catheter. No free fluid noted in the pelvis.       Dictated by: Kailyn Prince on Wed May 17, 2017 10:28:05 AM EDT           Signed by     Signed     Date/Time Phone Pager  Navya Hendrickson MD Radiology [40] 5/17/2017 10:32 [phone]        [address] Radiologist [221]     Exam Information     Status Exam Begun   Exam Ended       Final [99] 5/17/2017 07:50 5/17/2017 08:20     Order Questions     Question Answer Comment    Wet Read? No     Portable Exam? No     Exam Indication OTHER (SEE COMMENTS BOX) flank pain, chronic ureteral stents, s/p b/l PCNs     External Result Report     External Result Report       RESULTING LAB      Lab      RADIOLOGY  No contact information on file         Priority and Order Details      Priority Class       Routine Ancillary Performed      Order    US RETROPERITONEAL [[id number]] (Order [id number])        Order Providers      Authorizing Encounter Billing    Itz Montgomery    Replaced:  [facility]       Order Questions      Question Answer Comment    Wet Read?  No     Portable Exam? No     Exam Indication OTHER (SEE COMMENTS BOX) flank pain, chronic ureteral stents, s/p b/l PCNs       Order Signing Info      Action Created on Order Mode Entered by Responsible Provider Signed by Signed on    Ordering 05/17/17 0900 Protocol Wolf Booker Signature Not Required        Acknowledgement Info      For At Acknowledged By Acknowledged On    Placing Order 05/16/17 0942 Ean Leyva RN 05/17/17 0900       Original Order      Ordered On Ordered By       5/16/2017  9:42 AM Chuy ARAIZA            Release Information      Released On Released By    5/17/2017  9:00 AM Wolf Booker       Additional Information      Associated Reports    View Encounter    Priority and Order Details       Order Information      US RETROPERITONEAL (Order #[id number]) on 5/16/17       ABN Associated with this Order        No ABN is associated with this order.                  Order Providers      Authorizing Provider Encounter Provider Billing Provider    MD Whitney Garcia MD Carrol Jupiter, MD       Medication Detail        Disp Refills Start End       US RETROPERITONEAL 5/17/2017 5/17/2017     Class:  Ancillary Performed        RESULTING LAB      Lab      RADIOLOGY  No contact information on file         Priority and Order Details      Priority Class       Routine Ancillary Performed        Order Providers      Authorizing Encounter Billing    [place] Postal    Replaced:  [facility]       Department      Name Address Phone [facility] [address] [place] [phone]        Provider Information:      Ordering User Ordering Provider Authorizing Provider    MD Jim Awad MD    Attending Provider(s) Admitting Provider PCP Billing Provider    Chris Lopes MD; Franklin Weber MD; Adelina Hutchins MD; Jim Verduzco MD; Russ Yates MD; Nicanor Dooley MD; MD Franklin Bustaamnte MD Burna Diver, MD Carrol Jupiter, MD       PACS Images      Show images for US RETROPERITONEAL       Parent Encounter      View Parent Encounter       RESULTING LAB      Lab      RADIOLOGY  No contact information on file         Order Questions      Question Answer Comment    Wet Read? No     Portable Exam? No     Exam Indication OTHER (SEE COMMENTS BOX) flank pain, chronic ureteral stents, s/p b/l PCNs       Order Signing Info      Action Created on Order Mode Entered by Responsible Provider Signed by Signed on    Ordering 05/17/17 0900 Protocol Claudio Comfort Signature Not Required        Acknowledgement Info      For At Acknowledged By Acknowledged On    Placing Order 05/16/17 Nolan Aj05Adriana [address], RN 05/17/17 0900       Original Order      Ordered On Ordered By       5/16/2017  9:42 AM Nilsa Rangel L            Release Information      Released On Released By    5/17/2017  9:00 AM Claudio Comfort       Additional Information      Associated Reports    View Encounter    Priority and Order Details       Order Information      US RETROPERITONEAL (Order #[id number]) on 5/16/17       ABN Associated with this Order        No ABN is associated with this order.                  Order Providers      Authorizing Provider Encounter Provider Billing Provider    MD Jignesh Ordonez MD Johnetta Ike, MD       Provider Information      Authorizing/Billing Provider    None       Medication Detail        Disp Refills Start End       US RETROPERITONEAL 5/17/2017 5/17/2017     Class:  Ancillary Performed                  Principal Problem:    Septic shock ([address] (6/20/2017)    Active Problems:    DEV (acute kidney injury) ([address] (12/29/2015)      UTI (urinary tract infection) (3/3/2017)      Ulcer of sacral region, unstageable ([address] (6/20/2017)          Signed By: Ruslan Rodriguez MD , FACS    June 23, 2017      PAGER:  [phone]  OFFICE:  [address] [place] [address]

## 2017-06-23 NOTE — PROGRESS NOTES
0 - Received report from Rahul, [address] [place] [address]. Pt is resting quietly and stable. No signs of distress or discomfort at this time. Will continue to monitor. 2035 - Pt's MAP at 57 on recheck. Levo increased to 3.5.  2040 - Map increased to 67. Will continue to monitor. 6/23/17  0412 - Pt refused lab draws. Dr. Ian Marquez aware. 6488 - Pain medication given in preparation for bath.  0600 - Pt bathed and was complaining of pain and was reluctant to help turn. Skin flaking off all over.

## 2017-06-23 NOTE — PROGRESS NOTES
INFECTIOUS DISEASE FOLLOW UP NOTE :     Admit Date: 6/19/2017    Will plan to check back on Monday 6/26/2017. Dr. Marisol Saavedra is on call for ID this weekend and can be reached at [phone] if needed. ABX:       Dapto 6/20-3   Levoflox 6/19-1  Cefepime 6/19-4,        ASSESSMENT-> RECOMMENDATIONS      SIRS/ ?sepsis POA  - hypotension requiring levophed, subjective fever but no leukocytsois  - due sec to infected Rt stent , BSI  - agreed to eventual mediport removal if necessary after d/w Dr. Pandey Stage 6/22 - continue on Dapto   -> continue Cefepime but change to Gentamicin per pharmacy dosing if GN in blood is also CRE Klebsiella  -> pt agrees to mediport removal  -> prognosis poor   Infected obstructed stents- suspect Rt ? left  - severe hydro on CT  - placement of b/l PCNs 5/11 at [facility] and left fell off few weeks ago  - urology consult appreciated  - tube was clamped on admission and now opened   - urine cx with GNR -> await final identification and sens of GN in blood for abx choice   Polymicrobial BSI   - 1 of 2 blood cx +ve for MRSA, NHS and GNR  - TTE 6/22: no veg on AV, MV, TV -> continue Abx as above  - Need to get Mediport out and pt wants to wait till no other option left    H/o multiple UTIs   - ucx >617G VRE was complicated with BSI and valve veg and required prolong hospitalization  - ucx 5/2017 with E.coli -unclear if got Rx or not  - s/p bilateral stent exchange 3/31, byrne changed 5/10 in urology office -> CT with b/l hydro Rt worse than left and concern for obstructed stent on Rt  ->Ua with sig pyuria  -> Await ucx   Sacral ulcer stage 4  - CT 6/20 shows erosion into rectal wall   - wd cx 6/20 MRSA, E faecalis, CONS, CRE Klebsiella -> contact isolation  -> ?colo-rectal eval though prognosis is extremely poor given CT findings and doubt a candidate for any Sx intervention  -> LWC    Rt pelvic mass ? ovarian ca / endometrial ca with mets  - CT 6/20: worsening of adnexal mass with increasing pulmonary nodules  - palliative care was involved in past and pt was hospice but it was revoked last admission ->  prognosis remains extremely poor  -> pt doesn't understand ?  Psych eval  -> appreciate palliative care input     H/o VRE BSI with ?aortic endocarditis in 3/2017   -  TTE aortic stenosis with possible aortic vegetation [chronic mixed calcified density]  -pt refused ESSIE and removal of mediport  - had many +ve subsequent blood cx ( In [place] reported as Veillonella species and Staph species in May 2017 -> treated with 6 weeks of Daptomycin    -> Still with same mediport    DEV- Cr 2.04  - suspect sec to obstruction, infection ->Monitor as stable with 1.3 today   Hypoalbuminemia     Lymphedema legs - ch  - suspect thrombus seen on CT also contributing      H/o LE DVT with IVC filter  - CT again showing progression     Seizure activity 3/26- was new  - no further seizures on Keppra     Co-morb- Anxiety depression, AS, ch pain, hemetemesis s/p EGD x6, MO, ch anemia      Ax- Clinda, Keflex, Linezolid, Zosyn, Sulfa, Vanco- most of the Abx cause rash and hives though had tolerated on and off in past with bendryl - Monitor  As tolerating cefepime for now         MICROBIOLOGY:   (3/3  Blcx x 2 - 2/2 VRE S gent, linezolid , daptomycin quinupristin, Ucx > 100K VRE  3/14 ucx ng  3/17 uctx ng  [place]-  5/1 blcx x3 with Veillonella species  5/10 blcx Staph epi, Staph cohnii  5/21 ucx >100k E.coli R Quinolones)     6/19 ucx >100k GNR, blcx 1 of 2 MRSA, E faecalis, GNR  6/20 blcx x2 IP, wd cx CONS, MRSA, E faecalis, CRE Klebsiella, S malto, , 3rd GNR, Diptheroids      LINES/DRAINS:   Rt chest mediport   [place] changed 5/10    MEDICATIONS:     Current Facility-Administered Medications   Medication Dose Route Frequency    0.9% sodium chloride infusion 250 mL  250 mL IntraVENous PRN    sodium hypochlorite (QUARTER STRENGTH DAKIN'S) 0.125% irrigation (bottle)   Topical DAILY    collagenase (SANTYL) 250 unit/gram ointment   Topical DAILY    multivitamin, tx-iron-ca-min (THERA-M w/ IRON) tablet 1 Tab  1 Tab Oral DAILY    insulin lispro (HUMALOG) injection   SubCUTAneous AC&HS    glucose chewable tablet 16 g  4 Tab Oral PRN    glucagon (GLUCAGEN) injection 1 mg  1 mg IntraMUSCular PRN    dextrose (D50) infusion 12.5-25 g  25-50 mL IntraVENous PRN    enoxaparin (LOVENOX) injection 40 mg  40 mg SubCUTAneous Q12H    NOREPINephrine (LEVOPHED) 8,000 mcg in dextrose 5% 250 mL infusion  2-16 mcg/min IntraVENous TITRATE    morphine injection 2 mg  2 mg IntraVENous Q4H PRN    naloxone (NARCAN) injection 0.4 mg  0.4 mg IntraVENous PRN    cefepime (MAXIPIME) 2 g in 0.9% sodium chloride (MBP/ADV) 100 mL MBP  2 g IntraVENous Q12H    LORazepam (ATIVAN) tablet 1 mg  1 mg Oral Q6H PRN    nystatin (MYCOSTATIN) 100,000 unit/gram powder   Topical BID    polyethylene glycol (MIRALAX) packet 17 g  17 g Oral DAILY    potassium chloride (K-DUR, KLOR-CON) SR tablet 20 mEq  20 mEq Oral BID    sodium bicarbonate tablet 650 mg  650 mg Oral TID    triamcinolone acetonide (KENALOG) 0.025 % cream 1 g  1 g Topical TID    trospium (SANCTURA) tablet 20 mg  20 mg Oral BID    famotidine (PF) (PEPCID) 20 mg in sodium chloride 0.9 % 10 mL injection  20 mg IntraVENous Q12H    DAPTOmycin (CUBICIN) 672 mg in 0.9% sodium chloride 50 mL IVPB  6 mg/kg IntraVENous Q24H    ELECTROLYTE REPLACEMENT PROTOCOL  1 Each Other PRN    ELECTROLYTE REPLACEMENT PROTOCOL  1 Each Other PRN    ELECTROLYTE REPLACEMENT PROTOCOL  1 Each Other PRN    ELECTROLYTE REPLACEMENT PROTOCOL  1 Each Other PRN    ELECTROLYTE REPLACEMENT PROTOCOL  1 Each Other PRN    ELECTROLYTE REPLACEMENT PROTOCOL  1 Each Other PRN    ELECTROLYTE REPLACEMENT PROTOCOL  1 Each Other PRN    sodium chloride (NS) flush 5-10 mL  5-10 mL IntraVENous PRN       SUBJECTIVE :     Interval notes reviewed.  In ICU appears comfortable feeding herself breakfast. Afebrile. Says she is ready to die but afraid for her daughter who will be left behind. OBJECTIVE     Visit Vitals    BP (!) 89/70    Pulse 68    Temp 96.6 °F (35.9 °C)    Resp 14    Ht 5' 4\" (1.626 m)    Wt 112 kg (246 lb 14.6 oz)  Comment: pt has no scale on bed    SpO2 100%    BMI 42.38 kg/m2       Temp (24hrs), Av.5 °F (36.4 °C), Min:96.4 °F (35.8 °C), Max:98.9 °F (37.2 °C)      Gen-No distress, ch sick appearing  HENT- No thrush, dry mm, pale  Chest- Symmetric expansion, CTA  CV-S1S2 RR, No JVD, 2+ edema  Abd-Soft, ? deep-tenderness, obese, large pannus with ulceration, BS+, +ve CVA tenderness on Rt, rt PCN, byrne+  Musculoskeletal:2+ edema. No clubbing or cyanosis  Skin-Multiple skin ulcers on thigh, legs, sacrum- under dressing.      Muscsk- 1+ edema        Labs: Results:   Chemistry Recent Labs      17   0355  17   0408  17   1230   GLU  115*  134*  109*   NA  140  141  144   K  4.2  4.1  4.2   CL  110*  112*  112*   CO2  19*  20*  22   BUN  24*  24*  26*   CREA  1.30  1.32*  1.25   CA  7.0*  7.4*  7.1*   AGAP  11  9  10   BUCR  18  18  21*      CBC w/Diff Recent Labs      17   0355  17   0408   WBC  10.0  11.3   RBC  2.47*  2.78*   HGB  6.6*  7.5*   HCT  22.0*  24.7*   PLT  210  277   GRANS  81*  81*   LYMPH  12*  12*   EOS  1  1          RADIOLOGY :          Imaging    Results from Hospital Encounter encounter on 17   XR CHEST PORT   Narrative CHEST AP PORTABLE    Indication: Chest pain and shortness of breath. Comparison: 2017. Findings: Right port with catheter tip near cavoatrial junction. The lungs  appear clear. No evidence for pneumothorax or pleural effusion. Cardiac  silhouette and pulmonary vascularity appear within normal limits. Impression IMPRESSION:    No acute cardiopulmonary disease.           Results from [place] encounter on 17   CT ABD PELV WO CONT   Narrative EXAM: CT of the abdomen and pelvis    INDICATION: 51-year-old patient with advanced stage endometrial cancer,  bilateral nephroureteral stents, and concern for possible infection adjacent to  the right percutaneous nephrostomy tube site. Nephrostomy catheter placement  occurred at an outside institution on 5/11/2017. COMPARISON: Several prior exams, most recent CT of the abdomen and pelvis  available for comparison: March 3, 2017. TECHNIQUE: Axial CT imaging of the abdomen and pelvis was performed without  intravenous contrast. Multiplanar reformats were generated. One or more dose reduction techniques were used on this CT: automated exposure  control, adjustment of the mAs and/or kVp according to patient's size, and  iterative reconstruction techniques. The specific techniques utilized on this CT  exam have been documented in the patient's electronic medical record.  _______________    FINDINGS:    LOWER CHEST: Minor dependent atelectasis is noted at each lung base. No focal  pneumonic consolidation, pneumothorax or pleural effusion. Normal cardiac size. No pericardial effusion. There are several pulmonary nodules noted. The largest  is present in the right lower lobe (series 4, image 10) which measures  approximately 8 x 8 mm in size, and is similar to prior CT of 3/4/2017. Additional right lower lobe pulmonary nodule noted (series 4, image 3), as well  as in the left lower lobe (series 4, image 12). The right lower lobe nodule  measures approximately 3 mm and is new in appearance from prior, with slight  enlargement of the subpleural left lower lobe pulmonary nodule, which measures  approximately 5 mm. LIVER, BILIARY: The unenhanced appearance of the liver demonstrates no focal  abnormality. No biliary dilation. The gallbladder is surgically absent. PANCREAS: There is mild fatty infiltration of the pancreas without evidence of  pancreatic ductal dilatation.     SPLEEN: Normal.    ADRENALS: Nodular thickening of the inferior left adrenal gland is similar to  prior exam.    KIDNEYS/URETERS/BLADDER:     On the right, there is percutaneous nephrostomy catheter, with formed loop  present with in the interpolar collecting system. In addition, there is a  nephroureteral stent, also with proximal formed loop in the interpolar  collecting system, and distal formed loop within the bladder. There is  moderately severe right-sided hydronephrosis, which is increased from prior CT  examination. On the left, a nephroureteral stent is in place, with proximal formed loop noted  in the interpolar collecting system, and distal formed loop present within the  bladder. Mild left-sided hydronephrosis is present, similar to prior CT exam.    A Duarte catheter is present within the bladder, which is decompressed. PELVIC ORGANS: The uterus contains an unremarkable unenhanced appearance. Right  adnexal soft tissue lesion noted, measuring approximately 5.1 x 2.9 cm in size,  with exact measurements limited by lack of intravenous contrast as well as beam  hardening artifact. VASCULATURE: Abdominal aorta is normal in course and caliber. There is an  inferior vena cava filter which spans the renal veins, with several tines which  project external to the lumen of the inferior vena cava, potentially within the  right renal vein (images 46-47). LYMPH NODES: Periaortic adenopathy along the left side of the retroperitoneum is  similar in appearance to prior exams. GASTROINTESTINAL TRACT: There is a small hiatal hernia. Small intestine is  normal in course and caliber. Moderate burden of formed stool throughout the  large intestine, which is similarly normal in caliber. No bowel obstruction. No  free intraperitoneal gas. There is a advanced age sacral decubitus ulcer, which  freely communicates with the posterior wall of the rectum (series 3, image 107)  with associated soft tissue thickening/stranding at the site of decubitus ulcer  formation.     BONES: There is accentuation of the lumbar lordosis, with unchanged grade 2  isthmic spondylolisthesis L5 on S1. Multilevel spondylosis noted, most advanced  L4-S1. Vertebral body heights are maintained intact. Moderately severe bilateral  hip joint osteoarthritis is present. OTHER: Numerous small foci of fat necrosis/fat-containing ventral abdominal  hernias are noted within the anterior abdominal wall, similar to prior. Diffuse  subcutaneous body wall edema is present, along with redemonstration of multiple  abdominal wall collateral vessels, the appearance of which is increased in the  interval from prior CT examination. _______________         Impression IMPRESSION:    1. Bilateral nephroureteral stents and percutaneous nephrostomy catheter in  stable and expected position. There is moderately severe right hydronephrosis,  which is increased in magnitude from prior CT examination, with mild left  hydronephrosis, similar to prior. 2. Bladder decompressed with a Duarte catheter. 3. Enlargement of a right adnexal lesion from recent prior CT examination. 4. New and slightly enlarged pulmonary nodules as above. Follow-up per oncology  protocol. 5. Inferior vena cava filter which spans across the renal veins as described  above. Increased formation of abdominal wall collaterals as well as anasarca  noted. 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 6. Progression of an advanced stage sacral decubitus ulcer which directly  communicates with the posterior wall of the rectum with associated skin  thickening/cellulitis. 7. Unchanged, grade 2 isthmic spondylolisthesis L5-S1. Note: Preliminary report sent to the Emergency Department by the radiology  resident at the time of the study. The additional findings of hydronephrosis and advanced sacral decubitus ulcer  were discussed with Dr. Santy Schmitz, hospitalist caring for the patient at 8:25 AM on  6/20/2017. Miya Segovia MD  June 23, 2017    [facility] AT THE [facility]  [phone]

## 2017-06-24 NOTE — PROGRESS NOTES
1 - Received report from [place], [address] [facility]. Pt is resting quietly and on Norepinephrine which we had to increase to 4mcg/min due to map in upper 50s. No signs or symptoms of distress. Will continue to monitor. 2250 - Pt's MAP under 60 consecutively. Norepinephrine increased to 4.52 mcg/min. 2330 - Pt is resting quietly and stable. No signs of distress or complaints of pain. Will continue to monitor. 6/24/17  0420 - Pt bathed and did not tolerate well. Complained and cried of pain even though she was medicated prior to bath. Minimal help with turning and became verbally aggressive. After finished pt was stable and calm. Duarte ensured of placement because of large amount of clear/blood tinged fluid between pt's legs. Unsure if source is vaginal or rectum. Duarte in place and draining. Will continue to monitor.

## 2017-06-24 NOTE — PROGRESS NOTES
Physical Exam   Musculoskeletal: She exhibits edema. Skin:           Primary Nurse Angelica Moffett RN and ,SHELLY Zhong    performed a dual skin assessment on this patient Impairment noted- see wound doc flow sheet  Ayo score is 10.   Pt on [place] bed,

## 2017-06-24 NOTE — PROGRESS NOTES
Progress Note      Patient: Jaimee Cody               Sex: female          DOA: 6/19/2017       YOB: 1960      Age:  56 y.o.        LOS:  LOS: 4 days             CHIEF COMPLAINT:  Sepsis, hypotension on pressors    Subjective:     Patient is awake  She says she feels okay today    Objective:      Visit Vitals    /61    Pulse 76    Temp 98 °F (36.7 °C)    Resp 14    Ht 5' 4\" (1.626 m)    Wt 112 kg (246 lb 14.6 oz)    SpO2 100%    BMI 42.38 kg/m2       Physical Exam:  Gen:  Patient is in no distress. No complaint  HEENT and Neck:  PERRL, No cervical tenderness or masses  Lungs:  Clear bilaterally. No rales, no wheeze. Normal effort. Heart:  Regular Rate and Rhythm. No murmur or gallop. No JVD. No edema.   Abdomen:  Soft, non tender, no masses, no Hepatosplenomegally  Extremities:  No digital clubbing, no cyanosis  Neuro:  Alert and oriented, Normal affect, Cranial nerves intact, No sensory deficits        Lab/Data Reviewed:  BMP:   Lab Results   Component Value Date/Time     06/23/2017 11:56 PM    K 3.6 06/23/2017 11:56 PM    K 3.6 06/23/2017 11:56 PM     (H) 06/23/2017 11:56 PM    CO2 22 06/23/2017 11:56 PM    AGAP 8 06/23/2017 11:56 PM    GLU 96 06/23/2017 11:56 PM    BUN 21 (H) 06/23/2017 11:56 PM    CREA 1.18 06/23/2017 11:56 PM    GFRAA 58 (L) 06/23/2017 11:56 PM    GFRNA 48 (L) 06/23/2017 11:56 PM     CBC:   Lab Results   Component Value Date/Time    WBC 9.6 06/23/2017 11:56 PM    HGB 7.8 (L) 06/23/2017 11:56 PM    HCT 25.0 (L) 06/23/2017 11:56 PM     06/23/2017 11:56 PM           Assessment/Plan     Principal Problem:    Septic shock ([facility] [address] (6/20/2017)    Active Problems:    DEV (acute kidney injury) ([facility] [address] (12/29/2015)      UTI (urinary tract infection) (3/3/2017)      Ulcer of sacral region, unstageable ([facility] [address] (6/20/2017)        Plan:  Continue with pressor support as needed  Monitoring cultures, ID managing ABX  Follow renal function  Follow mental status  Wound care  GI prophylaxis  DVT prophylaxis

## 2017-06-24 NOTE — PROGRESS NOTES
[facility]. Pulmonary, Critical Care, and Sleep Medicine    Name: Normand Cushing MRN: [id number]   : 1960 Hospital: [facility]   Date: 2017  Admission Date: 2017     Chart and notes reviewed. Data reviewed. I have evaluated all findings. [x]I have reviewed the flowsheet and previous days notes. : Still requiring levophed, bcx + for gram + and gram - organisms  : Levophed weaning down, Hb 6.6 but initially refused transfusion, tearful this morning  : Still requiring levophed, refused labs this AM  : Remains on levophed this am, hgb lower, afebrile, hypoglycemic episodes yesterday, on D5                ROS:A comprehensive review of systems was negative. Events and notes from last 24 hours reviewed. Care plan discussed on multidisciplinary rounds.   Patient Active Problem List   Diagnosis Code    Anemia D64.9    Anxiety F41.9    Nausea and vomiting R11.2    Morbid obesity ([facility]) E66.01    Chronic pain syndrome G89.4    History of ovarian cancer Z85.43    History of gastric bypass Z98.890    CAD (coronary artery disease) I25.10    History of Clostridium difficile Z87.19    History of endometrial cancer Z85.42    History of GI bleed Z87.19    Lymphedema I89.0    Hypokalemia E87.6    DEV (acute kidney injury) ([facility]) N17.9    Pulmonary nodules R91.8    A-fib ([facility]) I48.91    Aortic stenosis I35.0    GI bleed K92.2    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    Acute blood loss anemia D62    Bladder mass N32.89    Flank pain, chronic R10.9, G89.29    UTI (urinary tract infection) N39.0    Severe sepsis with septic shock (CODE) R65.21    Severe thrombocytopenia (HCC) D69.6    Septic shock (HCC) A41.9, R65.21    Ulcer of sacral region, unstageable (HCC) L98.429       Vital Signs:  Visit Vitals    /56    Pulse 73    Temp 97.8 °F (36.6 °C)    Resp 14    Ht 5' 4\" (1.626 m)    Wt 112 kg (246 lb 14.6 oz)  Comment: pt not on a weigh bed  SpO2 100%    BMI 42.38 kg/m2       O2 Device: Room air       Temp (24hrs), Av °F (36.7 °C), Min:97.6 °F (36.4 °C), Max:98.7 °F (37.1 °C)       Intake/Output:   Last shift:         Last 3 shifts:  1901 -  0700  In: 1718.8 [P.O.:60; I.V.:1658.8]  Out: 6091 [Urine:3650]    Intake/Output Summary (Last 24 hours) at 17 0819  Last data filed at 17 0644   Gross per 24 hour   Intake           1527.4 ml   Output             2310 ml   Net           -782.6 ml                      Current Facility-Administered Medications   Medication Dose Route Frequency    potassium chloride 10 mEq in 100 ml IVPB  10 mEq IntraVENous Q1H    calcium gluconate 2 g in 0.9% sodium chloride 50 mL IVPB  2 g IntraVENous ONCE    famotidine (PEPCID) tablet 20 mg  20 mg Oral BID    dextrose 5% infusion  50 mL/hr IntraVENous CONTINUOUS    0.9% sodium chloride (MBP/ADV) 0.9 % infusion        sodium hypochlorite (QUARTER STRENGTH DAKIN'S) 0.125% irrigation (bottle)   Topical DAILY    collagenase (SANTYL) 250 unit/gram ointment   Topical DAILY    multivitamin, tx-iron-ca-min (THERA-M w/ IRON) tablet 1 Tab  1 Tab Oral DAILY    insulin lispro (HUMALOG) injection   SubCUTAneous AC&HS    enoxaparin (LOVENOX) injection 40 mg  40 mg SubCUTAneous Q12H    NOREPINephrine (LEVOPHED) 8,000 mcg in dextrose 5% 250 mL infusion  2-16 mcg/min IntraVENous TITRATE    cefepime (MAXIPIME) 2 g in 0.9% sodium chloride (MBP/ADV) 100 mL MBP  2 g IntraVENous Q12H    nystatin (MYCOSTATIN) 100,000 unit/gram powder   Topical BID    polyethylene glycol (MIRALAX) packet 17 g  17 g Oral DAILY    potassium chloride (K-DUR, KLOR-CON) SR tablet 20 mEq  20 mEq Oral BID    sodium bicarbonate tablet 650 mg  650 mg Oral TID    triamcinolone acetonide (KENALOG) 0.025 % cream 1 g  1 g Topical TID    trospium (SANCTURA) tablet 20 mg  20 mg Oral BID    DAPTOmycin (CUBICIN) 672 mg in 0.9% sodium chloride 50 mL IVPB  6 mg/kg IntraVENous Q24H Physical Exam:   General/Neuro:  Alert, cooperative, no distress, appears chronically ill, moves all extremities, +pallor   Head:  Normocephalic, without obvious abnormality, atraumatic. Eyes:  Conjunctivae/corneas clear. PERRL, EOMs intact. Nose: Nares normal.    Throat: Lips, mucosa, and tongue dry   Neck: Supple, no JVD       Lungs:   Clear to auscultation bilaterally. No wheezes or rales       Heart:  Regular rate and rhythm, no murmur   Abdomen:   Soft, non-tender. Bowel sounds normal. No masses,  No organomegaly. Extremities: ++ edema all 4 extremities. Pulses: 2+ and symmetric   Skin: Skin color, texture, turgor normal. No rashes or lesions               DATA:  MAR reviewed and pertinent medications noted or modified as needed    Labs:  Recent Labs      06/23/17   2356  06/23/17   1024  06/22/17   0355   WBC  9.6  12.2  10.0   HGB  7.8*  10.1*  6.6*   HCT  25.0*  32.9*  22.0*   PLT  170  199  210     Recent Labs      06/23/17   2356  06/23/17   1024  06/22/17   0355   NA  141  140  140   K  3.6  3.6  3.9  4.2   CL  111*  111*  110*   CO2  22  19*  19*   GLU  96  75  115*   BUN  21*  22*  24*   CREA  1.18  1.16  1.30   CA  7.5*  7.4*  7.0*   MG  1.8  1.9  1.8   PHOS  2.4*  2.9  2.9   ALB  1.4*   --    --      No results for input(s): PH, PCO2, PO2, HCO3, FIO2 in the last 72 hours. No results for input(s): FIO2I, IFO2, HCO3I, IHCO3, HCOPOC, PCO2I, PCOPOC, IPHI, PHI, PHPOC, PO2I, PO2POC in the last 72 hours. No lab exists for component: IPOC2  Imaging:  [x]                           I have personally reviewed the patients radiographs and reports    High complexity decision making was performed during this consultation and evaluation. [x]       Pt is at high risk for further organ failure and dysfunction. Critical care time spent  minutes with patient exclusive of procedures.     IMPRESSION:    Anemia D64.9    Anxiety F41.9    Nausea and vomiting R11.2    Morbid obesity (HCC) E66.01  Chronic pain syndrome G89.4    History of ovarian cancer Z85.43    History of gastric bypass Z98.890    CAD (coronary artery disease) I25.10    History of Clostridium difficile Z87.19    History of endometrial cancer Z85.42    History of GI bleed Z87.19    Lymphedema I89.0    Hypokalemia E87.6    DEV (acute kidney injury) (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    Acute blood loss anemia D62    Bladder mass N32.89    Flank pain, chronic R10.9, G89.29    UTI (urinary tract infection) N39.0    Severe sepsis with septic shock (CODE) R65.21    Severe thrombocytopenia (HCC) D69.6    Septic shock (HCC) A41.9, R65.21    Ulcer of sacral region, unstageable (HCC) L98.429     ·       PLAN:   Neuro  Awake, alert no acute issues  Seen by psych, no new recommendations     Cardiovascular  Continue levophed, wean down if able  S/p albumin  2D echo with EF 55 - 57%, grade 1 diastolic, PA 45 - 50  Afib rate controlled     Pulmonary  RML pulmonary nodule, suspect mets  Oxygen as needed to keep sats over 90%m, currently on RA     GI  Continue pepcid   CT abdomen with enlarged abdominal mass, sacral decub communicating with wall of rectum  Poor surgical candidate  Encourage PO, D5 added due to hypoglycemia     Renal  Nephrostomy tubes in place, urology following  Creatinine slightly elevated but stable  Renal function improved from May 2017    ID  Septic shock, persists  Continue broad spectrum abx, now dapto  and cefepime  Bcx 6/19 with acinetobacter, efaecalis, MRSA-repeat blood cultures NEG  ucx 6/19 Ecoli and MRSA  Wound cx +MRSA, Efaecalis, CRE klebsiella, acinetobacter, stenotrophomonas, bacillus sp  Appreciate ID's assistance with regimen   Per ID removal of port would be ideal however it is currently our only central access and must remain in place until pt no longer requiring vasopressors     Heme  Chronic anemia  Hb 7.8, transfuse prn  Ovarian/endometrial cancer  HO RLE DVT likely 2/2 venous outflow obstruction due to pelvic mass  Hb dropped after restart of lovenox, will need to discuss risk v benefit of full anticoagulation as pt is both a bleed risk and a recurrent VTE risk, cont lovenox 40 bid             Vickie RocheNailama

## 2017-06-24 NOTE — PROGRESS NOTES
Assumed care of patient from night nurse. Received patient in bed, resting quietly. Easily awakened, patient is lethargic. Oriented to self, place and situation, reoriented to time. On RA. HOB elevated, side rails up x 3. Pt instructed to call for any needs, call bell within reach; pt verbalized understanding. Bed in low position with wheels locked. See shift assessment for further information concerning patient. 1800- Wound care done on sacrum. Medicated with Morphine 2mg IV prior to wound care. Patient tolerated fair. D50 given for low blood sugar. 1952- Bedside and Verbal shift change report given to Davie Madison RN (oncoming nurse) by Sundeep Perez RN   (offgoing nurse). Report included the following information SBAR, Kardex, Procedure Summary, Intake/Output, MAR, Recent Results and Cardiac Rhythm NSR.

## 2017-06-25 NOTE — PROGRESS NOTES
0000 Nursing assessment, awake, moaning, not following commands. Restless. 0230 Sleeping on and off, no distress noted. 0 Bath given and repositioned, Morphine for pain. Pinkish drainage from wale area noted. 0630 Sleeping, no distress noted.

## 2017-06-25 NOTE — PROGRESS NOTES
1945  Bedside SBAR report taken from Zita Aragon RN. Patient resting at this time. 2010  Patient's eyes are open, will look at me when I call her name, but she will not answer questions, she moans. Will not follow directions. Blood tinged secretions seen between legs. Will not let me open legs to see where secretions are coming from. 2100  Refusing medications, refusing water, but did let me perform mouth care.

## 2017-06-25 NOTE — PROGRESS NOTES
[facility]. Pulmonary, Critical Care, and Sleep Medicine    Name: Angus Clifton MRN: [id number]   : 1960 Hospital: [address] [place] [address]   Date: 2017  Admission Date: 2017     Chart and notes reviewed. Data reviewed. I have evaluated all findings. [x]I have reviewed the flowsheet and previous days notes. 55 yo female with metastatic endometrial cancer, hx GIB, hydronephrosis, anxiety/depresion, lymphedema, recurrent UTI, possible endocarditis, seizure, chronic pain, chronic byrne, admitted with urosepsis requiring levophed. Pt repeatedly revokes hospice and refuses care. Off levophed. : Still requiring levophed, bcx + for gram + and gram - organisms  : Levophed weaning down, Hb 6.6 but initially refused transfusion, tearful this morning  : Still requiring levophed, refused labs this AM  : Remains on levophed this am, hgb lower, afebrile, hypoglycemic episodes yesterday, on D5  : Refusing care-medications, water, exams. Off levophed since yesterday. Afebrile. No c/o this am. Glucose low again last evening. hgb 7.2                ROS:A comprehensive review of systems was negative. Events and notes from last 24 hours reviewed. Care plan discussed on multidisciplinary rounds.   Patient Active Problem List   Diagnosis Code    Anemia D64.9    Anxiety F41.9    Nausea and vomiting R11.2    Morbid obesity ([facility] [address] E66.01    Chronic pain syndrome G89.4    History of ovarian cancer Z85.43    History of gastric bypass Z98.890    CAD (coronary artery disease) I25.10    History of Clostridium difficile Z87.19    History of endometrial cancer Z85.42    History of GI bleed Z87.19    Lymphedema I89.0    Hypokalemia E87.6    DEV (acute kidney injury) ([address] N17.9    Pulmonary nodules R91.8    A-fib ([facility] [address] I48.91    Aortic stenosis I35.0    GI bleed K92.2    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    Acute blood loss anemia D62    Bladder mass N32.89    Flank pain, chronic R10.9, G89.29    UTI (urinary tract infection) N39.0    Severe sepsis with septic shock (CODE) R65.21    Severe thrombocytopenia (HCC) D69.6    Septic shock (HCC) A41.9, R65.21    Ulcer of sacral region, unstageable (HCC) L98.429       Vital Signs:  Visit Vitals    BP 98/76    Pulse 90    Temp 97.7 °F (36.5 °C)    Resp 13    Ht 5' 4\" (1.626 m)    Wt 112 kg (246 lb 14.6 oz)  Comment: pt not on a weigh bed    SpO2 100%    BMI 42.38 kg/m2       O2 Device: Room air       Temp (24hrs), Av.9 °F (36.6 °C), Min:96.9 °F (36.1 °C), Max:98.3 °F (36.8 °C)       Intake/Output:   Last shift:         Last 3 shifts:  1901 -  0700  In: 2688.4 [I.V.:2688.4]  Out: 1381 [Urine:3750]    Intake/Output Summary (Last 24 hours) at 17 0930  Last data filed at 17 0600   Gross per 24 hour   Intake          1567.89 ml   Output             1675 ml   Net          -107.11 ml                      Current Facility-Administered Medications   Medication Dose Route Frequency    sodium phosphate 6 mmol in 0.9% sodium chloride 250 mL infusion  6 mmol IntraVENous ONCE    famotidine (PEPCID) tablet 20 mg  20 mg Oral BID    dextrose 5% infusion  50 mL/hr IntraVENous CONTINUOUS    sodium hypochlorite (QUARTER STRENGTH DAKIN'S) 0.125% irrigation (bottle)   Topical DAILY    collagenase (SANTYL) 250 unit/gram ointment   Topical DAILY    multivitamin, tx-iron-ca-min (THERA-M w/ IRON) tablet 1 Tab  1 Tab Oral DAILY    insulin lispro (HUMALOG) injection   SubCUTAneous AC&HS    enoxaparin (LOVENOX) injection 40 mg  40 mg SubCUTAneous Q12H    NOREPINephrine (LEVOPHED) 8,000 mcg in dextrose 5% 250 mL infusion  2-16 mcg/min IntraVENous TITRATE    cefepime (MAXIPIME) 2 g in 0.9% sodium chloride (MBP/ADV) 100 mL MBP  2 g IntraVENous Q12H    nystatin (MYCOSTATIN) 100,000 unit/gram powder   Topical BID    polyethylene glycol (MIRALAX) packet 17 g  17 g Oral DAILY    potassium chloride (K-DUR, KLOR-CON) SR tablet 20 mEq  20 mEq Oral BID    sodium bicarbonate tablet 650 mg  650 mg Oral TID    triamcinolone acetonide (KENALOG) 0.025 % cream 1 g  1 g Topical TID    trospium (SANCTURA) tablet 20 mg  20 mg Oral BID    DAPTOmycin (CUBICIN) 672 mg in 0.9% sodium chloride 50 mL IVPB  6 mg/kg IntraVENous Q24H       Physical Exam:   General/Neuro:  Alert, cooperative, no distress, appears chronically ill, moves all extremities, +pallor   Head:  Normocephalic, without obvious abnormality, atraumatic. Eyes:  Conjunctivae/corneas clear. PERRL, EOMs intact. Nose: Nares normal.    Throat: Lips, mucosa, and tongue dry   Neck: Supple, no JVD       Lungs:   Clear to auscultation bilaterally. No wheezes or rales       Heart:  Regular rate and rhythm, no murmur   Abdomen:   Soft, non-tender. Bowel sounds normal. No masses,  No organomegaly. Extremities: ++ edema all 4 extremities. Pulses: 2+ and symmetric   Skin: Skin color, texture, turgor normal.Scattered bruising               DATA:  MAR reviewed and pertinent medications noted or modified as needed    Labs:  Recent Labs      06/25/17   0330  06/23/17   2356  06/23/17   1024   WBC  9.0  9.6  12.2   HGB  7.2*  7.8*  10.1*   HCT  23.4*  25.0*  32.9*   PLT  143  170  199     Recent Labs      06/25/17   0330  06/24/17   1845  06/23/17   2356  06/23/17   1024   NA  141   --   141  140   K  4.0  3.9  3.6  3.6  3.9   CL  111*   --   111*  111*   CO2  20*   --   22  19*   GLU  105*   --   96  75   BUN  19*   --   21*  22*   CREA  1.02   --   1.18  1.16   CA  7.4*   --   7.5*  7.4*   MG  1.9  1.8  1.8  1.9   PHOS  1.6*   --   2.4*  2.9   ALB   --    --   1.4*   --      No results for input(s): PH, PCO2, PO2, HCO3, FIO2 in the last 72 hours. No results for input(s): FIO2I, IFO2, HCO3I, IHCO3, HCOPOC, PCO2I, PCOPOC, IPHI, PHI, PHPOC, PO2I, PO2POC in the last 72 hours.     No lab exists for component: IPOC2  Imaging:  [x]                           I have personally reviewed the patients radiographs and reports    High complexity decision making was performed during this consultation and evaluation. [x]       Pt is at high risk for further organ failure and dysfunction. Critical care time spent  minutes with patient exclusive of procedures.     IMPRESSION:    Anemia D64.9    Anxiety F41.9    Nausea and vomiting R11.2    Morbid obesity (HCC) E66.01    Chronic pain syndrome G89.4    History of ovarian cancer Z85.43    History of gastric bypass Z98.890    CAD (coronary artery disease) I25.10    History of Clostridium difficile Z87.19    History of endometrial cancer Z85.42    History of GI bleed Z87.19    Lymphedema I89.0    Hypokalemia E87.6    DEV (acute kidney injury) ([facility] [address] N17.9    Pulmonary nodules R91.8    A-fib ([facility]) I48.91    Aortic stenosis I35.0    GI bleed K92.2    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    Acute blood loss anemia D62    Bladder mass N32.89    Flank pain, chronic R10.9, G89.29    UTI (urinary tract infection) N39.0    Severe sepsis with septic shock (CODE) R65.21    Severe thrombocytopenia (HCC) D69.6    Septic shock (HCC) A41.9, R65.21    Ulcer of sacral region, unstageable ([facility]) L98.429     ·       PLAN:   Neuro  Awake, alert no acute issues  Seen by psych, no new recommendations     Cardiovascular  Currently off levophed  S/p albumin  2D echo with EF 55 - 89%, grade 1 diastolic, PA 45 - 50  Afib rate controlled     Pulmonary  RML pulmonary nodule, suspect mets  Oxygen as needed to keep sats over 90%m, currently on RA     GI  Continue pepcid   CT abdomen with enlarged abdominal mass, sacral decub communicating with wall of rectum  Poor surgical candidate  Encourage PO, D5 added due to hypoglycemia     Renal  Nephrostomy tubes in place, urology following  Creatinine slightly elevated but stable  Renal function improved from May 2017    ID  Septic shock, persists  Continue broad spectrum abx, now dapto  and cefepime  Bcx 6/19 with acinetobacter, efaecalis, MRSA-repeat blood cultures NEG  ucx 6/19 Ecoli and MRSA  Wound cx +MRSA, Efaecalis, CRE klebsiella, acinetobacter, stenotrophomonas, bacillus sp  Appreciate ID's assistance with regimen   Per ID removal of port would be ideal however it is currently our only central access and must remain in place until pt no longer requiring vasopressors. Could consider PICC     Heme  Chronic anemia, hx GIB  Hb 7.2, transfuse prn  Ovarian/endometrial cancer  HO RLE DVT likely 2/2 venous outflow obstruction due to pelvic mass  Hb dropped after restart of lovenox, will need to discuss risk v benefit of full anticoagulation as pt is both a bleed risk and a recurrent VTE risk, cont lovenox 40 bid for now     FULL CODE   Palliative care following  Consider ethics consult as pt refuses care, continues to be FULL CODE. Poor prognosis.         RIK Stinson

## 2017-06-25 NOTE — ROUTINE PROCESS
Bedside and Verbal shift change report given to Kelsea Hernandez (oncoming nurse) by Cassy Sinclair RN   (offgoing nurse). Report included the following information SBAR, Kardex, MAR and Recent Results.

## 2017-06-25 NOTE — PROGRESS NOTES
Progress Note      Patient: Carly Cummins               Sex: female          DOA: 6/19/2017       YOB: 1960      Age:  56 y.o.        LOS:  LOS: 5 days             CHIEF COMPLAINT:  Septic shock, improving with malignancy history    Subjective:     Patient appears anxious  She is uncomfortable. Objective:      Visit Vitals    BP (!) 118/92    Pulse (!) 101    Temp 98 °F (36.7 °C)    Resp 12    Ht 5' 4\" (1.626 m)    Wt 112 kg (246 lb 14.6 oz)    SpO2 96%    BMI 42.38 kg/m2       Physical Exam:  Gen:  Anxious, no specific complaint  Lungs:  Clear bilaterally. No rales, no wheeze. Normal effort. Heart:  Regular Rate and Rhythm. No murmur or gallop. No JVD. No edema. Abdomen:  Soft, non tender, no masses, no Hepatosplenomegally  Extremities:  No digital clubbing, no cyanosis  Neuro:  Normal tone, interacts but hard to assess mental status        Lab/Data Reviewed:  BMP:   Lab Results   Component Value Date/Time     06/25/2017 03:30 AM    K 4.0 06/25/2017 03:30 AM     (H) 06/25/2017 03:30 AM    CO2 20 (L) 06/25/2017 03:30 AM    AGAP 10 06/25/2017 03:30 AM     (H) 06/25/2017 03:30 AM    BUN 19 (H) 06/25/2017 03:30 AM    CREA 1.02 06/25/2017 03:30 AM    GFRAA >60 06/25/2017 03:30 AM    GFRNA 56 (L) 06/25/2017 03:30 AM     CBC:   Lab Results   Component Value Date/Time    WBC 9.0 06/25/2017 03:30 AM    HGB 7.2 (L) 06/25/2017 03:30 AM    HCT 23.4 (L) 06/25/2017 03:30 AM     06/25/2017 03:30 AM           Assessment/Plan     Principal Problem:    Septic shock, improving    Active Problems:    DEV (acute kidney injury) ([address] (12/29/2015)      UTI (urinary tract infection) (3/3/2017)      Ulcer of sacral region, unstageable ([address] (6/20/2017)        Plan:  Continue with BP monitoring, moving out of ICU if she can stay off pressors  ID has been working with patient regarding antibiotics.   Patient has been highly resistant to removal of port, even if it is potentially infected.   Monitor renal function  Monitor mental status  GI prophylaxis  DVT prophylaxis

## 2017-06-25 NOTE — PROGRESS NOTES
Assumed care of patient from night nurse. Received patient in bed, resting quietly. Easily awakened, patient is alert to self but refuses to speak other than yelling \"what\" when her name is called. HOB elevated, side rails up x 3. Pt instructed to call for any needs, call bell within reach. Bed in low position with wheels locked. See shift assessment for further information concerning patient. 1000- Patient refused to eat breakfast. Did take morning meds but needed extra encouragement. 1200- Refused to eat lunch. 1600- Wound care done as ordered. Morphine given for pain. 1730- Patient refuses to eat dinner. 1845- Patient put pills in her mouth but would not swallow any water to swallow pills. Patient eventually spit pill out and continues to refuse dinner after several attempts in assisting with feeding. 1930- Bedside and Verbal shift change report given to SHELLY Winslow (oncoming nurse) by Linda Jones RN   (offgoing nurse). Report included the following information SBAR, Kardex, Procedure Summary, Intake/Output, MAR, Recent Results and Cardiac Rhythm NSR.

## 2017-06-26 NOTE — PROGRESS NOTES
Patient is unable to communicate at this time as she is resting peacefully at present.  offered prayer. No family seen at the time of this visit around 0900 toady. Chaplains will continue to follow and will provide pastoral care on an as needed/requested basis.     Rosalva Ballesteros   Spiritual Care   [phone]

## 2017-06-26 NOTE — PROGRESS NOTES
Chart reviewed. Pt has [employer] ins., has NO SNF benefit, thanks. Plan depends on progress. Will cont to follow. Arleth Johnson RN,ext. [phone].

## 2017-06-26 NOTE — PROGRESS NOTES
INFECTIOUS DISEASE FOLLOW UP NOTE :     Admit Date: 6/19/2017    ABX:       Dapto 6/20-6   Levoflox 6/19-1  Cefepime 6/19-4,        ASSESSMENT-> RECOMMENDATIONS      SIRS/ ?sepsis POA  - hypotension requiring levophed, subjective fever but no leukocytsois  - due sec to infected Rt stent , BSI  - agreed to eventual mediport removal if necessary after d/w Dr. Chelsey Ascencio 6/22 -> continue Daptomycin  -> dc Cefepime (not CRE Klebsiella in blood)  -> Ceftriaxone 2 gm IV q 24 h   -> prognosis remains poor   Infected obstructed stents- suspect Rt ? left  - severe hydro on CT  - placement of b/l PCNs 5/11 at [facility] and left fell off few weeks ago  - urology consult appreciated  - tube was clamped on admission and now opened   - urine cx with GNR -> abx change as above   Polymicrobial BSI   - 1 of 3 blood cx 6/19 +ve for MRSA, E faecalis, CONS and E coli  - TTE 6/22: no veg on AV, MV, TV  - rpt 6/20 NG x 2  -> pt agrees to mediport removal -> continue Abx as above  -> re-culture for temp >101   H/o multiple UTIs   - ucx >213E VRE was complicated with BSI and valve veg and required prolong hospitalization  - ucx 5/2017 with E.coli -unclear if got Rx or not  - s/p bilateral stent exchange 3/31, byrne changed 5/10 in urology office  - urcx 6/19 >100K E coli, >100K MRSA -> CT with b/l hydro Rt worse than left and concern for obstructed stent on Rt  -> Urology considering chronic R PCN vs trial of R resonance metal ureteral stent when better   Sacral ulcer stage 4  - CT 6/20 shows erosion into rectal wall   - wd cx 6/20 MRSA, E faecalis, CONS, CRE Klebsiella  - prognosis is extremely poor given CT findings and doubt a candidate for any Sx intervention   -> continue contact isolation  -> ?colo-rectal eval though      Rt pelvic mass ? ovarian ca / endometrial ca with mets  - CT 6/20: worsening of adnexal mass with increasing pulmonary nodules  - palliative care was involved in past and pt was hospice but it was revoked last admission -> prognosis remains extremely poor  -> appreciate palliative care input     H/o VRE BSI with ?aortic endocarditis in 3/2017   -  TTE aortic stenosis with possible aortic vegetation [chronic mixed calcified density]  -pt refused ESSIE and removal of mediport  - had many +ve subsequent blood cx ( In [place] reported as Veillonella species and Staph species in May 2017 -> treated with 6 weeks of Daptomycin    -> Still with same mediport    DEV- Cr 2.04  - suspect sec to obstruction, infection ->Monitor as stable with 1.3 today   Hypoalbuminemia     Lymphedema legs - ch  - suspect thrombus seen on CT also contributing      H/o LE DVT with IVC filter  - CT again showing progression     Seizure activity 3/26- was new  - no further seizures on Keppra     Co-morb- Anxiety depression, AS, ch pain, hemetemesis s/p EGD x6, MO, ch anemia      Ax- Clinda, Keflex, Linezolid, Zosyn, Sulfa, Vanco- most of the Abx cause rash and hives though had tolerated on and off in past with bendryl - Monitor  As tolerating cefepime for now         MICROBIOLOGY:   (3/3  Blcx x 2 - 2/2 VRE S gent, linezolid , daptomycin quinupristin, Ucx > 100K VRE  3/14 ucx ng  3/17 uctx ng  [place]-  5/1 blcx x3 with Veillonella species  5/10 blcx Staph epi, Staph cohnii  5/21 ucx >100k E.coli R Quinolones)     6/19 ucx >100k GNR, blcx 1 of 2 MRSA, E faecalis, GNR  6/20 blcx x2 IP, wd cx CONS, MRSA, E faecalis, CRE Klebsiella, S malto, , 3rd GNR, Diptheroids      LINES/DRAINS:   Rt chest mediport   [place] changed 5/10    MEDICATIONS:     Current Facility-Administered Medications   Medication Dose Route Frequency    famotidine (PEPCID) tablet 20 mg  20 mg Oral BID    dextrose 5% infusion  50 mL/hr IntraVENous CONTINUOUS    0.9% sodium chloride infusion 250 mL  250 mL IntraVENous PRN    sodium hypochlorite (QUARTER STRENGTH DAKIN'S) 0.125% irrigation (bottle)   Topical DAILY    collagenase (SANTYL) 250 unit/gram ointment   Topical DAILY    multivitamin, tx-iron-ca-min (THERA-M w/ IRON) tablet 1 Tab  1 Tab Oral DAILY    insulin lispro (HUMALOG) injection   SubCUTAneous AC&HS    glucose chewable tablet 16 g  4 Tab Oral PRN    glucagon (GLUCAGEN) injection 1 mg  1 mg IntraMUSCular PRN    dextrose (D50) infusion 12.5-25 g  25-50 mL IntraVENous PRN    enoxaparin (LOVENOX) injection 40 mg  40 mg SubCUTAneous Q12H    morphine injection 2 mg  2 mg IntraVENous Q4H PRN    naloxone (NARCAN) injection 0.4 mg  0.4 mg IntraVENous PRN    cefepime (MAXIPIME) 2 g in 0.9% sodium chloride (MBP/ADV) 100 mL MBP  2 g IntraVENous Q12H    LORazepam (ATIVAN) tablet 1 mg  1 mg Oral Q6H PRN    nystatin (MYCOSTATIN) 100,000 unit/gram powder   Topical BID    polyethylene glycol (MIRALAX) packet 17 g  17 g Oral DAILY    potassium chloride (K-DUR, KLOR-CON) SR tablet 20 mEq  20 mEq Oral BID    sodium bicarbonate tablet 650 mg  650 mg Oral TID    triamcinolone acetonide (KENALOG) 0.025 % cream 1 g  1 g Topical TID    trospium (SANCTURA) tablet 20 mg  20 mg Oral BID    DAPTOmycin (CUBICIN) 672 mg in 0.9% sodium chloride 50 mL IVPB  6 mg/kg IntraVENous Q24H    ELECTROLYTE REPLACEMENT PROTOCOL  1 Each Other PRN    ELECTROLYTE REPLACEMENT PROTOCOL  1 Each Other PRN    ELECTROLYTE REPLACEMENT PROTOCOL  1 Each Other PRN    ELECTROLYTE REPLACEMENT PROTOCOL  1 Each Other PRN    ELECTROLYTE REPLACEMENT PROTOCOL  1 Each Other PRN    ELECTROLYTE REPLACEMENT PROTOCOL  1 Each Other PRN    ELECTROLYTE REPLACEMENT PROTOCOL  1 Each Other PRN    sodium chloride (NS) flush 5-10 mL  5-10 mL IntraVENous PRN       SUBJECTIVE :     Interval notes reviewed. In deep sleep in ICU. Did not awaken when examined. Afebrile.   WBC normal.  Last blcx NG     OBJECTIVE     Visit Vitals    /82    Pulse 85    Temp 97.5 °F (36.4 °C)    Resp 14    Ht 5' 4\" (1.626 m)    Wt 112 kg (246 lb 14.6 oz)  Comment: pt not on a weigh bed    SpO2 100%    BMI 42.38 kg/m2       Temp (24hrs), Av.8 °F (36.6 °C), Min:97.5 °F (36.4 °C), Max:98 °F (36.7 °C)      Gen-No distress, ch sick appearing  HENT- No thrush, dry mm, pale  Chest- Symmetric expansion, CTA  CV-S1S2 RR, No JVD, 2+ edema  Abd-Soft, obese, non-tender,  large pannus with ulceration, BS+, rt PCN draining clear yellow urine, byrne draining clear yellow urine  Musculoskeletal: 1+ edema. No clubbing or cyanosis  Skin- Multiple skin ulcers on thigh, legs, sacrum- dressings intact.      Muscsk- 1+ edema        Labs: Results:   Chemistry Recent Labs      06/26/17   0230  06/25/17   0330  06/24/17   1845  06/23/17   2356   GLU  80  105*   --   96   NA  141  141   --   141   K  4.2  4.0  3.9  3.6  3.6   CL  113*  111*   --   111*   CO2  20*  20*   --   22   BUN  20*  19*   --   21*   CREA  0.96  1.02   --   1.18   CA  7.5*  7.4*   --   7.5*   AGAP  8  10   --   8   BUCR  21*  19   --   18   ALB   --    --    --   1.4*      CBC w/Diff Recent Labs      06/26/17   0230  06/25/17   0330  06/23/17   2356   WBC  9.3  9.0  9.6   RBC  2.99*  2.66*  2.84*   HGB  8.0*  7.2*  7.8*   HCT  26.2*  23.4*  25.0*   PLT  146  143  170   GRANS  83*  79*  82*   LYMPH  9*  14*  12*   EOS  1  1  1          RADIOLOGY :          Imaging    Results from Hospital Encounter encounter on 06/19/17   XR CHEST PORT   Narrative CHEST AP PORTABLE    Indication: Chest pain and shortness of breath. Comparison: 03/26/2017. Findings: Right port with catheter tip near cavoatrial junction. The lungs  appear clear. No evidence for pneumothorax or pleural effusion. Cardiac  silhouette and pulmonary vascularity appear within normal limits. Impression IMPRESSION:    No acute cardiopulmonary disease.           Results from [place] encounter on 06/19/17   CT ABD PELV WO CONT   Narrative EXAM: CT of the abdomen and pelvis    INDICATION: 56-year-old patient with advanced stage endometrial cancer,  bilateral nephroureteral stents, and concern for possible infection adjacent to  the right percutaneous nephrostomy tube site. Nephrostomy catheter placement  occurred at an outside institution on 5/11/2017. COMPARISON: Several prior exams, most recent CT of the abdomen and pelvis  available for comparison: March 3, 2017. TECHNIQUE: Axial CT imaging of the abdomen and pelvis was performed without  intravenous contrast. Multiplanar reformats were generated. One or more dose reduction techniques were used on this CT: automated exposure  control, adjustment of the mAs and/or kVp according to patient's size, and  iterative reconstruction techniques. The specific techniques utilized on this CT  exam have been documented in the patient's electronic medical record.  _______________    FINDINGS:    LOWER CHEST: Minor dependent atelectasis is noted at each lung base. No focal  pneumonic consolidation, pneumothorax or pleural effusion. Normal cardiac size. No pericardial effusion. There are several pulmonary nodules noted. The largest  is present in the right lower lobe (series 4, image 10) which measures  approximately 8 x 8 mm in size, and is similar to prior CT of 3/4/2017. Additional right lower lobe pulmonary nodule noted (series 4, image 3), as well  as in the left lower lobe (series 4, image 12). The right lower lobe nodule  measures approximately 3 mm and is new in appearance from prior, with slight  enlargement of the subpleural left lower lobe pulmonary nodule, which measures  approximately 5 mm. LIVER, BILIARY: The unenhanced appearance of the liver demonstrates no focal  abnormality. No biliary dilation. The gallbladder is surgically absent. PANCREAS: There is mild fatty infiltration of the pancreas without evidence of  pancreatic ductal dilatation.     SPLEEN: Normal.    ADRENALS: Nodular thickening of the inferior left adrenal gland is similar to  prior exam.    KIDNEYS/URETERS/BLADDER:     On the right, there is percutaneous nephrostomy catheter, with formed loop  present with in the interpolar collecting system. In addition, there is a  nephroureteral stent, also with proximal formed loop in the interpolar  collecting system, and distal formed loop within the bladder. There is  moderately severe right-sided hydronephrosis, which is increased from prior CT  examination. On the left, a nephroureteral stent is in place, with proximal formed loop noted  in the interpolar collecting system, and distal formed loop present within the  bladder. Mild left-sided hydronephrosis is present, similar to prior CT exam.    A Duarte catheter is present within the bladder, which is decompressed. PELVIC ORGANS: The uterus contains an unremarkable unenhanced appearance. Right  adnexal soft tissue lesion noted, measuring approximately 5.1 x 2.9 cm in size,  with exact measurements limited by lack of intravenous contrast as well as beam  hardening artifact. VASCULATURE: Abdominal aorta is normal in course and caliber. There is an  inferior vena cava filter which spans the renal veins, with several tines which  project external to the lumen of the inferior vena cava, potentially within the  right renal vein (images 46-47). LYMPH NODES: Periaortic adenopathy along the left side of the retroperitoneum is  similar in appearance to prior exams. GASTROINTESTINAL TRACT: There is a small hiatal hernia. Small intestine is  normal in course and caliber. Moderate burden of formed stool throughout the  large intestine, which is similarly normal in caliber. No bowel obstruction. No  free intraperitoneal gas. There is a advanced age sacral decubitus ulcer, which  freely communicates with the posterior wall of the rectum (series 3, image 107)  with associated soft tissue thickening/stranding at the site of decubitus ulcer  formation.     BONES: There is accentuation of the lumbar lordosis, with unchanged grade 2  isthmic spondylolisthesis L5 on S1. Multilevel spondylosis noted, most advanced  L4-S1. Vertebral body heights are maintained intact. Moderately severe bilateral  hip joint osteoarthritis is present. OTHER: Numerous small foci of fat necrosis/fat-containing ventral abdominal  hernias are noted within the anterior abdominal wall, similar to prior. Diffuse  subcutaneous body wall edema is present, along with redemonstration of multiple  abdominal wall collateral vessels, the appearance of which is increased in the  interval from prior CT examination. _______________         Impression IMPRESSION:    1. Bilateral nephroureteral stents and percutaneous nephrostomy catheter in  stable and expected position. There is moderately severe right hydronephrosis,  which is increased in magnitude from prior CT examination, with mild left  hydronephrosis, similar to prior. 2. Bladder decompressed with a Duarte catheter. 3. Enlargement of a right adnexal lesion from recent prior CT examination. 4. New and slightly enlarged pulmonary nodules as above. Follow-up per oncology  protocol. 5. Inferior vena cava filter which spans across the renal veins as described  above. Increased formation of abdominal wall collaterals as well as anasarca  noted. 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 6. Progression of an advanced stage sacral decubitus ulcer which directly  communicates with the posterior wall of the rectum with associated skin  thickening/cellulitis. 7. Unchanged, grade 2 isthmic spondylolisthesis L5-S1. Note: Preliminary report sent to the Emergency Department by the radiology  resident at the time of the study. The additional findings of hydronephrosis and advanced sacral decubitus ulcer  were discussed with Dr. Fuad Armando, hospitalist caring for the patient at 8:25 AM on  6/20/2017.             Angela Cason MD  June 26, 2017    [facility] AT THE [facility]  [phone]

## 2017-06-26 NOTE — PROGRESS NOTES
1935: Bedside verbal shift report received from Arvin munoz, [address] [place] [address]. Pt is resting in bed, no signs of distress, lethargic but arousable, instructed to press call light if assistance is needed, call light within reach. 4652: Bedside and Verbal shift change report given to Arvin munoz RN (oncoming nurse) and Jayleen Castillo by Frank Skinner RN (offgoing nurse). Report included the following information SBAR, Kardex, Intake/Output, MAR and Recent Results.

## 2017-06-26 NOTE — PROGRESS NOTES
1930--Assessment completed. VSS. CLean and dry. No distress noted. Patient will not answer any questions--turns head away and closes eyes when spoken to.2204 Phosphorus 1.7. Order placed for replacement at this time. 0118--Dr. Perry paged as urine output from byrne is decreased 12 ml this hour. Sodium phosphate just received from supervisor at this time and infusing over four hours. 0130--Dr. Renu Pearl returned call at this time and notified of urine output from byrne 12ml this hour and 25 and 21ml past 2 hours. NO new orders received at this time. 0600-Patient pulls bp cuff off and bracelets off but when asked to do or say anything she turns her head away and closes her eyes. CLean and dry. VSS. No distress noted. 0720-Bedside shift change report given to Ravi Adams RN   (oncoming nurse) by Flores Pepe RN   (offgoing nurse). Report included the following information SBAR, ED Summary, Intake/Output, Recent Results and Cardiac Rhythm nsr.

## 2017-06-26 NOTE — DIABETES MGMT
NUTRITIONAL RE-ASSESSMENT AND GLYCEMIC CONTROL PLAN OF CARE     Rita Rangel           56 y.o.           6/19/2017                 1. Recurrent UTI    2. Hypotension, unspecified hypotension type    3. Endometrial cancer ([address]    4. Severe sepsis with septic shock (CODE)    5. Wound of sacral region, subsequent encounter    6. Anxiety       [x]  No Cultural, [place] or ethnic dietary need identified. []  Cultural, [place] and ethnic food preferences identified and addressed    [x]  Participated in discharge planning/Interdisciplinary rounds   Food allergies: [x]  No        []  Yes-  ASSESSMENT:   Pt is overweight related to excess caloric intake as evidenced by 206% ideal weight and BMI= 42.4 kg/m2. Pt meets criteria for obesity. Pt with increased calorie and protein requirements related to sacral wound and Ayo score =13. Pt w/hypoalbuminemia as evidenced by albumin=1.4 mg/dl. Pt seen on meal rounds , appetite and intake decreased from last week. She does not want additional Ensure. INTERVENTIONS/PLAN:   Encouraged increased intake at meals to meet calorie and protein requirements. SUBJECTIVE/OBJECTIVE:   Information obtained from: Pt and ICU rounds    Diet: 2 g Na with Ensure 8oz /d   Patient Vitals for the past 100 hrs:   % Diet Eaten   06/25/17 0900 0 %   06/22/17 1901 0 %   06/22/17 1400 25 %   06/22/17 0900 10 %     Medications: [x]                Reviewed   Receiving  vitamin/mineral w/Fe supplement for wound healing.   Corrective lispro- not requiring  Most Recent POC Glucose:   Recent Labs      06/26/17   0230  06/25/17   0330  06/23/17   2356   GLU  80  105*  96         Labs:   Lab Results   Component Value Date/Time    Hemoglobin A1c <3.5 06/21/2017 04:08 AM     Lab Results   Component Value Date/Time    Sodium 141 06/26/2017 02:30 AM    Potassium 4.2 06/26/2017 02:30 AM    Chloride 113 06/26/2017 02:30 AM    CO2 20 06/26/2017 02:30 AM    Anion gap 8 06/26/2017 02:30 AM    Glucose 80 06/26/2017 02:30 AM    BUN 20 06/26/2017 02:30 AM    Creatinine 0.96 06/26/2017 02:30 AM    Calcium 7.5 06/26/2017 02:30 AM    Magnesium 1.9 06/26/2017 02:30 AM    Phosphorus 1.9 06/26/2017 02:30 AM    Albumin 1.4 06/23/2017 11:56 PM     Anthropometrics: IBW : 54.4 kg (120 lb), % IBW (Calculated): 206.5 %, BMI (calculated): 42.4  Wt Readings from Last 1 Encounters:   05/10/17 136.5 kg (301 lb)      Ht Readings from Last 1 Encounters:   06/21/17 5' 4\" (1.626 m)     Estimated Nutrition Needs: 1700 Kcals/day, Protein (g): 77 g Fluid (ml): 1800 ml  Based on:   []          Actual BW    [x]          IBW   []            Adjusted BW    Nutrition Diagnoses:      As stated above. Nutrition Interventions: as astated above  Goal:     Intake will meet >75% of energy and protein requirements by 7/1 to promote wound healing. No wt goals due to dx. Glucose will be within target range without further hypoglycemia by 6/29.     Nutrition Monitoring and Evaluation      []     Monitor po intake on meal rounds  [x]     Continue inpatient monitoring and intervention  []     Other:      Nutrition Risk:  []   High     [x]  Moderate    []  Minimal/Uncompromised    Brianna Miladys, RD

## 2017-06-26 NOTE — PROGRESS NOTES
0800 .Bedside and Verbal shift change report given to Darwin Luevano (oncoming nurse) by Filippo Ybarra RN (offgoing nurse). Report included the following information SBAR, Kardex and MAR. Patient is drowsy and withdrawn  She opens her eyes on command but will not follow any other commands    0815 She refused to take her morning meds or eat  1200 . TRANSFER - OUT REPORT:    Verbal report given to [facility] RN(name) on Sandi Neil  being transferred to [facility](unit) for routine progression of care       Report consisted of patients Situation, Background, Assessment and   Recommendations(SBAR). Information from the following report(s) SBAR, Kardex and MAR was reviewed with the receiving nurse.     Lines:   Venous Access Device Mediport (Power port) Upper chest (subclavicular area, right (Active)       Venous Access Device mediport  (Active)   Central Line Being Utilized Yes 6/26/2017  8:00 AM   Criteria for Appropriate Use Hemodynamically unstable, requiring monitoring lines, vasopressors, or volume resuscitation 6/26/2017  8:00 AM   Site Assessment Clean, dry, & intact 6/26/2017  8:00 AM   Date of Last Dressing Change 06/20/17 6/26/2017  8:00 AM   Dressing Status Clean, dry, & intact 6/26/2017  8:00 AM   Dressing Type Disk with Chlorhexadine gluconate (CHG) 6/26/2017  8:00 AM   Action Taken Open ports on tubing capped 6/26/2017  8:00 AM   Date Accessed (Medial Site) 06/20/17 6/26/2017  8:00 AM   Action Taken (Medial Site) Infusing 6/26/2017  8:00 AM   Date Needle Changed (Medial Site) 06/20/17 6/25/2017  8:00 AM   Alcohol Cap Used Yes 6/26/2017  8:00 AM       Peripheral IV 06/20/17 Right Wrist (Active)   Site Assessment Clean, dry, & intact 6/26/2017 12:00 PM   Phlebitis Assessment 0 6/26/2017 12:00 PM   Infiltration Assessment 0 6/26/2017 12:00 PM   Dressing Status Clean, dry, & intact 6/26/2017 12:00 PM   Dressing Type Transparent 6/26/2017  8:00 AM   Hub Color/Line Status Blue;Patent;Capped 6/26/2017  8:00 AM   Action Taken Open ports on tubing capped 6/26/2017  8:00 AM   Alcohol Cap Used Yes 6/26/2017  8:00 AM       Peripheral IV 06/22/17 Left;Upper Cephalic (Active)   Site Assessment Clean, dry, & intact 6/26/2017 12:00 PM   Phlebitis Assessment 0 6/26/2017 12:00 PM   Infiltration Assessment 0 6/26/2017 12:00 PM   Dressing Status Clean, dry, & intact 6/26/2017 12:00 PM   Dressing Type Transparent 6/26/2017  8:00 AM   Hub Color/Line Status Pink;Patent;Capped 6/26/2017  8:00 AM   Action Taken Open ports on tubing capped 6/26/2017  8:00 AM   Alcohol Cap Used Yes 6/26/2017  8:00 AM        Opportunity for questions and clarification was provided.       Patient transported with:   Monitor  Registered Nurse

## 2017-06-26 NOTE — PROGRESS NOTES
PCCM  Mon 6/26/2017     VSS/Afeb    CXR clear; RA    There are no ongoing critical care issues this admission. Her identified PCP is faculty at [facility]. I suggest contacting him. As likely noted before, she needs improved continuity of care with an individual physician or designated team through the continuum of home/hospital.     PCCM will sign off. May go to floor.     Please call for specific PCCM issues, questions or concerns.     -cheri   [phone]

## 2017-06-26 NOTE — PROGRESS NOTES
Progress Note    Patient: Garrison Kidd MRN: [id number]  SSN: [id number]    YOB: 1960  Age: 56 y.o. Sex: female      Admit Date: 6/19/2017    LOS: 6 days     Assessment: This is a 53-year-old female who has a history of bilateral ureteral obstruction secondary to endometrial carcinoma, status post chemoradiation. She had bilateral stents last placed in 3/17 and presented to the hospital with a UTI and acute kidney injury and had PCN placed 5/11/17 now presents with sepsis and Rt PCN capped   PCN has been to drainage  Clinically improving. DEV -     Plan:        Con't all tubes, byrne to drainage  Abx per primary  Transfering to floor  SCr .96  Will need L stent exchange, Rt stent removal once over acute issue  Will see next week, please call with questions     Subjective:     No acute issues.      Objective:     Vitals:    06/26/17 1030 06/26/17 1045 06/26/17 1100 06/26/17 1115   BP: (!) 147/107  (!) 130/111    Pulse: 87 89 86 88   Resp: 17 14 17 21   Temp:       SpO2: 100% 100% 100% 100%   Weight:       Height:            Intake and Output:  Current Shift:    Last three shifts: 06/24 1901 - 06/26 0700  In: 3163.7 [P.O.:40; I.V.:3123.7]  Out: 2155 [Urine:2155]    Current Facility-Administered Medications   Medication Dose Route Frequency    famotidine (PEPCID) tablet 20 mg  20 mg Oral BID    dextrose 5% infusion  50 mL/hr IntraVENous CONTINUOUS    0.9% sodium chloride infusion 250 mL  250 mL IntraVENous PRN    sodium hypochlorite (QUARTER STRENGTH DAKIN'S) 0.125% irrigation (bottle)   Topical DAILY    collagenase (SANTYL) 250 unit/gram ointment   Topical DAILY    multivitamin, tx-iron-ca-min (THERA-M w/ IRON) tablet 1 Tab  1 Tab Oral DAILY    insulin lispro (HUMALOG) injection   SubCUTAneous AC&HS    glucose chewable tablet 16 g  4 Tab Oral PRN    glucagon (GLUCAGEN) injection 1 mg  1 mg IntraMUSCular PRN    dextrose (D50) infusion 12.5-25 g  25-50 mL IntraVENous PRN    enoxaparin (LOVENOX) injection 40 mg  40 mg SubCUTAneous Q12H    morphine injection 2 mg  2 mg IntraVENous Q4H PRN    naloxone (NARCAN) injection 0.4 mg  0.4 mg IntraVENous PRN    cefepime (MAXIPIME) 2 g in 0.9% sodium chloride (MBP/ADV) 100 mL MBP  2 g IntraVENous Q12H    LORazepam (ATIVAN) tablet 1 mg  1 mg Oral Q6H PRN    nystatin (MYCOSTATIN) 100,000 unit/gram powder   Topical BID    polyethylene glycol (MIRALAX) packet 17 g  17 g Oral DAILY    potassium chloride (K-DUR, KLOR-CON) SR tablet 20 mEq  20 mEq Oral BID    sodium bicarbonate tablet 650 mg  650 mg Oral TID    triamcinolone acetonide (KENALOG) 0.025 % cream 1 g  1 g Topical TID    trospium (SANCTURA) tablet 20 mg  20 mg Oral BID    DAPTOmycin (CUBICIN) 672 mg in 0.9% sodium chloride 50 mL IVPB  6 mg/kg IntraVENous Q24H    ELECTROLYTE REPLACEMENT PROTOCOL  1 Each Other PRN    ELECTROLYTE REPLACEMENT PROTOCOL  1 Each Other PRN    ELECTROLYTE REPLACEMENT PROTOCOL  1 Each Other PRN    ELECTROLYTE REPLACEMENT PROTOCOL  1 Each Other PRN    ELECTROLYTE REPLACEMENT PROTOCOL  1 Each Other PRN    ELECTROLYTE REPLACEMENT PROTOCOL  1 Each Other PRN    ELECTROLYTE REPLACEMENT PROTOCOL  1 Each Other PRN    sodium chloride (NS) flush 5-10 mL  5-10 mL IntraVENous PRN         Physical Exam:   GENERAL: somewhat somulent  LUNG: unlabored breathing  ABDOMEN: soft, non-tender.  No masses,  no organomegaly  EXTREMITIES:  extremities normal, atraumatic, no cyanosis or edema  SKIN: no jaundice  : Rt NT clear yellow      Lab/Data Review:  BMP:   Lab Results   Component Value Date/Time     06/26/2017 02:30 AM    K 4.2 06/26/2017 02:30 AM     (H) 06/26/2017 02:30 AM    CO2 20 (L) 06/26/2017 02:30 AM    AGAP 8 06/26/2017 02:30 AM    GLU 80 06/26/2017 02:30 AM    BUN 20 (H) 06/26/2017 02:30 AM    CREA 0.96 06/26/2017 02:30 AM    GFRAA >60 06/26/2017 02:30 AM    GFRNA >60 06/26/2017 02:30 AM     CBC:   Lab Results   Component Value Date/Time    WBC 9.3 06/26/2017 02:30 AM    HGB 8.0 (L) 06/26/2017 02:30 AM    HCT 26.2 (L) 06/26/2017 02:30 AM     06/26/2017 02:30 AM     COAGS: No results found for: APTT, PTP, INR       CT Results:    Results from Hospital Encounter encounter on 06/19/17   CT ABD PELV WO CONT   Narrative EXAM: CT of the abdomen and pelvis    INDICATION: 56-year-old patient with advanced stage endometrial cancer,  bilateral nephroureteral stents, and concern for possible infection adjacent to  the right percutaneous nephrostomy tube site. Nephrostomy catheter placement  occurred at an outside institution on 5/11/2017. COMPARISON: Several prior exams, most recent CT of the abdomen and pelvis  available for comparison: March 3, 2017. TECHNIQUE: Axial CT imaging of the abdomen and pelvis was performed without  intravenous contrast. Multiplanar reformats were generated. One or more dose reduction techniques were used on this CT: automated exposure  control, adjustment of the mAs and/or kVp according to patient's size, and  iterative reconstruction techniques. The specific techniques utilized on this CT  exam have been documented in the patient's electronic medical record.  _______________    FINDINGS:    LOWER CHEST: Minor dependent atelectasis is noted at each lung base. No focal  pneumonic consolidation, pneumothorax or pleural effusion. Normal cardiac size. No pericardial effusion. There are several pulmonary nodules noted. The largest  is present in the right lower lobe (series 4, image 10) which measures  approximately 8 x 8 mm in size, and is similar to prior CT of 3/4/2017. Additional right lower lobe pulmonary nodule noted (series 4, image 3), as well  as in the left lower lobe (series 4, image 12). The right lower lobe nodule  measures approximately 3 mm and is new in appearance from prior, with slight  enlargement of the subpleural left lower lobe pulmonary nodule, which measures  approximately 5 mm.     LIVER, BILIARY: The unenhanced appearance of the liver demonstrates no focal  abnormality. No biliary dilation. The gallbladder is surgically absent. PANCREAS: There is mild fatty infiltration of the pancreas without evidence of  pancreatic ductal dilatation. SPLEEN: Normal.    ADRENALS: Nodular thickening of the inferior left adrenal gland is similar to  prior exam.    KIDNEYS/URETERS/BLADDER:     On the right, there is percutaneous nephrostomy catheter, with formed loop  present with in the interpolar collecting system. In addition, there is a  nephroureteral stent, also with proximal formed loop in the interpolar  collecting system, and distal formed loop within the bladder. There is  moderately severe right-sided hydronephrosis, which is increased from prior CT  examination. On the left, a nephroureteral stent is in place, with proximal formed loop noted  in the interpolar collecting system, and distal formed loop present within the  bladder. Mild left-sided hydronephrosis is present, similar to prior CT exam.    A Duarte catheter is present within the bladder, which is decompressed. PELVIC ORGANS: The uterus contains an unremarkable unenhanced appearance. Right  adnexal soft tissue lesion noted, measuring approximately 5.1 x 2.9 cm in size,  with exact measurements limited by lack of intravenous contrast as well as beam  hardening artifact. VASCULATURE: Abdominal aorta is normal in course and caliber. There is an  inferior vena cava filter which spans the renal veins, with several tines which  project external to the lumen of the inferior vena cava, potentially within the  right renal vein (images 46-47). LYMPH NODES: Periaortic adenopathy along the left side of the retroperitoneum is  similar in appearance to prior exams. GASTROINTESTINAL TRACT: There is a small hiatal hernia. Small intestine is  normal in course and caliber.  Moderate burden of formed stool throughout the  large intestine, which is similarly normal in caliber. No bowel obstruction. No  free intraperitoneal gas. There is a advanced age sacral decubitus ulcer, which  freely communicates with the posterior wall of the rectum (series 3, image 107)  with associated soft tissue thickening/stranding at the site of decubitus ulcer  formation. BONES: There is accentuation of the lumbar lordosis, with unchanged grade 2  isthmic spondylolisthesis L5 on S1. Multilevel spondylosis noted, most advanced  L4-S1. Vertebral body heights are maintained intact. Moderately severe bilateral  hip joint osteoarthritis is present. OTHER: Numerous small foci of fat necrosis/fat-containing ventral abdominal  hernias are noted within the anterior abdominal wall, similar to prior. Diffuse  subcutaneous body wall edema is present, along with redemonstration of multiple  abdominal wall collateral vessels, the appearance of which is increased in the  interval from prior CT examination. _______________         Impression IMPRESSION:    1. Bilateral nephroureteral stents and percutaneous nephrostomy catheter in  stable and expected position. There is moderately severe right hydronephrosis,  which is increased in magnitude from prior CT examination, with mild left  hydronephrosis, similar to prior. 2. Bladder decompressed with a Duarte catheter. 3. Enlargement of a right adnexal lesion from recent prior CT examination. 4. New and slightly enlarged pulmonary nodules as above. Follow-up per oncology  protocol. 5. Inferior vena cava filter which spans across the renal veins as described  above. Increased formation of abdominal wall collaterals as well as anasarca  noted. 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 6. Progression of an advanced stage sacral decubitus ulcer which directly  communicates with the posterior wall of the rectum with associated skin  thickening/cellulitis.   7. Unchanged, grade 2 isthmic spondylolisthesis L5-S1. Note: Preliminary report sent to the Emergency Department by the radiology  resident at the time of the study. The additional findings of hydronephrosis and advanced sacral decubitus ulcer  were discussed with Dr. Gage Munson, hospitalist caring for the patient at 8:25 AM on  6/20/2017. US Results:    Results from Abstract encounter on 06/14/17   US RETROPERITONEUM COMP   Impression    Procedure Location       [facility]         Results    [address] (Accession [id number]) (Order [id number])      Result Information     Status Provider Status       Final result (5/17/2017 10:32 AM) Open      Order Date/Time     Order Date/Time    05/16/17 09:42 AM     PACS Images     Show images for US RETROPERITONEAL     Impression  IMPRESSION:     1.  Mild right-sided hydronephrosis. Right nephrostomy tube is evident. Slightly distal positioning of the ureteral stent in the ureter. 2. No left-sided hydronephrosis. Left nephrostomy is evident. Nonvisualization of a left ureteral stent. 3. Consider plain film to localize stents.     Narrative  RENAL ULTRASOUND COMPLETE     INDICATION: Flank pain, chronic ureteral stents; recent placement of nephrostomy tubes.     TECHNIQUE: Select images from retroperitoneal ultrasound provided for interpretation.       Comparison: June 2016 ultrasound and May 2017 CT     FINDINGS: Limited by body habitus and clinical condition.     The right kidney measures 11.3 x 6.7 x 5.4 cm in size.  Normal cortical echogenicity compared with adjacent liver. Mild hydronephrosis. Percutaneous nephrostomy tube evident. The proximal aspect of the ureteral stent is not evident and renal pelvis, but is seen within the uppermost ureter.  No renal calculi evident. No solid mass.  Intact kidney vascularity.  No adjacent free fluid or fluid collections.       The left kidney measures 9.3 x 3.9 x 4.4 cm in size.  Normal cortical echogenicity compared with adjacent spleen. There is no collecting system dilatation or evidence of obstruction. No renal calculi evident. No solid mass. Nephrostomy tube present. A left-sided ureteral stent is not evident. Intact kidney vascularity.  No adjacent free fluid or fluid collections. Proximal ureter does not appear dilated.     Bladder decompressed around Duarte catheter. No free fluid noted in the pelvis.       Dictated by: Hay Khalil on Wed May 17, 2017 10:28:05 AM EDT           Signed by     Signed     Date/Time Phone Pager  Cat Melendez MD Radiology [[id number]] 5/17/2017 10:32 [phone]        [address] Radiologist [221]     Exam Information     Status Exam Begun   Exam Ended       Final [99] 5/17/2017 07:50 5/17/2017 08:20     Order Questions     Question Answer Comment    Wet Read? No     Portable Exam? No     Exam Indication OTHER (SEE COMMENTS BOX) flank pain, chronic ureteral stents, s/p b/l PCNs     External Result Report     External Result Report       RESULTING LAB      Lab      RADIOLOGY  No contact information on file         Priority and Order Details      Priority Class       Routine Ancillary Performed      Order    US RETROPERITONEAL [[id number]] (Order [id number])        Order Providers      Authorizing Encounter Billing    Rajani Mg    Replaced:  [facility]       Order Questions      Question Answer Comment    Wet Read?  No     Portable Exam? No     Exam Indication OTHER (SEE COMMENTS BOX) flank pain, chronic ureteral stents, s/p b/l PCNs       Order Signing Info      Action Created on Order Mode Entered by Responsible Provider Signed by Signed on    Ordering 05/17/17 0900 Protocol Susanne Lange Signature Not Required        Acknowledgement Info      For At Acknowledged By Acknowledged On    Placing Order 05/16/17 Nolan Flores [id number], Adriana 81, RN 05/17/17 0900       Original Order      Ordered On Ordered By       5/16/2017  9:42 AM Howard ARAIZA            Release Information      Released On Released By    5/17/2017  9:00 AM Wilmanlyric Pamela ARAIZA       Additional Information      Associated Reports    View Encounter    Priority and Order Details       Order Information      US RETROPERITONEAL (Order #[id number]) on 5/16/17       ABN Associated with this Order        No ABN is associated with this order.                  Order Providers      Authorizing Provider Encounter Provider Billing Provider    MD Theresa Moran MD [facility], MD       Medication Detail        Disp Refills Start End       US RETROPERITONEAL 5/17/2017 5/17/2017     Class: Ancillary Performed        RESULTING LAB      Lab      RADIOLOGY  No contact information on file         Priority and Order Details      Priority Class       Routine Ancillary Performed        Order Providers      Authorizing Encounter Billing    Aravind Gutierrez    Replaced:  [facility]       Department      Name Address Phone [facility] [address] [place] [phone]        Provider Information:      Ordering User Ordering Provider Authorizing Provider    MD Cedric Bledsoe MD    Attending Provider(s) Admitting Provider PCP Billing Provider    Hoang Lorenzo MD; Jennifer Baez MD; Ang Whitehead MD; Cedric Calzada MD; Stefanie Barrera MD; Kendrick Hollingsworth MD; MD Jennifer Wright MD Juli Cam, MD [facility], MD       PACS Images      Show images for US RETROPERITONEAL       Parent Encounter      View Parent Encounter       RESULTING LAB      Lab      RADIOLOGY  No contact information on file         Order Questions      Question Answer Comment    Wet Read?  No     Portable Exam? No     Exam Indication OTHER (SEE COMMENTS BOX) flank pain, chronic ureteral stents, s/p b/l PCNs       Order Signing Info      Action Created on Order Mode Entered by Responsible Provider Signed by Signed on    Ordering 05/17/17 0900 Protocol Larry Grant Signature Not Required        Acknowledgement Info      For At Acknowledged By Acknowledged On    Placing Order 05/16/17 Nolan Flores [id number], Emeryaudreyadria [address], RN 05/17/17 0900       Original Order      Ordered On Ordered By       5/16/2017  9:42 AM Dinora Mix L            Release Information      Released On Released By    5/17/2017  9:00 AM Larry Grant       Additional Information      Associated Reports    View Encounter    Priority and Order Details       Order Information      US RETROPERITONEAL (Order #[id number]) on 5/16/17       ABN Associated with this Order        No ABN is associated with this order.                  Order Providers      Authorizing Provider Encounter Provider Billing Provider    MD Ann Marie Agee MD Yaakov Bruckner, MD       Provider Information      Authorizing/Billing Provider    None       Medication Detail        Disp Refills Start End       US RETROPERITONEAL 5/17/2017 5/17/2017     Class:  Ancillary Performed                  Principal Problem:    Septic shock ([facility] [address] (6/20/2017)    Active Problems:    DEV (acute kidney injury) ([address] (12/29/2015)      UTI (urinary tract infection) (3/3/2017)      Ulcer of sacral region, unstageable ([address] (6/20/2017)          Signed By: Jennifer Rosales MD , FACS    June 26, 2017      Jennifer Rosales MD  [facility]  [address] [facility], [address] [facility]  Phone: [phone]  Pager: [phone]

## 2017-06-26 NOTE — PROGRESS NOTES
Internal Medicine Progress Note    Patient's Name: Pawan Console  Admit Date: 6/19/2017  Length of Stay: 6      Assessment/Plan     Active Hospital Problems    Diagnosis Date Noted    Septic shock ([facility] [address] 06/20/2017    Ulcer of sacral region, unstageable ([facility] [address] 06/20/2017    UTI (urinary tract infection) 03/03/2017    DEV (acute kidney injury) ([facility] [address] 12/29/2015     - Cont all tubes per urology, byrne to drainage  - Plan for L stent xchange and R stent removal once acute issues resolved  - Cont daptomycin  - Palliative care following, pt wants everything done at this point  - Cont acceptable home medications for chronic conditions   - DVT protocol    Transfer to floor    I have personally reviewed all pertinent labs and films that have officially resulted over the last 24 hours. I have personally checked for all pending labs that are awaiting final results.     Subjective     Pt s/e @ bedside  No major events overnight  Lethargic but arousable  Denies CP or SOB    Objective     Visit Vitals    BP (!) 157/97 (BP 1 Location: Right arm, BP Patient Position: At rest)    Pulse 88    Temp 98 °F (36.7 °C)    Resp 16    Ht 5' 4\" (1.626 m)    Wt 112 kg (246 lb 14.6 oz)    SpO2 100%    BMI 42.38 kg/m2       Physical Exam:  General Appearance: NAD, lethargic  Lungs: Diminished w/ normal respiratory effort  CV: RRR, no m/r/g  Abdomen: soft, non-tender, obese, normal bowel sounds  Extremities: no cyanosis, B/L LE edema  Neuro: No focal deficits, motor/sensory intact    Lab/Data Reviewed:  BMP:   Lab Results   Component Value Date/Time     06/26/2017 02:30 AM    K 4.2 06/26/2017 02:30 AM     (H) 06/26/2017 02:30 AM    CO2 20 (L) 06/26/2017 02:30 AM    AGAP 8 06/26/2017 02:30 AM    GLU 80 06/26/2017 02:30 AM    BUN 20 (H) 06/26/2017 02:30 AM    CREA 0.96 06/26/2017 02:30 AM    GFRAA >60 06/26/2017 02:30 AM    GFRNA >60 06/26/2017 02:30 AM     CBC:   Lab Results   Component Value Date/Time    WBC 9.3 06/26/2017 02:30 AM    HGB 8.0 (L) 06/26/2017 02:30 AM    HCT 26.2 (L) 06/26/2017 02:30 AM     06/26/2017 02:30 AM       Imaging Reviewed:  No results found.     Medications Reviewed:  Current Facility-Administered Medications   Medication Dose Route Frequency    famotidine (PEPCID) tablet 20 mg  20 mg Oral BID    dextrose 5% infusion  50 mL/hr IntraVENous CONTINUOUS    0.9% sodium chloride infusion 250 mL  250 mL IntraVENous PRN    sodium hypochlorite (QUARTER STRENGTH DAKIN'S) 0.125% irrigation (bottle)   Topical DAILY    collagenase (SANTYL) 250 unit/gram ointment   Topical DAILY    multivitamin, tx-iron-ca-min (THERA-M w/ IRON) tablet 1 Tab  1 Tab Oral DAILY    insulin lispro (HUMALOG) injection   SubCUTAneous AC&HS    glucose chewable tablet 16 g  4 Tab Oral PRN    glucagon (GLUCAGEN) injection 1 mg  1 mg IntraMUSCular PRN    dextrose (D50) infusion 12.5-25 g  25-50 mL IntraVENous PRN    enoxaparin (LOVENOX) injection 40 mg  40 mg SubCUTAneous Q12H    morphine injection 2 mg  2 mg IntraVENous Q4H PRN    naloxone (NARCAN) injection 0.4 mg  0.4 mg IntraVENous PRN    cefepime (MAXIPIME) 2 g in 0.9% sodium chloride (MBP/ADV) 100 mL MBP  2 g IntraVENous Q12H    LORazepam (ATIVAN) tablet 1 mg  1 mg Oral Q6H PRN    nystatin (MYCOSTATIN) 100,000 unit/gram powder   Topical BID    polyethylene glycol (MIRALAX) packet 17 g  17 g Oral DAILY    potassium chloride (K-DUR, KLOR-CON) SR tablet 20 mEq  20 mEq Oral BID    sodium bicarbonate tablet 650 mg  650 mg Oral TID    triamcinolone acetonide (KENALOG) 0.025 % cream 1 g  1 g Topical TID    trospium (SANCTURA) tablet 20 mg  20 mg Oral BID    DAPTOmycin (CUBICIN) 672 mg in 0.9% sodium chloride 50 mL IVPB  6 mg/kg IntraVENous Q24H    ELECTROLYTE REPLACEMENT PROTOCOL  1 Each Other PRN    ELECTROLYTE REPLACEMENT PROTOCOL  1 Each Other PRN    ELECTROLYTE REPLACEMENT PROTOCOL  1 Each Other PRN    ELECTROLYTE REPLACEMENT PROTOCOL  1 Each Other PRN    ELECTROLYTE REPLACEMENT PROTOCOL  1 Each Other PRN    ELECTROLYTE REPLACEMENT PROTOCOL  1 Each Other PRN    ELECTROLYTE REPLACEMENT PROTOCOL  1 Each Other PRN    sodium chloride (NS) flush 5-10 mL  5-10 mL IntraVENous PRN           Yolette Mcdonald, DO  Internal Medicine, Hospitalist  Pager: [facility]

## 2017-06-27 NOTE — PROGRESS NOTES
Patient received in bed awake from SHELLY Nicolas. Patient lethargic and drowsy, nonverbal s/s not indicative of pain and discomfort. Patient resting quietly. Frequent use items within reach. Bed locked in low position. Call bell within reach. [id number] Carrier from Lab called to notify that phlebotomy has tried 4 time to draw the 0400 labs and were unsuccessful.

## 2017-06-27 NOTE — PROGRESS NOTES
Internal Medicine Progress Note    Patient's Name: Pawan Console  Admit Date: 6/19/2017  Length of Stay: 7      Assessment/Plan     Active Hospital Problems    Diagnosis Date Noted    Septic shock ([facility] 06/20/2017    Stage 4 skin ulcer of sacral region [facility]) 06/20/2017    UTI (urinary tract infection) 03/03/2017    DEV (acute kidney injury) ([facility] 12/29/2015    CAD (coronary artery disease) 12/29/2015    Endometrial cancer ([facility] 03/18/2015    Morbid obesity ([facility] 10/02/2014    Anemia 01/16/2013     - Cont byrne  - Plan for L stent xchange and R stent removal once acute issues resolved  - Remains afebrile and w/o leukocytosis  - Cont daptomycin and ceftriaxone per ID  - Palliative care following, pt wants everything done at this point  - Wound care for sacral ulcer  - CRS eval, although unlikely surgical candidate  - Cont acceptable home medications for chronic conditions   - DVT protocol    Prognosis remains very grim. The patient has revoked hospice multiple times now despite her poor prognosis. She continues to want everything done at this point. I have personally reviewed all pertinent labs and films that have officially resulted over the last 24 hours. I have personally checked for all pending labs that are awaiting final results.     Subjective     Pt s/e @ bedside  No major events overnight  Lethargic but arousable  Denies CP or SOB    Objective     Visit Vitals    /87 (BP 1 Location: Left arm, BP Patient Position: Lying left side)    Pulse 90    Temp 97.8 °F (36.6 °C)    Resp 16    Ht 5' 4\" (1.626 m)    Wt 112 kg (246 lb 14.6 oz)    SpO2 92%    BMI 42.38 kg/m2       Physical Exam:  General Appearance: NAD, lethargic  Lungs: Diminished w/ normal respiratory effort  CV: RRR, no m/r/g  Abdomen: soft, non-tender, obese, normal bowel sounds  Extremities: no cyanosis, B/L LE edema  Neuro: No focal deficits, motor/sensory intact    Lab/Data Reviewed:  BMP:   No results found for: NA, K, CL, CO2, AGAP, GLU, BUN, CREA, GFRAA, GFRNA  CBC:   No results found for: WBC, HGB, HGBEXT, HCT, HCTEXT, PLT, PLTEXT, HGBEXT, HCTEXT, PLTEXT    Imaging Reviewed:  No results found.     Medications Reviewed:  Current Facility-Administered Medications   Medication Dose Route Frequency    cefTRIAXone (ROCEPHIN) 2 g in 0.9% sodium chloride (MBP/ADV) 50 mL MBP  2 g IntraVENous Q24H    famotidine (PEPCID) tablet 20 mg  20 mg Oral BID    dextrose 5% infusion  50 mL/hr IntraVENous CONTINUOUS    0.9% sodium chloride infusion 250 mL  250 mL IntraVENous PRN    sodium hypochlorite (QUARTER STRENGTH DAKIN'S) 0.125% irrigation (bottle)   Topical DAILY    collagenase (SANTYL) 250 unit/gram ointment   Topical DAILY    multivitamin, tx-iron-ca-min (THERA-M w/ IRON) tablet 1 Tab  1 Tab Oral DAILY    insulin lispro (HUMALOG) injection   SubCUTAneous AC&HS    glucose chewable tablet 16 g  4 Tab Oral PRN    glucagon (GLUCAGEN) injection 1 mg  1 mg IntraMUSCular PRN    dextrose (D50) infusion 12.5-25 g  25-50 mL IntraVENous PRN    enoxaparin (LOVENOX) injection 40 mg  40 mg SubCUTAneous Q12H    morphine injection 2 mg  2 mg IntraVENous Q4H PRN    naloxone (NARCAN) injection 0.4 mg  0.4 mg IntraVENous PRN    LORazepam (ATIVAN) tablet 1 mg  1 mg Oral Q6H PRN    nystatin (MYCOSTATIN) 100,000 unit/gram powder   Topical BID    polyethylene glycol (MIRALAX) packet 17 g  17 g Oral DAILY    potassium chloride (K-DUR, KLOR-CON) SR tablet 20 mEq  20 mEq Oral BID    sodium bicarbonate tablet 650 mg  650 mg Oral TID    triamcinolone acetonide (KENALOG) 0.025 % cream 1 g  1 g Topical TID    trospium (SANCTURA) tablet 20 mg  20 mg Oral BID    DAPTOmycin (CUBICIN) 672 mg in 0.9% sodium chloride 50 mL IVPB  6 mg/kg IntraVENous Q24H    sodium chloride (NS) flush 5-10 mL  5-10 mL IntraVENous PRN           Ana Joseph DO  Internal Medicine, Hospitalist  Pager: [phone]  [facility]

## 2017-06-27 NOTE — WOUND CARE
Patient seen by IP wound care on 6/22/17, extensive skin assessment and wound care consult performed at that time. Appropriate wound care orders placed in patients chart. Please see wound care assessment/note below. General Progress Note     Patient: Ulanda Koyanagi MRN: [id number] LOS: 2   YOB: 1960  Age: 56 y.o. Sex: female       Admit Date: 6/19/2017        Subjective:   Patient complains of generalized pain./discomfort during wound care assessment/treatment. Patient was able to assist with turning. Patient primary RN at bedside during initial assessment to assist with turning. Tita,SHELLY bedside to assist.  Please see wound assessment below.      Report to Janet Henning; orders received, Dr. David Shaikh.     Objective:      Vitals  Patient Vitals for the past 8 hrs:    BP Temp Pulse Resp SpO2   06/22/17 1430 101/63 97.8 °F (36.6 °C) 86 16 98 %   06/22/17 1347 - 98.9 °F (37.2 °C) 83 16 100 %   06/22/17 1200 - 98.3 °F (36.8 °C) - - -   06/22/17 1030 (!) 86/37 - 93 16 100 %   06/22/17 1000 100/69 - (!) 102 17 92 %   06/22/17 0930 118/53 - (!) 101 22 97 %   06/22/17 0900 107/73 - 87 13 100 %   06/22/17 0800 (!) 87/60 98.3 °F (36.8 °C) 81 13 98 %   06/22/17 0700 90/54 - 79 13 100 %         I/O Current Shift  06/22 0701 - 06/22 1900  In: 34.3 [I.V.:34.3]  Out: 260 [Urine:260]     I/O Last Three Shifts  06/20 1901 - 06/22 0700  In: 3243.3 [P.O.:540; I.V.:2703.3]  Out: 2004 [Urine:2004]                    Assessment:      Wound Presentation:       06/22/17 1402   Wound Sacral/coccyx   Date First Assessed/Time First Assessed: 03/13/17 1212   POA: Yes  Wound Type: Pressure ulcer;Moisture Assoc. Skin Damage  Location: Sacral/coccyx   DRESSING STATUS Removed   DRESSING TYPE Silicone  (Mepilex)   Pressure Injury Unstageable   Wound Length (cm) 10.7 cm   Wound Width (cm) 17 cm   Wound Depth (cm) 3.1   Wound Surface area (cm^3) 563.89 cm^2   Condition of [facility]; Other (comment)   Condition of Edges Open Tissue Type Yellow;Black   Tissue Type Percent Black 80 %   Tissue Type Percent Yellow 20   Direction of Tunnels 12  oclock   Depth of Tunnel/Sinus (cm) 2.6 cm   Direction of Undermining 12    oclock;1    oclock;2    oclock;3    oclock;4    oclock   Depth of Undermining (cm) 2.2   Drainage Amount  Large   Drainage Color Brown   Wound Odor Foul;Strong   Periwound Skin Condition Erythema, blanchable   Cleansing and Cleansing Agents  Normal saline   Dressing Type Applied Other (Comment)  (Saline Moist Mesalt, Saline MOist Gauze, Mepilex)   Procedure Tolerated Well   Wound Back Left;Posterior   Date First Assessed/Time First Assessed: 06/20/17 2000   POA: Yes  Wound Type: Pressure ulcer  Location: (c) Back  Wound Description (Optional):  two area left flank open slough /yellow bas rolled red edges  Orientation: Left;Posterior   DRESSING STATUS Removed   DRESSING TYPE Other (Comment)  (MepilexBorder)   Pressure Injury Unstageable   Wound Length (cm) 1 cm  (A second area measured 0.3x1. 3x0.4)   Wound Width (cm) 1.5 cm   Wound Depth (cm) 0.9   Wound Surface area (cm^3) 1.35 cm^2   Condition of Edges Open   Tissue Type Yellow;Red   Tissue Type Percent Red 10   Tissue Type Percent Yellow 90   Drainage Amount  Moderate   Drainage Color Serosanguinous   Wound Odor Foul   Periwound Skin Condition Other (comment)  (Dry/Flaky)   Cleansing and Cleansing Agents  Normal saline   Dressing Type Applied Other (Comment)  (MepilexLite)   Procedure Tolerated Fair   Wound Thigh Posterior   Date First Assessed/Time First Assessed: 06/20/17 2000   POA: Yes  Wound Type:  Other (comment)  Location: Thigh  Orientation: Posterior   DRESSING TYPE Open to air   Non-Pressure Injury Partial thickness (epider/derm)   Wound Length (cm) 1 cm   Wound Width (cm) 3.5 cm   Wound Depth (cm) 0.1   Wound Surface area (cm^3) 0.35 cm^2   Condition of Edges Open   Tissue Type Red;Yellow   Tissue Type Percent Pink 20   Tissue Type Percent Yellow 80   Drainage Amount  Small    Drainage Color Serosanguinous   Wound Odor None   Periwound Skin Condition Excoriated   Cleansing and Cleansing Agents  Normal saline   Dressing Type Applied Other (Comment)  (MepilexBorder)   Procedure Tolerated Fair   Wound Buttocks Right   Date First Assessed/Time First Assessed: 06/20/17 2000   POA: Yes  Wound Type: Pressure ulcer  Location: Buttocks  Orientation: Right   DRESSING STATUS Removed   DRESSING TYPE Silicone  (Mepilex)   Pressure Injury Unstageable   Wound Length (cm) 2 cm   Wound Width (cm) 3 cm   Wound Depth (cm) 0.2   Wound Surface area (cm^3) 1.2 cm^2   Condition of Base Slough   Condition of Edges Open   Tissue Type Yellow   Tissue Type Percent Yellow 100   Drainage Amount  Small    Drainage Color Serosanguinous   Wound Odor Mild   Periwound Skin Condition Erythema, non-blanchable   Cleansing and Cleansing Agents  Normal saline   Dressing Type Applied Silicone  (Mepilex)   Procedure Tolerated Well   Wound Abdomen Anterior   Date First Assessed/Time First Assessed: 06/20/17 2000   POA: Yes  Wound Type: (c) Other (comment)  Location: (c) Abdomen  Wound Description (Optional): under pannus  Orientation: Anterior   DRESSING STATUS Removed   DRESSING TYPE Other (Comment)  (LinenPillowCase)   Non-Pressure Injury Partial thickness (epider/derm)   Wound Length (cm) 0.4 cm  (Pannua)   Wound Width (cm) 13.5 cm   Wound Depth (cm) 0.1   Wound Surface area (cm^3) 0.54 cm^2   Condition of Edges Open   Tissue Type Red   Tissue Type Percent Red 100   Drainage Amount  Small    Drainage Color Serosanguinous   Wound Odor None   Periwound Skin Condition Excoriated   Cleansing and Cleansing Agents  Normal saline   Dressing Type Applied Other (Comment)  (ZincOxide)   Procedure Tolerated Fair   Wound Breast   Date First Assessed/Time First Assessed: 06/20/17 2000   POA: Yes  Wound Type: Moisture Assoc.  Skin Damage  Location: Breast  Wound Description (Optional): under bilat breast   DRESSING STATUS Removed   DRESSING TYPE Other (Comment)  (PillowLinenCase)   Non-Pressure Injury Partial thickness (epider/derm)   Wound Length (cm) 0.2 cm  (Multiplesareasofexcoriationunderbilateralbreasts)   Wound Width (cm) 0.2 cm   Wound Depth (cm) 0.1   Wound Surface area (cm^3) 0 cm^2   Tissue Type Red   Tissue Type Percent Red 100   Drainage Amount  Small    Drainage Color Serosanguinous   Wound Odor None   Periwound Skin Condition Excoriated   Cleansing and Cleansing Agents  Normal saline   Dressing Type Applied Other (Comment)  (Zinc/Patienthasnystatinpowder)   Procedure Tolerated Fair   Wound Foot Right;Lateral   Date First Assessed/Time First Assessed: 06/20/17 2000   POA: Yes  Wound Type: Pressure ulcer  Location: Foot  Orientation: Right;Lateral   DRESSING STATUS Removed   DRESSING TYPE Other (Comment)  (PrevalonBoot)   Pressure Injury DTI   Wound Length (cm) 0.5 cm   Wound Width (cm) 0.8 cm   Tissue Type Maroon/purple   Tissue Type Percent Maroon/Purple 100 %   Drainage Amount  None   Wound Odor None   Periwound Skin Condition Other (comment)  (Dry/Flaky)   Cleansing and Cleansing Agents  Normal saline   Dressing Type Applied Other (Comment)  (PrevalonBoot)   Procedure Tolerated Fair   Wound Leg Right;Lateral   Date First Assessed/Time First Assessed: 06/20/17 2000   POA: Yes  Wound Type: Pressure ulcer  Location: Leg  Orientation: Right;Lateral   DRESSING STATUS Removed   DRESSING TYPE Silicone  (Mepilex)   Pressure Injury Stage ll   Wound Length (cm) 1.2 cm  (Proximal 1.8x1x0. 1   Distal 1.2x1.7)   Wound Width (cm) 1.7 cm   Wound Depth (cm) 0.1   Wound Surface area (cm^3) 0.2 cm^2   Condition of Base Pink   Condition of Edges Open   Tissue Type Pink   Tissue Type Percent Pink 100   Drainage Amount  Scant   Drainage Color Serous   Wound Odor None   Periwound Skin Condition Erythema, non-blanchable   Cleansing and Cleansing Agents  Normal saline   Dressing Type Applied Silicone  (Mepilex)   Procedure Tolerated Well Wound Back Right;Posterior   Date First Assessed/Time First Assessed: 06/21/17 2000   POA: Yes  Wound Type: Pressure ulcer  Location: Back  Orientation: Right;Posterior   DRESSING STATUS Removed   DRESSING TYPE Other (Comment)  (MepilexBorder)   Pressure Injury Stage ll   Wound Length (cm) 0.7 cm   Wound Width (cm) 0.9 cm   Wound Depth (cm) 0.1   Wound Surface area (cm^3) 0.06 cm^2   Tissue Type Red   Tissue Type Percent Red 100   Drainage Amount  Scant   Drainage Color Serosanguinous   Wound Odor None   Periwound Skin Condition Other (comment)  (Dry/Flaky)   Cleansing and Cleansing Agents  Normal saline   Dressing Type Applied Other (Comment)  (MepilexBorder)   Procedure Tolerated Fair   Wound Leg Left;Lateral;Distal   Date First Assessed/Time First Assessed: 06/21/17 2000   POA: Yes  Wound Type: Pressure ulcer  Location: Leg  Orientation: Left;Lateral;Distal   DRESSING STATUS Removed   DRESSING TYPE Silicone  (Mepilex)   Pressure Injury Stage ll   Wound Length (cm) 2 cm   Wound Width (cm) 0.7 cm   Wound Depth (cm) 0.1   Wound Surface area (cm^3) 0.14 cm^2   Condition of Base Pink   Condition of Edges Open   Tissue Type Pink   Tissue Type Percent Pink 100   Drainage Amount  Scant   Drainage Color Serous   Wound Odor None   Cleansing and Cleansing Agents  Normal saline   Dressing Type Applied Silicone  (Mepilex)   Procedure Tolerated Fair   Wound Back Right;Lateral   Date First Assessed/Time First Assessed: 06/21/17 2000   POA: Yes  Wound Type: Pressure ulcer  Location: Back  Orientation: Right;Lateral   DRESSING STATUS Removed   DRESSING TYPE Other (Comment)  (MepilexBorder/Tape)   Pressure Injury Stage ll   Wound Length (cm) 1 cm   Wound Width (cm) 0.5 cm   Wound Depth (cm) 0.1   Wound Surface area (cm^3) 0.05 cm^2   Condition of Edges Open   Tissue Type Pink  (PalePink)   Tissue Type Percent Pink 100   Drainage Amount  Small    Drainage Color Serosanguinous   Wound Odor None   Periwound Skin Condition Excoriated   Cleansing and Cleansing Agents  Normal saline   Dressing Type Applied Other (Comment)  (MepilexBorder)   Procedure Tolerated Fair   Wound Leg Left;Lateral;Proximal   Date First Assessed/Time First Assessed: 06/21/17 2000   POA: Yes  Wound Type: Pressure ulcer  Location: Leg  Orientation: Left;Lateral;Proximal   DRESSING STATUS Removed   DRESSING TYPE Silicone  (Mepilex)   Pressure Injury DTI   Wound Length (cm) 5.4 cm   Wound Width (cm) 0.7 cm   Condition of Base Purple   Condition of Edges Closed   Tissue Type Maroon/purple   Tissue Type Percent Maroon/Purple 100 %   Drainage Amount  None   Wound Odor None   Periwound Skin Condition Erythema, blanchable;Edematous   Cleansing and Cleansing Agents  Normal saline   Dressing Type Applied Silicone  (Mepilex)   Procedure Tolerated Fair   Wound Leg Right;Lateral;Medial   Date First Assessed/Time First Assessed: 06/21/17 2000   POA: Yes  Wound Type: Pressure ulcer  Location: Leg  Orientation: Right;Lateral;Medial   DRESSING STATUS Removed   DRESSING TYPE Silicone  (Mepilex)   Pressure Injury Unstageable   Wound Length (cm) 2 cm   Wound Width (cm) 2.6 cm   Wound Depth (cm) 0.2   Wound Surface area (cm^3) 1.04 cm^2   Condition of Base Slough;Pink   Condition of Edges Open   Tissue Type Pink;Yellow   Tissue Type Percent Pink 15   Tissue Type Percent Yellow 85   Drainage Amount  Small    Drainage Color Serous   Wound Odor None   Periwound Skin Condition Erythema, blanchable;Edematous   Cleansing and Cleansing Agents  Normal saline   Dressing Type Applied Silicone  (Mepilex)   Procedure Tolerated Fair                                                                            Dressing changed to nephrostomy tube sites. Nephrosotomy tubes observed to not be sutured in. Mepilex Foam placed beneath tube as well as on top to secure.    Ruperto Taylor made aware.      Principal Problem:    Septic shock ([facility] (6/20/2017)     Active Problems:    DEV (acute kidney injury) ([facility] (12/29/2015)       UTI (urinary tract infection) (3/3/2017)       Ulcer of sacral region, unstageable ([facility] (6/20/2017)              Patient Supine side; bed rails up x3, bed low and locked, lights dimmed for comfort, and call bell within patient's reach.     Patient verbalized understanding to all instructions provided but needs additional instruction.   Plan:      Nursing to Perform Daily  Wound Care/Dressing change to Sacrum as well as Right Buttock Wound: Cleanse wound with normal saline only (do not use dermal wound cleanser due to it inactivates santyl), apply no sting skin prep to periwound skin then Convatec Zinc Oxide Cream, apply santyl nickel-thickness to entire wound base/sides (to include necrotic tissue), then fill undermined area with 1/4 Strength Dakin's Moistened Mesalt Ribbon and Wound base/sides with 1/4 Strength Dakin's Moistened Mesalt Sheets, fill any dead space with Dakin's Moistened Kerlix, cover all with 4x4s and abd pads, secure with hypafix tape.     Nursing to Perform Daily  Wound Care/Dressing change to Left Posterior Back and Left Lateral (middle)Leg: Cleanse wound(s) with normal saline only (do not use dermal wound cleanser due to it inactivates santyl), apply no sting skin prep then zinc oxide cream to periwound skin, apply santyl (nickel-thickness) to entire wound, cover with folded saline-moistened 4x4 and Mepilex, secure with hypafix or paper tape.      Nursing to Perform Daily  Wound Care/Dressing change to Right Lateral Foot:  Cleanse wound with normal saline or dermal wound cleanser, apply no sting skin prep to wound     Nursing to Perform Daily  Cleanse between all skin folds (under breast, pannus, arms and legs) and apply zinc oxide cream      Nursing to Perform Tuesday Thursday Saturday  Wound Care/Dressing change to Right Lateral Leg, Right Lateral Side, Right Posterior Back, Left Lateral Leg (Distal and Proximal):  Cleanse wound with normal saline or dermal wound cleanser, apply no sting skin prep to periwound skin, cover with Mepilex Silicone Border, may secure with hypafix tape.        Continue Low air loss bed, TAP system, Q2hr Turning and Prevalon boots     Alexandre Dupont RN, [address] [facility]

## 2017-06-27 NOTE — PROGRESS NOTES
INFECTIOUS DISEASE FOLLOW UP NOTE :     Admit Date: 6/19/2017    ABX:       Dapto 6/20-7   Levoflox 6/19-1  Cefepime 6/19-4       ASSESSMENT-> RECOMMENDATIONS      SIRS/ ?sepsis POA  - hypotension requiring levophed, subjective fever but no leukocytsois  - due sec to infected Rt stent , BSI  - agreed to eventual mediport removal if necessary after d/w Dr. Vannessa Cadet 6/22 -> continue Daptomycin, Ceftriaxone 2 gm IV q 24 h   -> prognosis remains poor   Infected obstructed stents- suspect Rt ? left  - severe hydro on CT  - placement of b/l PCNs 5/11 at [facility] and left fell off few weeks ago  - urology consult appreciated  - tube was clamped on admission and now opened   - urine cx with GNR -> abx change as above   Polymicrobial BSI   - 1 of 3 blood cx 6/19 +ve for MRSA, E faecalis, CONS and E coli  - TTE 6/22: no veg on AV, MV, TV  - rpt 6/20 NG x 2  -> pt agrees to mediport removal if necessary -> continue Abx as above  -> will need at least 4 weeks (3 more)   -> re-culture for temp >101, remove mediport if positive again with any of organisms isolated 6/19   H/o multiple UTIs   - ucx >376N VRE was complicated with BSI and valve veg and required prolong hospitalization  - ucx 5/2017 with E.coli -unclear if got Rx or not  - s/p bilateral stent exchange 3/31, byrne changed 5/10 in urology office  - urcx 6/19 >100K E coli, >100K MRSA  - CT 6/20: Bilat hydro Rt > Lt, concern for obstruction on Rt -> Urology considering chronic R PCN vs trial of R resonance metal ureteral stent when better   Sacral ulcer stage 4  - CT 6/20 shows erosion into rectal wall   - wd cx 6/20 MRSA, E faecalis, CONS, CRE Klebsiella  - prognosis is extremely poor given CT findings and doubt a candidate for any Sx intervention -> continue contact isolation       Rt pelvic mass ? ovarian ca / endometrial ca with mets  - CT 6/20: worsening of adnexal mass with increasing pulmonary nodules  - palliative care was involved in past and pt was hospice but it was revoked last admission ->  prognosis remains extremely poor  -> appreciate palliative care input     H/o VRE BSI with ?aortic endocarditis in 3/2017   -  TTE aortic stenosis with possible aortic vegetation [chronic mixed calcified density]  -pt refused ESSIE and removal of mediport  - had many +ve subsequent blood cx ( In [place] reported as Veillonella species and Staph species in May 2017 -> treated with 6 weeks of Daptomycin    -> Still with same mediport    DEV- Cr 2.04  - suspect sec to obstruction, infection ->Monitor as stable with 1.3 today   Hypoalbuminemia     Lymphedema legs - ch  - suspect thrombus seen on CT also contributing      H/o LE DVT with IVC filter  - CT again showing progression     Seizure activity 3/26- was new  - no further seizures on Keppra     Co-morb- Anxiety depression, AS, ch pain, hemetemesis s/p EGD x6, MO, ch anemia      Ax- Clinda, Keflex, Linezolid, Zosyn, Sulfa, Vanco- most of the Abx cause rash and hives though had tolerated on and off in past with bendryl - Monitor  As tolerating cefepime for now         MICROBIOLOGY:   (3/3  Blcx x 2 - 2/2 VRE S gent, linezolid , daptomycin quinupristin, Ucx > 100K VRE  3/14 ucx ng  3/17 uctx ng  [place]-  5/1 blcx x3 with Veillonella species  5/10 blcx Staph epi, Staph cohnii  5/21 ucx >100k E.coli R Quinolones)     6/19 ucx >100k GNR, blcx 1 of 2 MRSA, E faecalis, GNR  6/20 blcx x2 IP, wd cx CONS, MRSA, E faecalis, CRE Klebsiella, S malto, , 3rd GNR, Diptheroids      LINES/DRAINS:   Rt chest mediport   [place] changed 5/10    MEDICATIONS:     Current Facility-Administered Medications   Medication Dose Route Frequency    cefTRIAXone (ROCEPHIN) 2 g in 0.9% sodium chloride (MBP/ADV) 50 mL MBP  2 g IntraVENous Q24H    famotidine (PEPCID) tablet 20 mg  20 mg Oral BID    dextrose 5% infusion  50 mL/hr IntraVENous CONTINUOUS    0.9% sodium chloride infusion 250 mL  250 mL IntraVENous PRN    sodium hypochlorite (QUARTER STRENGTH DAKIN'S) 0.125% irrigation (bottle)   Topical DAILY    collagenase (SANTYL) 250 unit/gram ointment   Topical DAILY    multivitamin, tx-iron-ca-min (THERA-M w/ IRON) tablet 1 Tab  1 Tab Oral DAILY    insulin lispro (HUMALOG) injection   SubCUTAneous AC&HS    glucose chewable tablet 16 g  4 Tab Oral PRN    glucagon (GLUCAGEN) injection 1 mg  1 mg IntraMUSCular PRN    dextrose (D50) infusion 12.5-25 g  25-50 mL IntraVENous PRN    enoxaparin (LOVENOX) injection 40 mg  40 mg SubCUTAneous Q12H    morphine injection 2 mg  2 mg IntraVENous Q4H PRN    naloxone (NARCAN) injection 0.4 mg  0.4 mg IntraVENous PRN    LORazepam (ATIVAN) tablet 1 mg  1 mg Oral Q6H PRN    nystatin (MYCOSTATIN) 100,000 unit/gram powder   Topical BID    polyethylene glycol (MIRALAX) packet 17 g  17 g Oral DAILY    potassium chloride (K-DUR, KLOR-CON) SR tablet 20 mEq  20 mEq Oral BID    sodium bicarbonate tablet 650 mg  650 mg Oral TID    triamcinolone acetonide (KENALOG) 0.025 % cream 1 g  1 g Topical TID    trospium (SANCTURA) tablet 20 mg  20 mg Oral BID    DAPTOmycin (CUBICIN) 672 mg in 0.9% sodium chloride 50 mL IVPB  6 mg/kg IntraVENous Q24H    sodium chloride (NS) flush 5-10 mL  5-10 mL IntraVENous PRN       SUBJECTIVE :     Interval notes reviewed. Out of ICU now. Sleeping soundly. Opened eyes briefly when examined but fell back to sleep immediately.   Afebrile    OBJECTIVE     Visit Vitals    /87 (BP 1 Location: Left arm, BP Patient Position: Lying left side)    Pulse 90    Temp 97.8 °F (36.6 °C)    Resp 16    Ht 5' 4\" (1.626 m)    Wt 112 kg (246 lb 14.6 oz)  Comment: pt not on a weigh bed    SpO2 92%    BMI 42.38 kg/m2       Temp (24hrs), Av.9 °F (36.6 °C), Min:97.7 °F (36.5 °C), Max:98.2 °F (36.8 °C)      Gen- pale chronically ill-appearing morbidly obese 56year-old  female, sleeping soundly, in no distress  HENT- did not open mouth to allow exam  Chest- no crackles or wheezes  CV-  RR, 2/6 systolic murmur  Abd-Soft, obese, non-tender,  large pannus with ulceration, BS+, rt PCN draining clear yellow urine, byrne draining clear yellow urine  Musculoskeletal: 1+ edema. No clubbing or cyanosis  Skin- Multiple skin ulcers on thigh, legs, sacrum- dressings intact.      Muscsk- 1+ edema      Labs: Results:   Chemistry Recent Labs      06/26/17   0230  06/25/17   0330  06/24/17   1845   GLU  80  105*   --    NA  141  141   --    K  4.2  4.0  3.9   CL  113*  111*   --    CO2  20*  20*   --    BUN  20*  19*   --    CREA  0.96  1.02   --    CA  7.5*  7.4*   --    AGAP  8  10   --    BUCR  21*  19   --       CBC w/Diff Recent Labs      06/26/17   0230  06/25/17   0330   WBC  9.3  9.0   RBC  2.99*  2.66*   HGB  8.0*  7.2*   HCT  26.2*  23.4*   PLT  146  143   GRANS  83*  79*   LYMPH  9*  14*   EOS  1  1          RADIOLOGY :          Imaging    Results from [place] encounter on 06/19/17   XR CHEST PORT   Narrative CHEST AP PORTABLE    Indication: Chest pain and shortness of breath. Comparison: 03/26/2017. Findings: Right port with catheter tip near cavoatrial junction. The lungs  appear clear. No evidence for pneumothorax or pleural effusion. Cardiac  silhouette and pulmonary vascularity appear within normal limits. Impression IMPRESSION:    No acute cardiopulmonary disease. Results from [place] encounter on 06/19/17   CT ABD PELV WO CONT   Narrative EXAM: CT of the abdomen and pelvis    INDICATION: 62-year-old patient with advanced stage endometrial cancer,  bilateral nephroureteral stents, and concern for possible infection adjacent to  the right percutaneous nephrostomy tube site. Nephrostomy catheter placement  occurred at an outside institution on 5/11/2017. COMPARISON: Several prior exams, most recent CT of the abdomen and pelvis  available for comparison: March 3, 2017.     TECHNIQUE: Axial CT imaging of the abdomen and pelvis was performed without  intravenous contrast. Multiplanar reformats were generated. One or more dose reduction techniques were used on this CT: automated exposure  control, adjustment of the mAs and/or kVp according to patient's size, and  iterative reconstruction techniques. The specific techniques utilized on this CT  exam have been documented in the patient's electronic medical record.  _______________    FINDINGS:    LOWER CHEST: Minor dependent atelectasis is noted at each lung base. No focal  pneumonic consolidation, pneumothorax or pleural effusion. Normal cardiac size. No pericardial effusion. There are several pulmonary nodules noted. The largest  is present in the right lower lobe (series 4, image 10) which measures  approximately 8 x 8 mm in size, and is similar to prior CT of 3/4/2017. Additional right lower lobe pulmonary nodule noted (series 4, image 3), as well  as in the left lower lobe (series 4, image 12). The right lower lobe nodule  measures approximately 3 mm and is new in appearance from prior, with slight  enlargement of the subpleural left lower lobe pulmonary nodule, which measures  approximately 5 mm. LIVER, BILIARY: The unenhanced appearance of the liver demonstrates no focal  abnormality. No biliary dilation. The gallbladder is surgically absent. PANCREAS: There is mild fatty infiltration of the pancreas without evidence of  pancreatic ductal dilatation. SPLEEN: Normal.    ADRENALS: Nodular thickening of the inferior left adrenal gland is similar to  prior exam.    KIDNEYS/URETERS/BLADDER:     On the right, there is percutaneous nephrostomy catheter, with formed loop  present with in the interpolar collecting system. In addition, there is a  nephroureteral stent, also with proximal formed loop in the interpolar  collecting system, and distal formed loop within the bladder. There is  moderately severe right-sided hydronephrosis, which is increased from prior CT  examination.     On the left, a nephroureteral stent is in place, with proximal formed loop noted  in the interpolar collecting system, and distal formed loop present within the  bladder. Mild left-sided hydronephrosis is present, similar to prior CT exam.    A Duarte catheter is present within the bladder, which is decompressed. PELVIC ORGANS: The uterus contains an unremarkable unenhanced appearance. Right  adnexal soft tissue lesion noted, measuring approximately 5.1 x 2.9 cm in size,  with exact measurements limited by lack of intravenous contrast as well as beam  hardening artifact. VASCULATURE: Abdominal aorta is normal in course and caliber. There is an  inferior vena cava filter which spans the renal veins, with several tines which  project external to the lumen of the inferior vena cava, potentially within the  right renal vein (images 46-47). LYMPH NODES: Periaortic adenopathy along the left side of the retroperitoneum is  similar in appearance to prior exams. GASTROINTESTINAL TRACT: There is a small hiatal hernia. Small intestine is  normal in course and caliber. Moderate burden of formed stool throughout the  large intestine, which is similarly normal in caliber. No bowel obstruction. No  free intraperitoneal gas. There is a advanced age sacral decubitus ulcer, which  freely communicates with the posterior wall of the rectum (series 3, image 107)  with associated soft tissue thickening/stranding at the site of decubitus ulcer  formation. BONES: There is accentuation of the lumbar lordosis, with unchanged grade 2  isthmic spondylolisthesis L5 on S1. Multilevel spondylosis noted, most advanced  L4-S1. Vertebral body heights are maintained intact. Moderately severe bilateral  hip joint osteoarthritis is present. OTHER: Numerous small foci of fat necrosis/fat-containing ventral abdominal  hernias are noted within the anterior abdominal wall, similar to prior.  Diffuse  subcutaneous body wall edema is present, along with redemonstration of multiple  abdominal wall collateral vessels, the appearance of which is increased in the  interval from prior CT examination. _______________         Impression IMPRESSION:    1. Bilateral nephroureteral stents and percutaneous nephrostomy catheter in  stable and expected position. There is moderately severe right hydronephrosis,  which is increased in magnitude from prior CT examination, with mild left  hydronephrosis, similar to prior. 2. Bladder decompressed with a Duarte catheter. 3. Enlargement of a right adnexal lesion from recent prior CT examination. 4. New and slightly enlarged pulmonary nodules as above. Follow-up per oncology  protocol. 5. Inferior vena cava filter which spans across the renal veins as described  above. Increased formation of abdominal wall collaterals as well as anasarca  noted. 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 6. Progression of an advanced stage sacral decubitus ulcer which directly  communicates with the posterior wall of the rectum with associated skin  thickening/cellulitis. 7. Unchanged, grade 2 isthmic spondylolisthesis L5-S1. Note: Preliminary report sent to the Emergency Department by the radiology  resident at the time of the study. The additional findings of hydronephrosis and advanced sacral decubitus ulcer  were discussed with Dr. Fuad Armando, hospitalist caring for the patient at 8:25 AM on  6/20/2017.             Angela Cason MD  June 27, 2017    [facility]  [phone]

## 2017-06-28 NOTE — PROGRESS NOTES
Orders received and chart reviewed palliative care meeting with family this am will wait for  Note prior to completing evaluation. 1516  Patient given comfort measures will discontinue orders.

## 2017-06-28 NOTE — ACP (ADVANCE CARE PLANNING)
Thompson Sylvester and Karolynn Pallas completed POST form for Ms [facility] DNR and comfort care needs. Copies were given to Thompson Sylvester and copy placed in Ms Grady's chart.     Ashwini Deshpande, Palliative

## 2017-06-28 NOTE — PROGRESS NOTES
1942: Bedside verbal shift report received from [place], [address] [place] [address] and [facility]. Pt is resting in bed, no signs of distress, instructed to press call light if assistance is needed, call light within reach. 0730: Bedside and Verbal shift change report given to Ryanne ruiz RN (oncoming nurse) by Anamika Santizo RN (offgoing nurse). Report included the following information SBAR, Kardex, Intake/Output, MAR and Recent Results.

## 2017-06-28 NOTE — PROGRESS NOTES
Per mal with  Palliative  She  Spoke  With  Dtr, they agree to comfort  Measures, and  Long term  Care, but want to take tonight  tomake final decision. Went over  Area facilities  Under CareParent,  And  Informed them that  Her  Income  Will go  To facility less 40.00/mo. dtr  Is aware. Posted in edc to  Facilities  In [address] [place] [address] 2050 in  [place] list  Given  To  Dtr, she  wiill choose facility  From  Accepting ones.

## 2017-06-28 NOTE — PROGRESS NOTES
Assume care resting ion bed. Lethargic and responsive to stimulus. No s/s of distress. No pain and discomfort noted. Bed on lowest position and wheels lock. Call bell within reach. 0330 Bedside and Verbal shift change report given to [facility] (oncoming nurse) by Andressa Tyler RN   (offgoing nurse). Report included the following information SBAR, Kardex, Intake/Output and MAR.

## 2017-06-28 NOTE — PROGRESS NOTES
[facility]: [phone]  [facility]: [phone]   [facility]/[address]: [phone]    Patient Name: Carmen Lino  YOB: 1960    Date of Initial Consult: 6/20/2017  Reason for Consult: care decisions  Requesting Provider: Dr. Chloe Bojorquez  Primary Care Physician: Jeffery Carpio MD      SUMMARY:   Carmen Lino is a 56y.o. year old with a past history of endometrial, ovarian cancer no further chemotherapy or surgical options available, has been on hospice care, chronic pain, AS, Urethral obstruction, who was admitted on 6/19/2017 from home with a diagnosis of fatigue, found to be in septic shock likely due to infected right stent, UTI. Current medical issues leading to Palliative Medicine involvement include: 56year old female who is well known to our service, with end stage endometrial and ovarian cancer, multiple co morbidities who was on hospice care, has now revoked their services. Palliative medicine is consulted to discuss care decisions. PALLIATIVE DIAGNOSES:   1. Metastatic endometrial cancer   2. Sepsis /UTI  3. Anxiety          PLAN:   1. [facility] [place] Her eyes were open, did not track me or follow commands/ no verbal communication. 2. Goals of care / care decisions Modesta Mantilla is no longer able to make her own medical decisions. She has executed an AMD, naming her daughter Rosaura Ott. Chaplain Richardson and I spoke with Rosaura Ott and her boyfriend Mauricio at bedside. Rosaura Ott understands from previous conversation with attending team, that her mother is very ill despite very aggressive treatment. She is not a candidate for diverting colostomy due to her poor condition. We compassionately but honestly shared with Rosaura Ott she is not likely to survive this event. Rosaura Ott was tearful but accepted the information and feels her mother is now tired and it has been a difficult journey for them both.  Understanding her poor prognosis, no heroics are to be employed including no chest compressions, no shock and no intubation for any reason, She is a DNR/DNI. We also discussed overwhelming sepsis / infection despite IVAB. She wishes to transition to comfort measures, stopping all aggressive treatments, including IVAB / lab work and x rays, no feeding tubes  and place on comfort measures only. POST form was completed with copy to chart and to Toby Lin. Comfort order set in place. Toby Lin is also aware her mother appears to be at the very end stage of life and could die in the next hours to days. 3. Symptom management will place on low dose Morphine PCA 0.5 mg / hr discussed with daughter to help manage pain and dyspnea . Side effects of morphine discussed including sedating effects. Ativan SL, Hankinul PRN also added for symptom management. 4. Disposition encouraged Angela to consider nursing facility with comfort measures and morphine PCA. Jenna,is bedbound, will need continued wound care for comfort, even with hospice support it would be difficult for Angela to provide care for her mother. Unfortunately patient and daughter have revoked hospice several times in past, suspect it is to difficult for daughter to be primary care giver for Juan Rousseau especially at end of life.  aware will speak with family  5. Initial consult note routed to primary continuity provider  6.  Communicated plan of care with: Palliative IDT, attending, daughter, RN, CM        GOALS OF CARE:     [====Goals of Care====]  GOALS OF CARE:  Patient / health care proxy stated goals: comfort measures       TREATMENT PREFERENCES:   Code Status: DNR    Advance Care Planning:  Advance Care Planning 6/20/2017   Patient's Healthcare Decision Maker is: Named in scanned ACP document   Primary Decision Maker Name Eugene Arteaga   Primary Decision Maker Phone Number -   Primary Decision Maker Relationship to Patient -   Secondary Decision Maker Name -   Secondary Decision Maker Phone Number -   Secondary Decision Maker Relationship to Patient -   Confirm Advance Directive Yes, on file   Patient Would Like to Complete Advance Directive -   Does the patient have other document types -       Other:    The palliative care team has discussed with patient / health care proxy about goals of care / treatment preferences for patient.  [====Goals of Care====]    Advance Care Planning 6/20/2017   Patient's Healthcare Decision Maker is: Named in scanned ACP document   Primary Decision Maker Name Namita Heredia   Primary Decision Maker Phone Number -   Primary Decision Maker Relationship to Patient -   Secondary Decision Maker Name -   Secondary Decision Maker Phone Number -   Secondary Decision Maker Relationship to Patient -   Confirm Advance Directive Yes, on file   Patient Would Like to Complete Advance Directive -   Does the patient have other document types -           HISTORY:     History obtained from: chart    CHIEF COMPLAINT: septic shock    HPI/SUBJECTIVE:    The patient is:   [] Verbal and participatory  [x] Non-participatory due to:   Please see summary   6/20/2017 CT scan abd / pelvis   Bilateral nephroureteral stents and percutaneous nephrostomy catheter in stable and expected position. There is moderately severe right hydronephrosis,  which is increased in magnitude from prior CT examination, with mild left hydronephrosis, similar to prior. 2. Bladder decompressed with a Duarte catheter. 3. Enlargement of a right adnexal lesion from recent prior CT examination. 4. New and slightly enlarged pulmonary nodules as above. Follow-up per oncology protocol. 5. Inferior vena cava filter which spans across the renal veins as described above. Increased formation of abdominal wall collaterals as well as anasarca  noted. 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   6. Progression of an advanced stage sacral decubitus ulcer which directly communicates with the posterior wall of the rectum with associated skin thickening/cellulitis. 7. Unchanged, grade 2 isthmic spondylolisthesis L5-S1.   Clinical Pain Assessment (nonverbal scale for nonverbal patients): Pain: 0  Adult Non-Verbal Pain Assessment    Face  [] 0   No particular expression or smile  [x] 1   Occasional grimace, tearing, frowning, wrinkled forehead  [] 2   Frequent grimace, tearing, frowning, wrinkled forehead    Activity (movement)  [x] 0   Lying quietly, normal position  [] 1   Seeking attention through movement or slow, cautious movement  [] 2   Restless, excessive activity and/or withdrawal reflexes    Guarding  [x] 0   Lying quietly, no positioning of hands over areas of body  [] 1   Splinting areas of the body, tense  [] 2   Rigid, stiff    Physiology (vital signs)  [x] 0   Stable vital signs  [] 1   Change in any of the following: SBP > 20mm Hg; HR > 20/minute  [] 2   Change in any of the following: SBP > 30mm Hg; HR > 25/minute    Respiratory  [x] 0   Baseline RR/SpO2, compliant with ventilator  [] 1   RR > 10 above baseline, or 5% drop SpO2, mild asynchrony with ventilator  [] 2   RR > 20 above baseline, or 10% drop SpO2, asynchrony with ventilator    Total Non-Verbal Pain Score: 1       FUNCTIONAL ASSESSMENT:     Palliative Performance Scale (PPS): 20%          PSYCHOSOCIAL/SPIRITUAL SCREENING:     Advance Care Planning:  Advance Care Planning 6/20/2017   Patient's Healthcare Decision Maker is: Named in scanned ACP document   Primary Decision Maker Name Filiberto Mckinley   Primary Decision Maker Phone Number -   Primary Decision Maker Relationship to Patient -   Secondary Decision Maker Name -   Secondary Decision Maker Phone Number -   Secondary Decision Maker Relationship to Patient -   Confirm Advance Directive Yes, on file   Patient Would Like to Complete Advance Directive -   Does the patient have other document types -        Any spiritual / Jainism concerns:  [] Yes /  [] No    Caregiver Burnout:  [] Yes /  [] No /  [] No Caregiver Present      Anticipatory grief assessment:   [] Normal  / [x] Maladaptive       ESAS Anxiety:      ESAS Depression:          REVIEW OF SYSTEMS:     Positive and pertinent negative findings in ROS are noted above in HPI. The following systems were [x] reviewed / [] unable to be reviewed as noted in HPI due to fatigue   Other findings are noted below. Systems: constitutional, ears/nose/mouth/throat, respiratory, gastrointestinal, genitourinary, musculoskeletal, integumentary, neurologic, psychiatric, endocrine. Positive findings noted below. Modified ESAS Completed by: provider           Pain: 0           Dyspnea: 0           Stool Occurrence(s): 1 (brown and formed)        PHYSICAL EXAM:     Wt Readings from Last 3 Encounters:   05/10/17 136.5 kg (301 lb)   05/06/17 104.8 kg (231 lb)   03/20/17 105 kg (231 lb 7.7 oz)     Blood pressure (!) 139/92, pulse 91, temperature 97.7 °F (36.5 °C), resp. rate 18, height 5' 4\" (1.626 m), weight 112 kg (246 lb 14.6 oz), SpO2 97 %. Pain:  Pain Scale 1: Visual  Pain Intensity 1: 0  Pain Onset 1: chronic  Pain Location 1: Back  Pain Orientation 1: Lower  Pain Description 1: Aching  Pain Intervention(s) 1: Medication (see MAR)       Constitutional: ill appearing lying in bed, poorly responsive   Respiratory: breathing not labored  Neurologic: opens eyes to name and stimulus, does not verbally respond, did not track me in room, did not follow commands.          HISTORY:     Principal Problem:    Septic shock ([address] (6/20/2017)    Active Problems:    Endometrial cancer ([facility] (3/18/2015)      Anemia (1/16/2013)      Morbid obesity ([facility] (10/2/2014)      CAD (coronary artery disease) (12/29/2015)      DEV (acute kidney injury) ([facility] (12/29/2015)      UTI (urinary tract infection) (3/3/2017)      Stage 4 skin ulcer of sacral region [facility]) (6/20/2017)      Past Medical History:   Diagnosis Date    Anxiety and depression  Aortic stenosis     Arthritis     Arthropathy     Cardiac disorder     Cellulitis     left leg    Chronic pain     Endometrial adenocarcinoma (HCC)     Gastric ulcer     H/O ovarian cancer     Hematemesis     Infection     Lymphedema     Munchausen syndrome     Narcotic dependence ([address]     Obesity     Spinal stenosis     Status post partial hysterectomy     Urethral obstruction       Past Surgical History:   Procedure Laterality Date    HX  SECTION      HX ENDOSCOPY      EGD 6 x [facility]    HX HERNIA REPAIR      HX OOPHORECTOMY      HX TONSILLECTOMY      HX VASCULAR ACCESS Right     single lumen power port      Family History   Problem Relation Age of Onset    Arthritis-osteo Mother     Heart Attack Mother     Cancer Father     Heart Attack Father     Hypertension Brother     Stroke Brother      History reviewed, no pertinent family history. Social History   Substance Use Topics    Smoking status: Former Smoker     Quit date: 3/31/2010    Smokeless tobacco: Never Used    Alcohol use No     Allergies   Allergen Reactions    Latex, Natural Rubber Hives and Itching    Clindamycin Hives    Clindamycin Itching    Iodine Hives     Pt denies allergy. Patient reports this is an allergy to contrast media that caused temporary vision loss and vomiting but is fine if premedicated with antihistamine.     Keflex [Cephalexin] Hives    Linezolid Hives    Nsaids (Non-Steroidal Anti-Inflammatory Drug) Nausea and Vomiting     Emesis, Previous GI bleed    Piperacillin-Tazobactam Hives     Has tolerated Teflaro @ [address]  Has been tolerating cephalosporins SAPH     Sulfa (Sulfonamide Antibiotics) Hives and Contact Dermatitis    Vancomycin Hives      Current Facility-Administered Medications   Medication Dose Route Frequency    LORazepam (INTENSOL) 2 mg/mL oral concentrate 0.5 mg  0.5 mg SubLINGual Q4H PRN    morphine (PF)  mg/30 ml   IntraVENous CONTINUOUS    glycopyrrolate (ROBINUL) injection 0.2 mg  0.2 mg IntraVENous Q4H PRN    hyoscyamine SL (LEVSIN/SL) tablet 0.125 mg  0.125 mg SubLINGual Q4H PRN    dextrose 5% infusion  25 mL/hr IntraVENous CONTINUOUS    sodium hypochlorite (QUARTER STRENGTH DAKIN'S) 0.125% irrigation (bottle)   Topical DAILY    collagenase (SANTYL) 250 unit/gram ointment   Topical DAILY    insulin lispro (HUMALOG) injection   SubCUTAneous AC&HS    morphine injection 2 mg  2 mg IntraVENous Q4H PRN    nystatin (MYCOSTATIN) 100,000 unit/gram powder   Topical BID    triamcinolone acetonide (KENALOG) 0.025 % cream 1 g  1 g Topical TID    sodium chloride (NS) flush 5-10 mL  5-10 mL IntraVENous PRN        LAB AND IMAGING FINDINGS:     Lab Results   Component Value Date/Time    WBC 12.0 06/27/2017 05:00 PM    HGB 8.3 06/27/2017 05:00 PM    PLATELET 429 28/48/3826 05:00 PM     Lab Results   Component Value Date/Time    Sodium 143 06/27/2017 05:00 PM    Potassium 3.9 06/27/2017 05:00 PM    Chloride 114 06/27/2017 05:00 PM    CO2 19 06/27/2017 05:00 PM    BUN 20 06/27/2017 05:00 PM    Creatinine 0.80 06/27/2017 05:00 PM    Calcium 7.8 06/27/2017 05:00 PM    Magnesium 1.8 06/27/2017 05:00 PM    Phosphorus 2.2 06/27/2017 05:00 PM      Lab Results   Component Value Date/Time    AST (SGOT) 16 06/19/2017 07:45 PM    Alk.  phosphatase 198 06/19/2017 07:45 PM    Protein, total 5.6 06/19/2017 07:45 PM    Albumin 1.4 06/23/2017 11:56 PM    Globulin 4.5 06/19/2017 07:45 PM     Lab Results   Component Value Date/Time    INR 1.4 06/21/2017 04:08 AM    Prothrombin time 16.3 06/21/2017 04:08 AM    aPTT 48.6 06/19/2017 07:45 PM      Lab Results   Component Value Date/Time    Iron 33 12/29/2015 09:30 AM    TIBC 301 12/29/2015 09:30 AM    Iron % saturation 11 12/29/2015 09:30 AM    Ferritin 75 08/11/2015 07:04 AM      No results found for: PH, PCO2, PO2  No components found for: [place]   Lab Results   Component Value Date/Time    CK 37 06/20/2017 12:30 PM    CK - MB 5.7 03/16/2017 11:30 AM              Total time: 70 minutes   Counseling / coordination time, spent as noted above: 50 minutes   > 50% counseling / coordination?: yes     Prolonged service was provided for  [x]30 min   []75 min in face to face time in the presence of the patient, spent as noted above. Time Start: 1315  Time End:  1430  Note: this can only be billed with  (initial) or 21 [phone] (follow up). If multiple start / stop times, list each separately.

## 2017-06-28 NOTE — PROGRESS NOTES
Internal Medicine Progress Note    Patient's Name: Varun Roth  Admit Date: 6/19/2017  Length of Stay: 8      Assessment/Plan     [facility] Problems    Diagnosis Date Noted    Septic shock ([facility] 06/20/2017    Stage 4 skin ulcer of sacral region [facility]) 06/20/2017    UTI (urinary tract infection) 03/03/2017    DEV (acute kidney injury) ([facility] 12/29/2015    CAD (coronary artery disease) 12/29/2015    Endometrial cancer ([facility] 03/18/2015    Morbid obesity ([facility] 10/02/2014    Anemia 01/16/2013     - Palliative care discussion with daughter and patient made comfort care  - D/c all antibx  - PCA morphine    I have personally reviewed all pertinent labs and films that have officially resulted over the last 24 hours. I have personally checked for all pending labs that are awaiting final results.     Subjective     Pt s/e @ bedside  Remains lethargic  Not interacting  Unable to assess ROS    Objective     Visit Vitals    BP (!) 139/92 (BP 1 Location: Left arm, BP Patient Position: At rest)    Pulse 91    Temp 97.7 °F (36.5 °C)    Resp 18    Ht 5' 4\" (1.626 m)    Wt 112 kg (246 lb 14.6 oz)    SpO2 97%    BMI 42.38 kg/m2       Physical Exam:  General Appearance: NAD, lethargic  HENT: normocephalic/atraumatic, dry mucus membranes  Neck: No JVD, supple  Lungs: Diminished BS with normal respiratory effort  CV: RRR, no m/r/g  Abdomen: soft, non-tender, obese, normal bowel sounds  Extremities: no cyanosis, + B/L LE edema  Neuro: No focal deficits, not cooperating with exam  Skin: Normal color, + areas ecchymoses UE  Lymphatics: No cervical or supraclavicular lymphadenopathy  Psych: AA&Ox0    Lab/Data Reviewed:  BMP:   Lab Results   Component Value Date/Time     06/27/2017 05:00 PM    K 3.9 06/27/2017 05:00 PM     (H) 06/27/2017 05:00 PM    CO2 19 (L) 06/27/2017 05:00 PM    AGAP 10 06/27/2017 05:00 PM    GLU 80 06/27/2017 05:00 PM    BUN 20 (H) 06/27/2017 05:00 PM    CREA 0.80 06/27/2017 05:00 PM GFRAA >60 06/27/2017 05:00 PM    GFRNA >60 06/27/2017 05:00 PM     CBC:   Lab Results   Component Value Date/Time    WBC 12.0 06/27/2017 05:00 PM    HGB 8.3 (L) 06/27/2017 05:00 PM    HCT 26.9 (L) 06/27/2017 05:00 PM     (L) 06/27/2017 05:00 PM       Imaging Reviewed:  No results found.     Medications Reviewed:  Current Facility-Administered Medications   Medication Dose Route Frequency    cefTRIAXone (ROCEPHIN) 2 g in 0.9% sodium chloride (MBP/ADV) 50 mL MBP  2 g IntraVENous Q24H    famotidine (PEPCID) tablet 20 mg  20 mg Oral BID    dextrose 5% infusion  50 mL/hr IntraVENous CONTINUOUS    0.9% sodium chloride infusion 250 mL  250 mL IntraVENous PRN    sodium hypochlorite (QUARTER STRENGTH DAKIN'S) 0.125% irrigation (bottle)   Topical DAILY    collagenase (SANTYL) 250 unit/gram ointment   Topical DAILY    multivitamin, tx-iron-ca-min (THERA-M w/ IRON) tablet 1 Tab  1 Tab Oral DAILY    insulin lispro (HUMALOG) injection   SubCUTAneous AC&HS    glucose chewable tablet 16 g  4 Tab Oral PRN    glucagon (GLUCAGEN) injection 1 mg  1 mg IntraMUSCular PRN    dextrose (D50) infusion 12.5-25 g  25-50 mL IntraVENous PRN    enoxaparin (LOVENOX) injection 40 mg  40 mg SubCUTAneous Q12H    morphine injection 2 mg  2 mg IntraVENous Q4H PRN    naloxone (NARCAN) injection 0.4 mg  0.4 mg IntraVENous PRN    LORazepam (ATIVAN) tablet 1 mg  1 mg Oral Q6H PRN    nystatin (MYCOSTATIN) 100,000 unit/gram powder   Topical BID    polyethylene glycol (MIRALAX) packet 17 g  17 g Oral DAILY    potassium chloride (K-DUR, KLOR-CON) SR tablet 20 mEq  20 mEq Oral BID    sodium bicarbonate tablet 650 mg  650 mg Oral TID    triamcinolone acetonide (KENALOG) 0.025 % cream 1 g  1 g Topical TID    trospium (SANCTURA) tablet 20 mg  20 mg Oral BID    DAPTOmycin (CUBICIN) 672 mg in 0.9% sodium chloride 50 mL IVPB  6 mg/kg IntraVENous Q24H    sodium chloride (NS) flush 5-10 mL  5-10 mL IntraVENous PRN           Louana Anchors Kaitlin Hancock  Internal Medicine, Hospitalist  Pager: [phone]  [address] [place] [address] Group

## 2017-06-28 NOTE — PROGRESS NOTES
INFECTIOUS DISEASE FOLLOW UP NOTE :     Admit Date: 6/19/2017    ABX:       Dapto 6/20-8   Ceftriaxone 6/27 - 1   Levoflox 6/19-1  Cefepime 6/19-7       ASSESSMENT-> RECOMMENDATIONS      SIRS/ ?sepsis POA  - hypotension requiring levophed, subjective fever but no leukocytsois  - due sec to infected Rt stent , BSI  - agreed to eventual mediport removal if necessary after d/w Dr. Lloyd Peters 6/22 -> continue Daptomycin, Ceftriaxone 2 gm IV q 24 h   -> prognosis remains poor  -> understand discussion w/ daughter re: comfort measures continues   Infected obstructed stents- suspect Rt ? left  - severe hydro on CT  - placement of b/l PCNs 5/11 at [facility] and left fell off few weeks ago  - urology consult appreciated  - tube was clamped on admission and now opened   - urine cx with GNR -> abx change as above   Polymicrobial BSI   - 1 of 3 blood cx 6/19 +ve for MRSA, E faecalis, CONS and E coli  - TTE 6/22: no veg on AV, MV, TV  - rpt 6/20 NG x 2  -> pt agrees to mediport removal if necessary -> continue Abx as above  -> will need at least 4 weeks (3 more - unless comfort measure instituted)   -> re-culture for temp >101, remove mediport if positive again with any of organisms isolated 6/19   H/o multiple UTIs   - ucx >064M VRE was complicated with BSI and valve veg and required prolong hospitalization  - ucx 5/2017 with E.coli -unclear if got Rx or not  - s/p bilateral stent exchange 3/31, byrne changed 5/10 in urology office  - urcx 6/19 >100K E coli, >100K MRSA  - CT 6/20: Bilat hydro Rt > Lt, concern for obstruction on Rt -> Urology considering chronic R PCN vs trial of R resonance metal ureteral stent when better (but improvement seems unlikely)   Sacral ulcer stage 4  - CT 6/20 shows erosion into rectal wall   - wd cx 6/20 MRSA, E faecalis, CONS, CRE Klebsiella  - prognosis is extremely poor given CT findings and doubt a candidate for any Sx intervention -> continue contact isolation       Rt pelvic mass ? ovarian ca / endometrial ca with mets  - CT 6/20: worsening of adnexal mass with increasing pulmonary nodules  - palliative care was involved in past and pt was hospice but it was revoked last admission ->  prognosis remains extremely poor  -> appreciate palliative care input     H/o VRE BSI with ?aortic endocarditis in 3/2017   -  TTE aortic stenosis with possible aortic vegetation [chronic mixed calcified density]  -pt refused ESSIE and removal of mediport  - had many +ve subsequent blood cx ( In [place] reported as Veillonella species and Staph species in May 2017 -> treated with 6 weeks of Daptomycin    -> Still with same mediport    DEV- Cr 2.04  - suspect sec to obstruction, infection ->Monitor as stable with 1.3 today   Hypoalbuminemia     Lymphedema legs - ch  - suspect thrombus seen on CT also contributing      H/o LE DVT with IVC filter  - CT again showing progression     Seizure activity 3/26- was new  - no further seizures on Keppra     Co-morb- Anxiety depression, AS, ch pain, hemetemesis s/p EGD x6, MO, ch anemia      Ax- Clinda, Keflex, Linezolid, Zosyn, Sulfa, Vanco- most of the Abx cause rash and hives though had tolerated on and off in past with bendryl - Monitor  As tolerating cefepime for now         MICROBIOLOGY:   (3/3  Blcx x 2 - 2/2 VRE S gent, linezolid , daptomycin quinupristin, Ucx > 100K VRE  3/14 ucx ng  3/17 uctx ng  [place]-  5/1 blcx x3 with Veillonella species  5/10 blcx Staph epi, Staph cohnii  5/21 ucx >100k E.coli R Quinolones)     6/19 ucx >100k GNR, blcx 1 of 2 MRSA, E faecalis, GNR  6/20 blcx x2 IP, wd cx CONS, MRSA, E faecalis, CRE Klebsiella, S malto, , 3rd GNR, Diptheroids      LINES/DRAINS:   Rt chest mediport   Duaret changed 5/10    MEDICATIONS:     Current Facility-Administered Medications   Medication Dose Route Frequency    cefTRIAXone (ROCEPHIN) 2 g in 0.9% sodium chloride (MBP/ADV) 50 mL MBP  2 g IntraVENous Q24H    famotidine (PEPCID) tablet 20 mg  20 mg Oral BID    dextrose 5% infusion  50 mL/hr IntraVENous CONTINUOUS    0.9% sodium chloride infusion 250 mL  250 mL IntraVENous PRN    sodium hypochlorite (QUARTER STRENGTH DAKIN'S) 0.125% irrigation (bottle)   Topical DAILY    collagenase (SANTYL) 250 unit/gram ointment   Topical DAILY    multivitamin, tx-iron-ca-min (THERA-M w/ IRON) tablet 1 Tab  1 Tab Oral DAILY    insulin lispro (HUMALOG) injection   SubCUTAneous AC&HS    glucose chewable tablet 16 g  4 Tab Oral PRN    glucagon (GLUCAGEN) injection 1 mg  1 mg IntraMUSCular PRN    dextrose (D50) infusion 12.5-25 g  25-50 mL IntraVENous PRN    enoxaparin (LOVENOX) injection 40 mg  40 mg SubCUTAneous Q12H    morphine injection 2 mg  2 mg IntraVENous Q4H PRN    naloxone (NARCAN) injection 0.4 mg  0.4 mg IntraVENous PRN    LORazepam (ATIVAN) tablet 1 mg  1 mg Oral Q6H PRN    nystatin (MYCOSTATIN) 100,000 unit/gram powder   Topical BID    polyethylene glycol (MIRALAX) packet 17 g  17 g Oral DAILY    potassium chloride (K-DUR, KLOR-CON) SR tablet 20 mEq  20 mEq Oral BID    sodium bicarbonate tablet 650 mg  650 mg Oral TID    triamcinolone acetonide (KENALOG) 0.025 % cream 1 g  1 g Topical TID    trospium (SANCTURA) tablet 20 mg  20 mg Oral BID    DAPTOmycin (CUBICIN) 672 mg in 0.9% sodium chloride 50 mL IVPB  6 mg/kg IntraVENous Q24H    sodium chloride (NS) flush 5-10 mL  5-10 mL IntraVENous PRN       SUBJECTIVE :     Interval notes reviewed. Continues to be lethargic. Opens eyes very briefly to name. No response to questions or commands. Afebrile. Not in respiratory distress.     OBJECTIVE     Visit Vitals    BP (!) 139/92 (BP 1 Location: Left arm, BP Patient Position: At rest)    Pulse 91    Temp 97.7 °F (36.5 °C)    Resp 18    Ht 5' 4\" (1.626 m)    Wt 112 kg (246 lb 14.6 oz)  Comment: pt not on a weigh bed    SpO2 97%    BMI 42.38 kg/m2       Temp (24hrs), Av.5 °F (36.4 °C), Min:97 °F (36.1 °C), Max:97.8 °F (36.6 °C)      Gen- pale chronically ill-appearing morbidly obese 56year-old  female, sleeping soundly, in no distress  HENT- did not open mouth to allow exam  Chest- no crackles or wheezes  CV-  RR, 2/6 systolic murmur  Abd-Soft, obese, non-tender,  large pannus with ulceration, BS+, rt PCN draining clear yellow urine, byrne draining clear yellow urine  Musculoskeletal: 1+ edema. No clubbing or cyanosis  Skin- Multiple skin ulcers on thigh, legs, sacrum- dressings intact.      Muscsk- 1+ edema      Labs: Results:   Chemistry Recent Labs      06/27/17   1700  06/26/17   0230   GLU  80  80   NA  143  141   K  3.9  4.2   CL  114*  113*   CO2  19*  20*   BUN  20*  20*   CREA  0.80  0.96   CA  7.8*  7.5*   AGAP  10  8   BUCR  25*  21*      CBC w/Diff Recent Labs      06/27/17   1700  06/26/17   0230   WBC  12.0  9.3   RBC  3.06*  2.99*   HGB  8.3*  8.0*   HCT  26.9*  26.2*   PLT  133*  146   GRANS  85*  83*   LYMPH  10*  9*   EOS  0  1          RADIOLOGY :          Imaging    Results from Hospital Encounter encounter on 06/19/17   XR CHEST PORT   Narrative CHEST AP PORTABLE    Indication: Chest pain and shortness of breath. Comparison: 03/26/2017. Findings: Right port with catheter tip near cavoatrial junction. The lungs  appear clear. No evidence for pneumothorax or pleural effusion. Cardiac  silhouette and pulmonary vascularity appear within normal limits. Impression IMPRESSION:    No acute cardiopulmonary disease. Results from [place] encounter on 06/19/17   CT ABD PELV WO CONT   Narrative EXAM: CT of the abdomen and pelvis    INDICATION: 79-year-old patient with advanced stage endometrial cancer,  bilateral nephroureteral stents, and concern for possible infection adjacent to  the right percutaneous nephrostomy tube site. Nephrostomy catheter placement  occurred at an outside institution on 5/11/2017.     COMPARISON: Several prior exams, most recent CT of the abdomen and pelvis  available for comparison: March 3, 2017. TECHNIQUE: Axial CT imaging of the abdomen and pelvis was performed without  intravenous contrast. Multiplanar reformats were generated. One or more dose reduction techniques were used on this CT: automated exposure  control, adjustment of the mAs and/or kVp according to patient's size, and  iterative reconstruction techniques. The specific techniques utilized on this CT  exam have been documented in the patient's electronic medical record.  _______________    FINDINGS:    LOWER CHEST: Minor dependent atelectasis is noted at each lung base. No focal  pneumonic consolidation, pneumothorax or pleural effusion. Normal cardiac size. No pericardial effusion. There are several pulmonary nodules noted. The largest  is present in the right lower lobe (series 4, image 10) which measures  approximately 8 x 8 mm in size, and is similar to prior CT of 3/4/2017. Additional right lower lobe pulmonary nodule noted (series 4, image 3), as well  as in the left lower lobe (series 4, image 12). The right lower lobe nodule  measures approximately 3 mm and is new in appearance from prior, with slight  enlargement of the subpleural left lower lobe pulmonary nodule, which measures  approximately 5 mm. LIVER, BILIARY: The unenhanced appearance of the liver demonstrates no focal  abnormality. No biliary dilation. The gallbladder is surgically absent. PANCREAS: There is mild fatty infiltration of the pancreas without evidence of  pancreatic ductal dilatation. SPLEEN: Normal.    ADRENALS: Nodular thickening of the inferior left adrenal gland is similar to  prior exam.    KIDNEYS/URETERS/BLADDER:     On the right, there is percutaneous nephrostomy catheter, with formed loop  present with in the interpolar collecting system. In addition, there is a  nephroureteral stent, also with proximal formed loop in the interpolar  collecting system, and distal formed loop within the bladder.  There is  moderately severe right-sided hydronephrosis, which is increased from prior CT  examination. On the left, a nephroureteral stent is in place, with proximal formed loop noted  in the interpolar collecting system, and distal formed loop present within the  bladder. Mild left-sided hydronephrosis is present, similar to prior CT exam.    A Duarte catheter is present within the bladder, which is decompressed. PELVIC ORGANS: The uterus contains an unremarkable unenhanced appearance. Right  adnexal soft tissue lesion noted, measuring approximately 5.1 x 2.9 cm in size,  with exact measurements limited by lack of intravenous contrast as well as beam  hardening artifact. VASCULATURE: Abdominal aorta is normal in course and caliber. There is an  inferior vena cava filter which spans the renal veins, with several tines which  project external to the lumen of the inferior vena cava, potentially within the  right renal vein (images 46-47). LYMPH NODES: Periaortic adenopathy along the left side of the retroperitoneum is  similar in appearance to prior exams. GASTROINTESTINAL TRACT: There is a small hiatal hernia. Small intestine is  normal in course and caliber. Moderate burden of formed stool throughout the  large intestine, which is similarly normal in caliber. No bowel obstruction. No  free intraperitoneal gas. There is a advanced age sacral decubitus ulcer, which  freely communicates with the posterior wall of the rectum (series 3, image 107)  with associated soft tissue thickening/stranding at the site of decubitus ulcer  formation. BONES: There is accentuation of the lumbar lordosis, with unchanged grade 2  isthmic spondylolisthesis L5 on S1. Multilevel spondylosis noted, most advanced  L4-S1. Vertebral body heights are maintained intact. Moderately severe bilateral  hip joint osteoarthritis is present.     OTHER: Numerous small foci of fat necrosis/fat-containing ventral abdominal  hernias are noted within the anterior abdominal wall, similar to prior. Diffuse  subcutaneous body wall edema is present, along with redemonstration of multiple  abdominal wall collateral vessels, the appearance of which is increased in the  interval from prior CT examination. _______________         Impression IMPRESSION:    1. Bilateral nephroureteral stents and percutaneous nephrostomy catheter in  stable and expected position. There is moderately severe right hydronephrosis,  which is increased in magnitude from prior CT examination, with mild left  hydronephrosis, similar to prior. 2. Bladder decompressed with a Duarte catheter. 3. Enlargement of a right adnexal lesion from recent prior CT examination. 4. New and slightly enlarged pulmonary nodules as above. Follow-up per oncology  protocol. 5. Inferior vena cava filter which spans across the renal veins as described  above. Increased formation of abdominal wall collaterals as well as anasarca  noted. 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 6. Progression of an advanced stage sacral decubitus ulcer which directly  communicates with the posterior wall of the rectum with associated skin  thickening/cellulitis. 7. Unchanged, grade 2 isthmic spondylolisthesis L5-S1. Note: Preliminary report sent to the Emergency Department by the radiology  resident at the time of the study. The additional findings of hydronephrosis and advanced sacral decubitus ulcer  were discussed with Dr. Mosie Hashimoto, hospitalist caring for the patient at 8:25 AM on  6/20/2017.             Matt Taylor MD  June 28, 2017    [facility] AT THE [facility]  [phone]

## 2017-06-28 NOTE — PROGRESS NOTES
and Palliative NP Anika Coughlin met with Ms Naomy Guy daughter Rl Bee and boyfriend Carina Decker, in staff break room at end of hallway near Ms Dominga Cifuentes.  offered compassionate listening and support as Daisy Wilkins shared her understanding of how sick her mom is. Daisy Wilkins and Carina Decker agreed to comfort measures at this time and will make decision about sending mom to a nursing facility with hospice. Daisy Wilkins shared some of her feelings and emotions about her mom's illness and fight for life over the last several years. Daisy Wilkins and Bonni Claude completed POST form for Ms Naomy Guy DNR and comfort care needs. Copies were given to Daisy Wilkins and copy placed in Ms Grady's chart. Daisy Wilkins and Mauricio expressed their appreciation for meeting, information and support. Chaplains will continue to be available for follow up support as needed/requested.     Greyson Ordoñez, Palliative

## 2017-06-28 NOTE — PROGRESS NOTES
Paged Dr. Lord Malik regarding byrne on NDP, advised of obstruction.  Telephone order to change order to not follow nurse driven protocol for removal.  RBV

## 2017-06-29 NOTE — ROUTINE PROCESS
0740 assumed care of pt after bedside verbal report was given by off going nurse, pt asleep in bed quietly, D5 saline infusing at 25 ml/hr,  morphine pca infusing     1233 pt resting in bed quietly, no distress noted, will monitor     1601 pt asleep in bed, no changes noted    1949 Bedside and Verbal shift change report given to Vernon Melchor (oncoming nurse) by SHELLY Catalan (offgoing nurse). Report included the following information SBAR, Kardex and MAR.

## 2017-06-29 NOTE — ROUTINE PROCESS
Bedside and Verbal shift change report given to Lasalle Klinefelter (oncoming nurse) by Chapis Hidalgo (offgoing nurse). Report included the following information SBAR, Kardex and MAR.

## 2017-06-29 NOTE — PROGRESS NOTES
Notified [facility] nurse liaison, Dat Caballero, and their intake rep, Sela Goldberg, of the referral for hospice at a facility. Choice of facility pending.  Dat Caballero will f/u for eval.

## 2017-06-29 NOTE — PROGRESS NOTES
NUTRITION screen  Pt seen for:       []   Supplements  [x]   PO intake check   []   Food Allergies  []   Food Preferences/Tolerances    []   Rescreen   []   Education    []   Diet order clarification []   Length of Stay > 7 days            SUBJECTIVE/OBJECTIVE:     Information obtained from:   Pt has been placed on comfort care, ABT stopped.   Diet: Regular, 2gm NA  PO Intake:  []   Good        []   Fair        [x]   Poor   Patient Vitals for the past 100 hrs:   % Diet Eaten   06/25/17 0900 0 %       Weight Changes:   []   Loss  []   Gain  []   Stable    Problems Identified:     []   Specified food preferences   []   Dislikes supplements              []   Allergies  []   Difficulty chewing     []   Dentition   []   Nausea/Vomiting  []   Constipation  []   Diarrhea    RD RECOMMENDATIONS/PLAN:     []   Obtained/adjusted food preferences/tolerances and/or snacks options   []   Dislikes supplements will try a substitution   []   Modify diet for food allergies  []   Adjust texture due to difficulty chewing   []   Educated patient  []   Rescreen per screening protocol  []   Add Supplements    Rescreen:  []   At Nutrition Risk  [x]   Not at Nutrition Risk, rescreen per screening protocol    Dulce Gloria RD

## 2017-06-29 NOTE — PROGRESS NOTES
Internal Medicine Progress Note    Patient's Name: Ella Camacho  Admit Date: 6/19/2017  Length of Stay: 9      Assessment/Plan     C/John Valencia 1106 Problems    Diagnosis Date Noted    Septic shock ([facility] 06/20/2017    Stage 4 skin ulcer of sacral region [facility]) 06/20/2017    UTI (urinary tract infection) 03/03/2017    DEV (acute kidney injury) ([facility] 12/29/2015    CAD (coronary artery disease) 12/29/2015    Endometrial cancer ([facility] 03/18/2015    Morbid obesity ([facility] 10/02/2014    Anemia 01/16/2013     - Awaiting hospice  - Cont PCA morphine    Subjective     Pt seen in bed  Appears comfortable    Objective     Visit Vitals    BP (!) 132/92 (BP 1 Location: Left arm, BP Patient Position: At rest)    Pulse (!) 40    Temp 97.8 °F (36.6 °C)    Resp 14    Ht 5' 4\" (1.626 m)    Wt 112 kg (246 lb 14.6 oz)    SpO2 91%    BMI 42.38 kg/m2       Physical Exam:  General Appearance: NAD, lethargic  HENT: normocephalic/atraumatic, dry mucus membranes  Neck: No JVD, supple  Ext: B/L edema    Lab/Data Reviewed:  BMP:   No results found for: NA, K, CL, CO2, AGAP, GLU, BUN, CREA, GFRAA, GFRNA  CBC:   No results found for: WBC, HGB, HGBEXT, HCT, HCTEXT, PLT, PLTEXT, HGBEXT, HCTEXT, PLTEXT    Imaging Reviewed:  No results found.     Medications Reviewed:  Current Facility-Administered Medications   Medication Dose Route Frequency    LORazepam (INTENSOL) 2 mg/mL oral concentrate 0.5 mg  0.5 mg SubLINGual Q4H PRN    glycopyrrolate (ROBINUL) injection 0.2 mg  0.2 mg IntraVENous Q4H PRN    hyoscyamine sulfate (CYSTOSPAZ) tablet 0.125 mg  0.125 mg SubLINGual Q4H PRN    morphine (PF)  mg/30 ml   IntraVENous CONTINUOUS    dextrose 5% infusion  25 mL/hr IntraVENous CONTINUOUS    sodium hypochlorite (QUARTER STRENGTH DAKIN'S) 0.125% irrigation (bottle)   Topical DAILY    collagenase (SANTYL) 250 unit/gram ointment   Topical DAILY    morphine injection 2 mg  2 mg IntraVENous Q4H PRN    nystatin (MYCOSTATIN) 100,000 unit/gram powder   Topical BID    triamcinolone acetonide (KENALOG) 0.025 % cream 1 g  1 g Topical TID    sodium chloride (NS) flush 5-10 mL  5-10 mL IntraVENous PRN           Ofelia Covert, DO  Internal Medicine, Hospitalist  Pager: [facility]

## 2017-06-29 NOTE — DISCHARGE SUMMARY
Internal Medicine Discharge Summary        Patient: Amira Codding    YOB: 1960    Age:  56 y. o. Admit Date: 6/19/2017    Discharge Date: 6/30/2017    LOS:  LOS: 10 days     Discharge To:  Hospice    Consults: Infectious Disease, Pulmonary/Critical Care, Urology and Palliative Care    Admission Diagnoses: Septic shock ([facility]  Septic shock ([facility]  UTI (urinary tract infection)  DEV (acute kidney injury) ([facility]  Ulcer of sacral region, unstageable [facility])    Discharge Diagnoses:    Problem List as of 6/29/2017  Date Reviewed: 5/16/2017          Codes Class Noted - Resolved    * (Principal)Septic shock ([facility] ICD-10-CM: A41.9, R65.21  ICD-9-CM: 038.9, 785.52, 995.92  6/20/2017 - Present        Stage 4 skin ulcer of sacral region [facility]) ICD-10-CM: L89.154  ICD-9-CM: 707.03, 707.24  6/20/2017 - Present        Severe thrombocytopenia (HCC) ICD-10-CM: D69.6  ICD-9-CM: 287.5  3/4/2017 - Present        UTI (urinary tract infection) ICD-10-CM: N39.0  ICD-9-CM: 599.0  3/3/2017 - Present        Severe sepsis with septic shock (CODE) ICD-10-CM: R65.21  ICD-9-CM: 038.9, 995.92, 785.52  3/3/2017 - Present        Bladder mass ICD-10-CM: N32.89  ICD-9-CM: 596.89  8/29/2016 - Present        Flank pain, chronic ICD-10-CM: R10.9, G89.29  ICD-9-CM: 789.09, 338.29  8/29/2016 - Present        Acute blood loss anemia ICD-10-CM: D62  ICD-9-CM: 285.1  7/2/2016 - Present        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  ICD-9-CM: 301.51  12/31/2015 - Present        CAD (coronary artery disease) (Chronic) ICD-10-CM: I25.10  ICD-9-CM: 414.00  12/29/2015 - Present        History of Clostridium difficile (Chronic) ICD-10-CM: Z87.19  ICD-9-CM: V12.79  12/29/2015 - Present        History of endometrial cancer (Chronic) ICD-10-CM: Z85.42  ICD-9-CM: V10.42  12/29/2015 - Present        History of GI bleed (Chronic) ICD-10-CM: Z87.19  ICD-9-CM: V12.79  12/29/2015 - Present        Lymphedema (Chronic) ICD-10-CM: I89.0  ICD-9-CM: 457.1  12/29/2015 - Present        Hypokalemia ICD-10-CM: E87.6  ICD-9-CM: 276.8  12/29/2015 - Present        DEV (acute kidney injury) ([facility] ICD-10-CM: N17.9  ICD-9-CM: 584.9  12/29/2015 - Present        Pulmonary nodules (Chronic) ICD-10-CM: R91.8  ICD-9-CM: 793.19  12/29/2015 - Present        A-fib (HCC) (Chronic) ICD-10-CM: I48.91  ICD-9-CM: 427.31  12/29/2015 - Present        Aortic stenosis (Chronic) ICD-10-CM: I35.0  ICD-9-CM: 424.1  12/29/2015 - Present        GI bleed ICD-10-CM: K92.2  ICD-9-CM: 578.9  12/29/2015 - Present        Chronic pain syndrome (Chronic) ICD-10-CM: G89.4  ICD-9-CM: 338.4  3/18/2015 - Present        Endometrial cancer ([facility] ICD-10-CM: C54.1  ICD-9-CM: 182.0  3/18/2015 - Present        History of ovarian cancer (Chronic) ICD-10-CM: Z85.43  ICD-9-CM: V10.43  3/18/2015 - Present        History of gastric bypass (Chronic) ICD-10-CM: Z98.890  ICD-9-CM: V45.86  3/18/2015 - Present        Morbid obesity ([facility] (Chronic) ICD-10-CM: E66.01  ICD-9-CM: 278.01  10/2/2014 - Present        Nausea and vomiting ICD-10-CM: R11.2  ICD-9-CM: 787.01  10/23/2013 - Present        Anxiety (Chronic) ICD-10-CM: F41.9  ICD-9-CM: 300.00  3/15/2013 - Present        Anemia ICD-10-CM: D64.9  ICD-9-CM: 285.9  1/16/2013 - Present        RESOLVED: New onset a-fib ([facility] ICD-10-CM: I48.91  ICD-9-CM: 427.31  12/3/2015 - 12/29/2015        RESOLVED: Hemoptysis ICD-10-CM: X72.3  ICD-9-CM: 786.30  11/14/2015 - 12/29/2015        RESOLVED: Facial rash ICD-10-CM: R21  ICD-9-CM: 782.1  11/12/2015 - 12/29/2015        RESOLVED: Clostridium difficile diarrhea ICD-10-CM: A04.7  ICD-9-CM: 008.45  8/12/2015 - 12/29/2015        RESOLVED: GI bleed ICD-10-CM: K92.2  ICD-9-CM: 578.9  8/10/2015 - 12/29/2015        RESOLVED: Lymphedema ICD-10-CM: I89.0  ICD-9-CM: 457.1  8/10/2015 - 12/29/2015        RESOLVED: Pulmonary nodule ICD-10-CM: R91.1  ICD-9-CM: 793.11  8/10/2015 - 12/29/2015        RESOLVED: Cellulitis of leg without foot, left ICD-10-CM: L03.116  ICD-9-CM: 682.6  8/8/2015 - 12/29/2015        RESOLVED: Hematemesis ICD-10-CM: K92.0  ICD-9-CM: 578.0  7/16/2015 - 12/29/2015        RESOLVED: Pelvic pain in female ICD-10-CM: R10.2  ICD-9-CM: 625.9  3/18/2015 - 12/29/2015        RESOLVED: Encounter for long-term (current) use of other medications ICD-10-CM: Z79.899  ICD-9-CM: V58.69  3/18/2015 - 12/29/2015        RESOLVED: Lymphedema of lower extremity ICD-10-CM: I89.0  ICD-9-CM: 457.1  10/2/2014 - 12/29/2015        RESOLVED: Venous insufficiency of lower extremity ICD-10-CM: I87.2  ICD-9-CM: 459.81  10/2/2014 - 12/29/2015        RESOLVED: Cellulitis of lower extremity ICD-10-CM: L03.119  ICD-9-CM: 682.6  9/27/2014 - 12/29/2015        RESOLVED: Fall ICD-10-CM: W19. Chente Alvarado  ICD-9-CM: E888.9  7/28/2014 - 12/29/2015        RESOLVED: Gastrointestinal hemorrhage ICD-10-CM: K92.2  ICD-9-CM: 578.9  7/28/2014 - 12/29/2015        RESOLVED: Lower urinary tract infectious disease ICD-10-CM: N39.0  ICD-9-CM: 599.0  7/28/2014 - 12/29/2015        RESOLVED: Upper gastrointestinal hemorrhage ICD-10-CM: K92.2  ICD-9-CM: 578.9  3/11/2014 - 12/29/2015        RESOLVED: Cardiac disease ICD-10-CM: I51.9  ICD-9-CM: 429.9  2/11/2014 - 12/29/2015    Overview Signed 8/13/2015 11:13 AM by David Friedman     Overview:   A.  Echo (3/12/13)EF 65%; probable aortic stenosis with trace AR; PAP 36mmHg. B. Lexiscan NST (2/10/14)EF 71%; small area of mild apical ischemia. C. Echo 2/12/14 EF 19%, Mild Diastolic Dysfunction, LAE, Mod AS (1.2cm2), Mid MR/AR/MO  D, Cardiac CTA: 2/18/14 1. Minor nonobstructive epicardial coronary artery disease appropriate for medical therapy. 2. Unremarkable great vessels, systemic thoracic veins and pulmonary veins. 3. Thin noncalcified pericardium with no evidence of pericardial effusion.               RESOLVED: Chest pain ICD-10-CM: R07.9  ICD-9-CM: 786.50  2/9/2014 - 12/29/2015        RESOLVED: Hematuria ICD-10-CM: R31.9  ICD-9-CM: 599.70 2/9/2014 - 12/29/2015        RESOLVED: Aphthous ulcer ICD-10-CM: K12.0  ICD-9-CM: 528.2  1/13/2014 - 12/29/2015        RESOLVED: Chronic pain ICD-10-CM: G89.29  ICD-9-CM: 338.29  12/25/2013 - 12/29/2015        RESOLVED: Disorder of uterus ICD-10-CM: N85.9  ICD-9-CM: 621.9  10/24/2013 - 12/29/2015    Overview Signed 8/13/2015 11:13 AM by Briana Desai     Overview:   TRANSVAGINAL US 10/23/2013:  Nonspecific echogenic focus in the uterine fundus. No calcification or metallic foreign body seen on recent CT. This could represent a small focus of gas in the endometrial canal. Rounded lesion in the lower uterine segment/cervix of uncertain etiology. A uterine fibroid could have this appearance, however endometrial base neoplastic process is not excluded. Consider direct visualization and/or biopsy if clinically warranted. RESOLVED: Chronic low back pain ICD-10-CM: M54.5, G89.29  ICD-9-CM: 724.2, 338.29  10/23/2013 - 12/29/2015        RESOLVED: Diarrhea ICD-10-CM: R19.7  ICD-9-CM: 787.91  10/23/2013 - 12/29/2015        RESOLVED: History of bilateral oophorectomy ICD-10-CM: Z65.031  ICD-9-CM: V45.77  10/23/2013 - 12/29/2015        RESOLVED: Pelvic and perineal pain ICD-10-CM: R10.2  ICD-9-CM: 625.9  10/23/2013 - 12/29/2015        RESOLVED: Spinal stenosis ICD-10-CM: M48.00  ICD-9-CM: 724.00  10/23/2013 - 12/29/2015        RESOLVED: Vaginal bleeding ICD-10-CM: N93.9  ICD-9-CM: 623.8  10/23/2013 - 12/29/2015        RESOLVED: Generalized abdominal pain ICD-10-CM: R10.84  ICD-9-CM: 789.07  6/22/2013 - 12/29/2015        RESOLVED: Cellulitis ICD-10-CM: L03.90  ICD-9-CM: 682.9  6/22/2013 - 12/29/2015    Overview Signed 8/13/2015 11:13 AM by Briana Desai     Overview:   CT 10/22/2013: Morbid obesity with subcutaneous infiltration and skin thickening along the lower abdominal wall which could represent underlying cellulitis and panniculitis. Correlation with physical examination is suggested. RESOLVED: Bacteremia ICD-10-CM: R78.81  ICD-9-CM: 790.7  3/15/2013 - 12/29/2015        RESOLVED: Hemorrhage complicating a procedure ICD-10-CM: [id number]  ICD-9-CM: 998.11  3/15/2013 - 12/29/2015        RESOLVED: Postoperative state ICD-10-CM: Z98.890  ICD-9-CM: V45.89  2/1/2013 - 12/29/2015        RESOLVED: Small bowel obstruction ([facility] ICD-10-CM: K56.69  ICD-9-CM: 560.9  11/25/2012 - 12/29/2015        RESOLVED: Malignant neoplasm of endometrium ([facility] ICD-10-CM: C54.1  ICD-9-CM: 182.0  9/1/2012 - 12/29/2015        RESOLVED: Adiposity ICD-10-CM: E66.9  ICD-9-CM: 278.02  9/1/2012 - 12/29/2015        RESOLVED: Malignant neoplasm of ovary ([facility] ICD-10-CM: C56.9  ICD-9-CM: 183.0  10/4/2011 - 12/29/2015    Overview Signed 8/13/2015 11:13 AM by [facility]     Overview:   LEFT             RESOLVED: Adnexal mass ICD-10-CM: N94.9  ICD-9-CM: 625.8  9/27/2011 - 12/29/2015        RESOLVED: Localized peritonitis ([facility] ICD-10-CM: K65.9  ICD-9-CM: 567.9  9/27/2011 - 12/29/2015              Discharge Condition:  Stable    Procedures: None         Hospital Course (Including Significant Findings): Ms Yessica Leon is a 56 year very unfortunate WF who is known to our service from previous many consults and prolonged hospitalizations in past at [place] and [facility]. She has multiple medical problems including advanced metastatic endometrial cancer, MO, AS, Anxiety/depression, ch pain, lymphedema, ch leg ulcers. She was admitted at [place] from 3/3 to 3/31/17 for sepsis, UTI, BSI with VRE and also felt to have AV endocarditis. She was on home hospice but revoked on admission. She has mediport for long time and given positive cx was suggested to get out but she refused. She also refused ESSIE and was treated empirically with long term Abx with Dapto.  She also had RLE DVT and was felt from advanced cancer and had placement of IVC given h/o many GI bleed in past. Her course was complicated byu new seizure onset, prolong intubation/resp failure and met encephalopathy. She also had DEV and finally had stents exchanged on 3/31 by urology. She continued to c/o chronic pain and needed pain meds while in hospital. She also had multiple decubitus ulcers including in sacrum and was evaluated by  care and not felt to be infected. Pt and daughter refused hospice and was sent home in stable condition. Pt was seen in Urology office on 5/10 and byrne was changed and ucx sent. She had US in office s/o b/l hydro and was sent to ED as was c/o pain in flank. She was evaluated at [facility] and admitted from 5/11 to 5/30. She had complicated course and reported her byrne was changed for 3 months. She was in DEV and had placement of b/l PCNs 5/11 by IR for severe hydro. She had 1/4 blcx +ve for GPC and was considered contaminant. She was found with severe constipation and required manual disimpaction. Pt also had bleeding from left PCN and byrne which was thought sec to progression of her cancer. She was on Zosyn and developed rash. She was switched to Keflex and discharged on Levaquin + Keflex (both meds in [facility]). She was also evaluated by Psych for depression but didn't think needs any medications. Pt was seen by palliative and went home with home hospice. She says was doing fine and developed increasing flank pain for almost 1 week. She also noted decreased UOP from her PCNs and right was leaking. Her daughter called ED and was advised to come to ED for eval. In ED, she was found with low BP and required levophed. CT was s/o severe hydro on right side. Urology was consulted. Pt was started on Cefepime and Vanco and given her previous many infections and complications and allergies. She was admitted to the unit with broad spectrum antibiotics and eventually changed to daptomycin. Urology saw the patient as well and recommended eventual change of her L stent and R stent removal once her acute issues were over.  Her hemodynamics were stable and she was able to be downgraded to the floor, but she became increasingly lethargic to the point where she lost capacity to make her decisions. Palliative care was consulted and they spoke with the patient's daughter who agreed to transition her to hospice care/comfort care. All of her antibioitics and non-necessary medications were stopped and she was transitioned to a PCA drip of morphine. She has B/L ureteral stents and byrne that need emptied once filled. Visit Vitals    /89 (BP 1 Location: Left arm, BP Patient Position: At rest)    Pulse (!) 109    Temp 97 °F (36.1 °C)    Resp 14    Ht 5' 4\" (1.626 m)    Wt 112 kg (246 lb 14.6 oz)    SpO2 90%    BMI 42.38 kg/m2       Physical Exam at Discharge:  General Appearance: NAD, lethargic  HENT: normocephalic/atraumatic, dry mucus membranes  Lungs: Diminished lung sounds although CTA with normal respiratory effort  CV: RRR, no m/r/g  Abdomen: soft, non-tender, obese, normal bowel sounds  Extremities: no cyanosis, B/L LE pitting edema  Neuro: Unable to assess due to lack of cooperation, opens eyes occasionally  Psych: lethargic, AA&Ox0    Labs Prior to Discharge:  Labs: Results:       Chemistry Recent Labs      06/27/17   1700   GLU  80   NA  143   K  3.9   CL  114*   CO2  19*   BUN  20*   CREA  0.80   CA  7.8*   AGAP  10   BUCR  25*      CBC w/Diff Recent Labs      06/27/17   1700   WBC  12.0   RBC  3.06*   HGB  8.3*   HCT  26.9*   PLT  133*   GRANS  85*   LYMPH  10*   EOS  0      Cardiac Enzymes No results for input(s): CPK, CKND1, LUIS CARLOS in the last 72 hours. No lab exists for component: CKRMB, TROIP   Coagulation No results for input(s): PTP, INR, APTT in the last 72 hours. No lab exists for component: INREXT    Lipid Panel No results found for: CHOL, CHOLPOCT, CHOLX, CHLST, CHOLV, 238645, HDL, LDL, LDLC, DLDLP, 338848, VLDLC, VLDL, TGLX, TRIGL, TRIGP, TGLPOCT, CHHD, CHHDX   BNP No results for input(s): BNPP in the last 72 hours.    Liver Enzymes No results for input(s): TP, ALB, TBIL, AP, SGOT, GPT in the last 72 hours. No lab exists for component: DBIL   Thyroid Studies Lab Results   Component Value Date/Time    TSH 2.82 03/15/2017 10:10 AM            Significant Imaging:  Ct Abd Pelv Wo Cont    Result Date: 6/20/2017  EXAM: CT of the abdomen and pelvis INDICATION: 62-year-old patient with advanced stage endometrial cancer, bilateral nephroureteral stents, and concern for possible infection adjacent to the right percutaneous nephrostomy tube site. Nephrostomy catheter placement occurred at an outside institution on 5/11/2017. COMPARISON: Several prior exams, most recent CT of the abdomen and pelvis available for comparison: March 3, 2017. TECHNIQUE: Axial CT imaging of the abdomen and pelvis was performed without intravenous contrast. Multiplanar reformats were generated. One or more dose reduction techniques were used on this CT: automated exposure control, adjustment of the mAs and/or kVp according to patient's size, and iterative reconstruction techniques. The specific techniques utilized on this CT exam have been documented in the patient's electronic medical record. _______________ FINDINGS: LOWER CHEST: Minor dependent atelectasis is noted at each lung base. No focal pneumonic consolidation, pneumothorax or pleural effusion. Normal cardiac size. No pericardial effusion. There are several pulmonary nodules noted. The largest is present in the right lower lobe (series 4, image 10) which measures approximately 8 x 8 mm in size, and is similar to prior CT of 3/4/2017. Additional right lower lobe pulmonary nodule noted (series 4, image 3), as well as in the left lower lobe (series 4, image 12). The right lower lobe nodule measures approximately 3 mm and is new in appearance from prior, with slight enlargement of the subpleural left lower lobe pulmonary nodule, which measures approximately 5 mm. LIVER, BILIARY: The unenhanced appearance of the liver demonstrates no focal abnormality.  No biliary dilation. The gallbladder is surgically absent. PANCREAS: There is mild fatty infiltration of the pancreas without evidence of pancreatic ductal dilatation. SPLEEN: Normal. ADRENALS: Nodular thickening of the inferior left adrenal gland is similar to prior exam. KIDNEYS/URETERS/BLADDER: On the right, there is percutaneous nephrostomy catheter, with formed loop present with in the interpolar collecting system. In addition, there is a nephroureteral stent, also with proximal formed loop in the interpolar collecting system, and distal formed loop within the bladder. There is moderately severe right-sided hydronephrosis, which is increased from prior CT examination. On the left, a nephroureteral stent is in place, with proximal formed loop noted in the interpolar collecting system, and distal formed loop present within the bladder. Mild left-sided hydronephrosis is present, similar to prior CT exam. A Duarte catheter is present within the bladder, which is decompressed. PELVIC ORGANS: The uterus contains an unremarkable unenhanced appearance. Right adnexal soft tissue lesion noted, measuring approximately 5.1 x 2.9 cm in size, with exact measurements limited by lack of intravenous contrast as well as beam hardening artifact. VASCULATURE: Abdominal aorta is normal in course and caliber. There is an inferior vena cava filter which spans the renal veins, with several tines which project external to the lumen of the inferior vena cava, potentially within the right renal vein (images 46-47). LYMPH NODES: Periaortic adenopathy along the left side of the retroperitoneum is similar in appearance to prior exams. GASTROINTESTINAL TRACT: There is a small hiatal hernia. Small intestine is normal in course and caliber. Moderate burden of formed stool throughout the large intestine, which is similarly normal in caliber. No bowel obstruction. No free intraperitoneal gas.  There is a advanced age sacral decubitus ulcer, which freely communicates with the posterior wall of the rectum (series 3, image 107) with associated soft tissue thickening/stranding at the site of decubitus ulcer formation. BONES: There is accentuation of the lumbar lordosis, with unchanged grade 2 isthmic spondylolisthesis L5 on S1. Multilevel spondylosis noted, most advanced L4-S1. Vertebral body heights are maintained intact. Moderately severe bilateral hip joint osteoarthritis is present. OTHER: Numerous small foci of fat necrosis/fat-containing ventral abdominal hernias are noted within the anterior abdominal wall, similar to prior. Diffuse subcutaneous body wall edema is present, along with redemonstration of multiple abdominal wall collateral vessels, the appearance of which is increased in the interval from prior CT examination. _______________     IMPRESSION: 1. Bilateral nephroureteral stents and percutaneous nephrostomy catheter in stable and expected position. There is moderately severe right hydronephrosis, which is increased in magnitude from prior CT examination, with mild left hydronephrosis, similar to prior. 2. Bladder decompressed with a Duarte catheter. 3. Enlargement of a right adnexal lesion from recent prior CT examination. 4. New and slightly enlarged pulmonary nodules as above. Follow-up per oncology protocol. 5. Inferior vena cava filter which spans across the renal veins as described above. Increased formation of abdominal wall collaterals as well as anasarca noted. 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 6. Progression of an advanced stage sacral decubitus ulcer which directly communicates with the posterior wall of the rectum with associated skin thickening/cellulitis. 7. Unchanged, grade 2 isthmic spondylolisthesis L5-S1. Note: Preliminary report sent to the Emergency Department by the radiology resident at the time of the study.  The additional findings of hydronephrosis and advanced sacral decubitus ulcer were discussed with Dr. Christofer Sherman, hospitalist caring for the patient at 8:25 AM on 6/20/2017. Xr Chest Port    Result Date: 6/20/2017  CHEST AP PORTABLE Indication: Chest pain and shortness of breath. Comparison: 03/26/2017. Findings: Right port with catheter tip near cavoatrial junction. The lungs appear clear. No evidence for pneumothorax or pleural effusion. Cardiac silhouette and pulmonary vascularity appear within normal limits. IMPRESSION: No acute cardiopulmonary disease. Discharge Medications:     Current Discharge Medication List      START taking these medications    Details   glycopyrrolate (ROBINUL) 0.2 mg/mL injection 1 mL by IntraVENous route every four (4) hours as needed. Qty: 1 Vial, Refills: 0      hyoscyamine sulfate (CYSTOSPAZ) 0.125 mg tablet 1 Tab by SubLINGual route every four (4) hours as needed for Other (Secretions) for up to 4 days. Qty: 4 Tab, Refills: 0      collagenase (SANTYL) 250 unit/gram ointment Apply 1 Each to affected area daily. Qty: 15 g, Refills: 0      LORazepam (INTENSOL) 2 mg/mL concentrated solution Take 0.25 mL by mouth every four (4) hours as needed for Anxiety or Agitation for up to 5 days. Max Daily Amount: 3 mg. Qty: 1 Bottle, Refills: 0         CONTINUE these medications which have CHANGED    Details   nystatin (MYCOSTATIN) powder Apply  to affected area two (2) times a day. Qty: 1 Bottle, Refills: 0      triamcinolone acetonide (KENALOG) 0.025 % topical cream Apply 1 g to affected area three (3) times daily.  use thin layer  Qty: 15 g, Refills: 0         STOP taking these medications       trospium (SANCTURA) 20 mg tablet Comments:   Reason for Stopping:         HYDROmorphone (DILAUDID) 4 mg tablet Comments:   Reason for Stopping:         polyethylene glycol (MIRALAX) 17 gram packet Comments:   Reason for Stopping:         LORazepam (ATIVAN) 1 mg tablet Comments:   Reason for Stopping:         morphine CR (MS CONTIN) 30 mg CR tablet Comments:   Reason for Stopping:         famotidine (PEPCID) 20 mg tablet Comments:   Reason for Stopping:         potassium chloride (K-DUR, KLOR-CON) 20 mEq tablet Comments:   Reason for Stopping:         sodium bicarbonate 650 mg tablet Comments:   Reason for Stopping:         ondansetron (ZOFRAN ODT) 4 mg disintegrating tablet Comments:   Reason for Stopping:         levETIRAcetam (KEPPRA) 1,000 mg tablet Comments:   Reason for Stopping:         albuterol-ipratropium (DUO-NEB) 2.5 mg-0.5 mg/3 ml nebu Comments:   Reason for Stopping:         enoxaparin (LOVENOX) 40 mg/0.4 mL Comments:   Reason for Stopping:         furosemide (LASIX) 40 mg tablet Comments:   Reason for Stopping:         magnesium oxide (MAG-OX) 400 mg tablet Comments:   Reason for Stopping:         pantoprazole (PROTONIX) 40 mg tablet Comments:   Reason for Stopping:               Activity: Bedrest    Diet: Comfort feeding    Wound Care: As directed    Follow-up:   Hospice care         Total time spent including time spent on final examination and discharge discussion, discharge documentation and records reviewed and medication reconciliation: > 30 minutes    Vijay Guerra DO  Internal Medicine, Hospitalist  Pager: [phone]

## 2017-06-29 NOTE — PROGRESS NOTES
ID- pt awake appearing, no verbal response to me, no obvious distress, no family at bedside. Pt now DNR pursuing comfort care, abx were stopped yesterday. We will remain available but not actively follow. Call at [phone] if further ID input desired.      Skyla Clark MD

## 2017-06-29 NOTE — PROGRESS NOTES
Called  dtr  To  Tell her  facilities that accepted  Are [facility], she  Asked me  To  Call [facility] , I did  And  They  Can accept . I  Called  dtr back and she  Stated  She  Wants to  Call them,  I repeated to her  You  Want to  Call them? She  Said yes  I said ok, she stated thanks  For  Your permission and hung up. called [facility]  And [facility] told them dtr to call them.

## 2017-06-29 NOTE — ROUTINE PROCESS
Bedside and Verbal shift change report given to Anuradha Soliman RN by Supa Deal RN. Report included the following information SBAR, Kardex, Intake/Output and MAR.

## 2017-06-29 NOTE — PROGRESS NOTES
Spoke to pts dtr told her [facility]  Has changed their acceptance to deny. So only accepting facility is [facility]. She  Argued with me said  She tried to  Call me but  My vm  Was full told her  dont  Have  A vm at this number. They are calling wrong number  She argued with me about that. she  Finally  Agreed to [facility]. Called [facility], ruy'd script   For pca, they will accept tomorrow.

## 2017-06-29 NOTE — PALLIATIVE CARE
Bedside visit with pt who had eyes closed but partially opened them when touched and name called. She immediately closed eyes again. She did not speak or try to speak. No s/s of distress.

## 2017-06-30 NOTE — HOSPICE
[facility] RN Note:  Pt to transport to [facility] 10:30 am.  Pt will be assessed for hospice care at facility. Please call with any questions or concerns. Thank You for allowing us to participate in the care of this patient.      Cordell Javier [facility]   [phone] office/24 hrs/weekends  [phone] cell (M-F 8-5pm)

## 2017-06-30 NOTE — PROGRESS NOTES
[facility]: [phone] ([phone])  [facility]: [phone]   [facility]: [phone]    Patient Name: Giuseppe Ny  YOB: 1960    Date of Initial Consult: 6/20/2017  Reason for Consult: care decisions  Requesting Provider: Dr. Tia Hanna  Primary Care Physician: Kalyan White MD      SUMMARY:   Giuseppe Ny is a 56y.o. year old with a past history of endometrial, ovarian cancer no further chemotherapy or surgical options available, has been on hospice care, chronic pain, AS, Urethral obstruction, who was admitted on 6/19/2017 from home with a diagnosis of fatigue, found to be in septic shock likely due to infected right stent, UTI. Current medical issues leading to Palliative Medicine involvement include: 56year old female who is well known to our service, with end stage endometrial and ovarian cancer, multiple co morbidities who was on hospice care, has now revoked their services. Palliative medicine is consulted to discuss care decisions. PALLIATIVE DIAGNOSES:   1. Metastatic endometrial cancer   2. Sepsis /UTI  3. Anxiety          PLAN:   1. Patient seen at bedside. Very comfortable appearing, opened her eyes this am, voice weak, soft told us she was doing OK. No family at bedside. 2. Goals of care / care decisions Amelie Moritz is no longer able to make her own medical decisions. She has executed an AMD, naming her daughter Tanmay Mullen. Chaplain Richardson and I spoke with Tanmay Mullen and her boyfriend Mauricio at bedside on Wednesday . Tanmay Mullen understands from previous conversation with attending team, that her mother is very ill despite very aggressive treatment. Patient is now a DNR/DNI. A POST form was completed. Amelie Moritz is on comfort measures. 3. Symptom management Morphine PCA infusing low dose at 0.5 mg / hr. She appears very comfortable today. Hospice at facility can also help with symptom management and dose escalation if needed. 4. Disposition to [facility] today per daughter choice. 5. Initial consult note routed to primary continuity provider  6. Communicated plan of care with: Palliative IDT,CM        GOALS OF CARE:     [====Goals of Care====]  GOALS OF CARE:  Patient / health care proxy stated goals: comfort measures       TREATMENT PREFERENCES:   Code Status: DNR    Advance Care Planning:  Advance Care Planning 6/28/2017   Patient's Healthcare Decision Maker is: -   Primary Decision Maker Name -   Primary Decision Maker Phone Number -   Primary Decision Maker Relationship to Patient -   Secondary Decision Maker Name -   Secondary Decision [address] Phone Number -   Secondary Decision Maker Relationship to Patient -   Confirm Advance Directive -   Patient Would Like to Complete Advance Directive -   Does the patient have other document types MOST/MOLST/POST/POLST       Other:    The palliative care team has discussed with patient / health care proxy about goals of care / treatment preferences for patient.  [====Goals of Care====]    Advance Care Planning 6/28/2017   Patient's Healthcare Decision Maker is: -   Primary Decision Maker Name -   Primary Decision Maker Phone Number -   Primary Decision Maker Relationship to Patient -   Secondary Decision [address] Name -   Secondary Decision [address] Phone Number -   Secondary Decision Maker Relationship to Patient -   Confirm Advance Directive -   Patient Would Like to Complete Advance Directive -   Does the patient have other document types MOST/MOLST/POST/POLST           HISTORY:     History obtained from: chart    CHIEF COMPLAINT: septic shock    HPI/SUBJECTIVE:    The patient is:   [] Verbal and participatory  [x] Non-participatory due to:   Please see summary   6/20/2017 CT scan abd / pelvis   Bilateral nephroureteral stents and percutaneous nephrostomy catheter in stable and expected position.  There is moderately severe right hydronephrosis,  which is increased in magnitude from prior CT examination, with mild left hydronephrosis, similar to prior. 2. Bladder decompressed with a Duarte catheter. 3. Enlargement of a right adnexal lesion from recent prior CT examination. 4. New and slightly enlarged pulmonary nodules as above. Follow-up per oncology protocol. 5. Inferior vena cava filter which spans across the renal veins as described above. Increased formation of abdominal wall collaterals as well as anasarca  noted. 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 6. Progression of an advanced stage sacral decubitus ulcer which directly communicates with the posterior wall of the rectum with associated skin thickening/cellulitis. 7. Unchanged, grade 2 isthmic spondylolisthesis L5-S1.   Clinical Pain Assessment (nonverbal scale for nonverbal patients): Pain: 0  Adult Non-Verbal Pain Assessment    Face  [x] 0   No particular expression or smile  [] 1   Occasional grimace, tearing, frowning, wrinkled forehead  [] 2   Frequent grimace, tearing, frowning, wrinkled forehead    Activity (movement)  [x] 0   Lying quietly, normal position  [] 1   Seeking attention through movement or slow, cautious movement  [] 2   Restless, excessive activity and/or withdrawal reflexes    Guarding  [x] 0   Lying quietly, no positioning of hands over areas of body  [] 1   Splinting areas of the body, tense  [] 2   Rigid, stiff    Physiology (vital signs)  [x] 0   Stable vital signs  [] 1   Change in any of the following: SBP > 20mm Hg; HR > 20/minute  [] 2   Change in any of the following: SBP > 30mm Hg; HR > 25/minute    Respiratory  [x] 0   Baseline RR/SpO2, compliant with ventilator  [] 1   RR > 10 above baseline, or 5% drop SpO2, mild asynchrony with ventilator  [] 2   RR > 20 above baseline, or 10% drop SpO2, asynchrony with ventilator    Total Non-Verbal Pain Score: 0       FUNCTIONAL ASSESSMENT:     Palliative Performance Scale (PPS): 20%          PSYCHOSOCIAL/SPIRITUAL SCREENING:     Advance Care Planning:  Advance Care Planning 6/28/2017   Patient's Healthcare Decision Maker is: -   Primary Decision Maker Name -   Primary Decision Maker Phone Number -   Primary Decision Maker Relationship to Patient -   Secondary Decision Maker Name -   Secondary Decision Maker Phone Number -   Secondary Decision Maker Relationship to Patient -   Confirm Advance Directive -   Patient Would Like to Complete Advance Directive -   Does the patient have other document types MOST/MOLST/POST/POLST        Any spiritual / Anabaptist concerns:  [] Yes /  [] No    Caregiver Burnout:  [] Yes /  [] No /  [] No Caregiver Present      Anticipatory grief assessment:   [] Normal  / [x] Maladaptive       ESAS Anxiety:      ESAS Depression:          REVIEW OF SYSTEMS:     Positive and pertinent negative findings in ROS are noted above in HPI. The following systems were [] reviewed / [x] unable to be reviewed as noted in HPI due to fatigue   Other findings are noted below. Systems: constitutional, ears/nose/mouth/throat, respiratory, gastrointestinal, genitourinary, musculoskeletal, integumentary, neurologic, psychiatric, endocrine. Positive findings noted below. Modified ESAS Completed by: provider           Pain: 0           Dyspnea: 0           Stool Occurrence(s): 1        PHYSICAL EXAM:     Wt Readings from Last 3 Encounters:   05/10/17 136.5 kg (301 lb)   05/06/17 104.8 kg (231 lb)   03/20/17 105 kg (231 lb 7.7 oz)     Blood pressure 133/81, pulse 87, temperature 97.7 °F (36.5 °C), resp. rate 14, height 5' 4\" (1.626 m), weight 112 kg (246 lb 14.6 oz), SpO2 96 %.   Pain:  Pain Scale 1: Numeric (0 - 10)  Pain Intensity 1: 0  Pain Onset 1: chronic  Pain Location 1: Back  Pain Orientation 1: Lower  Pain Description 1: Aching  Pain Intervention(s) 1: Medication (see MAR)       Constitutional: ill appearing lying in bed, opened her eyes this am, weak frail, comfortable appearing   Respiratory: breathing not labored, but swallow   Neurologic: alerted to her name, smiled, voice is weak, soft, did not follow commands, but told me she was comfortable         HISTORY:     Principal Problem:    Septic shock ([address] (2017)    Active Problems:    Endometrial cancer ([address] (3/18/2015)      Anemia (2013)      Morbid obesity ([address] (10/2/2014)      CAD (coronary artery disease) (2015)      DEV (acute kidney injury) ([address] (2015)      UTI (urinary tract infection) (3/3/2017)      Stage 4 skin ulcer of sacral region ([address] (2017)      Past Medical History:   Diagnosis Date    Anxiety and depression     Aortic stenosis     Arthritis     Arthropathy     Cardiac disorder     Cellulitis     left leg    Chronic pain     Endometrial adenocarcinoma (HCC)     Gastric ulcer     H/O ovarian cancer     Hematemesis     Infection     Lymphedema     Munchausen syndrome     Narcotic dependence ([address]     Obesity     Spinal stenosis     Status post partial hysterectomy     Urethral obstruction       Past Surgical History:   Procedure Laterality Date    HX  SECTION      HX ENDOSCOPY      EGD 6 x [facility]    HX HERNIA REPAIR      HX OOPHORECTOMY      HX TONSILLECTOMY      HX VASCULAR ACCESS Right     single lumen power port      Family History   Problem Relation Age of Onset    Arthritis-osteo Mother     Heart Attack Mother     Cancer Father     Heart Attack Father     Hypertension Brother     Stroke Brother      History reviewed, no pertinent family history. Social History   Substance Use Topics    Smoking status: Former Smoker     Quit date: 3/31/2010    Smokeless tobacco: Never Used    Alcohol use No     Allergies   Allergen Reactions    Latex, Natural Rubber Hives and Itching    Clindamycin Hives    Clindamycin Itching    Iodine Hives     Pt denies allergy.   Patient reports this is an allergy to contrast media that caused temporary vision loss and vomiting but is fine if premedicated with antihistamine.  Keflex [Cephalexin] Hives    Linezolid Hives    Nsaids (Non-Steroidal Anti-Inflammatory Drug) Nausea and Vomiting     Emesis, Previous GI bleed    Piperacillin-Tazobactam Hives     Has tolerated Teflaro @ [address]  Has been tolerating cephalosporins SAPH     Sulfa (Sulfonamide Antibiotics) Hives and Contact Dermatitis    Vancomycin Hives      Current Facility-Administered Medications   Medication Dose Route Frequency    LORazepam (INTENSOL) 2 mg/mL oral concentrate 0.5 mg  0.5 mg SubLINGual Q4H PRN    glycopyrrolate (ROBINUL) injection 0.2 mg  0.2 mg IntraVENous Q4H PRN    hyoscyamine sulfate (CYSTOSPAZ) tablet 0.125 mg  0.125 mg SubLINGual Q4H PRN    morphine (PF)  mg/30 ml   IntraVENous CONTINUOUS    dextrose 5% infusion  25 mL/hr IntraVENous CONTINUOUS    sodium hypochlorite (QUARTER STRENGTH DAKIN'S) 0.125% irrigation (bottle)   Topical DAILY    collagenase (SANTYL) 250 unit/gram ointment   Topical DAILY    morphine injection 2 mg  2 mg IntraVENous Q4H PRN    nystatin (MYCOSTATIN) 100,000 unit/gram powder   Topical BID    triamcinolone acetonide (KENALOG) 0.025 % cream 1 g  1 g Topical TID    sodium chloride (NS) flush 5-10 mL  5-10 mL IntraVENous PRN     Current Outpatient Prescriptions   Medication Sig    nystatin (MYCOSTATIN) powder Apply  to affected area two (2) times a day.  triamcinolone acetonide (KENALOG) 0.025 % topical cream Apply 1 g to affected area three (3) times daily. use thin layer    glycopyrrolate (ROBINUL) 0.2 mg/mL injection 1 mL by IntraVENous route every four (4) hours as needed.  hyoscyamine sulfate (CYSTOSPAZ) 0.125 mg tablet 1 Tab by SubLINGual route every four (4) hours as needed for Other (Secretions) for up to 4 days.  collagenase (SANTYL) 250 unit/gram ointment Apply 1 Each to affected area daily.     LORazepam (INTENSOL) 2 mg/mL concentrated solution Take 0.25 mL by mouth every four (4) hours as needed for Anxiety or Agitation for up to 5 days. Max Daily Amount: 3 mg. LAB AND IMAGING FINDINGS:     Lab Results   Component Value Date/Time    WBC 12.0 06/27/2017 05:00 PM    HGB 8.3 06/27/2017 05:00 PM    PLATELET 060 03/90/5283 05:00 PM     Lab Results   Component Value Date/Time    Sodium 143 06/27/2017 05:00 PM    Potassium 3.9 06/27/2017 05:00 PM    Chloride 114 06/27/2017 05:00 PM    CO2 19 06/27/2017 05:00 PM    BUN 20 06/27/2017 05:00 PM    Creatinine 0.80 06/27/2017 05:00 PM    Calcium 7.8 06/27/2017 05:00 PM    Magnesium 1.8 06/27/2017 05:00 PM    Phosphorus 2.2 06/27/2017 05:00 PM      Lab Results   Component Value Date/Time    AST (SGOT) 16 06/19/2017 07:45 PM    Alk. phosphatase 198 06/19/2017 07:45 PM    Protein, total 5.6 06/19/2017 07:45 PM    Albumin 1.4 06/23/2017 11:56 PM    Globulin 4.5 06/19/2017 07:45 PM     Lab Results   Component Value Date/Time    INR 1.4 06/21/2017 04:08 AM    Prothrombin time 16.3 06/21/2017 04:08 AM    aPTT 48.6 06/19/2017 07:45 PM      Lab Results   Component Value Date/Time    Iron 33 12/29/2015 09:30 AM    TIBC 301 12/29/2015 09:30 AM    Iron % saturation 11 12/29/2015 09:30 AM    Ferritin 75 08/11/2015 07:04 AM      No results found for: PH, PCO2, PO2  No components found for: [place]   Lab Results   Component Value Date/Time    CK 37 06/20/2017 12:30 PM    CK - MB 5.7 03/16/2017 11:30 AM              Total time: 25 minutes   Counseling / coordination time, spent as noted above: 15 minutes   > 50% counseling / coordination?: yes     Prolonged service was provided for  []30 min   []75 min in face to face time in the presence of the patient, spent as noted above. Time Start:   Time End:    Note: this can only be billed with 39916 (initial) or 18011 (follow up). If multiple start / stop times, list each separately.

## 2017-06-30 NOTE — PROGRESS NOTES
Have been  On  Hold  With [facility] for 20 min  In order to  Set up stretcher transport  For  Pt.

## 2017-06-30 NOTE — ROUTINE PROCESS
0740 assumed care of pt after bedside verbal report was given by off going nurse, pt asleep in bed quietly, D5 saline infusing at 25 ml/hr,  morphine pca infusing     1233 pt resting in bed quietly, no distress noted, will monitor     1601 pt asleep in bed, no changes noted    1949 Bedside and Verbal shift change report given to Alfredo Ospina (oncoming nurse) by SHELLY Catalan (offgoing nurse). Report included the following information SBAR, Kardex and MAR.

## 2017-06-30 NOTE — PROGRESS NOTES
Care Management Interventions  PCP Verified by CM: Yes  Palliative Care Consult (Criteria: CHF and RRAT>21): No  Reason for No Palliative Care Consult: Other (see comment)  Mode of Transport at Discharge:  Other (see comment)  Transition of Care Consult (CM Consult): Rupa: Yes  Discharge Durable Medical Equipment: No  Physical Therapy Consult: No  Occupational Therapy Consult: No  Speech Therapy Consult: No  Current Support Network: Relative's Home  Confirm Follow Up Transport: Family  Plan discussed with Pt/Family/Caregiver: Yes  Freedom of Choice Offered: Yes  Discharge Location  Discharge Placement: Home with hospice

## 2017-06-30 NOTE — PROGRESS NOTES
Notified [facility] rep, Ailyn Baugh, the pt will be transported to [facility] @ 56. She reported they are in the process of coordinating hospice care at the facility.

## 2017-06-30 NOTE — PROGRESS NOTES
Per [facility], pt has [facility] , they transferred me to  [facility] stated they do  Not need  auth for  [employer] transport. [facility]  Set transport  For 1030 am to [place]. Pcs completed. Left message  On  dtrs vm. Post and  Scripts  For  pca placed in  Envelope , fax'd them yesterday, dc summ in  Envelope, called grge at [place], she  Asked me  To refax  script, I did  So. Placed  Back in envelope.  Transport for [place] with hospice care  At 1030 am.

## 2017-06-30 NOTE — ROUTINE PROCESS
Bedside and Verbal shift change report given to [address] [place] [address] by Rc Pantoja RN. Report included the following information SBAR, Kardex, Intake/Output and MAR.

## 2017-07-07 PROBLEM — R65.20 SEVERE SEPSIS (HCC): Status: ACTIVE | Noted: 2017-01-01

## 2017-07-07 PROBLEM — A41.9 SEVERE SEPSIS (HCC): Status: ACTIVE | Noted: 2017-01-01

## 2017-07-07 NOTE — ED NOTES
Patient received 2 doses of EPI, 1 shock at 1153 for VTach, 1153 NSR noted. 1157 Patient's NorEpi changed to 18 to sustain BP. Patient noted to be in [place], Shocked. 1214 pulse check, NSR noted. Patient given magnesium to control heart rate and rhythm. Override pulled Mag 2g/50ML MD approved to hang this concentration; however, to run 25ML in for a dose of 1g. Needs met.

## 2017-07-07 NOTE — ED TRIAGE NOTES
Patient brought to the ED via EMS, intubated, patient is DNR, DNI, but son (not POA) states last week she said she wanted everything done, daughter who is POA is unavailable

## 2017-07-07 NOTE — PALLIATIVE CARE
D/W Dr Pamela Adair and Dr Mian Younger, as well as Dr Ysabel Serrano. According to [facility], pt revoked DDNR. T/C to daughter, initially her boyfriend \"Norris\" would not let me speak to her. I told him he could not have any PHI nor was he allowed legally to make any medical decisions for pt. Daughter Maximus Burgess then came onto phone and she confirmed that her mother had said she did want CPR, Angela's main concern is that she doesn't want anything she signed (POST) to prevent her mother from getting CPR. Explained she needs to come to [facility] and discuss situation with MD's and that her mother is not going to survive. She said she appreciated the honesty then [facility]" got on phone again, difficult to understand as he was slurring his words and said I had told him someone would be at their house at 3:00. I informed him that I had not said that and asked again to speak to Maximus Burgess, he began cursing and using obscenities and then hung up. Pt needs an Ethics consult.

## 2017-07-07 NOTE — ED PROVIDER NOTES
HPI Comments: 11:43 AM Cj Gutierrez is a 56 y.o. female with history of  who presents to the ED via EMS for respiratory distress. Per EMS, pt is a hospice patient and was DNR however the nursing facility stated the patient verbally states she wanted to be a full code. The son in law speaking for POA, who is currently unavailable wants patient to be full code. Per EMS, they are unaware of her last seen normal.     PCP: Xochitl Prakash MD      The history is provided by the EMS personnel. Past Medical History:   Diagnosis Date    Anxiety and depression     Aortic stenosis     Arthritis     Arthropathy     Cardiac disorder     Cellulitis     left leg    Chronic pain     Endometrial adenocarcinoma (HCC)     Gastric ulcer     H/O ovarian cancer     Hematemesis     Infection     Lymphedema     Munchausen syndrome     Narcotic dependence ([facility] [address]     Obesity     Spinal stenosis     Status post partial hysterectomy     Urethral obstruction        Past Surgical History:   Procedure Laterality Date    HX  SECTION      HX ENDOSCOPY      EGD 6 x [facility]    HX HERNIA REPAIR      HX OOPHORECTOMY      HX TONSILLECTOMY      HX VASCULAR ACCESS Right     single lumen power port         Family History:   Problem Relation Age of Onset    Arthritis-osteo Mother     Heart Attack Mother     Cancer Father     Heart Attack Father     Hypertension Brother     Stroke Brother        Social History     Social History    Marital status:      Spouse name: N/A    Number of children: N/A    Years of education: N/A     Occupational History    Not on file.      Social History Main Topics    Smoking status: Former Smoker     Quit date: 3/31/2010    Smokeless tobacco: Never Used    Alcohol use No    Drug use: No      Comment: hx of thc    Sexual activity: No     Other Topics Concern    Not on file     Social History Narrative         ALLERGIES: Latex, natural rubber; Clindamycin; Clindamycin; Iodine; Keflex [cephalexin]; Linezolid; Nsaids (non-steroidal anti-inflammatory drug); Piperacillin-tazobactam; Sulfa (sulfonamide antibiotics); and Vancomycin    Review of Systems   Unable to perform ROS: Intubated       Vitals:    07/07/17 1250 07/07/17 1252 07/07/17 1255 07/07/17 1318   BP: 97/58 98/63 95/62    Pulse: (!) 111 (!) 111 (!) 111    Resp: 14 14 14    Temp:    97.4 °F (36.3 °C)   SpO2: 100% 100% 100%    Weight:                Physical Exam   Constitutional: She is oriented to person, place, and time. HENT:   Head: Normocephalic and atraumatic. Eyes:   Unresponsive pupils at 3mm. Cardiovascular: Regular rhythm, normal heart sounds and intact distal pulses. Tachycardia present. Tachycardia   Pulmonary/Chest:   Bilateral breath sounds coarse and patient is intubated   Abdominal: Soft. Bowel sounds are normal.   Genitourinary:   Genitourinary Comments: Nephrostomy bag right side. Pus drainage in byrne catheter and bag. Musculoskeletal: Normal range of motion. She exhibits no edema. Mottling toes bilaterally. Dusky colored toes. Neurological: She is alert and oriented to person, place, and time. No cranial nerve deficit. Skin: Skin is warm and dry. Suprascapular hematoma left. Ecchymosis bilateral upper extremities. Skin mottling bilateral fingertips and toes. Nursing note and vitals reviewed. [facility]  ED Course       Cardioversion, electrical  Date/Time: 7/7/2017 11:56 PM  Performed by: Sanna Rangel by: Froilan Alvarez     Consent:     Consent obtained:  Emergent situation    Consent given by:  Healthcare agent  Pre-procedure details:     Cardioversion basis:  Emergent    Rhythm:  Ventricular tachycardia  Attempt one: Shock (Joules):  200  Post-procedure details:     Patient status:  Unresponsive    Patient tolerance of procedure:   Tolerated well, no immediate complications  Cardioversion, electrical  Date/Time: 7/7/2017 12:17 PM  Performed by: Kd Carrasco Felicitas Asif by: Saumya Barrera     Consent:     Consent obtained:  Emergent situation    Consent given by:  Healthcare agent  Pre-procedure details:     Cardioversion basis:  Emergent    Rhythm:  Ventricular tachycardia  Attempt one: Shock (Joules):  200    Shock outcome:  Conversion to normal sinus rhythm  Post-procedure details:     Patient status:  Unresponsive    Patient tolerance of procedure:   Tolerated well, no immediate complications  CRITICAL CARE (ASAP ONLY)  Performed by: Saumya Barrera  Authorized by: Saumya Barrera     Critical care provider statement:     Critical care time (minutes):  75    Critical care was necessary to treat or prevent imminent or life-threatening deterioration of the following conditions:  Sepsis, respiratory failure and shock    Critical care was time spent personally by me on the following activities:  Blood draw for specimens, ordering and performing treatments and interventions, ordering and review of laboratory studies, ordering and review of radiographic studies, pulse oximetry, re-evaluation of patient's condition, review of old charts, vascular access procedures, ventilator management, development of treatment plan with patient or surrogate, discussions with consultants, evaluation of patient's response to treatment, examination of patient and obtaining history from patient or surrogate        Vitals:  Patient Vitals for the past 12 hrs:   Temp Pulse Resp BP SpO2   07/07/17 1318 97.4 °F (36.3 °C) - - - -   07/07/17 1255 - (!) 111 14 95/62 100 %   07/07/17 1252 - (!) 111 14 98/63 100 %   07/07/17 1250 - (!) 111 14 97/58 100 %   07/07/17 1245 - (!) 111 14 109/71 99 %   07/07/17 1240 - (!) 102 14 116/78 -   07/07/17 1235 - (!) 110 14 129/75 -   07/07/17 1230 - (!) 112 15 129/78 -   07/07/17 1225 - (!) 109 14 116/71 99 %   07/07/17 1224 - (!) 110 14 - 100 %   07/07/17 1220 - (!) 110 14 116/66 100 %   07/07/17 1215 - (!) 121 14 116/67 100 %   07/07/17 1210 - (!) 122 14 113/74 100 %   07/07/17 1205 - (!) 123 14 123/82 100 %   07/07/17 1200 - (!) 135 15 145/71 -   07/07/17 1143 - (!) 130 26 - (!) 62 %        Medications ordered:   Medications   sodium chloride (NS) flush 5-10 mL (not administered)   magnesium sulfate 2 g/50 ml 2 gram/50 mL (4 %) IVPB premix pgbk (  Canceled Entry 7/7/17 1222)   NOREPINephrine (LEVOPHED) 8,000 mcg in dextrose 5% 250 mL infusion (5 mcg/min IntraVENous New Bag 7/7/17 1153)   NOREPINephrine (LEVOPHED) 8,000 mcg 8 mg/250 mL (32 mcg/mL) in dextrose 5% 250 mL infusion (18 mcg/min  Rate Change 7/7/17 1200)   EPINEPHrine (ADRENALIN) 0.1 mg/mL syringe 1 mg (1 mg IntraVENous Given 7/7/17 1153)   sodium chloride 0.9 % bolus infusion 3,348 mL (3,348 mL IntraVENous New Bag 7/7/17 1158)   levoFLOXacin (LEVAQUIN) 500 mg in D5W IVPB (500 mg IntraVENous New Bag 7/7/17 1214)   aztreonam (AZACTAM) 1 g in 0.9% sodium chloride (MBP/ADV) 100 mL MBP (1 g IntraVENous New Bag 7/7/17 1214)   magnesium sulfate 1 g/100 ml IVPB (premix or compounded) (1 g IntraVENous New Bag 7/7/17 1221)         Lab findings:  Recent Results (from the past 12 hour(s))   CBC WITH AUTOMATED DIFF    Collection Time: 07/07/17 12:00 PM   Result Value Ref Range    WBC 23.1 (H) 4.6 - 13.2 K/uL    RBC 3.20 (L) 4.20 - 5.30 M/uL    HGB 8.7 (L) 12.0 - 16.0 g/dL    HCT 28.4 (L) 35.0 - 45.0 %    MCV 88.8 74.0 - 97.0 FL    MCH 27.2 24.0 - 34.0 PG    MCHC 30.6 (L) 31.0 - 37.0 g/dL    RDW 17.8 (H) 11.6 - 14.5 %    PLATELET 635 304 - 672 K/uL    MPV 9.9 9.2 - 11.8 FL    NEUTROPHILS 78 (H) 42 - 75 %    LYMPHOCYTES 15 (L) 20 - 51 %    MONOCYTES 7 2 - 9 %    EOSINOPHILS 0 0 - 5 %    BASOPHILS 0 0 - 3 %    ABS. NEUTROPHILS 18.0 (H) 1.8 - 8.0 K/UL    ABS. LYMPHOCYTES 3.5 0.8 - 3.5 K/UL    ABS. MONOCYTES 1.6 (H) 0 - 1.0 K/UL    ABS. EOSINOPHILS 0.0 0.0 - 0.4 K/UL    ABS.  BASOPHILS 0.0 0.0 - 0.1 K/UL    RBC COMMENTS ANISOCYTOSIS  1+        DF MANUAL     PROTHROMBIN TIME + INR    Collection Time: 07/07/17 12:00 PM Result Value Ref Range    Prothrombin time 15.5 (H) 11.5 - 15.2 sec    INR 1.3 (H) 0.8 - 1.2     PTT    Collection Time: 07/07/17 12:00 PM   Result Value Ref Range    aPTT 33.8 23.0 - 81.1 SEC   METABOLIC PANEL, COMPREHENSIVE    Collection Time: 07/07/17 12:00 PM   Result Value Ref Range    Sodium 143 136 - 145 mmol/L    Potassium 4.5 3.5 - 5.5 mmol/L    Chloride 116 (H) 100 - 108 mmol/L    CO2 13 (L) 21 - 32 mmol/L    Anion gap 14 3.0 - 18 mmol/L    Glucose 145 (H) 74 - 99 mg/dL    BUN 42 (H) 7.0 - 18 MG/DL    Creatinine 3.27 (H) 0.6 - 1.3 MG/DL    BUN/Creatinine ratio 13 12 - 20      GFR est AA 18 (L) >60 ml/min/1.73m2    GFR est non-AA 15 (L) >60 ml/min/1.73m2    Calcium 8.3 (L) 8.5 - 10.1 MG/DL    Bilirubin, total 0.3 0.2 - 1.0 MG/DL    ALT (SGPT) 12 (L) 13 - 56 U/L    AST (SGOT) 24 15 - 37 U/L    Alk.  phosphatase 370 (H) 45 - 117 U/L    Protein, total 6.1 (L) 6.4 - 8.2 g/dL    Albumin 1.5 (L) 3.4 - 5.0 g/dL    Globulin 4.6 (H) 2.0 - 4.0 g/dL    A-G Ratio 0.3 (L) 0.8 - 1.7     TYPE & SCREEN    Collection Time: 07/07/17 12:00 PM   Result Value Ref Range    Crossmatch Expiration 07/10/2017     ABO/Rh(D) PENDING     Antibody screen PENDING    MAGNESIUM    Collection Time: 07/07/17 12:00 PM   Result Value Ref Range    Magnesium 2.3 1.6 - 2.6 mg/dL   POC LACTIC ACID    Collection Time: 07/07/17 12:05 PM   Result Value Ref Range    Lactic Acid (POC) 2.1 (HH) 0.4 - 2.0 mmol/L   POC CHEM8    Collection Time: 07/07/17 12:05 PM   Result Value Ref Range    CO2, POC 16 (L) 19 - 24 MMOL/L    Glucose,  (H) 74 - 106 MG/DL    BUN, POC 38 (H) 7 - 18 MG/DL    Creatinine, POC 3.2 (H) 0.6 - 1.3 MG/DL    GFRAA, POC 18 (L) >60 ml/min/1.73m2    GFRNA, POC 15 (L) >60 ml/min/1.73m2    Sodium,  136 - 145 MMOL/L    Potassium, POC 4.4 3.5 - 5.5 MMOL/L    Calcium, ionized (POC) 1.32 1.12 - 1.32 MMOL/L    Chloride,  (H) 100 - 108 MMOL/L    Anion gap, POC 17 10 - 20      Hematocrit, POC 27 (L) 36 - 49 %    Hemoglobin, POC 9.2 (L) 12 - 16 G/DL   POC G3    Collection Time: 07/07/17  1:24 PM   Result Value Ref Range    Device: VENT      FIO2 (POC) 100 %    pH (POC) 7.055 (LL) 7.35 - 7.45      pCO2 (POC) 42.8 35.0 - 45.0 MMHG    pO2 (POC) 68 (L) 80 - 100 MMHG    HCO3 (POC) 12.0 (L) 22 - 26 MMOL/L    sO2 (POC) 84 (L) 92 - 97 %    Base deficit (POC) 18 mmol/L    Mode ASSIST CONTROL      Tidal volume 450 ml    Set Rate 14 bpm    PEEP/CPAP (POC) 5 cmH2O    PIP (POC) 29      Allens test (POC) N/A      Inspiratory Time 1 sec    Total resp. rate 14      Site RIGHT RADIAL      Patient temp.  98.6      Specimen type (POC) ARTERIAL      Performed by Viji Casas     Volume control plus YES         EKG interpretation by ED Physician:       X-Ray, CT or other radiology findings or impressions:  XR CHEST PORT    (Results Pending)       Orders:  Orders Placed This Encounter    SEVERE SEPSIS AND SEPTIC SHOCK BUNDLE INITIATED     Standing Status:   Standing     Number of Occurrences:   1    CULTURE, URINE     Standing Status:   Standing     Number of Occurrences:   1     Order Specific Question:   Reason for Culture     Answer:   Eval of sepsis without a clear source    CULTURE, BLOOD     Standing Status:   Standing     Number of Occurrences:   1    CULTURE, BLOOD     Standing Status:   Standing     Number of Occurrences:   1    XR CHEST PORT     Standing Status:   Standing     Number of Occurrences:   1     Order Specific Question:   Reason for Exam     Answer:   Sepsis    CBC WITH AUTOMATED DIFF     Standing Status:   Standing     Number of Occurrences:   1    PROTHROMBIN TIME + INR     Standing Status:   Standing     Number of Occurrences:   1    PTT     Standing Status:   Standing     Number of Occurrences:   1    METABOLIC PANEL, COMPREHENSIVE     Standing Status:   Standing     Number of Occurrences:   1    URINALYSIS W/ RFLX MICROSCOPIC     Standing Status:   Standing     Number of Occurrences:   1    LACTIC ACID, PLASMA     If initial lactate level is greater than or equal to 2, draw second lactate in 4 hours. Standing Status:   Standing     Number of Occurrences:   2    MAGNESIUM     Standing Status:   Standing     Number of Occurrences:   1    BLOOD GAS, ARTERIAL     Standing Status:   Standing     Number of Occurrences:   1    VITAL SIGNS - PER UNIT ROUTINE     PER UNIT ROUTINE     Standing Status:   Standing     Number of Occurrences:   1    STRICT I & O     Standing Status:   Standing     Number of Occurrences:   1    NEUROLOGIC STATUS ASSESSMENT - PER UNIT ROUTINE     PER UNIT ROUTINE     Standing Status:   Standing     Number of Occurrences:   1    NOTIFY PROVIDER: SPECIFY Notify provider within one hour to start vasopressors if patient is unable to maintain a MAP of greater than or equal to 65 mmHg despite fluid resuscitation CONTINUOUS STAT     Standing Status:   Standing     Number of Occurrences:   1     Order Specific Question:   Please describe the test or procedure you would like to order.      Answer:   Notify provider within one hour to start vasopressors if patient is unable to maintain a MAP of greater than or equal to 65 mmHg despite fluid resuscitation    HEMODYNAMIC MONITORING - MEASURE CVP AND ScvO2     In the event of persistent arterial hypotension after two hours of fluid resuscitation efforts, or initial lactate greater than or equal to 4 mmol/L (36 mg/dl) measure and record CVP and ScvO2     Standing Status:   Standing     Number of Occurrences:   1     Order Specific Question:   Monitoring     Answer:   Central Venous Pressure    ELECTRICAL CARDIOVERSION(ASAP ONLY)     This order was created via procedure documentation     Standing Status:   Standing     Number of Occurrences:   1    ELECTRICAL CARDIOVERSION(ASAP ONLY)     This order was created via procedure documentation     Standing Status:   Standing     Number of Occurrences:   1    CRITICAL CARE (ASAP ONLY)     This order was created via procedure documentation     Standing Status:   Standing     Number of Occurrences:   1    IP CONSULT TO INTENSIVIST     Standing Status:   Standing     Number of Occurrences:   1     Order Specific Question:   Reason for Consult: Answer:   Sepsis     Order Specific Question:   Did you call or speak to the consulting provider? Answer:   No     Order Specific Question:   Consult To     Answer:   ED    IP CONSULT TO PALLIATIVE CARE - PROVIDER     Standing Status:   Standing     Number of Occurrences:   1     Order Specific Question:   Reason for Consult     Answer:   End Stage Disease     Order Specific Question:   Did you call or speak to the consulting provider? Answer:   No     Order Specific Question:   Consult To     Answer:   Dr Angelo Loza, requires contact isolation     Standing Status:   Standing     Number of Occurrences:   1    POC LACTIC ACID     Standing Status:   Standing     Number of Occurrences:   1    POC CHEM8     Standing Status:   Standing     Number of Occurrences:   1    POC G3     Standing Status:   Standing     Number of Occurrences:   1    EKG, 12 LEAD, INITIAL     Standing Status:   Standing     Number of Occurrences:   1     Order Specific Question:   Reason for Exam:     Answer:   Sepsis    TYPE & SCREEN     ENTER SURGERY DATE IF FOR PRE-OP TESTING. Standing Status:   Standing     Number of Occurrences:   1     Order Specific Question:   Has patient been transfused or pregnant in the last 3 mos. ?      Answer:   Unknown    SALINE LOCK IV ONE TIME STAT     Standing Status:   Standing     Number of Occurrences:   1    NOREPINephrine (LEVOPHED) 8,000 mcg 8 mg/250 mL (32 mcg/mL) in dextrose 5% 250 mL infusion     Burke Law   : perfecto override    EPINEPHrine (ADRENALIN) 0.1 mg/mL syringe 1 mg    sodium chloride (NS) flush 5-10 mL    sodium chloride 0.9 % bolus infusion 3,348 mL    levoFLOXacin (LEVAQUIN) 500 mg in D5W IVPB     Order Specific Question:   Antibiotic Indications     Answer:   Sepsis of Unknown Etiology    aztreonam (AZACTAM) 1 g in 0.9% sodium chloride (MBP/ADV) 100 mL MBP     Order Specific Question:   Antibiotic Indications     Answer:   Sepsis of Unknown Etiology    magnesium sulfate 1 g/100 ml IVPB (premix or compounded)    magnesium sulfate 2 g/50 ml 2 gram/50 mL (4 %) IVPB premix pgbk     Amanda Kerns   : cabinet override    NOREPINephrine (LEVOPHED) 8,000 mcg in dextrose 5% 250 mL infusion     Order Specific Question:   Titrate Infusion? Answer:   Yes     Order Specific Question:   Initial Infusion Rate: Answer:   4 mcg/min     Order Specific Question:   Titrate Every 2-5 Minutes In Increments of: Answer:   1 mcg/min     Order Specific Question:   Goal of Therapy is: Answer:   MAP greater than 65 mmHg     Order Specific Question:   Contact Physician for: Answer:   Patient is not achieving goal and is at max rate    IP CONSULT TO HOSPITALIST     Standing Status:   Standing     Number of Occurrences:   1     Order Specific Question:   Reason for Consult: Answer:   OTHER     Order Specific Question:   Did you call or speak to the consulting provider? Answer:   No     Order Specific Question:   Consult To     Answer:   ED    INITIAL PHYSICIAN ORDER: INPATIENT Intensive Care; 3. Patient receiving treatment that can only be provided in an inpatient setting (further clarification in H&P documentation)     Standing Status:   Standing     Number of Occurrences:   1     Order Specific Question:   Status: Answer:   Inpatient [101]     Order Specific Question:   Type of Bed     Answer:   Intensive Care [6]     Order Specific Question:   Inpatient Hospitalization Certified Necessary for the Following Reasons     Answer:   3.  Patient receiving treatment that can only be provided in an inpatient setting (further clarification in H&P documentation)     Order Specific Question:   Admitting Diagnosis Answer:   Severe sepsis [facility]) [[id number]]     Order Specific Question:   Admitting Physician     Answer:   Robert Huerta     Order Specific Question:   Attending Physician     Answer:   Robert Huerta     Order Specific Question:   Estimated Length of Stay     Answer:   3-4 Midnights     Order Specific Question:   Discharge Plan:     Answer:   Home with Office Follow-up       Reevaluation, Progress notes, Consult notes, or additional Procedure notes:   11:38 AM On phone with [address] [place] [address] verifying DNR. Pt intubated upon arrival in ED. 11:51 AM Left upper IO established. 11:52 AM epi given    11:56 AM Pt in Vtach, defibrillation needed. Shock delivered. 11:58 AM Discussed with pharmacist about pt's allergies and the antibiotics for the septic bundle. He will call back. 12:17 PM Shock delivered. 12:52PM  Consult to intensive MD  MINE HAJI    Sepsis Re-Assessment Documentation:       Vital Signs  Temp: 97.4 °F (36.3 °C)  Temp Source: Axillary  Pulse (Heart Rate): (!) 111  Resp Rate: 14  BP: 95/62  MAP (Monitor): 70   Reeval: Cap refill less than 2 seconds. Radial pulse 2+ bilaterally. Skin warm and dry with a map of over 75.   12:49 /78, map: 85    1:09 PM Consult: I discussed care with Dr. Herminia Davis (hospitalist). It was a standard discussion including patient history, chief complaint, available diagnostic results, and predicted treatment course. Agrees to admit patient. 1:13 PM Consult: I discussed care with Dr. Angleo Gibbons (intensivist). It was a standard discussion including patient history, chief complaint, available diagnostic results, and predicted treatment course. Disposition:  Diagnosis:   1. Septic shock ([address]    2. Stage 4 skin ulcer of sacral region ([address]    3.  Acute on chronic respiratory failure, unspecified whether with hypoxia or hypercapnia (HCC)        Disposition:     Follow-up Information     None           Patient's Medications   Start Taking    No medications on file   Continue Taking    COLLAGENASE (SANTYL) 250 UNIT/GRAM OINTMENT    Apply 1 Each to affected area daily. FENTANYL (DURAGESIC) 25 MCG/HR PATCH    1 Patch by TransDERmal route every seventy-two (72) hours. GLYCOPYRROLATE (ROBINUL) 0.2 MG/ML INJECTION    1 mL by IntraVENous route every four (4) hours as needed. MORPHINE (ROXANOL) 100 MG/5 ML (20 MG/ML) CONCENTRATED SOLUTION    Take 0.5 mL by mouth every three (3) hours as needed for Pain. NYSTATIN (MYCOSTATIN) POWDER    Apply  to affected area two (2) times a day. TRIAMCINOLONE ACETONIDE (KENALOG) 0.025 % TOPICAL CREAM    Apply 1 g to affected area three (3) times daily. use thin layer   These Medications have changed    No medications on file   Stop Taking    No medications on file     2:05pm  Spoke with daughter and requested she come to ED regarding mothers medical decision on DNR/DNI status. Palliative care MD to come to bedside per Dr. Meenakshi Roy. Per RN when attempting to change byrne there was so much purulence and infection in vaginal region it could not be replaced. Requested diaper.  MD    Scribe Attestation  Sharlene Barragan scribing for and in the presence of Karey Mckinnon MD (07/07/17)      Physician Attestation  I personally performed the services described in this documentation, reviewed and edited the documentation which was dictated to the scribe in my presence, and it accurately records my words and actions.     Karey Mckinnon MD (07/07/17)      Signed by: Shanti Katz, July 07, 2017 at 12:13 PM

## 2017-07-07 NOTE — PROGRESS NOTES
continued in ED the support of the family of Humberto Aguayo, who is a 56 y.o.,female who is awaiting her compassionate wean from the ventilator support. Plan:  Jameson Ramos will continue to be available to provide pastoral care on an as-needed/requested basis. Provided pager number to patient's nurse and ED .  recommends bedside caregivers page  on duty if patient's family shows signs of acute spiritual or emotional distress.     [facility] Certified [address] [place] [address],[address] [place] [address]    [phone]

## 2017-07-07 NOTE — ED TRIAGE NOTES
The Sepsis Screening has been completed on arrival in the Emergency Department.     Vital signs:  No data found.           =monitored (data validate)       =calculated (manual entry)    Is patient is 31y/o or older, meets 2 or more ABNORMAL VITAL SIGNS below, associated with SUSPECTED INFECTION?  YES    Provider in room, aware     Temperature < 96.8°F (36°C) or > 100.9°F (38.3°C)   Heart Rate > 90 beats per minute   Respiratory Rate > 20 beats per minute   BP < 90 systolic    MAP < 65    IF ANSWER IS YES, CALL A CODE SEPSIS  POC LACTIC ACID ASAP AND BEGIN NURSE DRIVEN SEPSIS ORDERS WHILE AWAITING PROVIDER

## 2017-07-07 NOTE — ED NOTES
The Sepsis Screening has been completed on arrival in the Emergency Department.     Vital signs:  Patient Vitals for the past 4 hrs:   Temp Pulse Resp BP SpO2   07/07/17 1540 - (!) 122 14 92/64 -   07/07/17 1530 - (!) 122 14 92/62 98 %   07/07/17 1520 - (!) 123 14 97/69 -   07/07/17 1515 - (!) 123 14 97/60 -   07/07/17 1510 - (!) 123 14 102/56 98 %   07/07/17 1505 - (!) 123 14 98/54 -   07/07/17 1500 - (!) 124 14 107/63 94 %   07/07/17 1450 - (!) 124 16 106/74 95 %   07/07/17 1440 - (!) 126 21 (!) 149/93 -   07/07/17 1430 - (!) 123 14 128/85 93 %   07/07/17 1420 - (!) 123 14 (!) 117/94 100 %   07/07/17 1410 - (!) 123 14 (!) 121/91 100 %   07/07/17 1400 - (!) 125 14 129/86 100 %   07/07/17 1350 - (!) 124 15 (!) 125/91 100 %   07/07/17 1340 - (!) 123 14 (!) 123/95 100 %   07/07/17 1330 - (!) 123 14 (!) 128/100 100 %   07/07/17 1325 - (!) 123 14 (!) 123/91 100 %   07/07/17 1320 - (!) 124 14 125/76 100 %   07/07/17 1318 97.4 °F (36.3 °C) - - - -   07/07/17 1315 - (!) 124 14 125/83 -   07/07/17 1310 - (!) 121 14 115/78 -   07/07/17 1300 - (!) 119 14 119/81 100 %   07/07/17 1255 - (!) 111 14 95/62 100 %   07/07/17 1252 - (!) 111 14 98/63 100 %   07/07/17 1250 - (!) 111 14 97/58 100 %   07/07/17 1245 - (!) 111 14 109/71 99 %   07/07/17 1240 - (!) 102 14 116/78 -   07/07/17 1235 - (!) 110 14 129/75 -   07/07/17 1230 - (!) 112 15 129/78 -   07/07/17 1225 - (!) 109 14 116/71 99 %   07/07/17 1224 - (!) 110 14 - 100 %   07/07/17 1220 - (!) 110 14 116/66 100 %   07/07/17 1215 - (!) 121 14 116/67 100 %   07/07/17 1210 - (!) 122 14 113/74 100 %   07/07/17 1205 - (!) 123 14 123/82 100 %   07/07/17 1200 - (!) 135 15 145/71 -   07/07/17 1155 - - - (!) 34/19 -       MAP (Monitor): 70    =monitored (data validate)  MAP (Calculated): (!) 24    =calculated (manual entry)    Is patient is 29y/o or older, meets 2 or more ABNORMAL VITAL SIGNS below, associated with SUSPECTED INFECTION?  YES     Temperature < 96.8°F (36°C) or > 100.9°F (38.3°C)   Heart Rate > 90 beats per minute   Respiratory Rate > 20 beats per minute   BP < 90 systolic    MAP < 65    IF ANSWER IS YES, CALL A CODE SEPSIS  POC LACTIC ACID ASAP AND BEGIN NURSE DRIVEN SEPSIS ORDERS WHILE AWAITING PROVIDER

## 2017-07-07 NOTE — Clinical Note
Status[de-identified] Inpatient [101] Type of Bed: Intensive Care [6] Inpatient Hospitalization Certified Necessary for the Following Reasons: 3. Patient receiving treatment that can only be provided in an inpatient setting (further clarification in H&P documentation) Admitting Diagnosis: Severe sepsis ([facility] [[id number]] Admitting Physician: Allison Bravo [[id number]] Attending Physician: Allison Bravo [[id number]] Estimated Length of Stay: 3-4 Midnights Discharge Plan[de-identified] Home with Office Follow-up

## 2017-07-07 NOTE — PROGRESS NOTES
RT to ED for pt being brought in intubated in the field by EMS. Pt is DNR/DNI, but family wants everything done at this time. Dr. Theo Galicia spoke to [facility] who is her son-in-law, but not her POA. Pt placed on mechanical ventilator and tube secured at 23 cm @ the teeth with Holister device. Pt then went into a run of V-Tach that required defib. She has no cough with suction, and is on no sedation. CXR and ABG to be obtained.     KFL

## 2017-07-07 NOTE — HOSPICE
Pt currently enrolled in [facility] and was being seen at [facility]. Per facility staff (Vidal Astudillo), pt told them Friday that she wanted to be a Full Code, so staff called 911 today. Then received a call from pt dtr/MPoa and LALA that they would like for everything to be done for pt, and they would like to revoke pt hospice benefit at this time. Hospice will attempt to present dtr/MPOA with revocation form to sign (have had difficulty reaching/getting call back from dtr/POA ). When I asked dtr if she would be there for awhile, she stated \"I hope so. \"

## 2017-07-07 NOTE — ED NOTES
Patient was noted to be in agony while awaiting comfort levels for extubation and initiation of comfort measures. This MD Edgar Puckett wanted to have a 2mg dose of Morphine administered. The patient's family previously requested 2mg of the available 4mg/hr of PCA to be administered due to grimacing on noted from patient. After making MD Edgar Puckett aware, MD Pato Barton was paged. After 30 minutes of no return call from hospitalist for modification of PCA settings, MD Edgar Puckett and this nurse felt that it was in the best interest of the patient to have additional Morphine ordered. Family agrees. Patient will be given the first dose of the additional Morphine in excess of the pump after verification with pharmacy has been completed. Patient remains ventilated. Needs met.

## 2017-07-07 NOTE — ED NOTES
SEPSIS SUMMARY    · TIME ZERO (time of recognition): 1200  · 2 Sets Blood Cultures Drawn: YES  · Initial POC Lactic Drawn:  YES      · Repeat POC Lactic acid needed? YES    REPEAT POC LACTIC ACID REQUIRED IN 4 HOURS OR PRIOR TO ADMISSION                      FOR ALL INITIAL POC LACTIC ACID GREATER THAN 2  · POC lactic acid results      Lab Results   Component Value Date    LACPOC 2.1 (HH) 07/07/2017    LACPOC 0.8 06/19/2017       · First Antibiotic started within 30 min of TIME ZERO starting with MONOTHERAPY first? YES  · All antibiotics ordered infusion initiated within first 3 hours of TIME ZERO? YES  · Target fluid bolus 30ml/kg initiated for Lactic Acid > 4 or SYS < 90 and/or MAP < 65 x 1 reading? YES  · ED Provider . [id number] completed within 3-6 hours of time zero or prior to admission?  YES    Vital signs:  Patient Vitals for the past 4 hrs:   Temp Pulse Resp BP SpO2   07/07/17 1540 - (!) 122 14 92/64 -   07/07/17 1530 - (!) 122 14 92/62 98 %   07/07/17 1520 - (!) 123 14 97/69 -   07/07/17 1515 - (!) 123 14 97/60 -   07/07/17 1510 - (!) 123 14 102/56 98 %   07/07/17 1505 - (!) 123 14 98/54 -   07/07/17 1500 - (!) 124 14 107/63 94 %   07/07/17 1450 - (!) 124 16 106/74 95 %   07/07/17 1440 - (!) 126 21 (!) 149/93 -   07/07/17 1430 - (!) 123 14 128/85 93 %   07/07/17 1420 - (!) 123 14 (!) 117/94 100 %   07/07/17 1410 - (!) 123 14 (!) 121/91 100 %   07/07/17 1400 - (!) 125 14 129/86 100 %   07/07/17 1350 - (!) 124 15 (!) 125/91 100 %   07/07/17 1340 - (!) 123 14 (!) 123/95 100 %   07/07/17 1330 - (!) 123 14 (!) 128/100 100 %   07/07/17 1325 - (!) 123 14 (!) 123/91 100 %   07/07/17 1320 - (!) 124 14 125/76 100 %   07/07/17 1318 97.4 °F (36.3 °C) - - - -   07/07/17 1315 - (!) 124 14 125/83 -   07/07/17 1310 - (!) 121 14 115/78 -   07/07/17 1300 - (!) 119 14 119/81 100 %   07/07/17 1255 - (!) 111 14 95/62 100 %   07/07/17 1252 - (!) 111 14 98/63 100 %   07/07/17 1250 - (!) 111 14 97/58 100 %   07/07/17 1245 - (!) 111 14 109/71 99 %   07/07/17 1240 - (!) 102 14 116/78 -   07/07/17 1235 - (!) 110 14 129/75 -   07/07/17 1230 - (!) 112 15 129/78 -   07/07/17 1225 - (!) 109 14 116/71 99 %   07/07/17 1224 - (!) 110 14 - 100 %   07/07/17 1220 - (!) 110 14 116/66 100 %   07/07/17 1215 - (!) 121 14 116/67 100 %   07/07/17 1210 - (!) 122 14 113/74 100 %   07/07/17 1205 - (!) 123 14 123/82 100 %   07/07/17 1200 - (!) 135 15 145/71 -   07/07/17 1155 - - - (!) 34/19 -       MAP (Monitor): 70    =monitored (data validate)  MAP (Calculated): (!) 24    =calculated (manual entry)      Additional Labs:    WBC:    Lab Results   Component Value Date/Time    WBC 23.1 07/07/2017 12:00 PM     Bilirubin:    Lab Results   Component Value Date/Time    Bilirubin, total 0.3 07/07/2017 12:00 PM    Bilirubin, direct <0.1 05/06/2017 10:31 AM    Bilirubin NEGATIVE  07/07/2017 12:51 PM     INR:  Lab Results   Component Value Date/Time    INR 1.3 07/07/2017 12:00 PM    INR 1.4 06/21/2017 04:08 AM    INR 1.2 06/19/2017 07:45 PM    Prothrombin time 15.5 07/07/2017 12:00 PM    Prothrombin time 16.3 06/21/2017 04:08 AM    Prothrombin time 14.9 06/19/2017 07:45 PM     Creatinine:  Lab Results   Component Value Date/Time    Creatinine, POC 3.2 07/07/2017 12:05 PM    Creatinine 3.27 07/07/2017 12:00 PM     Platelet Count:    Lab Results   Component Value Date/Time    PLATELET 905 88/67/2447 12:00 PM       SEPSIS CRITERIA MET? YES    Sepsis=2 or more SIRS criteria + infection  SIRS Criteria:   Temperature < 96.8°F (36°C) or > 100.9°F (38.3°C)    Heart Rate > 90 beats per minute    Respiratory Rate > 20 beats per minute    WBC count > 12,000 or <4,000 or > 10% bands     SEVERE SEPSIS CRITERIA MET? NO   SIRS criteria Met? NO   Documented source of infection? NO   Evidence of Organ Dysfunction?  NO    Severe Sepsis = SIRS Criteria + Source of Infection + Organ Dysfunction  Organ Dysfunction is met when the patient has new onset any of the following:   SBP<90 or MAP<65 or decrease in SBP more than 40 points from baseline    Bilirubin>2    INR>1.5 or aPTT>60    Creatinine>2 or UO<0.5 ml/kg/hr for 2 hours    Platelet count < 561,992    Lactate >2    Acute Respiratory Failure (BiPap/CPAP/Ventilator)    SEPTIC SHOCK CRITERIA MET? NO   Severe Sepsis criteria met? NO   Initial Lactic Acid > 4? NO   Persistent hypotension after 30ml/kg fluid bolus completion?  NO    Septic Shock = Severe Sepsis + Any of the following   Initial Lactate > 4    Persistent hypotension defined as 2 consecutive systolic BP's less than 90 and/or MAP less 65 within the first hour after fluid bolus completion

## 2017-07-07 NOTE — PROGRESS NOTES
Staff requested that  come to ED to be of support to family of critically ill patient, Odette Steward, who is a 56 y.o.,female. Patient's daughter Thompson Sylvester and her  were at beside.  present when Dr. Kelvin Suero spoke to Thompson Sylvester (who is Medical POA) about making patient comfortable and then taking her off of the ventilator support. Marzuleika Sylvester was agreeable. With the papers that came with patient from [facility] were both a Durable Do No Resuscitate (DDNR) and a POST document, both stating that patient wanted comfort measures w/o efforts to resuscitate or intubate her. Provided extended support to daughter and partner as they waited for patient to be sedated. Both very receptive to my lifting patient up in prayer.  will continue support as long as needed, especially at the time of the compassionate wean from ventilator support. Plan: This  will be available to family and staff throughout the evening. Beyond that, should patient survive extubation and be taken to a room, chaplains will continue to follow and will provide pastoral care on an as- needed/requested basis.  recommends bedside caregivers page  on duty if patient's family shows signs of acute spiritual or emotional distress.     [facility]  Board Certified [address] [place] [address]    [phone]

## 2017-07-07 NOTE — ED NOTES
Patient was turned and cleaned. Placed chux pads. Patient tolerated turning well with no rhythm changes. Newly noted mottling to bilateral lower extremities and lower abdomen.

## 2017-07-08 LAB
ATRIAL RATE: 122 BPM
CALCULATED P AXIS, ECG09: 76 DEGREES
CALCULATED R AXIS, ECG10: 42 DEGREES
CALCULATED T AXIS, ECG11: 108 DEGREES
DIAGNOSIS, 93000: NORMAL
P-R INTERVAL, ECG05: 126 MS
Q-T INTERVAL, ECG07: 344 MS
QRS DURATION, ECG06: 88 MS
QTC CALCULATION (BEZET), ECG08: 490 MS
VENTRICULAR RATE, ECG03: 122 BPM

## 2017-07-08 NOTE — ED NOTES
Spoke with Dr. Frank March regarding talking to the family to determine the timing of the pending extubation.   Informed that he will talk to the family

## 2017-07-08 NOTE — PROGRESS NOTES
Called to examine patient who has . No response to verbal and tactile stimuli. No respiratory effort. Absent heart sounds and pulses. Pupils fixed and dilated.    Patient pronounced dead at 20:40 PM on 17  Patricia Cunningham MD  8:51 PM  .

## 2017-07-08 NOTE — H&P
[address]  ROUTINE H AND PS    Name:  Sera Palomino  MR#:  [id number]  :  1960  Account #:  [de-identified]  Date of Adm:  2017      REASON FOR ADMISSION:  Sepsis and acute hypoxic respiratory  failure. HISTORY OF PRESENT ILLNESS: The patient is a pleasant 49-year-  old female with a past medical history significant for stage 4 sacral  ulcer present on admission, bladder mass, status post nephrostomy  tube, anemia, coronary artery disease, history of endometrial cancer,  history of GI bleed, chronic lymphedema, kidney injury, a-fib, aortic  stenosis, morbid obesity and anxiety, who was recently discharged  from the hospital on 2017 after a recent hospitalization with  septic shock, UTI and acute kidney injury. The patient was transferred  to [facility] on hospice. The patient was brought to the ER after  the patient was being unresponsive. There was some confusion about  her CODE STATUS. They could not get hold of her daughter. Her  daughter's significant other stated that THE PATIENT WAS  REVERSING HER DNR STATUS TO FULL CODE and there may  have been a similar conversation at the nursing home. Based on not  enough evidence, the patient was intubated and was brought to the  ER, where she was resuscitated after being shocked for v-tach and  receiving medications per ACLS protocol. The patient was started on  pressor, IV fluid and antibiotics for severe sepsis and possible source  being UTI. At the time I evaluated her, the patient was intubated and  unresponsive. Most of the history was obtained from the chart. PAST MEDICAL HISTORY: Recent sepsis, bladder mass, acute  kidney injury, GI bleed, chronic pain, endometrial cancer, lymphedema,  recurrent cellulitis, history of C. diff, anemia, stage 4 sacral ulcer. PAST SURGICAL HISTORY:  , cholecystectomy,  tonsillectomy. SOCIAL HISTORY:  Single; has children. Nonsmoker; nondrinker.     FAMILY HISTORY:  Noncontributory. ALLERGIES:  TO CLINDAMYCIN, IODINE, [place] Roxy,  ZOSYN, SULFA. MEDICATIONS  She was discharged on:  1. Robanul. 2.  Hycosamine. 3.  Santyl. 4.  Ativan. 5.  Kenalog. REVIEW OF SYSTEMS:  The patient cannot provide. PHYSICAL EXAMINATION:  GENERAL:  This is a very ill-appearing female, unresponsive. VITAL SIGNS:  Temperature 97.4, heart rate 122, blood pressure 92/64,  respiratory rate 14, satting 98%. HEENT:  Normocephalic, atraumatic. Oropharynx is difficult to visualize. NECK:  No JVD. HEART:  S1, S2. Tachy but regular. LUNGS:  Clear to auscultation bilaterally. No wheezing. ABDOMEN:  Nontender; nondistended; normoactive bowel sounds. EXTREMITIES:  Trace edema. Extremities are cold to touch. NEUROLOGIC:  Cannot be assessed. ANCILLARY DATA:  WBC 23.1, hemoglobin 8.7, platelets 449. UA:  Too numerous to count WBCs. BUN 42, creatinine 3.27. Chest x-ray:  ET tube 1 cm above the garth; hypoinflation. Right lower lung opacity,  potentially atelectasis. ASSESSMENT AND PLAN  1. Severe sepsis. 2.  Acute hypoxic respiratory failure. 3.  Acute kidney injury. 4.  Leukocytosis secondary to sepsis. 5.  Anemia. 6.  Bladder mass. 7.  Endometrial cancer. 8.  Anxiety. 9.  Depression. 10.  Chronic pain syndrome. 11.  Recurrent urinary tract infection. 12.  History of gastrointestinal bleed. 13.  Coronary artery disease. 14.  Stage 4 sacral ulcer, present on admission. DISCUSSION:  I had a long discussion with the patient's daughter in  the presence of the hospice team and . We discussed the  patient's own wishes to be a DO NOT RESUSCITATE AND DO NOT  INTUBATE. The patient's daughter already completed a POLST form  and admitted her to the nursing home under the care of hospice;  however, secondary to acute change in condition, they brought her to  the ER.   After a long discussion with the patient's daughter regarding  the patient's wishes, she agrees that her mom would benefit from  compassionate extubation after she is comfortable on a morphine drip. We will start with a morphine 2 mg bolus and start 2 mg per hour IV  and advance as needed for comfort. Once the patient is comfortable,  we will have the respiratory therapist extubate the patient and the  patient will be transferred to a private room for end-of-life care. I have  answered all of the patient's daughter's questions regarding end of life  and prognosis. She agreed to terminal extubation and the hospice  team and  stayed with the daughter at the bedside for  support.         Barnabas Homans, MD Ivery Bennett / Cally Ca  D:  07/07/2017   16:56  T:  07/07/2017   23:01  Job #:  [id number]

## 2017-07-08 NOTE — PROGRESS NOTES
returned to the ED to discover that patient Thanh Strong, who was a 56 y.o.,female,  soon after being extubated, at . Family was present at time of death and, per physician, did not want to wait any longer before going home and so did not need the  to come back to be with them.  was not paged or notified in any way, but spent a good deal of time with daughter and her boyfriend earlier in the evening.  arrangements unknown to this .     [facility] Certified [address] [place] [address],[address] [place] [address]    [phone]

## 2017-07-08 NOTE — PROGRESS NOTES
Called to bedside to extubate pt. Family present. I gave them the option to stay or go. They stayed. Pt was suctioned and tube manzo loosened, air removed from cuff and tube pulled. Pt did not gag. She was cold to touch and SP02 was reading 69%. Pt was extubated to 6L nasal canula.

## 2017-07-10 LAB
BACTERIA SPEC CULT: ABNORMAL
GRAM STN SPEC: ABNORMAL
SERVICE CMNT-IMP: ABNORMAL

## 2018-12-19 NOTE — PROGRESS NOTES
Spoke with pt , she  Did not  Answer me, just  Stared, spoke  With  John Pop  From palliative  Care, she  Plans  To  Call dtr  And speak with her about poss placement  With  Comfort measures. will speak  With dtr  After she  Speaks with mal. Statement Selected

## 2019-06-30 NOTE — PROGRESS NOTES
Problem: Dysphagia (Adult)  Goal: *Acute Goals and Plan of Care (Insert Text)  Recommendations:  Diet: mech-soft, chopped/honey-thick liquids (cup sips)  Meds: one at a time in pudding  Aspiration Precautions  Oral Care TID  Other: MBS completed with aspiration on thin and nectar    Goals: Patient will:  1. Tolerate PO trials with 0 s/s overt distress in 4/5 trials  2. Utilize compensatory swallow strategies/maneuvers (decrease bite/sip, size/rate, alt. liq/sol) with min cues in 4/5 trials  3. Perform oral-motor/laryngeal exercises to increase oropharyngeal swallow function with min cues  4. Complete an objective swallow study (i.e., MBSS) to assess swallow integrity, r/o aspiration, and determine of safest LRD, min A - goal met 3/21/17     Outcome: Progressing Towards Goal  SPEECH LANGUAGE PATHOLOGY DYSPHAGIA TREATMENT     Patient: Isabel Diggs (89 y.o. female)  Date: 3/23/2017  Diagnosis: Severe sepsis with septic shock (CODE) (HCC)  UTI (urinary tract infection)  Severe sepsis with septic shock (CODE) (HCC)  Severe thrombocytopenia (HCC)  mucous plugs  mucous plugs Severe sepsis with septic shock (CODE) (HCC)  Procedure(s) (LRB):  BRONCHOSCOPY (N/A) 7 Days Post-Op  Precautions: aspiration Fall, Contact      ASSESSMENT:  Pt observed for dysphagia mgmt; 0 s/sx aspiration with mech-soft/honey thick liquid presentations. Pt accepted mech-soft/HTL via cup sip and tsp presentation with delayed laryngeal elevation to palpation. Recommend continuation of mech-soft/HTL diet with assistance; benefits from (+) reinforcement. SLP will continue to follow as indicated.       Progression toward goals:  [ ]         Improving appropriately and progressing toward goals  [X]         Improving slowly and progressing toward goals  [ ]         Not making progress toward goals and plan of care will be adjusted       PLAN:  Recommendations and Planned Interventions:  Patient continues to benefit from skilled intervention to address the above impairments. Continue treatment per established plan of care. Discharge Recommendations:  Skilled Nursing Facility       SUBJECTIVE:   Patient stated Hugo Butts. OBJECTIVE:   Cognitive and Communication Status:  Neurologic State: Alert, Appropriate for age  Orientation Level: Oriented X4  Cognition: Follows commands  Perception: Appears intact  Perseveration: No perseveration noted  Safety/Judgement: Fall prevention, Awareness of environment  Dysphagia Treatment:  Oral Assessment:  Oral Assessment  Labial: No impairment  Dentition: Intact  Oral Hygiene: good  Lingual: No impairment  Velum: No impairment  Mandible: No impairment  P.O.  Trials:              Patient Position: [id number]              Vocal quality prior to P.O.: No impairment              Consistency Presented: Honey thick liquid, Mechanical soft              How Presented: Self-fed/presented, Spoon              How Much: 8              Bolus Acceptance: No impairment              Bolus Formation/Control: No impairment              Type of Impairment: Delayed              Propulsion: No impairment              Oral Residue: None              Initiation of Swallow: Delayed (# of seconds)              Laryngeal Elevation: Decreased              Aspiration Signs/Symptoms: None              Pharyngeal Phase Characteristics: Poor endurance              Effective Modifications: Small sips and bites              Cues for Modifications: Minimal                                Oral Phase Severity: No impairment              Pharyngeal Phase Severity : Moderate                    PAIN:  Start of Tx: 0  End of Tx: 0      After treatment:   [ ]              Patient left in no apparent distress sitting up in chair  [X]              Patient left in no apparent distress in bed  [X]              Call bell left within reach  [X]              Nursing notified  [ ]              Family present  [ ]              Caregiver present  [ ]              Bed alarm activated COMMUNICATION/EDUCATION:   [X]        Aspiration precautions; swallow safety; compensatory techniques  [ ]        Patient unable to participate in education; education ongoing with staff  [ ]         Posted safety precautions in patient's room.   [ ]         Oral-motor/laryngeal strengthening exercises        LUDY Ybarra  Time Calculation: 15 mins Ambulatory

## 2019-08-07 NOTE — PROGRESS NOTES
1925 Bedside and Verbal shift change report given to MARK Birch RN (oncoming nurse) by Ashely Chu (offgoing nurse). Report included the following information SBAR, Kardex, MAR and Recent Results. Patient in bed sleeping. Call bell within reach, bed in low position, and pt shows no signs of distress. Will continue to monitor. 2035 Notified by [employer] tech pt is tachycardic heart rate increased to 140's with 4 beats of vtach and frequent PACs. Patient in room sleeping with no signs of distress. Will continue to monitor. 200 Spoke with Dr. Mariola Ruano concerning pt's MEW score of 7, Dr. Mariola Ruano said she will call Pat Shutter from ICU. RN supervisor Arleth Wynn arrived. Orders placed for mag replacement. Will administer and continue to monitor patient. 18 Notified by respiratory patient refusing treatment of Bipap. Educated patient concerning importance of receiving treatment. Patient stated \"I don't care. \"     0100 Notified Dr. Mariola Ruano of patient's rash on arms from possible allergic reaction to new antibiotic daptomycin. Orders received to hold daptomycin and continue to monitor pt's rash. Will continue to monitor. 0750 Bedside and Verbal shift change report given to FOSTER Garcia RN (oncoming nurse) by Lynda Navarro RN (offgoing nurse). Report included the following information SBAR, Kardex, MAR and Recent Results. Scribe Attestation (For Scribes USE Only)... Attending Attestation (For Attendings USE Only).../Scribe Attestation (For Scribes USE Only)...

## 2019-12-04 NOTE — ROUTINE PROCESS
Paged Dr arriaza



PAGER ID:  [phone] 

MESSAGE:  Chanda Karuna Reyes Rm 3012C FYI pt DM 2, BS this am was 202, BS afternoon 177, BS 
night 144. Patient refuses all insulin. TRANSFER - IN REPORT:    Verbal report received from Cecy Villalobos RN (name) on Celia Conception  being received from ED (unit) for routine progression of care      Report consisted of patients Situation, Background, Assessment and   Recommendations(SBAR). Information from the following report(s) ED Summary was reviewed with the receiving nurse. Opportunity for questions and clarification was provided. Assessment completed upon patients arrival to unit and care assumed.

## 2020-09-24 NOTE — PROGRESS NOTES
Patient is due for OT re-evaluation. Pt had RRT called last night; will continue to follow pt and complete re-evaluation as schedule permits and as patient is appropriate. 2nd attempt: [id number]: pt currently receiving EEG. Will follow up tomorrow. Thank you.     Usah Rosas, MS OTR/L  Office Ext: [phone]  Pager: [phone] Will schedule visual field 24-2 to evaluate for glaucoma.

## 2022-01-01 NOTE — H&P
History and Physical    Patient: Dada Dodd               Sex: female          DOA: 3/3/2017       YOB: 1960      Age:  49 y.o.        LOS:  LOS: 1 day        Chief Complaint   Patient presents with    Altered mental status         HPI:     Dada Dodd is a 49 y.o. female who is admitted for severe sepsis and shock, urosepsis and severe thrombocytopenia. Patient was on Hospice until now. She presents with c/o altered mental status via EMS. Patient was found AMS with blood sugar low 40's. There was no IV assess and she was given Glucagon and her mentation and blood sugar improved. Patient in non diabetic. She has endometrial cancer. In ED she has urosepsis and hypotension requiring pressor support after 30 cc/kg BOLUS normal saline. Patient has Mediport. She also was febrile with Tmax 101.6. Denies cough, sob, chest pain. She was started on antibiotics . Hx VRE UTI noted. She reports having foul smelling urine and vaginal discharge. Patient has a platelet level of 17,251. No evidence of bleed. CT head not yet obtained. Patient will be admitted for further treatment. Intensivist consulted. Past Medical History:   Diagnosis Date    Anxiety and depression     Aortic stenosis     Aortic stenosis     Arthritis     Arthropathy     Back pain     Cardiac disorder     Cellulitis     left leg    Cellulitis     Chronic pain     Endometrial adenocarcinoma (HCC)     Endometrial cancer (HCC)     Endometrial hyperplasia     Gastric ulcer     H/O ovarian cancer     Heart murmur     Hematemesis     History of blood transfusion     History of endometrial cancer     Infection     Lymphedema     Munchausen syndrome     Narcotic dependence (HCC)     Obesity     Ovarian cancer (HCC)     Spinal stenosis     Status post partial hysterectomy     Super-super obese (HCC)     Urethral obstruction    .     Past Surgical History:   Procedure Laterality Date    HX  SECTION      HX ENDOSCOPY      EGD 6 x [facility]    HX HERNIA REPAIR      HX OOPHORECTOMY      HX TONSILLECTOMY      HX VASCULAR ACCESS Right     single lumen power port       No current facility-administered medications on file prior to encounter. Current Outpatient Prescriptions on File Prior to Encounter   Medication Sig Dispense Refill    dronabinol (MARINOL) 2.5 mg capsule Take 1 Cap by mouth two (2) times a day. Max Daily Amount: 5 mg. 60 Cap 0    HYDROmorphone (DILAUDID) 4 mg tablet Take 1 Tab by mouth every three (3) hours as needed. Max Daily Amount: 32 mg. 45 Tab 0    metoprolol succinate (TOPROL-XL) 25 mg XL tablet Take 1 Tab by mouth daily. 30 Tab 0    morphine CR (MS CONTIN) 30 mg CR tablet Take 1 Tab by mouth every twelve (12) hours. Max Daily Amount: 60 mg. 14 Tab 0    ondansetron (ZOFRAN ODT) 4 mg disintegrating tablet Take 1 Tab by mouth every six (6) hours as needed for Nausea (vomiting). 30 Tab 0    pantoprazole (PROTONIX) 40 mg tablet Take 1 Tab by mouth Before breakfast and dinner. 60 Tab 0    oxyCODONE-acetaminophen (PERCOCET) 5-325 mg per tablet Take 1 Tab by mouth every four (4) hours as needed for Pain. Max Daily Amount: 6 Tabs. 8 Tab 0       Social History     Social History    Marital status:      Spouse name: N/A    Number of children: N/A    Years of education: N/A     Occupational History    Not on file. Social History Main Topics    Smoking status: Former Smoker    Smokeless tobacco: Never Used    Alcohol use No    Drug use: No      Comment: hx of thc    Sexual activity: No     Other Topics Concern    Not on file     Social History Narrative       Prior to Admission Medications   Prescriptions Last Dose Informant Patient Reported? Taking? HYDROmorphone (DILAUDID) 4 mg tablet   No No   Sig: Take 1 Tab by mouth every three (3) hours as needed. Max Daily Amount: 32 mg.   dronabinol (MARINOL) 2.5 mg capsule   No No   Sig: Take 1 Cap by mouth two (2) times a day.  Max Daily Amount: 5 mg.   metoprolol succinate (TOPROL-XL) 25 mg XL tablet   No No   Sig: Take 1 Tab by mouth daily. morphine CR (MS CONTIN) 30 mg CR tablet   No No   Sig: Take 1 Tab by mouth every twelve (12) hours. Max Daily Amount: 60 mg.   ondansetron (ZOFRAN ODT) 4 mg disintegrating tablet   No No   Sig: Take 1 Tab by mouth every six (6) hours as needed for Nausea (vomiting). oxyCODONE-acetaminophen (PERCOCET) 5-325 mg per tablet   No No   Sig: Take 1 Tab by mouth every four (4) hours as needed for Pain. Max Daily Amount: 6 Tabs. pantoprazole (PROTONIX) 40 mg tablet   No No   Sig: Take 1 Tab by mouth Before breakfast and dinner. Facility-Administered Medications: None       Family History   Problem Relation Age of Onset    Arthritis-osteo Mother     Heart Attack Mother     Cancer Father     Heart Attack Father     Hypertension Brother     Stroke Brother        Review of Systems   Constitutional: Positive for fever and malaise/fatigue. HENT: Negative. Eyes: Negative. Respiratory: Negative. Cardiovascular: Negative. Gastrointestinal: Positive for abdominal pain and nausea. Genitourinary: Negative. Musculoskeletal: Positive for myalgias. Skin: Negative. Endo/Heme/Allergies: Negative. Psychiatric/Behavioral: Negative. Physical Exam:       Visit Vitals    BP (!) 75/61    Pulse 100    Temp (!) 37.3 °F (2.9 °C)    Resp 20    Ht 5' 4\" (1.626 m)    Wt 122.5 kg (270 lb)    SpO2 100%    BMI 46.35 kg/m2       Physical Exam   Constitutional: She is oriented to person, place, and time. She appears well-developed and well-nourished. She appears lethargic. She appears ill. No distress. Eyes: No scleral icterus. Neck: Neck supple. Cardiovascular: Regular rhythm, S1 normal, S2 normal and normal heart sounds. Tachycardia present. No murmur heard. capp refill < 3 sec   Pulmonary/Chest: Effort normal and breath sounds normal. No respiratory distress.  She has no wheezes. She has no rales. Abdominal: Soft. She exhibits distension. There is tenderness. There is guarding. There is no rebound. Musculoskeletal: She exhibits edema. Neurological: She is oriented to person, place, and time. She appears lethargic. Skin: Skin is warm. No rash noted. No erythema. No pallor. Psychiatric: She has a normal mood and affect. Ancillary Studies: All lab and imaging reviewed for the past 24 hours. Recent Results (from the past 24 hour(s))   GLUCOSE, POC    Collection Time: 03/03/17  7:17 PM   Result Value Ref Range    Glucose (POC) <30.0 (LL) 70 - 110 mg/dL   GLUCOSE, POC    Collection Time: 03/03/17  7:25 PM   Result Value Ref Range    Glucose (POC) 55 (L) 70 - 290 mg/dL   METABOLIC PANEL, COMPREHENSIVE    Collection Time: 03/03/17  7:47 PM   Result Value Ref Range    Sodium 127 (L) 136 - 145 mmol/L    Potassium 5.3 3.5 - 5.5 mmol/L    Chloride 92 (L) 100 - 108 mmol/L    CO2 9 (LL) 21 - 32 mmol/L    Anion gap 26 (H) 3.0 - 18 mmol/L    Glucose 440 (HH) 74 - 99 mg/dL     (H) 7.0 - 18 MG/DL    Creatinine 6.55 (H) 0.6 - 1.3 MG/DL    BUN/Creatinine ratio 25 (H) 12 - 20      GFR est AA 8 (L) >60 ml/min/1.73m2    GFR est non-AA 7 (L) >60 ml/min/1.73m2    Calcium 7.6 (L) 8.5 - 10.1 MG/DL    Bilirubin, total 2.2 (H) 0.2 - 1.0 MG/DL    ALT (SGPT) 46 13 - 56 U/L    AST (SGOT) 82 (H) 15 - 37 U/L    Alk.  phosphatase 450 (H) 45 - 117 U/L    Protein, total 5.9 (L) 6.4 - 8.2 g/dL    Albumin 1.4 (L) 3.4 - 5.0 g/dL    Globulin 4.5 (H) 2.0 - 4.0 g/dL    A-G Ratio 0.3 (L) 0.8 - 1.7     LIPASE    Collection Time: 03/03/17  7:47 PM   Result Value Ref Range    Lipase 207 73 - 393 U/L   POC LACTIC ACID    Collection Time: 03/03/17  7:47 PM   Result Value Ref Range    Lactic Acid (POC) 7.9 (HH) 0.4 - 2.0 mmol/L   GLUCOSE, POC    Collection Time: 03/03/17  7:50 PM   Result Value Ref Range    Glucose (POC) 429 (HH) 70 - 110 mg/dL   GLUCOSE, POC    Collection Time: 03/03/17  7:51 PM   Result Value Ref Range    Glucose (POC) 438 (HH) 70 - 110 mg/dL   GLUCOSE, POC    Collection Time: 03/03/17  8:44 PM   Result Value Ref Range    Glucose (POC) 179 (H) 70 - 110 mg/dL   CULTURE, BLOOD    Collection Time: 03/03/17  9:04 PM   Result Value Ref Range    Special Requests: PERIPHERAL      Culture result: PENDING    CULTURE, BLOOD    Collection Time: 03/03/17  9:04 PM   Result Value Ref Range    Special Requests: PERIPHERAL      Culture result: PENDING    CBC WITH AUTOMATED DIFF    Collection Time: 03/03/17  9:04 PM   Result Value Ref Range    WBC 28.4 (H) 4.6 - 13.2 K/uL    RBC 3.30 (L) 4.20 - 5.30 M/uL    HGB 8.8 (L) 12.0 - 16.0 g/dL    HCT 28.2 (L) 35.0 - 45.0 %    MCV 85.5 74.0 - 97.0 FL    MCH 26.7 24.0 - 34.0 PG    MCHC 31.2 31.0 - 37.0 g/dL    RDW 15.7 (H) 11.6 - 14.5 %    PLATELET 10 (LL) 652 - 420 K/uL    NEUTROPHILS 80 (H) 42 - 75 %    BAND NEUTROPHILS 17 (H) 0 - 5 %    LYMPHOCYTES 2 (L) 20 - 51 %    MONOCYTES 1 (L) 2 - 9 %    EOSINOPHILS 0 0 - 5 %    BASOPHILS 0 0 - 3 %    ABS. NEUTROPHILS 22.7 (H) 1.8 - 8.0 K/UL    ABS. LYMPHOCYTES 0.6 (L) 0.8 - 3.5 K/UL    ABS. MONOCYTES 0.3 0 - 1.0 K/UL    ABS. EOSINOPHILS 0.0 0.0 - 0.4 K/UL    ABS. BASOPHILS 0.0 0.0 - 0.1 K/UL    PLATELET COMMENTS PLATELETS APPEAR MARKEDLY  DECREASED.      RBC COMMENTS NORMOCYTIC, NORMOCHROMIC      WBC COMMENTS TOXIC GRANULATION      DF MANUAL     URINALYSIS W/ RFLX MICROSCOPIC    Collection Time: 03/03/17  9:09 PM   Result Value Ref Range    Color DARK YELLOW      Appearance TURBID      Specific gravity 1.018 1.005 - 1.030      pH (UA) 8.0 5.0 - 8.0      Protein 300 (A) NEG mg/dL    Glucose NEGATIVE  NEG mg/dL    Ketone NEGATIVE  NEG mg/dL    Bilirubin NEGATIVE  NEG      Blood LARGE (A) NEG      Urobilinogen 1.0 0.2 - 1.0 EU/dL    Nitrites NEGATIVE  NEG      Leukocyte Esterase LARGE (A) NEG     URINE MICROSCOPIC ONLY    Collection Time: 03/03/17  9:09 PM   Result Value Ref Range    WBC TOO NUMEROUS TO COUNT 0 - 4 /hpf    RBC TOO NUMEROUS TO COUNT 0 - 5 /hpf    Epithelial cells FEW 0 - 5 /lpf    Bacteria 4+ (A) NEG /hpf   EKG, 12 LEAD, INITIAL    Collection Time: 03/03/17  9:17 PM   Result Value Ref Range    Ventricular Rate 127 BPM    Atrial Rate 127 BPM    P-R Interval 130 ms    QRS Duration 86 ms    Q-T Interval 278 ms    QTC Calculation (Bezet) 404 ms    Calculated P Axis 51 degrees    Calculated R Axis 39 degrees    Calculated T Axis 65 degrees    Diagnosis       Sinus tachycardia  Possible Left atrial enlargement  Nonspecific T wave abnormality  Abnormal ECG  When compared with ECG of 01-AUG-2016 08:25,  Vent.  rate has increased BY  70 BPM  T wave inversion now evident in Lateral leads     METABOLIC PANEL, BASIC    Collection Time: 03/03/17 10:15 PM   Result Value Ref Range    Sodium 134 (L) 136 - 145 mmol/L    Potassium 4.6 3.5 - 5.5 mmol/L    Chloride 101 100 - 108 mmol/L    CO2 9 (LL) 21 - 32 mmol/L    Anion gap 24 (H) 3.0 - 18 mmol/L    Glucose 115 (H) 74 - 99 mg/dL     (H) 7.0 - 18 MG/DL    Creatinine 6.10 (H) 0.6 - 1.3 MG/DL    BUN/Creatinine ratio 25 (H) 12 - 20      GFR est AA 9 (L) >60 ml/min/1.73m2    GFR est non-AA 7 (L) >60 ml/min/1.73m2    Calcium 7.2 (L) 8.5 - 10.1 MG/DL   POC LACTIC ACID    Collection Time: 03/04/17 12:26 AM   Result Value Ref Range    Lactic Acid (POC) 3.6 (HH) 0.4 - 2.0 mmol/L       Assessment/Plan     Active Problems:    UTI (urinary tract infection) (3/3/2017)      Severe sepsis with septic shock (CODE) ([facility]) (3/3/2017)      Severe thrombocytopenia ([facility]) (3/4/2017)      Encephalopathy  Hypoglycemia   ARF  Bandemia  Leukocytosis  Leg pain / swelling   Anemia  Endometrial Cancer  Abdominal pain   Metabolic acidosis  Lactic acidosis  Sinus Tachycardia  Hypocalcemia   Hyponatremia  Mediport      PLAN:    Severe sepsis with shock  - 2/2 UTI  - CT abdomen /pelvis pending  - Levophed for pressor support goal MAP > 65  - monitor UOP  - Continue Zyvox, Levaquin, zosyn  - Follow urine and blood culture ordered  - Intensivist consulted    UTI  - hx VRE UTI, Immunosuppression endometrial cancer  - On Zyvox , Zosyn , Levaquin pending Urine culture result  - Recommend ID consult in AM    Severe thrombocytopenia  - Platelet 15,516 and septic   - will transfuse given platelet level less than 30,000 with sepsis  - Transfuse platelets until platelet level > 01,783  - Recommend Hematology / oncology consult in AM    Bandemia   - 2/2 sepsis  - Follow cbc and blood and urine cultures    Encephalopathy  - 2/2 Hypoglycemia vs urosepsis vs hypotension vs uremia  - Improving   - continue to monitor   - CT head pending  - continue to treat likely sources     Leukocytosis   - 2/2 sepsis  - Monitor cbc     Hypoglycemia   - Resolved     ARF  - cr 6.55  - bun 167  - Likeley prerenal with  UREMIA  - IVF   - Needs further evaluation  - Monitor BMP  - Recommend Nephrology consult in AM    Anemia   - Hgb 8.8  - likely 2/2 Endometrial  malignancy   - Transfuse if hemoglobin < 7    Endometrial Cancer  - Patient was on Hospice until today. She has rescinded her DDNR and Hospice and now wants to be FULL CODE  - Recommend oncology consult   - Recommend Palliative care consult   - Pain control prn if and when BP permits given hypotension/ shock    Abdominal Pain  - Lower abdomen  - 2/2 UTI vs Pelvic malignancy   - pain control  - Note CT abdomen and pelvis pending     Lactic acidosis   - 2/2 severe sepsis and shock  - Trend   - IVF     Sinus Tachycardia   - 2/2 sepsis  - Tele   - IVF     ? Hypocalcemia   - Ionized calcium ordered and treat as appropriate     Hyponatremia   - IVF  - BMP ordered    Mediport   - for IV drug use   - In the presence of fever and sepsis may need to be ruled out as a possible source  - ID consult in AM    Vaginal discharge   - Wet prep ordered    DVT prophylaxis - SCD's    GI Prophylaxis - Protonix    Code : FULL CODE .  Patient rescinded hospice and DNR       Jayne Vernon MD  3/4/2017  11:49 PM 2022 17:54

## 2022-04-08 NOTE — PROGRESS NOTES
conducted a Follow up consultation and Spiritual Assessment for Daisy Levy, who is a 49 y.o.,female. The  provided the following Interventions:  Continued the relationship of care and support. Listened empathically. Offered prayer and assurance of continued prayer on patients behalf. Chart reviewed. The following outcomes were achieved:  Patient expressed gratitude for pastoral care visit. Assessment:  There are no further spiritual or Gnosticist issues which require Spiritual Care Services interventions at this time. Plan:  Chaplains will continue to follow and will provide pastoral care on an as needed/requested basis.  recommends bedside caregivers page  on duty if patient shows signs of acute spiritual or emotional distress.      [address] [facility]   Staff [facility]   [phone] Problem: Mobility Impaired (Adult and Pediatric)  Goal: *Acute Goals and Plan of Care (Insert Text)  Description: FUNCTIONAL STATUS PRIOR TO ADMISSION: Patient was independent and active without use of DME.     HOME SUPPORT PRIOR TO ADMISSION: The patient lived alone with son (lives on the property) available to provide assistance. Pt reports his wife recently passed away. Physical Therapy Goals  Weekly Reassessment 4/7/2022 (goals down graded)  1. Patient will move from supine to sit and sit to supine  in bed with CGA within 5 days. 2.  Patient will perform sit to/from stand with Min A within 5 days. 3.  Patient will ambulate 150 feet with least restrictive assistive device and Minimum assistance within 5 days. 4.  Patient will ascend/descend 2 stairs with handrail(s) with moderate assistance  within 5 days. 5.  Patient will perform cardiac exercises per protocol with minimum assistance within 5 days. 6.  Patient will verbally recall and functionally demonstrate mindful-based movements (\"move in the tube\") principles with cues within 5 days. Initiated 3/31/2022  1. Patient will move from supine to sit and sit to supine  in bed with modified independence within 5 days. 2.  Patient will perform sit to/from stand with modified independence within 5 days. 3.  Patient will ambulate 300 feet with least restrictive assistive device and modified independence within 5 days. 4.  Patient will ascend/descend 12 stairs with handrail(s) with independence within 5 days. 5.  Patient will perform cardiac exercises per protocol with modified independence within 5 days. 6.  Patient will verbally recall and functionally demonstrate mindful-based movements (\"move in the tube\") principles without cues within 5 days.           Outcome: Progressing Towards Goal     PHYSICAL THERAPY TREATMENT  Patient: Jay Mccarthy (15 y.o. male)  Date: 4/8/2022  Diagnosis: CAD (coronary artery disease) [I25.10] <principal problem not specified>  Procedure(s) (LRB):  CORONARY ARTERY BYPASS GRAFT (CABG) X3, LIMA. RSVH VIA EVH. ECC. LAWSON AND EPI AORTIC US BY DR Lynnwood Babinski (N/A) 9 Days Post-Op  Precautions: Fall,Sternal,Other (comment) (move in the tube)  Chart, physical therapy assessment, plan of care and goals were reviewed. ASSESSMENT  Patient continues with skilled PT services and is progressing towards goals. RN cleared for activity though reporting pt is sleepy today, no medication given for agitation. Received pt sleeping in bed with a sitter in the room. easily wakes to voice. Pt presents today with significant improvement in his functional mobility, mentation, and at times, mindful based movements with sit<->stand. He transitioned to sitting EOB w/ Min A and sat EOB w/ good unsupported sitting balance. He ambulated around the unit with the RW with CGA x1 and stand by assist of another for safety only d/t h/o waxing/ waning mentation and mobility. He stood at the sink brushing his teeth w/ OT with steady static stand balance. Patient remained sitting up in the chair with the sitter in the room. Recommend RN assist with walks in the costa and to the bathroom w/ the RW. At this time, recommend two person assist d/t waxing/ waning mentation. Discussed w/ RN. Made CM aware of progress made today. Patient is not consistently demonstrating understanding of mindful-based movements (\"move in the tube\") principles of keeping UEs proximal to ribcage to prevent lateral pull on the sternum during load-bearing activities with verbal cues required for compliance. He will occasionally demonstrate mindful based movements though most often does not. Current Level of Function Impacting Discharge (mobility/balance): Min A getting OOB; CGA sit<->stand (bed); Min A sit<->stand (chair);  Ambulates 125 ft w/ RW w/ Min A (stand by assist of another d/t waxing/ waning mentation, strength, functional mobility)    Other factors to consider for discharge: 24/7 caregiver assistance is not available         PLAN :  Patient continues to benefit from skilled intervention to address the above impairments. Continue treatment per established plan of care. to address goals. Recommendation for discharge: (in order for the patient to meet his/her long term goals)  Therapy up to 5 days/week in SNF setting d/t no 24/7 caregivers. Preference is for home to familiar environment with HHPT and 24/7 caregiver assist.       This discharge recommendation:  Has been made in collaboration with the attending provider and/or case management    IF patient discharges home will need the following DME: to be determined (TBD) by SNF. If home, pt reports owning a RW. SUBJECTIVE:   Patient stated Something happened internationally. I don't know what. Do you know what happened?     OBJECTIVE DATA SUMMARY:   Patient mobilized on continuous portable monitor/telemetry. Critical Behavior:  Neurologic State: Drowsy,Confused  Orientation Level: Disoriented to place,Oriented to bypass surgery,Disoriented to time,Oriented to person  Cognition: Memory loss,Poor safety awareness,Followed commands appropriately today,Impaired decision making,Impulsive  Safety/Judgement: Lack of insight into deficits and need for mindful based movement    Functional Mobility Training:  Bed Mobility:     Supine to Sit: Minimum assistance;Assist x1;Additional time     Scooting: Stand-by assistance          Transfers:  Sit to Stand: Contact guard assistance from bed; Min A from chair. Stand to Sit: Contact guard assistance. Demonstrated mindful based movement when returning to sit on 1 of 3 trials. Balance:  Sitting: Intact; Without support  Standing: Impaired; Without support  Standing - Static: Fair;Occasional  Standing - Dynamic : Fair;Constant support    Ambulation/Gait Training:  Distance (ft): 125 Feet (ft)  Assistive Device: Gait belt;Walker, rolling  Ambulation - Level of Assistance: Contact guard assistance; Other (comment) (w/ SBA of another for safety d/t assist required yesterday)        Gait Abnormalities: Other (mild LE flexion)              Speed/Davina: Accelerated     Swing Pattern: Right asymmetrical  Gait with the walker was steady today. Cardiac diagnosis intervention:  Requires reminders for mindful based movements. Will at times follow through w/o       Pain Rating:  None indicated    Activity Tolerance:   Good, Fair, SpO2 stable on RA, and requires rest breaks    After treatment patient left in no apparent distress:   Sitting in chair, Call bell within reach, Bed / chair alarm activated, and sitting in the room    COMMUNICATION/COLLABORATION:   The patients plan of care was discussed with: Occupational therapist and Case management.      Sanam Cadena PT   Time Calculation: 20 mins

## 2023-11-14 NOTE — PROGRESS NOTES
[facility]. Pulmonary, Critical Care, and Sleep Medicine    Name: Suyapa Narayanan MRN: [id number]   : 1960 Hospital: [facility]   Date: 2017  Admission Date: 2017     Chart and notes reviewed. Data reviewed. I have evaluated all findings. [x]I have reviewed the flowsheet and previous days notes. : Still requiring levophed, bcx + for gram + and gram - organisms  : Levophed weaning down, Hb 6.6 but initially refused transfusion, tearful this morning                ROS:A comprehensive review of systems was negative. Events and notes from last 24 hours reviewed. Care plan discussed on multidisciplinary rounds.   Patient Active Problem List   Diagnosis Code    Anemia D64.9    Anxiety F41.9    Nausea and vomiting R11.2    Morbid obesity ([facility] [address] E66.01    Chronic pain syndrome G89.4    History of ovarian cancer Z85.43    History of gastric bypass Z98.890    CAD (coronary artery disease) I25.10    History of Clostridium difficile Z87.19    History of endometrial cancer Z85.42    History of GI bleed Z87.19    Lymphedema I89.0    Hypokalemia E87.6    DEV (acute kidney injury) ([facility] [address] N17.9    Pulmonary nodules R91.8    A-fib ([facility] [address] I48.91    Aortic stenosis I35.0    GI bleed K92.2    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    Acute blood loss anemia D62    Bladder mass N32.89    Flank pain, chronic R10.9, G89.29    UTI (urinary tract infection) N39.0    Severe sepsis with septic shock (CODE) R65.21    Severe thrombocytopenia (HCC) D69.6    Septic shock (HCC) A41.9, R65.21    Ulcer of sacral region, unstageable (HCC) L98.429       Vital Signs:  Visit Vitals    BP (!) 87/60    Pulse 81    Temp 99.4 °F (37.4 °C)    Resp 13    Ht 5' 4\" (1.626 m)    Wt 114.4 kg (252 lb 3.3 oz)    SpO2 98%    BMI 43.29 kg/m2       O2 Device: Room air       Temp (24hrs), Av.4 °F (37.4 °C), Min:99.1 °F (37.3 °C), Max:99.7 °F (37.6 °C)       Intake/Output:   Last shift:         Last 3 shifts: 06/20 1901 - 06/22 0700  In: 3243.3 [P.O.:540; I.V.:2703.3]  Out: 2004 [Urine:2004]    Intake/Output Summary (Last 24 hours) at 06/22/17 0824  Last data filed at 06/22/17 0700   Gross per 24 hour   Intake           786.24 ml   Output             1189 ml   Net          -402.76 ml      Ventilator Settings:                Current Facility-Administered Medications   Medication Dose Route Frequency    insulin lispro (HUMALOG) injection   SubCUTAneous AC&HS    enoxaparin (LOVENOX) injection 40 mg  40 mg SubCUTAneous Q12H    NOREPINephrine (LEVOPHED) 8,000 mcg in dextrose 5% 250 mL infusion  2-16 mcg/min IntraVENous TITRATE    cefepime (MAXIPIME) 2 g in 0.9% sodium chloride (MBP/ADV) 100 mL MBP  2 g IntraVENous Q12H    nystatin (MYCOSTATIN) 100,000 unit/gram powder   Topical BID    polyethylene glycol (MIRALAX) packet 17 g  17 g Oral DAILY    potassium chloride (K-DUR, KLOR-CON) SR tablet 20 mEq  20 mEq Oral BID    sodium bicarbonate tablet 650 mg  650 mg Oral TID    triamcinolone acetonide (KENALOG) 0.025 % cream 1 g  1 g Topical TID    trospium (SANCTURA) tablet 20 mg  20 mg Oral BID    famotidine (PF) (PEPCID) 20 mg in sodium chloride 0.9 % 10 mL injection  20 mg IntraVENous Q12H    DAPTOmycin (CUBICIN) 672 mg in 0.9% sodium chloride 50 mL IVPB  6 mg/kg IntraVENous Q24H       Telemetry:     Physical Exam:   General:  Alert, cooperative, no distress, appears chronically ill   Head:  Normocephalic, without obvious abnormality, atraumatic. Eyes:  Conjunctivae/corneas clear. PERRL, EOMs intact. Nose: Nares normal. Septum midline. Mucosa normal. No drainage or sinus tenderness. Throat: Lips, mucosa, and tongue normal. Teeth and gums normal.   Neck: Supple, symmetrical, trachea midline, no adenopathy, thyroid: no enlargment/tenderness/nodules, no carotid bruit and no JVD. Back:   Symmetric, no curvature. ROM normal.   Lungs:   Clear to auscultation bilaterally.    Chest wall: No tenderness or deformity. Heart:  Regular rate and rhythm, S1, S2 normal, no murmur, click, rub or gallop. Abdomen:   Soft, non-tender. Bowel sounds normal. No masses,  No organomegaly. Extremities: Extremities normal, atraumatic, no cyanosis + edema. Pulses: 2+ and symmetric all extremities. Skin: Skin color, texture, turgor normal. No rashes or lesions   Lymph nodes:        Cervical, supraclavicular, and axillary nodes normal.   Neurologic: Grossly nonfocal       DATA:  MAR reviewed and pertinent medications noted or modified as needed    Labs:  Recent Labs      06/22/17   0355  06/21/17   0408  06/20/17   0320   WBC  10.0  11.3  9.0   HGB  6.6*  7.5*  7.7*   HCT  22.0*  24.7*  25.3*   PLT  210  277  279     Recent Labs      06/22/17   0355  06/21/17   0408  06/20/17   1820  06/20/17   1230   06/19/17   1945   NA  140  141   --   144   --   141   K  4.2  4.1   --   4.2   --   4.4   CL  110*  112*   --   112*   --   108   CO2  19*  20*   --   22   --   23   GLU  115*  134*   --   109*   --   98   BUN  24*  24*   --   26*   --   28*   CREA  1.30  1.32*   --   1.25   --   1.32*   CA  7.0*  7.4*   --   7.1*   --   7.6*   MG  1.8  1.9  1.6  1.6   < >   --    PHOS  2.9  3.2   --   3.5   --    --    ALB   --    --    --    --    --   1.1*   SGOT   --    --    --    --    --   16   ALT   --    --    --    --    --   10*   INR   --   1.4*   --    --    --   1.2    < > = values in this interval not displayed. No results for input(s): PH, PCO2, PO2, HCO3, FIO2 in the last 72 hours. No results for input(s): FIO2I, IFO2, HCO3I, IHCO3, HCOPOC, PCO2I, PCOPOC, IPHI, PHI, PHPOC, PO2I, PO2POC in the last 72 hours. No lab exists for component: IPOC2  Imaging:  [x]                           I have personally reviewed the patients radiographs and reports    High complexity decision making was performed during this consultation and evaluation. [x]       Pt is at high risk for further organ failure and dysfunction. Critical care time spent  minutes with patient exclusive of procedures.     IMPRESSION:    Anemia D64.9    Anxiety F41.9    Nausea and vomiting R11.2    Morbid obesity ([facility] E66.01    Chronic pain syndrome G89.4    History of ovarian cancer Z85.43    History of gastric bypass Z98.890    CAD (coronary artery disease) I25.10    History of Clostridium difficile Z87.19    History of endometrial cancer Z85.42    History of GI bleed Z87.19    Lymphedema I89.0    Hypokalemia E87.6    DEV (acute kidney injury) ([facility] [address] N17.9    Pulmonary nodules R91.8    A-fib ([facility]) I48.91    Aortic stenosis I35.0    GI bleed K92.2    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    Acute blood loss anemia D62    Bladder mass N32.89    Flank pain, chronic R10.9, G89.29    UTI (urinary tract infection) N39.0    Severe sepsis with septic shock (CODE) R65.21    Severe thrombocytopenia ([facility]) D69.6    Septic shock ([facility]) A41.9, R65.21    Ulcer of sacral region, unstageable ([facility]) L98.429     ·       PLAN:   Neuro  Awake, alert, tearful     Cardiovascular  Continue levophed, weaning down  Continue fluid hydration  2D echo with EF 55 - 69%, grade 1 diastolic, PA 45 - 50  Afib rate controlled     Pulmonary  RML pulmonary nodule, suspect mets  Oxygen as needed to keep sats over 90%     GI  Continue pepsid   CT abdomen with enlarged abdominal mass, sacral decub communicating with wall of rectum     Renal  Nephrostomy tubes in place  Creatinine slightly elevated but stable  Renal function improved from May 2017    ID  Septic shock, slowly improving  Continue broad spectrum abx, now dapto  and cefepime  Bcx 6/19 with gram negative rods and non hemolytic strep, staph   ucx 6/19 gram negative rods  Multi organism infection with multiple abx used in the past increasing risk for MDR organism, appreciate ID's assistance with regimen  Continue aggressive hydration     Heme  Chronic anemia  Hb 6.6, receiving transfusion today  Ovarian/endometrial cancer  HO RLE DVT likely 2/2 venous outflow obstruction due to pelvic mass  Hb dropped after restart of lovenox, will need to discuss risk v benefit of full anticoagulation as pt is both a bleed risk and a recurrent VTE risk     Pain  Will need adjustment of chronic pain regimen  Will use caution given risk of dropping BP further and respiratory depression with pain meds     Critical care time 32 minutes    Florencio Hernández MD No action was needed

## 2024-02-27 NOTE — PROGRESS NOTES
Assumed care of sleeping female pt. Pt SR on monitor HR 85. Care Coordination  Note    Care Coordination  (CCSS) outreached Dr. Norman office to check status of waiver. Per Elizabeth (FRANKLIN) - waiver has not been completed. CCSS explained this is an urgent need and completed waiver with clinicals can be faxed back to [facility] fax# [phone]. Elizabeth was understanding of this and stated she would inform Tawny (FRANKLIN).

## 2025-02-18 NOTE — PROGRESS NOTES
Daily Progress Note: 3/28/2017 11:49 AM   Admit Date: 3/3/2017    Patient seen in follow up for multiple medical problems as listed below:  Patient Active Problem List   Diagnosis Code    Anemia D64.9    Anxiety F41.9    Nausea and vomiting R11.2    Morbid obesity ([facility] [address] E66.01    Chronic pain syndrome G89.4    History of ovarian cancer Z85.43    History of gastric bypass Z98.890    CAD (coronary artery disease) I25.10    History of Clostridium difficile Z87.19    History of endometrial cancer Z85.42    History of GI bleed Z87.19    Lymphedema I89.0    Hypokalemia E87.6    DEV (acute kidney injury) ([facility] [address] N17.9    Pulmonary nodules R91.8    [facility] I48.91    Aortic stenosis I35.0    GI bleed K92.2    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 female who is admitted for severe sepsis and shock, urosepsis and severe thrombocytopenia. Patient was on Hospice until now. She presents with c/o altered mental status via EMS with cyanotic/desirae complextion. Patient was found AMS with blood sugar low 40's. There was no IV assess and she was given Glucagon and her mentation and blood sugar improved. Patient in non diabetic. She has endometrial cancer. In ED she has urosepsis and hypotension requiring pressor support after 30 cc/kg BOLUS normal saline. Patient has Mediport. She also was febrile with Tmax 101.6. Denies cough, sob, chest pain. She was started on antibiotics . Hx VRE UTI noted. She reports having foul smelling urine and vaginal discharge. Patient had a platelet level of 66,961 with no bleed requiring platelet transfusion initially.  She improved and was transferred to the floor until an RRT was called 3/11 for acute hypoxic respiratory failure requiring intubation. Bronchospy 3/13 with significant mucous plugging. Repeat bronchoscopy 3/16. Respiratory cultures only with candida. Extubated and re-intubated 3/15. Bronchoscopy 3/16 minimal mucous plugging. Fevers started 3/16 source unknown. Has required prn RBC transfusions for Hg<7. Re-cultured 3/17 however continued fevers and TTE findings of questionable vegetation warrant treatment for endocarditis. Extubated 3/18 and has continued to improve clinically. She will daptomycin until 4/17. She was to be discharged 3/26 but hours prior to discharge she had seizure like activity and remained sleepy afterwards. Mentation has slowly improved she was started on keppra BID. EEG no epileptic activity, CT head no acute findings. MRI head pending.       Unresponsive with New onset seizure  witnessed seizure 2030 on 3/26 given 2m IV ativan. No report of what activity was seen. Drowsy/post ictal 3/27 am. CT head wnl. MRI head pending. EEG no epileptic activity. Neurology consult for further opinion, Initiated on Keppra BID      Endocarditis  -plan for Dapto till 4/17 has central IV access     Hypoxic resp failure - intubated x2, resolved  -Bronchospy 3/13, 3/16 with significant mucous plugging.   -Cx pending on eraxis, azactam, dapto   Extubated 3/18     Subacute RLE DVT  -thought due to obstructing pelvic CA. Present on admission due to subacute nature. -LMWH. S/p IVC filter placement     Severe sepsis with shock and VRE bacteremia  - 2/2 UTI. Leukocytosis, tachy, resolving  - off Levophed for pressor support goal MAP > 65, Zyvox, Levaquin, zosyn initially. - Follow urine and blood culture: GPC in blood on Daptomycin with ID consult + Eraxis and Azactam  - Intensivist consulting      VRE UTI  - hx VRE UTI,again with VRE. Immunosuppression endometrial cancer  - On Zyvox , Zosyn , Levaquin initially then to zyvox with rash.  then Dapto  - repeat UA/Cx 3/14-NGTD     Severe thrombocytopenia  - Platelet down to 9k on admission s/p transfusions x2. keep >25K. resolved      Encephalopathy x2 events  - 2/2 Hypoglycemia vs urosepsis vs hypotension vs uremia-resolved CT head non-acute  - Metabolic encephalopathy again 3/27 - improving. CT head wnl. Etiology unk      Hypoglycemia   - Resolved       ARF  - cr 6.55/[id number] ->improving Cr into 1's  - severe prerenal with UREMIA  - IVF. Na Bicarb prn      Anemia   - Hgb 8.8  - likely 2/2 Endometrial malignancy   - Transfuse if hemoglobin < 7      Hx of Ovarian and Endometrial Cancer with abdominal pain  - Patient was on Hospice until today. Daughter has rescinded her DDNR and Hospice and now wants to be FULL CODE  - Palliative care consult monday  - Lower abdomen pain Likely due to new R adnexal mass from ovarian CA. Discuss further pending hospice plans  - pain control  -Mediport for IV drug use, if no urinary source may need to be ruled out as a possible source      Hyponatremia   Hypokalemia  - IVF corrected. Kcl prn      Vaginal discharge   - Wet prep ordered     Sacral decub ulcers  -POA. Wound care consulting      Chronic problems  1. Recurrent gastrointestinal bleed with multiple  esophagogastroduodenoscopies in the past year with negative results. 2. History of ovarian cancer. 3. History of endometrial cancer. 4. Bladder mass, status post cystoscopy and biopsy results pending. 5. Acute blood loss anemia, stable. 6. Chronic back pain. 7. Chronic abdominal pain. 8. Nausea and vomiting secondary to above      DVT Protocol Active: yes  Code Status:  Partial Code     Disposition: Home tomorrow pending clinical course. Plan for [place] family not wanting further hospice    Subjective:     CC: Altered mental status    Interval History: MRI head still not completed, EEG suggestive of encephalopathic process, Urology holding on stent exchanges. K+ improving, WBC normal. Afebrile.  Mentation improving      Objective:     Visit Vitals    /82    Pulse 88    Temp 98.2 °F (36.8 °C)    Resp 18    Ht 5' 4\" (1.626 m)    Wt 105 kg (231 lb 7.7 oz)    SpO2 100%    BMI 39.73 kg/m2       Temp (24hrs), Av.5 °F (36.9 °C), Min:98.2 °F (36.8 °C), Max:98.7 °F (37.1 °C)        Intake/Output Summary (Last 24 hours) at 17 1050  Last data filed at 17 1010   Gross per 24 hour   Intake          1049.17 ml   Output             1500 ml   Net          -450.83 ml       Gen: sleepy but alert, NAD  HEENT:  KARINA  Neck: No Bruits/JVD   Lungs:   CTAB. Good respiratory effort  Heart:   RR S1 S2 without M/R/G  Abdomen: voery obese,NT, BSX4,   Extremities:   Obese/ LE edema. No cyanosis. Skin:  no jaundice/lesions  Neuro: moving arms.  Patient non-compliant with neuro exam      Data Review:     Meds/Labs/Tests reviewed    Current Shift:     Last three shifts:   1901 -  0700  In: 1099.2 [I.V.:1099.2]  Out: 2900 [Urine:2900]  Recent Labs      17   0450  17   0600  17   0500   WBC  7.7  9.4  7.5   RBC  2.94*  2.99*  2.94*   HGB  8.4*  8.5*  8.3*   HCT  27.7*  27.7*  27.2*   PLT  249  238  226   GRANS  80*  83*  83*   LYMPH  12*  11*  11*   EOS  2  1  1       Recent Labs      17   0450  17   0600  17   0500   BUN  13  15  18   CREA  0.71  0.69  0.76   CA  8.4*  7.9*  7.0*   K  3.2*  3.0*  3.5   NA  138  136  137   CL  94*  92*  93*   CO2  39*  34*  34*   PHOS  2.8  2.7  2.3*   GLU  93  95  84        Lab Results   Component Value Date/Time    Glucose 93 2017 04:50 AM    Glucose 95 2017 06:00 AM    Glucose 84 2017 05:00 AM    Glucose 86 2017 04:00 AM    Glucose 93 2017 04:00 AM          Care coordination with Nursing/Consultants/staff: 10  Prior history, labs, and charting reviewed: 15    Procedures/Imaging:  CT head 3/26 evening  EEG 3/27  MRI head    Total time spent with chart review, patient examination/education, discussion with staff on case,documentation and medication management / adjustment  :  27 Minutes      Dr Nanda Rees [address] [facility]  Hospitalist Division  Pager: [phone]

## (undated) DEVICE — SYR ASSEMB INFL BLLN 60ML --

## (undated) DEVICE — SINGLE USE SUCTION VALVE MAJ-209: Brand: SINGLE USE SUCTION VALVE (STERILE)

## (undated) DEVICE — ARNDT ENDOBRONCHIAL BLOCKER SET WITH SPHERICAL BALLOON: Brand: ARNDT

## (undated) DEVICE — SOL IRRIGATION INJ NACL 0.9% 500ML BTL

## (undated) DEVICE — SOLUTION IRRIG 3000ML 0.9% SOD CHL FLX CONT 0797208] ICU MEDICAL INC]

## (undated) DEVICE — TRAY PREP DRY W/ PREM GLV 2 APPL 6 SPNG 2 UNDPD 1 OVERWRAP

## (undated) DEVICE — SYR 50ML SLIP TIP NSAF LF STRL --

## (undated) DEVICE — MEDI-VAC NON-CONDUCTIVE SUCTION TUBING: Brand: CARDINAL HEALTH

## (undated) DEVICE — TUBING IRRIG L77IN DIA0.241IN L BOR FOR CYSTO W/ NVENT

## (undated) DEVICE — BITE BLK ENDOSCP AD 54FR GRN POLYETH ENDOSCP W STRP SLD

## (undated) DEVICE — 6X9 1.75 MIL 3-W LABGUARD TZ,DISPENS.BOX: Brand: MINIGRIP COMMERCIAL

## (undated) DEVICE — SOLUTION IV STRL H2O 500 ML AQUALITE POUR BTL

## (undated) DEVICE — FLEX ADVANTAGE 1500CC: Brand: FLEX ADVANTAGE

## (undated) DEVICE — (D)STOPCOCK IV 4W STD BOR -- DISC BY MFR NO SUB

## (undated) DEVICE — CATHETER URETH 16FR BLLN 5CC SIL ALLY W/ SIL HYDRGEL 2 W F

## (undated) DEVICE — SINGLE USE BIOPSY VALVE MAJ-210: Brand: SINGLE USE BIOPSY VALVE (STERILE)

## (undated) DEVICE — CONTAINER DRN 20L DISP FLUIDRN LLS

## (undated) DEVICE — SPONGE GZ W4XL4IN COT 12 PLY TYP VII WVN C FLD DSGN

## (undated) DEVICE — AIRLIFE™ MISTY MAX 10™ NEBULIZER WITH 7 FOOT (2.1 M) FEMALE U/CONNECT-IT CRUSH RESISTANT OXYGEN TUBING, BAFFLED TEE ADAPTER (22 MM I.D./ 22 MM O.D.), MOUTHPIECE AND 6 INCH (15 CM) FLEXTUBE: Brand: AIRLIFE™

## (undated) DEVICE — TRAP MUCUS SPECIMEN 40ML -- MEDICHOICE

## (undated) DEVICE — KENDALL 500 SERIES DIAPHORETIC FOAM MONITORING ELECTRODE - TEAR DROP SHAPE ( 30/PK): Brand: KENDALL

## (undated) DEVICE — SYRINGE MED 10ML POLYPR LUERSLIP TIP FLAT TOP W/O SFTY DISP

## (undated) DEVICE — GDWIRE 3CM FLX-TIP 0.038X150CM -- BX/5 SENSOR

## (undated) DEVICE — KIT COLON W/ 1.1OZ LUB AND 2 END

## (undated) DEVICE — SET ADMIN 16ML TBNG L100IN 2 Y INJ SITE IV PIGGY BK DISP

## (undated) DEVICE — BRUSH CYTO L150CM CHN L2MM BRIST DIA1.9MM PTFE S STL BULL

## (undated) DEVICE — CANNULA ORIG TL CLR W FOAM CUSHIONS AND 14FT SUPL TB 3 CHN

## (undated) DEVICE — GDWIREGLDEWIRE 0.038INX150CM --

## (undated) DEVICE — SYRINGE MED 20ML STD CLR PLAS LUERLOCK TIP N CTRL DISP

## (undated) DEVICE — KENDALL SCD EXPRESS SLEEVES, KNEE LENGTH, MEDIUM: Brand: KENDALL SCD

## (undated) DEVICE — Device

## (undated) DEVICE — SOL IRR NACL 0.9% 500ML POUR --